# Patient Record
Sex: FEMALE | Race: WHITE
[De-identification: names, ages, dates, MRNs, and addresses within clinical notes are randomized per-mention and may not be internally consistent; named-entity substitution may affect disease eponyms.]

---

## 2017-01-09 LAB
ALBUMIN SERPL-MCNC: 3.6 G/DL (ref 3.2–4.5)
ALT SERPL-CCNC: 21 U/L (ref 0–55)
ANION GAP SERPL CALC-SCNC: 9 MMOL/L (ref 5–14)
AST SERPL-CCNC: 18 U/L (ref 5–34)
BASOPHILS # BLD AUTO: 0 10^3/UL (ref 0–0.1)
BASOPHILS NFR BLD AUTO: 0 % (ref 0–10)
BILIRUB SERPL-MCNC: 0.5 MG/DL (ref 0.1–1)
BUN SERPL-MCNC: 22 MG/DL (ref 7–18)
BUN/CREAT SERPL: 22
CALCIUM SERPL-MCNC: 8.6 MG/DL (ref 8.5–10.1)
CHLORIDE SERPL-SCNC: 104 MMOL/L (ref 98–107)
CO2 SERPL-SCNC: 22 MMOL/L (ref 21–32)
CREAT SERPL-MCNC: 1.01 MG/DL (ref 0.6–1.3)
EOSINOPHIL # BLD AUTO: 0.2 10^3/UL (ref 0–0.3)
EOSINOPHIL NFR BLD AUTO: 2 % (ref 0–10)
ERYTHROCYTE [DISTWIDTH] IN BLOOD BY AUTOMATED COUNT: 14.6 % (ref 10–14.5)
GFR SERPLBLD BASED ON 1.73 SQ M-ARVRAT: 54 ML/MIN
GLUCOSE SERPL-MCNC: 259 MG/DL (ref 70–105)
LDH SERPL L TO P-CCNC: 319 U/L (ref 125–220)
LYMPHOCYTES # BLD AUTO: 1.5 X 10^3 (ref 1–4)
LYMPHOCYTES NFR BLD AUTO: 23 % (ref 12–44)
MCH RBC QN AUTO: 27 PG (ref 25–34)
MCHC RBC AUTO-ENTMCNC: 30 G/DL (ref 32–36)
MCV RBC AUTO: 88 FL (ref 80–99)
MONOCYTES # BLD AUTO: 0.5 X 10^3 (ref 0–1)
MONOCYTES NFR BLD AUTO: 8 % (ref 0–12)
NEUTROPHILS # BLD AUTO: 4.3 X 10^3 (ref 1.8–7.8)
NEUTROPHILS NFR BLD AUTO: 66 % (ref 42–75)
PLATELET # BLD: 192 10^3/UL (ref 130–400)
PMV BLD AUTO: 10.4 FL (ref 7.4–10.4)
POTASSIUM SERPL-SCNC: 4.9 MMOL/L (ref 3.6–5)
PROT SERPL-MCNC: 6.3 G/DL (ref 6.4–8.2)
RBC # BLD AUTO: 3.55 10^6/UL (ref 4.35–5.85)
RETICS/RBC NFR: 2.41 % (ref 0.5–2.4)
SODIUM SERPL-SCNC: 135 MMOL/L (ref 135–145)
WBC # BLD AUTO: 6.5 10^3/UL (ref 4.3–11)

## 2017-01-09 NOTE — XMS REPORT
Continuity of Care Document

 Created on: 2016



MADISON SCHUMACHER

External Reference #: [id number]

: 1945

Sex: Female



Demographics







 Address  [address] [place]  [address]

 

 Home Phone  [phone]

 

 Preferred Language  English

 

 Marital Status  Unknown

 

 Synagogue Affiliation  Unknown

 

 Race  Unknown

 

 Ethnic Group  Unknown





Author







 Author  [facility]

 

 Organization  [facility]

 

 Address  Unknown

 

 Phone  Unavailable







Support







 Name  Relationship  Address  Phone

 

 KOKO DOTSON DO  Caregiver  [address]

[place]  [address] [phone]

 

 FOX NAVARRO ARNP  Caregiver  [address] [facility] [place]  [address] [phone]

 

 EMILIO SCHUMACHER  Next Of Kin  [address] [place]  [address] [phone]







Care Team Providers







 Care Team Member Name  Role  Phone

 

 KOKO DOTSON DO  PCP  [phone]







Insurance Providers







 Payer Name  Policy Number  Subscriber Name  Relationship

 

 [employer]  [id number]  Madison Schumacher  19 Self / Same As Patient

 

 [employer]  [id number]  Madison Schumacher Self / Same As Patient







Advance Directives







 Directive  Response  Recorded Date/Time

 

 Advance Directives  Yes  16 2:06pm

 

 Health Care Power of   Yes  16 2:06pm

 

 Organ Donor  Yes  16 2:06pm







Problems

Active Problems







 Medical Problem  Onset Date  Status

 

 CHF (congestive heart failure)  Unknown  Acute

 

 Cellulitis, abdominal wall  Unknown  Acute

 

 Chest pain  Unknown  Acute

 

 Fracture of foot bone, left, closed  Unknown  Acute

 

 Fracture of toe  Unknown  Acute

 

 History of thrombocytopenia  Unknown  Acute

 

 History of thrombocytopenia  Unknown  Acute

 

 Hypertensive urgency  Unknown  Acute

 

 Hypertensive urgency  Unknown  Acute

 

 Hypomagnesemia  Unknown  Acute

 

 Hypoxemia  Unknown  Acute

 

 Knee pain  Unknown  Acute

 

 Knee pain  Unknown  Acute

 

 Left low back pain  Unknown  Acute

 

 Sepsis  Unknown  Acute

 

 Sprain of knee  Unknown  Acute







Medications

Current Home Medications







 Medication  Dose  Units  Route  Directions  Days/Qty  Instructions  Start Date

 

 Glipizide (Glucotrol) 5 Mg  5  Mg  Oral  Daily 0630     TAKES 1 TAB IN THE AM 
AND 1 & 1/2 TABS IN THE EVENING  09

 

 Hydrochlorothiazide 12.5 Mg  12.5  Mg  Oral  Daily        09

 

 Glipizide (Glucotrol) 5 Mg  7.5  Mg  Oral  Daily 1700     TAKES 1 TAB IN THE 
AM AND 1 & 1/2 TABS IN THE EVENING  11

 

 Benazepril Hcl 40 Mg  40  Mg  Oral  Daily        06/10/13

 

 Albuterol Sulfate 1 Puff  2  Puff  Inhalation  Every 6 Hours as needed for 
Shortness Of Breath        14

 

 Aspirin 81 Mg  81  Mg  Oral  Daily        03/28/15

 

 Potassium Citrate 15 Meq  15  Meq  Oral  Twice A Day        08/30/15

 

 Omega-3 Fatty Acids/Fish Oil 1 Each  1,000  Mg  Oral  Daily        08/30/15

 

 Carvedilol 12.5 Mg  12.5  Mg  Oral  Twice A Day        08/31/15

 

 Ticagrelor 90 Mg  90  Mg  Oral  Twice A Day        08/31/15

 

 Albuterol Sulfate 2.5 Mg/3 Ml  2.5  Mg  Nebullizer  Three Times A Day as 
needed for Wheezing        08/31/15

 

 Naproxen Sodium 220 Mg  440  Mg  Oral  Twice A Day as needed for Pain     
TAKES 2 (220MG) TABLETS  08/31/15

 

 Atorvastatin Calcium 10 Mg  10  Mg  Oral  Daily        08/31/15

 

 Pantoprazole Sodium 40 Mg  40  Mg  Oral  Daily        08/31/15

 

 Fluticasone/Salmeterol 1 Each  1  Puff  Inhalation  Daily as needed for 
Allergies        08/31/15

 

 Insulin Lispro 100 Unit/1 Ml  6-8  Unit  Sub-Q  With Breakfast as needed for 
Blood Sugar Above 120        08/31/15

 

 Amoxicillin/Potassium Clav 1 Each  1  Each  Oral  Twice A Day  10     09/02/15

 

 Tramadol Hcl 50 Mg  50  Mg  Oral  Every 6 Hours as needed for Pain  20     





Past Home Medications







 Medication  Directions  Ordered  Status

 

 Ezetimibe 10 Mg Tablet,     09  Discontinued

 

 Carvedilol (Coreg) 6.25 Mg Tablet, 6.25 Mg Oral  Twice A Day  09  
Discontinued

 

 Amlodipine/Benazepril Hcl 1 Each Capsule, 1 Tab Oral  Daily  09  
Discontinued

 

 Metformin Hcl (Glucophage) 1,000 Mg Tablet, 1000 Mg Oral  Twice A Day    Discontinued

 

 Aspirin 81 Mg Chew,     09  Discontinued

 

 Cholecalciferol 1,000 Unit Tablet,     09  Discontinued

 

 Naproxen Sodium 220 Mg Tablet,     09  Discontinued

 

 Insulin Glargine 100 Unit/1 Ml Vial,   Subcutaneously  Bedtime for Sliding 
Scale  09  Discontinued

 

 Insulin Human Lispro 100 U/Ml Vial, 6-8 Units Subcutaneously  With Breakfast 
as needed for Bs Above 120  09  Discontinued

 

 Ezetimibe 10 Mg Tablet, 10 Mg Oral  Daily  09  Discontinued

 

 Glipizide (Glucotrol) 5 Mg Tablet,     09  Discontinued

 

 Salmeterol Xinafoate/Fluticasone 1 Diskus Inhp, 1 Puff Inhalation  Daily as 
needed for Allergies  12/10/10  Discontinued

 

 Albuterol 8.5 Gm Hfa.aer.ad, 8.5 Gm Inhalation  Every 4HRS as needed  12/10/10
  Discontinued

 

 Clindamycin Hcl 75 Mg Capsule, 2 Tab Oral  Daily  12/10/10  Discontinued

 

 Acetaminophen/Hydrocodone Bitart 1 Tab Tab, 1 - 2 Ea Oral  Q4hr Prn  12/14/10  
Discontinued

 

 Nitrofurantoin Macrocrystals 100 Mg Capsule, 1 Cap Oral  Twice A Day  13
  Discontinued

 

 Phenazopyridine Hcl 200 Mg Tablet, 1 Each Oral  Three Times A Day And Prn    Discontinued

 

 Hyoscyamine Sulfate 0.125 Mg Tab, 1 - 2 Each Oral  Q4hr Prn  13  
Discontinued

 

 Acetaminophen/Hydrocodone Bitart 1 Tab Tablet, 1 - 2 Tab Oral  Every 4HRS as 
needed  13  Discontinued

 

 Amlodipine Besylate (Norvasc 10 Mg) 10 Mg Tablet, 10 Mg Oral  Daily  06/10/13  
Discontinued

 

 Clindamycin Hcl 150 Mg Cap, 150 Mg Oral  Daily  06/10/13  Discontinued

 

 Nitrofurantoin Macrocrystals 100 Mg Capsule, 100 Mg Oral  Twice A Day  06/10/
13  Discontinued

 

 Aspirin 81 Mg Tabec, 81 Mg Oral  Daily  13  Discontinued

 

 Clopidogrel Bisulfate 75 Mg Tablet, 75 Mg Oral  Daily  13  Discontinued

 

 Atorvastatin Calcium 10 Mg Tablet, 10 Mg Oral  Daily  13  Discontinued

 

 Omega-3/Dha/Epa/Fish Oil 1 Each Capsule.dr, 3000 Mg Oral  Daily  13  
Discontinued

 

 Pantoprazole Sodium 40 Mg Tablet.dr, 40 Mg Oral  Daily  13  Discontinued

 

 Cyclobenzaprine Hcl (Flexeril) 10 Mg Tablet, 1 Each Oral  Three Times A Day 
And Prn  13  Discontinued

 

 Acetaminophen/Hydrocodone Bitart 1 Each Tablet, 1 Each Oral  Q4hr Prn    Discontinued

 

 Clindamycin Hcl 150 Mg Cap, 150 Mg Oral  Daily  14  Discontinued

 

 Naproxen Sodium 220 Mg Capsule, 220 Mg Oral  Twice A Day as needed for Pain    Discontinued

 

 Acetaminophen/Hydrocodone Bitart 1 Each Tablet, 1 Each Oral  Every 6 Hours as 
needed for Pain  14  Discontinued

 

 Omega-3/Dha/Epa/Fish Oil 1,200 Mg Capsule, 1500 Mg Oral  Twice A Day  10/08/14
  Discontinued

 

 Clindamycin Hcl 150 Mg Capsule, 150 Mg Oral  Bedtime  10/08/14  Discontinued

 

 [Poly-Iron] , 150 Mg Oral  Mon, Wed, Fri  10/08/14  Discontinued

 

 Potassium Citrate 5 Meq Tablet.sa, 15 Meq Oral  Twice A Day  10/08/14  
Discontinued

 

 Carvedilol (Coreg) 12.5 Mg Tablet, 12.5 Mg Oral  Twice A Day  10/08/14  
Discontinued

 

 Acetaminophen/Hydrocodone Bitart 1 Each Tablet, 0.5 Tab Oral  Q4 -6H as needed 
for Pain  10/08/14  Discontinued

 

 Insulin Glargine 100 Unit/1 Ml Vial, 50   Subcutaneously  Bedtime for Sliding 
Scale  10/10/14  Discontinued

 

 Prednisone 20 Mg Tab, 100 Mg Oral  Daily  10/10/14  Discontinued

 

 Omega-3S/Dha/Epa/Fish Oil/D3 1 Each Capsule, 1500 Mg Oral  Twice A Day  01/13/
15  Discontinued

 

 Insulin Glargine,Hum.rec.anlog 100 Unit/1 Ml Vial, 30-50 Unit Sub-Q  Bedtime  
01/13/15  Discontinued

 

 Amlodipine Besylate 10 Mg Tablet, 10 Mg Oral  Daily  01/13/15  Discontinued

 

 [Ticagrelor] 90 Mg Tablet, 90 Mg Oral  Twice A Day  01/14/15  Discontinued

 

 [Aspirin] 81 Mg Tabec, 81 Mg Oral  Daily  01/14/15  Discontinued

 

 Carvedilol 12.5 Mg Tablet, 12.5 Mg Oral  Twice A Day  01/14/15  Discontinued

 

 Hydrocodone Bit/Acetaminophen 1 Each Tablet, 0.5-1 Ea Oral  Every 6 Hours as 
needed for Pain  03/28/15  Discontinued

 

 Cefdinir (Omnicef) 300 Mg Capsule, 1 Each Oral  Twice A Day  03/28/15  
Discontinued

 

 Albuterol Sulfate 0.63 Mg/3 Ml Vial.neb, 0.83 Mg Inhalation  Three Times A Day 
as needed for Shortness Of Breath  08/30/15  Discontinued

 

 [Aleve] ,     08/30/15  Discontinued







Social History







 Social History Problem  Response  Recorded Date/Time

 

 Alcohol Use  Occasionally Uses  2016 2:06pm

 

 Recreational Drug Use  No  2016 2:06pm

 

 Recent Foreign Travel  No  10/08/2014 1:31pm

 

 Recent Infectious Disease Exposure  No  10/08/2014 1:31pm

 

 Hospitalization with Isolation  Denies  10/08/2014 1:31pm

 

 Sexually Transmitted Disease  No  2016 2:06pm

 

 HIV/AIDS  No  2016 2:06pm

 

 Do you dip or chew tobacco?  No  2016 2:06pm







Hospital Discharge Instructions

Current inpatient/outpatient. Discharge instructions are currently unavailable.



Plan of Care







 Prescriptions   







Functional Status

No functional status results.



Allergies, Adverse Reactions, Alerts







 Allergen  Type  Severity  Reaction  Status  Last Updated

 

 heparin ([id number])  Allergy  Unknown     Active  01/13/15

 

 TAPE  Allergy  Unknown     Active  14







Immunizations







 Name  Given  Type

 

 Date of Pneumonia Vaccine  10/08/13  Historical

 

 Date of Influenza Vaccine  14  Historical

 

 Hepatitis A  No  Historical

 

 Hepatitis B  No  Historical

 

 Tetanus Booster (TDap)  Unknown  Historical







Vital Signs

No known vital signs results.



Results

Pending Laboratory Results







 Test Name  Collection Date/Time

 

     

 

     

 

     

 

     

 

     

 

     

 

     

 

     

 

     

 

     

 

     

 

     

 

     

 

     

 

     

 

     

 

     

 

     

 

     

 

     

 

     

 

     

 

     

 

     

 

     

 

     

 

     

 

     

 

     

 

     

 

     

 

     

 

     

 

     

 

     

 

     

 

     

 

     

 

     

 

     

 

     







Procedures

No known history of procedures.



Encounters







 Encounter  Location  Arrival/Admit Date  Discharge/Depart Date  Attending 
Provider

 

 Registered Clinic  [facility]  16 9:53am     FOX NAVARRO

 

 Discharged Recurring  [facility]  16 9:51am   11:59pm  ROSALBA PHILLIPS

## 2017-01-19 ENCOUNTER — HOSPITAL ENCOUNTER (OUTPATIENT)
Dept: HOSPITAL 75 - ONC | Age: 72
LOS: 80 days | Discharge: HOME | End: 2017-04-09
Attending: INTERNAL MEDICINE
Payer: MEDICARE

## 2017-01-19 DIAGNOSIS — E11.9: ICD-10-CM

## 2017-01-19 DIAGNOSIS — M87.9: ICD-10-CM

## 2017-01-19 DIAGNOSIS — Z79.899: ICD-10-CM

## 2017-01-19 DIAGNOSIS — D69.3: Primary | ICD-10-CM

## 2017-01-19 DIAGNOSIS — D50.9: ICD-10-CM

## 2017-01-19 DIAGNOSIS — E66.01: ICD-10-CM

## 2017-01-19 DIAGNOSIS — I25.10: ICD-10-CM

## 2017-01-19 PROCEDURE — 80053 COMPREHEN METABOLIC PANEL: CPT

## 2017-01-19 PROCEDURE — 99213 OFFICE O/P EST LOW 20 MIN: CPT

## 2017-01-19 PROCEDURE — 85025 COMPLETE CBC W/AUTO DIFF WBC: CPT

## 2017-01-19 PROCEDURE — 82728 ASSAY OF FERRITIN: CPT

## 2017-01-19 PROCEDURE — 85045 AUTOMATED RETICULOCYTE COUNT: CPT

## 2017-01-19 PROCEDURE — 36415 COLL VENOUS BLD VENIPUNCTURE: CPT

## 2017-01-19 PROCEDURE — 96365 THER/PROPH/DIAG IV INF INIT: CPT

## 2017-01-19 PROCEDURE — 83615 LACTATE (LD) (LDH) ENZYME: CPT

## 2017-01-27 ENCOUNTER — HOSPITAL ENCOUNTER (EMERGENCY)
Dept: HOSPITAL 75 - ER | Age: 72
Discharge: HOME | End: 2017-01-27
Payer: MEDICARE

## 2017-01-27 VITALS — SYSTOLIC BLOOD PRESSURE: 172 MMHG | DIASTOLIC BLOOD PRESSURE: 70 MMHG

## 2017-01-27 VITALS — BODY MASS INDEX: 45.99 KG/M2 | WEIGHT: 293 LBS | HEIGHT: 67 IN

## 2017-01-27 DIAGNOSIS — Z79.4: ICD-10-CM

## 2017-01-27 DIAGNOSIS — H61.21: ICD-10-CM

## 2017-01-27 DIAGNOSIS — I10: ICD-10-CM

## 2017-01-27 DIAGNOSIS — Z79.899: ICD-10-CM

## 2017-01-27 DIAGNOSIS — Z79.82: ICD-10-CM

## 2017-01-27 DIAGNOSIS — E11.9: ICD-10-CM

## 2017-01-27 DIAGNOSIS — Z95.5: ICD-10-CM

## 2017-01-27 DIAGNOSIS — J06.9: Primary | ICD-10-CM

## 2017-01-27 LAB
BASOPHILS # BLD AUTO: 0 10^3/UL (ref 0–0.1)
BASOPHILS NFR BLD AUTO: 0 % (ref 0–10)
EOSINOPHIL # BLD AUTO: 0.1 10^3/UL (ref 0–0.3)
EOSINOPHIL NFR BLD AUTO: 2 % (ref 0–10)
ERYTHROCYTE [DISTWIDTH] IN BLOOD BY AUTOMATED COUNT: 18.6 % (ref 10–14.5)
LYMPHOCYTES # BLD AUTO: 1.1 X 10^3 (ref 1–4)
LYMPHOCYTES NFR BLD AUTO: 19 % (ref 12–44)
MCH RBC QN AUTO: 29 PG (ref 25–34)
MCHC RBC AUTO-ENTMCNC: 32 G/DL (ref 32–36)
MCV RBC AUTO: 91 FL (ref 80–99)
MONOCYTES # BLD AUTO: 0.4 X 10^3 (ref 0–1)
MONOCYTES NFR BLD AUTO: 8 % (ref 0–12)
NEUTROPHILS # BLD AUTO: 4.1 X 10^3 (ref 1.8–7.8)
NEUTROPHILS NFR BLD AUTO: 71 % (ref 42–75)
PLATELET # BLD: 154 10^3/UL (ref 130–400)
PMV BLD AUTO: 11 FL (ref 7.4–10.4)
RBC # BLD AUTO: 3.8 10^6/UL (ref 4.35–5.85)
WBC # BLD AUTO: 5.7 10^3/UL (ref 4.3–11)

## 2017-01-27 PROCEDURE — 36415 COLL VENOUS BLD VENIPUNCTURE: CPT

## 2017-01-27 PROCEDURE — 99285 EMERGENCY DEPT VISIT HI MDM: CPT

## 2017-01-27 PROCEDURE — 85025 COMPLETE CBC W/AUTO DIFF WBC: CPT

## 2017-01-27 PROCEDURE — 87804 INFLUENZA ASSAY W/OPTIC: CPT

## 2017-01-27 NOTE — ED COUGH/URI
General


Chief Complaint:  Cough/Cold/Flu Symptoms


Stated Complaint:  COUGH/COLD SYMPTOMS


Nursing Triage Note:  


PT STATES SHE HAS HAD A COUGH, CONGESTION AND SOA FOR 3-4 DAYS. STATES SHE 


THOUGHT SHE HAD A FEVER THIS AM.


Source:  patient


Exam Limitations:  no limitations





History of Present Illness


Time seen by provider:  17:09


Initial Comments


The patient is a 71-year-old white female known to me.  She reports that she 

has developed upper respiratory symptoms over the last 3-4 days.  She has a 

stuffiness and decreased hearing in her right ear.  It Feels like there is a 

lump in her throat and she has had heavy postnasal drip.  She has coughing 

spells that make her very breathless but otherwise does not feel short of 

breath.  She has been coughing up some thick phlegm which seems to help.


Timing/Duration:  week


Severity/Quality:  moderate, productive cough


Associated Symptoms:  dizziness, earache, nasal congestion, nasal drainage





Allergies and Home Medications


Allergies


Coded Allergies:  


     heparin (Unverified  Allergy, Unknown, 1/13/15)


Uncoded Allergies:  


     TAPE (Allergy, Unknown, 6/18/14)





Home Medications


Albuterol Sulfate 1 Puff Puff 2 PUFF IH Q6H PRN PRN SHORTNESS OF BREATH (

Reported) 


Albuterol Sulfate 2.5 Mg/3 Ml Vial.neb 2.5 MG NEB TID PRN PRN WHEEZING (Reported

) 


Amoxicillin/Potassium Clav 1 Each Tablet #10 1 EACH PO BID 


   Prescribed by: KOKO DOTSON on 9/2/15 1301


Aspirin 81 Mg Tablet.dr 81 MG PO DAILY (Reported) 


Atorvastatin Calcium 10 Mg Tablet 10 MG PO DAILY (Reported) 


Benazepril Hcl 40 Mg Tablet 40 MG PO DAILY (Reported) 


Carvedilol 12.5 Mg Tablet 12.5 MG PO BID (Reported) 


Fluticasone/Salmeterol 1 Each Blst.w.dev 1 PUFF INH DAILY PRN PRN ALLERGIES (

Reported) 


Glipizide 5 Mg Tablet 5 MG PO DAILY 0630 (Reported) 


   TAKES 1 TAB IN THE AM AND 1 & 1/2 TABS IN THE EVENING 


Glipizide 5 Mg Tablet 7.5 MG PO DAILY 1700 (Reported) 


   TAKES 1 TAB IN THE AM AND 1 & 1/2 TABS IN THE EVENING 


Hydrochlorothiazide 12.5 Mg Cap 12.5 MG PO DAILY (Reported) 


Insulin Lispro 100 Unit/1 Ml Vial 6-8 UNIT SQ WITH BREAKFAST PRN PRN BLOOD 

SUGAR ABOVE 120 (Reported) 


Naproxen Sodium 220 Mg Tablet 440 MG PO BID PRN PRN PAIN (Reported) 


   TAKES 2 (220MG) TABLETS 


Omega-3 Fatty Acids/Fish Oil 1 Each Capsule 1,000 MG PO DAILY (Reported) 


Pantoprazole Sodium 40 Mg Tablet.dr 40 MG PO DAILY (Reported) 


Potassium Citrate 15 Meq Tab 15 MEQ PO BID (Reported) 


Ticagrelor 90 Mg Tablet 90 MG PO BID (Reported) 


Tramadol HCl 50 Mg Tablet #20 50 MG PO Q6H PRN PRN PAIN 


   Prescribed by: ATUL MARES on 5/2/16 1518


Tramadol HCl 50 Mg Tablet #20 50 MG PO Q4H 


   Prescribed by: ABISAI DANIELLE on 9/28/16 1540





Constitutional:   see HPI


EENTM:   hearing loss nose congestion


Respiratory:   see HPI cough


Cardiovascular:   no symptoms reported


Gastrointestinal:   no symptoms reported


Genitourinary:   no symptoms reported


Musculoskeletal:   no symptoms reported


Skin:   no symptoms reported


Psychiatric/Neurological:   No Symptoms Reported


Hematologic/Lymphatic:   No Symptoms Reported


Immunological/Allergic:   no symptoms reported





Past Medical-Social-Family Hx


Patient Social History


Alcohol Use:  Denies Use


Recreational Drug Use:  No


Smoking Status:  Never a Smoker


Former Smoker/When Quit:  Oct 8, 1984


Recent Foreign Travel:  No


Contact w/Someone Who Travel:  No


Recent Infectious Disease Expo:  No


Recent Hopitalizations:  No


Physical Abuse Screen:  No


Sexual Abuse:  No





Immunizations Up To Date


Tetanus Booster (TDap):  Unknown


Date of Pneumonia Vaccine:  Oct 8, 2013


Date of Influenza Vaccine:  Oct 1, 2016





Seasonal Allergies


Seasonal Allergies:  Yes





Surgeries


HX Surgeries:  Yes (LITHOTRIPSY, STONE REMOVAL, J STENT; CARDIAC CATHS--STENTS 

X 2)


Surgeries:  Cardiac, Coronary Stent, Gallbladder, Renal





Respiratory


Hx Respiratory Disorders:  Yes


Respiratory Disorders:  Asthma





Cardiovascular


Hx Cardiac Disorders:  Yes (CORONARY STENTS X2)


Cardiac Disorders:  Coronary Artery Disease, High Cholesterol, Hypertension





Neurological


Hx Neurological Disorders:  No





Reproductive System


Hx Reproductive Disorders:  No


Sexually Transmitted Disease:  No


HIV/AIDS:  No


GYN History:  Menopausal





Genitourinary


Hx Genitourinary Disorders:  Yes (KIDNEY STENTS)


Genitourinary Disorders:  Kidney Stones





Gastrointestinal


Hx Gastrointestinal Disorders:  Yes


Gastrointestinal Disorders:  Gastroesophageal Reflux





Musculoskeletal


Hx Musculoskeletal Disorders:  Yes (CHRONIC OSTEOMYELYTIS LEFT SHIN WITH 

LYMPHEDEMA;  WHEELCHAIR BOUND )


Musculoskeletal Disorders:  Degenerate Disk Disease, Arthritis





Endocrine


Hx Endocrine Disorders:  Yes (DM TYPE II; MORBID OBESITY)


Endocrine Disorders:  Diabetes, Insulin dep





HEENT


HX ENT Disorders:  No





Cancer


Hx Cancer:  No





Psychosocial


Hx Psychiatric Problems:  No





Integumentary


HX Skin/Integumentary Disorder:  No


Skin/Integumentary Disorders:  Recent Skin Changes





Blood Transfusions


Hx Blood Disorders:  Yes (ITP)


Adverse Reaction to a Blood Tr:  No





Family Medical History


Significant Family History:  No Pertinent Family Hx


Family Medial History:  


Cardiovascular disease


  19 MOTHER


Diabetes mellitus


  19 MOTHER


Paternal family history of emphysema


  19 FATHER





Physical Exam


Vital Signs





 Vital Sign - Last 12Hours








 1/27/17 1/27/17





 15:53 15:56


 


Temp 97.8 


 


Pulse 88 


 


Resp 18 


 


B/P 173/75 


 


O2 Delivery  Room Air





Capillary Refill : Less Than 3 Seconds


General Appearance:   mild distress


Eyes:  Bilateral Eye Normal Inspection


HEENT:   other (cerumen impaction right ear obscuring the tympanic membrane)


Neck:   non-tender full range of motion supple


Respiratory:   chest non-tender lungs clear normal breath sounds no respiratory 

distress no accessory muscle use respiratory distress


Cardiovascular:   normal peripheral pulses regular rate, rhythm no edema no 

gallop no JVD no murmur


Gastrointestinal:   other (large pannus)


Extremities:   normal range of motion non-tender normal inspection no pedal 

edema no calf tenderness normal capillary refill


Neurologic/Psychiatric:   CNs II-XII nml as tested no motor/sensory deficits 

alert normal mood/affect oriented x 3


Skin:   normal color warm/dry





Progress/Results/Core Measures


Results/Orders


Lab Results





 Laboratory Tests








Test


  1/27/17


16:55 Range/Units


 


 


Basophils # (Auto)


  0.0 


  0.0-0.1


10^3/uL


 


Basophils (%) (Auto) 0  0-10  %


 


Eosinophils # (Auto)


  0.1 


  0.0-0.3


10^3/uL


 


Eosinophils (%) (Auto) 2  0-10  %


 


Hematocrit 35  35-52  %


 


Hemoglobin 11.0 L 11.5-16.0  G/DL


 


Lymphocytes # (Auto) 1.1  1.0-4.0  X 10^3


 


Lymphocytes (%) (Auto) 19  12-44  %


 


Mean Corpuscular Hemoglobin 29  25-34  PG


 


Mean Corpuscular Hemoglobin


Concent 32 


  32-36  G/DL


 


 


Mean Corpuscular Volume 91  80-99  FL


 


Mean Platelet Volume 11.0 H 7.4-10.4  FL


 


Monocytes # (Auto) 0.4  0.0-1.0  X 10^3


 


Monocytes (%) (Auto) 8  0-12  %


 


Neutrophils # (Auto) 4.1  1.8-7.8  X 10^3


 


Neutrophils (%) (Auto) 71  42-75  %


 


Platelet Count


  154 


  130-400


10^3/uL


 


Red Blood Count


  3.80 L


  4.35-5.85


10^6/uL


 


Red Cell Distribution Width 18.6 H 10.0-14.5  %


 


White Blood Count


  5.7 


  4.3-11.0


10^3/uL








Micro Results








 Microbiology


1/27/17 Influenza Types A,B Antigen (KIM) - Final, Complete


          








My Orders





 Orders-CHEPE GRAHAM MD


Cbc With Automated Diff (1/27/17 16:34)


Influenza A And B Antigens (1/27/17 16:34)





Vital Signs/I&O





 Vital Sign - Last 12Hours








 1/27/17 1/27/17





 15:53 15:56


 


Temp 97.8 


 


Pulse 88 


 


Resp 18 


 


B/P 173/75 


 


O2 Delivery  Room Air








Blood Pressure Mean:  107





Departure


Impression


Impression:  


 Primary Impression:  


 Upper respiratory infection


Disposition:  01 HOME, SELF-CARE


Condition:  Stable/Unchanged





Departure-Patient Inst.


Referrals:  


KOKO DOTSON DO (PCP/Family)


Primary Care Physician


Patient Instructions:  Cough, Adult (DC)





Add. Discharge Instructions:


All discharge instructions reviewed with patient and/or family. Voiced 

understanding.


If you develop a fever use Tylenol or ibuprofen for relief.


As Robitussin has worked in the past for you get some and use it


Plenty of liquids or useful as well as lozenges such as halls








CHEPE GRAHAM MD Jan 27, 2017 17:14

## 2017-01-27 NOTE — XMS REPORT
Continuity of Care Document

 Created on: 2016



MADISON SCHUMACHER

External Reference #: [id number]

: 1945

Sex: Female



Demographics







 Address  [address] [place]  [address]

 

 Home Phone  [phone]

 

 Preferred Language  English

 

 Marital Status  Unknown

 

 [place] Affiliation  Unknown

 

 Race  Unknown

 

 Ethnic Group  Unknown





Author







 Author  [facility]

 

 Organization  [facility]

 

 Address  Unknown

 

 Phone  Unavailable







Support







 Name  Relationship  Address  Phone

 

 KOKO DOTSON DO  Caregiver  [address]

[place]  [address] [phone]

 

 FOX NAVARRO ARNP  Caregiver  [address] [facility] [place]  [address] [phone]

 

 EMILIO SCHUMACHER  Next Of Kin  [address] [place]  [address] [phone]







Care Team Providers







 Care Team Member Name  Role  Phone

 

 KOKO DOTSON DO  PCP  [phone]







Insurance Providers







 Payer Name  Policy Number  Subscriber Name  Relationship

 

 [employer]  [id number]  Madison Schumacher  19 Self / Same As Patient

 

 [employer]  [id number]  Madison Schumacher Self / Same As Patient







Advance Directives







 Directive  Response  Recorded Date/Time

 

 Advance Directives  Yes  16 2:06pm

 

 Health Care Power of   Yes  16 2:06pm

 

 Organ Donor  Yes  16 2:06pm







Problems

Active Problems







 Medical Problem  Onset Date  Status

 

 CHF (congestive heart failure)  Unknown  Acute

 

 Cellulitis, abdominal wall  Unknown  Acute

 

 Chest pain  Unknown  Acute

 

 Fracture of foot bone, left, closed  Unknown  Acute

 

 Fracture of toe  Unknown  Acute

 

 History of thrombocytopenia  Unknown  Acute

 

 History of thrombocytopenia  Unknown  Acute

 

 Hypertensive urgency  Unknown  Acute

 

 Hypertensive urgency  Unknown  Acute

 

 Hypomagnesemia  Unknown  Acute

 

 Hypoxemia  Unknown  Acute

 

 Knee pain  Unknown  Acute

 

 Knee pain  Unknown  Acute

 

 Left low back pain  Unknown  Acute

 

 Sepsis  Unknown  Acute

 

 Sprain of knee  Unknown  Acute







Medications

Current Home Medications







 Medication  Dose  Units  Route  Directions  Days/Qty  Instructions  Start Date

 

 Glipizide (Glucotrol) 5 Mg  5  Mg  Oral  Daily 0630     TAKES 1 TAB IN THE AM 
AND 1 & 1/2 TABS IN THE EVENING  09

 

 Hydrochlorothiazide 12.5 Mg  12.5  Mg  Oral  Daily        09

 

 Glipizide (Glucotrol) 5 Mg  7.5  Mg  Oral  Daily 1700     TAKES 1 TAB IN THE 
AM AND 1 & 1/2 TABS IN THE EVENING  11

 

 Benazepril Hcl 40 Mg  40  Mg  Oral  Daily        06/10/13

 

 Albuterol Sulfate 1 Puff  2  Puff  Inhalation  Every 6 Hours as needed for 
Shortness Of Breath        14

 

 Aspirin 81 Mg  81  Mg  Oral  Daily        03/28/15

 

 Potassium Citrate 15 Meq  15  Meq  Oral  Twice A Day        08/30/15

 

 Omega-3 Fatty Acids/Fish Oil 1 Each  1,000  Mg  Oral  Daily        08/30/15

 

 Carvedilol 12.5 Mg  12.5  Mg  Oral  Twice A Day        08/31/15

 

 Ticagrelor 90 Mg  90  Mg  Oral  Twice A Day        08/31/15

 

 Albuterol Sulfate 2.5 Mg/3 Ml  2.5  Mg  Nebullizer  Three Times A Day as 
needed for Wheezing        08/31/15

 

 Naproxen Sodium 220 Mg  440  Mg  Oral  Twice A Day as needed for Pain     
TAKES 2 (220MG) TABLETS  08/31/15

 

 Atorvastatin Calcium 10 Mg  10  Mg  Oral  Daily        08/31/15

 

 Pantoprazole Sodium 40 Mg  40  Mg  Oral  Daily        08/31/15

 

 Fluticasone/Salmeterol 1 Each  1  Puff  Inhalation  Daily as needed for 
Allergies        08/31/15

 

 Insulin Lispro 100 Unit/1 Ml  6-8  Unit  Sub-Q  With Breakfast as needed for 
Blood Sugar Above 120        08/31/15

 

 Amoxicillin/Potassium Clav 1 Each  1  Each  Oral  Twice A Day  10     09/02/15

 

 Tramadol Hcl 50 Mg  50  Mg  Oral  Every 6 Hours as needed for Pain  20     





Past Home Medications







 Medication  Directions  Ordered  Status

 

 Ezetimibe 10 Mg Tablet,     09  Discontinued

 

 Carvedilol (Coreg) 6.25 Mg Tablet, 6.25 Mg Oral  Twice A Day  09  
Discontinued

 

 Amlodipine/Benazepril Hcl 1 Each Capsule, 1 Tab Oral  Daily  09  
Discontinued

 

 Metformin Hcl (Glucophage) 1,000 Mg Tablet, 1000 Mg Oral  Twice A Day    Discontinued

 

 Aspirin 81 Mg Chew,     09  Discontinued

 

 Cholecalciferol 1,000 Unit Tablet,     09  Discontinued

 

 Naproxen Sodium 220 Mg Tablet,     09  Discontinued

 

 Insulin Glargine 100 Unit/1 Ml Vial,   Subcutaneously  Bedtime for Sliding 
Scale  09  Discontinued

 

 Insulin Human Lispro 100 U/Ml Vial, 6-8 Units Subcutaneously  With Breakfast 
as needed for Bs Above 120  09  Discontinued

 

 Ezetimibe 10 Mg Tablet, 10 Mg Oral  Daily  09  Discontinued

 

 Glipizide (Glucotrol) 5 Mg Tablet,     09  Discontinued

 

 Salmeterol Xinafoate/Fluticasone 1 Diskus Inhp, 1 Puff Inhalation  Daily as 
needed for Allergies  12/10/10  Discontinued

 

 Albuterol 8.5 Gm Hfa.aer.ad, 8.5 Gm Inhalation  Every 4HRS as needed  12/10/10
  Discontinued

 

 Clindamycin Hcl 75 Mg Capsule, 2 Tab Oral  Daily  12/10/10  Discontinued

 

 Acetaminophen/Hydrocodone Bitart 1 Tab Tab, 1 - 2 Ea Oral  Q4hr Prn  12/14/10  
Discontinued

 

 Nitrofurantoin Macrocrystals 100 Mg Capsule, 1 Cap Oral  Twice A Day  13
  Discontinued

 

 Phenazopyridine Hcl 200 Mg Tablet, 1 Each Oral  Three Times A Day And Prn    Discontinued

 

 Hyoscyamine Sulfate 0.125 Mg Tab, 1 - 2 Each Oral  Q4hr Prn  13  
Discontinued

 

 Acetaminophen/Hydrocodone Bitart 1 Tab Tablet, 1 - 2 Tab Oral  Every 4HRS as 
needed  13  Discontinued

 

 Amlodipine Besylate (Norvasc 10 Mg) 10 Mg Tablet, 10 Mg Oral  Daily  06/10/13  
Discontinued

 

 Clindamycin Hcl 150 Mg Cap, 150 Mg Oral  Daily  06/10/13  Discontinued

 

 Nitrofurantoin Macrocrystals 100 Mg Capsule, 100 Mg Oral  Twice A Day  06/10/
13  Discontinued

 

 Aspirin 81 Mg Tabec, 81 Mg Oral  Daily  13  Discontinued

 

 Clopidogrel Bisulfate 75 Mg Tablet, 75 Mg Oral  Daily  13  Discontinued

 

 Atorvastatin Calcium 10 Mg Tablet, 10 Mg Oral  Daily  13  Discontinued

 

 Omega-3/Dha/Epa/Fish Oil 1 Each Capsule.dr, 3000 Mg Oral  Daily  13  
Discontinued

 

 Pantoprazole Sodium 40 Mg Tablet.dr, 40 Mg Oral  Daily  13  Discontinued

 

 Cyclobenzaprine Hcl (Flexeril) 10 Mg Tablet, 1 Each Oral  Three Times A Day 
And Prn  13  Discontinued

 

 Acetaminophen/Hydrocodone Bitart 1 Each Tablet, 1 Each Oral  Q4hr Prn    Discontinued

 

 Clindamycin Hcl 150 Mg Cap, 150 Mg Oral  Daily  14  Discontinued

 

 Naproxen Sodium 220 Mg Capsule, 220 Mg Oral  Twice A Day as needed for Pain    Discontinued

 

 Acetaminophen/Hydrocodone Bitart 1 Each Tablet, 1 Each Oral  Every 6 Hours as 
needed for Pain  14  Discontinued

 

 Omega-3/Dha/Epa/Fish Oil 1,200 Mg Capsule, 1500 Mg Oral  Twice A Day  10/08/14
  Discontinued

 

 Clindamycin Hcl 150 Mg Capsule, 150 Mg Oral  Bedtime  10/08/14  Discontinued

 

 [Poly-Iron] , 150 Mg Oral  Mon, Wed, Fri  10/08/14  Discontinued

 

 Potassium Citrate 5 Meq Tablet.sa, 15 Meq Oral  Twice A Day  10/08/14  
Discontinued

 

 Carvedilol (Coreg) 12.5 Mg Tablet, 12.5 Mg Oral  Twice A Day  10/08/14  
Discontinued

 

 Acetaminophen/Hydrocodone Bitart 1 Each Tablet, 0.5 Tab Oral  Q4 -6H as needed 
for Pain  10/08/14  Discontinued

 

 Insulin Glargine 100 Unit/1 Ml Vial, 50   Subcutaneously  Bedtime for Sliding 
Scale  10/10/14  Discontinued

 

 Prednisone 20 Mg Tab, 100 Mg Oral  Daily  10/10/14  Discontinued

 

 Omega-3S/Dha/Epa/Fish Oil/D3 1 Each Capsule, 1500 Mg Oral  Twice A Day  01/13/
15  Discontinued

 

 Insulin Glargine,Hum.rec.anlog 100 Unit/1 Ml Vial, 30-50 Unit Sub-Q  Bedtime  
01/13/15  Discontinued

 

 Amlodipine Besylate 10 Mg Tablet, 10 Mg Oral  Daily  01/13/15  Discontinued

 

 [Ticagrelor] 90 Mg Tablet, 90 Mg Oral  Twice A Day  01/14/15  Discontinued

 

 [Aspirin] 81 Mg Tabec, 81 Mg Oral  Daily  01/14/15  Discontinued

 

 Carvedilol 12.5 Mg Tablet, 12.5 Mg Oral  Twice A Day  01/14/15  Discontinued

 

 Hydrocodone Bit/Acetaminophen 1 Each Tablet, 0.5-1 Ea Oral  Every 6 Hours as 
needed for Pain  03/28/15  Discontinued

 

 Cefdinir (Omnicef) 300 Mg Capsule, 1 Each Oral  Twice A Day  03/28/15  
Discontinued

 

 Albuterol Sulfate 0.63 Mg/3 Ml Vial.neb, 0.83 Mg Inhalation  Three Times A Day 
as needed for Shortness Of Breath  08/30/15  Discontinued

 

 [Aleve] ,     08/30/15  Discontinued







Social History







 Social History Problem  Response  Recorded Date/Time

 

 Alcohol Use  Occasionally Uses  2016 2:06pm

 

 Recreational Drug Use  No  2016 2:06pm

 

 Recent Foreign Travel  No  10/08/2014 1:31pm

 

 Recent Infectious Disease Exposure  No  10/08/2014 1:31pm

 

 Hospitalization with Isolation  Denies  10/08/2014 1:31pm

 

 Sexually Transmitted Disease  No  2016 2:06pm

 

 HIV/AIDS  No  2016 2:06pm

 

 Do you dip or chew tobacco?  No  2016 2:06pm







Hospital Discharge Instructions

Current inpatient/outpatient. Discharge instructions are currently unavailable.



Plan of Care







 Prescriptions   







Functional Status

No functional status results.



Allergies, Adverse Reactions, Alerts







 Allergen  Type  Severity  Reaction  Status  Last Updated

 

 heparin ([id number])  Allergy  Unknown     Active  01/13/15

 

 TAPE  Allergy  Unknown     Active  14







Immunizations







 Name  Given  Type

 

 Date of Pneumonia Vaccine  10/08/13  Historical

 

 Date of Influenza Vaccine  14  Historical

 

 Hepatitis A  No  Historical

 

 Hepatitis B  No  Historical

 

 Tetanus Booster (TDap)  Unknown  Historical







Vital Signs

No known vital signs results.



Results

Pending Laboratory Results







 Test Name  Collection Date/Time

 

     

 

     

 

     

 

     

 

     

 

     

 

     

 

     

 

     

 

     

 

     

 

     

 

     

 

     

 

     

 

     

 

     

 

     

 

     

 

     

 

     

 

     

 

     

 

     

 

     

 

     

 

     

 

     

 

     

 

     

 

     

 

     

 

     

 

     

 

     

 

     

 

     

 

     

 

     

 

     

 

     







Procedures

No known history of procedures.



Encounters







 Encounter  Location  Arrival/Admit Date  Discharge/Depart Date  Attending 
Provider

 

 Registered Clinic  [facility]  16 9:53am     FOX NAVARRO

 

 Discharged Recurring  [facility]  16 9:51am   11:59pm  ROSALBA PHILLIPS

## 2017-01-31 ENCOUNTER — HOSPITAL ENCOUNTER (INPATIENT)
Dept: HOSPITAL 75 - ER | Age: 72
LOS: 2 days | Discharge: HOME | DRG: 194 | End: 2017-02-02
Attending: INTERNAL MEDICINE | Admitting: INTERNAL MEDICINE
Payer: MEDICARE

## 2017-01-31 VITALS — WEIGHT: 293 LBS | HEIGHT: 67 IN | BODY MASS INDEX: 45.99 KG/M2

## 2017-01-31 VITALS — SYSTOLIC BLOOD PRESSURE: 155 MMHG | DIASTOLIC BLOOD PRESSURE: 82 MMHG

## 2017-01-31 VITALS — SYSTOLIC BLOOD PRESSURE: 105 MMHG | DIASTOLIC BLOOD PRESSURE: 57 MMHG

## 2017-01-31 DIAGNOSIS — I25.10: ICD-10-CM

## 2017-01-31 DIAGNOSIS — M06.9: ICD-10-CM

## 2017-01-31 DIAGNOSIS — I10: ICD-10-CM

## 2017-01-31 DIAGNOSIS — G47.33: ICD-10-CM

## 2017-01-31 DIAGNOSIS — E66.01: ICD-10-CM

## 2017-01-31 DIAGNOSIS — K21.9: ICD-10-CM

## 2017-01-31 DIAGNOSIS — Z95.5: ICD-10-CM

## 2017-01-31 DIAGNOSIS — K75.81: ICD-10-CM

## 2017-01-31 DIAGNOSIS — E11.9: ICD-10-CM

## 2017-01-31 DIAGNOSIS — N20.0: ICD-10-CM

## 2017-01-31 DIAGNOSIS — M19.91: ICD-10-CM

## 2017-01-31 DIAGNOSIS — Z87.891: ICD-10-CM

## 2017-01-31 DIAGNOSIS — J45.909: ICD-10-CM

## 2017-01-31 DIAGNOSIS — J18.9: Primary | ICD-10-CM

## 2017-01-31 DIAGNOSIS — E78.00: ICD-10-CM

## 2017-01-31 DIAGNOSIS — Z79.4: ICD-10-CM

## 2017-01-31 LAB
ALBUMIN SERPL-MCNC: 3.5 G/DL (ref 3.2–4.5)
ALT SERPL-CCNC: 17 U/L (ref 0–55)
ANION GAP SERPL CALC-SCNC: 10 MMOL/L (ref 5–14)
ARTERIAL PATENCY WRIST A: (no result)
AST SERPL-CCNC: 19 U/L (ref 5–34)
BASE EXCESS STD BLDA CALC-SCNC: 0.4 MMOL/L (ref -2.5–2.5)
BASOPHILS # BLD AUTO: 0 10^3/UL (ref 0–0.1)
BASOPHILS NFR BLD AUTO: 0 % (ref 0–10)
BILIRUB SERPL-MCNC: 1.1 MG/DL (ref 0.1–1)
BILIRUB UR QL STRIP: NEGATIVE
BODY TEMPERATURE: 101
BUN SERPL-MCNC: 11 MG/DL (ref 7–18)
BUN/CREAT SERPL: 14
CALCIUM SERPL-MCNC: 8.3 MG/DL (ref 8.5–10.1)
CHLORIDE SERPL-SCNC: 100 MMOL/L (ref 98–107)
CO2 BLDA CALC-SCNC: 26.1 MMOL/L (ref 21–31)
CO2 SERPL-SCNC: 22 MMOL/L (ref 21–32)
CREAT SERPL-MCNC: 0.76 MG/DL (ref 0.6–1.3)
EOSINOPHIL # BLD AUTO: 0 10^3/UL (ref 0–0.3)
EOSINOPHIL NFR BLD AUTO: 0 % (ref 0–10)
ERYTHROCYTE [DISTWIDTH] IN BLOOD BY AUTOMATED COUNT: 18.4 % (ref 10–14.5)
GFR SERPLBLD BASED ON 1.73 SQ M-ARVRAT: > 60 ML/MIN
GLUCOSE SERPL-MCNC: 180 MG/DL (ref 70–105)
HCO3 BLDA-SCNC: 25 MMOL/L (ref 23–27)
INR PPP: 1.2 (ref 0.8–1.4)
KETONES UR QL STRIP: NEGATIVE
LEUKOCYTE ESTERASE UR QL STRIP: NEGATIVE
LYMPHOCYTES # BLD AUTO: 1.1 X 10^3 (ref 1–4)
LYMPHOCYTES NFR BLD AUTO: 10 % (ref 12–44)
MCH RBC QN AUTO: 29 PG (ref 25–34)
MCHC RBC AUTO-ENTMCNC: 32 G/DL (ref 32–36)
MCV RBC AUTO: 90 FL (ref 80–99)
MONOCYTES # BLD AUTO: 0.8 X 10^3 (ref 0–1)
MONOCYTES NFR BLD AUTO: 7 % (ref 0–12)
NEUTROPHILS # BLD AUTO: 8.8 X 10^3 (ref 1.8–7.8)
NEUTROPHILS NFR BLD AUTO: 82 % (ref 42–75)
NITRITE UR QL STRIP: NEGATIVE
PCO2 BLDA: 42 MMHG (ref 35–45)
PH BLDA: 7.4 [PH] (ref 7.37–7.43)
PH UR STRIP: 6 [PH] (ref 5–9)
PLATELET # BLD: 185 10^3/UL (ref 130–400)
PMV BLD AUTO: 11.3 FL (ref 7.4–10.4)
PO2 BLDA: 77 MMHG (ref 79–93)
POTASSIUM SERPL-SCNC: 4.8 MMOL/L (ref 3.6–5)
PROT SERPL-MCNC: 6.3 G/DL (ref 6.4–8.2)
PROT UR QL STRIP: (no result)
PROTHROMBIN TIME: 15 SEC (ref 12.2–14.7)
RBC # BLD AUTO: 3.87 10^6/UL (ref 4.35–5.85)
SAO2 % BLDA FROM PO2: 96 % (ref 94–100)
SODIUM SERPL-SCNC: 132 MMOL/L (ref 135–145)
SP GR UR STRIP: 1.01 (ref 1.02–1.02)
SQUAMOUS #/AREA URNS HPF: (no result) /HPF
UROBILINOGEN UR-MCNC: NORMAL MG/DL
WBC # BLD AUTO: 10.7 10^3/UL (ref 4.3–11)
WBC #/AREA URNS HPF: (no result) /HPF

## 2017-01-31 PROCEDURE — 83605 ASSAY OF LACTIC ACID: CPT

## 2017-01-31 PROCEDURE — 82962 GLUCOSE BLOOD TEST: CPT

## 2017-01-31 PROCEDURE — 85610 PROTHROMBIN TIME: CPT

## 2017-01-31 PROCEDURE — 87040 BLOOD CULTURE FOR BACTERIA: CPT

## 2017-01-31 PROCEDURE — 85025 COMPLETE CBC W/AUTO DIFF WBC: CPT

## 2017-01-31 PROCEDURE — 80053 COMPREHEN METABOLIC PANEL: CPT

## 2017-01-31 PROCEDURE — 81000 URINALYSIS NONAUTO W/SCOPE: CPT

## 2017-01-31 PROCEDURE — 70450 CT HEAD/BRAIN W/O DYE: CPT

## 2017-01-31 PROCEDURE — 96374 THER/PROPH/DIAG INJ IV PUSH: CPT

## 2017-01-31 PROCEDURE — 82805 BLOOD GASES W/O2 SATURATION: CPT

## 2017-01-31 PROCEDURE — 36415 COLL VENOUS BLD VENIPUNCTURE: CPT

## 2017-01-31 PROCEDURE — 71020: CPT

## 2017-01-31 PROCEDURE — 94760 N-INVAS EAR/PLS OXIMETRY 1: CPT

## 2017-01-31 PROCEDURE — 94640 AIRWAY INHALATION TREATMENT: CPT

## 2017-01-31 PROCEDURE — 71010: CPT

## 2017-01-31 RX ADMIN — ALBUTEROL SULFATE SCH MG: 2.5 SOLUTION RESPIRATORY (INHALATION) at 20:03

## 2017-01-31 RX ADMIN — SODIUM CHLORIDE SCH MLS/HR: 900 INJECTION, SOLUTION INTRAVENOUS at 18:38

## 2017-01-31 NOTE — ED COUGH/URI
General


Stated Complaint:  SOA


Source:  patient


Exam Limitations:  no limitations





History of Present Illness


Time seen by provider:  13:22


Initial Comments


brought to ER by her adult daughter with reports of fever up to 101, worsening 

shortness of breath with a cough productive of yellow sputum. She was seen here 

in the emergency room on 17 with a negative flu swab.  Daughter reports 

her to be very lethargic and incoherent today


Timing/Duration:  week, getting worse


Severity/Quality:  productive cough


Associated Symptoms:  cough, fever/chills, shortness of breath





Allergies and Home Medications


Allergies


Coded Allergies:  


     heparin (Unverified  Allergy, Unknown, 1/13/15)


Uncoded Allergies:  


     TAPE (Allergy, Unknown, 14)





Home Medications


Albuterol Sulfate 1 Puff Puff 2 PUFF IH Q6H PRN PRN SHORTNESS OF BREATH (

Reported) 


Albuterol Sulfate 2.5 Mg/3 Ml Vial.neb 2.5 MG NEB TID PRN PRN WHEEZING (Reported

) 


Aspirin 81 Mg Tablet.dr 81 MG PO DAILY (Reported) 


Atorvastatin Calcium 10 Mg Tablet 10 MG PO DAILY (Reported) 


Benazepril Hcl 40 Mg Tablet 40 MG PO DAILY (Reported) 


Carvedilol 12.5 Mg Tablet 12.5 MG PO BID (Reported) 


Fluticasone/Salmeterol 1 Each Blst.w.dev 1 PUFF INH DAILY PRN PRN ALLERGIES (

Reported) 


Glipizide 5 Mg Tablet 5 MG PO DAILY 0630 (Reported) 


   TAKES 1 TAB IN THE AM AND 1 & 1/2 TABS IN THE EVENING 


Glipizide 5 Mg Tablet 7.5 MG PO DAILY 1700 (Reported) 


   TAKES 1 TAB IN THE AM AND 1 & 1/2 TABS IN THE EVENING 


Hydrochlorothiazide 12.5 Mg Cap 12.5 MG PO DAILY (Reported) 


Insulin Lispro 100 Unit/1 Ml Vial 6-8 UNIT SQ WITH BREAKFAST PRN PRN BLOOD 

SUGAR ABOVE 120 (Reported) 


Naproxen Sodium 220 Mg Tablet 440 MG PO BID PRN PRN PAIN (Reported) 


   TAKES 2 (220MG) TABLETS 


Omega-3 Fatty Acids/Fish Oil 1 Each Capsule 1,000 MG PO DAILY (Reported) 


Pantoprazole Sodium 40 Mg Tablet.dr 40 MG PO DAILY (Reported) 


Potassium Citrate 15 Meq Tab 15 MEQ PO BID (Reported) 


Ticagrelor 90 Mg Tablet 90 MG PO BID (Reported) 


Tramadol HCl 50 Mg Tablet #20 50 MG PO Q6H PRN PRN PAIN 


   Prescribed by: ATUL MARES on 16 1518


Tramadol HCl 50 Mg Tablet #20 50 MG PO Q4H 


   Prescribed by: ABISAI DANIELLE on 16 1540





Constitutional:   see HPI chills fever


EENTM:   see HPI


Respiratory:   see HPI cough dyspnea on exertion


Cardiovascular:   no symptoms reported


Genitourinary:   no symptoms reported


Musculoskeletal:   no symptoms reported


Skin:   no symptoms reported


Psychiatric/Neurological:   No Symptoms Reported


Hematologic/Lymphatic:   No Symptoms Reported





Past Medical-Social-Family Hx


Patient Social History


Former Smoker/When Quit:  Oct 8, 1984


Recent Hopitalizations:  No





Immunizations Up To Date


Tetanus Booster (TDap):  Unknown


Date of Pneumonia Vaccine:  Oct 8, 2013


Date of Influenza Vaccine:  Oct 1, 2016





Seasonal Allergies


Seasonal Allergies:  Yes





Surgeries


HX Surgeries:  Yes (LITHOTRIPSY, STONE REMOVAL, J STENT; CARDIAC CATHS--STENTS 

X 2)


Surgeries:  Cardiac, Coronary Stent, Gallbladder, Renal





Respiratory


Hx Respiratory Disorders:  Yes


Respiratory Disorders:  Asthma





Cardiovascular


Hx Cardiac Disorders:  Yes (CORONARY STENTS X2)


Cardiac Disorders:  Coronary Artery Disease, High Cholesterol, Hypertension





Neurological


Hx Neurological Disorders:  No





Reproductive System


Hx Reproductive Disorders:  No


Sexually Transmitted Disease:  No


HIV/AIDS:  No


GYN History:  Menopausal





Genitourinary


Hx Genitourinary Disorders:  Yes (KIDNEY STENTS)


Genitourinary Disorders:  Kidney Stones





Gastrointestinal


Hx Gastrointestinal Disorders:  Yes


Gastrointestinal Disorders:  Gastroesophageal Reflux





Musculoskeletal


Hx Musculoskeletal Disorders:  Yes (CHRONIC OSTEOMYELYTIS LEFT SHIN WITH 

LYMPHEDEMA;  WHEELCHAIR BOUND )


Musculoskeletal Disorders:  Degenerate Disk Disease, Arthritis





Endocrine


Hx Endocrine Disorders:  Yes (DM TYPE II; MORBID OBESITY)


Endocrine Disorders:  Diabetes, Insulin dep





HEENT


HX ENT Disorders:  No





Cancer


Hx Cancer:  No





Psychosocial


Hx Psychiatric Problems:  No





Integumentary


HX Skin/Integumentary Disorder:  No


Skin/Integumentary Disorders:  Recent Skin Changes





Blood Transfusions


Hx Blood Disorders:  Yes (ITP)


Adverse Reaction to a Blood Tr:  No





Family Medical History


Significant Family History:  No Pertinent Family Hx


Family Medial History:  


Cardiovascular disease


  19 MOTHER


Diabetes mellitus


  19 MOTHER


Paternal family history of emphysema


  19 FATHER





Physical Exam


Vital Signs





 Vital Sign - Last 12Hours








 17





 13:10 13:35


 


Temp 101.1 


 


Pulse 97 


 


Resp 34 


 


B/P 202/91 


 


O2 Delivery Room Air 


 


O2 Flow Rate  2





Capillary Refill :


General Appearance:   WD/WN no apparent distress


Eyes:  Bilateral Eye EOMI, Bilateral Eye Normal Inspection, Bilateral Eye PERRL


HEENT:   PERRL/EOMI normal ENT inspection


Neck:   non-tender full range of motion


Respiratory:   no respiratory distress no accessory muscle use crackles (

bilateral bases)


Gastrointestinal:   normal bowel sounds non tender soft


Extremities:   normal range of motion non-tender normal inspection other (trace 

bilateral lower extremities pitting edema.  She does have compression stockings 

on)


Neurologic/Psychiatric:   alert normal mood/affect oriented x 3


Skin:   normal color warm/dry





Progress/Results/Core Measures


Results/Orders


Lab Results





 Laboratory Tests








Test


  17


13:35 17


13:48 17


14:19 17


15:05 Range/Units


 


 


Basophils # (Auto)


  0.0 


  


  


  


  0.0-0.1


10^3/uL


 


Basophils (%) (Auto) 0     0-10  %


 


Eosinophils # (Auto)


  0.0 


  


  


  


  0.0-0.3


10^3/uL


 


Eosinophils (%) (Auto) 0     0-10  %


 


Hematocrit 35     35-52  %


 


Hemoglobin 11.1 L    11.5-16.0  G/DL


 


Lymphocytes # (Auto) 1.1     1.0-4.0  X 10^3


 


Lymphocytes (%) (Auto) 10 L    12-44  %


 


Mean Corpuscular Hemoglobin 29     25-34  PG


 


Mean Corpuscular Hemoglobin


Concent 32 


  


  


  


  32-36  G/DL


 


 


Mean Corpuscular Volume 90     80-99  FL


 


Mean Platelet Volume 11.3 H    7.4-10.4  FL


 


Monocytes # (Auto) 0.8     0.0-1.0  X 10^3


 


Monocytes (%) (Auto) 7     0-12  %


 


Neutrophils # (Auto) 8.8 H    1.8-7.8  X 10^3


 


Neutrophils (%) (Auto) 82 H    42-75  %


 


Platelet Count


  185 


  


  


  


  130-400


10^3/uL


 


Red Blood Count


  3.87 L


  


  


  


  4.35-5.85


10^6/uL


 


Red Cell Distribution Width 18.4 H    10.0-14.5  %


 


White Blood Count


  10.7 


  


  


  


  4.3-11.0


10^3/uL


 


Glucometer  198 H     MG/DL


 


Alanine Aminotransferase


(ALT/SGPT) 


  


  17 


  


  0-55  U/L


 


 


Albumin   3.5   3.2-4.5  G/DL


 


Alkaline Phosphatase   138 H    U/L


 


Anion Gap   10   5-14  MMOL/L


 


Aspartate Amino Transf


(AST/SGOT) 


  


  19 


  


  5-34  U/L


 


 


BUN/Creatinine Ratio   14    


 


Blood Urea Nitrogen   11   7-18  MG/DL


 


Calcium Level   8.3 L  8.5-10.1  MG/DL


 


Carbon Dioxide Level   22   21-32  MMOL/L


 


Chloride Level   100     MMOL/L


 


Creatinine


  


  


  0.76 


  


  0.60-1.30


MG/DL


 


Estimat Glomerular Filtration


Rate 


  


  > 60 


  


   


 


 


Glucose Level   180 H    MG/DL


 


INR Comment   1.2   0.8-1.4  


 


Lactic Acid Level   1.8   0.5-2.0  MMOL/L


 


Potassium Level   4.8   3.6-5.0  MMOL/L


 


Prothrombin Time   15.0 H  12.2-14.7  SEC


 


Sodium Level   132 L  135-145  MMOL/L


 


Total Bilirubin   1.1 H  0.1-1.0  MG/DL


 


Total Protein   6.3 L  6.4-8.2  G/DL








My Orders





 Orders-SIXTO BULL APRN


Cbc With Automated Diff (17 13:20)


Comprehensive Metabolic Panel (17 13:20)


Protime With Inr (17 13:20)


Chest 1 View, Ap/Pa Only (17 13:20)


Blood Culture (17 13:20)


Lactic Acid Analyzer (17 13:20)


Acetaminophen  Tablet (Tylenol  Tablet) (17 13:30)


Albuterol/Ipra Inhalation Soln (Duoneb I (17 13:30)


Svn Sm Volume Nebulizer Rt-Rfs (17 13:27)


Albuterol/Ipra Inhalation Soln (Duoneb I (17 13:28)


Metoprolol Tartrate Injection (Lopressor (17 14:15)


Furosemide  Injection (Lasix  Injection) (17 14:15)


Ua Culture If Indicated (17 14:26)


Ibuprofen  Tablet (Motrin  Tablet) (17 15:15)


Arterial Blood Gas (17 15:10)


Ct Head Wo (17 15:15)





Medications Given in ED





 Current Medications








 Medications  Dose


 Ordered  Sig/Adelfo


 Route  Start Time


 Stop Time Status Last Admin


Dose Admin


 


 Acetaminophen  1,000 mg  ONCE  ONCE


 PO  17 13:30


 17 13:31 DC 17 13:57


1,000 MG


 


 Albuterol/


 Ipratropium  3 ml  STK-MED ONCE


 .ROUTE  17 13:28


 17 13:30 DC 17 13:34


3 ML


 


 Furosemide  40 mg  ONCE  ONCE


 IVP  17 14:15


 17 14:16 DC 17 14:16


40 MG


 


 Ibuprofen  600 mg  ONCE  ONCE


 PO  17 15:15


 17 15:16 DC 17 15:14


600 MG








Vital Signs/I&O





 Vital Sign - Last 12Hours








 17





 13:10 13:35


 


Temp 101.1 


 


Pulse 97 


 


Resp 34 


 


B/P 202/91 


 


O2 Delivery Room Air Nasal Cannula


 


O2 Flow Rate  2











Diagnostic Imaging





   Diagonstic Imaging:  Xray


   Plain Films/CT/US/NM/MRI:  chest


Comments


NAME:   DEV SCHUMACHER


MED REC#:   [id number]


ACCOUNT#:   [id number]


PT STATUS:   REG ER


:   1945


PHYSICIAN:   SIXTO BULL


ADMIT DATE:   17/ER


 ***Draft***


Date of Exam:17





CHEST 1 VIEW, AP/PA ONLY














INDICATION: Cough x3 weeks





EXAMINATION: Frontal chest obtained at 151 hours p.m. and


compared with 16.





There is cardiomegaly. There is central vascular congestion. The


study is technically limited but there is no definite infiltrate


or pneumothorax or pleural fluid.





IMPRESSION:


Technically limited study. Cardiomegaly with central vascula


congestion. No definite consolidation or pleural fluid.





Dictated on workstation # [id number]








Dict:   17 1359


Trans:   17 1405


[facility] [id number]





Interpreted by:     MARGOTH MG MD


Electronically signed by:





Departure


Communication


Time/Spoke to Admitting Phy:  15:31


Communication


Discussed with Dr. Caal.  We will admit the patient.


Progress Notes


1531-she is now sitting up in the chair talking and alert.  Family states she 

is much more alert than she was upon arrival though she is still somewhat 

altered.





Impression


Impression:  


 Primary Impression:  


 Febrile illness


 Additional Impressions:  


 Productive cough


 Influenza-like symptoms


Disposition:   ADMITTED AS INPATIENT


Condition:  Stable


Decision to Admit Reason:  Admit from ER (General)


Decision to Admit/Date:  2017


Time/Decision to Admit Time:  15:32





Departure-Patient Inst.


Referrals:  


KOKO DOTSON DO (PCP/Family)


Primary Care Physician








SIXTO BULL 2017 13:24

## 2017-01-31 NOTE — DIAGNOSTIC IMAGING REPORT
PROCEDURE: CT head without contrast.



TECHNIQUE: Multiple contiguous axial images were obtained

through the brain without the use of intravenous contrast.



INDICATION:  Confusion. Altered mental status.



FINDINGS: There is no intracranial hemorrhage, edema, or mass

effect. There are mild periventricular and deep white matter

hypodensities compatible with chronic microvascular ischemic

changes. This is commonly seen at the patient's age. There is no

hydrocephalus. No extra-axial fluid collection.



The calvarium, the paranasal sinuses, and the mastoid air cells

are clear.



IMPRESSION: No acute process.



Dictated by:



Dictated on workstation # [id number]

## 2017-01-31 NOTE — XMS REPORT
Continuity of Care Document

 Created on: 2016



MADISON SCHUMACHER

External Reference #: [id number]

: 1945

Sex: Female



Demographics







 Address  [address] [place]  [address]

 

 Home Phone  [phone]

 

 Preferred Language  English

 

 Marital Status  Unknown

 

 Pentecostalism Affiliation  Unknown

 

 Race  Unknown

 

 Ethnic Group  Unknown





Author







 Author  [facility]

 

 Organization  [facility]

 

 Address  Unknown

 

 Phone  Unavailable







Support







 Name  Relationship  Address  Phone

 

 KOKO DOTSON DO  Caregiver  [address]

[place]  [address] [phone]

 

 FOX NAVARRO ARNP  Caregiver  [address] [facility] [place]  [address] [phone]

 

 EMILIO SCHUMACHER  Next Of Kin  [address] [place]  [address] [phone]







Care Team Providers







 Care Team Member Name  Role  Phone

 

 KOKO DOTSON DO  PCP  [phone]







Insurance Providers







 Payer Name  Policy Number  Subscriber Name  Relationship

 

 [employer]  [id number]  Madison Schumacher  19 Self / Same As Patient

 

 [employer]  [id number]  Madison Schumacher Self / Same As Patient







Advance Directives







 Directive  Response  Recorded Date/Time

 

 Advance Directives  Yes  16 2:06pm

 

 Health Care Power of   Yes  16 2:06pm

 

 Organ Donor  Yes  16 2:06pm







Problems

Active Problems







 Medical Problem  Onset Date  Status

 

 CHF (congestive heart failure)  Unknown  Acute

 

 Cellulitis, abdominal wall  Unknown  Acute

 

 Chest pain  Unknown  Acute

 

 Fracture of foot bone, left, closed  Unknown  Acute

 

 Fracture of toe  Unknown  Acute

 

 History of thrombocytopenia  Unknown  Acute

 

 History of thrombocytopenia  Unknown  Acute

 

 Hypertensive urgency  Unknown  Acute

 

 Hypertensive urgency  Unknown  Acute

 

 Hypomagnesemia  Unknown  Acute

 

 Hypoxemia  Unknown  Acute

 

 Knee pain  Unknown  Acute

 

 Knee pain  Unknown  Acute

 

 Left low back pain  Unknown  Acute

 

 Sepsis  Unknown  Acute

 

 Sprain of knee  Unknown  Acute







Medications

Current Home Medications







 Medication  Dose  Units  Route  Directions  Days/Qty  Instructions  Start Date

 

 Glipizide (Glucotrol) 5 Mg  5  Mg  Oral  Daily 0630     TAKES 1 TAB IN THE AM 
AND 1 & 1/2 TABS IN THE EVENING  09

 

 Hydrochlorothiazide 12.5 Mg  12.5  Mg  Oral  Daily        09

 

 Glipizide (Glucotrol) 5 Mg  7.5  Mg  Oral  Daily 1700     TAKES 1 TAB IN THE 
AM AND 1 & 1/2 TABS IN THE EVENING  11

 

 Benazepril Hcl 40 Mg  40  Mg  Oral  Daily        06/10/13

 

 Albuterol Sulfate 1 Puff  2  Puff  Inhalation  Every 6 Hours as needed for 
Shortness Of Breath        14

 

 Aspirin 81 Mg  81  Mg  Oral  Daily        03/28/15

 

 Potassium Citrate 15 Meq  15  Meq  Oral  Twice A Day        08/30/15

 

 Omega-3 Fatty Acids/Fish Oil 1 Each  1,000  Mg  Oral  Daily        08/30/15

 

 Carvedilol 12.5 Mg  12.5  Mg  Oral  Twice A Day        08/31/15

 

 Ticagrelor 90 Mg  90  Mg  Oral  Twice A Day        08/31/15

 

 Albuterol Sulfate 2.5 Mg/3 Ml  2.5  Mg  Nebullizer  Three Times A Day as 
needed for Wheezing        08/31/15

 

 Naproxen Sodium 220 Mg  440  Mg  Oral  Twice A Day as needed for Pain     
TAKES 2 (220MG) TABLETS  08/31/15

 

 Atorvastatin Calcium 10 Mg  10  Mg  Oral  Daily        08/31/15

 

 Pantoprazole Sodium 40 Mg  40  Mg  Oral  Daily        08/31/15

 

 Fluticasone/Salmeterol 1 Each  1  Puff  Inhalation  Daily as needed for 
Allergies        08/31/15

 

 Insulin Lispro 100 Unit/1 Ml  6-8  Unit  Sub-Q  With Breakfast as needed for 
Blood Sugar Above 120        08/31/15

 

 Amoxicillin/Potassium Clav 1 Each  1  Each  Oral  Twice A Day  10     09/02/15

 

 Tramadol Hcl 50 Mg  50  Mg  Oral  Every 6 Hours as needed for Pain  20     





Past Home Medications







 Medication  Directions  Ordered  Status

 

 Ezetimibe 10 Mg Tablet,     09  Discontinued

 

 Carvedilol (Coreg) 6.25 Mg Tablet, 6.25 Mg Oral  Twice A Day  09  
Discontinued

 

 Amlodipine/Benazepril Hcl 1 Each Capsule, 1 Tab Oral  Daily  09  
Discontinued

 

 Metformin Hcl (Glucophage) 1,000 Mg Tablet, 1000 Mg Oral  Twice A Day    Discontinued

 

 Aspirin 81 Mg Chew,     09  Discontinued

 

 Cholecalciferol 1,000 Unit Tablet,     09  Discontinued

 

 Naproxen Sodium 220 Mg Tablet,     09  Discontinued

 

 Insulin Glargine 100 Unit/1 Ml Vial,   Subcutaneously  Bedtime for Sliding 
Scale  09  Discontinued

 

 Insulin Human Lispro 100 U/Ml Vial, 6-8 Units Subcutaneously  With Breakfast 
as needed for Bs Above 120  09  Discontinued

 

 Ezetimibe 10 Mg Tablet, 10 Mg Oral  Daily  09  Discontinued

 

 Glipizide (Glucotrol) 5 Mg Tablet,     09  Discontinued

 

 Salmeterol Xinafoate/Fluticasone 1 Diskus Inhp, 1 Puff Inhalation  Daily as 
needed for Allergies  12/10/10  Discontinued

 

 Albuterol 8.5 Gm Hfa.aer.ad, 8.5 Gm Inhalation  Every 4HRS as needed  12/10/10
  Discontinued

 

 Clindamycin Hcl 75 Mg Capsule, 2 Tab Oral  Daily  12/10/10  Discontinued

 

 Acetaminophen/Hydrocodone Bitart 1 Tab Tab, 1 - 2 Ea Oral  Q4hr Prn  12/14/10  
Discontinued

 

 Nitrofurantoin Macrocrystals 100 Mg Capsule, 1 Cap Oral  Twice A Day  13
  Discontinued

 

 Phenazopyridine Hcl 200 Mg Tablet, 1 Each Oral  Three Times A Day And Prn    Discontinued

 

 Hyoscyamine Sulfate 0.125 Mg Tab, 1 - 2 Each Oral  Q4hr Prn  13  
Discontinued

 

 Acetaminophen/Hydrocodone Bitart 1 Tab Tablet, 1 - 2 Tab Oral  Every 4HRS as 
needed  13  Discontinued

 

 Amlodipine Besylate (Norvasc 10 Mg) 10 Mg Tablet, 10 Mg Oral  Daily  06/10/13  
Discontinued

 

 Clindamycin Hcl 150 Mg Cap, 150 Mg Oral  Daily  06/10/13  Discontinued

 

 Nitrofurantoin Macrocrystals 100 Mg Capsule, 100 Mg Oral  Twice A Day  06/10/
13  Discontinued

 

 Aspirin 81 Mg Tabec, 81 Mg Oral  Daily  13  Discontinued

 

 Clopidogrel Bisulfate 75 Mg Tablet, 75 Mg Oral  Daily  13  Discontinued

 

 Atorvastatin Calcium 10 Mg Tablet, 10 Mg Oral  Daily  13  Discontinued

 

 Omega-3/Dha/Epa/Fish Oil 1 Each Capsule.dr, 3000 Mg Oral  Daily  13  
Discontinued

 

 Pantoprazole Sodium 40 Mg Tablet.dr, 40 Mg Oral  Daily  13  Discontinued

 

 Cyclobenzaprine Hcl (Flexeril) 10 Mg Tablet, 1 Each Oral  Three Times A Day 
And Prn  13  Discontinued

 

 Acetaminophen/Hydrocodone Bitart 1 Each Tablet, 1 Each Oral  Q4hr Prn    Discontinued

 

 Clindamycin Hcl 150 Mg Cap, 150 Mg Oral  Daily  14  Discontinued

 

 Naproxen Sodium 220 Mg Capsule, 220 Mg Oral  Twice A Day as needed for Pain    Discontinued

 

 Acetaminophen/Hydrocodone Bitart 1 Each Tablet, 1 Each Oral  Every 6 Hours as 
needed for Pain  14  Discontinued

 

 Omega-3/Dha/Epa/Fish Oil 1,200 Mg Capsule, 1500 Mg Oral  Twice A Day  10/08/14
  Discontinued

 

 Clindamycin Hcl 150 Mg Capsule, 150 Mg Oral  Bedtime  10/08/14  Discontinued

 

 [Poly-Iron] , 150 Mg Oral  Mon, Wed, Fri  10/08/14  Discontinued

 

 Potassium Citrate 5 Meq Tablet.sa, 15 Meq Oral  Twice A Day  10/08/14  
Discontinued

 

 Carvedilol (Coreg) 12.5 Mg Tablet, 12.5 Mg Oral  Twice A Day  10/08/14  
Discontinued

 

 Acetaminophen/Hydrocodone Bitart 1 Each Tablet, 0.5 Tab Oral  Q4 -6H as needed 
for Pain  10/08/14  Discontinued

 

 Insulin Glargine 100 Unit/1 Ml Vial, 50   Subcutaneously  Bedtime for Sliding 
Scale  10/10/14  Discontinued

 

 Prednisone 20 Mg Tab, 100 Mg Oral  Daily  10/10/14  Discontinued

 

 Omega-3S/Dha/Epa/Fish Oil/D3 1 Each Capsule, 1500 Mg Oral  Twice A Day  01/13/
15  Discontinued

 

 Insulin Glargine,Hum.rec.anlog 100 Unit/1 Ml Vial, 30-50 Unit Sub-Q  Bedtime  
01/13/15  Discontinued

 

 Amlodipine Besylate 10 Mg Tablet, 10 Mg Oral  Daily  01/13/15  Discontinued

 

 [Ticagrelor] 90 Mg Tablet, 90 Mg Oral  Twice A Day  01/14/15  Discontinued

 

 [Aspirin] 81 Mg Tabec, 81 Mg Oral  Daily  01/14/15  Discontinued

 

 Carvedilol 12.5 Mg Tablet, 12.5 Mg Oral  Twice A Day  01/14/15  Discontinued

 

 Hydrocodone Bit/Acetaminophen 1 Each Tablet, 0.5-1 Ea Oral  Every 6 Hours as 
needed for Pain  03/28/15  Discontinued

 

 Cefdinir (Omnicef) 300 Mg Capsule, 1 Each Oral  Twice A Day  03/28/15  
Discontinued

 

 Albuterol Sulfate 0.63 Mg/3 Ml Vial.neb, 0.83 Mg Inhalation  Three Times A Day 
as needed for Shortness Of Breath  08/30/15  Discontinued

 

 [Aleve] ,     08/30/15  Discontinued







Social History







 Social History Problem  Response  Recorded Date/Time

 

 Alcohol Use  Occasionally Uses  2016 2:06pm

 

 Recreational Drug Use  No  2016 2:06pm

 

 Recent Foreign Travel  No  10/08/2014 1:31pm

 

 Recent Infectious Disease Exposure  No  10/08/2014 1:31pm

 

 Hospitalization with Isolation  Denies  10/08/2014 1:31pm

 

 Sexually Transmitted Disease  No  2016 2:06pm

 

 HIV/AIDS  No  2016 2:06pm

 

 Do you dip or chew tobacco?  No  2016 2:06pm







Hospital Discharge Instructions

Current inpatient/outpatient. Discharge instructions are currently unavailable.



Plan of Care







 Prescriptions   







Functional Status

No functional status results.



Allergies, Adverse Reactions, Alerts







 Allergen  Type  Severity  Reaction  Status  Last Updated

 

 heparin ([id number])  Allergy  Unknown     Active  01/13/15

 

 TAPE  Allergy  Unknown     Active  14







Immunizations







 Name  Given  Type

 

 Date of Pneumonia Vaccine  10/08/13  Historical

 

 Date of Influenza Vaccine  14  Historical

 

 Hepatitis A  No  Historical

 

 Hepatitis B  No  Historical

 

 Tetanus Booster (TDap)  Unknown  Historical







Vital Signs

No known vital signs results.



Results

Pending Laboratory Results







 Test Name  Collection Date/Time

 

     

 

     

 

     

 

     

 

     

 

     

 

     

 

     

 

     

 

     

 

     

 

     

 

     

 

     

 

     

 

     

 

     

 

     

 

     

 

     

 

     

 

     

 

     

 

     

 

     

 

     

 

     

 

     

 

     

 

     

 

     

 

     

 

     

 

     

 

     

 

     

 

     

 

     

 

     

 

     

 

     







Procedures

No known history of procedures.



Encounters







 Encounter  Location  Arrival/Admit Date  Discharge/Depart Date  Attending 
Provider

 

 Registered Clinic  [facility]  16 9:53am     FOX NAVARRO

 

 Discharged Recurring  [facility]  16 9:51am   11:59pm  ROSALBA PHILLIPS

## 2017-01-31 NOTE — DIAGNOSTIC IMAGING REPORT
INDICATION: Cough x3 weeks



EXAMINATION: Frontal chest obtained at 151 hours p.m. and

compared with 9/28/16.



There is cardiomegaly. There is central vascular congestion. The

study is technically limited but there is no definite infiltrate

or pneumothorax or pleural fluid.



IMPRESSION:

Technically limited study. Cardiomegaly with central vascula

congestion. No definite consolidation or pleural fluid.



Dictated by:



Dictated on workstation # [id number]

## 2017-02-01 VITALS — DIASTOLIC BLOOD PRESSURE: 62 MMHG | SYSTOLIC BLOOD PRESSURE: 134 MMHG

## 2017-02-01 VITALS — SYSTOLIC BLOOD PRESSURE: 115 MMHG | DIASTOLIC BLOOD PRESSURE: 59 MMHG

## 2017-02-01 VITALS — DIASTOLIC BLOOD PRESSURE: 60 MMHG | SYSTOLIC BLOOD PRESSURE: 131 MMHG

## 2017-02-01 VITALS — DIASTOLIC BLOOD PRESSURE: 58 MMHG | SYSTOLIC BLOOD PRESSURE: 117 MMHG

## 2017-02-01 VITALS — DIASTOLIC BLOOD PRESSURE: 90 MMHG | SYSTOLIC BLOOD PRESSURE: 137 MMHG

## 2017-02-01 VITALS — SYSTOLIC BLOOD PRESSURE: 105 MMHG | DIASTOLIC BLOOD PRESSURE: 51 MMHG

## 2017-02-01 LAB
ALBUMIN SERPL-MCNC: 3.4 G/DL (ref 3.2–4.5)
ALT SERPL-CCNC: 15 U/L (ref 0–55)
ANION GAP SERPL CALC-SCNC: 8 MMOL/L (ref 5–14)
AST SERPL-CCNC: 14 U/L (ref 5–34)
BASOPHILS # BLD AUTO: 0 10^3/UL (ref 0–0.1)
BASOPHILS NFR BLD AUTO: 0 % (ref 0–10)
BILIRUB SERPL-MCNC: 1.1 MG/DL (ref 0.1–1)
BUN SERPL-MCNC: 17 MG/DL (ref 7–18)
BUN/CREAT SERPL: 15
CALCIUM SERPL-MCNC: 8.3 MG/DL (ref 8.5–10.1)
CHLORIDE SERPL-SCNC: 99 MMOL/L (ref 98–107)
CO2 SERPL-SCNC: 25 MMOL/L (ref 21–32)
CREAT SERPL-MCNC: 1.11 MG/DL (ref 0.6–1.3)
EOSINOPHIL # BLD AUTO: 0 10^3/UL (ref 0–0.3)
EOSINOPHIL NFR BLD AUTO: 0 % (ref 0–10)
ERYTHROCYTE [DISTWIDTH] IN BLOOD BY AUTOMATED COUNT: 18.7 % (ref 10–14.5)
GFR SERPLBLD BASED ON 1.73 SQ M-ARVRAT: 49 ML/MIN
GLUCOSE SERPL-MCNC: 154 MG/DL (ref 70–105)
LYMPHOCYTES # BLD AUTO: 1.3 X 10^3 (ref 1–4)
LYMPHOCYTES NFR BLD AUTO: 12 % (ref 12–44)
MCH RBC QN AUTO: 29 PG (ref 25–34)
MCHC RBC AUTO-ENTMCNC: 32 G/DL (ref 32–36)
MCV RBC AUTO: 91 FL (ref 80–99)
MONOCYTES # BLD AUTO: 1 X 10^3 (ref 0–1)
MONOCYTES NFR BLD AUTO: 9 % (ref 0–12)
NEUTROPHILS # BLD AUTO: 8.8 X 10^3 (ref 1.8–7.8)
NEUTROPHILS NFR BLD AUTO: 79 % (ref 42–75)
PLATELET # BLD: 179 10^3/UL (ref 130–400)
PMV BLD AUTO: 10.9 FL (ref 7.4–10.4)
POTASSIUM SERPL-SCNC: 4.7 MMOL/L (ref 3.6–5)
PROT SERPL-MCNC: 6.3 G/DL (ref 6.4–8.2)
RBC # BLD AUTO: 3.71 10^6/UL (ref 4.35–5.85)
SODIUM SERPL-SCNC: 132 MMOL/L (ref 135–145)
WBC # BLD AUTO: 11.2 10^3/UL (ref 4.3–11)

## 2017-02-01 RX ADMIN — SODIUM CHLORIDE SCH MLS/HR: 900 INJECTION, SOLUTION INTRAVENOUS at 19:59

## 2017-02-01 RX ADMIN — GUAIFENESIN AND CODEINE PHOSPHATE PRN ML: 100; 10 SOLUTION ORAL at 01:03

## 2017-02-01 RX ADMIN — INSULIN ASPART SCH UNIT: 100 INJECTION, SOLUTION INTRAVENOUS; SUBCUTANEOUS at 16:45

## 2017-02-01 RX ADMIN — CARVEDILOL SCH MG: 12.5 TABLET, FILM COATED ORAL at 20:08

## 2017-02-01 RX ADMIN — SODIUM CHLORIDE SCH MLS/HR: 900 INJECTION, SOLUTION INTRAVENOUS at 13:23

## 2017-02-01 RX ADMIN — ALBUTEROL SULFATE SCH MG: 2.5 SOLUTION RESPIRATORY (INHALATION) at 06:26

## 2017-02-01 RX ADMIN — FLUTICASONE PROPIONATE AND SALMETEROL XINAFOATE SCH PUFF: 45; 21 AEROSOL, METERED RESPIRATORY (INHALATION) at 18:39

## 2017-02-01 RX ADMIN — SODIUM CHLORIDE SCH MLS/HR: 900 INJECTION, SOLUTION INTRAVENOUS at 05:13

## 2017-02-01 RX ADMIN — GUAIFENESIN AND CODEINE PHOSPHATE PRN ML: 100; 10 SOLUTION ORAL at 05:57

## 2017-02-01 RX ADMIN — METFORMIN HYDROCHLORIDE SCH MG: 500 TABLET, FILM COATED ORAL at 17:00

## 2017-02-01 RX ADMIN — OMEGA-3 FATTY ACIDS CAP 1000 MG SCH MG: 1000 CAP at 17:05

## 2017-02-01 RX ADMIN — INSULIN ASPART SCH UNIT: 100 INJECTION, SOLUTION INTRAVENOUS; SUBCUTANEOUS at 21:15

## 2017-02-01 RX ADMIN — ALBUTEROL SULFATE SCH MG: 2.5 SOLUTION RESPIRATORY (INHALATION) at 18:39

## 2017-02-01 NOTE — HISTORY & PHYSICAL-HOSPITALIST
HPI


History of Present Illness:


HPI/Chief Complaint


CC: Fever with confusion





HPI: This is a 71yoWF pt of mine with morbid obesity, NATAN, DM insulin dependent

, HTN, and Merritt that presented to ER one day after ER evaluation did a 

worsening fever with confusion. WBC was normal, and kidney function was stable 

with normal lactic acid at 1.8 but due to confusion and comorbidities pt was 

placed empirically on Rocephin and pt is much improved.





Patient Interview:


Pt states that she is still coughing regularly and producing mucus.


Physical exam was stable.


Pt states that she is able to eat and drink without complications.


Pt states that she had cold symptoms for two weeks before pt began to 

experience confusion.





Scribed by Slim Kirk under the direct supervision of Dr. Dotson.


Source:  patient


Date Seen


17


Attending Physician


Ranjeet Caal MD


PCP


Kavitha Dotson DO


Referring Physician





Date of Admission


2017 at 16:11





Home Medications & Allergies


Home Medications


Reviewed patient Home Medication Reconciliation Form





Allergies


Coded Allergies:  


     heparin (Verified  Allergy, Unknown, 17)


Uncoded Allergies:  


     TAPE (Allergy, Mild, RASH, 17)





Past Medical-Social-Family Hx


Patient Social History


Marrital Status:  


Employed/Student:  retired (teacher and hairdresser)


Alcohol Use:  Occasionally Uses


Recreational Drug Use:  No


Smoking Status:  Former Smoker


Former smoker/When Quit:  Oct 8, 1984


Type Used:  Cigarettes


Physical Abuse Screen:  No


Sexual Abuse:  No


Recent Foreign Travel:  No


Contact w/other who traveled:  No


Recent Hopitalizations:  No


Recent Infectious Disease Expo:  No





Immunizations Up To Date


Tetanus Booster (TDap):  Unknown


Date of Pneumonia Vaccine:  Oct 1, 2016


Date of Influenza Vaccine:  Oct 1, 2016





Seasonal Allergies


Seasonal Allergies:  Yes





Surgeries


HX Surgeries:  Yes (LITHOTRIPSY, STONE REMOVAL, J STENT; CARDIAC CATHS--STENTS 

X 2)


Surgeries:  Cardiac, Coronary Stent, Gallbladder, Renal





Respiratory


Hx Respiratory Disorders:  Yes


Respiratory Disorders:  Asthma





Cardiovascular


Hx Cardiovascular Disorders:  Yes (CORONARY STENTS X2)


Cardiac Disorders:  Coronary Artery Disease, High Cholesterol, Hypertension





Neurological


Hx Neurological Disorders:  No





Reproductive System


Pregnant:  No


Hx Reproductive Disorders:  No


Sexually Transmitted Disease:  No


HIV/AIDS:  No


Female Reproductive Disorders:  Denies





Genitourinary


Hx Genitourinary Disorders:  Yes (KIDNEY STENTS)


Genitourinary Disorders:  Bladder Infection, Kidney Stones





Gastrointestinal


Hx Gastrointestinal Disorders:  Yes


Gastrointestinal Disorders:  Gastroesophageal Reflux





Musculoskeletal


Hx Musculoskeletal Disorders:  Yes (CHRONIC OSTEOMYELYTIS LEFT SHIN WITH 

LYMPHEDEMA;  WHEELCHAIR BOUND )


Musculoskeletal Disorders:  Rheumatoid Arthritis, Foot Drop, Fractures





Endocrine


Hx Endocrine Disorders:  Yes (DM TYPE II; MORBID OBESITY)


Endocrine Disorders:  Diabetes, Insulin dep





HEENT


HX ENT Disorders:  No





Cancer


Hx Cancer:  No





Psychosocial


Hx Psychiatric Problems:  No





Integumentary


HX Skin/Integumentary Disorder:  No


Skin/Integumentary Disorders:  Recent Skin Changes





Blood Transfusions


Hx Blood Disorders:  Yes (ITP)


Adverse Reaction to a Blood Tr:  No





Family Medical History


Significant Family History:  No Pertinent Family Hx


Family Hx:  


Cardiovascular disease


  19 MOTHER


Diabetes mellitus


  19 MOTHER


Paternal family history of emphysema


  19 FATHER





Review of Systems


Constitutional:   see HPI chills dizziness fever malaise weakness


EENTM:   no symptoms reported


Respiratory:   cough short of breath


Cardiovascular:   no symptoms reported


Gastrointestinal:   no symptoms reported


Genitourinary:   decreased output


Musculoskeletal:   back pain


Skin:   no symptoms reported


Psychiatric/Neurological:   Depressed


All Other Systems Reviewed


Negative Unless Noted:  Yes





Physical Exam


Physical Exam


Vital Signs





 Vital Sign - Last 12Hours








 17





 13:10 13:35 16:29


 


Temp 101.1  


 


Pulse 97  


 


Resp 34  


 


B/P 202/91  


 


Pulse Ox   97


 


O2 Delivery Room Air  


 


O2 Flow Rate  2 





Capillary Refill : Less Than 3 Seconds


General Appearance:   No Apparent Distress WD/WN Chronically ill Obese Other (

acutely ill)


Eyes:  Bilateral Eye Normal Inspection, Bilateral Eye PERRL


HEENT:   PERRL/EOMI Normal ENT Inspection Pharynx Normal


Neck:   Full Range of Motion Normal Inspection Non Tender Supple Carotid Bruit


Respiratory:   Chest Non Tender No Accessory Muscle Use No Respiratory Distress 

Crackles Decreased Breath Sounds Wheezing


Cardiovascular:   Regular Rate, Rhythm No Edema No Gallop No JVD No Murmur 

Normal Peripheral Pulses


Gastrointestinal:   Normal Bowel Sounds No Organomegaly No Pulsatile Mass Non 

Tender Soft


Back:   Normal Inspection No CVA Tenderness No Vertebral Tenderness


Extremity:   Normal Capillary Refill Normal Inspection Normal Range of Motion 

Non Tender No Calf Tenderness No Pedal Edema


Neurologic/Psychiatric:   Alert Oriented x3 No Motor/Sensory Deficits Normal 

Mood/Affect


Skin:   Normal Color Warm/Dry


Lymphatic:   No Adenopathy





Results


Results/Procedures


Lab


Laboratory Tests


17 13:35








17 14:19








17 09:04














Assessment/Plan


Admission Diagnosis


Assessment:


Acute febrile illness with altered mental status w/LLL infiltrate on repeat CXR 

today c/w lung exam after fluids when initial CXR was normal so will add Zmax 

to Rocephin


DM


HTN


MERRITT


Asthma


CAD previous stent placement


Renal lithiasis





Assessment and Plan


Plan:


Re-check CXR and it was reviewed


Reconciled home meds


PT


Monitor sugar





Clinical Quality Measures


DVT/VTE Risk/Contraindication:


Risk Factor Score Per Nursin


RFS Level Per Nursing on Admit:  4+=Very High








KAVITHA DOTSON DO 2017 10:45

## 2017-02-02 VITALS — SYSTOLIC BLOOD PRESSURE: 119 MMHG | DIASTOLIC BLOOD PRESSURE: 59 MMHG

## 2017-02-02 VITALS — SYSTOLIC BLOOD PRESSURE: 133 MMHG | DIASTOLIC BLOOD PRESSURE: 63 MMHG

## 2017-02-02 VITALS — DIASTOLIC BLOOD PRESSURE: 69 MMHG | SYSTOLIC BLOOD PRESSURE: 144 MMHG

## 2017-02-02 RX ADMIN — SODIUM CHLORIDE SCH MLS/HR: 900 INJECTION, SOLUTION INTRAVENOUS at 09:03

## 2017-02-02 RX ADMIN — OMEGA-3 FATTY ACIDS CAP 1000 MG SCH MG: 1000 CAP at 07:09

## 2017-02-02 RX ADMIN — METFORMIN HYDROCHLORIDE SCH MG: 500 TABLET, FILM COATED ORAL at 07:08

## 2017-02-02 RX ADMIN — FLUTICASONE PROPIONATE AND SALMETEROL XINAFOATE SCH PUFF: 45; 21 AEROSOL, METERED RESPIRATORY (INHALATION) at 07:15

## 2017-02-02 RX ADMIN — CARVEDILOL SCH MG: 12.5 TABLET, FILM COATED ORAL at 09:02

## 2017-02-02 RX ADMIN — INSULIN ASPART SCH UNIT: 100 INJECTION, SOLUTION INTRAVENOUS; SUBCUTANEOUS at 11:00

## 2017-02-02 RX ADMIN — ALBUTEROL SULFATE SCH MG: 2.5 SOLUTION RESPIRATORY (INHALATION) at 07:10

## 2017-02-02 RX ADMIN — INSULIN ASPART SCH UNIT: 100 INJECTION, SOLUTION INTRAVENOUS; SUBCUTANEOUS at 06:00

## 2017-02-02 NOTE — DISCHARGE SUMMARY-HOSPITALIST
Diagnosis/Chief Complaint


Date of Admission


2017 at 16:11


Date of Discharge





Admission Diagnosis


Assessment:


Acute febrile illness with altered mental status w/LLL infiltrate on repeat CXR 

today c/w lung exam after fluids when initial CXR was normal so will add Zmax 

to Rocephin


DM


HTN


MERRITT


Asthma


CAD previous stent placement


Renal lithiasis





Discharge Diagnosis


Assessment:


Acute febrile illness with altered mental status w/LLL infiltrate on repeat CXR 

today c/w lung exam after fluids when initial CXR was normal so will add Zmax 

to Rocephin


DM


HTN


MERRITT


Asthma


CAD previous stent placement


Renal lithiasis








Plan:


Re-check CXR and it was reviewed


Reconciled home meds


PT


Monitor sugar








Reason Hospital Visit/Course


CC: Fever with confusion





HPI: This is a 71yoWF pt of mine with morbid obesity, NATAN, DM insulin dependent

, HTN, and Merritt that presented to ER one day after ER evaluation did a 

worsening fever with confusion. WBC was normal, and kidney function was stable 

with normal lactic acid at 1.8 but due to confusion and comorbidities pt was 

placed empirically on Rocephin and pt is much improved.





Patient Interview:


Pt states that she is still coughing regularly and producing mucus.


Physical exam was stable.


Pt states that she is able to eat and drink without complications.


Pt states that she had cold symptoms for two weeks before pt began to 

experience confusion.





Scribed by Slim Kirk under the direct supervision of Dr. Dotson.








Notes from 2017:





Chart Review:


No fever


Vitals stable


All Cx NGTD


CXR yesterday confirmed LLL pneumonia


Pt responding to Rocephin and Zmax





Patient Interview:


Dr. Dotson informs pt that CXR confirm pneumonia, but that treatment is working 

well.


Pt states that she feels ready to go home.


Pt denies using O2 at home.


Pt denies using CPAP at home.


Pt states that her coughing is reduced, but requests cough medicine.


Physical exam was stable.


Pt states that she uses [facility].


Pt will have assistance from family after DC.


Pt states that she feels that she is breathing sufficiently.





No fever, pleasant, oriented 3, chronically ill


Regular rate and rhythm, clear to all sedation bilaterally but distant breath 

sounds in the bases


No edema





Plan:


Home O2 eval


Lasix 20 mg IV x1


Codeine


DC IVF


Follow-up appointment with Dr. Dotson next week





Scribed by Slim Kirk under the direct supervision of Dr. Dotson.





Discharge Summary


Discharge Physical Examination


Allergies:  


Coded Allergies:  


     heparin (Verified  Allergy, Unknown, 17)


Uncoded Allergies:  


     TAPE (Allergy, Mild, RASH, 17)


Vitals & I&Os





Vital Signs








  Date Time  Temp Pulse Resp B/P Pulse Ox O2 Delivery O2 Flow Rate FiO2


 


17 09:54     96 Nasal Cannula 1.00 


 


17 08:00 96.0 93 20 144/69    











Hospital Course


Labs (last 24 hrs)


 Laboratory Tests


17 16:22: Glucometer 155H


17 20:39: Glucometer 166H


17 05:59: Glucometer 76


17 11:08: Glucometer 136H





 Microbiology


17 Blood Culture - Preliminary, Resulted


          No growth





Pending Labs


Laboratory Tests


17 05:59: Glucometer 76


17 11:08: Glucometer 136








Discharge


Home Medications:





 Active Scripts


 Active


Cefdinir 300 Mg Capsule 300 Mg PO BID


 Reported


Glipizide 5 Mg Tablet 5 Mg PO DAILY@0630


Metformin HCl 1,000 Mg Tablet 1,000 Mg PO BID


Brilinta (Ticagrelor) 60 Mg Tablet 60 Mg PO BID


Citalopram HBr (Citalopram Hydrobromide) 20 Mg Tablet 20 Mg PO DAILY


Proventil Hfa (Albuterol Sulfate) 6.7 Gm Hfa.aer.ad 2 Puff IH Q4H PRN


Omega 3 Fish Oil Softgel (Omega-3 Fatty Acids/Fish Oil) 1 Each Capsule.dr 1 Cap 

PO BID


Humalog (Insulin Lispro) 100 Unit/1 Ml Vial 6-8 Unit SQ WITH BREAKFAST PRN


Advair 100-50 Diskus (Fluticasone/Salmeterol) 1 Each Blst.w.dev 1 Puff INH 

DAILY PRN


     LAST FILLED 16 #1 INHALER


Pantoprazole Sodium 40 Mg Tablet.dr 40 Mg PO DAILY


Atorvastatin Calcium 10 Mg Tablet 10 Mg PO DAILY


Aleve (Naproxen Sodium) 220 Mg Tablet 440 Mg PO BID PRN


     TAKES 2 (220MG) TABLETS


Albuterol Sulfate 2.5 Mg/3 Ml Vial.neb 2.5 Mg NEB TID PRN


Carvedilol 12.5 Mg Tablet 12.5 Mg PO BID


Potassium Citrate ER (Potassium Citrate) 15 Meq Tab 15 Meq PO BID


Aspir 81 (Aspirin) 81 Mg Tablet.dr 81 Mg PO HS


Glipizide 5 Mg Tablet 7.5 Mg PO DAILY 1700


     TAKES 1 TAB IN THE AM AND 1 & 1/2 TABS IN THE EVENING


Hctz (Hydrochlorothiazide) 12.5 Mg Cap 12.5 Mg PO DAILY





Instructions to patient/family


Please see electonic discharge instructions given to patient.





Clinical Quality Measures


DVT/VTE Risk/Contraindication:


Risk Factor Score Per Nursin


RFS Level Per Nursing on Admit:  4+=Very High








KOKO DOTSON DO 2017 10:46

## 2017-02-02 NOTE — DISCHARGE INSTRUCTIONS
Discharge Instructions


Discharge Medications


New, Converted or Re-Newed RX:  Transmitted to Pharmacy


New Medications:  


Cefdinir (Cefdinir) 300 Mg Capsule


300 MG PO BID #10 CAP


 


Continued Medications:  


Albuterol Sulfate (Albuterol Sulfate) 2.5 Mg/3 Ml Vial.neb


2.5 MG NEB TID PRN WHEEZING


Albuterol Sulfate (Proventil Hfa) 6.7 Gm Hfa.aer.ad


2 PUFF IH Q4H PRN SHORTNESS OF BREATH


Aspirin (Aspir 81) 81 Mg Tablet.dr


81 MG PO HS TAB


Atorvastatin Calcium (Atorvastatin Calcium) 10 Mg Tablet


10 MG PO DAILY


Carvedilol (Carvedilol) 12.5 Mg Tablet


12.5 MG PO BID


Citalopram Hydrobromide (Citalopram HBr) 20 Mg Tablet


20 MG PO DAILY


Fluticasone/Salmeterol (Advair 100-50 Diskus) 1 Each Blst.w.dev


1 PUFF INH DAILY LAST FILLED 05/04/16 #1 INHALER PRN ALLERGIES


Glipizide (Glipizide) 5 Mg Tablet


7.5 MG PO DAILY 1700 TAKES 1 TAB IN THE AM AND 1 & 1/2 TABS IN THE EVENING


Glipizide (Glipizide) 5 Mg Tablet


5 MG PO DAILY@0630


Hydrochlorothiazide (Hctz) 12.5 Mg Cap


12.5 MG PO DAILY


Insulin Lispro (Humalog) 100 Unit/1 Ml Vial


6-8 UNIT SQ WITH BREAKFAST PRN BLOOD SUGAR ABOVE 120


Metformin HCl (Metformin HCl) 1,000 Mg Tablet


1000 MG PO BID


Naproxen Sodium (Aleve) 220 Mg Tablet


440 MG PO BID TAKES 2 (220MG) TABLETS PRN PAIN


Omega-3 Fatty Acids/Fish Oil (Omega 3 Fish Oil Softgel) 1 Each Capsule.dr


1 CAP PO BID CAP


Pantoprazole Sodium (Pantoprazole Sodium) 40 Mg Tablet.


40 MG PO DAILY


Potassium Citrate (Potassium Citrate ER) 15 Meq Tab


15 MEQ PO BID TAB


Ticagrelor (Brilinta) 60 Mg Tablet


60 MG PO BID








Patient Instructions


Goal/Follow Up Appt:  


Dr [facility] Thursday, 2/9/17 at 1:00pm


Patient Instructions:  


Complete antibiotic


Maintain asthma medicine at home





Activity & Diet


Discharge Diet:  ADA Diet, Cardiac Diet


Activity as Tolerated:  Yes








KOKO GODOY DO Feb 2, 2017 11:00

## 2017-04-17 LAB
ALBUMIN SERPL-MCNC: 3.5 G/DL (ref 3.2–4.5)
ALT SERPL-CCNC: 19 U/L (ref 0–55)
ANION GAP SERPL CALC-SCNC: 6 MMOL/L (ref 5–14)
AST SERPL-CCNC: 21 U/L (ref 5–34)
BASOPHILS # BLD AUTO: 0 10^3/UL (ref 0–0.1)
BASOPHILS NFR BLD AUTO: 0 % (ref 0–10)
BILIRUB SERPL-MCNC: 0.5 MG/DL (ref 0.1–1)
BUN SERPL-MCNC: 19 MG/DL (ref 7–18)
BUN/CREAT SERPL: 22
CALCIUM SERPL-MCNC: 8.6 MG/DL (ref 8.5–10.1)
CHLORIDE SERPL-SCNC: 105 MMOL/L (ref 98–107)
CO2 SERPL-SCNC: 26 MMOL/L (ref 21–32)
CREAT SERPL-MCNC: 0.86 MG/DL (ref 0.6–1.3)
EOSINOPHIL # BLD AUTO: 0.2 10^3/UL (ref 0–0.3)
EOSINOPHIL NFR BLD AUTO: 4 % (ref 0–10)
ERYTHROCYTE [DISTWIDTH] IN BLOOD BY AUTOMATED COUNT: 15 % (ref 10–14.5)
GFR SERPLBLD BASED ON 1.73 SQ M-ARVRAT: > 60 ML/MIN
GLUCOSE SERPL-MCNC: 278 MG/DL (ref 70–105)
LDH SERPL L TO P-CCNC: 242 U/L (ref 125–220)
LYMPHOCYTES # BLD AUTO: 1.3 X 10^3 (ref 1–4)
LYMPHOCYTES NFR BLD AUTO: 24 % (ref 12–44)
MCH RBC QN AUTO: 29 PG (ref 25–34)
MCHC RBC AUTO-ENTMCNC: 31 G/DL (ref 32–36)
MCV RBC AUTO: 92 FL (ref 80–99)
MONOCYTES # BLD AUTO: 0.5 X 10^3 (ref 0–1)
MONOCYTES NFR BLD AUTO: 8 % (ref 0–12)
NEUTROPHILS # BLD AUTO: 3.5 X 10^3 (ref 1.8–7.8)
NEUTROPHILS NFR BLD AUTO: 64 % (ref 42–75)
PLATELET # BLD: 206 10^3/UL (ref 130–400)
PMV BLD AUTO: 10.1 FL (ref 7.4–10.4)
POTASSIUM SERPL-SCNC: 4.6 MMOL/L (ref 3.6–5)
PROT SERPL-MCNC: 6.3 G/DL (ref 6.4–8.2)
RBC # BLD AUTO: 3.34 10^6/UL (ref 4.35–5.85)
RETICS/RBC NFR: 2.52 % (ref 0.5–2.4)
SODIUM SERPL-SCNC: 137 MMOL/L (ref 135–145)
WBC # BLD AUTO: 5.6 10^3/UL (ref 4.3–11)

## 2017-04-28 ENCOUNTER — HOSPITAL ENCOUNTER (OUTPATIENT)
Dept: HOSPITAL 75 - ONC | Age: 72
LOS: 79 days | Discharge: HOME | End: 2017-07-16
Attending: INTERNAL MEDICINE
Payer: MEDICARE

## 2017-04-28 DIAGNOSIS — M87.9: ICD-10-CM

## 2017-04-28 DIAGNOSIS — Z79.899: ICD-10-CM

## 2017-04-28 DIAGNOSIS — D69.3: Primary | ICD-10-CM

## 2017-04-28 DIAGNOSIS — E66.01: ICD-10-CM

## 2017-04-28 DIAGNOSIS — I25.10: ICD-10-CM

## 2017-04-28 DIAGNOSIS — D50.9: ICD-10-CM

## 2017-04-28 DIAGNOSIS — E11.9: ICD-10-CM

## 2017-04-28 PROCEDURE — 80053 COMPREHEN METABOLIC PANEL: CPT

## 2017-04-28 PROCEDURE — 83615 LACTATE (LD) (LDH) ENZYME: CPT

## 2017-04-28 PROCEDURE — 85025 COMPLETE CBC W/AUTO DIFF WBC: CPT

## 2017-04-28 PROCEDURE — 36415 COLL VENOUS BLD VENIPUNCTURE: CPT

## 2017-04-28 PROCEDURE — 85045 AUTOMATED RETICULOCYTE COUNT: CPT

## 2017-04-28 PROCEDURE — 99213 OFFICE O/P EST LOW 20 MIN: CPT

## 2017-04-28 PROCEDURE — 96365 THER/PROPH/DIAG IV INF INIT: CPT

## 2017-04-28 PROCEDURE — 82728 ASSAY OF FERRITIN: CPT

## 2017-07-31 LAB
ALBUMIN SERPL-MCNC: 3.5 GM/DL (ref 3.2–4.5)
ALT SERPL-CCNC: 27 U/L (ref 0–55)
ANION GAP SERPL CALC-SCNC: 11 MMOL/L (ref 5–14)
AST SERPL-CCNC: 23 U/L (ref 5–34)
BASOPHILS # BLD AUTO: 0 10^3/UL (ref 0–0.1)
BASOPHILS NFR BLD AUTO: 0 % (ref 0–10)
BILIRUB SERPL-MCNC: 0.7 MG/DL (ref 0.1–1)
BUN SERPL-MCNC: 18 MG/DL (ref 7–18)
BUN/CREAT SERPL: 22
CALCIUM SERPL-MCNC: 9 MG/DL (ref 8.5–10.1)
CHLORIDE SERPL-SCNC: 103 MMOL/L (ref 98–107)
CO2 SERPL-SCNC: 21 MMOL/L (ref 21–32)
CREAT SERPL-MCNC: 0.83 MG/DL (ref 0.6–1.3)
EOSINOPHIL # BLD AUTO: 0.2 10^3/UL (ref 0–0.3)
EOSINOPHIL NFR BLD AUTO: 3 % (ref 0–10)
ERYTHROCYTE [DISTWIDTH] IN BLOOD BY AUTOMATED COUNT: 14.4 % (ref 10–14.5)
GFR SERPLBLD BASED ON 1.73 SQ M-ARVRAT: > 60 ML/MIN
GLUCOSE SERPL-MCNC: 252 MG/DL (ref 70–105)
LDH SERPL L TO P-CCNC: 208 U/L (ref 125–220)
LYMPHOCYTES # BLD AUTO: 1.2 X 10^3 (ref 1–4)
LYMPHOCYTES NFR BLD AUTO: 20 % (ref 12–44)
MCH RBC QN AUTO: 29 PG (ref 25–34)
MCHC RBC AUTO-ENTMCNC: 32 G/DL (ref 32–36)
MCV RBC AUTO: 92 FL (ref 80–99)
MONOCYTES # BLD AUTO: 0.4 X 10^3 (ref 0–1)
MONOCYTES NFR BLD AUTO: 6 % (ref 0–12)
NEUTROPHILS # BLD AUTO: 4.3 X 10^3 (ref 1.8–7.8)
NEUTROPHILS NFR BLD AUTO: 71 % (ref 42–75)
PLATELET # BLD: 195 10^3/UL (ref 130–400)
PMV BLD AUTO: 10.4 FL (ref 7.4–10.4)
POTASSIUM SERPL-SCNC: 4.8 MMOL/L (ref 3.6–5)
PROT SERPL-MCNC: 6.3 GM/DL (ref 6.4–8.2)
RBC # BLD AUTO: 3.57 10^6/UL (ref 4.35–5.85)
RETICS/RBC NFR: 2.76 % (ref 0.5–2.4)
SODIUM SERPL-SCNC: 135 MMOL/L (ref 135–145)
WBC # BLD AUTO: 6.1 10^3/UL (ref 4.3–11)

## 2017-08-14 ENCOUNTER — HOSPITAL ENCOUNTER (OUTPATIENT)
Dept: HOSPITAL 75 - ONC | Age: 72
LOS: 47 days | Discharge: HOME | End: 2017-09-30
Attending: INTERNAL MEDICINE
Payer: MEDICARE

## 2017-08-14 DIAGNOSIS — Z79.899: ICD-10-CM

## 2017-08-14 DIAGNOSIS — D69.3: Primary | ICD-10-CM

## 2017-08-14 DIAGNOSIS — E66.01: ICD-10-CM

## 2017-08-14 DIAGNOSIS — E11.9: ICD-10-CM

## 2017-08-14 DIAGNOSIS — I25.10: ICD-10-CM

## 2017-08-14 DIAGNOSIS — M87.9: ICD-10-CM

## 2017-08-14 DIAGNOSIS — D50.9: ICD-10-CM

## 2017-08-14 PROCEDURE — 83615 LACTATE (LD) (LDH) ENZYME: CPT

## 2017-08-14 PROCEDURE — 80053 COMPREHEN METABOLIC PANEL: CPT

## 2017-08-14 PROCEDURE — 36415 COLL VENOUS BLD VENIPUNCTURE: CPT

## 2017-08-14 PROCEDURE — 99213 OFFICE O/P EST LOW 20 MIN: CPT

## 2017-08-14 PROCEDURE — 85025 COMPLETE CBC W/AUTO DIFF WBC: CPT

## 2017-08-14 PROCEDURE — 85045 AUTOMATED RETICULOCYTE COUNT: CPT

## 2017-08-14 PROCEDURE — 96365 THER/PROPH/DIAG IV INF INIT: CPT

## 2017-08-14 PROCEDURE — 82728 ASSAY OF FERRITIN: CPT

## 2017-10-23 ENCOUNTER — HOSPITAL ENCOUNTER (OUTPATIENT)
Dept: HOSPITAL 75 - ONC | Age: 72
LOS: 87 days | Discharge: HOME | End: 2018-01-18
Attending: INTERNAL MEDICINE
Payer: MEDICARE

## 2017-10-23 DIAGNOSIS — D69.3: Primary | ICD-10-CM

## 2017-10-23 DIAGNOSIS — M87.9: ICD-10-CM

## 2017-10-23 DIAGNOSIS — E66.01: ICD-10-CM

## 2017-10-23 DIAGNOSIS — E11.9: ICD-10-CM

## 2017-10-23 DIAGNOSIS — I25.10: ICD-10-CM

## 2017-10-23 DIAGNOSIS — Z79.899: ICD-10-CM

## 2017-10-23 DIAGNOSIS — D50.9: ICD-10-CM

## 2017-10-23 LAB
ALBUMIN SERPL-MCNC: 3.4 GM/DL (ref 3.2–4.5)
ALP SERPL-CCNC: 168 U/L (ref 40–136)
ALT SERPL-CCNC: 28 U/L (ref 0–55)
BASOPHILS # BLD AUTO: 0 10^3/UL (ref 0–0.1)
BASOPHILS NFR BLD AUTO: 0 % (ref 0–10)
BILIRUB SERPL-MCNC: 0.4 MG/DL (ref 0.1–1)
BUN/CREAT SERPL: 20
CALCIUM SERPL-MCNC: 8.3 MG/DL (ref 8.5–10.1)
CHLORIDE SERPL-SCNC: 106 MMOL/L (ref 98–107)
CO2 SERPL-SCNC: 23 MMOL/L (ref 21–32)
CREAT SERPL-MCNC: 0.9 MG/DL (ref 0.6–1.3)
EOSINOPHIL # BLD AUTO: 0.1 10^3/UL (ref 0–0.3)
EOSINOPHIL NFR BLD AUTO: 2 % (ref 0–10)
ERYTHROCYTE [DISTWIDTH] IN BLOOD BY AUTOMATED COUNT: 14.4 % (ref 10–14.5)
GFR SERPLBLD BASED ON 1.73 SQ M-ARVRAT: > 60 ML/MIN
GLUCOSE SERPL-MCNC: 224 MG/DL (ref 70–105)
HCT VFR BLD CALC: 33 % (ref 35–52)
HGB BLD-MCNC: 10.2 G/DL (ref 11.5–16)
LYMPHOCYTES # BLD AUTO: 1.5 X 10^3 (ref 1–4)
LYMPHOCYTES NFR BLD AUTO: 24 % (ref 12–44)
MANUAL DIFFERENTIAL PERFORMED BLD QL: NO
MCH RBC QN AUTO: 29 PG (ref 25–34)
MCHC RBC AUTO-ENTMCNC: 31 G/DL (ref 32–36)
MCV RBC AUTO: 94 FL (ref 80–99)
MONOCYTES # BLD AUTO: 0.4 X 10^3 (ref 0–1)
MONOCYTES NFR BLD AUTO: 7 % (ref 0–12)
NEUTROPHILS # BLD AUTO: 4 X 10^3 (ref 1.8–7.8)
NEUTROPHILS NFR BLD AUTO: 66 % (ref 42–75)
PLATELET # BLD: 158 10^3/UL (ref 130–400)
PMV BLD AUTO: 11 FL (ref 7.4–10.4)
POTASSIUM SERPL-SCNC: 5 MMOL/L (ref 3.6–5)
PROT SERPL-MCNC: 6.3 GM/DL (ref 6.4–8.2)
RBC # BLD AUTO: 3.47 10^6/UL (ref 4.35–5.85)
SODIUM SERPL-SCNC: 137 MMOL/L (ref 135–145)
WBC # BLD AUTO: 6.1 10^3/UL (ref 4.3–11)

## 2017-10-23 PROCEDURE — 99213 OFFICE O/P EST LOW 20 MIN: CPT

## 2017-10-23 PROCEDURE — 82728 ASSAY OF FERRITIN: CPT

## 2017-10-23 PROCEDURE — 80053 COMPREHEN METABOLIC PANEL: CPT

## 2017-10-23 PROCEDURE — 85025 COMPLETE CBC W/AUTO DIFF WBC: CPT

## 2017-10-23 PROCEDURE — 36415 COLL VENOUS BLD VENIPUNCTURE: CPT

## 2018-02-12 ENCOUNTER — HOSPITAL ENCOUNTER (OUTPATIENT)
Dept: HOSPITAL 75 - RAD | Age: 73
End: 2018-02-12
Attending: NURSE PRACTITIONER
Payer: MEDICARE

## 2018-02-12 DIAGNOSIS — J20.9: ICD-10-CM

## 2018-02-12 DIAGNOSIS — I51.7: Primary | ICD-10-CM

## 2018-02-12 PROCEDURE — 71046 X-RAY EXAM CHEST 2 VIEWS: CPT

## 2018-02-12 NOTE — DIAGNOSTIC IMAGING REPORT
CLINICAL INDICATION: Patient has been sick with cough and

congestion since Danielle. Checking for pneumonia.



EXAM: Chest x-ray PA and lateral views.



COMPARISON: Chest x-ray dated 2/1/2017.



FINDINGS:



There is interval improved aeration of the left lung base. There

is suspected mild bibasilar atelectasis. Otherwise, lungs are

clear. There is cardiomegaly and mild pulmonary vascular

congestion again seen. There are degenerative spurs involving the

thoracic spine.



IMPRESSION:

1: There is no interval lung infiltrate seen. There is improved

aeration of the left lung base with resolution of the previously

seen infiltrate.



2: Stable cardiomegaly and mild pulmonary vascular congestion

which may be seen with congestive heart failure.



Dictated by: 



  Dictated on workstation # [id number]

## 2018-02-14 LAB
ALBUMIN SERPL-MCNC: 3.3 GM/DL (ref 3.2–4.5)
ALP SERPL-CCNC: 124 U/L (ref 40–136)
ALT SERPL-CCNC: 20 U/L (ref 0–55)
BASOPHILS # BLD AUTO: 0 10^3/UL (ref 0–0.1)
BASOPHILS NFR BLD AUTO: 0 % (ref 0–10)
BILIRUB SERPL-MCNC: 0.6 MG/DL (ref 0.1–1)
BUN/CREAT SERPL: 33
CALCIUM SERPL-MCNC: 8.7 MG/DL (ref 8.5–10.1)
CHLORIDE SERPL-SCNC: 106 MMOL/L (ref 98–107)
CO2 SERPL-SCNC: 21 MMOL/L (ref 21–32)
CREAT SERPL-MCNC: 1.33 MG/DL (ref 0.6–1.3)
EOSINOPHIL # BLD AUTO: 0.2 10^3/UL (ref 0–0.3)
EOSINOPHIL NFR BLD AUTO: 2 % (ref 0–10)
ERYTHROCYTE [DISTWIDTH] IN BLOOD BY AUTOMATED COUNT: 15.9 % (ref 10–14.5)
GFR SERPLBLD BASED ON 1.73 SQ M-ARVRAT: 39 ML/MIN
GLUCOSE SERPL-MCNC: 144 MG/DL (ref 70–105)
HCT VFR BLD CALC: 22 % (ref 35–52)
HGB BLD-MCNC: 6.2 G/DL (ref 11.5–16)
LYMPHOCYTES # BLD AUTO: 0.8 X 10^3 (ref 1–4)
LYMPHOCYTES NFR BLD AUTO: 11 % (ref 12–44)
MANUAL DIFFERENTIAL PERFORMED BLD QL: NO
MCH RBC QN AUTO: 24 PG (ref 25–34)
MCHC RBC AUTO-ENTMCNC: 28 G/DL (ref 32–36)
MCV RBC AUTO: 86 FL (ref 80–99)
MONOCYTES # BLD AUTO: 0.5 X 10^3 (ref 0–1)
MONOCYTES NFR BLD AUTO: 7 % (ref 0–12)
NEUTROPHILS # BLD AUTO: 5.7 X 10^3 (ref 1.8–7.8)
NEUTROPHILS NFR BLD AUTO: 79 % (ref 42–75)
PLATELET # BLD: 210 10^3/UL (ref 130–400)
PMV BLD AUTO: 11.3 FL (ref 7.4–10.4)
POTASSIUM SERPL-SCNC: 5.6 MMOL/L (ref 3.6–5)
PROT SERPL-MCNC: 6.3 GM/DL (ref 6.4–8.2)
RBC # BLD AUTO: 2.55 10^6/UL (ref 4.35–5.85)
SODIUM SERPL-SCNC: 137 MMOL/L (ref 135–145)
WBC # BLD AUTO: 7.2 10^3/UL (ref 4.3–11)

## 2018-02-17 ENCOUNTER — HOSPITAL ENCOUNTER (INPATIENT)
Dept: HOSPITAL 75 - ER | Age: 73
LOS: 3 days | Discharge: NURSING, REHAB OR LONG-TERM CARE | DRG: 812 | End: 2018-02-20
Attending: FAMILY MEDICINE | Admitting: FAMILY MEDICINE
Payer: MEDICARE

## 2018-02-17 VITALS — SYSTOLIC BLOOD PRESSURE: 147 MMHG | DIASTOLIC BLOOD PRESSURE: 74 MMHG

## 2018-02-17 VITALS — WEIGHT: 249.8 LBS | HEIGHT: 67 IN | BODY MASS INDEX: 39.21 KG/M2

## 2018-02-17 DIAGNOSIS — J45.30: ICD-10-CM

## 2018-02-17 DIAGNOSIS — I11.9: ICD-10-CM

## 2018-02-17 DIAGNOSIS — Z99.3: ICD-10-CM

## 2018-02-17 DIAGNOSIS — L03.311: ICD-10-CM

## 2018-02-17 DIAGNOSIS — K21.9: ICD-10-CM

## 2018-02-17 DIAGNOSIS — Z95.5: ICD-10-CM

## 2018-02-17 DIAGNOSIS — L29.8: ICD-10-CM

## 2018-02-17 DIAGNOSIS — E87.5: ICD-10-CM

## 2018-02-17 DIAGNOSIS — Z87.891: ICD-10-CM

## 2018-02-17 DIAGNOSIS — E11.9: ICD-10-CM

## 2018-02-17 DIAGNOSIS — M06.9: ICD-10-CM

## 2018-02-17 DIAGNOSIS — B37.2: ICD-10-CM

## 2018-02-17 DIAGNOSIS — Z91.14: ICD-10-CM

## 2018-02-17 DIAGNOSIS — Z79.4: ICD-10-CM

## 2018-02-17 DIAGNOSIS — I25.10: ICD-10-CM

## 2018-02-17 DIAGNOSIS — E78.00: ICD-10-CM

## 2018-02-17 DIAGNOSIS — I50.9: ICD-10-CM

## 2018-02-17 DIAGNOSIS — L27.0: ICD-10-CM

## 2018-02-17 DIAGNOSIS — E66.01: ICD-10-CM

## 2018-02-17 DIAGNOSIS — D50.9: Primary | ICD-10-CM

## 2018-02-17 LAB
ALBUMIN SERPL-MCNC: 3.1 GM/DL (ref 3.2–4.5)
ALP SERPL-CCNC: 140 U/L (ref 40–136)
ALT SERPL-CCNC: 17 U/L (ref 0–55)
APTT BLD: 32 SEC (ref 24–35)
BASOPHILS # BLD AUTO: 0 10^3/UL (ref 0–0.1)
BASOPHILS NFR BLD AUTO: 0 % (ref 0–10)
BILIRUB SERPL-MCNC: 0.5 MG/DL (ref 0.1–1)
BUN/CREAT SERPL: 31
CALCIUM SERPL-MCNC: 8.6 MG/DL (ref 8.5–10.1)
CHLORIDE SERPL-SCNC: 109 MMOL/L (ref 98–107)
CO2 SERPL-SCNC: 20 MMOL/L (ref 21–32)
CREAT SERPL-MCNC: 1.18 MG/DL (ref 0.6–1.3)
EOSINOPHIL # BLD AUTO: 0.2 10^3/UL (ref 0–0.3)
EOSINOPHIL NFR BLD AUTO: 3 % (ref 0–10)
ERYTHROCYTE [DISTWIDTH] IN BLOOD BY AUTOMATED COUNT: 16.5 % (ref 10–14.5)
GFR SERPLBLD BASED ON 1.73 SQ M-ARVRAT: 45 ML/MIN
GLUCOSE SERPL-MCNC: 63 MG/DL (ref 70–105)
HCT VFR BLD CALC: 24 % (ref 35–52)
HGB BLD-MCNC: 6.8 G/DL (ref 11.5–16)
INR PPP: 1.2 (ref 0.8–1.4)
LYMPHOCYTES # BLD AUTO: 1.2 X 10^3 (ref 1–4)
LYMPHOCYTES NFR BLD AUTO: 16 % (ref 12–44)
MAGNESIUM SERPL-MCNC: 1.9 MG/DL (ref 1.8–2.4)
MANUAL DIFFERENTIAL PERFORMED BLD QL: NO
MCH RBC QN AUTO: 26 PG (ref 25–34)
MCHC RBC AUTO-ENTMCNC: 29 G/DL (ref 32–36)
MCV RBC AUTO: 89 FL (ref 80–99)
MONOCYTES # BLD AUTO: 0.6 X 10^3 (ref 0–1)
MONOCYTES NFR BLD AUTO: 8 % (ref 0–12)
NEUTROPHILS # BLD AUTO: 5.4 X 10^3 (ref 1.8–7.8)
NEUTROPHILS NFR BLD AUTO: 72 % (ref 42–75)
PLATELET # BLD: 203 10^3/UL (ref 130–400)
PMV BLD AUTO: 11.1 FL (ref 7.4–10.4)
POTASSIUM SERPL-SCNC: 5.4 MMOL/L (ref 3.6–5)
PROT SERPL-MCNC: 5.8 GM/DL (ref 6.4–8.2)
PROTHROMBIN TIME: 15.4 SEC (ref 12.2–14.7)
RBC # BLD AUTO: 2.66 10^6/UL (ref 4.35–5.85)
SODIUM SERPL-SCNC: 140 MMOL/L (ref 135–145)
WBC # BLD AUTO: 7.5 10^3/UL (ref 4.3–11)

## 2018-02-17 PROCEDURE — 85027 COMPLETE CBC AUTOMATED: CPT

## 2018-02-17 PROCEDURE — 83735 ASSAY OF MAGNESIUM: CPT

## 2018-02-17 PROCEDURE — 87205 SMEAR GRAM STAIN: CPT

## 2018-02-17 PROCEDURE — 82962 GLUCOSE BLOOD TEST: CPT

## 2018-02-17 PROCEDURE — 86901 BLOOD TYPING SEROLOGIC RH(D): CPT

## 2018-02-17 PROCEDURE — 86850 RBC ANTIBODY SCREEN: CPT

## 2018-02-17 PROCEDURE — 87186 SC STD MICRODIL/AGAR DIL: CPT

## 2018-02-17 PROCEDURE — 83605 ASSAY OF LACTIC ACID: CPT

## 2018-02-17 PROCEDURE — 85025 COMPLETE CBC W/AUTO DIFF WBC: CPT

## 2018-02-17 PROCEDURE — 85007 BL SMEAR W/DIFF WBC COUNT: CPT

## 2018-02-17 PROCEDURE — 81000 URINALYSIS NONAUTO W/SCOPE: CPT

## 2018-02-17 PROCEDURE — 87075 CULTR BACTERIA EXCEPT BLOOD: CPT

## 2018-02-17 PROCEDURE — 87088 URINE BACTERIA CULTURE: CPT

## 2018-02-17 PROCEDURE — 83036 HEMOGLOBIN GLYCOSYLATED A1C: CPT

## 2018-02-17 PROCEDURE — 71045 X-RAY EXAM CHEST 1 VIEW: CPT

## 2018-02-17 PROCEDURE — 36415 COLL VENOUS BLD VENIPUNCTURE: CPT

## 2018-02-17 PROCEDURE — 80053 COMPREHEN METABOLIC PANEL: CPT

## 2018-02-17 PROCEDURE — 80048 BASIC METABOLIC PNL TOTAL CA: CPT

## 2018-02-17 PROCEDURE — 87040 BLOOD CULTURE FOR BACTERIA: CPT

## 2018-02-17 PROCEDURE — 87070 CULTURE OTHR SPECIMN AEROBIC: CPT

## 2018-02-17 PROCEDURE — 87077 CULTURE AEROBIC IDENTIFY: CPT

## 2018-02-17 PROCEDURE — 96361 HYDRATE IV INFUSION ADD-ON: CPT

## 2018-02-17 PROCEDURE — 83540 ASSAY OF IRON: CPT

## 2018-02-17 PROCEDURE — 96374 THER/PROPH/DIAG INJ IV PUSH: CPT

## 2018-02-17 PROCEDURE — 86900 BLOOD TYPING SEROLOGIC ABO: CPT

## 2018-02-17 PROCEDURE — 86920 COMPATIBILITY TEST SPIN: CPT

## 2018-02-17 PROCEDURE — 82728 ASSAY OF FERRITIN: CPT

## 2018-02-17 PROCEDURE — 85730 THROMBOPLASTIN TIME PARTIAL: CPT

## 2018-02-17 PROCEDURE — 85610 PROTHROMBIN TIME: CPT

## 2018-02-17 PROCEDURE — 96375 TX/PRO/DX INJ NEW DRUG ADDON: CPT

## 2018-02-17 NOTE — ED GENERAL
General


Chief Complaint:  General Problems/Pain


Stated Complaint:  ANEMIA, CELLULITIS PANNUS, CANDIDAL INFECTION


Nursing Triage Note:  


PT TO ED 5 W/ C/O "DRAINING" YEAST INFECTION TO RT HIP.  PT'S SON ALSO REPORTS 


INCREASED CONFUSION, RECENT BLOOD TRANSFUSION R/T ANEMA


Nursing Sepsis Screen:  No Definite Risk





History of Present Illness


Date Seen by Provider:  Feb 17, 2018


Time Seen by Provider:  21:30


Initial Comments


72-year-old  female presents for weakness, drainage from pannus, and 

chronic anemia. Patient reports on 2/14/18, she was transfused with 1 unit of 

PRBCs at [facility]. She has chronic anemia, and receives iron 

infusions. She also has been having erythema and drainage from her pannus, on 

the right side. She has been started on antifungal creams and she took a round 

of azithromycin which she finished 2 days ago. She denies a history of MRSA.


Timing/Duration:  5-6 Days


Associated Systoms:  No Chest Pain, No Cough, No Diaphoresis, No Fever/Chills, 

No Loss of Appetite, No Malaise, No Nausea/Vomiting, No Rash, Shortness of Air, 

Weakness





Allergies and Home Medications


Allergies


Coded Allergies:  


     heparin (Verified  Allergy, Unknown, 1/31/17)


Uncoded Allergies:  


     TAPE (Allergy, Mild, RASH, 1/31/17)





Home Medications


Albuterol Sulfate 2.5 Mg/3 Ml Vial.neb, 2.5 MG NEB TID PRN for WHEEZING, (

Reported)


Albuterol Sulfate 6.7 Gm Hfa.aer.ad, 2 PUFF IH Q4H PRN for SHORTNESS OF BREATH, 

(Reported)


Aspirin 81 Mg Tablet.dr, 81 MG PO HS, (Reported)


Atorvastatin Calcium 10 Mg Tablet, 10 MG PO DAILY, (Reported)


Carvedilol 12.5 Mg Tablet, 12.5 MG PO BID, (Reported)


Cefdinir 300 Mg Capsule, 300 MG PO BID, #10


   Prescribed by: KOKO DOTSON on 2/2/17 1059


Citalopram Hydrobromide 20 Mg Tablet, 20 MG PO DAILY, (Reported)


Fluticasone/Salmeterol 1 Each Blst.w.dev, 1 PUFF INH DAILY PRN for ALLERGIES, (

Reported)


   LAST FILLED 05/04/16 #1 INHALER 


Glipizide 5 Mg Tablet, 7.5 MG PO DAILY 1700, (Reported)


   TAKES 1 TAB IN THE AM AND 1 & 1/2 TABS IN THE EVENING 


Glipizide 5 Mg Tablet, 5 MG PO DAILY@0630, (Reported)


Hydrochlorothiazide 12.5 Mg Cap, 12.5 MG PO DAILY, (Reported)


Insulin Lispro 100 Unit/1 Ml Vial, 6-8 UNIT SQ WITH BREAKFAST PRN for BLOOD 

SUGAR ABOVE 120, (Reported)


Metformin HCl 1,000 Mg Tablet, 1,000 MG PO BID, (Reported)


Naproxen Sodium 220 Mg Tablet, 440 MG PO BID PRN for PAIN, (Reported)


   TAKES 2 (220MG) TABLETS 


Omega-3 Fatty Acids/Fish Oil 1 Each Capsule.dr, 1 CAP PO BID, (Reported)


Pantoprazole Sodium 40 Mg Tablet.dr, 40 MG PO DAILY, (Reported)


Potassium Citrate 15 Meq Tab, 15 MEQ PO BID, (Reported)


Ticagrelor 60 Mg Tablet, 60 MG PO BID, (Reported)





Constitutional:  see HPI, weakness, other (pallor)


Skin:  see HPI, other (fungal infection to pannus)


Hematologic/Lymphatic:  See HPI, Anemia


All Other Systems Reviewed


Negative Unless Noted:  Yes





Past Medical-Social-Family Hx


Patient Social History


Alcohol Use:  Denies Use


Number of Drinks Today:  AA


Alcohol Beverage of Choice:  Beer


Recreational Drug Use:  No


Smoking Status:  Former Smoker


Type Used:  Cigarettes


Former Smoker, Quit:  Jan 31, 1977


Recent Foreign Travel:  No


Contact w/Someone Who Travel:  No


Recent Infectious Disease Expo:  No


Recent Hopitalizations:  No


Physical Abuse:  No


Sexual Abuse:  No


Mistreated:  No


Fear:  No





Immunizations Up To Date


Tetanus Booster (TDap):  Unknown


PED Vaccines UTD:  Yes


Date of Pneumonia Vaccine:  Oct 1, 2016


Date of Influenza Vaccine:  Oct 1, 2016





Seasonal Allergies


Seasonal Allergies:  Yes





Surgeries


History of Surgeries:  Yes


Surgeries:  Cardiac, Coronary Stent, Gallbladder, Renal





Respiratory


History of Respiratory Disorde:  Yes


Respiratory Disorders:  Asthma


Currently Using CPAP:  No


Currently Using BIPAP:  No





Cardiovascular


History of Cardiac Disorders:  Yes


Cardiac Disorders:  Coronary Artery Disease, High Cholesterol, Hypertension





Neurological


History of Neurological Disord:  No





Reproductive System


Hx Reproductive Disorders:  No


Sexually Transmitted Disease:  No


HIV/AIDS:  No


Female Reproductive Disorders:  Denies


GYN History:  Menopausal





Genitourinary


History of Genitourinary Disor:  No


Genitourinary Disorders:  Bladder Infection, Kidney Stones





Gastrointestinal


History of Gastrointestinal Di:  No


Gastrointestinal Disorders:  Gastroesophageal Reflux





Musculoskeletal


Musculoskeletal Disorders:  Rheumatoid Arthritis, Foot Drop, Fractures





Endocrine


History of Endocrine Disorders:  Yes (DM TYPE II; MORBID OBESITY)


Endocrine Disorders:  Diabetes, Insulin dep





HEENT


History of HEENT Disorders:  No





Cancer


History of Cancer:  No





Psychosocial


History of Psychiatric Problem:  Yes


Suicide Risk Score:  0





Integumentary


History of Skin or Integumenta:  No


Skin/Integumentary Disorders:  Recent Skin Changes





Blood Transfusions


History of Blood Disorders:  Yes (anemia, platelets low, transfusion every 2-3 

months)


Adverse Reaction to a Blood Tr:  No





Reviewed Nursing Assessment


Reviewed/Agree w Nursing PMH:  Yes





Family Medical History


Significant Family History:  No Pertinent Family Hx


Family Medial History:  


Cardiovascular disease


  19 MOTHER


Diabetes mellitus


  19 MOTHER


Paternal family history of emphysema


  19 FATHER





Physical Exam


Vital Signs





Vital Signs - First Documented








 2/17/18





 21:20


 


Temp 97.5


 


Pulse 79


 


Resp 20


 


B/P (MAP) 115/45 (68)


 


Pulse Ox 96


 


O2 Delivery Room Air





Capillary Refill : Less Than 3 Seconds


General Appearance:  WD/WN, Mild Distress


Eyes:  Bilateral Eye Normal Inspection, Bilateral Eye PERRL, Bilateral Eye EOMI


HEENT:  PERRL/EOMI, TMs Normal, Normal ENT Inspection, Pharynx Normal


Neck:  Full Range of Motion, Normal Inspection, Non Tender, Supple


Respiratory:  Chest Non Tender, Lungs Clear, Normal Breath Sounds


Cardiovascular:  Regular Rate, Rhythm, No Murmur


Gastrointestinal:  Normal Bowel Sounds, Non Tender, Soft, Distended


Extremity:  Normal Capillary Refill, Pedal Edema


Neurologic/Psychiatric:  Alert, Oriented x3, No Motor/Sensory Deficits, Normal 

Mood/Affect


Skin:  Warm/Dry, Pallor, Other (patient has morbid obesity with large abdominal 

apron. With the assistance of one RN, the patient was able to stand, dressings 

were removed from the pannus, there was a strong foul odor and clear drainage 

noted. Culture was obtained. There was marked erythema, no abscesses or 

purulent drainage noted.)





Focused Exam


Evaluation


Lactate Level


Laboratory Tests


2/17/18 21:44: Lactic Acid Level 4.48*H





Lactic Acid Level





Laboratory Tests








Test


  2/17/18


21:44


 


Lactic Acid Level


  4.48 MMOL/L


(0.50-2.00)  *H











Progress/Results/Core Measures


Suspected Sepsis


Recent Fever Within 48 Hours:  No


Infection Criteria Present:  Suspected New Infection


New/Unexplained  Altered Menta:  No


Sepsis Screen:  No Definite Risk


Within 3hrs of presentation:  Admin fluids, Admin ABX, Blood cultures prior to 

ABX's, Lactate level


Sepsis Diagnosis:  


SIRS


Temperature:97.5 


Pulse: 79 


Respiratory Rate: 20


 


Laboratory Tests


2/17/18 21:44: White Blood Count 7.5


Blood Pressure 115 /45 


Mean: 68


 


Laboratory Tests


2/17/18 21:44: Lactic Acid Level 4.48*H


Laboratory Tests


2/17/18 21:44: 


Creatinine 1.18, INR Comment 1.2, Platelet Count 203, Total Bilirubin 0.5








Results/Orders


Lab Results





Laboratory Tests








Test


  2/17/18


21:44 Range/Units


 


 


White Blood Count


  7.5 


  4.3-11.0


10^3/uL


 


Red Blood Count


  2.66 L


  4.35-5.85


10^6/uL


 


Hemoglobin 6.8 *L 11.5-16.0  G/DL


 


Hematocrit 24 L 35-52  %


 


Mean Corpuscular Volume 89  80-99  FL


 


Mean Corpuscular Hemoglobin 26  25-34  PG


 


Mean Corpuscular Hemoglobin


Concent 29 L


  32-36  G/DL


 


 


Red Cell Distribution Width 16.5 H 10.0-14.5  %


 


Platelet Count


  203 


  130-400


10^3/uL


 


Mean Platelet Volume 11.1 H 7.4-10.4  FL


 


Neutrophils (%) (Auto) 72  42-75  %


 


Lymphocytes (%) (Auto) 16  12-44  %


 


Monocytes (%) (Auto) 8  0-12  %


 


Eosinophils (%) (Auto) 3  0-10  %


 


Basophils (%) (Auto) 0  0-10  %


 


Neutrophils # (Auto) 5.4  1.8-7.8  X 10^3


 


Lymphocytes # (Auto) 1.2  1.0-4.0  X 10^3


 


Monocytes # (Auto) 0.6  0.0-1.0  X 10^3


 


Eosinophils # (Auto)


  0.2 


  0.0-0.3


10^3/uL


 


Basophils # (Auto)


  0.0 


  0.0-0.1


10^3/uL


 


Prothrombin Time 15.4 H 12.2-14.7  SEC


 


INR Comment 1.2  0.8-1.4  


 


Activated Partial


Thromboplast Time 32 


  24-35  SEC


 


 


Sodium Level 140  135-145  MMOL/L


 


Potassium Level 5.4 H 3.6-5.0  MMOL/L


 


Chloride Level 109 H   MMOL/L


 


Carbon Dioxide Level 20 L 21-32  MMOL/L


 


Anion Gap 11  5-14  MMOL/L


 


Blood Urea Nitrogen 36 H 7-18  MG/DL


 


Creatinine


  1.18 


  0.60-1.30


MG/DL


 


Estimat Glomerular Filtration


Rate 45 


   


 


 


BUN/Creatinine Ratio 31   


 


Glucose Level 63 L   MG/DL


 


Lactic Acid Level


  4.48 *H


  0.50-2.00


MMOL/L


 


Calcium Level 8.6  8.5-10.1  MG/DL


 


Magnesium Level 1.9  1.8-2.4  MG/DL


 


Total Bilirubin 0.5  0.1-1.0  MG/DL


 


Aspartate Amino Transf


(AST/SGOT) 28 


  5-34  U/L


 


 


Alanine Aminotransferase


(ALT/SGPT) 17 


  0-55  U/L


 


 


Alkaline Phosphatase 140 H   U/L


 


Total Protein 5.8 L 6.4-8.2  GM/DL


 


Albumin 3.1 L 3.2-4.5  GM/DL








My Orders





Orders - COLIN ZACARIAS


Cbc With Automated Diff (2/17/18 21:33)


Comprehensive Metabolic Panel (2/17/18 21:33)


Lactic Acid Analyzer (2/17/18 21:33)


Blood Culture (2/17/18 21:33)


Sputum Culture (2/17/18 21:33)


Ua Culture If Indicated (2/17/18 21:33)


Protime With Inr (2/17/18 21:33)


Partial Thromboplastin Time (2/17/18 21:33)


Chest 1 View, Ap/Pa Only (2/17/18 21:33)


Saline Lock/Iv-Start (2/17/18 21:33)


Type And Screen (2/17/18 21:33)


Magnesium (2/17/18 21:33)


Saline Lock/Iv-Start (2/17/18 22:11)


Ns Iv 1000 Ml (Sodium Chloride 0.9%) (2/17/18 22:11)


Iron Tibc %Sat & Ferritin (2/17/18 22:43)


Vancomycin Injection (Vancomycin Injecti (2/17/18 23:00)


Wound Culture (2/17/18 23:13)


Anaerobic Culture (2/17/18 23:13)


Medications Given in ED





Current Medications








 Medications  Dose


 Ordered  Sig/Adelfo


 Route  Start Time


 Stop Time Status Last Admin


Dose Admin


 


 Sodium Chloride  1,000 ml @ 


 0 mls/hr  Q0M ONCE


 IV  2/17/18 22:11


 2/17/18 22:12 DC 2/17/18 22:32


1,000 MLS/HR








Vital Signs/I&O





Vital Sign - Last 12Hours








 2/17/18





 21:20


 


Temp 97.5


 


Pulse 79


 


Resp 20


 


B/P (MAP) 115/45 (68)


 


Pulse Ox 96


 


O2 Delivery Room Air





Capillary Refill : Less Than 3 Seconds








Blood Pressure Mean:  68








Progress Note :  


   Time:  21:30


Progress Note


Initial evaluation completed, labs recommended. Treatment pending lab results.


2200 RBC 2.6, hemoglobin 6.8, hematocrit 24, lactic acid 4.48. Normal saline 1 

L IV over one hour.


2230 discussed patient with Dr. Edgar, agreed to inpatient admission with 

Diflucan 150 mg once daily and vancomycin per pharmacy. We'll start with 

vancomycin 1 g IV in the ED. 


2245 discussed patient by phone with Dr. Pablo, recommended to transfuse 2 

units PRBCs overnight. Evaluate with labs in the morning. He cautioned against 

over hydration, no IV fluids to be given while receiving blood transfusion.


2300 admission discussed with the patient and her son, they agreed with this 

treatment plan QUESTIONS answered.





Departure


Impression


Impression:  


 Primary Impression:  


 Chronic anemia


 Additional Impressions:  


 Candidal skin infection


 Cellulitis of abdominal wall


Disposition:  09 ADMITTED AS INPATIENT


Condition:  Stable





Admissions


Decision to Admit Reason:  Admit from ER (General)


Decision to Admit/Date:  Feb 17, 2018


Time/Decision to Admit Time:  22:00





Departure-Patient Inst.


Referrals:  


INOCENTE GRANADOS DO (PCP)


Primary Care Physician








LEANDER FREIRE (Family)


Primary Care Physician











COLIN ZACARIAS Feb 17, 2018 23:21

## 2018-02-18 VITALS — SYSTOLIC BLOOD PRESSURE: 145 MMHG | DIASTOLIC BLOOD PRESSURE: 62 MMHG

## 2018-02-18 VITALS — SYSTOLIC BLOOD PRESSURE: 153 MMHG | DIASTOLIC BLOOD PRESSURE: 70 MMHG

## 2018-02-18 VITALS — DIASTOLIC BLOOD PRESSURE: 60 MMHG | SYSTOLIC BLOOD PRESSURE: 135 MMHG

## 2018-02-18 VITALS — DIASTOLIC BLOOD PRESSURE: 72 MMHG | SYSTOLIC BLOOD PRESSURE: 174 MMHG

## 2018-02-18 VITALS — DIASTOLIC BLOOD PRESSURE: 74 MMHG | SYSTOLIC BLOOD PRESSURE: 168 MMHG

## 2018-02-18 VITALS — SYSTOLIC BLOOD PRESSURE: 132 MMHG | DIASTOLIC BLOOD PRESSURE: 91 MMHG

## 2018-02-18 VITALS — DIASTOLIC BLOOD PRESSURE: 64 MMHG | SYSTOLIC BLOOD PRESSURE: 148 MMHG

## 2018-02-18 VITALS — DIASTOLIC BLOOD PRESSURE: 68 MMHG | SYSTOLIC BLOOD PRESSURE: 149 MMHG

## 2018-02-18 VITALS — SYSTOLIC BLOOD PRESSURE: 140 MMHG | DIASTOLIC BLOOD PRESSURE: 72 MMHG

## 2018-02-18 VITALS — SYSTOLIC BLOOD PRESSURE: 150 MMHG | DIASTOLIC BLOOD PRESSURE: 34 MMHG

## 2018-02-18 LAB
ALBUMIN SERPL-MCNC: 3.2 GM/DL (ref 3.2–4.5)
ALP SERPL-CCNC: 139 U/L (ref 40–136)
ALT SERPL-CCNC: 21 U/L (ref 0–55)
ANISOCYTOSIS BLD QL SMEAR: SLIGHT
APTT PPP: YELLOW S
BACTERIA #/AREA URNS HPF: (no result) /HPF
BASO STIPL BLD QL SMEAR: SLIGHT
BASOPHILS # BLD AUTO: 0 10^3/UL (ref 0–0.1)
BASOPHILS NFR BLD AUTO: 0 % (ref 0–10)
BASOPHILS NFR BLD MANUAL: 0 %
BILIRUB SERPL-MCNC: 1 MG/DL (ref 0.1–1)
BILIRUB UR QL STRIP: NEGATIVE
BUN/CREAT SERPL: 31
BUN/CREAT SERPL: 31
CALCIUM SERPL-MCNC: 8.3 MG/DL (ref 8.5–10.1)
CALCIUM SERPL-MCNC: 8.4 MG/DL (ref 8.5–10.1)
CHLORIDE SERPL-SCNC: 106 MMOL/L (ref 98–107)
CHLORIDE SERPL-SCNC: 106 MMOL/L (ref 98–107)
CO2 SERPL-SCNC: 18 MMOL/L (ref 21–32)
CO2 SERPL-SCNC: 19 MMOL/L (ref 21–32)
CREAT SERPL-MCNC: 1.16 MG/DL (ref 0.6–1.3)
CREAT SERPL-MCNC: 1.23 MG/DL (ref 0.6–1.3)
EOSINOPHIL # BLD AUTO: 0 10^3/UL (ref 0–0.3)
EOSINOPHIL NFR BLD AUTO: 0 % (ref 0–10)
EOSINOPHIL NFR BLD MANUAL: 0 %
ERYTHROCYTE [DISTWIDTH] IN BLOOD BY AUTOMATED COUNT: 16.5 % (ref 10–14.5)
FIBRINOGEN PPP-MCNC: (no result) MG/DL
GFR SERPLBLD BASED ON 1.73 SQ M-ARVRAT: 43 ML/MIN
GFR SERPLBLD BASED ON 1.73 SQ M-ARVRAT: 46 ML/MIN
GLUCOSE SERPL-MCNC: 222 MG/DL (ref 70–105)
GLUCOSE SERPL-MCNC: 285 MG/DL (ref 70–105)
GLUCOSE UR STRIP-MCNC: NEGATIVE MG/DL
HCT VFR BLD CALC: 28 % (ref 35–52)
HGB BLD-MCNC: 8.3 G/DL (ref 11.5–16)
HYALINE CASTS #/AREA URNS LPF: (no result) /LPF
KETONES UR QL STRIP: NEGATIVE
LEUKOCYTE ESTERASE UR QL STRIP: (no result)
LYMPHOCYTES # BLD AUTO: 0.3 X 10^3 (ref 1–4)
LYMPHOCYTES NFR BLD AUTO: 4 % (ref 12–44)
MANUAL DIFFERENTIAL PERFORMED BLD QL: YES
MCH RBC QN AUTO: 27 PG (ref 25–34)
MCHC RBC AUTO-ENTMCNC: 30 G/DL (ref 32–36)
MCV RBC AUTO: 89 FL (ref 80–99)
MONOCYTES # BLD AUTO: 0.2 X 10^3 (ref 0–1)
MONOCYTES NFR BLD AUTO: 3 % (ref 0–12)
MONOCYTES NFR BLD: 3 %
NEUTROPHILS # BLD AUTO: 6.1 X 10^3 (ref 1.8–7.8)
NEUTROPHILS NFR BLD AUTO: 93 % (ref 42–75)
NEUTS BAND NFR BLD MANUAL: 89 %
NEUTS BAND NFR BLD: 3 %
NITRITE UR QL STRIP: NEGATIVE
OVALOCYTES BLD QL SMEAR: SLIGHT
PH UR STRIP: 5 [PH] (ref 5–9)
PLATELET # BLD: 193 10^3/UL (ref 130–400)
PMV BLD AUTO: 11.2 FL (ref 7.4–10.4)
POLYCHROMASIA BLD QL SMEAR: SLIGHT
POTASSIUM SERPL-SCNC: 5.3 MMOL/L (ref 3.6–5)
POTASSIUM SERPL-SCNC: 5.9 MMOL/L (ref 3.6–5)
PROT SERPL-MCNC: 5.9 GM/DL (ref 6.4–8.2)
PROT UR QL STRIP: (no result)
RBC # BLD AUTO: 3.11 10^6/UL (ref 4.35–5.85)
RBC #/AREA URNS HPF: (no result) /HPF
SODIUM SERPL-SCNC: 135 MMOL/L (ref 135–145)
SODIUM SERPL-SCNC: 136 MMOL/L (ref 135–145)
SP GR UR STRIP: 1.02 (ref 1.02–1.02)
SQUAMOUS #/AREA URNS HPF: (no result) /HPF
UROBILINOGEN UR-MCNC: NORMAL MG/DL
VARIANT LYMPHS NFR BLD MANUAL: 5 %
WBC # BLD AUTO: 6.5 10^3/UL (ref 4.3–11)
WBC #/AREA URNS HPF: (no result) /HPF

## 2018-02-18 RX ADMIN — CLINDAMYCIN IN 5 PERCENT DEXTROSE SCH MLS/HR: 12 INJECTION, SOLUTION INTRAVENOUS at 17:52

## 2018-02-18 RX ADMIN — Medication SCH ML: at 05:02

## 2018-02-18 RX ADMIN — Medication SCH ML: at 20:36

## 2018-02-18 RX ADMIN — CLINDAMYCIN IN 5 PERCENT DEXTROSE SCH MLS/HR: 12 INJECTION, SOLUTION INTRAVENOUS at 05:02

## 2018-02-18 RX ADMIN — CLINDAMYCIN IN 5 PERCENT DEXTROSE SCH MLS/HR: 12 INJECTION, SOLUTION INTRAVENOUS at 23:18

## 2018-02-18 RX ADMIN — FLUCONAZOLE SCH MG: 100 TABLET ORAL at 09:15

## 2018-02-18 RX ADMIN — PANTOPRAZOLE SODIUM SCH MG: 40 TABLET, DELAYED RELEASE ORAL at 20:36

## 2018-02-18 RX ADMIN — LISINOPRIL SCH MG: 20 TABLET ORAL at 20:35

## 2018-02-18 RX ADMIN — CLINDAMYCIN IN 5 PERCENT DEXTROSE SCH MLS/HR: 12 INJECTION, SOLUTION INTRAVENOUS at 11:24

## 2018-02-18 RX ADMIN — Medication SCH ML: at 14:55

## 2018-02-18 RX ADMIN — SODIUM CHLORIDE SCH MLS/HR: 900 INJECTION, SOLUTION INTRAVENOUS at 14:55

## 2018-02-18 RX ADMIN — ASPIRIN SCH MG: 81 TABLET ORAL at 20:36

## 2018-02-18 RX ADMIN — SODIUM CHLORIDE SCH MLS/HR: 900 INJECTION, SOLUTION INTRAVENOUS at 00:18

## 2018-02-18 RX ADMIN — NYSTATIN SCH GM: 100000 CREAM TOPICAL at 20:37

## 2018-02-18 RX ADMIN — INSULIN DETEMIR SCH UNIT: 100 INJECTION, SOLUTION SUBCUTANEOUS at 20:36

## 2018-02-18 RX ADMIN — CLINDAMYCIN IN 5 PERCENT DEXTROSE SCH MLS/HR: 12 INJECTION, SOLUTION INTRAVENOUS at 00:40

## 2018-02-18 RX ADMIN — SODIUM CHLORIDE SCH MLS/HR: 900 INJECTION, SOLUTION INTRAVENOUS at 06:57

## 2018-02-18 RX ADMIN — CARVEDILOL SCH MG: 12.5 TABLET, FILM COATED ORAL at 20:36

## 2018-02-18 RX ADMIN — ATORVASTATIN CALCIUM SCH MG: 10 TABLET, FILM COATED ORAL at 20:36

## 2018-02-18 NOTE — DIAGNOSTIC IMAGING REPORT
INDICATION: Cough and congestion



COMPARISON: 02/12/2018



TECHNIQUE: Single frontal radiograph of the chest dated

02/17/2018



FINDINGS: The cardiac silhouette is enlarged, though stable. Mild

central pulmonary vascular congestion. Mild prominence of the

pulmonary interstitium is again identified. No additional focal

pulmonary opacity. Slight blunting of the left costophrenic

angle. No significant right pleural effusion. No pneumothorax. No

acute osseous abnormality.



IMPRESSION: Cardiomegaly with mild pulmonary vascular congestion.



Prominence of the pulmonary interstitium, likely related to mild

interstitial edema with trace left pleural effusion.



Dictated by: 



  Dictated on workstation # [id number]
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
## 2018-02-18 NOTE — HISTORY & PHYSICIAL ([facility])
ABIODUN WOODALL MED STUDENT 18 1315:


HPI


History of Present Illness:


Patient is a 73 yo F w/ PMH of iron deficiency anemia, CAD s/p stenting x2, HTN

, asthma and DMII who presented to the ED w/ c/o fatigue and was found to be 

anemic w/ Hbg of 6.8; cellulitis was also noted being treated w/ topical 

nystatin. Patient states fatigue started Saturday, noticed she was getting more 

tired throughout the day, currently patient is wheelchair bound living alone 

and was unable to perform transfers by herself 2/2 leg weakness, she has been 

seeing "stars" in her vision during these dizzy spells which are mostly 

positional. Patient has an extensive hx of anemia since childhood and has been 

on PO Fe for years however it's been a few years since her last infusion of 

iron. Patient is a type II diabetic, she takes her glucose level in the morning 

only, takes metformin 1000 mg bid and inconsistently takes insulin 0-30 units/

day.  





Patient was seen on Monday in clinic for rash developing in her pannus region 

and difficulty breathing and was started on azithromycin and given nystatin 

topical cream.


Source:  patient


Exam Limitations:  no limitations


Date seen by provider:  2018


Time Seen by Provider:  14:20


Attending Physician


Bryce Mireles MD


PCP


Rachel Xiong NP


Consult





Date of Admission


2018 at 22:40





Home Medications


Home Medications


Reviewed patient Home Medication Reconciliation Form





Allergies


Coded Allergies:  


     vancomycin (Verified  Allergy, Intermediate, RASH, 18)


     heparin (Verified  Allergy, Unknown, 17)


Uncoded Allergies:  


     TAPE (Allergy, Mild, RASH, 17)





PMH-Social-Family Hx


Patient Social History


Marrital Status:  


Number of Children:  4


Number of living children:  4


Living Status:  Alone


Alcohol Use:  Denies Use


Recreational Drug Use:  No


Smoking Status:  Former Smoker


Former smoker/When Quit:  Oct 8, 1984


Type Used:  Cigarettes


Recent Foreign Travel:  No


Contact w/other who traveled:  No


Recent Hopitalizations:  No


Recent Infectious Disease Expo:  No


Physical Abuse Screen:  No


Sexual Abuse:  No





Immunizations Up To Date


Tetanus Booster (TDap):  Unknown


Date of Pneumonia Vaccine:  Oct 1, 2016


Date of Influenza Vaccine:  Oct 1, 2017





Family Medical History


Significant Family History:  No Pertinent Family Hx


Family History:  


Cardiovascular disease


  19 MOTHER


Diabetes mellitus


  19 MOTHER


Paternal family history of emphysema


  19 FATHER





Review of Systems ([facility])


Constitutional:  dizziness, weakness


EENTM:  blurred vision, No epistaxis, No nose congestion


Respiratory:  No cough, dyspnea on exertion, No hemoptysis, orthopnea, short of 

breath


Cardiovascular:  No chest pain


Gastrointestinal:  No abdominal pain, No constipation, No diarrhea, No 

hematemesis, No melena, No nausea, No vomiting


Genitourinary:  No discharge, No dysuria


Musculoskeletal:  no symptoms reported


Skin:  rash


Psychiatric/Neurological:  Denies Headache, Denies Numbness, Denies Paresthesia

, Denies Tingling





Reviewed Test Results


Reviewed Test Results


Lab





Laboratory Tests








Test


  18


21:44 18


23:36 18


03:13 18


06:09 Range/Units


 


 


White Blood Count


  7.5 


  


  


  


  4.3-11.0


10^3/uL


 


Red Blood Count


  2.66 L


  


  


  


  4.35-5.85


10^6/uL


 


Hemoglobin 6.8 *L    11.5-16.0  G/DL


 


Hematocrit 24 L    35-52  %


 


Mean Corpuscular Volume 89     80-99  FL


 


Mean Corpuscular Hemoglobin 26     25-34  PG


 


Mean Corpuscular Hemoglobin


Concent 29 L


  


  


  


  32-36  G/DL


 


 


Red Cell Distribution Width 16.5 H    10.0-14.5  %


 


Platelet Count


  203 


  


  


  


  130-400


10^3/uL


 


Mean Platelet Volume 11.1 H    7.4-10.4  FL


 


Neutrophils (%) (Auto) 72     42-75  %


 


Lymphocytes (%) (Auto) 16     12-44  %


 


Monocytes (%) (Auto) 8     0-12  %


 


Eosinophils (%) (Auto) 3     0-10  %


 


Basophils (%) (Auto) 0     0-10  %


 


Neutrophils # (Auto) 5.4     1.8-7.8  X 10^3


 


Lymphocytes # (Auto) 1.2     1.0-4.0  X 10^3


 


Monocytes # (Auto) 0.6     0.0-1.0  X 10^3


 


Eosinophils # (Auto)


  0.2 


  


  


  


  0.0-0.3


10^3/uL


 


Basophils # (Auto)


  0.0 


  


  


  


  0.0-0.1


10^3/uL


 


Prothrombin Time 15.4 H    12.2-14.7  SEC


 


INR Comment 1.2     0.8-1.4  


 


Activated Partial


Thromboplast Time 32 


  


  


  


  24-35  SEC


 


 


Sodium Level 140     135-145  MMOL/L


 


Potassium Level 5.4 H    3.6-5.0  MMOL/L


 


Chloride Level 109 H      MMOL/L


 


Carbon Dioxide Level 20 L    21-32  MMOL/L


 


Anion Gap 11     5-14  MMOL/L


 


Blood Urea Nitrogen 36 H    7-18  MG/DL


 


Creatinine


  1.18 


  


  


  


  0.60-1.30


MG/DL


 


Estimat Glomerular Filtration


Rate 45 


  


  


  


   


 


 


BUN/Creatinine Ratio 31      


 


Glucose Level 63 L      MG/DL


 


Lactic Acid Level


  4.48 *H


  3.66 *H


  


  


  0.50-2.00


MMOL/L


 


Calcium Level 8.6     8.5-10.1  MG/DL


 


Magnesium Level 1.9     1.8-2.4  MG/DL


 


Total Bilirubin 0.5     0.1-1.0  MG/DL


 


Aspartate Amino Transf


(AST/SGOT) 28 


  


  


  


  5-34  U/L


 


 


Alanine Aminotransferase


(ALT/SGPT) 17 


  


  


  


  0-55  U/L


 


 


Alkaline Phosphatase 140 H      U/L


 


Total Protein 5.8 L    6.4-8.2  GM/DL


 


Albumin 3.1 L    3.2-4.5  GM/DL


 


Urine Color   YELLOW    


 


Urine Clarity


  


  


  SLIGHTLY


CLOUDY 


   


 


 


Urine pH   5   5-9  


 


Urine Specific Gravity   1.020   1.016-1.022  


 


Urine Protein   2+ H  NEGATIVE  


 


Urine Glucose (UA)   NEGATIVE   NEGATIVE  


 


Urine Ketones   NEGATIVE   NEGATIVE  


 


Urine Nitrite   NEGATIVE   NEGATIVE  


 


Urine Bilirubin   NEGATIVE   NEGATIVE  


 


Urine Urobilinogen   NORMAL   NORMAL  MG/DL


 


Urine Leukocyte Esterase   3+ H  NEGATIVE  


 


Urine RBC (Auto)   4+ H  NEGATIVE  


 


Urine RBC    H   /HPF


 


Urine WBC   5-10 H   /HPF


 


Urine Squamous Epithelial


Cells 


  


  5-10 


  


   /HPF


 


 


Urine Crystals   NONE    /LPF


 


Urine Bacteria   FEW H   /HPF


 


Urine Casts   PRESENT    /LPF


 


Urine Hyaline Casts   2-5 H   /LPF


 


Urine Mucus   NEGATIVE    /LPF


 


Urine Culture Indicated   YES    


 


Glucometer    158 H   MG/DL


 


Test


  18


10:34 18


12:45 18


14:53 


  Range/Units


 


 


White Blood Count


  6.5 


  


  


  


  4.3-11.0


10^3/uL


 


Red Blood Count


  3.11 L


  


  


  


  4.35-5.85


10^6/uL


 


Hemoglobin 8.3 #L    11.5-16.0  G/DL


 


Hematocrit 28 L    35-52  %


 


Mean Corpuscular Volume 89     80-99  FL


 


Mean Corpuscular Hemoglobin 27     25-34  PG


 


Mean Corpuscular Hemoglobin


Concent 30 L


  


  


  


  32-36  G/DL


 


 


Red Cell Distribution Width 16.5 H    10.0-14.5  %


 


Platelet Count


  193 


  


  


  


  130-400


10^3/uL


 


Mean Platelet Volume 11.2 H    7.4-10.4  FL


 


Neutrophils (%) (Auto) 93 H    42-75  %


 


Lymphocytes (%) (Auto) 4 L    12-44  %


 


Monocytes (%) (Auto) 3     0-12  %


 


Eosinophils (%) (Auto) 0     0-10  %


 


Basophils (%) (Auto) 0     0-10  %


 


Neutrophils # (Auto) 6.1     1.8-7.8  X 10^3


 


Lymphocytes # (Auto) 0.3 L    1.0-4.0  X 10^3


 


Monocytes # (Auto) 0.2     0.0-1.0  X 10^3


 


Eosinophils # (Auto)


  0.0 


  


  


  


  0.0-0.3


10^3/uL


 


Basophils # (Auto)


  0.0 


  


  


  


  0.0-0.1


10^3/uL


 


Neutrophils % (Manual) 89      %


 


Lymphocytes % (Manual) 5      %


 


Monocytes % (Manual) 3      %


 


Eosinophils % (Manual) 0      %


 


Basophils % (Manual) 0      %


 


Band Neutrophils 3      %


 


Polychromasia SLIGHT      


 


Basophilic Stippling SLIGHT      


 


Anisocytosis SLIGHT      


 


Elliptocytes SLIGHT      


 


Sodium Level 135     135-145  MMOL/L


 


Potassium Level 5.9 H    3.6-5.0  MMOL/L


 


Chloride Level 106       MMOL/L


 


Carbon Dioxide Level 18 L    21-32  MMOL/L


 


Anion Gap 11     5-14  MMOL/L


 


Blood Urea Nitrogen 38 H    7-18  MG/DL


 


Creatinine


  1.23 


  


  


  


  0.60-1.30


MG/DL


 


Estimat Glomerular Filtration


Rate 43 


  


  


  


   


 


 


BUN/Creatinine Ratio 31      


 


Glucose Level 285 H      MG/DL


 


Lactic Acid Level


  3.14 *H


  3.96 *H


  


  


  0.50-2.00


MMOL/L


 


Calcium Level 8.3 L    8.5-10.1  MG/DL


 


Total Bilirubin 1.0     0.1-1.0  MG/DL


 


Aspartate Amino Transf


(AST/SGOT) 32 


  


  


  


  5-34  U/L


 


 


Alanine Aminotransferase


(ALT/SGPT) 21 


  


  


  


  0-55  U/L


 


 


Alkaline Phosphatase 139 H      U/L


 


Total Protein 5.9 L    6.4-8.2  GM/DL


 


Albumin 3.2     3.2-4.5  GM/DL








Radiology


CXR  @ 2133


IMPRESSION: Cardiomegaly with mild pulmonary vascular congestion.


Prominence of the pulmonary interstitium, likely related to mild


interstitial edema with trace left pleural effusion.





Physical Exam-([facility])


Physical Exam


Vital Signs





 VS - Last 72 Hours, by Label








 18





 21:20 23:20 23:55 00:00


 


Temp 97.5  96.9 


 


Pulse 79 76 67 


 


Resp  18 


 


B/P (MAP) 115/45 (68) 143/63 147/74 (98) 


 


Pulse Ox 96 97 94 96


 


O2 Delivery Room Air Room Air Room Air Room Air


 


    





 18





 01:48 02:06 04:21 04:21


 


Temp 97.1 98.0 97.0 97.0


 


Pulse 77 78 72 72


 


Resp 


 


B/P (MAP) 149/68 153/70 168/74 (105) 168/74


 


Pulse Ox 96 96 91 91


 


O2 Delivery Room Air Room Air Room Air Room Air


 


    





 18





 05:46 06:05 08:00 08:49


 


Temp 98.9 98.7 97.6 96.7


 


Pulse 72 76 77 86


 


Resp  20


 


B/P (MAP) 148/64 145/62 132/91 (105) 174/72


 


Pulse Ox 92 92 94 93


 


O2 Delivery Room Air Room Air Room Air Room Air





Capillary Refill : Less Than 3 Seconds


General Appearance:  WD/WN, no apparent distress


HEENT:  PERRL/EOMI, normal ENT inspection, No scleral icterus (R), No scleral 

icterus (L)


Neck:  non-tender, full range of motion, supple, No carotid bruit


Respiratory:  chest non-tender, lungs clear, normal breath sounds, no 

respiratory distress, no accessory muscle use


Cardiovascular:  normal peripheral pulses, regular rate, rhythm, systolic murmur


Peripheral Pulses:  2+ Dorsalis Pedis (R), 2+ Left Dors-Pedis (L)


Gastrointestinal:  normal bowel sounds, non tender, soft, No guarding, No 

rebound


Extremities:  pedal edema


Neurologic/Psychiatric:  CNs II-XII nml as tested, alert, normal mood/affect, 

oriented x 3


Skin:  rash





Clinical Quality Measures


DVT/VTE Risk/Contraindication:


Risk Factor Score Per Nursin


RFS Level Per Nursing on Admit:  4+=Very High





Copy


[facility]   [facility]





Assessment/Plan


Assessment/Plan


Admission Dx


Anemia & cellulitis


Plan


Anemia, Hgb 6.8->8.3


Chronic anemia since age 7


PO Fe and infusions for years, last infusion a few years ago


Dizzy, seeing stars, difficulty performing transfers at home


Hx of falls


Plan:


- Fe infusion


- Monitor Hgb


- FOBT





Cellulitis of panus


- Nystatin cream


- Fluconazole 150 mg qd


- Clindamycin





Hyperkalemia, K 5.4->5.9


- Lasix 40 mg IV x1


- Kayexalate


- Continue to monitor





Lactic Acidosis, L acid 4.48->3.66->3.14->3.96


- D/c home metformin


- IVF





DMII


Patient on PO meds at home


Inconsistently taking insulin


Plan:


- Insulin sliding scale


- Hgb A1c








BRYCE MIRELES MD 18 2007:


Home Medications


Allergies


Coded Allergies:  


     vancomycin (Verified  Allergy, Intermediate, RASH, 18)


     heparin (Verified  Allergy, Unknown, 17)


Uncoded Allergies:  


     TAPE (Allergy, Mild, RASH, 17)





PMH-Social-Family Hx


Patient Social History


Living Status:  Family lives near and helps patient





Past Medical History





Normocytic Anemia with Fe Def: follows with Dr Osman HERNANDEZDM


CAD s/p stenting


Asthma


Morbid Obesity





Family Medical History


Family History:  


Cardiovascular disease


  19 MOTHER


Diabetes mellitus


  19 MOTHER


Paternal family history of emphysema


  19 FATHER





Physical Exam-([facility])


Physical Exam


Vital Signs





 VS - Last 72 Hours, by Label








 18





 21:20 23:20 23:55 00:00


 


Temp 97.5  96.9 


 


Pulse 79 76 67 


 


Resp 20 20 18 


 


B/P (MAP) 115/45 (68) 143/63 147/74 (98) 


 


Pulse Ox 96 97 94 96


 


O2 Delivery Room Air Room Air Room Air Room Air


 


    





 18





 01:48 02:06 04:21 04:21


 


Temp 97.1 98.0 97.0 97.0


 


Pulse 77 78 72 72


 


Resp 20 18 18 18


 


B/P (MAP) 149/68 153/70 168/74 (105) 168/74


 


Pulse Ox 96 96 91 91


 


O2 Delivery Room Air Room Air Room Air Room Air


 


    





 18





 05:46 06:05 08:00 08:49


 


Temp 98.9 98.7 97.6 96.7


 


Pulse 72 76 77 86


 


Resp 18 18 20 20


 


B/P (MAP) 148/64 145/62 132/91 (105) 174/72


 


Pulse Ox 92 92 94 93


 


O2 Delivery Room Air Room Air Room Air Room Air


 


    





 18 





 12:00 16:35 19:52 


 


Temp 98.5 97.8 98.0 


 


Pulse 84 85 79 


 


Resp 18 18 18 


 


B/P (MAP) 150/34 (72) 140/72 (94) 135/60 (85) 


 


Pulse Ox 92 91 96 


 


O2 Delivery Room Air Room Air Room Air 








General Appearance:  WD/WN, no apparent distress, other (Morbid Obesity)


HEENT:  PERRL/EOMI


Neck:  non-tender, full range of motion, supple


Respiratory:  chest non-tender, lungs clear, normal breath sounds, no 

respiratory distress, no accessory muscle use


Cardiovascular:  normal peripheral pulses, regular rate, rhythm, systolic 

murmur (3/6)


Gastrointestinal:  normal bowel sounds, non tender, soft


Extremities:  pedal edema (3+ bilaterally)


Neurologic/Psychiatric:  CNs II-XII nml as tested, no motor/sensory deficits, 

alert, normal mood/affect, oriented x 3


Skin:  rash (Pannus Cellulits with non purulent drainage, + edma in pannus)


Lymphatic:  no adenopathy





Copy


Copies To 1:   Rachel VELASQUEZ





Assessment/Plan


Assessment/Plan





(1) Hyperkalemia


Status:  Acute


Assessment & Plan:  - 1 dose of Kayexalate given, repeat bmp in AM


- No ECG changes





(2) Lactic acidosis


Status:  Acute


Assessment & Plan:  - Does no look to be do to infection as it is not improving 

with hydration


- Recommend holding Metformin 


- Continue to monitor





(3) Insulin dependent diabetes mellitus with complications


Status:  Chronic


Assessment & Plan:  - Non Compliant with insulin, states that she takes it 

sometimes and takes long acting on slide scale


- Start Levemir 10 qhs


- A1c pending


- Would benefit from DM education





(4) CAD (coronary artery disease), native coronary artery


Status:  Chronic


Assessment & Plan:  - Continue home meds


Qualifiers:  


   Qualified Codes:  I25.10 - Atherosclerotic heart disease of native coronary 

artery without angina pectoris


(5) Mild persistent asthma


Status:  Chronic


Assessment & Plan:  - Treatments available PRN


Qualifiers:  


   Qualified Codes:  J45.30 - Mild persistent asthma, uncomplicated


(6) Morbid obesity


Status:  Chronic


(7) Cellulitis, abdominal wall


Status:  Acute


Assessment & Plan:  - Continue antibiotics


- Patient did not tolerate Vancomycin due to itching and infusion site redness, 

placed on her allergy list





(8) CHF (congestive heart failure)


Status:  Chronic


Assessment & Plan:  - Last Echo in system from  shows normal EF 60%


- Patient to put on compression stockings today


Qualifiers:  


   Qualified Codes:  I50.9 - Heart failure, unspecified


(9) Candidal skin infection


Status:  Acute


Assessment & Plan:  - Continue topical and systemic antifungals





(10) DVT prophylaxis


Status:  Acute


Assessment & Plan:  Lovenox 40 Daily














ABIODUN WOODALL STUDENT 2018 13:15


BRYCE MIRELES MD 2018 20:07

## 2018-02-19 VITALS — SYSTOLIC BLOOD PRESSURE: 141 MMHG | DIASTOLIC BLOOD PRESSURE: 63 MMHG

## 2018-02-19 VITALS — DIASTOLIC BLOOD PRESSURE: 58 MMHG | SYSTOLIC BLOOD PRESSURE: 126 MMHG

## 2018-02-19 VITALS — SYSTOLIC BLOOD PRESSURE: 148 MMHG | DIASTOLIC BLOOD PRESSURE: 67 MMHG

## 2018-02-19 VITALS — DIASTOLIC BLOOD PRESSURE: 60 MMHG | SYSTOLIC BLOOD PRESSURE: 133 MMHG

## 2018-02-19 VITALS — SYSTOLIC BLOOD PRESSURE: 139 MMHG | DIASTOLIC BLOOD PRESSURE: 69 MMHG

## 2018-02-19 VITALS — SYSTOLIC BLOOD PRESSURE: 137 MMHG | DIASTOLIC BLOOD PRESSURE: 60 MMHG

## 2018-02-19 LAB
ALBUMIN SERPL-MCNC: 3 GM/DL (ref 3.2–4.5)
ALP SERPL-CCNC: 124 U/L (ref 40–136)
ALT SERPL-CCNC: 26 U/L (ref 0–55)
BASOPHILS # BLD AUTO: 0 10^3/UL (ref 0–0.1)
BASOPHILS NFR BLD AUTO: 0 % (ref 0–10)
BILIRUB SERPL-MCNC: 0.6 MG/DL (ref 0.1–1)
BUN/CREAT SERPL: 34
CALCIUM SERPL-MCNC: 8.2 MG/DL (ref 8.5–10.1)
CHLORIDE SERPL-SCNC: 110 MMOL/L (ref 98–107)
CO2 SERPL-SCNC: 22 MMOL/L (ref 21–32)
CREAT SERPL-MCNC: 1.18 MG/DL (ref 0.6–1.3)
EOSINOPHIL # BLD AUTO: 0.1 10^3/UL (ref 0–0.3)
EOSINOPHIL NFR BLD AUTO: 3 % (ref 0–10)
ERYTHROCYTE [DISTWIDTH] IN BLOOD BY AUTOMATED COUNT: 17.9 % (ref 10–14.5)
GFR SERPLBLD BASED ON 1.73 SQ M-ARVRAT: 45 ML/MIN
GLUCOSE SERPL-MCNC: 109 MG/DL (ref 70–105)
HCT VFR BLD CALC: 26 % (ref 35–52)
HGB BLD-MCNC: 7.8 G/DL (ref 11.5–16)
LYMPHOCYTES # BLD AUTO: 0.7 X 10^3 (ref 1–4)
LYMPHOCYTES NFR BLD AUTO: 17 % (ref 12–44)
MANUAL DIFFERENTIAL PERFORMED BLD QL: NO
MCH RBC QN AUTO: 27 PG (ref 25–34)
MCHC RBC AUTO-ENTMCNC: 30 G/DL (ref 32–36)
MCV RBC AUTO: 90 FL (ref 80–99)
MONOCYTES # BLD AUTO: 0.5 X 10^3 (ref 0–1)
MONOCYTES NFR BLD AUTO: 12 % (ref 0–12)
NEUTROPHILS # BLD AUTO: 2.9 X 10^3 (ref 1.8–7.8)
NEUTROPHILS NFR BLD AUTO: 68 % (ref 42–75)
PLATELET # BLD: 174 10^3/UL (ref 130–400)
PMV BLD AUTO: 10.9 FL (ref 7.4–10.4)
POTASSIUM SERPL-SCNC: 5.1 MMOL/L (ref 3.6–5)
PROT SERPL-MCNC: 5.5 GM/DL (ref 6.4–8.2)
RBC # BLD AUTO: 2.91 10^6/UL (ref 4.35–5.85)
SODIUM SERPL-SCNC: 139 MMOL/L (ref 135–145)
WBC # BLD AUTO: 4.2 10^3/UL (ref 4.3–11)

## 2018-02-19 RX ADMIN — CLINDAMYCIN IN 5 PERCENT DEXTROSE SCH MLS/HR: 12 INJECTION, SOLUTION INTRAVENOUS at 23:58

## 2018-02-19 RX ADMIN — Medication SCH ML: at 21:52

## 2018-02-19 RX ADMIN — INSULIN DETEMIR SCH UNIT: 100 INJECTION, SOLUTION SUBCUTANEOUS at 21:48

## 2018-02-19 RX ADMIN — ASPIRIN SCH MG: 81 TABLET ORAL at 21:48

## 2018-02-19 RX ADMIN — CLINDAMYCIN IN 5 PERCENT DEXTROSE SCH MLS/HR: 12 INJECTION, SOLUTION INTRAVENOUS at 12:08

## 2018-02-19 RX ADMIN — CARVEDILOL SCH MG: 12.5 TABLET, FILM COATED ORAL at 21:48

## 2018-02-19 RX ADMIN — NYSTATIN SCH GM: 100000 CREAM TOPICAL at 09:28

## 2018-02-19 RX ADMIN — CEFAZOLIN SODIUM SCH MLS/HR: 2 SOLUTION INTRAVENOUS at 21:47

## 2018-02-19 RX ADMIN — CARVEDILOL SCH MG: 12.5 TABLET, FILM COATED ORAL at 09:27

## 2018-02-19 RX ADMIN — FLUCONAZOLE SCH MG: 100 TABLET ORAL at 09:27

## 2018-02-19 RX ADMIN — CLINDAMYCIN IN 5 PERCENT DEXTROSE SCH MLS/HR: 12 INJECTION, SOLUTION INTRAVENOUS at 05:22

## 2018-02-19 RX ADMIN — Medication SCH ML: at 12:09

## 2018-02-19 RX ADMIN — NYSTATIN SCH GM: 100000 CREAM TOPICAL at 12:09

## 2018-02-19 RX ADMIN — NYSTATIN SCH GM: 100000 CREAM TOPICAL at 21:52

## 2018-02-19 RX ADMIN — CLINDAMYCIN IN 5 PERCENT DEXTROSE SCH MLS/HR: 12 INJECTION, SOLUTION INTRAVENOUS at 17:25

## 2018-02-19 RX ADMIN — PANTOPRAZOLE SODIUM SCH MG: 40 TABLET, DELAYED RELEASE ORAL at 21:48

## 2018-02-19 RX ADMIN — Medication SCH ML: at 05:22

## 2018-02-19 RX ADMIN — LISINOPRIL SCH MG: 20 TABLET ORAL at 21:51

## 2018-02-19 RX ADMIN — ATORVASTATIN CALCIUM SCH MG: 10 TABLET, FILM COATED ORAL at 21:48

## 2018-02-19 NOTE — PROGRESS NOTE (SOAP)
Subjective


Subjective/Events-last exam


Today patient reports that she is feeling much better than when she was admitted

, because she was able to rest.  Patient reports that abruptly about 2 weeks 

ago she suddenly became unable to get herself into her bed, because she was 

unable to raise her leg high enough to get into the bed.  Patient reports she 

will have someone that helps her into bed at night, but she will wake up in the 

middle of the night to go to the bathroom, and then has been unable to get back 

into bed and has had to stay upright in her wheelchair.  She reports this 

happened many nights in a row, and she thinks that is what contributed to her 

pannus infection.  The patient reports that she lives at home alone and that 

makes it difficult for her; she is also concerned because she cannot see to 

clean her wound due to its location.  She would be interested in a swing bed if 

she qualifies or home health.


Review of Systems


Date Seen by Provider:  2018


Time Seen by Provider:  12:22


General:  No Chills, No Night Sweats, No Fatigue, No Malaise


HEENT:  No Head Aches, No Eye Pain, No Ear Pain, No Dysphasia


Pulmonary:  No Dyspnea, No Cough


Cardiovascular:  No: Chest Pain, Palpitations


Gastrointestinal:  No: Nausea, Vomiting


Genitourinary:  No Dysuria, No Frequency


Musculoskeletal:  No: neck pain, back pain


Neurological:  Weakness, No: Numbness, Change in speech, Confusion, Seizures





Objective


Exam


Last Set of Vital Signs





Vital Signs








  Date Time  Temp Pulse Resp B/P (MAP) Pulse Ox O2 Delivery O2 Flow Rate FiO2


 


18 04:23 97.9 75 19 133/60 (84) 89 Room Air  





Capillary Refill : Less Than 3 Seconds


I&O











Intake and Output 


 


 18





 00:00


 


Intake Total 3830 ml


 


Output Total 300 ml


 


Balance 3530 ml


 


 


 


Intake Oral 1530 ml


 


IV Total 1750 ml


 


Other 550 ml


 


Output Urine Total 300 ml


 


# Voids 4


 


# Bowel Movements 1








General:  Alert, Oriented X3, Cooperative, No Acute Distress


HEENT:  EOMI, Mucous Memb Moist/Pink


Neck:  Supple, No Thyromegaly


Lungs:  Clear to Auscultation, Normal Air Movement


Heart:  Regular Rate, Normal S1, Normal S2


Abdomen:  Normal Bowel Sounds, Soft, No Tenderness


Extremities:  No Clubbing, No Cyanosis


Skin:  Other (cellulitis of pannus with ulceration, healing appropriately)


Neuro:  Normal Speech, Normal Tone, Sensation Intact


Psych/Mental Status:  Mental Status NL, Mood NL





Results/Procedures


Lab


Laboratory Tests


18 12:45: Lactic Acid Level 3.96*H


18 14:53: 


Lactic Acid Level 3.14*H, Sodium Level 136, Potassium Level 5.3H, Chloride 

Level 106, Carbon Dioxide Level 19L, Anion Gap 11, Blood Urea Nitrogen 36H, 

Creatinine 1.16, Estimat Glomerular Filtration Rate 46, BUN/Creatinine Ratio 31

, Glucose Level 222H, Calcium Level 8.4L


18 17:07: Lactic Acid Level 2.43*H


18 20:17: Glucometer 199H


18 05:33: 


White Blood Count 4.2L, Red Blood Count 2.91L, Hemoglobin 7.8L, Hematocrit 26L, 

Mean Corpuscular Volume 90, Mean Corpuscular Hemoglobin 27, Mean Corpuscular 

Hemoglobin Concent 30L, Red Cell Distribution Width 17.9H, Platelet Count 174, 

Mean Platelet Volume 10.9H, Neutrophils (%) (Auto) 68, Lymphocytes (%) (Auto) 17

, Monocytes (%) (Auto) 12, Eosinophils (%) (Auto) 3, Basophils (%) (Auto) 0, 

Neutrophils # (Auto) 2.9, Lymphocytes # (Auto) 0.7L, Monocytes # (Auto) 0.5, 

Eosinophils # (Auto) 0.1, Basophils # (Auto) 0.0, Sodium Level 139, Potassium 

Level 5.1H, Chloride Level 110H, Carbon Dioxide Level 22, Anion Gap 7, Blood 

Urea Nitrogen 40H, Creatinine 1.18, Estimat Glomerular Filtration Rate 45, BUN/

Creatinine Ratio 34, Glucose Level 109H, Lactic Acid Level 1.04, Calcium Level 

8.2L, Total Bilirubin 0.6, Aspartate Amino Transf (AST/SGOT) 37H, Alanine 

Aminotransferase (ALT/SGPT) 26, Alkaline Phosphatase 124, Total Protein 5.5L, 

Albumin 3.0L





Microbiology


18 Blood Culture - Preliminary, Resulted


          No growth


18 Urine Culture - Preliminary, Resulted


          


18 Gram Stain - Final, Resulted


          


18 Wound Culture - Preliminary, Resulted


          Gram Negative Gutierrez


          Corynebacterium species


Radiology


CXR  @ 9276


IMPRESSION: Cardiomegaly with mild pulmonary vascular congestion.


Prominence of the pulmonary interstitium, likely related to mild


interstitial edema with trace left pleural effusion.





Assessment/Plan


Assessment/Plan


Admission Dx


Cellulitis


Anemia


Plan








(1) Hyperkalemia


Status:  Acute


Assessment & Plan:  - 1 dose of Kayexalate given, repeat bmp in AM


- No ECG changes





-down to 5.1 this AM


-will recheck in AM and if not decreased will repeat Kayexalate





(2) Lactic acidosis


Status:  Acute


Assessment & Plan:  - Does no look to be do to infection as it is not improving 

with hydration


- Recommend holding Metformin 


- Continue to monitor





(3) Insulin dependent diabetes mellitus with complications


Status:  Chronic


Assessment & Plan:  - Non Compliant with insulin, states that she takes it 

sometimes and takes long acting on slide scale


- Start Levemir 10 qhs


- A1c pending


- Would benefit from DM education





-HgbA1C pending


-continue Levemir 10 units at HS


-sliding scale with meals


-will plan to have patient follow up with diabetic educator shortly after 

discharge





(4) CAD (coronary artery disease), native coronary artery


Status:  Chronic


Assessment & Plan:  - Continue home meds


Qualifiers:  


   Qualified Codes:  I25.10 - Atherosclerotic heart disease of native coronary 

artery without angina pectoris


(5) Mild persistent asthma


Status:  Chronic


Assessment & Plan:  - Treatments available PRN


Qualifiers:  


   Qualified Codes:  J45.30 - Mild persistent asthma, uncomplicated


(6) Morbid obesity


Status:  Chronic


(7) Cellulitis, abdominal wall


Status:  Acute


Assessment & Plan:  - Continue antibiotics


- Patient did not tolerate Vancomycin due to itching and infusion site redness, 

placed on her allergy list





-anaerobic cultures pending


-corynebacterium species with sensitivities pending


-proteus mirabilis that is pan sensitive; will start on cefazolin Q8H 


-continue clindamycin


-will narrow coverage once final sensitivities are available





(8) CHF (congestive heart failure)


Status:  Chronic


Assessment & Plan:  - Last Echo in system from  shows normal EF 60%


- Patient to put on compression stockings today


Qualifiers:  


   Qualified Codes:  I50.9 - Heart failure, unspecified


(9) Candidal skin infection


Status:  Acute


Assessment & Plan:  - Continue topical and systemic antifungals





(10) DVT prophylaxis


Status:  Acute


Assessment & Plan:  Lovenox 40 Daily





(11) Anemia


Status:  Chronic


Assessment & Plan:  


-B12, Folate, Iron Studies pending


-s/p transfusion 1 unit PRBC for symptomatic anemia


-Hgb stable


-Hematology consulted 








Clinical Quality Measures


DVT/VTE Risk/Contraindication:


Risk Factor Score Per Nursin


RFS Level Per Nursing on Admit:  4+=Very High











MURPHY LONG DO 2018 11:45

## 2018-02-19 NOTE — PHYSICAL THERAPY EVALUATION
PT Evaluation-General


Medical Diagnosis


Admission Date


2018 at 22:40


Medical Diagnosis:  anemia/cellulitis pannus


Onset Date:  2018





Therapy Diagnosis


Therapy Diagnosis:  debility





Height/Weight


Height (Feet):  5


Height (Inches):  7.00


Weight (Pounds):  249


Weight (Ounces):  12.8





Precautions


Precautions/Isolations:  Fall Prevention, Standard Precautions, Pressure Ulcer





Weight Bear Status


Right Lower Extremity:  Right


Weight Bearing/Tolerated


Left Lower Extremity:  Left


Weight Bearing/Tolerated





Referral


Physician:  Herve


Reason for Referral:  Evaluation/Treatment





Medical History


Pertinent Medical History:  Arthritis, CAD, DM, GERD, HTN, Neuropathy, OA


Additional Medical History


drop foot/morbid obesity


Current History


ED with drainage of pannus and increase confusion


Reviewed History:  Yes





Social History


Home:  Apartment


Current Living Status:  Alone


Entry Into Home:  Level Entry





Prior/Core FIM


Prior Level of Function


              Functional [place] Measure


0=Not Assessed/NA   4=Minimal Assistance


1=Total Assistance   5=Supervision or Setup


2=Maximal Assistance   6=Modified [place]


3=Moderate Assistance   7=Complete [place]


Bed Mobility:  6


Transfers (B,C,W/C) (FIM):  6


Wheelchair Mobility:  6


nonambulatory PLOF





PT Evaluation-Current


Subjective


Patient agrees to PT.  Patient reports she does not ambulate due to her 

avascular necrosis right hip.  Patient reports that Dr. Koenig told her not 

to move her leg, even exercise. PT reassured patient that exercise is fine and 

will not compromise hip.





Pain





   Numeric Pain Scale:  0-No Pain


   Location:  No Pain Reported





Objective


Patient Orientation:  Normal For Age


Problem Solving:  Fair





ROM/Strength


ROM Lower Extremities


bilateral LE limited due to obesity (pannus limited bilateral LE ROM)


Strength Lower Extremities


bilateral Le 4-/5 grossly with knee flexion/extension





Integumentary/Posture


Integumentary


refer to nursing notes


Bowel Incontinence:  No


Posture


trunk flexed posture in stand with FWW





Neuromuscular


(Tone, Coordination, Reflexes)


diminished coordination due to inactivity PLOF





Sensory


Vision:  Functional


Hearing:  Functional


Sensation Right Lower Extremit:  Intact


Sensation Left Lower Extremity:  Intact





Transfers


              Functional [place] Measure


0=Not Assessed/NA   4=Minimal Assistance


1=Total Assistance   5=Supervision or Setup


2=Maximal Assistance   6=Modified [place]


3=Moderate Assistance   7=Complete [place]


Transfers (B, C, W/C) (FIM):  5


Scootin


Rollin


Supine to/from Sit:  5


Sit to/from Stand:  5


bed t/f WC(FIM only if WC use):  5


SBA with all bed mobility with use of assistive device to assist with right LE 

and SBA with transfers.





Gait


Mode of Locomotion:  Wheelchair


Anticipated Mode of Locomotion:  Wheelchair





Wheelchair Training


Wheelchair (FIM):  1


Wheelchair Distance (FIM):  1=up to 49 ft


Wheelchair Level of Assist:  6





Balance


Sitting Static:  Fair


Sitting Dynamic:  Fair


Standing Static:  Fair


 Standing Dynamic:  Fair





Assessment/Needs


72 y.o. female, will be seen short term to address bed mobility and transfers 

to ensure safe return to home.  Patient appears to self limit.  Nursing 

informed of PT findings.


Rehab Potential:  Fair


Post Rehab Potential-Barriers:  compliance





PT Long Term Goals


Long Term Goals


PT Long Term Goals Time Frame:  2018


Transfers (B,C,W/C) (FIM):  6





PT Plan


Problem List


Problem List:  Activity Tolerance, Functional Strength, Safety, Balance, 

Transfer, Bed Mobility





Treatment/Plan


Treatment Plan:  Continue Plan of Care


Treatment Plan:  Bed Mobility, Education, Functional Activity Daniel, Functional 

Strength, Safety, Therapeutic Exercise, Transfers


Treatment Duration:  2018


Frequency:  5 times per week


Estimated Hrs Per Day:  .25 hour per day


Patient and/or Family Agrees t:  Yes





Time/GCodes


Time In:  815


Time Out:  843


Total Billed Treatment Time:  28


Total Billed Treatment


1 visit


[facility]


FA 13 min











EMILIO JUNIOR PT 2018 09:22

## 2018-02-20 ENCOUNTER — HOSPITAL ENCOUNTER (INPATIENT)
Dept: HOSPITAL 75 - 4TH | Age: 73
LOS: 3 days | Discharge: NURSING, REHAB OR LONG-TERM CARE | DRG: 603 | End: 2018-02-23
Attending: FAMILY MEDICINE | Admitting: FAMILY MEDICINE
Payer: MEDICARE

## 2018-02-20 VITALS — DIASTOLIC BLOOD PRESSURE: 84 MMHG | SYSTOLIC BLOOD PRESSURE: 127 MMHG

## 2018-02-20 VITALS — HEIGHT: 67 IN | WEIGHT: 249.8 LBS | BODY MASS INDEX: 39.21 KG/M2

## 2018-02-20 VITALS — DIASTOLIC BLOOD PRESSURE: 78 MMHG | SYSTOLIC BLOOD PRESSURE: 160 MMHG

## 2018-02-20 VITALS — DIASTOLIC BLOOD PRESSURE: 66 MMHG | SYSTOLIC BLOOD PRESSURE: 155 MMHG

## 2018-02-20 DIAGNOSIS — R53.1: ICD-10-CM

## 2018-02-20 DIAGNOSIS — B37.2: ICD-10-CM

## 2018-02-20 DIAGNOSIS — I50.9: ICD-10-CM

## 2018-02-20 DIAGNOSIS — D50.9: ICD-10-CM

## 2018-02-20 DIAGNOSIS — Z91.14: ICD-10-CM

## 2018-02-20 DIAGNOSIS — Z79.4: ICD-10-CM

## 2018-02-20 DIAGNOSIS — K21.9: ICD-10-CM

## 2018-02-20 DIAGNOSIS — Z95.5: ICD-10-CM

## 2018-02-20 DIAGNOSIS — I11.9: ICD-10-CM

## 2018-02-20 DIAGNOSIS — J45.30: ICD-10-CM

## 2018-02-20 DIAGNOSIS — Z87.891: ICD-10-CM

## 2018-02-20 DIAGNOSIS — E66.01: ICD-10-CM

## 2018-02-20 DIAGNOSIS — L03.311: Primary | ICD-10-CM

## 2018-02-20 DIAGNOSIS — E78.00: ICD-10-CM

## 2018-02-20 DIAGNOSIS — E11.9: ICD-10-CM

## 2018-02-20 DIAGNOSIS — Z99.3: ICD-10-CM

## 2018-02-20 DIAGNOSIS — I25.10: ICD-10-CM

## 2018-02-20 DIAGNOSIS — M06.9: ICD-10-CM

## 2018-02-20 LAB
BASOPHILS # BLD AUTO: 0 10^3/UL (ref 0–0.1)
BASOPHILS NFR BLD AUTO: 1 % (ref 0–10)
BUN/CREAT SERPL: 31
CALCIUM SERPL-MCNC: 8.1 MG/DL (ref 8.5–10.1)
CHLORIDE SERPL-SCNC: 110 MMOL/L (ref 98–107)
CO2 SERPL-SCNC: 22 MMOL/L (ref 21–32)
CREAT SERPL-MCNC: 1.25 MG/DL (ref 0.6–1.3)
EOSINOPHIL # BLD AUTO: 0.2 10^3/UL (ref 0–0.3)
EOSINOPHIL NFR BLD AUTO: 5 % (ref 0–10)
ERYTHROCYTE [DISTWIDTH] IN BLOOD BY AUTOMATED COUNT: 19 % (ref 10–14.5)
GFR SERPLBLD BASED ON 1.73 SQ M-ARVRAT: 42 ML/MIN
GLUCOSE SERPL-MCNC: 144 MG/DL (ref 70–105)
HCT VFR BLD CALC: 26 % (ref 35–52)
HGB BLD-MCNC: 7.8 G/DL (ref 11.5–16)
LYMPHOCYTES # BLD AUTO: 0.9 X 10^3 (ref 1–4)
LYMPHOCYTES NFR BLD AUTO: 24 % (ref 12–44)
MANUAL DIFFERENTIAL PERFORMED BLD QL: NO
MCH RBC QN AUTO: 27 PG (ref 25–34)
MCHC RBC AUTO-ENTMCNC: 30 G/DL (ref 32–36)
MCV RBC AUTO: 91 FL (ref 80–99)
MONOCYTES # BLD AUTO: 0.5 X 10^3 (ref 0–1)
MONOCYTES NFR BLD AUTO: 13 % (ref 0–12)
NEUTROPHILS # BLD AUTO: 2.2 X 10^3 (ref 1.8–7.8)
NEUTROPHILS NFR BLD AUTO: 57 % (ref 42–75)
PLATELET # BLD: 173 10^3/UL (ref 130–400)
PMV BLD AUTO: 11.2 FL (ref 7.4–10.4)
POTASSIUM SERPL-SCNC: 5.1 MMOL/L (ref 3.6–5)
RBC # BLD AUTO: 2.88 10^6/UL (ref 4.35–5.85)
SODIUM SERPL-SCNC: 139 MMOL/L (ref 135–145)
WBC # BLD AUTO: 3.8 10^3/UL (ref 4.3–11)

## 2018-02-20 PROCEDURE — 82962 GLUCOSE BLOOD TEST: CPT

## 2018-02-20 PROCEDURE — 80048 BASIC METABOLIC PNL TOTAL CA: CPT

## 2018-02-20 PROCEDURE — 36415 COLL VENOUS BLD VENIPUNCTURE: CPT

## 2018-02-20 RX ADMIN — Medication SCH ML: at 05:01

## 2018-02-20 RX ADMIN — CEFAZOLIN SODIUM SCH MLS/HR: 2 SOLUTION INTRAVENOUS at 20:44

## 2018-02-20 RX ADMIN — CARVEDILOL SCH MG: 12.5 TABLET, FILM COATED ORAL at 08:32

## 2018-02-20 RX ADMIN — NYSTATIN SCH GM: 100000 CREAM TOPICAL at 20:43

## 2018-02-20 RX ADMIN — SODIUM POLYSTYRENE SULFONATE SCH GM: 15 SUSPENSION ORAL; RECTAL at 12:36

## 2018-02-20 RX ADMIN — SODIUM POLYSTYRENE SULFONATE SCH GM: 15 SUSPENSION ORAL; RECTAL at 09:41

## 2018-02-20 RX ADMIN — MICONAZOLE NITRATE SCH GM: 20 POWDER TOPICAL at 20:45

## 2018-02-20 RX ADMIN — CARVEDILOL SCH MG: 12.5 TABLET, FILM COATED ORAL at 20:44

## 2018-02-20 RX ADMIN — NYSTATIN SCH GM: 100000 CREAM TOPICAL at 08:32

## 2018-02-20 RX ADMIN — CLINDAMYCIN IN 5 PERCENT DEXTROSE SCH MLS/HR: 12 INJECTION, SOLUTION INTRAVENOUS at 05:01

## 2018-02-20 RX ADMIN — CLINDAMYCIN IN 5 PERCENT DEXTROSE SCH MLS/HR: 12 INJECTION, SOLUTION INTRAVENOUS at 11:19

## 2018-02-20 RX ADMIN — PANTOPRAZOLE SODIUM SCH MG: 40 TABLET, DELAYED RELEASE ORAL at 20:44

## 2018-02-20 RX ADMIN — CLINDAMYCIN IN 5 PERCENT DEXTROSE SCH MLS/HR: 12 INJECTION, SOLUTION INTRAVENOUS at 18:08

## 2018-02-20 RX ADMIN — NYSTATIN SCH GM: 100000 CREAM TOPICAL at 12:57

## 2018-02-20 RX ADMIN — CEFAZOLIN SODIUM SCH MLS/HR: 2 SOLUTION INTRAVENOUS at 12:36

## 2018-02-20 RX ADMIN — LISINOPRIL SCH MG: 20 TABLET ORAL at 20:44

## 2018-02-20 RX ADMIN — ATORVASTATIN CALCIUM SCH MG: 10 TABLET, FILM COATED ORAL at 20:44

## 2018-02-20 RX ADMIN — FLUCONAZOLE SCH MG: 100 TABLET ORAL at 08:32

## 2018-02-20 RX ADMIN — ASPIRIN SCH MG: 81 TABLET ORAL at 20:44

## 2018-02-20 RX ADMIN — INSULIN DETEMIR SCH UNIT: 100 INJECTION, SOLUTION SUBCUTANEOUS at 20:45

## 2018-02-20 RX ADMIN — CEFAZOLIN SODIUM SCH MLS/HR: 2 SOLUTION INTRAVENOUS at 05:00

## 2018-02-20 NOTE — PHYSICAL THERAPY EVALUATION
PT Evaluation-General


Medical Diagnosis


Admission Date


2018 at 14:07


Medical Diagnosis:  Cellulitis, anemia, debility


Onset Date:  2018





Therapy Diagnosis


Therapy Diagnosis:  Impaired functional mobility and acivity tolerance





Height/Weight


Height (Feet):  5


Height (Inches):  7.00


Weight (Pounds):  249


Weight (Ounces):  12.8





Precautions


Precautions/Isolations:  Fall Prevention, Standard Precautions





Weight Bear Status


Right Lower Extremity:  Right


Weight Bearing/Tolerated


Left Lower Extremity:  Left


Weight Bearing/Tolerated





Referral


Physician:  Vee Francis DO


Reason for Referral:  Evaluation/Treatment





Medical History


Pertinent Medical History:  Arthritis, CAD, DM, GERD, HTN, Neuropathy, OA


Additional Medical History


CHF, asthma


Current History


Pt arrived with c/o rash under pannus. Pt found to have cellulitis, anemia, and 

debility.


Reviewed History:  Yes





Social History


Home:  Single Level


Current Living Status:  Alone


Entry Into Home:  Ramp


PT Steps Into Home:  0


PT Steps Inside Home:  0


Pt ambulates in home while cruising from anything she can grab to remain 

standing.





Prior/Core FIM


Prior Level of Function


              Functional [place] Measure


0=Not Assessed/NA   4=Minimal Assistance


1=Total Assistance   5=Supervision or Setup


2=Maximal Assistance   6=Modified [place]


3=Moderate Assistance   7=Complete [place]


Bed Mobility:  6


Transfers (B,C,W/C) (FIM):  6


Gait:  6


Locomotion:  6


Pt utilized WCH and walker to get around the home as well as grabbing furniture 

to keep balance during walking.





PT Evaluation-Current


Subjective


Pt is sitting in WCH pre eval and reports pain in R hip due to reporting 

avascular necrosis. Pt agrees to PT.





Pain





   Comment:  No pain rating given





Pt/Family Goals


[place] at home





Objective


Patient Orientation:  Normal For Age





ROM/Strength


ROM Lower Extremities


limitations in hip ROM due to pannus


Strenght Lower Extremities


Grossly 4/5 nicolette





Neuromuscular


(Tone, Coordination, Reflexes)


NT





Sensory


Vision:  Wears Glasses


Hearing:  Functional


Sensation Right Lower Extremit:  Impaired (Impaired at L4 and S1)


Sensation Left Lower Extremity:  Impaired (Impaired at S1)





Transfers


              Functional [place] Measure


0=Not Assessed/NA   4=Minimal Assistance


1=Total Assistance   5=Supervision or Setup


2=Maximal Assistance   6=Modified [place]


3=Moderate Assistance   7=Complete [place]


Transfers (B, C, W/C) (FIM):  4


Sit to/from Stand:  4


bed t/f WC(FIM only if WC use):  4


Sit to Lying (QC):  88


Lying to Sitting/Side of Bed(Q:  88


Sit to Stand (QC):  4


Chair/Bed-to-Chair Xfer(QC):  4


Bed mobility not performed due to pt refusing to lay in bed.





Gait


Does the Patient Walk?:  No and Walking Goal IS indicated


Mode of Locomotion:  Wheelchair


Anticipated Mode of Locomotion:  Wheelchair





Wheelchair Training


Does the Pt Use a Wheelchair?:  Yes


Wheelchair (FIM):  1


Distance:  15 feet x2


Wheelchair Level of Assist:  6


Wheel 50 ft with 2 turns (QC):  88


Wheel 150 ft (QC):  88


Type of Wheelchair:  Manual


Pt remained in room throughout eval.





Balance


Sitting Static:  Fair


Sitting Dynamic:  Fair


Standing Static:  Fair


 Standing Dynamic:  Fair





Treatment


Ankle pumps: 20 x2 BLE





Assessment/Needs


Pt demonstrates BLE strength at 4/5 grossly, but refuses to perform bed 

mobility.  Pt is able to perform WCH to sitting edge of bed transfer with CGA 

and use of walker. Pt is able to utilize WCH to get around the room and 

continues to hold onto bed rails to maintain sitting balance in the bed.


Rehab Potential:  Fair


Post Rehab Potential-Barriers:  Compliance, obesity





PT Long Term Goals


Long Term Goals


PT Long Term Goals Time Frame:  2018


Transfers (B,C,W/C) (FIM):  5


Sit to Lying (QC):  4


Lying-Sitting on Side/Bed(QC):  4


Sit to Stand (QC):  4


Rollin


Chair/Bed-to-Chair Xfer(QC):  4


Gait (FIM):  1


Distance:  20'


Gait Level of Assist:  5


Gait Assistive Device:  FWW


Wheelchair (FIM):  2


Distance:  50'


Wheelchair Level of Assist:  6


Wheel 50 feet with 2 turns (QC:  6





PT Plan


Problem List


Problem List:  Activity Tolerance, Functional Strength, Safety, Balance, Gait, 

Transfer, Bed Mobility, ROM





Treatment/Plan


Treatment Plan:  Continue Plan of Care


Treatment Plan:  Bed Mobility, Education, Functional Activity Daniel, Functional 

Strength, Gait, Safety, Therapeutic Exercise, Transfers


Treatment Duration:  2018


Frequency:  6 times per week


Estimated Hrs Per Day:  .25 hour per day (15-30 min)


Patient and/or Family Agrees t:  Yes





Safety Risks/Education


Patient Education:  Transfer Techniques, Correct Positioning, W/C Management, 

Safety Issues


Teaching Recipient:  Patient


Teaching Methods:  Demonstration, Discussion


Response to Teaching:  Reinforcement Needed





Discharge Recommendations


Plan


Pt will perform bed mobility and transfer training, gait and balance training, 

strength and activity tolerance training, and education in order to promote 

independence at home.


Therapy D/C Recommendations:  Home w/ Family Support


Equpiment Recommendations-D/C:  Front Wheeled Walker





Time/GCodes


Time In:  1500


Time Out:  1530


Total Billed Treatment Time:  30


Total Billed Treatment


1 visit


20 min EVM


10 min CHRISTIAN AGUIRRE PT 2018 15:50

## 2018-02-20 NOTE — WOUND CARE ASSESSMENT
Wound Care Assessment


Date Seen by Provider:  Feb 20, 2018


Time Seen by Provider:  18:15


Chief Complaint


Rash bilateral groins.


HPI


72 year old female with bilateral intertrigo related to severe obesity and 

immobility.


Exam





Vital Signs








  Date Time  Temp Pulse Resp B/P (MAP) Pulse Ox O2 Delivery O2 Flow Rate FiO2


 


2/20/18 21:00      Room Air  


 


2/20/18 17:33 97.4 74 17 160/78 (105) 90   





Capillary Refill :





Results


Laboratory Tests


2/20/18 20:33: Glucometer 192H











BRADLY MERAZ MD Feb 20, 2018 23:33

## 2018-02-20 NOTE — PHYSICAL THERAPY DAILY NOTE
PT Daily Note-Current


Subjective


Patient agrees to PT.  Patient requests commode use for BM.





Pain





   Numeric Pain Scale:  0-No Pain


   Location:  No Pain Reported





Mental Status


Patient Orientation:  Normal For Age





Transfers


              Functional [place] Measure


0=Not Assessed/NA   4=Minimal Assistance


1=Total Assistance   5=Supervision or Setup


2=Maximal Assistance   6=Modified [place]


3=Moderate Assistance   7=Complete IndependenceIRFPAI Quality Coding Scale











6 Independent with activity with or without an assistive device


 


5  Patient requires set up or clean up by helper.  Patient completes activity  

by  themselves


 


4 Supervision or touching assist (CGA). [place] provide cues , steadying assist


 


3 The helper provides less than half the effort to complete the activity


 


2 The helper provides more than half the effort to complete the activity


 


1 Dependent.  The helper does all the effort to complete an activity 


 


7 Patient refused to complete or attempt activity


 


9 The patient did not perform the activity before the current illness or injury


 


88 Not attempted due to Medical conditions or safety concerns








Transfers (B, C, W/C) (FIM):  5


Scootin


Sit to/from Stand:  5


Bed to/from Chair:  5


Patient performs sit to stand transfers and SPT with FWW with slow, methodical 

movement.  Patient refuses to cleanse self after BM stating she is able to do 

it at home but can't do it here.  When PT asked why patient reports her house 

is set up differently and PT just doesn't understand.  Patient also reports she 

hasn't been able to get into her bed at home for several weeks.  When attempted 

in hospital, patient states its the bed and not her.  PT will discuss with RN/

or supervisor on a different bed, however, it is very obvious that the limiting 

factor is her pannus and the pressure/weight it places on her legs resulting in 

inability to perform bed mobility.





Weight Bearing


Right Lower Extremity:  Right


Weight Bearing/Tolerated


Left Lower Extremity:  Left


Weight Bearing/Tolerated





Exercises


Seated Therapy Exercises:  Ankle pumps, Long arc quads


Seated Reps:  25





Assessment


Patient is unable to perform bed mobility due to limitation with ROM bilateral 

hips due to weight of pannus.  This PT discussed with RN on retrieving a lower 

bed to see is patient is able to perform this task.  Patient, when asked to 

perform toileting, bed mobility, etc, states she is able to perform these tasks 

at home just not here.





PT Long Term Goals


Long Term Goals


PT Long Term Goals Time Frame:  2018


Transfers (B,C,W/C) (FIM):  6





PT Plan


Treatment/Plan


Treatment Plan:  Continue Plan of Care


Treatment Plan:  Bed Mobility, Education, Functional Activity Daniel, Functional 

Strength, Safety, Therapeutic Exercise, Transfers


Treatment Duration:  2018


Frequency:  5 times per week


Estimated Hrs Per Day:  .25 hour per day


Patient and/or Family Agrees t:  Yes





Time/GCodes


Time In:  847


Time Out:  915


Total Billed Treatment Time:  28


Total Billed Treatment


1 visit


FA x 2 28 min











EMILIO JUNIOR PT 2018 09:28

## 2018-02-20 NOTE — DISCHARGE INSTRUCTIONS
Discharge [facility]


Discharge Medications


New, Converted or Re-Newed RX:  Other (patient to swing bed)


Continued Medications:  


Albuterol Sulfate (Proventil Hfa) 6.7 Gm Hfa.aer.ad


2 PUFF IH Q4H PRN for SHORTNESS OF BREATH





Aspirin (Aspirin EC) 81 Mg Tablet.dr


81 MG PO HS, TAB





Atorvastatin Calcium (Atorvastatin Calcium) 10 Mg Tablet


10 MG PO HS





Benazepril HCl (Benazepril HCl) 40 Mg Tab


40 MG PO HS





Carvedilol (Carvedilol) 12.5 Mg Tablet


12.5 MG PO BID





Citalopram Hydrobromide (Citalopram HBr) 20 Mg Tablet


20 MG PO HS





Fluticasone/Salmeterol (Advair 100-50 Diskus) 1 Each Blst.w.dev


1-2 PUFF IH DAILY PRN for SHORTNESS OF BREATH, EA





Glipizide (Glipizide) 5 Mg Tablet


5 MG PO BID





Hydrochlorothiazide (Hydrochlorothiazide) 12.5 Mg Capsule


12.5 MG PO BID





Insulin Detemir (Levemir Flextouch) 100 Unit/1 Ml Insuln.pen


100 UNIT SQ, EA


USES A SLIDING SCALE RELATED TO GLUCOSE LEVEL


Insulin Lispro (Humalog Kwikpen) 100 Unit/1 Ml Insuln.pen


6-8 UNIT SQ PRN, EA


takes 6-8 units for glucose greater than 130


Lactobacillus Combination No.4 (Probiotic) 1 Each Capsule


1 CAP PO Q48H





Metformin HCl (Metformin HCl) 1,000 Mg Tablet


1000 MG PO BID


WITH MEALS


Mupirocin (Mupirocin) 22 Gm Oint...g.


TP TID PRN for RASH





Naproxen Sodium (Aleve) 220 Mg Tablet


440 MG PO BID PRN for PAIN


TAKES 2 (220MG) TABLETS


Nystatin (Nystatin) 15 Gm Cream..g.


TP TID PRN for RASH, TUBE





Pantoprazole Sodium (Pantoprazole Sodium) 40 Mg Tablet.dr


40 MG PO HS





Potassium Citrate (Urocit-K) 15 Meq Tablet.er


15 MEQ PO BID





Ticagrelor (Brilinta) 60 Mg Tablet


60 MG PO BID











Patient Instructions


Patient Instructions


Continue with therapies and IV antibiotics in swing bed





Activity & Diet


Discharge Diet:  ADA Diet


Activity as Tolerated:  Yes





Copy


Copies To 1:   [facility]/MURPHY MEADOWS DO Feb 20, 2018 13:54

## 2018-02-21 VITALS — DIASTOLIC BLOOD PRESSURE: 66 MMHG | SYSTOLIC BLOOD PRESSURE: 140 MMHG

## 2018-02-21 VITALS — DIASTOLIC BLOOD PRESSURE: 79 MMHG | SYSTOLIC BLOOD PRESSURE: 154 MMHG

## 2018-02-21 VITALS — SYSTOLIC BLOOD PRESSURE: 164 MMHG | DIASTOLIC BLOOD PRESSURE: 68 MMHG

## 2018-02-21 LAB
BUN/CREAT SERPL: 30
CALCIUM SERPL-MCNC: 8.1 MG/DL (ref 8.5–10.1)
CHLORIDE SERPL-SCNC: 110 MMOL/L (ref 98–107)
CO2 SERPL-SCNC: 21 MMOL/L (ref 21–32)
CREAT SERPL-MCNC: 0.89 MG/DL (ref 0.6–1.3)
GFR SERPLBLD BASED ON 1.73 SQ M-ARVRAT: > 60 ML/MIN
GLUCOSE SERPL-MCNC: 164 MG/DL (ref 70–105)
POTASSIUM SERPL-SCNC: 4.9 MMOL/L (ref 3.6–5)
SODIUM SERPL-SCNC: 139 MMOL/L (ref 135–145)

## 2018-02-21 RX ADMIN — CLINDAMYCIN IN 5 PERCENT DEXTROSE SCH MLS/HR: 12 INJECTION, SOLUTION INTRAVENOUS at 04:57

## 2018-02-21 RX ADMIN — CLINDAMYCIN IN 5 PERCENT DEXTROSE SCH MLS/HR: 12 INJECTION, SOLUTION INTRAVENOUS at 00:02

## 2018-02-21 RX ADMIN — ASPIRIN SCH MG: 81 TABLET ORAL at 21:06

## 2018-02-21 RX ADMIN — CEFAZOLIN SODIUM SCH MLS/HR: 2 SOLUTION INTRAVENOUS at 21:05

## 2018-02-21 RX ADMIN — LISINOPRIL SCH MG: 20 TABLET ORAL at 21:07

## 2018-02-21 RX ADMIN — CEFAZOLIN SODIUM SCH MLS/HR: 2 SOLUTION INTRAVENOUS at 04:57

## 2018-02-21 RX ADMIN — CEFAZOLIN SODIUM SCH MLS/HR: 2 SOLUTION INTRAVENOUS at 12:00

## 2018-02-21 RX ADMIN — CLINDAMYCIN IN 5 PERCENT DEXTROSE SCH MLS/HR: 12 INJECTION, SOLUTION INTRAVENOUS at 13:14

## 2018-02-21 RX ADMIN — ATORVASTATIN CALCIUM SCH MG: 10 TABLET, FILM COATED ORAL at 21:06

## 2018-02-21 RX ADMIN — MICONAZOLE NITRATE SCH APPLIC: 20 POWDER TOPICAL at 09:14

## 2018-02-21 RX ADMIN — Medication PRN ML: at 12:01

## 2018-02-21 RX ADMIN — MICONAZOLE NITRATE SCH APPLIC: 20 POWDER TOPICAL at 13:14

## 2018-02-21 RX ADMIN — NYSTATIN SCH GM: 100000 CREAM TOPICAL at 09:15

## 2018-02-21 RX ADMIN — PANTOPRAZOLE SODIUM SCH MG: 40 TABLET, DELAYED RELEASE ORAL at 21:06

## 2018-02-21 RX ADMIN — FLUCONAZOLE SCH MG: 100 TABLET ORAL at 09:14

## 2018-02-21 RX ADMIN — CLINDAMYCIN IN 5 PERCENT DEXTROSE SCH MLS/HR: 12 INJECTION, SOLUTION INTRAVENOUS at 23:50

## 2018-02-21 RX ADMIN — NYSTATIN SCH GM: 100000 CREAM TOPICAL at 13:00

## 2018-02-21 RX ADMIN — Medication PRN ML: at 13:14

## 2018-02-21 RX ADMIN — CARVEDILOL SCH MG: 12.5 TABLET, FILM COATED ORAL at 21:06

## 2018-02-21 RX ADMIN — INSULIN DETEMIR SCH UNIT: 100 INJECTION, SOLUTION SUBCUTANEOUS at 21:06

## 2018-02-21 RX ADMIN — NYSTATIN SCH GM: 100000 CREAM TOPICAL at 22:38

## 2018-02-21 RX ADMIN — CLINDAMYCIN IN 5 PERCENT DEXTROSE SCH MLS/HR: 12 INJECTION, SOLUTION INTRAVENOUS at 18:42

## 2018-02-21 RX ADMIN — MICONAZOLE NITRATE SCH APPLIC: 20 POWDER TOPICAL at 22:38

## 2018-02-21 RX ADMIN — Medication PRN ML: at 18:42

## 2018-02-21 RX ADMIN — CARVEDILOL SCH MG: 12.5 TABLET, FILM COATED ORAL at 09:14

## 2018-02-21 NOTE — OCCUPATIONAL THERAPY EVAL
OT Evaluation-General/PLF


Medical Diagnosis


Admission Date


Feb 20, 2018 at 14:07


Medical Diagnosis:  Cellulitis, anemia, debility


Onset Date:  Feb 17, 2018





Therapy Diagnosis


Therapy Diagnosis:  decr self care, decr funct mobility, decr activity jenna





Height/Weight


Height (Feet):  5


Height (Inches):  7.00


Weight (Pounds):  249


Weight (Ounces):  12.8





Precautions


Precautions/Isolations:  Fall Prevention, Standard Precautions


Safety Interventions:  None





Referral


Physician:  Vee Francis DO


Referral Reason:  Evaluation/Treatment





Medical History


Pertinent Medical History:  Arthritis, CAD, DM, GERD, Heart Failure, HTN, 

Neuropathy, OA, Rheumatoid Arthritis


Additional Medical History


Coronary stent, asthma. Bladder infection, kidney stones. Foot drop. Morbid 

obesity. pt reported avascular necrosis R hip


Current History


Admitted with weakness and drainage from pannus. Has cellulitis abdominal wall, 

candida skin infection, chronic anemia


Reviewed History:  Yes





Social History


Home:  Single Level


Current Living Status:  Alone


Entry Into Home:  Ramp


 Steps Into Home:  0


 Steps Inside Home:  0





ADL-Prior Level of Function


ADL PLOF Comments


Pt reported that she has been able to manage most of her basic ADLs at home but 

she does have trouble getting her pants on over her feet. She has someone come 

put her support socks on twice a week and just leaves them on otherwise. She 

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 She has 4 children that help but are 

not able to live with her to help full time. She is concerned about her ability 

to manage at home and said that her children want her to move to an assisted 

living facility


DME/Equipment:  Bath Bench, Grab Bars, Shower Hose Extender





OT Current Status


Subjective


Pt seen in room, up in w/c, agreeable to OT. pain not rated or described





Appearance


Alert, cooperative





Current


Glasses/Contacts:  Yes


Upper Extremity ROM


Grossly WFL bilat


Upper Extremity Strength


grossly 4+/5 bilat but with some weakness L shoulder when extending it.





ADL-Treatment


              Functional [place] Measure


0=Not Assessed/NA   4=Minimal Assistance


1=Total Assistance   5=Supervision or Setup


2=Maximal Assistance   6=Modified [place]


3=Moderate Assistance   7=Complete IndependenceIRFPAI Quality Coding Scale











6 Independent with activity with or without an assistive device


 


5  Patient requires set up or clean up by helper.  Patient completes activity  

by  themselves


 


4 Supervision or touching assist (CGA). [place] provide cues , steadying assist


 


3 The helper provides less than half the effort to complete the activity


 


2 The helper provides more than half the effort to complete the activity


 


1 Dependent.  The helper does all the effort to complete an activity 


 


7 Patient refused to complete or attempt activity


 


9 The patient did not perform the activity before the current illness or injury


 


88 Not attempted due to Medical conditions or safety concerns








Eating (FIM):  6 (Pt reported that she is able to open packages, fed herself)


Eating (QC):  6


Grooming (FIM):  6 (Pt reported that she is able to propel her w/c to bathroom 

to sink and brush teeth, comb hair, etc, with no help)


Oral Hygiene (QC):  6


Toileting (FIM):  1 (PT had just toileted patient and reported that she needed 

help to get pants down, pants up, and wipe. CGA when standing, tall toilet, 

grab bar)


Toileting Hygiene (QC):  1


Toilet/Commode Transfer (FIM):  4 (Per PT, pt needed CGA for sit to stand (usug 

FWW), then CGA to turn to sit on toilet, using grab bar and tall toilet)





Education


OT Patient Education:  Purpose of tx/functional activities, Safety issues, 

Transfer techniques, Use of adapted equipment


Teaching Recipient:  Patient


Teaching Methods:  Discussion


Response to Teaching:  Verbalize Understanding





OT Long Term Goals


Long Term Goals


Time Frame:  Feb 28, 2018


Lower Body Dressing(FIM):  5


Toileting(FIM):  5


Toileting Hygiene (QC):  5


Toilet/Commode Transfer(FIM):  5


Toilet/Commode Transfer (QC):  5


Additional Goals:  2-Verbalize Understanding, 3-ImproveStrength/Daniel


1=Demonstrate adherence to instructed precautions during ADL tasks.


2=Patient will verbalize/demonstrate understanding of assistive devices/

modifications for ADL.


3=Patient will improve strength/tolerance for activity to enable patient to 

perform ADL's.





OT Education/Plan


Problem List/Assessment


Assessment:  Decreased Activ Tolerance, Dependent Transfers, Impaired Self-Care 

Skills


Pt would benefit from skilled OT to increase her independence in basic self 

care to allow her to safely return to her home and to decrease caregiver burden





Discharge Recommendations


Plan/Recommendations:  Continue POC





Treatment Plan/Plan of Care


Treatment,Training & Education:  Yes


Patient would benefit from OT for education, treatment and training to promote 

independence in ADL's, mobility, safety and/or upper extremity function for ADL'

s.


Plan of Care:  ADL Retraining, Functional Mobility


Treatment Duration:  Feb 28, 2018


Frequency:  5 times per week


Estimated Hrs Per Day:  .5 hour per day


Agreement:  Yes


Rehab Potential:  Fair





Time/GCodes


Start Time:  15:30


Stop Time:  16:00


Total Time Billed (hr/min):  30


Billed Treatment Time


visit, 30 minutes evaluation low intensity











CRICKET POLLACK OT Feb 21, 2018 16:15

## 2018-02-21 NOTE — PHYSICAL THERAPY DAILY NOTE
PT Daily Note-Current


Subjective


Pt sitting in [facility] visiting upon arrival.  Pt agrees to PT but reports it might 

be limited due to Amnia causing dizziness and lightheadedness.





Mental Status


Patient Orientation:  Person, Place, Situation


Attachments:  IV





Transfers


              Functional [place] Measure


0=Not Assessed/NA   4=Minimal Assistance


1=Total Assistance   5=Supervision or Setup


2=Maximal Assistance   6=Modified [place]


3=Moderate Assistance   7=Complete IndependenceIRFPAI Quality Coding Scale











6 Independent with activity with or without an assistive device


 


5  Patient requires set up or clean up by helper.  Patient completes activity  

by  themselves


 


4 Supervision or touching assist (CGA). [place] provide cues , steadying assist


 


3 The helper provides less than half the effort to complete the activity


 


2 The helper provides more than half the effort to complete the activity


 


1 Dependent.  The helper does all the effort to complete an activity 


 


7 Patient refused to complete or attempt activity


 


9 The patient did not perform the activity before the current illness or injury


 


88 Not attempted due to Medical conditions or safety concerns








Sit to/from Stand:  4


Sit to Stand (QC):  4





Weight Bearing


Right Lower Extremity:  Right


Weight Bearing/Tolerated


Left Lower Extremity:  Left


Weight Bearing/Tolerated





Wheelchair Training


Does the Pt Use a Wheelchair?:  Yes


Wheelchair Distance:  1=up to 49 ft


Distance:  25'


Wheelchair Level of Assist:  5


Type of Wheelchair:  Manual





Treatments


Pt reports to PTA that she doesn't feel as though she can participate in 

Therapy due to lightheadedness and dizziness.  Pt decides she needs to use BSC 

but cannot SPT to it from [facility].  Pt propels self to toilet and stands using FWW 

for support at [facility].  Pt shuffles feet then sits.  PTA assists with donning & 

doffing pants as well as pericare.  Pt returns to [facility] to propel self out of 

restroom.  Nurse & Aide arrive at end of tx.





Assessment


Current Status:  Fair Progress


Pt is limited with mobility and transfers as well as toileting.





PT Long Term Goals


Long Term Goals


PT Long Term Goals Time Frame:  2018


Transfers (B,C,W/C) (FIM):  5


Sit to Lying (QC):  4


Lying-Sitting on Side/Bed(QC):  4


Sit to Stand (QC):  4


Rollin


Chair/Bed-to-Chair Xfer(QC):  4


Gait (FIM):  1


Distance:  20'


Gait Level of Assist:  5


Gait Assistive Device:  FWW


Wheelchair (FIM):  2


Distance:  50'


Wheelchair Level of Assist:  6


Wheel 50 feet with 2 turns (QC:  6





PT Plan


Problem List


Problem List:  Activity Tolerance, Functional Strength, Safety, Balance, Gait, 

Transfer, Bed Mobility





Treatment/Plan


Treatment Plan:  Continue Plan of Care


Treatment Plan:  Bed Mobility, Education, Functional Activity Daniel, Functional 

Strength, Gait, Safety, Therapeutic Exercise, Transfers


Treatment Duration:  2018


Frequency:  6 times per week


Estimated Hrs Per Day:  .25 hour per day (15-30 min)


Patient and/or Family Agrees t:  Yes





Safety Risks/Education


Patient Education:  Gait Training, Transfer Techniques, Correct Positioning, 

Safety Issues


Teaching Recipient:  Patient


Teaching Methods:  Discussion


Response to Teaching:  Verbalize Understanding





Time/GCodes


Time In:  1447


Time Out:  1512


Total Billed Treatment Time:  25


Total Billed Treatment


1, FA x2 (25m)











STEPHANY PAREDES PTA 2018 15:25

## 2018-02-22 VITALS — DIASTOLIC BLOOD PRESSURE: 65 MMHG | SYSTOLIC BLOOD PRESSURE: 149 MMHG

## 2018-02-22 VITALS — DIASTOLIC BLOOD PRESSURE: 71 MMHG | SYSTOLIC BLOOD PRESSURE: 155 MMHG

## 2018-02-22 RX ADMIN — PANTOPRAZOLE SODIUM SCH MG: 40 TABLET, DELAYED RELEASE ORAL at 22:16

## 2018-02-22 RX ADMIN — MICONAZOLE NITRATE SCH APPLIC: 20 POWDER TOPICAL at 13:10

## 2018-02-22 RX ADMIN — CEFAZOLIN SODIUM SCH MLS/HR: 2 SOLUTION INTRAVENOUS at 04:33

## 2018-02-22 RX ADMIN — CLINDAMYCIN IN 5 PERCENT DEXTROSE SCH MLS/HR: 12 INJECTION, SOLUTION INTRAVENOUS at 12:23

## 2018-02-22 RX ADMIN — ATORVASTATIN CALCIUM SCH MG: 10 TABLET, FILM COATED ORAL at 22:16

## 2018-02-22 RX ADMIN — NYSTATIN SCH GM: 100000 CREAM TOPICAL at 12:43

## 2018-02-22 RX ADMIN — ASPIRIN SCH MG: 81 TABLET ORAL at 22:16

## 2018-02-22 RX ADMIN — MICONAZOLE NITRATE SCH APPLIC: 20 POWDER TOPICAL at 22:17

## 2018-02-22 RX ADMIN — CLINDAMYCIN IN 5 PERCENT DEXTROSE SCH MLS/HR: 12 INJECTION, SOLUTION INTRAVENOUS at 17:59

## 2018-02-22 RX ADMIN — CLINDAMYCIN IN 5 PERCENT DEXTROSE SCH MLS/HR: 12 INJECTION, SOLUTION INTRAVENOUS at 05:12

## 2018-02-22 RX ADMIN — NYSTATIN SCH GM: 100000 CREAM TOPICAL at 08:53

## 2018-02-22 RX ADMIN — CEFAZOLIN SODIUM SCH MLS/HR: 2 SOLUTION INTRAVENOUS at 20:38

## 2018-02-22 RX ADMIN — LISINOPRIL SCH MG: 20 TABLET ORAL at 22:16

## 2018-02-22 RX ADMIN — FLUCONAZOLE SCH MG: 100 TABLET ORAL at 08:53

## 2018-02-22 RX ADMIN — INSULIN DETEMIR SCH UNIT: 100 INJECTION, SOLUTION SUBCUTANEOUS at 22:18

## 2018-02-22 RX ADMIN — CEFAZOLIN SODIUM SCH MLS/HR: 2 SOLUTION INTRAVENOUS at 13:10

## 2018-02-22 RX ADMIN — CARVEDILOL SCH MG: 12.5 TABLET, FILM COATED ORAL at 22:16

## 2018-02-22 RX ADMIN — NYSTATIN SCH GM: 100000 CREAM TOPICAL at 22:17

## 2018-02-22 RX ADMIN — CARVEDILOL SCH MG: 12.5 TABLET, FILM COATED ORAL at 08:53

## 2018-02-22 RX ADMIN — MICONAZOLE NITRATE SCH APPLIC: 20 POWDER TOPICAL at 08:53

## 2018-02-22 NOTE — PHYSICAL THERAPY DAILY NOTE
PT Daily Note-Current


Subjective


Patient agrees to exercises, however, declined attempt to get in to bed.





Pain





   Numeric Pain Scale:  5-Moderate Pain


   Location:  Right


   Location Body Site:  Hip


   Pain Description:  Ache





Mental Status


Patient Orientation:  Normal For Age





Transfers


              Functional [place] Measure


0=Not Assessed/NA   4=Minimal Assistance


1=Total Assistance   5=Supervision or Setup


2=Maximal Assistance   6=Modified [place]


3=Moderate Assistance   7=Complete IndependenceIRFPAI Quality Coding Scale











6 Independent with activity with or without an assistive device


 


5  Patient requires set up or clean up by helper.  Patient completes activity  

by  themselves


 


4 Supervision or touching assist (CGA). [place] provide cues , steadying assist


 


3 The helper provides less than half the effort to complete the activity


 


2 The helper provides more than half the effort to complete the activity


 


1 Dependent.  The helper does all the effort to complete an activity 


 


7 Patient refused to complete or attempt activity


 


9 The patient did not perform the activity before the current illness or injury


 


88 Not attempted due to Medical conditions or safety concerns








Transfers (B, C, W/C) (FIM):  5


Scootin


Sit to/from Stand:  5


Sit to Stand (QC):  5


Chair/Bed-to-Chair Xfer(QC):  5


Bed to/from Chair:  5


she did sit EOB, however, declined to attempt bed mobility exercises.





Weight Bearing


Right Lower Extremity:  Right


Weight Bearing/Tolerated


Left Lower Extremity:  Left


Weight Bearing/Tolerated





Exercises


Seated Therapy Exercises:  Ankle pumps, Long arc quads


Seated Reps:  20 (3 sets)





Assessment


Patient tolerated treatment.  PT encouraged patient to perform bed mobility, 

however, patient does not feel comfortable with the bed in the room.





PT Short Term Goals


Short Term Goals


Wheelchair Distance:  25'





PT Long Term Goals


Long Term Goals


PT Long Term Goals Time Frame:  2018


Transfers (B,C,W/C) (FIM):  5


Sit to Lying (QC):  4


Lying-Sitting on Side/Bed(QC):  4


Sit to Stand (QC):  4


Rollin


Chair/Bed-to-Chair Xfer(QC):  4


Gait (FIM):  1


Distance:  20'


Gait Level of Assist:  5


Gait Assistive Device:  FWW


Wheelchair (FIM):  2


Distance:  50'


Wheelchair Level of Assist:  6


Wheel 50 feet with 2 turns (QC:  6





PT Plan


Treatment/Plan


Treatment Plan:  Continue Plan of Care


Treatment Plan:  Bed Mobility, Education, Functional Activity Daniel, Functional 

Strength, Gait, Safety, Therapeutic Exercise, Transfers


Treatment Duration:  2018


Frequency:  6 times per week


Estimated Hrs Per Day:  .25 hour per day (15-30 min)


Patient and/or Family Agrees t:  Yes





Time/GCodes


Time In:  930


Time Out:  945


Total Billed Treatment Time:  15


Total Billed Treatment


1 visit


EX 15 min











EMILIO JUNIOR PT 2018 10:17

## 2018-02-22 NOTE — DISCHARGE SUMMARY
Diagnosis/Chief Complaint


Date of Admission


2018 at 14:07


Date of Discharge


18


Admission Diagnosis


Admission Diagnosis


Cellulitis


Diabetes Mellitus, Type 2 - Insulin Dependent


Obesity, BMI 39


Weakness


Anemia


Hyperkalemia





Discharge Diagnosis


Cellulitis


-pt's pannus cellulitis improved in appearance


-wound care per Dr. Caraballo's recommendations


-will plan to DC abx on discharge





Type II DM


-pt previously was using levemir only occasionally


-elevated lactic acid, suspected due to metformin -- metformin discontinued


-Levemir and sliding scale insulin


-diabetic diet


-patient will need diabetic education on discharge and emphasis on importance 

of insulin use





Weakness


-patient reports that she is not nearly strong enough to go home and would like 

to be discharged to a nursing home


-social work consulted, spoke with patient


-patient accepted at [facility]


-will discharge tomorrow to [facility] to continue PT and strength 

building





Anemia


-pt sees hematology


-s/p transfusion 2 units PRBC as inpatient for symptomatic anemia


-pt with long history of anemia of unknown exact etiology


-pt to continue to follow with hematology after discharge





Hyperkalemia


-pt had elevated potassium as inpatient when discharged to swing


-recheck in the AM showed resolution


-RESOLVED





Chief Complaint/HPI


Chief Complaint/HPI


72 year old obese, diabetic female admitted to a swing bed for IV antibiotics - 

patient with pannus cellulitis that cultured out multiple organisms, including 

corynebacterium, for which she is on Ancef.  Patient was initially treated for 

Vancomycin, but had a reaction, and then clindamycin, which does not treat 

corynebacterium.  Once the culture results were available, the patient was put 

on Ancef.  With wound care and antibiotics, the patient's pannus cellulitis 

continues to improve.  The patient was on metformin at home prior to admission 

to inpatient, but was found to have significant lactic acidosis which was 

attributed to metformin and it was discontinued.  The patient is on levemir and 

was placed on sliding scale insulin, which she will need to continue after 

discharge.  The patient has overall weakness.  Patient's cellulitis was caused 

by the fact that 2 weeks ago she became too weak to get herself into bed, and 

she lives alone.  The patient would have assistance to get into bed initially, 

but when she would get up in the middle of the night to use the bathroom, she 

could not get herself back into bed and would just sit up in her wheelchair.  

This led to increased friction and pressure on her pannus, and coupled with her 

diabetes and noncompliant insulin use she developed an infection.  She is 

unable to appropriately care for this area herself, as well as is still to weak 

to get into bed herself.  The patient also has a long history of anemia, and on 

admission to the hospital had symptomatic anemia requiring a blood transfusion.

  She follows with hematology, who saw her as an inpatient, and she will 

continue to see them after discharge.





Discharge Summary-Simple/Stand


Discharge Physical Examination


Allergies:  


Coded Allergies:  


     vancomycin (Verified  Allergy, Intermediate, RASH, 18)


     heparin (Verified  Allergy, Unknown, 17)


Uncoded Allergies:  


     TAPE (Allergy, Mild, RASH, 17)


Vitals & I&Os





Vital Sign - Last 12Hours








  Date Time  Temp Pulse Resp B/P (MAP) Pulse Ox O2 Delivery O2 Flow Rate FiO2


 


18 19:18      Room Air  


 


18 17:16 97.9 76 18 149/65 (93) 92   














Intake and Output 


 


 18





 00:00


 


Intake Total 1350 ml


 


Output Total 1100 ml


 


Balance 250 ml








General Appearance:  Alert, Oriented X3, Cooperative, No Acute Distress


HEENT:  Atraumatic, EOMI, Mucous Memb Moist/[place]


Respiratory:  Clear to Auscultation, Normal Air Movement


Cardiovascular:  Regular Rate, Normal S1, Normal S2, Other (II/VI systolic 

murmur)


Abdominal:  Normal Bowel Sounds, Soft, No Tenderness


Extremities:  No Clubbing, No Cyanosis


Skin:  Other (resolving pannus cellulitis)


Neuro:  Normal Speech, Normal Tone, Sensation Intact, Cranial Nerves 3-12 NL


Psych/Mental Status:  Mental Status NL, Mood NL





Hospital Course


See final discharge diagnosis.





Discussion & Recommendations


The patient reports that she is much too weak to go home even with home health, 

and she would like to go to a nursing home.  She would like to go and get 

stronger before attempting to go home.  She was seen by social work, and has 

been accepted at [facility] tomorrow.





Discharge


Condition at discharge


Stable


Instructions to patient/family


Please see electronic discharge instructions given to patient.


Discharge Medications


Reviewed and agree with Discharge Medication list on patient's Discharge 

Instruction sheet





Clinical Quality Measures


DVT/VTE Risk/Contraindication:


Risk Factor Score Per Nursin





Copy


Copies To 1:   [facility]/MURPHY MEADOWS DO 2018 21:12

## 2018-02-22 NOTE — DISCHARGE INSTRUCTIONS
Discharge [facility]


Discharge Medications


New, Converted or Re-Newed RX:  Other (patient discharged to nursing home)


New Medications:  


Insulin Determir (Levemir) 1,000 Units/10 Ml Soln


10 UNIT SQ HS for 30 Days, #5 EA





 


Changed Medications:  


Insulin Lispro (Humalog Kwikpen) 100 Unit/1 Ml Insuln.pen


6 UNIT SQ AC for 30 Days, #5 EA (Changed from: 6-8 UNIT; PRN; takes 6-8 units 

for glucose greater than 130)


for glucose greater than 130 with meals


 


Continued Medications:  


Albuterol Sulfate (Proventil Hfa) 6.7 Gm Hfa.aer.ad


2 PUFF IH Q4H PRN for SHORTNESS OF BREATH





Aspirin (Aspirin EC) 81 Mg Tablet.dr


81 MG PO HS, TAB





Atorvastatin Calcium (Atorvastatin Calcium) 10 Mg Tablet


10 MG PO HS





Benazepril HCl (Benazepril HCl) 40 Mg Tab


40 MG PO HS





Carvedilol (Carvedilol) 12.5 Mg Tablet


12.5 MG PO BID





Citalopram Hydrobromide (Citalopram HBr) 20 Mg Tablet


20 MG PO HS





Fluticasone/Salmeterol (Advair 100-50 Diskus) 1 Each Blst.w.dev


1-2 PUFF IH DAILY PRN for SHORTNESS OF BREATH, EA





Glipizide (Glipizide) 5 Mg Tablet


5 MG PO BID





Hydrochlorothiazide (Hydrochlorothiazide) 12.5 Mg Capsule


12.5 MG PO BID





Lactobacillus Combination No.4 (Probiotic) 1 Each Capsule


1 CAP PO Q48H





Mupirocin (Mupirocin) 22 Gm Oint...g.


TP TID PRN for RASH





Naproxen Sodium (Aleve) 220 Mg Tablet


440 MG PO BID PRN for PAIN


TAKES 2 (220MG) TABLETS


Nystatin (Nystatin) 15 Gm Cream..g.


TP TID PRN for RASH, TUBE





Pantoprazole Sodium (Pantoprazole Sodium) 40 Mg Tablet.dr


40 MG PO HS





Ticagrelor (Brilinta) 60 Mg Tablet


60 MG PO BID





 


Discontinued Medications:  


Insulin Detemir (Levemir Flextouch) 100 Unit/1 Ml Insuln.pen


100 UNIT SQ, EA


USES A SLIDING SCALE RELATED TO GLUCOSE LEVEL


Metformin HCl (Metformin HCl) 1,000 Mg Tablet


1000 MG PO BID


WITH MEALS


Potassium Citrate (Urocit-K) 15 Meq Tablet.er


15 MEQ PO BID











Patient Instructions


Patient Instructions


Continue therapy and medications as directed


Goal/Follow Up Appt:  


Patient will be seen at [facility]


Return to [facility] Hospital For:  


chest pain or pressure, shortness of breath, fever over 101 unrelieved by


tylenol, nausea or vomiting that makes you unable to keep down ice chips


or lasts for more than 24 hours, if directed by the on call provider, or


any other emergent complaints or concerns





Activity & Diet


Discharge Diet:  ADA Diet, Cardiac Diet





Copy


Copies To 1:   [facility]/MURPHY MEADOWS DO Feb 22, 2018 22:09

## 2018-02-22 NOTE — OCCUPATIONAL THER DAILY NOTE
OT Current Status-Daily Note


Subjective


Pt seen in room, up in w/c, agreeable to OT. No pain mentioned





Appearance


Alert, cooperative





Mental Status/Objective


              Functional [place] Measure


0=Not Assessed/NA   4=Minimal Assistance


1=Total Assistance   5=Supervision or Setup


2=Maximal Assistance   6=Modified [place]


3=Moderate Assistance   7=Complete [place]





ADL-Treatment


OT demonstration of use of dressing stick and reacher for putting pants on and 

taking them off, taking shoes off. Pt practiced many of the movements needed to 

use the devices to assist with lower body dressing. Pt also provided with 

information for purchase in town and also online. She is very interested in 

using the items and believes that they will work for her. Discussed that pt 

will receive continued OT at [facility] and that she will be able to practice using 

these items there. Pt left up in wheelchair, all needs met.


              Functional [place] Measure


0=Not Assessed/NA   4=Minimal Assistance


1=Total Assistance   5=Supervision or Setup


2=Maximal Assistance   6=Modified [place]


3=Moderate Assistance   7=Complete IndependenceIRFPAI Quality Coding Scale











6 Independent with activity with or without an assistive device


 


5  Patient requires set up or clean up by helper.  Patient completes activity  

by  themselves


 


4 Supervision or touching assist (CGA). [place] provide cues , steadying assist


 


3 The helper provides less than half the effort to complete the activity


 


2 The helper provides more than half the effort to complete the activity


 


1 Dependent.  The helper does all the effort to complete an activity 


 


7 Patient refused to complete or attempt activity


 


9 The patient did not perform the activity before the current illness or injury


 


88 Not attempted due to Medical conditions or safety concerns











Education


OT Patient Education:  Modified ADL techniques, Purpose of tx/functional 

activities, Use of adapted equipment


Teaching Recipient:  Patient


Teaching Methods:  Demonstration


Response to Teaching:  Return Demonstration





OT Short Term Goals


Short Term Goals


1=Demonstrate adherence to instructed precautions during ADL tasks.


2=Patient will verbalize/demonstrate understanding of assistive devices/

modifications for ADL.


3=Patient will improve strength/tolerance for activity to enable patient to 

perform ADL's.





OT Long Term Goals


Long Term Goals


Time Frame:  Feb 28, 2018


Lower Body Dressing(FIM):  5


Toileting(FIM):  5


Toileting Hygiene (QC):  5


Toilet/Commode Transfer(FIM):  5


Toilet/Commode Transfer (QC):  5


Additional Goals:  2-Verbalize Understanding, 3-ImproveStrength/Daniel


1=Demonstrate adherence to instructed precautions during ADL tasks.


2=Patient will verbalize/demonstrate understanding of assistive devices/

modifications for ADL.


3=Patient will improve strength/tolerance for activity to enable patient to 

perform ADL's.





OT Education/Plan


Problem List/Assessment


Pt would benefit from skilled OT to increase her independence in basic self 

care to allow her to safely return to her home and to decrease caregiver burden





Discharge Recommendations


Plan/Recommendations:  Continue POC





Treatment Plan/Plan of Care


Patient would benefit from OT for education, treatment and training to promote 

independence in ADL's, mobility, safety and/or upper extremity function for ADL'

s.


Plan of Care:  ADL Retraining, Functional Mobility


Treatment Duration:  Feb 28, 2018


Frequency:  5 times per week


Estimated Hrs Per Day:  .5 hour per day


Agreement:  Yes


Rehab Potential:  Fair





Time/GCodes


Start Time:  14:50


Stop Time:  15:05


Total Time Billed (hr/min):  15


Billed Treatment Time


visit, 15 minutes ADL











CRICKET POLLACK OT Feb 22, 2018 15:37

## 2018-02-23 VITALS — SYSTOLIC BLOOD PRESSURE: 144 MMHG | DIASTOLIC BLOOD PRESSURE: 65 MMHG

## 2018-02-23 RX ADMIN — CARVEDILOL SCH MG: 12.5 TABLET, FILM COATED ORAL at 09:12

## 2018-02-23 RX ADMIN — NYSTATIN SCH GM: 100000 CREAM TOPICAL at 09:13

## 2018-02-23 RX ADMIN — FLUCONAZOLE SCH MG: 100 TABLET ORAL at 09:12

## 2018-02-23 RX ADMIN — CLINDAMYCIN IN 5 PERCENT DEXTROSE SCH MLS/HR: 12 INJECTION, SOLUTION INTRAVENOUS at 01:20

## 2018-02-23 RX ADMIN — CLINDAMYCIN IN 5 PERCENT DEXTROSE SCH MLS/HR: 12 INJECTION, SOLUTION INTRAVENOUS at 12:22

## 2018-02-23 RX ADMIN — CLINDAMYCIN IN 5 PERCENT DEXTROSE SCH MLS/HR: 12 INJECTION, SOLUTION INTRAVENOUS at 07:19

## 2018-02-23 RX ADMIN — MICONAZOLE NITRATE SCH APPLIC: 20 POWDER TOPICAL at 12:49

## 2018-02-23 RX ADMIN — CEFAZOLIN SODIUM SCH MLS/HR: 2 SOLUTION INTRAVENOUS at 12:49

## 2018-02-23 RX ADMIN — MICONAZOLE NITRATE SCH APPLIC: 20 POWDER TOPICAL at 09:13

## 2018-02-23 RX ADMIN — NYSTATIN SCH GM: 100000 CREAM TOPICAL at 12:49

## 2018-02-23 RX ADMIN — CEFAZOLIN SODIUM SCH MLS/HR: 2 SOLUTION INTRAVENOUS at 05:07

## 2018-02-23 NOTE — THERAPY TEAM DISCHARGE SUMMARY
Therapy Discharge Summary


Discharge Recommendations


Date of Discharge





Therapy D/C Recommendations:  Home w/ Family Support





Occupational Therapy


Pt seen for skilled OT to increase her independence in basic self care. On 

admission she was modified independent/independent with eating and grooming at w

/c level, was dependant for toileting (clothing management and hygiene) and CGA 

for toilet transfers. She was shown dressing stick and reacher to help her put 

pants on over feet but did not practice with them. She is discharged today for 

continued therapy at skilled level. No goals were met due to short length of 

treatment but progress was made toward lower body dressing goal. DC OT


Decreased Activ Tolerance, Dependent Transfers, Impaired Self-Care Skills





PT Long Term Goals


Long Term Goals


PT Long Term Goals Time Frame:  2018


Transfers (B,C,W/C) (FIM):  5


Sit to Lying (QC):  4


Lying-Sitting on Side/Bed(QC):  4


Sit to Stand (QC):  4 (met 18)


Rollin


Chair/Bed-to-Chair Xfer(QC):  4 (met 18)


Gait (FIM):  1


Distance:  20'


Gait Level of Assist:  5


Gait Assistive Device:  FWW


Wheelchair (FIM):  2


Distance:  50'


Wheelchair Level of Assist:  6


Wheel 50 feet with 2 turns (QC:  6





OT Long Term Goals


Long Term Goals


Time Frame:  2018


Lower Body Dressing(FIM):  5


Toileting(FIM):  5


Toileting Hygiene (QC):  5


Toilet/Commode Transfer(FIM):  5


Toilet/Commode Transfer (QC):  5


Additional Goals:  2-Verbalize Understanding, 3-ImproveStrength/Daniel


1=Demonstrate adherence to instructed precautions during ADL tasks.


2=Patient will verbalize/demonstrate understanding of assistive devices/

modifications for ADL.


3=Patient will improve strength/tolerance for activity to enable patient to 

perform ADL's.











CRICKET POLLACK OT 2018 08:28

## 2018-02-23 NOTE — DISCHARGE INST-SKILLED NURSING
Discharge Inst-Skilled NF


Patient Instructions


Patient Problems:  


Pannus Cellulitis - resolving


Chronic Anemia


Type II Diabetes Mellitus, Insulin Dependent


HTN


Morbid Obesity


Generalized Debility and Weakness


Heart Murmur


CAD


Chronic Pain GERD


Goal:  


strength building and restoration of function sufficient to live


independently and alone





Consult/Follow Up/Orders


Follow up appt.:  


Patient will be seen by LEIGHTON COLMENARES at [facility]





Patient to keep appt at [facility] with Hematology as


previously scheduled; please contact [facility] to confirm exact date


and time


Skilled NF Admit to:  [facility]


Certifications SNF


I certify that SNF services are required to be given on an inpatient basis 

because of the above named patient's need for skilled nursing care on a 

continuing basis for the conditions(s) for which he/she was receiving inpatient 

hospital services prior to his/her transfer to the SNF.


Skilled Nursing Facility Order:  Nursing Services, Occupational Ther-Evaluate & 

Treat, Physical Therapy-Evaluate & Treat, Wound Care-Eval/Treat


Discharge Diet:  ADA Diet, Cardiac Diet


Daily Activity as Tolerated:  Yes





New & Resume Previous Orders


New & Resume Previous Orders


Routine Vital Signs


Accuchecks AC and HS, call for glucose over 400


Medications as ordered


Diet as above


Code Status: FULL





Discharge Medications


New Medications:  


Insulin Determir (Levemir) 1,000 Units/10 Ml Soln


10 UNIT SQ HS for 30 Days, #5 EA





 


Changed Medications:  


Insulin Lispro (Humalog Kwikpen) 100 Unit/1 Ml Insuln.pen


6 UNIT SQ AC for 30 Days, #5 EA (Changed from: 6-8 UNIT; PRN; takes 6-8 units 

for glucose greater than 130)


for glucose greater than 130 with meals


 


Continued Medications:  


Albuterol Sulfate (Proventil Hfa) 6.7 Gm Hfa.aer.ad


2 PUFF IH Q4H PRN for SHORTNESS OF BREATH





Aspirin (Aspirin EC) 81 Mg Tablet.dr


81 MG PO HS, TAB





Atorvastatin Calcium (Atorvastatin Calcium) 10 Mg Tablet


10 MG PO HS





Benazepril HCl (Benazepril HCl) 40 Mg Tab


40 MG PO HS





Carvedilol (Carvedilol) 12.5 Mg Tablet


12.5 MG PO BID





Citalopram Hydrobromide (Citalopram HBr) 20 Mg Tablet


20 MG PO HS





Fluticasone/Salmeterol (Advair 100-50 Diskus) 1 Each Blst.w.dev


1-2 PUFF IH DAILY PRN for SHORTNESS OF BREATH, EA





Glipizide (Glipizide) 5 Mg Tablet


5 MG PO BID





Hydrochlorothiazide (Hydrochlorothiazide) 12.5 Mg Capsule


12.5 MG PO BID





Lactobacillus Combination No.4 (Probiotic) 1 Each Capsule


1 CAP PO Q48H





Mupirocin (Mupirocin) 22 Gm Oint...g.


TP TID PRN for RASH





Naproxen Sodium (Aleve) 220 Mg Tablet


440 MG PO BID PRN for PAIN


TAKES 2 (220MG) TABLETS


Nystatin (Nystatin) 15 Gm Cream..g.


TP TID PRN for RASH, TUBE





Pantoprazole Sodium (Pantoprazole Sodium) 40 Mg Tablet.dr


40 MG PO HS





Ticagrelor (Brilinta) 60 Mg Tablet


60 MG PO BID





 


Discontinued Medications:  


Insulin Detemir (Levemir Flextouch) 100 Unit/1 Ml Insuln.pen


100 UNIT SQ, EA


USES A SLIDING SCALE RELATED TO GLUCOSE LEVEL


Metformin HCl (Metformin HCl) 1,000 Mg Tablet


1000 MG PO BID


WITH MEALS


Potassium Citrate (Urocit-K) 15 Meq Tablet.er


15 MEQ PO BID








Vee Long 


Feb 23, 2018 


09:04











VEE LONG DO Feb 23, 2018 09:18

## 2018-02-23 NOTE — THERAPY TEAM DISCHARGE SUMMARY
Therapy Discharge Summary


Discharge Recommendations


Date of Discharge





Therapy D/C Recommendations:  Home w/ Family Support





Physical Therapy


Patient is nonambulatory x 2 yrs, however, is able to perform sit to stand 

transfers and SPT w/c to bed SBA.  Patient continues to have difficulty with 

performing sit to supine mobility due to large pannus restricting hip ROM.  

Patient has avascular necrosis of the right hip per patient report and states 

that Dr. Koenig told her not to move it.  Patient performed exercises of AP 

and LAQ but unable to demonstrate hip flexion exercises due to above statement.

  PT to dismiss to NH on this date.





Occupational Therapy


Decreased Activ Tolerance, Dependent Transfers, Impaired Self-Care Skills





PT Long Term Goals


Long Term Goals


PT Long Term Goals Time Frame:  2018


Transfers (B,C,W/C) (FIM):  5


Sit to Lying (QC):  4


Lying-Sitting on Side/Bed(QC):  4


Sit to Stand (QC):  4 (met 18)


Rollin


Chair/Bed-to-Chair Xfer(QC):  4 (met 18)


Gait (FIM):  1


Distance:  20'


Gait Level of Assist:  5


Gait Assistive Device:  FWW


Wheelchair (FIM):  2


Distance:  50'


Wheelchair Level of Assist:  6


Wheel 50 feet with 2 turns (QC:  6





OT Long Term Goals


Long Term Goals


Time Frame:  2018


Lower Body Dressing(FIM):  5


Toileting(FIM):  5


Toileting Hygiene (QC):  5


Toilet/Commode Transfer(FIM):  5


Toilet/Commode Transfer (QC):  5


Additional Goals:  2-Verbalize Understanding, 3-ImproveStrength/Daniel


1=Demonstrate adherence to instructed precautions during ADL tasks.


2=Patient will verbalize/demonstrate understanding of assistive devices/

modifications for ADL.


3=Patient will improve strength/tolerance for activity to enable patient to 

perform ADL's.











EMILIO JUNIOR PT 2018 08:13

## 2018-02-28 ENCOUNTER — HOSPITAL ENCOUNTER (OUTPATIENT)
Dept: HOSPITAL 75 - ONC | Age: 73
LOS: 76 days | Discharge: HOME | End: 2018-05-15
Attending: INTERNAL MEDICINE
Payer: MEDICARE

## 2018-02-28 DIAGNOSIS — D50.9: ICD-10-CM

## 2018-02-28 DIAGNOSIS — E11.9: ICD-10-CM

## 2018-02-28 DIAGNOSIS — D69.3: Primary | ICD-10-CM

## 2018-02-28 DIAGNOSIS — Z79.899: ICD-10-CM

## 2018-02-28 DIAGNOSIS — I25.10: ICD-10-CM

## 2018-02-28 DIAGNOSIS — E66.01: ICD-10-CM

## 2018-02-28 DIAGNOSIS — M87.9: ICD-10-CM

## 2018-02-28 LAB
ALBUMIN SERPL-MCNC: 3 GM/DL (ref 3.2–4.5)
ALP SERPL-CCNC: 230 U/L (ref 40–136)
ALT SERPL-CCNC: 27 U/L (ref 0–55)
BASOPHILS # BLD AUTO: 0 10^3/UL (ref 0–0.1)
BASOPHILS NFR BLD AUTO: 0 % (ref 0–10)
BILIRUB SERPL-MCNC: 0.8 MG/DL (ref 0.1–1)
BUN/CREAT SERPL: 17
CALCIUM SERPL-MCNC: 8.1 MG/DL (ref 8.5–10.1)
CHLORIDE SERPL-SCNC: 104 MMOL/L (ref 98–107)
CO2 SERPL-SCNC: 28 MMOL/L (ref 21–32)
CREAT SERPL-MCNC: 0.83 MG/DL (ref 0.6–1.3)
EOSINOPHIL # BLD AUTO: 0.3 10^3/UL (ref 0–0.3)
EOSINOPHIL NFR BLD AUTO: 6 % (ref 0–10)
ERYTHROCYTE [DISTWIDTH] IN BLOOD BY AUTOMATED COUNT: 21.7 % (ref 10–14.5)
GFR SERPLBLD BASED ON 1.73 SQ M-ARVRAT: > 60 ML/MIN
GLUCOSE SERPL-MCNC: 190 MG/DL (ref 70–105)
HCT VFR BLD CALC: 32 % (ref 35–52)
HGB BLD-MCNC: 9.5 G/DL (ref 11.5–16)
LYMPHOCYTES # BLD AUTO: 1 X 10^3 (ref 1–4)
LYMPHOCYTES NFR BLD AUTO: 21 % (ref 12–44)
MANUAL DIFFERENTIAL PERFORMED BLD QL: NO
MCH RBC QN AUTO: 28 PG (ref 25–34)
MCHC RBC AUTO-ENTMCNC: 30 G/DL (ref 32–36)
MCV RBC AUTO: 93 FL (ref 80–99)
MONOCYTES # BLD AUTO: 0.4 X 10^3 (ref 0–1)
MONOCYTES NFR BLD AUTO: 8 % (ref 0–12)
NEUTROPHILS # BLD AUTO: 3.1 X 10^3 (ref 1.8–7.8)
NEUTROPHILS NFR BLD AUTO: 65 % (ref 42–75)
PLATELET # BLD: 210 10^3/UL (ref 130–400)
PMV BLD AUTO: 11.3 FL (ref 7.4–10.4)
POTASSIUM SERPL-SCNC: 4.9 MMOL/L (ref 3.6–5)
PROT SERPL-MCNC: 5.8 GM/DL (ref 6.4–8.2)
RBC # BLD AUTO: 3.46 10^6/UL (ref 4.35–5.85)
SODIUM SERPL-SCNC: 138 MMOL/L (ref 135–145)
WBC # BLD AUTO: 4.8 10^3/UL (ref 4.3–11)

## 2018-02-28 PROCEDURE — 84155 ASSAY OF PROTEIN SERUM: CPT

## 2018-02-28 PROCEDURE — 86920 COMPATIBILITY TEST SPIN: CPT

## 2018-02-28 PROCEDURE — 85025 COMPLETE CBC W/AUTO DIFF WBC: CPT

## 2018-02-28 PROCEDURE — 36430 TRANSFUSION BLD/BLD COMPNT: CPT

## 2018-02-28 PROCEDURE — 86900 BLOOD TYPING SEROLOGIC ABO: CPT

## 2018-02-28 PROCEDURE — 36415 COLL VENOUS BLD VENIPUNCTURE: CPT

## 2018-02-28 PROCEDURE — 80053 COMPREHEN METABOLIC PANEL: CPT

## 2018-02-28 PROCEDURE — 86850 RBC ANTIBODY SCREEN: CPT

## 2018-02-28 PROCEDURE — 83883 ASSAY NEPHELOMETRY NOT SPEC: CPT

## 2018-02-28 PROCEDURE — 96365 THER/PROPH/DIAG IV INF INIT: CPT

## 2018-02-28 PROCEDURE — 82728 ASSAY OF FERRITIN: CPT

## 2018-02-28 PROCEDURE — 84165 PROTEIN E-PHORESIS SERUM: CPT

## 2018-02-28 PROCEDURE — 86901 BLOOD TYPING SEROLOGIC RH(D): CPT

## 2018-02-28 PROCEDURE — 83615 LACTATE (LD) (LDH) ENZYME: CPT

## 2018-07-11 ENCOUNTER — HOSPITAL ENCOUNTER (EMERGENCY)
Dept: HOSPITAL 75 - ER | Age: 73
LOS: 1 days | Discharge: HOME | End: 2018-07-12
Payer: MEDICARE

## 2018-07-11 VITALS — WEIGHT: 293 LBS | BODY MASS INDEX: 45.99 KG/M2 | HEIGHT: 67 IN

## 2018-07-11 DIAGNOSIS — K21.9: ICD-10-CM

## 2018-07-11 DIAGNOSIS — I25.10: ICD-10-CM

## 2018-07-11 DIAGNOSIS — F32.9: ICD-10-CM

## 2018-07-11 DIAGNOSIS — E78.00: ICD-10-CM

## 2018-07-11 DIAGNOSIS — F41.9: ICD-10-CM

## 2018-07-11 DIAGNOSIS — Z88.8: ICD-10-CM

## 2018-07-11 DIAGNOSIS — E66.01: ICD-10-CM

## 2018-07-11 DIAGNOSIS — I10: ICD-10-CM

## 2018-07-11 DIAGNOSIS — Z88.1: ICD-10-CM

## 2018-07-11 DIAGNOSIS — Z87.442: ICD-10-CM

## 2018-07-11 DIAGNOSIS — Z90.49: ICD-10-CM

## 2018-07-11 DIAGNOSIS — J45.909: ICD-10-CM

## 2018-07-11 DIAGNOSIS — M25.551: Primary | ICD-10-CM

## 2018-07-11 DIAGNOSIS — Z79.82: ICD-10-CM

## 2018-07-11 DIAGNOSIS — R53.1: ICD-10-CM

## 2018-07-11 DIAGNOSIS — Z95.5: ICD-10-CM

## 2018-07-11 DIAGNOSIS — Z87.891: ICD-10-CM

## 2018-07-11 DIAGNOSIS — Z79.4: ICD-10-CM

## 2018-07-11 DIAGNOSIS — E11.9: ICD-10-CM

## 2018-07-11 DIAGNOSIS — M06.9: ICD-10-CM

## 2018-07-11 DIAGNOSIS — Z86.2: ICD-10-CM

## 2018-07-11 LAB
BASOPHILS # BLD AUTO: 0 10^3/UL (ref 0–0.1)
BASOPHILS NFR BLD AUTO: 0 % (ref 0–10)
BUN/CREAT SERPL: 27
CALCIUM SERPL-MCNC: 8.8 MG/DL (ref 8.5–10.1)
CHLORIDE SERPL-SCNC: 104 MMOL/L (ref 98–107)
CO2 SERPL-SCNC: 21 MMOL/L (ref 21–32)
CREAT SERPL-MCNC: 1.07 MG/DL (ref 0.6–1.3)
EOSINOPHIL # BLD AUTO: 0.2 10^3/UL (ref 0–0.3)
EOSINOPHIL NFR BLD AUTO: 3 % (ref 0–10)
ERYTHROCYTE [DISTWIDTH] IN BLOOD BY AUTOMATED COUNT: 13.8 % (ref 10–14.5)
GFR SERPLBLD BASED ON 1.73 SQ M-ARVRAT: 51 ML/MIN
GLUCOSE SERPL-MCNC: 172 MG/DL (ref 70–105)
HCT VFR BLD CALC: 34 % (ref 35–52)
HGB BLD-MCNC: 11 G/DL (ref 11.5–16)
LYMPHOCYTES # BLD AUTO: 1.5 X 10^3 (ref 1–4)
LYMPHOCYTES NFR BLD AUTO: 22 % (ref 12–44)
MANUAL DIFFERENTIAL PERFORMED BLD QL: NO
MCH RBC QN AUTO: 30 PG (ref 25–34)
MCHC RBC AUTO-ENTMCNC: 32 G/DL (ref 32–36)
MCV RBC AUTO: 92 FL (ref 80–99)
MONOCYTES # BLD AUTO: 0.5 X 10^3 (ref 0–1)
MONOCYTES NFR BLD AUTO: 7 % (ref 0–12)
NEUTROPHILS # BLD AUTO: 4.6 X 10^3 (ref 1.8–7.8)
NEUTROPHILS NFR BLD AUTO: 68 % (ref 42–75)
PLATELET # BLD: 180 10^3/UL (ref 130–400)
PMV BLD AUTO: 11 FL (ref 7.4–10.4)
POTASSIUM SERPL-SCNC: 4.9 MMOL/L (ref 3.6–5)
RBC # BLD AUTO: 3.72 10^6/UL (ref 4.35–5.85)
SODIUM SERPL-SCNC: 136 MMOL/L (ref 135–145)
WBC # BLD AUTO: 6.8 10^3/UL (ref 4.3–11)

## 2018-07-11 PROCEDURE — 85025 COMPLETE CBC W/AUTO DIFF WBC: CPT

## 2018-07-11 PROCEDURE — 80048 BASIC METABOLIC PNL TOTAL CA: CPT

## 2018-07-11 PROCEDURE — 96374 THER/PROPH/DIAG INJ IV PUSH: CPT

## 2018-07-11 PROCEDURE — 36415 COLL VENOUS BLD VENIPUNCTURE: CPT

## 2018-07-11 NOTE — ED HIP PAIN/INJURY
General


Chief Complaint:  Hip/Pelvic Problems


Stated Complaint:  HIP PAIN


Nursing Triage Note:  


RIGHT HIP PAIN, NO INJURY


Source:  patient, old records


Exam Limitations:  no limitations





History of Present Illness


Date Seen by Provider:  Jul 11, 2018


Time Seen by Provider:  21:23


Initial Comments


This 72-year-old woman presents to the emergency room with complaints of 

exacerbation of chronic right hip pain.  She reports history of avascular 

necrosis which typically is not very painful for her.  She is normally able to 

control her pain with Aleve taken in the mornings.  However, the pain she is 

experiencing today is not relieved with Aleve.  In the past couple of days she 

has had extreme difficulty in transferring and bearing weight due to pain.  The 

pain starts in the right SI joint area and radiates down into the thigh.  She 

reports pain is better with sitting and worse with lying flat or bearing 

weight.  She also describes some generalized weakness especially in both legs.  

She has felt like this in the past when having anemia or trouble 

thrombocytopenia for which she has required transfusions in the past.  She 

would like her cell counts checked today.  Patient reports increased stress 

level lately with the deaths of multiple people who are close to her.  Patient 

uses a wheelchair most of the time but has historically been able to transfer 

independently and bear weight.  Patient has had episodes of sciatica in the 

past and states the distribution of today's pain seems similar.  Patient 

reports she has not been considered a surgical candidate for hip replacement in 

the past due to her generalized health and comorbidities.  She states she is 

not a good surgical candidate.





Allergies and Home Medications


Allergies


Coded Allergies:  


     vancomycin (Verified  Allergy, Intermediate, RASH, 2/18/18)


     heparin (Verified  Allergy, Unknown, 1/31/17)


Uncoded Allergies:  


     TAPE (Allergy, Mild, RASH, 1/31/17)





Home Medications


Albuterol Sulfate 6.7 Gm Hfa.aer.ad, 2 PUFF IH Q4H PRN for SHORTNESS OF BREATH, 

(Reported)


Aspirin 81 Mg Tablet.dr, 81 MG PO HS, (Reported)


Atorvastatin Calcium 10 Mg Tablet, 10 MG PO HS, (Reported)


Benazepril HCl 40 Mg Tab, 40 MG PO HS, (Reported)


Carvedilol 12.5 Mg Tablet, 12.5 MG PO BID, (Reported)


Citalopram Hydrobromide 20 Mg Tablet, 20 MG PO HS, (Reported)


Fluticasone/Salmeterol 1 Each Blst.w.dev, 1-2 PUFF IH DAILY PRN for SHORTNESS 

OF BREATH, (Reported)


Glipizide 5 Mg Tablet, 5 MG PO BID, (Reported)


Hydrochlorothiazide 12.5 Mg Capsule, 12.5 MG PO BID, (Reported)


Insulin Determir 1,000 Units/10 Ml Soln, 10 UNIT SQ HS


   Prescribed by: MURPHY LONG on 2/22/18 2148


Insulin Lispro 100 Unit/1 Ml Insuln.pen, 6 UNIT SQ AC


   for glucose greater than 130 with meals 


   Prescribed by: MURPHY LONG on 2/22/18 2148


Lactobacillus Combination No.4 1 Each Capsule, 1 CAP PO Q48H, (Reported)


Mupirocin 22 Gm Oint...g., TP TID PRN for RASH, (Reported)


Naproxen Sodium 220 Mg Tablet, 440 MG PO BID PRN for PAIN, (Reported)


   TAKES 2 (220MG) TABLETS 


Nystatin 15 Gm Cream..g., TP TID PRN for RASH, (Reported)


Pantoprazole Sodium 40 Mg Tablet.dr, 40 MG PO HS, (Reported)





Patient Home Medication List


Home Medication List Reviewed:  Yes





Constitutional:  see HPI


EENTM:  no symptoms reported


Respiratory:  no symptoms reported


Cardiovascular:  no symptoms reported


Gastrointestinal:  no symptoms reported


Genitourinary:  no symptoms reported


Pregnant:  No


Musculoskeletal:  see HPI


Skin:  no symptoms reported


Psychiatric/Neurological:  No Symptoms Reported





Past Medical-Social-Family Hx


Past Med/Social Hx:  Reviewed and Corrections made


Patient Social History


Alcohol Use:  Rarely Uses


Number of Drinks Today:  AA


Alcohol Beverage of Choice:  Beer


Recreational Drug Use:  No


Smoking Status:  Former Smoker


Type Used:  Cigarettes


Former Smoker, Quit:  Jan 31, 1977


2nd Hand Smoke Exposure:  No


Recent Foreign Travel:  No


Contact w/Someone Who Travel:  No


Recent Infectious Disease Expo:  No


Recent Hopitalizations:  No





Immunizations Up To Date


Tetanus Booster (TDap):  Unknown


PED Vaccines UTD:  Yes


Date of Pneumonia Vaccine:  Oct 1, 2016


Date of Influenza Vaccine:  Oct 1, 2017





Seasonal Allergies


Seasonal Allergies:  Yes





Past Medical History


Surgeries:  Yes


Cardiac, Coronary Stent, Gallbladder, Renal


Respiratory:  Yes


Asthma


Currently Using CPAP:  No


Currently Using BIPAP:  No


Cardiac:  Yes (stent)


Coronary Artery Disease, High Cholesterol, Hypertension


Neurological:  No


Pregnant:  No


Reproductive Disorders:  No


Female Reproductive Disorders:  Denies


GYN History:  Menopausal


Sexually Transmitted Disease:  No


HIV/AIDS:  No


Genitourinary:  Yes


Bladder Infection, Kidney Stones


Gastrointestinal:  No


Gastroesophageal Reflux


Musculoskeletal:  Yes (avascular necrosis of the right hip)


Rheumatoid Arthritis, Foot Drop, Fractures


Endocrine:  Yes (DM TYPE II; MORBID OBESITY)


Diabetes, Insulin dep


HEENT:  No


Cancer:  No


Psychosocial:  Yes


Anxiety, Depression


Integumentary:  No


Recent Skin Changes


Blood Disorders:  Yes (anemia, platelets low, transfusion every 2-3 months)


Adverse Reaction/Blood Tranf:  No





Family Medical History


Reviewed and Corrections made





Cardiovascular disease


  19 MOTHER


Diabetes mellitus


  19 MOTHER


Paternal family history of emphysema


  19 FATHER


Heart Disease, COPD, Diabetes





Physical Exam


Vital Signs


Capillary Refill : Less Than 3 Seconds


Height, Weight, BMI


Height: 5'7.00"


Weight: 249lbs.12.8oz.113.757344pb; 39.1 BMI


Method:Stated


General Appearance:  No Apparent Distress, WD/WN


HEENT:  Normal ENT Inspection


Cardiovascular:  Regular Rate, Rhythm, No Murmur


Respiratory:  Lungs Clear, Normal Breath Sounds, No Accessory Muscle Use, No 

Respiratory Distress


Extremity:  Other (There is pain with rotation of the right hip and palpation 

of the lateral hip)


Neurologic/Psychiatric:  Alert, Oriented x3, No Motor/Sensory Deficits, Normal 

Mood/Affect, CNs II-XII Norm as Tested


Skin:  Normal Color, Warm/Dry





Progress/Results/Core Measures


Results/Orders


Lab Results





Laboratory Tests








Test


 7/11/18


21:39 Range/Units


 


 


White Blood Count


 6.8 


 4.3-11.0


10^3/uL


 


Red Blood Count


 3.72 L


 4.35-5.85


10^6/uL


 


Hemoglobin 11.0 L 11.5-16.0  G/DL


 


Hematocrit 34 L 35-52  %


 


Mean Corpuscular Volume 92  80-99  FL


 


Mean Corpuscular Hemoglobin 30  25-34  PG


 


Mean Corpuscular Hemoglobin


Concent 32 


 32-36  G/DL





 


Red Cell Distribution Width 13.8  10.0-14.5  %


 


Platelet Count


 180 


 130-400


10^3/uL


 


Mean Platelet Volume 11.0 H 7.4-10.4  FL


 


Neutrophils (%) (Auto) 68  42-75  %


 


Lymphocytes (%) (Auto) 22  12-44  %


 


Monocytes (%) (Auto) 7  0-12  %


 


Eosinophils (%) (Auto) 3  0-10  %


 


Basophils (%) (Auto) 0  0-10  %


 


Neutrophils # (Auto) 4.6  1.8-7.8  X 10^3


 


Lymphocytes # (Auto) 1.5  1.0-4.0  X 10^3


 


Monocytes # (Auto) 0.5  0.0-1.0  X 10^3


 


Eosinophils # (Auto)


 0.2 


 0.0-0.3


10^3/uL


 


Basophils # (Auto)


 0.0 


 0.0-0.1


10^3/uL


 


Sodium Level 136  135-145  MMOL/L


 


Potassium Level 4.9  3.6-5.0  MMOL/L


 


Chloride Level 104    MMOL/L


 


Carbon Dioxide Level 21  21-32  MMOL/L


 


Anion Gap 11  5-14  MMOL/L


 


Blood Urea Nitrogen 29 H 7-18  MG/DL


 


Creatinine


 1.07 


 0.60-1.30


MG/DL


 


Estimat Glomerular Filtration


Rate 51 


  





 


BUN/Creatinine Ratio 27   


 


Glucose Level 172 H   MG/DL


 


Calcium Level 8.8  8.5-10.1  MG/DL








My Orders





Orders - LACY MELGOZA MD


Pelvis With Right Hip 2-3views (7/11/18 21:38)


Basic Metabolic Panel (7/11/18 21:38)


Cbc With Automated Diff (7/11/18 21:38)


Saline Lock/Iv-Start (7/11/18 21:38)


Ketorolac Injection (Toradol Injection) (7/11/18 22:30)


Acetaminophen  Tablet (Tylenol  Tablet) (7/11/18 23:45)


Iv Push Med Admin Ed (7/11/18 )





Medications Given in ED





Vital Signs/I&O











Blood Pressure Mean:  108











Progress


Progress Note #1:  


   Time:  22:22


Progress Note


Labs have been reviewed.  Patient has unremarkable cell counts.  X-rays of the 

pelvis and right hip have been reviewed by me.  Unfortunately patient's bone 

density and a body habitus negatively impacting the quality of the study.  

Patient also had a significant amount of pain while transferring to and from 

the x-ray table.  Patient would like some pain management.  She wants to avoid 

narcotics if possible as she is already unsteady on her feet and with 

transfers.  Toradol will be given at this time to help control her pain.  X-

rays are being sent to [facility] for review.  There is concern from this provider 

about the integrity of the femoral neck.


Progress Note #2:  


Progress Note


Tylenol and Toradol were given for management of pain.  Patient still had 

significant pain.  An attempt was made to perform a CT scan of the pelvis.  

Once patient saw the CT table and assessed today, she declined the CT.  She 

wants to return home and try again at a later date if symptoms persist.





Diagnostic Imaging





   Diagonstic Imaging:  Xray


   Plain Films/CT/US/NM/MRI:  pelvis, hip


Comments


X-ray of the pelvis and hip was viewed by me.  [facility] report reviewed.  No 

acute fractures noted.  There is significant degenerative and posttraumatic 

change.  Follow-up with CT of the pelvis was recommended if clinical concern 

persists.





Departure


Impression





 Primary Impression:  


 Right hip pain


 Additional Impressions:  


 Generalized weakness


 History of anemia


Disposition:  01 HOME, SELF-CARE


Condition:  Improved





Departure-Patient Inst.


Decision time for Depature:  00:26


Referrals:  


[facility] (PCP)


Primary Care Physician








LEANDER FREIRE (Family)


Primary Care Physician


Patient Instructions:  Hip Pain





Add. Discharge Instructions:  


You may continue taking Aleve (naproxen) up to 500 mg twice a day.  Add Tylenol 

(acetaminophen) up to 1000 mg every 6 hours as needed for pain not controlled 

by Aleve.


Follow-up with your primary care provider as soon as possible.  Return to care 

if you have worsening symptoms.











All discharge instructions reviewed with patient and/or family. Voiced 

understanding.





Copy


Copies To 1:   CARIDAD PIKE MD, JOSHUA T MD Jul 11, 2018 22:27

## 2018-07-12 VITALS — SYSTOLIC BLOOD PRESSURE: 158 MMHG | DIASTOLIC BLOOD PRESSURE: 76 MMHG

## 2018-07-12 NOTE — DIAGNOSTIC IMAGING REPORT
Pelvis and right hip at 1020h.



INDICATION:



 4 views were obtained. This exam is less than optimal due to

patient's body habitus.



The previous CT abdomen/pelvis exam of 03/18/2016 noted a right

femoral head fracture with subchondral collapse and a background

of pre-existing avascular necrosis. In the interval since the

previous exam, the degenerative and post traumatic changes

involving the right femoral head have progressed somewhat. There

is no fracture or acute bony abnormality appreciated. The left

hip joint and sacroiliac joints show only moderate degenerative

disease. There is moderately severe degenerative disc and bony

disease in the visualized lower lumbar spine. The soft tissues

are unremarkable.



IMPRESSION:

1.  There are posttraumatic and degenerative changes involving

the right femoral head but there is no acute abnormality evident

on this suboptimal exam..

2. If clinical concern regarding an underlying bony abnormality

persists, then CT would be recommended for further evaluation.



Dictated by: 



  Dictated on workstation # [id number]

## 2018-08-23 ENCOUNTER — HOSPITAL ENCOUNTER (OUTPATIENT)
Dept: HOSPITAL 75 - ONC | Age: 73
LOS: 8 days | Discharge: HOME | End: 2018-08-31
Attending: INTERNAL MEDICINE
Payer: MEDICARE

## 2018-08-23 DIAGNOSIS — I25.10: ICD-10-CM

## 2018-08-23 DIAGNOSIS — Z79.899: ICD-10-CM

## 2018-08-23 DIAGNOSIS — D69.3: Primary | ICD-10-CM

## 2018-08-23 DIAGNOSIS — E66.01: ICD-10-CM

## 2018-08-23 DIAGNOSIS — D50.9: ICD-10-CM

## 2018-08-23 DIAGNOSIS — E11.9: ICD-10-CM

## 2018-08-23 DIAGNOSIS — M87.9: ICD-10-CM

## 2018-08-23 PROCEDURE — 96365 THER/PROPH/DIAG IV INF INIT: CPT

## 2018-09-08 ENCOUNTER — HOSPITAL ENCOUNTER (EMERGENCY)
Dept: HOSPITAL 75 - ER | Age: 73
Discharge: HOME | End: 2018-09-08
Payer: MEDICARE

## 2018-09-08 VITALS — DIASTOLIC BLOOD PRESSURE: 92 MMHG | SYSTOLIC BLOOD PRESSURE: 197 MMHG

## 2018-09-08 VITALS — WEIGHT: 293 LBS | HEIGHT: 67 IN | BODY MASS INDEX: 45.99 KG/M2

## 2018-09-08 DIAGNOSIS — Z79.82: ICD-10-CM

## 2018-09-08 DIAGNOSIS — E66.01: ICD-10-CM

## 2018-09-08 DIAGNOSIS — N39.0: Primary | ICD-10-CM

## 2018-09-08 DIAGNOSIS — Z88.8: ICD-10-CM

## 2018-09-08 DIAGNOSIS — E11.9: ICD-10-CM

## 2018-09-08 DIAGNOSIS — J45.909: ICD-10-CM

## 2018-09-08 DIAGNOSIS — I25.10: ICD-10-CM

## 2018-09-08 DIAGNOSIS — Z95.5: ICD-10-CM

## 2018-09-08 DIAGNOSIS — F41.9: ICD-10-CM

## 2018-09-08 DIAGNOSIS — K21.9: ICD-10-CM

## 2018-09-08 DIAGNOSIS — F32.9: ICD-10-CM

## 2018-09-08 DIAGNOSIS — Z88.0: ICD-10-CM

## 2018-09-08 DIAGNOSIS — Z82.49: ICD-10-CM

## 2018-09-08 DIAGNOSIS — Z87.891: ICD-10-CM

## 2018-09-08 DIAGNOSIS — R06.02: ICD-10-CM

## 2018-09-08 DIAGNOSIS — Z79.51: ICD-10-CM

## 2018-09-08 DIAGNOSIS — M06.9: ICD-10-CM

## 2018-09-08 DIAGNOSIS — E78.00: ICD-10-CM

## 2018-09-08 DIAGNOSIS — Z79.4: ICD-10-CM

## 2018-09-08 DIAGNOSIS — Z87.442: ICD-10-CM

## 2018-09-08 DIAGNOSIS — I10: ICD-10-CM

## 2018-09-08 DIAGNOSIS — D64.9: ICD-10-CM

## 2018-09-08 DIAGNOSIS — Z91.048: ICD-10-CM

## 2018-09-08 LAB
ALBUMIN SERPL-MCNC: 3.7 GM/DL (ref 3.2–4.5)
ALP SERPL-CCNC: 197 U/L (ref 40–136)
ALT SERPL-CCNC: 25 U/L (ref 0–55)
APTT PPP: YELLOW S
BACTERIA #/AREA URNS HPF: (no result) /HPF
BASOPHILS # BLD AUTO: 0 10^3/UL (ref 0–0.1)
BASOPHILS NFR BLD AUTO: 0 % (ref 0–10)
BILIRUB SERPL-MCNC: 1.1 MG/DL (ref 0.1–1)
BILIRUB UR QL STRIP: NEGATIVE
BUN/CREAT SERPL: 23
CALCIUM SERPL-MCNC: 8.9 MG/DL (ref 8.5–10.1)
CHLORIDE SERPL-SCNC: 106 MMOL/L (ref 98–107)
CO2 SERPL-SCNC: 23 MMOL/L (ref 21–32)
CREAT SERPL-MCNC: 0.79 MG/DL (ref 0.6–1.3)
EOSINOPHIL # BLD AUTO: 0.3 10^3/UL (ref 0–0.3)
EOSINOPHIL NFR BLD AUTO: 4 % (ref 0–10)
ERYTHROCYTE [DISTWIDTH] IN BLOOD BY AUTOMATED COUNT: 16.9 % (ref 10–14.5)
FIBRINOGEN PPP-MCNC: (no result) MG/DL
GFR SERPLBLD BASED ON 1.73 SQ M-ARVRAT: > 60 ML/MIN
GLUCOSE SERPL-MCNC: 115 MG/DL (ref 70–105)
GLUCOSE UR STRIP-MCNC: NEGATIVE MG/DL
HCT VFR BLD CALC: 38 % (ref 35–52)
HGB BLD-MCNC: 12 G/DL (ref 11.5–16)
KETONES UR QL STRIP: NEGATIVE
LEUKOCYTE ESTERASE UR QL STRIP: (no result)
LYMPHOCYTES # BLD AUTO: 1.2 X 10^3 (ref 1–4)
LYMPHOCYTES NFR BLD AUTO: 17 % (ref 12–44)
MAGNESIUM SERPL-MCNC: 2 MG/DL (ref 1.8–2.4)
MANUAL DIFFERENTIAL PERFORMED BLD QL: NO
MCH RBC QN AUTO: 31 PG (ref 25–34)
MCHC RBC AUTO-ENTMCNC: 32 G/DL (ref 32–36)
MCV RBC AUTO: 96 FL (ref 80–99)
MONOCYTES # BLD AUTO: 0.5 X 10^3 (ref 0–1)
MONOCYTES NFR BLD AUTO: 7 % (ref 0–12)
NEUTROPHILS # BLD AUTO: 5.3 X 10^3 (ref 1.8–7.8)
NEUTROPHILS NFR BLD AUTO: 72 % (ref 42–75)
NITRITE UR QL STRIP: NEGATIVE
PH UR STRIP: 5 [PH] (ref 5–9)
PLATELET # BLD: 192 10^3/UL (ref 130–400)
PMV BLD AUTO: 11.4 FL (ref 7.4–10.4)
POTASSIUM SERPL-SCNC: 4.4 MMOL/L (ref 3.6–5)
PROT SERPL-MCNC: 6.9 GM/DL (ref 6.4–8.2)
PROT UR QL STRIP: (no result)
RBC # BLD AUTO: 3.93 10^6/UL (ref 4.35–5.85)
RBC #/AREA URNS HPF: (no result) /HPF
SODIUM SERPL-SCNC: 138 MMOL/L (ref 135–145)
SP GR UR STRIP: 1.02 (ref 1.02–1.02)
SQUAMOUS #/AREA URNS HPF: (no result) /HPF
UROBILINOGEN UR-MCNC: NORMAL MG/DL
WBC # BLD AUTO: 7.4 10^3/UL (ref 4.3–11)
WBC #/AREA URNS HPF: (no result) /HPF

## 2018-09-08 PROCEDURE — 87077 CULTURE AEROBIC IDENTIFY: CPT

## 2018-09-08 PROCEDURE — 87088 URINE BACTERIA CULTURE: CPT

## 2018-09-08 PROCEDURE — 85025 COMPLETE CBC W/AUTO DIFF WBC: CPT

## 2018-09-08 PROCEDURE — 71045 X-RAY EXAM CHEST 1 VIEW: CPT

## 2018-09-08 PROCEDURE — 86141 C-REACTIVE PROTEIN HS: CPT

## 2018-09-08 PROCEDURE — 80053 COMPREHEN METABOLIC PANEL: CPT

## 2018-09-08 PROCEDURE — 83735 ASSAY OF MAGNESIUM: CPT

## 2018-09-08 PROCEDURE — 36415 COLL VENOUS BLD VENIPUNCTURE: CPT

## 2018-09-08 PROCEDURE — 87186 SC STD MICRODIL/AGAR DIL: CPT

## 2018-09-08 PROCEDURE — 82962 GLUCOSE BLOOD TEST: CPT

## 2018-09-08 PROCEDURE — 83880 ASSAY OF NATRIURETIC PEPTIDE: CPT

## 2018-09-08 PROCEDURE — 81000 URINALYSIS NONAUTO W/SCOPE: CPT

## 2018-09-08 NOTE — XMS REPORT
[facility]

 Created on: 2018



Madison Young

External Reference #: [id number]

: 1945

Sex: Female



Demographics







 Address  [address] [place]  [address]

 

 Preferred Language  Unknown

 

 Marital Status  Unknown

 

 Taoism Affiliation  Unknown

 

 Race  Unknown

 

 Ethnic Group  Unknown





Author







 Author  FREIRELEANDER HILLIARD

 

 Organization  [facility]

 

 Address  [address] [place]  [address]



 

 Phone  [phone]







Care Team Providers







 Care Team Member Name  Role  Phone

 

 FREIRELEANDER HILLIARD  Unavailable  [phone]







PROBLEMS







 Type  Condition  ICD9-CM Code  KIZ02-ZE Code  Onset Dates  Condition Status  
SNOMED Code

 

 Problem  Anxiety disorder, unspecified     F41.9     Active  688107811

 

 Problem  Body mass index (BMI) of 45.0-49.9 in adult     Z68.42     Active  
471029130

 

 Problem  Morbid (severe) obesity due to excess calories     E66.01     Active  
009659068

 

 Problem  Iron deficiency anemia     D50.9     Active  26419938

 

 Problem  Non-adherence to medical treatment     Z91.19     Active  674234386

 

 Problem  Long-term insulin use     Z79.4     Active  495175046

 

 Problem  Recurrent major depressive disorder, in partial remission     F33.41 
    Active  07642910

 

 Problem  Skin ulcer, limited to breakdown of skin     L98.491     Active  
88427407

 

 Problem  Other iron deficiency anemia     D50.8     Active  36878933

 

 Problem  Avascular necrosis of bone of right hip     M87.051     Active  
877376147

 

 Problem  Coronary artery disease     I25.10     Active  02254220

 

 Problem  Chronic kidney disease (CKD) stage G3a/A3, moderately decreased 
glomerular filtration rate (GFR) between 45-59 mL/min/1.73 square meter and 
albuminuria creatinine ratio greater than 300 mg/g     N18.3     Active  
077683040

 

 Problem  Microalbuminuric diabetic nephropathy     E11.21     Active  895152504

 

 Problem  Immune thrombocytopenic purpura     D69.3     Active  647501032

 

 Problem  Hyperlipidemia, unspecified hyperlipidemia     E78.5     Active  
28315648

 

 Problem  GERD (gastroesophageal reflux disease)     K21.9     Active  136824283

 

 Problem  Asthma     J45.909     Active  291247621

 

 Problem  Type 2 diabetes mellitus with diabetic polyneuropathy     E11.42     
Active  47107425

 

 Problem  Hypertension     I10     Active  53545189







ALLERGIES

No Information



ENCOUNTERS







 Encounter  Location  Date  Diagnosis

 

 [facility]  [id number] [address]
[phone]  29 Aug, 2018   

 

 [place] [id number] [address]     

 

 [place] [address] [place] [address]-
[phone]    Type 2 diabetes mellitus with diabetic polyneuropathy 
E11.42 ; Hypertension I10 ; Hyperlipidemia, unspecified hyperlipidemia E78.5 ; 
Body mass index (BMI) of 45.0-49.9 in adult Z68.42 ; Long-term insulin use 
Z79.4 ; Non-adherence to medical treatment Z91.19 ; Coronary artery disease 
I25.10 ; GERD (gastroesophageal reflux disease) K21.9 ; Asthma J45.909 and 
Recurrent major depressive disorder, in partial remission F33.41

 

 [place] [address] [place] [id number] [place] [phone]
[phone]  22 May, 2018  Recurrent major depressive disorder, in partial remission 
F33.41 and Hypertension I10

 

 [place] [address] [place] [id number] [place] [address]-
4163  21 May, 2018  Immune thrombocytopenic purpura D69.3 and Iron deficiency 
anemia D50.9

 

 [place] [id number] [place] [phone]-
[phone]  21 May, 2018  Type 2 diabetes mellitus with diabetic polyneuropathy 
E11.42 ; Long-term insulin use Z79.4 ; Chronic kidney disease (CKD) stage G3a/A3
, moderately decreased glomerular filtration rate (GFR) between 45-59 mL/min/
1.73 square meter and albuminuria creatinine ratio greater than 300 mg/g N18.3 
; Hypertension I10 ; Hyperlipidemia, unspecified hyperlipidemia E78.5 ; 
Coronary artery disease I25.10 ; GERD (gastroesophageal reflux disease) K21.9 ; 
Immune thrombocytopenic purpura D69.3 ; Asthma J45.909 ; Morbid (severe) 
obesity due to excess calories E66.01 and Recurrent major depressive disorder, 
in partial remission F33.41

 

 [address] [place] [address]-
[phone]  11 May, 2018  Chronic kidney disease (CKD) stage G3a/A3, moderately 
decreased glomerular filtration rate (GFR) between 45-59 mL/min/1.73 square 
meter and albuminuria creatinine ratio greater than 300 mg/g N18.3

 

 [place] [address] [place] [phone]-
[phone]  02 May, 2018  Chronic kidney disease (CKD) stage G3a/A3, moderately 
decreased glomerular filtration rate (GFR) between 45-59 mL/min/1.73 square 
meter and albuminuria creatinine ratio greater than 300 mg/g N18.3

 

 [place] [address] [place] [id number] [place] [phone]-
[phone]     

 

 [place] [address] [phone]-
[phone]  28 Mar, 2018   

 

 [place] [address] [place] [id number] [place] [phone]-
[phone]  26 Mar, 2018  Immune thrombocytopenic purpura D69.3 ; Type 2 diabetes 
mellitus with diabetic polyneuropathy E11.42 ; Avascular necrosis of bone of 
right hip M87.051 ; Long-term insulin use Z79.4 ; GERD (gastroesophageal reflux 
disease) K21.9 ; Hyperlipidemia, unspecified hyperlipidemia E78.5 ; 
Hypertension I10 ; Recurrent major depressive disorder, in partial remission 
F33.41 ; Microalbuminuric diabetic nephropathy E11.21 ; Body mass index (BMI) 
of 45.0-49.9 in adult Z68.42 ; BMI 45.0-49.9, adult Z68.42 and Chronic kidney 
disease (CKD) stage G3a/A3, moderately decreased glomerular filtration rate (GFR
) between 45-59 mL/min/1.73 square meter and albuminuria creatinine ratio 
greater than 300 mg/g N18.3

 

 [place] [address] [place] [id number] [place] [address]-
0208  23 Mar, 2018   

 

 [place] [address] [place] [id number] [place] [address]265-
5096  23 Mar, 2018   

 

 [place] [address] [place] [id number] [place] [phone]-
[phone]  19 Mar, 2018   

 

 [place] [address] [place] [address]-
3486  14 Mar, 2018   

 

 [place] [address] [place] [address]-
2235  13 Mar, 2018  Type 2 diabetes mellitus with diabetic polyneuropathy 
E11.42 ; Asthma J45.909 and Hypertension I10

 

 [facility]  [address] [place] 
[id number]  13 Mar, 2018  Skin ulcer, limited to breakdown of skin L98.491 ; 
Immune thrombocytopenic purpura D69.3 ; Avascular necrosis of bone of right hip 
M87.051 and Type 2 diabetes mellitus with diabetic polyneuropathy E11.42

 

 [place] [address] [place] [id number] [place] [address]-
6050  06 Mar, 2018  Asthma J45.909 and Type 2 diabetes mellitus with diabetic 
polyneuropathy E11.42

 

 [place] [address] [place] [id number] [place] [address]295-
7861  01 Mar, 2018   

 

 [facility]  [address] [place] 
[id number]    Other iron deficiency anemia D50.8 ; Weakness R53.1 ; 
Type 2 diabetes mellitus with diabetic polyneuropathy E11.42 ; Cellulitis of 
trunk, unspecified site of trunk L03.319 ; Coronary artery disease I25.10 ; 
Avascular necrosis of bone of right hip M87.051 and Morbid (severe) obesity due 
to excess calories E66.01

 

 [facility]  [id number] [place] [address] 
[phone]     

 

 [facility]  [address] [place] [address]-
3710     

 

 [facility]  [id number] [place] [phone]
[phone]  15 Feb, 2018  Yeast infection of the skin B37.2

 

 [place] [address] [place] [id number] [place] [address] 96009-
0890  15 Feb, 2018   

 

 [place] [address] [place] [id number] [place] [address] 49692-
9318  15 2018   

 

 [facility]  [address] [place] [id number] [place] [address]-
9768     

 

 [facility] [place] [facility]  [address] [place] [id number] [place] [phone]
[phone]     

 

 [facility]  [address] [address]-
2241    Type 2 diabetes mellitus with diabetic polyneuropathy 
E11.42 ; Avascular necrosis of bone of right hip M87.051 ; Hyperlipidemia, 
unspecified hyperlipidemia E78.5 ; Hypertension I10 ; Anxiety disorder, 
unspecified F41.9 ; Immune thrombocytopenic purpura D69.3 ; Coronary artery 
disease I25.10 ; Orthopnea R06.01 ; Acute bronchitis, unspecified organism 
J20.9 ; Wound of left lower extremity, initial encounter S81.802A ; Body aches 
R52 and Debilitated R53.81

 

 [place] [address] [place] [id number] [place] [address] 61419-
1770     

 

 [place] [address] [place] [id number] [place] [address] 88032-
0394    Type 2 diabetes mellitus with diabetic polyneuropathy 
E11.42 ; Encounter for immunization Z23 ; Hyperlipidemia, unspecified 
hyperlipidemia E78.5 ; Hypertension I10 ; Anxiety disorder, unspecified F41.9 ; 
Asthma J45.909 ; Body mass index (BMI) of 45.0-49.9 in adult Z68.42 and Morbid (
severe) obesity due to excess calories E66.01

 

 [facility]  [address] [place] [id number] [place] [address]38-
6272    Type 2 diabetes mellitus with diabetic polyneuropathy 
E11.42 ; Hyperlipidemia, unspecified hyperlipidemia E78.5 ; Hypertension I10 
and GERD (gastroesophageal reflux disease) K21.9

 

 [place] [address] [place] [id number] [place] [address] [phone]-
[phone]  22 Mar, 2017  Type 2 diabetes mellitus with diabetic polyneuropathy E11.42

 

 [place] [address] [place] [id number] [place] [address] 05060-
0531  28 2017  Type 2 diabetes mellitus with diabetic polyneuropathy 
E11.42 ; Coronary artery disease I25.10 ; History of MI (myocardial infarction) 
I25.2 ; Immune thrombocytopenic purpura D69.3 ; GERD (gastroesophageal reflux 
disease) K21.9 ; Hyperlipidemia, unspecified hyperlipidemia E78.5 ; 
Hypertension I10 ; History of kidney stones Z87.442 and Anxiety disorder, 
unspecified F41.9

 

 [place] [address] [place] [id number] [place] [address]23-
8515  14 2017   

 

 [place] [address] [address]-
4912  29 Sep, 2016  Coronary artery disease I25.10 ; History of MI (myocardial 
infarction) I25.2 ; History of kidney stones Z87.442 ; Type 2 diabetes mellitus 
with diabetic polyneuropathy E11.42 ; Avascular necrosis of bone of right hip 
M87.051 ; Hyperlipidemia, unspecified hyperlipidemia E78.5 ; Hypertension I10 ; 
Asthma J45.909 and Encounter for immunization Z23

 

 [place] [address] [place] [id number] [place] [address] 83493-
[phone]  20 Sep, 2016   

 

 [place] [address] [address]-
2479    Coronary artery disease I25.10 ; Type 2 diabetes mellitus 
with diabetic polyneuropathy E11.42 ; History of MI (myocardial infarction) 
I25.2 ; Immune thrombocytopenic purpura D69.3 ; Hyperlipidemia, unspecified 
hyperlipidemia E78.5 and Hypertension I10

 

 [place] [address] [place] [id number] [place] [address] 76941-
[phone]    Coronary artery disease I25.10 ; Lymphedema I89.0 ; History 
of MI (myocardial infarction) I25.2 ; History of kidney stones Z87.442 ; Immune 
thrombocytopenic purpura D69.3 ; Type 2 diabetes mellitus with diabetic 
polyneuropathy E11.42 ; Avascular necrosis of bone of right hip M87.051 ; GERD (
gastroesophageal reflux disease) K21.9 ; Hyperlipidemia, unspecified 
hyperlipidemia E78.5 ; Hypertension I10 and Asthma J45.909

 

 [place] [address] [place] [address] [phone]-
[phone]     

 

 [facility]  [address] [place] [id number] [place] [address] 26974-
[phone]     

 

 [place] [address] [place] [id number] [place] [address] [phone]-
[phone]  02 Dec, 2015   

 

 [place] [address] [address]-
5362  02 Dec, 2015  Hyperlipidemia, unspecified hyperlipidemia E78.5

 

 [place] [address] [place] [id number] [place] [address] [phone]-
[phone]     

 

 [place] [address] [place] [id number] [place] [address] 46185-
[phone]     

 

 [place] [address] [place] [id number] [place] [address] [phone]-
[phone]    Allergic rhinitis J30.9

 

 [place] [address] [place] [id number] [place] [address] 21590-
9972    Type 2 diabetes mellitus with diabetic polyneuropathy 
E11.42 ; Routine adult health maintenance Z00.00 ; Coronary artery disease 
I25.10 ; History of MI (myocardial infarction) I25.2 ; History of kidney stones 
Z87.442 ; Immune thrombocytopenic purpura D69.3 ; Avascular necrosis of bone of 
right hip M87.051 and GERD (gastroesophageal reflux disease) K21.9







IMMUNIZATIONS

No Known Immunizations



SOCIAL HISTORY

Never Assessed



REASON FOR VISIT

Triage



PLAN OF CARE





VITAL SIGNS





MEDICATIONS

Unknown Medications



RESULTS

No Results



PROCEDURES

No Known procedures



INSTRUCTIONS





MEDICATIONS ADMINISTERED

No Known Medications



MEDICAL (GENERAL) HISTORY







 Type  Description  Date

 

 Medical History  Type II Diabetes   

 

 Medical History  CAD   

 

 Medical History  Acute MI x1   

 

 Medical History  Heart Cath x3   

 

 Medical History  Kidney Stones   

 

 Medical History  Lymphedema   

 

 Medical History  Immune thrombocytopenic purpura   

 

 Surgical History  Heart Stents x2   

 

 Surgical History  Cholecystectomy   

 

 Surgical History     

 

 Surgical History  Monitor Teeth   

 

 Hospitalization History  [facility]  

 

 Hospitalization History  Sepsis/Toxic Shock  

 

 Hospitalization History  [facility]-flu[facility]pneumonia  2017

 

 Hospitalization History  [facility]- Anemia, cellulitis pannus, yeast 
infection. Discharged 2018

## 2018-09-08 NOTE — XMS REPORT
[facility]

 Created on: 2018



Madison Young

External Reference #: [id number]

: 1945

Sex: Female



Demographics







 Address  [address] [place]  [address]

 

 Preferred Language  Unknown

 

 Marital Status  Unknown

 

 Synagogue Affiliation  Unknown

 

 Race  Unknown

 

 Ethnic Group  Unknown





Author







 Author  TANI  LEANDER

 

 Organization  [facility]

 

 Address  [address] [place]  [address]



 

 Phone  [phone]







Care Team Providers







 Care Team Member Name  Role  Phone

 

 TANI  LEANDER  Unavailable  [phone]







PROBLEMS







 Type  Condition  ICD9-CM Code  HIF94-EY Code  Onset Dates  Condition Status  
SNOMED Code

 

 Problem  Long-term insulin use     Z79.4     Active  270440989

 

 Problem  Skin ulcer, limited to breakdown of skin     L98.491     Active  
09735961

 

 Problem  Other iron deficiency anemia     D50.8     Active  38110450

 

 Problem  Peripheral edema     R60.9     Active  649600642

 

 Problem  Coronary artery disease     I25.10     Active  33424326

 

 Problem  Diabetes mellitus due to underlying condition with foot ulcer     
E08.621     Active  884810438

 

 Problem  Type 2 diabetes mellitus with diabetic polyneuropathy     E11.42     
Active  62053489

 

 Problem  Microalbuminuric diabetic nephropathy     E11.21     Active  062083525

 

 Problem  Iron deficiency anemia     D50.9     Active  19583199

 

 Problem  Non-adherence to medical treatment     Z91.19     Active  669611334

 

 Problem  Non-pressure chronic ulcer of other part of left foot limited to 
breakdown of skin     L97.521     Active  445243014

 

 Problem  Acute idiopathic gout involving toe of left foot     M10.072     
Active  38684031

 

 Problem  Immune thrombocytopenic purpura     D69.3     Active  690075357

 

 Problem  Hyperlipidemia, unspecified hyperlipidemia     E78.5     Active  
46984325

 

 Problem  GERD (gastroesophageal reflux disease)     K21.9     Active  446555110

 

 Problem  Avascular necrosis of bone of right hip     M87.051     Active  
445207364

 

 Problem  Anxiety disorder, unspecified     F41.9     Active  737930610

 

 Problem  Morbid (severe) obesity due to excess calories     E66.01     Active  
672963756

 

 Problem  Asthma     J45.909     Active  876840996

 

 Problem  Body mass index (BMI) of 45.0-49.9 in adult     Z68.42     Active  
646093001

 

 Problem  Chronic kidney disease (CKD) stage G3a/A3, moderately decreased 
glomerular filtration rate (GFR) between 45-59 mL/min/1.73 square meter and 
albuminuria creatinine ratio greater than 300 mg/g     N18.3     Active  
479435739

 

 Problem  Hypertension     I10     Active  99358082

 

 Problem  Recurrent major depressive disorder, in partial remission     F33.41 
    Active  52454134







ALLERGIES

No Information



ENCOUNTERS







 Encounter  Location  Date  Diagnosis

 

 [facility]  [id number] [place] [phone]-
[phone]  06 Sep, 2018   

 

 [facility]  [address] [phone]-
[phone]  29 Aug, 2018   

 

 [facility]  [address] [phone]-
[phone]  13 Aug, 2018  Hypertension I10 and Peripheral edema R60.9

 

 [facility]  [id number] [place] [phone]-
[phone]  09 Aug, 2018   

 

 [facility]  [address] [phone]-
[phone]  09 Aug, 2018  Hypertension I10 and Peripheral edema R60.9

 

 [facility]  [id number] [place] [address] [phone]-
[phone]  06 Aug, 2018  Hypertension I10 and Peripheral edema R60.9

 

 [facility]  [id number] [place] [address] 30803-
[phone]  02 Aug, 2018   

 

 [facility]  [id number] [place] [address] 45367-
2410  02 Aug, 2018  Hypertension I10 and Peripheral edema R60.9

 

 [facility]  [id number] [place] [address] 13258-
[phone]  01 Aug, 2018   

 

 [facility]  [id number] [place] [address] 21969-
[phone]     

 

 [facility]  [id number] [place] [address] 54669-
[phone]     

 

 [facility]  [id number] [place] [address] 30243-
4397     

 

 [facility]  [address] [place] [address]-
0726    Diabetes mellitus due to underlying condition with foot 
ulcer E08.621 ; Non-pressure chronic ulcer of other part of left foot limited 
to breakdown of skin L97.521 and BMI 45.0-49.9, adult Z68.42

 

 [id number] [place] [address] 76212-
[phone]    Acute idiopathic gout involving toe of left foot M10.072

 

 [id number] [place] [address] 21936-
[phone]     

 

 [address] [address]-
2810     

 

 [id number] [place] [address] 41320-
2360     

 

 [id number] [place] [address] 28854-
5374    Type 2 diabetes mellitus with diabetic polyneuropathy 
E11.42 ; Hypertension I10 ; Hyperlipidemia, unspecified hyperlipidemia E78.5 ; 
Body mass index (BMI) of 45.0-49.9 in adult Z68.42 ; Long-term insulin use 
Z79.4 ; Non-adherence to medical treatment Z91.19 ; Coronary artery disease 
I25.10 ; GERD (gastroesophageal reflux disease) K21.9 ; Asthma J45.909 and 
Recurrent major depressive disorder, in partial remission F33.41

 

 [id number] [place] [address] 69061-
[phone]  22 May, 2018  Recurrent major depressive disorder, in partial remission 
F33.41 and Hypertension I10

 

 [id number] [place] [address] 29170-
0572  21 May, 2018  Immune thrombocytopenic purpura D69.3 and Iron deficiency 
anemia D50.9

 

 [id number] [place] [address] 82830-
8999  21 May, 2018  Type 2 diabetes mellitus with diabetic polyneuropathy 
E11.42 ; Long-term insulin use Z79.4 ; Chronic kidney disease (CKD) stage G3a/A3
, moderately decreased glomerular filtration rate (GFR) between 45-59 mL/min/
1.73 square meter and albuminuria creatinine ratio greater than 300 mg/g N18.3 
; Hypertension I10 ; Hyperlipidemia, unspecified hyperlipidemia E78.5 ; 
Coronary artery disease I25.10 ; GERD (gastroesophageal reflux disease) K21.9 ; 
Immune thrombocytopenic purpura D69.3 ; Asthma J45.909 ; Morbid (severe) 
obesity due to excess calories E66.01 and Recurrent major depressive disorder, 
in partial remission F33.41

 

 [id number] [place] [address] 77605-
4472  11 May, 2018  Chronic kidney disease (CKD) stage G3a/A3, moderately 
decreased glomerular filtration rate (GFR) between 45-59 mL/min/1.73 square 
meter and albuminuria creatinine ratio greater than 300 mg/g N18.3

 

 [id number] [place] [address] 63324-
5442  02 May, 2018  Chronic kidney disease (CKD) stage G3a/A3, moderately 
decreased glomerular filtration rate (GFR) between 45-59 mL/min/1.73 square 
meter and albuminuria creatinine ratio greater than 300 mg/g N18.3

 

 [id number] [place] [address] 03534-
5519     

 

 [id number] [place] [address] 07638-
3528  28 Mar, 2018   

 

 [id number] [place] [address] 31193-
6772  26 Mar, 2018  Immune thrombocytopenic purpura D69.3 ; Type 2 diabetes 
mellitus with diabetic polyneuropathy E11.42 ; Avascular necrosis of bone of 
right hip M87.051 ; Long-term insulin use Z79.4 ; GERD (gastroesophageal reflux 
disease) K21.9 ; Hyperlipidemia, unspecified hyperlipidemia E78.5 ; 
Hypertension I10 ; Recurrent major depressive disorder, in partial remission 
F33.41 ; Microalbuminuric diabetic nephropathy E11.21 ; Body mass index (BMI) 
of 45.0-49.9 in adult Z68.42 ; BMI 45.0-49.9, adult Z68.42 and Chronic kidney 
disease (CKD) stage G3a/A3, moderately decreased glomerular filtration rate (GFR
) between 45-59 mL/min/1.73 square meter and albuminuria creatinine ratio 
greater than 300 mg/g N18.3

 

 [address] [place] [address] 25764-
2286  23 Mar, 2018   

 

 [address] [address]-
1343  23 Mar, 2018   

 

 [id number] [place] [address] 96202-
5074  19 Mar, 2018   

 

 [address] 9[address]87-
9816  14 Mar, 2018   

 

 [id number] [place] [address] 39536-
6387  13 Mar, 2018  Type 2 diabetes mellitus with diabetic polyneuropathy 
E11.42 ; Asthma J45.909 and Hypertension I10

 

 [facility]  [address] 
[id number]  13 Mar, 2018  Skin ulcer, limited to breakdown of skin L98.491 ; 
Immune thrombocytopenic purpura D69.3 ; Avascular necrosis of bone of right hip 
M87.051 and Type 2 diabetes mellitus with diabetic polyneuropathy E11.42

 

 [id number] [place] [address] 93731-
5760  06 Mar, 2018  Asthma J45.909 and Type 2 diabetes mellitus with diabetic 
polyneuropathy E11.42

 

 [address] [place] [address] 17346-
0910  01 Mar, 2018   

 

 [facility]  [address] 
[id number]    Other iron deficiency anemia D50.8 ; Weakness R53.1 ; 
Type 2 diabetes mellitus with diabetic polyneuropathy E11.42 ; Cellulitis of 
trunk, unspecified site of trunk L03.319 ; Coronary artery disease I25.10 ; 
Avascular necrosis of bone of right hip M87.051 and Morbid (severe) obesity due 
to excess calories E66.01

 

 [facility] [place]  [id number] [place] 
[id number]     

 

 [facility]  [id number] [place] [address] 56706-
3975     

 

 [facility]  [facility] [id number] [place] [address] [phone]
[phone]  15 Feb, 2018  Yeast infection of the skin B37.2

 

 [id number] [place] [address] 03597-
0245  15 Feb, 2018   

 

 [id number] [place] [address] 93178-
9364  15 Feb, 2018   

 

 [id number] [place] [address] 60887-
[phone]     

 

 [facility] WALK IN [facility]  [id number] [place] [address] [phone]
[phone]     

 

 [address] [place] [address] 22726-
3317    Type 2 diabetes mellitus with diabetic polyneuropathy 
E11.42 ; Avascular necrosis of bone of right hip M87.051 ; Hyperlipidemia, 
unspecified hyperlipidemia E78.5 ; Hypertension I10 ; Anxiety disorder, 
unspecified F41.9 ; Immune thrombocytopenic purpura D69.3 ; Coronary artery 
disease I25.10 ; Orthopnea R06.01 ; Acute bronchitis, unspecified organism 
J20.9 ; Wound of left lower extremity, initial encounter S81.802A ; Body aches 
R52 and Debilitated R53.81

 

 [address] [place] [address] 67550-
7923     

 

 [address] [place] [address] 45120-
5450    Type 2 diabetes mellitus with diabetic polyneuropathy 
E11.42 ; Encounter for immunization Z23 ; Hyperlipidemia, unspecified 
hyperlipidemia E78.5 ; Hypertension I10 ; Anxiety disorder, unspecified F41.9 ; 
Asthma J45.909 ; Body mass index (BMI) of 45.0-49.9 in adult Z68.42 and Morbid (
severe) obesity due to excess calories E66.01

 

 [id number] [place] [address] 55002-
8269    Type 2 diabetes mellitus with diabetic polyneuropathy 
E11.42 ; Hyperlipidemia, unspecified hyperlipidemia E78.5 ; Hypertension I10 
and GERD (gastroesophageal reflux disease) K21.9

 

 [address] [address]-
4831  22 Mar, 2017  Type 2 diabetes mellitus with diabetic polyneuropathy E11.42

 

 [address] [address]-
9497    Type 2 diabetes mellitus with diabetic polyneuropathy 
E11.42 ; Coronary artery disease I25.10 ; History of MI (myocardial infarction) 
I25.2 ; Immune thrombocytopenic purpura D69.3 ; GERD (gastroesophageal reflux 
disease) K21.9 ; Hyperlipidemia, unspecified hyperlipidemia E78.5 ; 
Hypertension I10 ; History of kidney stones Z87.442 and Anxiety disorder, 
unspecified F41.9

 

 [address] [address]-
1411     

 

 [address] [address]-
8345  29 Sep, 2016  Coronary artery disease I25.10 ; History of MI (myocardial 
infarction) I25.2 ; History of kidney stones Z87.442 ; Type 2 diabetes mellitus 
with diabetic polyneuropathy E11.42 ; Avascular necrosis of bone of right hip 
M87.051 ; Hyperlipidemia, unspecified hyperlipidemia E78.5 ; Hypertension I10 ; 
Asthma J45.909 and Encounter for immunization Z23

 

 [address] [address]70-
6165  20 Sep, 2016   

 

 [address] [address]-
9999    Coronary artery disease I25.10 ; Type 2 diabetes mellitus 
with diabetic polyneuropathy E11.42 ; History of MI (myocardial infarction) 
I25.2 ; Immune thrombocytopenic purpura D69.3 ; Hyperlipidemia, unspecified 
hyperlipidemia E78.5 and Hypertension I10

 

 [id number] [place] [address] 10908-
6731    Coronary artery disease I25.10 ; Lymphedema I89.0 ; History 
of MI (myocardial infarction) I25.2 ; History of kidney stones Z87.442 ; Immune 
thrombocytopenic purpura D69.3 ; Type 2 diabetes mellitus with diabetic 
polyneuropathy E11.42 ; Avascular necrosis of bone of right hip M87.051 ; GERD (
gastroesophageal reflux disease) K21.9 ; Hyperlipidemia, unspecified 
hyperlipidemia E78.5 ; Hypertension I10 and Asthma J45.909

 

 [address] 8[address]14-
9837     

 

 [address] [address]65-
1018     

 

 [address] [address]82-
3559  02 Dec, 2015   

 

 [address] 86825-
0732  02 Dec, 2015  Hyperlipidemia, unspecified hyperlipidemia E78.5

 

 [address] [address]854-
[phone]     

 

 [address] 9[address]07-
1126     

 

 [address] [address]91-
0323    Allergic rhinitis J30.9

 

 [address] [address]807-
9403    Type 2 diabetes mellitus with diabetic polyneuropathy 
E11.42 ; Routine adult health maintenance Z00.00 ; Coronary artery disease 
I25.10 ; History of MI (myocardial infarction) I25.2 ; History of kidney stones 
Z87.442 ; Immune thrombocytopenic purpura D69.3 ; Avascular necrosis of bone of 
right hip M87.051 and GERD (gastroesophageal reflux disease) K21.9







IMMUNIZATIONS

No Known Immunizations



SOCIAL HISTORY

Never Assessed



REASON FOR VISIT

lab orders



PLAN OF CARE





VITAL SIGNS





MEDICATIONS

Unknown Medications



RESULTS

No Results



PROCEDURES

No Known procedures



INSTRUCTIONS





MEDICATIONS ADMINISTERED

No Known Medications



MEDICAL (GENERAL) HISTORY







 Type  Description  Date

 

 Medical History  Type II Diabetes   

 

 Medical History  CAD   

 

 Medical History  Acute MI x1   

 

 Medical History  Heart Cath x3   

 

 Medical History  Kidney Stones   

 

 Medical History  Lymphedema   

 

 Medical History  Immune thrombocytopenic purpura   

 

 Surgical History  Heart Stents x2   

 

 Surgical History  Cholecystectomy   

 

 Surgical History     

 

 Surgical History  [place] Teeth   

 

 Hospitalization History  [facility]  

 

 Hospitalization History  Sepsis/Toxic Shock  

 

 Hospitalization History  [facility]-flu[facility]pneumonia  2017

 

 Hospitalization History  [facility]- Anemia, cellulitis pannus, yeast 
infection. Discharged 2018

## 2018-09-08 NOTE — XMS REPORT
[facility]

 Created on: 2018



Madison Young

External Reference #: [id number]

: 1945

Sex: Female



Demographics







 Address  [address] [place]  [address]

 

 Preferred Language  Unknown

 

 Marital Status  Unknown

 

 [employer] Affiliation  Unknown

 

 Race  Unknown

 

 Ethnic Group  Unknown





Author







 Author  FREIRELEANDER HILLIARD

 

 Organization  [facility]

 

 Address  [address] [place]  [address]



 

 Phone  [phone]







Care Team Providers







 Care Team Member Name  Role  Phone

 

 FREIRELEANDER HILLIARD  Unavailable  [phone]







PROBLEMS







 Type  Condition  ICD9-CM Code  [id number] Code  Onset Dates  Condition Status  
SNOMED Code

 

 Problem  Anxiety disorder, unspecified     F41.9     Active  015590200

 

 Problem  Body mass index (BMI) of 45.0-49.9 in adult     Z68.42     Active  
103811047

 

 Problem  Morbid (severe) obesity due to excess calories     E66.01     Active  
617782380

 

 Problem  Iron deficiency anemia     D50.9     Active  76701395

 

 Problem  Non-adherence to medical treatment     Z91.19     Active  788000317

 

 Problem  Long-term insulin use     Z79.4     Active  689197378

 

 Problem  Recurrent major depressive disorder, in partial remission     F33.41 
    Active  50987908

 

 Problem  Skin ulcer, limited to breakdown of skin     L98.491     Active  
89091365

 

 Problem  Other iron deficiency anemia     D50.8     Active  38164670

 

 Problem  Avascular necrosis of bone of right hip     M87.051     Active  
424849625

 

 Problem  Coronary artery disease     I25.10     Active  77613635

 

 Problem  Chronic kidney disease (CKD) stage G3a/A3, moderately decreased 
glomerular filtration rate (GFR) between 45-59 mL/min/1.73 square meter and 
albuminuria creatinine ratio greater than 300 mg/g     N18.3     Active  
345432606

 

 Problem  Microalbuminuric diabetic nephropathy     E11.21     Active  812202889

 

 Problem  Immune thrombocytopenic purpura     D69.3     Active  930963820

 

 Problem  Hyperlipidemia, unspecified hyperlipidemia     E78.5     Active  
96599120

 

 Problem  GERD (gastroesophageal reflux disease)     K21.9     Active  533110984

 

 Problem  Asthma     J45.909     Active  348900762

 

 Problem  Type 2 diabetes mellitus with diabetic polyneuropathy     E11.42     
Active  33243940

 

 Problem  Hypertension     I10     Active  57320811







ALLERGIES

No Information



ENCOUNTERS







 Encounter  Location  Date  Diagnosis

 

 [facility]  [address], [place] [phone]
[phone]  29 Aug, 2018   

 

 [place] [address], [place] [address]     

 

 [place] [address] [place] [address]-
9300    Type 2 diabetes mellitus with diabetic polyneuropathy 
E11.42 ; Hypertension I10 ; Hyperlipidemia, unspecified hyperlipidemia E78.5 ; 
Body mass index (BMI) of 45.0-49.9 in adult Z68.42 ; Long-term insulin use 
Z79.4 ; Non-adherence to medical treatment Z91.19 ; Coronary artery disease 
I25.10 ; GERD (gastroesophageal reflux disease) K21.9 ; Asthma J45.909 and 
Recurrent major depressive disorder, in partial remission F33.41

 

 [place] [address] [place] [id number] [place] [phone]-
[phone]  22 May, 2018  Recurrent major depressive disorder, in partial remission 
F33.41 and Hypertension I10

 

 [place] [address] [place] [id number] [place] [phone]-
[phone]  21 May, 2018  Immune thrombocytopenic purpura D69.3 and Iron deficiency 
anemia D50.9

 

 [place] [id number] [place] [address]-
4320  21 May, 2018  Type 2 diabetes mellitus with diabetic polyneuropathy 
E11.42 ; Long-term insulin use Z79.4 ; Chronic kidney disease (CKD) stage G3a/A3
, moderately decreased glomerular filtration rate (GFR) between 45-59 mL/min/
1.73 square meter and albuminuria creatinine ratio greater than 300 mg/g N18.3 
; Hypertension I10 ; Hyperlipidemia, unspecified hyperlipidemia E78.5 ; 
Coronary artery disease I25.10 ; GERD (gastroesophageal reflux disease) K21.9 ; 
Immune thrombocytopenic purpura D69.3 ; Asthma J45.909 ; Morbid (severe) 
obesity due to excess calories E66.01 and Recurrent major depressive disorder, 
in partial remission F33.41

 

 [address] [place] [phone]-
[phone]  11 May, 2018  Chronic kidney disease (CKD) stage G3a/A3, moderately 
decreased glomerular filtration rate (GFR) between 45-59 mL/min/1.73 square 
meter and albuminuria creatinine ratio greater than 300 mg/g N18.3

 

 [place] [address] [place] [address]-
[phone]  02 May, 2018  Chronic kidney disease (CKD) stage G3a/A3, moderately 
decreased glomerular filtration rate (GFR) between 45-59 mL/min/1.73 square 
meter and albuminuria creatinine ratio greater than 300 mg/g N18.3

 

 [place] [address] [place] [id number] [place] [address]-
[phone]     

 

 [place] [address] [phone]-
[phone]  28 Mar, 2018   

 

 [place] [address] [place] [id number] [place] [address]-
[phone]  26 Mar, 2018  Immune thrombocytopenic purpura D69.3 ; Type 2 diabetes 
mellitus with diabetic polyneuropathy E11.42 ; Avascular necrosis of bone of 
right hip M87.051 ; Long-term insulin use Z79.4 ; GERD (gastroesophageal reflux 
disease) K21.9 ; Hyperlipidemia, unspecified hyperlipidemia E78.5 ; 
Hypertension I10 ; Recurrent major depressive disorder, in partial remission 
F33.41 ; Microalbuminuric diabetic nephropathy E11.21 ; Body mass index (BMI) 
of 45.0-49.9 in adult Z68.42 ; BMI 45.0-49.9, adult Z68.42 and Chronic kidney 
disease (CKD) stage G3a/A3, moderately decreased glomerular filtration rate (GFR
) between 45-59 mL/min/1.73 square meter and albuminuria creatinine ratio 
greater than 300 mg/g N18.3

 

 [place] [address] [place] [id number] [place] [phone]-
[phone]  23 Mar, 2018   

 

 [place] [address] [place] [id number] [place] [phone]-
[phone]  23 Mar, 2018   

 

 [place] [address] [place] [id number] [place] [address]-
[phone]  19 Mar, 2018   

 

 [place] [address] [place] [address]-
5509  14 Mar, 2018   

 

 [place] [address] [place] [address]-
5259  13 Mar, 2018  Type 2 diabetes mellitus with diabetic polyneuropathy 
E11.42 ; Asthma J45.909 and Hypertension I10

 

 [address] [facility]  [address] [place] 
[id number]  13 Mar, 2018  Skin ulcer, limited to breakdown of skin L98.491 ; 
Immune thrombocytopenic purpura D69.3 ; Avascular necrosis of bone of right hip 
M87.051 and Type 2 diabetes mellitus with diabetic polyneuropathy E11.42

 

 [place] [address] [place] [id number] [place] [address] 6[address]34-
5271  06 Mar, 2018  Asthma J45.909 and Type 2 diabetes mellitus with diabetic 
polyneuropathy E11.42

 

 [place] [address] [place] [id number] [place] [address] 65184-
3410  01 Mar, 2018   

 

 [address] [facility]  [address] [place] 
[id number]    Other iron deficiency anemia D50.8 ; Weakness R53.1 ; 
Type 2 diabetes mellitus with diabetic polyneuropathy E11.42 ; Cellulitis of 
trunk, unspecified site of trunk L03.319 ; Coronary artery disease I25.10 ; 
Avascular necrosis of bone of right hip M87.051 and Morbid (severe) obesity due 
to excess calories E66.01

 

 [facility]  [address] [id number] [place] [address] 
[id number]     

 

 [facility]  [address] [place] [phone]-
[phone]     

 

 [facility]  [id number] [place] [phone]
[phone]  15 Feb, 2018  Yeast infection of the skin B37.2

 

 [place] [address] [place] [id number] [place] [address] [phone]-
[phone]  15 Feb, 2018   

 

 [place] [address] [place] [id number] [place] [address] 01848-
2403  15 2018   

 

 [facility]  [address] [place] [id number] [place] [address] [phone]-
[phone]     

 

 [facility] [place] [facility]  [address] [place] [id number] [place] [phone]
[phone]     

 

 [facility]  [address] [phone]-
[phone]    Type 2 diabetes mellitus with diabetic polyneuropathy 
E11.42 ; Avascular necrosis of bone of right hip M87.051 ; Hyperlipidemia, 
unspecified hyperlipidemia E78.5 ; Hypertension I10 ; Anxiety disorder, 
unspecified F41.9 ; Immune thrombocytopenic purpura D69.3 ; Coronary artery 
disease I25.10 ; Orthopnea R06.01 ; Acute bronchitis, unspecified organism 
J20.9 ; Wound of left lower extremity, initial encounter S81.802A ; Body aches 
R52 and Debilitated R53.81

 

 [place] [address] [place] [id number] [place] [address]-
2304     

 

 [place] [address] [place] [id number] [place] [address] 08846-
4465    Type 2 diabetes mellitus with diabetic polyneuropathy 
E11.42 ; Encounter for immunization Z23 ; Hyperlipidemia, unspecified 
hyperlipidemia E78.5 ; Hypertension I10 ; Anxiety disorder, unspecified F41.9 ; 
Asthma J45.909 ; Body mass index (BMI) of 45.0-49.9 in adult Z68.42 and Morbid (
severe) obesity due to excess calories E66.01

 

 [facility]  [address] [place] [id number] [place] [address] 44767-
2258    Type 2 diabetes mellitus with diabetic polyneuropathy 
E11.42 ; Hyperlipidemia, unspecified hyperlipidemia E78.5 ; Hypertension I10 
and GERD (gastroesophageal reflux disease) K21.9

 

 [place] [address] [place] [id number] [place] [address] 38118-
[phone]  22 Mar, 2017  Type 2 diabetes mellitus with diabetic polyneuropathy E11.42

 

 [place] [address] [place] [id number] [place] [address] 17191-
4432  28 2017  Type 2 diabetes mellitus with diabetic polyneuropathy 
E11.42 ; Coronary artery disease I25.10 ; History of MI (myocardial infarction) 
I25.2 ; Immune thrombocytopenic purpura D69.3 ; GERD (gastroesophageal reflux 
disease) K21.9 ; Hyperlipidemia, unspecified hyperlipidemia E78.5 ; 
Hypertension I10 ; History of kidney stones Z87.442 and Anxiety disorder, 
unspecified F41.9

 

 [place] [address] [place] [id number] [place] [address]-
8626  14 2017   

 

 [place] [address] [address]-
7153  29 Sep, 2016  Coronary artery disease I25.10 ; History of MI (myocardial 
infarction) I25.2 ; History of kidney stones Z87.442 ; Type 2 diabetes mellitus 
with diabetic polyneuropathy E11.42 ; Avascular necrosis of bone of right hip 
M87.051 ; Hyperlipidemia, unspecified hyperlipidemia E78.5 ; Hypertension I10 ; 
Asthma J45.909 and Encounter for immunization Z23

 

 [place] [address] [place] [id number] [place] [address] 80781-
1554  20 Sep, 2016   

 

 [place] [address] [address]-
0823    Coronary artery disease I25.10 ; Type 2 diabetes mellitus 
with diabetic polyneuropathy E11.42 ; History of MI (myocardial infarction) 
I25.2 ; Immune thrombocytopenic purpura D69.3 ; Hyperlipidemia, unspecified 
hyperlipidemia E78.5 and Hypertension I10

 

 [place] [address] [place] [id number] [place] [address] 47967-
8127    Coronary artery disease I25.10 ; Lymphedema I89.0 ; History 
of MI (myocardial infarction) I25.2 ; History of kidney stones Z87.442 ; Immune 
thrombocytopenic purpura D69.3 ; Type 2 diabetes mellitus with diabetic 
polyneuropathy E11.42 ; Avascular necrosis of bone of right hip M87.051 ; GERD (
gastroesophageal reflux disease) K21.9 ; Hyperlipidemia, unspecified 
hyperlipidemia E78.5 ; Hypertension I10 and Asthma J45.909

 

 [place] [address] [place] [address] 23178-
[phone]     

 

 [facility]  [address] [place] [id number] [place] [address] [phone]-
[phone]     

 

 [place] [address] [place] [id number] [place] [address] [phone]-
[phone]  02 Dec, 2015   

 

 [facility]  [address] [phone]
[phone]  02 Dec, 2015  Hyperlipidemia, unspecified hyperlipidemia E78.5

 

 [place] [address] [place] [id number] [place] [address] [phone]-
[phone]     

 

 [place] [address] [place] [id number] [place] [address] [phone]-
[phone]     

 

 [place] [address] [place] [id number] [place] [address] 35158-
5937    Allergic rhinitis J30.9

 

 [place] [address] [place] [id number] [place] [address] 11266-
[phone]    Type 2 diabetes mellitus with diabetic polyneuropathy 
E11.42 ; Routine adult health maintenance Z00.00 ; Coronary artery disease 
I25.10 ; History of MI (myocardial infarction) I25.2 ; History of kidney stones 
Z87.442 ; Immune thrombocytopenic purpura D69.3 ; Avascular necrosis of bone of 
right hip M87.051 and GERD (gastroesophageal reflux disease) K21.9







IMMUNIZATIONS

No Known Immunizations



SOCIAL HISTORY

Never Assessed



REASON FOR VISIT

Critical Lab Results



PLAN OF CARE





VITAL SIGNS





MEDICATIONS

Unknown Medications



RESULTS

No Results



PROCEDURES

No Known procedures



INSTRUCTIONS





MEDICATIONS ADMINISTERED

No Known Medications



MEDICAL (GENERAL) HISTORY







 Type  Description  Date

 

 Medical History  Type II Diabetes   

 

 Medical History  CAD   

 

 Medical History  Acute MI x1   

 

 Medical History  Heart Cath x3   

 

 Medical History  Kidney Stones   

 

 Medical History  Lymphedema   

 

 Medical History  Immune thrombocytopenic purpura   

 

 Surgical History  Heart Stents x2   

 

 Surgical History  Cholecystectomy   

 

 Surgical History     

 

 Surgical History  Midway Teeth   

 

 Hospitalization History  [facility]  

 

 Hospitalization History  Sepsis/Toxic Shock  

 

 Hospitalization History  [facility]-flu/pneumonia  2017

 

 Hospitalization History  [facility]- Anemia, cellulitis pannus, yeast 
infection. Discharged 2018

## 2018-09-08 NOTE — XMS REPORT
[facility]

 Created on: 2018



Madison Young

External Reference #: [id number]

: 1945

Sex: Female



Demographics







 Address  [address] [place]  [address]

 

 Preferred Language  Unknown

 

 Marital Status  Unknown

 

 Voodoo Affiliation  Unknown

 

 Race  Unknown

 

 Ethnic Group  Unknown





Author







 Author  FREIRELEANDER HILLIARD

 

 Organization  [facility]

 

 Address  [address] [place]  [address]



 

 Phone  [phone]







Care Team Providers







 Care Team Member Name  Role  Phone

 

 FREIRELEANDER HILLIARD  Unavailable  [phone]







PROBLEMS







 Type  Condition  ICD9-CM Code  [id number] Code  Onset Dates  Condition Status  
SNOMED Code

 

 Problem  Anxiety disorder, unspecified     F41.9     Active  393155449

 

 Problem  Body mass index (BMI) of 45.0-49.9 in adult     Z68.42     Active  
455936701

 

 Problem  Morbid (severe) obesity due to excess calories     E66.01     Active  
911644711

 

 Problem  Iron deficiency anemia     D50.9     Active  88558419

 

 Problem  Non-adherence to medical treatment     Z91.19     Active  306122470

 

 Problem  Long-term insulin use     Z79.4     Active  979050978

 

 Problem  Recurrent major depressive disorder, in partial remission     F33.41 
    Active  81035642

 

 Problem  Skin ulcer, limited to breakdown of skin     L98.491     Active  
01694123

 

 Problem  Other iron deficiency anemia     D50.8     Active  33569669

 

 Problem  Avascular necrosis of bone of right hip     M87.051     Active  
147692230

 

 Problem  Coronary artery disease     I25.10     Active  86620376

 

 Problem  Chronic kidney disease (CKD) stage G3a/A3, moderately decreased 
glomerular filtration rate (GFR) between 45-59 mL/min/1.73 square meter and 
albuminuria creatinine ratio greater than 300 mg/g     N18.3     Active  
560545629

 

 Problem  Microalbuminuric diabetic nephropathy     E11.21     Active  951859064

 

 Problem  Immune thrombocytopenic purpura     D69.3     Active  243102242

 

 Problem  Hyperlipidemia, unspecified hyperlipidemia     E78.5     Active  
13307521

 

 Problem  GERD (gastroesophageal reflux disease)     K21.9     Active  301844621

 

 Problem  Asthma     J45.909     Active  258946763

 

 Problem  Type 2 diabetes mellitus with diabetic polyneuropathy     E11.42     
Active  55985782

 

 Problem  Hypertension     I10     Active  18697499







ALLERGIES

No Information



ENCOUNTERS







 Encounter  Location  Date  Diagnosis

 

 [facility]  [address], [place] [address]
[phone]  29 Aug, 2018   

 

 [place] [address], [place] [address]
[phone]     

 

 [place] [address] [place] [phone]-
[phone]    Type 2 diabetes mellitus with diabetic polyneuropathy 
E11.42 ; Hypertension I10 ; Hyperlipidemia, unspecified hyperlipidemia E78.5 ; 
Body mass index (BMI) of 45.0-49.9 in adult Z68.42 ; Long-term insulin use 
Z79.4 ; Non-adherence to medical treatment Z91.19 ; Coronary artery disease 
I25.10 ; GERD (gastroesophageal reflux disease) K21.9 ; Asthma J45.909 and 
Recurrent major depressive disorder, in partial remission F33.41

 

 [place] [address] [place] [id number] [place] [address]-
[phone]  22 May, 2018  Recurrent major depressive disorder, in partial remission 
F33.41 and Hypertension I10

 

 [place] [address] [place] [id number] [place] [address]-
5615  21 May, 2018  Immune thrombocytopenic purpura D69.3 and Iron deficiency 
anemia D50.9

 

 [place] [id number] [place] [address]-
5831  21 May, 2018  Type 2 diabetes mellitus with diabetic polyneuropathy 
E11.42 ; Long-term insulin use Z79.4 ; Chronic kidney disease (CKD) stage G3a/A3
, moderately decreased glomerular filtration rate (GFR) between 45-59 mL/min/
1.73 square meter and albuminuria creatinine ratio greater than 300 mg/g N18.3 
; Hypertension I10 ; Hyperlipidemia, unspecified hyperlipidemia E78.5 ; 
Coronary artery disease I25.10 ; GERD (gastroesophageal reflux disease) K21.9 ; 
Immune thrombocytopenic purpura D69.3 ; Asthma J45.909 ; Morbid (severe) 
obesity due to excess calories E66.01 and Recurrent major depressive disorder, 
in partial remission F33.41

 

 [address] [place] [address]-
[phone]  11 May, 2018  Chronic kidney disease (CKD) stage G3a/A3, moderately 
decreased glomerular filtration rate (GFR) between 45-59 mL/min/1.73 square 
meter and albuminuria creatinine ratio greater than 300 mg/g N18.3

 

 [place] [address] [place] [address]211-
4162  02 May, 2018  Chronic kidney disease (CKD) stage G3a/A3, moderately 
decreased glomerular filtration rate (GFR) between 45-59 mL/min/1.73 square 
meter and albuminuria creatinine ratio greater than 300 mg/g N18.3

 

 [place] [address] [place] [id number] [place] [address] 66145-
[phone]     

 

 [place] [address] [phone]-
[phone]  28 Mar, 2018   

 

 [place] [address] [place] [id number] [place] [phone]-
[phone]  26 Mar, 2018  Immune thrombocytopenic purpura D69.3 ; Type 2 diabetes 
mellitus with diabetic polyneuropathy E11.42 ; Avascular necrosis of bone of 
right hip M87.051 ; Long-term insulin use Z79.4 ; GERD (gastroesophageal reflux 
disease) K21.9 ; Hyperlipidemia, unspecified hyperlipidemia E78.5 ; 
Hypertension I10 ; Recurrent major depressive disorder, in partial remission 
F33.41 ; Microalbuminuric diabetic nephropathy E11.21 ; Body mass index (BMI) 
of 45.0-49.9 in adult Z68.42 ; BMI 45.0-49.9, adult Z68.42 and Chronic kidney 
disease (CKD) stage G3a/A3, moderately decreased glomerular filtration rate (GFR
) between 45-59 mL/min/1.73 square meter and albuminuria creatinine ratio 
greater than 300 mg/g N18.3

 

 [place] [address] [place] [id number] [place] [address]09-
[phone]  23 Mar, 2018   

 

 [place] [address] [place] [id number] [place] [address] 48959-
[phone]  23 Mar, 2018   

 

 [place] [address] [place] [id number] [place] [address] [phone]-
[phone]  19 Mar, 2018   

 

 [place] [address] [place] [address]97-
7804  14 Mar, 2018   

 

 [place] [address] [place] [address] 96676-
0263  13 Mar, 2018  Type 2 diabetes mellitus with diabetic polyneuropathy 
E11.42 ; Asthma J45.909 and Hypertension I10

 

 [facility]  [address] [place] 
[phone]  13 Mar, 2018  Skin ulcer, limited to breakdown of skin L98.491 ; 
Immune thrombocytopenic purpura D69.3 ; Avascular necrosis of bone of right hip 
M87.051 and Type 2 diabetes mellitus with diabetic polyneuropathy E11.42

 

 [place] [address] [place] [id number] [place] [address] 08478-
1735  06 Mar, 2018  Asthma J45.909 and Type 2 diabetes mellitus with diabetic 
polyneuropathy E11.42

 

 [place] [address] [place] [id number] [place] [address] 68373-
7327  01 Mar, 2018   

 

 [facility]  [address] [place] 
[id number]    Other iron deficiency anemia D50.8 ; Weakness R53.1 ; 
Type 2 diabetes mellitus with diabetic polyneuropathy E11.42 ; Cellulitis of 
trunk, unspecified site of trunk L03.319 ; Coronary artery disease I25.10 ; 
Avascular necrosis of bone of right hip M87.051 and Morbid (severe) obesity due 
to excess calories E66.01

 

 [facility]  [id number] [place] [address] 
[id number]     

 

 [facility]  [address] [place] [address]24-
[phone]     

 

 [facility] WALK IN [facility]  [id number] [place] [phone]
[phone]  15 Feb, 2018  Yeast infection of the skin B37.2

 

 [place] [address] [place] [id number] [place] [address] 25892-
6286  15 Feb, 2018   

 

 [place] [address] [place] [id number] [place] [address] 36753-
[phone]  15 2018   

 

 [facility]  [address] [id number] [place] [address] 25583-
[phone]     

 

 [facility] [place] [facility]  [id number] [place] [address] [phone]
[phone]     

 

 [facility]  [address] [phone]-
[phone]    Type 2 diabetes mellitus with diabetic polyneuropathy 
E11.42 ; Avascular necrosis of bone of right hip M87.051 ; Hyperlipidemia, 
unspecified hyperlipidemia E78.5 ; Hypertension I10 ; Anxiety disorder, 
unspecified F41.9 ; Immune thrombocytopenic purpura D69.3 ; Coronary artery 
disease I25.10 ; Orthopnea R06.01 ; Acute bronchitis, unspecified organism 
J20.9 ; Wound of left lower extremity, initial encounter S81.802A ; Body aches 
R52 and Debilitated R53.81

 

 [place] [address] [place] [id number] [place] [address] 63771-
[phone]     

 

 [place] [address] [place] [id number] [place] [address] 56236-
1484    Type 2 diabetes mellitus with diabetic polyneuropathy 
E11.42 ; Encounter for immunization Z23 ; Hyperlipidemia, unspecified 
hyperlipidemia E78.5 ; Hypertension I10 ; Anxiety disorder, unspecified F41.9 ; 
Asthma J45.909 ; Body mass index (BMI) of 45.0-49.9 in adult Z68.42 and Morbid (
severe) obesity due to excess calories E66.01

 

 [facility]  [id number] [place] [address] 01690-
5876    Type 2 diabetes mellitus with diabetic polyneuropathy 
E11.42 ; Hyperlipidemia, unspecified hyperlipidemia E78.5 ; Hypertension I10 
and GERD (gastroesophageal reflux disease) K21.9

 

 [place] [address] [place] [id number] [place] [address]99-
2704  22 Mar, 2017  Type 2 diabetes mellitus with diabetic polyneuropathy E11.42

 

 [place] [address] [place] [id number] [place] [address] 26818-
4219  28 2017  Type 2 diabetes mellitus with diabetic polyneuropathy 
E11.42 ; Coronary artery disease I25.10 ; History of MI (myocardial infarction) 
I25.2 ; Immune thrombocytopenic purpura D69.3 ; GERD (gastroesophageal reflux 
disease) K21.9 ; Hyperlipidemia, unspecified hyperlipidemia E78.5 ; 
Hypertension I10 ; History of kidney stones Z87.442 and Anxiety disorder, 
unspecified F41.9

 

 [place] [address] [place] [id number] [place] [address] 82006-
0786  14 2017   

 

 [place] [address] [phone]-
[phone]  29 Sep, 2016  Coronary artery disease I25.10 ; History of MI (myocardial 
infarction) I25.2 ; History of kidney stones Z87.442 ; Type 2 diabetes mellitus 
with diabetic polyneuropathy E11.42 ; Avascular necrosis of bone of right hip 
M87.051 ; Hyperlipidemia, unspecified hyperlipidemia E78.5 ; Hypertension I10 ; 
Asthma J45.909 and Encounter for immunization Z23

 

 [place] [address] [place] [id number] [place] [address] 58477-
[phone]  20 Sep, 2016   

 

 [place] [address] [address]-
2171    Coronary artery disease I25.10 ; Type 2 diabetes mellitus 
with diabetic polyneuropathy E11.42 ; History of MI (myocardial infarction) 
I25.2 ; Immune thrombocytopenic purpura D69.3 ; Hyperlipidemia, unspecified 
hyperlipidemia E78.5 and Hypertension I10

 

 [place] [address] [place] [id number] [place] [address] 97723-
[phone]    Coronary artery disease I25.10 ; Lymphedema I89.0 ; History 
of MI (myocardial infarction) I25.2 ; History of kidney stones Z87.442 ; Immune 
thrombocytopenic purpura D69.3 ; Type 2 diabetes mellitus with diabetic 
polyneuropathy E11.42 ; Avascular necrosis of bone of right hip M87.051 ; GERD (
gastroesophageal reflux disease) K21.9 ; Hyperlipidemia, unspecified 
hyperlipidemia E78.5 ; Hypertension I10 and Asthma J45.909

 

 [place] [address] [place] [address] [phone]-
[phone]     

 

 [facility]  [address] [id number] [place] [address] 36646-
[phone]     

 

 [place] [address] [place] [id number] [place] [address] [phone]-
[phone]  02 Dec, 2015   

 

 [place] [address] [phone]-
[phone]  02 Dec, 2015  Hyperlipidemia, unspecified hyperlipidemia E78.5

 

 [place] [address] [place] [id number] [place] [address] 75963-
[phone]     

 

 [place] [address] [place] [id number] [place] [address] 39444-
[phone]     

 

 [place] [address] [place] [id number] [place] [address] 04583-
[phone]    Allergic rhinitis J30.9

 

 [place] [address] [place] [id number] [place] [address] [phone]-
6877    Type 2 diabetes mellitus with diabetic polyneuropathy 
E11.42 ; Routine adult health maintenance Z00.00 ; Coronary artery disease 
I25.10 ; History of MI (myocardial infarction) I25.2 ; History of kidney stones 
Z87.442 ; Immune thrombocytopenic purpura D69.3 ; Avascular necrosis of bone of 
right hip M87.051 and GERD (gastroesophageal reflux disease) K21.9







IMMUNIZATIONS

No Known Immunizations



SOCIAL HISTORY

Never Assessed



REASON FOR VISIT

Requests return call



PLAN OF CARE





VITAL SIGNS





MEDICATIONS

Unknown Medications



RESULTS

No Results



PROCEDURES

No Known procedures



INSTRUCTIONS





MEDICATIONS ADMINISTERED

No Known Medications



MEDICAL (GENERAL) HISTORY







 Type  Description  Date

 

 Medical History  Type II Diabetes   

 

 Medical History  CAD   

 

 Medical History  Acute MI x1   

 

 Medical History  Heart Cath x3   

 

 Medical History  Kidney Stones   

 

 Medical History  Lymphedema   

 

 Medical History  Immune thrombocytopenic purpura   

 

 Surgical History  Heart Stents x2   

 

 Surgical History  Cholecystectomy   

 

 Surgical History     

 

 Surgical History  Wana Teeth   

 

 Hospitalization History  [facility]  

 

 Hospitalization History  Sepsis/Toxic Shock  

 

 Hospitalization History  [facility]-flu/pneumonia  2017

 

 Hospitalization History  [facility]- Anemia, cellulitis pannus, yeast 
infection. Discharged 2018

## 2018-09-08 NOTE — XMS REPORT
[facility]

 Created on: 2018



Madison Young

External Reference #: [id number]

: 1945

Sex: Female



Demographics







 Address  [address] [place]  [address]

 

 Preferred Language  Unknown

 

 Marital Status  Unknown

 

 Yazidism Affiliation  Unknown

 

 Race  Unknown

 

 Ethnic Group  Unknown





Author







 Author  TANI  LEANDER

 

 Organization  [facility]

 

 Address  [address] [place]  [address]



 

 Phone  [phone]







Care Team Providers







 Care Team Member Name  Role  Phone

 

 TANI  LEANDER  Unavailable  [phone]







PROBLEMS







 Type  Condition  ICD9-CM Code  LCT15-UC Code  Onset Dates  Condition Status  
SNOMED Code

 

 Problem  Long-term insulin use     Z79.4     Active  575188919

 

 Problem  Skin ulcer, limited to breakdown of skin     L98.491     Active  
32606972

 

 Problem  Other iron deficiency anemia     D50.8     Active  46195710

 

 Problem  Peripheral edema     R60.9     Active  178480708

 

 Problem  Coronary artery disease     I25.10     Active  62443557

 

 Problem  Diabetes mellitus due to underlying condition with foot ulcer     
E08.621     Active  114626974

 

 Problem  Type 2 diabetes mellitus with diabetic polyneuropathy     E11.42     
Active  97696956

 

 Problem  Microalbuminuric diabetic nephropathy     E11.21     Active  765115241

 

 Problem  Iron deficiency anemia     D50.9     Active  60231039

 

 Problem  Non-adherence to medical treatment     Z91.19     Active  113820491

 

 Problem  Non-pressure chronic ulcer of other part of left foot limited to 
breakdown of skin     L97.521     Active  717676431

 

 Problem  Acute idiopathic gout involving toe of left foot     M10.072     
Active  35843063

 

 Problem  Immune thrombocytopenic purpura     D69.3     Active  780657382

 

 Problem  Hyperlipidemia, unspecified hyperlipidemia     E78.5     Active  
40763898

 

 Problem  GERD (gastroesophageal reflux disease)     K21.9     Active  126632332

 

 Problem  Avascular necrosis of bone of right hip     M87.051     Active  
547102208

 

 Problem  Anxiety disorder, unspecified     F41.9     Active  106416634

 

 Problem  Morbid (severe) obesity due to excess calories     E66.01     Active  
751455213

 

 Problem  Asthma     J45.909     Active  871564794

 

 Problem  Body mass index (BMI) of 45.0-49.9 in adult     Z68.42     Active  
079044896

 

 Problem  Chronic kidney disease (CKD) stage G3a/A3, moderately decreased 
glomerular filtration rate (GFR) between 45-59 mL/min/1.73 square meter and 
albuminuria creatinine ratio greater than 300 mg/g     N18.3     Active  
831892116

 

 Problem  Hypertension     I10     Active  61628297

 

 Problem  Recurrent major depressive disorder, in partial remission     F33.41 
    Active  11584651







ALLERGIES

No Information



ENCOUNTERS







 Encounter  Location  Date  Diagnosis

 

 [facility]  [id number] [place] [address] 14046-
[phone]  06 Sep, 2018   

 

 [facility]  [address] [phone]-
[phone]  29 Aug, 2018   

 

 [facility]  [id number] [place] [address] 85729-
[phone]  13 Aug, 2018   

 

 [facility]  [address] [phone]-
[phone]  09 Aug, 2018   

 

 [facility]  [address] 15958-
[phone]  09 Aug, 2018  Hypertension I10 and Peripheral edema R60.9

 

 [facility]  [id number] [place] [address] 16959-
1668  06 Aug, 2018  Hypertension I10 and Peripheral edema R60.9

 

 [facility]  [id number] [place] [address] 00084-
1516  02 Aug, 2018   

 

 [facility]  [id number] [place] [address] 38798-
9924  02 Aug, 2018  Hypertension I10 and Peripheral edema R60.9

 

 [facility]  [id number] [place] [address] 88294-
1039  01 Aug, 2018   

 

 [facility]  [id number] [place] [address] 61891-
5295     

 

 [facility]  [address] [address]33-
7144     

 

 [facility]  [id number] [place] [address] 52634-
6117     

 

 [facility]  [address] [address]990-
1475    Diabetes mellitus due to underlying condition with foot 
ulcer E08.621 ; Non-pressure chronic ulcer of other part of left foot limited 
to breakdown of skin L97.521 and BMI 45.0-49.9, adult Z68.42

 

 [place] [address] [place] [address] 06644-
5954    Acute idiopathic gout involving toe of left foot M10.072

 

 [place] [address] [id number] [place] [address] 54059-
0202     

 

 [id number] [place] [address] 59647-
5551     

 

 [address] [id number] [place] [address] 19038-
4180     

 

 [id number] [place] [address] 93131-
7670    Type 2 diabetes mellitus with diabetic polyneuropathy 
E11.42 ; Hypertension I10 ; Hyperlipidemia, unspecified hyperlipidemia E78.5 ; 
Body mass index (BMI) of 45.0-49.9 in adult Z68.42 ; Long-term insulin use 
Z79.4 ; Non-adherence to medical treatment Z91.19 ; Coronary artery disease 
I25.10 ; GERD (gastroesophageal reflux disease) K21.9 ; Asthma J45.909 and 
Recurrent major depressive disorder, in partial remission F33.41

 

 [id number] [place] [address] 66269-
1315  22 May, 2018  Recurrent major depressive disorder, in partial remission 
F33.41 and Hypertension I10

 

 [id number] [place] [address] 88296-
2974  21 May, 2018  Immune thrombocytopenic purpura D69.3 and Iron deficiency 
anemia D50.9

 

 [id number] [place] [address] 20206-
8930  21 May, 2018  Type 2 diabetes mellitus with diabetic polyneuropathy 
E11.42 ; Long-term insulin use Z79.4 ; Chronic kidney disease (CKD) stage G3a/A3
, moderately decreased glomerular filtration rate (GFR) between 45-59 mL/min/
1.73 square meter and albuminuria creatinine ratio greater than 300 mg/g N18.3 
; Hypertension I10 ; Hyperlipidemia, unspecified hyperlipidemia E78.5 ; 
Coronary artery disease I25.10 ; GERD (gastroesophageal reflux disease) K21.9 ; 
Immune thrombocytopenic purpura D69.3 ; Asthma J45.909 ; Morbid (severe) 
obesity due to excess calories E66.01 and Recurrent major depressive disorder, 
in partial remission F33.41

 

 [id number] [place] [address] 40651-
9144  11 May, 2018  Chronic kidney disease (CKD) stage G3a/A3, moderately 
decreased glomerular filtration rate (GFR) between 45-59 mL/min/1.73 square 
meter and albuminuria creatinine ratio greater than 300 mg/g N18.3

 

 [id number] [place] [address] 38615-
8629  02 May, 2018  Chronic kidney disease (CKD) stage G3a/A3, moderately 
decreased glomerular filtration rate (GFR) between 45-59 mL/min/1.73 square 
meter and albuminuria creatinine ratio greater than 300 mg/g N18.3

 

 [id number] [place] [address] 99900-
1818     

 

 [id number] [place] [address] 43198-
3130  28 Mar, 2018   

 

 [id number] [place] [address] 30399-
6571  26 Mar, 2018  Immune thrombocytopenic purpura D69.3 ; Type 2 diabetes 
mellitus with diabetic polyneuropathy E11.42 ; Avascular necrosis of bone of 
right hip M87.051 ; Long-term insulin use Z79.4 ; GERD (gastroesophageal reflux 
disease) K21.9 ; Hyperlipidemia, unspecified hyperlipidemia E78.5 ; 
Hypertension I10 ; Recurrent major depressive disorder, in partial remission 
F33.41 ; Microalbuminuric diabetic nephropathy E11.21 ; Body mass index (BMI) 
of 45.0-49.9 in adult Z68.42 ; BMI 45.0-49.9, adult Z68.42 and Chronic kidney 
disease (CKD) stage G3a/A3, moderately decreased glomerular filtration rate (GFR
) between 45-59 mL/min/1.73 square meter and albuminuria creatinine ratio 
greater than 300 mg/g N18.3

 

 [id number] [place] [address] 49377-
3312  23 Mar, 2018   

 

 [address] [address] 19672-
7097  23 Mar, 2018   

 

 [id number] [place] [address] 25496-
4746  19 Mar, 2018   

 

 [address] 74673-
5411  14 Mar, 2018   

 

 [id number] [place] [address] 45498-
1457  13 Mar, 2018  Type 2 diabetes mellitus with diabetic polyneuropathy 
E11.42 ; Asthma J45.909 and Hypertension I10

 

 [facility]  [address] [place] 
[id number]  13 Mar, 2018  Skin ulcer, limited to breakdown of skin L98.491 ; 
Immune thrombocytopenic purpura D69.3 ; Avascular necrosis of bone of right hip 
M87.051 and Type 2 diabetes mellitus with diabetic polyneuropathy E11.42

 

 [address] [place] [address] 45008-
8596  06 Mar, 2018  Asthma J45.909 and Type 2 diabetes mellitus with diabetic 
polyneuropathy E11.42

 

 [id number] [place] [address] 46544-
4684  01 Mar, 2018   

 

 [facility]  [address] 
[id number]    Other iron deficiency anemia D50.8 ; Weakness R53.1 ; 
Type 2 diabetes mellitus with diabetic polyneuropathy E11.42 ; Cellulitis of 
trunk, unspecified site of trunk L03.319 ; Coronary artery disease I25.10 ; 
Avascular necrosis of bone of right hip M87.051 and Morbid (severe) obesity due 
to excess calories E66.01

 

 [id number] 
[id number]     

 

 [facility]  [id number] [place] [address] 06018-
1279     

 

 [facility]  [id number] [place] [address] [phone]
[phone]  15 Feb, 2018  Yeast infection of the skin B37.2

 

 [id number] [place] [address] 65321-
0740  15 Feb, 2018   

 

 [facility]  [address] [place] [address] 69982-
8882  15 Feb, 2018   

 

 [id number] [place] [address] 72349-
4076     

 

 [facility] [place] [id number] [place] [address] 24549
-[phone]     

 

 [facility]  [address] [place] [address] 75160-
8932    Type 2 diabetes mellitus with diabetic polyneuropathy 
E11.42 ; Avascular necrosis of bone of right hip M87.051 ; Hyperlipidemia, 
unspecified hyperlipidemia E78.5 ; Hypertension I10 ; Anxiety disorder, 
unspecified F41.9 ; Immune thrombocytopenic purpura D69.3 ; Coronary artery 
disease I25.10 ; Orthopnea R06.01 ; Acute bronchitis, unspecified organism 
J20.9 ; Wound of left lower extremity, initial encounter S81.802A ; Body aches 
R52 and Debilitated R53.81

 

 [address] [place] [address] 72220-
1965     

 

 [address] [id number] [place] [address] 34536-
0587    Type 2 diabetes mellitus with diabetic polyneuropathy 
E11.42 ; Encounter for immunization Z23 ; Hyperlipidemia, unspecified 
hyperlipidemia E78.5 ; Hypertension I10 ; Anxiety disorder, unspecified F41.9 ; 
Asthma J45.909 ; Body mass index (BMI) of 45.0-49.9 in adult Z68.42 and Morbid (
severe) obesity due to excess calories E66.01

 

 [address] [address]47-
2138    Type 2 diabetes mellitus with diabetic polyneuropathy 
E11.42 ; Hyperlipidemia, unspecified hyperlipidemia E78.5 ; Hypertension I10 
and GERD (gastroesophageal reflux disease) K21.9

 

 [address] [address]16-
3973  22 Mar, 2017  Type 2 diabetes mellitus with diabetic polyneuropathy E11.42

 

 [address] [address]58-
4642    Type 2 diabetes mellitus with diabetic polyneuropathy 
E11.42 ; Coronary artery disease I25.10 ; History of MI (myocardial infarction) 
I25.2 ; Immune thrombocytopenic purpura D69.3 ; GERD (gastroesophageal reflux 
disease) K21.9 ; Hyperlipidemia, unspecified hyperlipidemia E78.5 ; 
Hypertension I10 ; History of kidney stones Z87.442 and Anxiety disorder, 
unspecified F41.9

 

 [address] 25003-
4833     

 

 [address] 7[address]68-
2862  29 Sep, 2016  Coronary artery disease I25.10 ; History of MI (myocardial 
infarction) I25.2 ; History of kidney stones Z87.442 ; Type 2 diabetes mellitus 
with diabetic polyneuropathy E11.42 ; Avascular necrosis of bone of right hip 
M87.051 ; Hyperlipidemia, unspecified hyperlipidemia E78.5 ; Hypertension I10 ; 
Asthma J45.909 and Encounter for immunization Z23

 

 [address] 9[address]82-
6729  20 Sep, 2016   

 

 [address] [address]661-
4374    Coronary artery disease I25.10 ; Type 2 diabetes mellitus 
with diabetic polyneuropathy E11.42 ; History of MI (myocardial infarction) 
I25.2 ; Immune thrombocytopenic purpura D69.3 ; Hyperlipidemia, unspecified 
hyperlipidemia E78.5 and Hypertension I10

 

 [address] [address] [address]65-
9106    Coronary artery disease I25.10 ; Lymphedema I89.0 ; History 
of MI (myocardial infarction) I25.2 ; History of kidney stones Z87.442 ; Immune 
thrombocytopenic purpura D69.3 ; Type 2 diabetes mellitus with diabetic 
polyneuropathy E11.42 ; Avascular necrosis of bone of right hip M87.051 ; GERD (
gastroesophageal reflux disease) K21.9 ; Hyperlipidemia, unspecified 
hyperlipidemia E78.5 ; Hypertension I10 and Asthma J45.909

 

 [address] 68643-
5476     

 

 [address] 06594-
9454     

 

 [address] 88261-
9061  02 Dec, 2015   

 

 [address] 02458-
6930  02 Dec, 2015  Hyperlipidemia, unspecified hyperlipidemia E78.5

 

 [address] [address]60-
2992     

 

 [address] 82282-
8903     

 

 [address] 24321-
9468    Allergic rhinitis J30.9

 

 [address] 40610-
1742    Type 2 diabetes mellitus with diabetic polyneuropathy 
E11.42 ; Routine adult health maintenance Z00.00 ; Coronary artery disease 
I25.10 ; History of MI (myocardial infarction) I25.2 ; History of kidney stones 
Z87.442 ; Immune thrombocytopenic purpura D69.3 ; Avascular necrosis of bone of 
right hip M87.051 and GERD (gastroesophageal reflux disease) K21.9







IMMUNIZATIONS

No Known Immunizations



SOCIAL HISTORY

Never Assessed



REASON FOR VISIT

Lab (walk-in)



PLAN OF CARE





VITAL SIGNS





MEDICATIONS

Unknown Medications



RESULTS

No Results



PROCEDURES







 Procedure  Date Ordered  Result  Body Site

 

 [facility]  May 11, 2018      

 

 VENCLAUDIA, ROUTINE*  May 11, 2018      







INSTRUCTIONS





MEDICATIONS ADMINISTERED

No Known Medications



MEDICAL (GENERAL) HISTORY







 Type  Description  Date

 

 Medical History  Type II Diabetes   

 

 Medical History  CAD   

 

 Medical History  Acute MI x1   

 

 Medical History  Heart Cath x3   

 

 Medical History  Kidney Stones   

 

 Medical History  Lymphedema   

 

 Medical History  Immune thrombocytopenic purpura   

 

 Surgical History  Heart Stents x2   

 

 Surgical History  Cholecystectomy   

 

 Surgical History     

 

 Surgical History  Newcomb Teeth   

 

 Hospitalization History  [facility]  

 

 Hospitalization History  Sepsis/Toxic Shock  

 

 [facility]

 

 [facility], [facility], yeast 
infection. Discharged 2018

## 2018-09-08 NOTE — XMS REPORT
[facility]

 Created on: 2018



Madison Young

External Reference #: [id number]

: 1945

Sex: Female



Demographics







 Address  [address] [place]  [address]

 

 Preferred Language  Unknown

 

 Marital Status  Unknown

 

 Mandaeism Affiliation  Unknown

 

 Race  Unknown

 

 Ethnic Group  Unknown





Author







 Author  SHAZIA MANUEL

 

 [facility]

 

 Address  [address] [place]  [address]



 

 Phone  [phone]







Care Team Providers







 Care Team Member Name  Role  Phone

 

 SHAZIA MANUEL  Unavailable  [phone]







PROBLEMS







 Type  Condition  ICD9-CM Code  WUC49-YG Code  Onset Dates  Condition Status  
SNOMED Code

 

 Problem  Anxiety disorder, unspecified     F41.9     Active  864751798

 

 Problem  Body mass index (BMI) of 45.0-49.9 in adult     Z68.42     Active  
542465391

 

 Problem  Morbid (severe) obesity due to excess calories     E66.01     Active  
343439721

 

 Problem  Iron deficiency anemia     D50.9     Active  76284231

 

 Problem  Non-adherence to medical treatment     Z91.19     Active  986651311

 

 Problem  Long-term insulin use     Z79.4     Active  011327013

 

 Problem  Recurrent major depressive disorder, in partial remission     F33.41 
    Active  77528450

 

 Problem  Skin ulcer, limited to breakdown of skin     L98.491     Active  
22109706

 

 Problem  Other iron deficiency anemia     D50.8     Active  53024056

 

 Problem  Avascular necrosis of bone of right hip     M87.051     Active  
595579985

 

 Problem  Coronary artery disease     I25.10     Active  00157075

 

 Problem  Chronic kidney disease (CKD) stage G3a/A3, moderately decreased 
glomerular filtration rate (GFR) between 45-59 mL/min/1.73 square meter and 
albuminuria creatinine ratio greater than 300 mg/g     N18.3     Active  
052918696

 

 Problem  Microalbuminuric diabetic nephropathy     E11.21     Active  239686128

 

 Problem  Immune thrombocytopenic purpura     D69.3     Active  558955643

 

 Problem  Hyperlipidemia, unspecified hyperlipidemia     E78.5     Active  
79868261

 

 Problem  GERD (gastroesophageal reflux disease)     K21.9     Active  888695759

 

 Problem  Asthma     J45.909     Active  948342123

 

 Problem  Type 2 diabetes mellitus with diabetic polyneuropathy     E11.42     
Active  47902941

 

 Problem  Hypertension     I10     Active  30709537







ALLERGIES

No Information



ENCOUNTERS







 Encounter  Location  Date  Diagnosis

 

 [facility]  [address], [place] [address]
[phone]  29 Aug, 2018   

 

 [place] [id number] [place] [phone]
[phone]     

 

 [place] [id number] [place] [phone]
[phone]     

 

 [place] [id number] [place] [phone]-
[phone]    Type 2 diabetes mellitus with diabetic polyneuropathy 
E11.42 ; Hypertension I10 ; Hyperlipidemia, unspecified hyperlipidemia E78.5 ; 
Body mass index (BMI) of 45.0-49.9 in adult Z68.42 ; Long-term insulin use 
Z79.4 ; Non-adherence to medical treatment Z91.19 ; Coronary artery disease 
I25.10 ; GERD (gastroesophageal reflux disease) K21.9 ; Asthma J45.909 and 
Recurrent major depressive disorder, in partial remission F33.41

 

 [place] [id number] [place] [phone]
[phone]  22 May, 2018  Recurrent major depressive disorder, in partial remission 
F33.41 and Hypertension I10

 

 [place] [id number] [place] [phone]
[phone]  21 May, 2018  Immune thrombocytopenic purpura D69.3 and Iron deficiency 
anemia D50.9

 

 [place] [id number] [place] [phone]-
8661  21 May, 2018  Type 2 diabetes mellitus with diabetic polyneuropathy 
E11.42 ; Long-term insulin use Z79.4 ; Chronic kidney disease (CKD) stage G3a/A3
, moderately decreased glomerular filtration rate (GFR) between 45-59 mL/min/
1.73 square meter and albuminuria creatinine ratio greater than 300 mg/g N18.3 
; Hypertension I10 ; Hyperlipidemia, unspecified hyperlipidemia E78.5 ; 
Coronary artery disease I25.10 ; GERD (gastroesophageal reflux disease) K21.9 ; 
Immune thrombocytopenic purpura D69.3 ; Asthma J45.909 ; Morbid (severe) 
obesity due to excess calories E66.01 and Recurrent major depressive disorder, 
in partial remission F33.41

 

 [place] [address] [place] [phone]
[phone]  11 May, 2018  Chronic kidney disease (CKD) stage G3a/A3, moderately 
decreased glomerular filtration rate (GFR) between 45-59 mL/min/1.73 square 
meter and albuminuria creatinine ratio greater than 300 mg/g N18.3

 

 [place] [address] [place] [phone]-
[phone]  02 May, 2018  Chronic kidney disease (CKD) stage G3a/A3, moderately 
decreased glomerular filtration rate (GFR) between 45-59 mL/min/1.73 square 
meter and albuminuria creatinine ratio greater than 300 mg/g N18.3

 

 [place] [id number] [place] [phone]
[phone]     

 

 [place] [id number] [place] [address] [phone]-
[phone]  28 Mar, 2018   

 

 [place] [id number] [place] [address] [phone]-
[phone]  26 Mar, 2018  Immune thrombocytopenic purpura D69.3 ; Type 2 diabetes 
mellitus with diabetic polyneuropathy E11.42 ; Avascular necrosis of bone of 
right hip M87.051 ; Long-term insulin use Z79.4 ; GERD (gastroesophageal reflux 
disease) K21.9 ; Hyperlipidemia, unspecified hyperlipidemia E78.5 ; 
Hypertension I10 ; Recurrent major depressive disorder, in partial remission 
F33.41 ; Microalbuminuric diabetic nephropathy E11.21 ; Body mass index (BMI) 
of 45.0-49.9 in adult Z68.42 ; BMI 45.0-49.9, adult Z68.42 and Chronic kidney 
disease (CKD) stage G3a/A3, moderately decreased glomerular filtration rate (GFR
) between 45-59 mL/min/1.73 square meter and albuminuria creatinine ratio 
greater than 300 mg/g N18.3

 

 [place] [address] [place] [address] [phone]-
[phone]  23 Mar, 2018   

 

 [place] [id number] [place] [address] [phone]-
[phone]  23 Mar, 2018   

 

 [facility]  [id number] [address]-
[phone]  19 Mar, 2018   

 

 [facility]  [address] [place] [phone]
[phone]  14 Mar, 2018   

 

 [place] [address] [place] [phone]-
[phone]  13 Mar, 2018  Type 2 diabetes mellitus with diabetic polyneuropathy 
E11.42 ; Asthma J45.909 and Hypertension I10

 

 [email or web address]  [address] [place] 
[id number]  13 Mar, 2018  Skin ulcer, limited to breakdown of skin L98.491 ; 
Immune thrombocytopenic purpura D69.3 ; Avascular necrosis of bone of right hip 
M87.051 and Type 2 diabetes mellitus with diabetic polyneuropathy E11.42

 

 [place] [address] [place] [address] 10834-
1229  06 Mar, 2018  Asthma J45.909 and Type 2 diabetes mellitus with diabetic 
polyneuropathy E11.42

 

 [place] [address] [address]-
2717  01 Mar, 2018   

 

 [email or web address]  [address] [place] 
[id number]    Other iron deficiency anemia D50.8 ; Weakness R53.1 ; 
Type 2 diabetes mellitus with diabetic polyneuropathy E11.42 ; Cellulitis of 
trunk, unspecified site of trunk L03.319 ; Coronary artery disease I25.10 ; 
Avascular necrosis of bone of right hip M87.051 and Morbid (severe) obesity due 
to excess calories E66.01

 

 [facility]  [id number] 
[id number]     

 

 [facility]  [address] [place] [phone]
[phone]     

 

 [facility] [place] [facility]  [address] [phone]  15 Feb, 2018  Yeast infection of the skin B37.2

 

 [place] [address] [address]-
7184  15 Feb, 2018   

 

 [facility]  [address] [place] [address]-
[phone]  15 Feb, 2018   

 

 [facility]  [id number] [place] [phone]-
[phone]  13 2018   

 

 [facility] [place] [facility]  [id number] [place] [phone]
[phone]     

 

 [facility]  [id number] [place] [address]10-
9021    Type 2 diabetes mellitus with diabetic polyneuropathy 
E11.42 ; Avascular necrosis of bone of right hip M87.051 ; Hyperlipidemia, 
unspecified hyperlipidemia E78.5 ; Hypertension I10 ; Anxiety disorder, 
unspecified F41.9 ; Immune thrombocytopenic purpura D69.3 ; Coronary artery 
disease I25.10 ; Orthopnea R06.01 ; Acute bronchitis, unspecified organism 
J20.9 ; Wound of left lower extremity, initial encounter S81.802A ; Body aches 
R52 and Debilitated R53.81

 

 [place] [id number] [place] [address] [phone]-
[phone]     

 

 [place] [id number] [place] [address] [phone]-
9595  02 2017  Type 2 diabetes mellitus with diabetic polyneuropathy 
E11.42 ; Encounter for immunization Z23 ; Hyperlipidemia, unspecified 
hyperlipidemia E78.5 ; Hypertension I10 ; Anxiety disorder, unspecified F41.9 ; 
Asthma J45.909 ; Body mass index (BMI) of 45.0-49.9 in adult Z68.42 and Morbid (
severe) obesity due to excess calories E66.01

 

 [place] [address] [place] [address] [phone]-
9996    Type 2 diabetes mellitus with diabetic polyneuropathy 
E11.42 ; Hyperlipidemia, unspecified hyperlipidemia E78.5 ; Hypertension I10 
and GERD (gastroesophageal reflux disease) K21.9

 

 [place] [address] [phone]-
[phone]  22 Mar, 2017  Type 2 diabetes mellitus with diabetic polyneuropathy E11.42

 

 [place] [address] [phone]-
[phone]    Type 2 diabetes mellitus with diabetic polyneuropathy 
E11.42 ; Coronary artery disease I25.10 ; History of MI (myocardial infarction) 
I25.2 ; Immune thrombocytopenic purpura D69.3 ; GERD (gastroesophageal reflux 
disease) K21.9 ; Hyperlipidemia, unspecified hyperlipidemia E78.5 ; 
Hypertension I10 ; History of kidney stones Z87.442 and Anxiety disorder, 
unspecified F41.9

 

 [place] [address] [phone]-
[phone]     

 

 [address] [phone]-
[phone]  29 Sep, 2016  Coronary artery disease I25.10 ; History of MI (myocardial 
infarction) I25.2 ; History of kidney stones Z87.442 ; Type 2 diabetes mellitus 
with diabetic polyneuropathy E11.42 ; Avascular necrosis of bone of right hip 
M87.051 ; Hyperlipidemia, unspecified hyperlipidemia E78.5 ; Hypertension I10 ; 
Asthma J45.909 and Encounter for immunization Z23

 

 [place] [address] [place] [address] [phone]-
4084  20 Sep, 2016   

 

 [address] [phone]-
6326    Coronary artery disease I25.10 ; Type 2 diabetes mellitus 
with diabetic polyneuropathy E11.42 ; History of MI (myocardial infarction) 
I25.2 ; Immune thrombocytopenic purpura D69.3 ; Hyperlipidemia, unspecified 
hyperlipidemia E78.5 and Hypertension I10

 

 [place] [address] [phone]-
[phone]    Coronary artery disease I25.10 ; Lymphedema I89.0 ; History 
of MI (myocardial infarction) I25.2 ; History of kidney stones Z87.442 ; Immune 
thrombocytopenic purpura D69.3 ; Type 2 diabetes mellitus with diabetic 
polyneuropathy E11.42 ; Avascular necrosis of bone of right hip M87.051 ; GERD (
gastroesophageal reflux disease) K21.9 ; Hyperlipidemia, unspecified 
hyperlipidemia E78.5 ; Hypertension I10 and Asthma J45.909

 

 [place] [address], [place] [address]-
2137     

 

 [place] [id number] [place] [address] [phone]-
[phone]     

 

 [place] [address] [place] [id number] [place] [address] [phone]-
[phone]  02 Dec, 2015   

 

 [place] [address] [place] [address] [id number] [place] [address] [phone]-
[phone]  02 Dec, 2015  Hyperlipidemia, unspecified hyperlipidemia E78.5

 

 [place] [address] [place] [address] [address] [place] [address] [phone]-
[phone]     

 

 [place] [id number] [place] [address] [phone]-
5172     

 

 [place] [address] [place] [address] [address] [place] [address] [phone]-
9728    Allergic rhinitis J30.9

 

 [place] [id number] [place] [address] 65703-
9667    Type 2 diabetes mellitus with diabetic polyneuropathy 
E11.42 ; Routine adult health maintenance Z00.00 ; Coronary artery disease 
I25.10 ; History of MI (myocardial infarction) I25.2 ; History of kidney stones 
Z87.442 ; Immune thrombocytopenic purpura D69.3 ; Avascular necrosis of bone of 
right hip M87.051 and GERD (gastroesophageal reflux disease) K21.9







IMMUNIZATIONS

No Known Immunizations



SOCIAL HISTORY

Never Assessed



REASON FOR VISIT

DC Advair and Increase Humalog



PLAN OF CARE





VITAL SIGNS





MEDICATIONS







 Medication  Instructions  Dosage  Frequency  Start Date  End Date  Duration  
Status

 

 Humalog KwikPen 100 UNIT/ML  Subcutaneous three times a day before meals  10 
units              Active







RESULTS

No Results



PROCEDURES

No Known procedures



INSTRUCTIONS





MEDICATIONS ADMINISTERED

No Known Medications



MEDICAL (GENERAL) HISTORY







 Type  Description  Date

 

 Medical History  Type II Diabetes   

 

 Medical History  CAD   

 

 Medical History  Acute MI x1   

 

 Medical History  Heart Cath x3   

 

 Medical History  Kidney Stones   

 

 Medical History  Lymphedema   

 

 Medical History  Immune thrombocytopenic purpura   

 

 Surgical History  Heart Stents x2   

 

 Surgical History  Cholecystectomy   

 

 Surgical History     

 

 Surgical History  [place]   

 

 Hospitalization History  IPT  

 

 Hospitalization History  Sepsis/Toxic Shock  

 

 Hospitalization History  [facility]-flu/pneumonia  2017

 

 Hospitalization History  [facility], [place], operated by [facility]- Anemia, cellulitis pannus, yeast 
infection. Discharged 2018

## 2018-09-08 NOTE — XMS REPORT
[facility]

 Created on: 2018



Madison Young

External Reference #: [id number]

: 1945

Sex: Female



Demographics







 Address  [address] [place]  [address]

 

 Preferred Language  Unknown

 

 Marital Status  Unknown

 

 [employer] Affiliation  Unknown

 

 Race  Unknown

 

 Ethnic Group  Unknown





Author







 Author  TANI  LEANDER

 

 Organization  [facility]

 

 Address  [address] [place]  [address]



 

 Phone  [phone]







Care Team Providers







 Care Team Member Name  Role  Phone

 

 TANI  LEANDER  Unavailable  [phone]







PROBLEMS







 Type  Condition  ICD9-CM Code  JSZ25-XO Code  Onset Dates  Condition Status  
SNOMED Code

 

 Problem  Long-term insulin use     Z79.4     Active  309159951

 

 Problem  Skin ulcer, limited to breakdown of skin     L98.491     Active  
49582785

 

 Problem  Other iron deficiency anemia     D50.8     Active  54535844

 

 Problem  Peripheral edema     R60.9     Active  257497986

 

 Problem  Coronary artery disease     I25.10     Active  48915656

 

 Problem  Diabetes mellitus due to underlying condition with foot ulcer     
E08.621     Active  988656248

 

 Problem  Type 2 diabetes mellitus with diabetic polyneuropathy     E11.42     
Active  12773106

 

 Problem  Microalbuminuric diabetic nephropathy     E11.21     Active  068187364

 

 Problem  Iron deficiency anemia     D50.9     Active  38405662

 

 Problem  Non-adherence to medical treatment     Z91.19     Active  024962021

 

 Problem  Non-pressure chronic ulcer of other part of left foot limited to 
breakdown of skin     L97.521     Active  373295130

 

 Problem  Acute idiopathic gout involving toe of left foot     M10.072     
Active  56557369

 

 Problem  Immune thrombocytopenic purpura     D69.3     Active  894491209

 

 Problem  Hyperlipidemia, unspecified hyperlipidemia     E78.5     Active  
29220560

 

 Problem  GERD (gastroesophageal reflux disease)     K21.9     Active  600948446

 

 Problem  Avascular necrosis of bone of right hip     M87.051     Active  
020285070

 

 Problem  Anxiety disorder, unspecified     F41.9     Active  323169703

 

 Problem  Morbid (severe) obesity due to excess calories     E66.01     Active  
487486589

 

 Problem  Asthma     J45.909     Active  795809535

 

 Problem  Body mass index (BMI) of 45.0-49.9 in adult     Z68.42     Active  
985866160

 

 Problem  Chronic kidney disease (CKD) stage G3a/A3, moderately decreased 
glomerular filtration rate (GFR) between 45-59 mL/min/1.73 square meter and 
albuminuria creatinine ratio greater than 300 mg/g     N18.3     Active  
400509802

 

 Problem  Hypertension     I10     Active  46333544

 

 Problem  Recurrent major depressive disorder, in partial remission     F33.41 
    Active  32436791







ALLERGIES

No Information



ENCOUNTERS







 Encounter  Location  Date  Diagnosis

 

 [place] [id number] [place] [address]992-
[phone]  06 Sep, 2018   

 

 [facility]  [address] [phone]-
[phone]  29 Aug, 2018   

 

 [facility]  [address] [address]-
8613  09 Aug, 2018   

 

 [place] [address] [phone]-
[phone]  06 Aug, 2018  Hypertension I10 and Peripheral edema R60.9

 

 [place] [address] [address]-
8971  02 Aug, 2018   

 

 [facility]  [id number] [place] [address] 88348-
9247  02 Aug, 2018  Hypertension I10 and Peripheral edema R60.9

 

 [place] [id number] [place] [address] 19267-
3745  01 Aug, 2018   

 

 [place] [id number] [place] [address] 53927-
1345     

 

 [place] [id number] [place] [address] 09272-
3579     

 

 [place] [id number] [place] [address] 40064-
9412     

 

 [place] [id number] [place] [address] 83229-
4440    Diabetes mellitus due to underlying condition with foot 
ulcer E08.621 ; Non-pressure chronic ulcer of other part of left foot limited 
to breakdown of skin L97.521 and BMI 45.0-49.9, adult Z68.42

 

 [place] [address] [place] [address]-
1599    Acute idiopathic gout involving toe of left foot M10.072

 

 [place] [id number] [place] [address] 07585-
4814     

 

 [place] [id number] [place] [address] 94590-
8580     

 

 [place] [id number] [place] [address] 38055-
1142     

 

 [place] [id number] [place] [address] 28476-
9044    Type 2 diabetes mellitus with diabetic polyneuropathy 
E11.42 ; Hypertension I10 ; Hyperlipidemia, unspecified hyperlipidemia E78.5 ; 
Body mass index (BMI) of 45.0-49.9 in adult Z68.42 ; Long-term insulin use 
Z79.4 ; Non-adherence to medical treatment Z91.19 ; Coronary artery disease 
I25.10 ; GERD (gastroesophageal reflux disease) K21.9 ; Asthma J45.909 and 
Recurrent major depressive disorder, in partial remission F33.41

 

 [place] [id number] [place] [address]133-
1208  22 May, 2018  Recurrent major depressive disorder, in partial remission 
F33.41 and Hypertension I10

 

 [place] [id number] [place] [address] 04173-
4621  21 May, 2018  Immune thrombocytopenic purpura D69.3 and Iron deficiency 
anemia D50.9

 

 [place] [address] [place] [address] 45473-
0003  21 May, 2018  Type 2 diabetes mellitus with diabetic polyneuropathy 
E11.42 ; Long-term insulin use Z79.4 ; Chronic kidney disease (CKD) stage G3a/A3
, moderately decreased glomerular filtration rate (GFR) between 45-59 mL/min/
1.73 square meter and albuminuria creatinine ratio greater than 300 mg/g N18.3 
; Hypertension I10 ; Hyperlipidemia, unspecified hyperlipidemia E78.5 ; 
Coronary artery disease I25.10 ; GERD (gastroesophageal reflux disease) K21.9 ; 
Immune thrombocytopenic purpura D69.3 ; Asthma J45.909 ; Morbid (severe) 
obesity due to excess calories E66.01 and Recurrent major depressive disorder, 
in partial remission F33.41

 

 [place] [id number] [place] [address] 91748-
1846  11 May, 2018  Chronic kidney disease (CKD) stage G3a/A3, moderately 
decreased glomerular filtration rate (GFR) between 45-59 mL/min/1.73 square 
meter and albuminuria creatinine ratio greater than 300 mg/g N18.3

 

 [place] [id number] [place] [address] 26867-
6475  02 May, 2018  Chronic kidney disease (CKD) stage G3a/A3, moderately 
decreased glomerular filtration rate (GFR) between 45-59 mL/min/1.73 square 
meter and albuminuria creatinine ratio greater than 300 mg/g N18.3

 

 [place] [address] [address]-
1517     

 

 [place] [address] [address]-
8565  28 Mar, 2018   

 

 [place] [address] [address]-
2013  26 Mar, 2018  Immune thrombocytopenic purpura D69.3 ; Type 2 diabetes 
mellitus with diabetic polyneuropathy E11.42 ; Avascular necrosis of bone of 
right hip M87.051 ; Long-term insulin use Z79.4 ; GERD (gastroesophageal reflux 
disease) K21.9 ; Hyperlipidemia, unspecified hyperlipidemia E78.5 ; 
Hypertension I10 ; Recurrent major depressive disorder, in partial remission 
F33.41 ; Microalbuminuric diabetic nephropathy E11.21 ; Body mass index (BMI) 
of 45.0-49.9 in adult Z68.42 ; BMI 45.0-49.9, adult Z68.42 and Chronic kidney 
disease (CKD) stage G3a/A3, moderately decreased glomerular filtration rate (GFR
) between 45-59 mL/min/1.73 square meter and albuminuria creatinine ratio 
greater than 300 mg/g N18.3

 

 [place] [address] [address]-
5339  23 Mar, 2018   

 

 [facility]  [address] [address]-
2654  23 Mar, 2018   

 

 [facility]  [address] [place] [address] 18687-
[phone]  19 Mar, 2018   

 

 [facility]  [address] [place] [address] 84068-
3015  14 Mar, 2018   

 

 [place] [id number] [place] [address] 48906-
9680  13 Mar, 2018  Type 2 diabetes mellitus with diabetic polyneuropathy 
E11.42 ; Asthma J45.909 and Hypertension I10

 

 [facility] [employer] [facility] [employer]  [address] [place] 
[id number]  13 Mar, 2018  Skin ulcer, limited to breakdown of skin L98.491 ; 
Immune thrombocytopenic purpura D69.3 ; Avascular necrosis of bone of right hip 
M87.051 and Type 2 diabetes mellitus with diabetic polyneuropathy E11.42

 

 [place] [address] [place] [address] 18435-
7817  06 Mar, 2018  Asthma J45.909 and Type 2 diabetes mellitus with diabetic 
polyneuropathy E11.42

 

 [place] [address] [place] [address] 85986-
9120  01 Mar, 2018   

 

 [facility]  [address] 
[id number]    Other iron deficiency anemia D50.8 ; Weakness R53.1 ; 
Type 2 diabetes mellitus with diabetic polyneuropathy E11.42 ; Cellulitis of 
trunk, unspecified site of trunk L03.319 ; Coronary artery disease I25.10 ; 
Avascular necrosis of bone of right hip M87.051 and Morbid (severe) obesity due 
to excess calories E66.01

 

 [facility]  [id number] 
[id number]     

 

 [facility]  [address] [address]-
1660     

 

 [facility] [place] [facility]  [id number] [phone]  15 Feb, 2018  Yeast infection of the skin B37.2

 

 [facility]  [id number] [place] [address] 17718-
2826  15 Feb, 2018   

 

 [facility]  [id number] [place] [address] 14602-
2549  15 Feb, 2018   

 

 [facility]  [address] [address]-
9352     

 

 [facility]  [id number] [place] [phone]
[phone]     

 

 [facility]  [id number] [place] [address] 47752-
1774    Type 2 diabetes mellitus with diabetic polyneuropathy 
E11.42 ; Avascular necrosis of bone of right hip M87.051 ; Hyperlipidemia, 
unspecified hyperlipidemia E78.5 ; Hypertension I10 ; Anxiety disorder, 
unspecified F41.9 ; Immune thrombocytopenic purpura D69.3 ; Coronary artery 
disease I25.10 ; Orthopnea R06.01 ; Acute bronchitis, unspecified organism 
J20.9 ; Wound of left lower extremity, initial encounter S81.802A ; Body aches 
R52 and Debilitated R53.81

 

 [place] [id number] [place] [address] 62119-
7423     

 

 [place] [id number] [place] [address] 42923-
4162  02 2017  Type 2 diabetes mellitus with diabetic polyneuropathy 
E11.42 ; Encounter for immunization Z23 ; Hyperlipidemia, unspecified 
hyperlipidemia E78.5 ; Hypertension I10 ; Anxiety disorder, unspecified F41.9 ; 
Asthma J45.909 ; Body mass index (BMI) of 45.0-49.9 in adult Z68.42 and Morbid (
severe) obesity due to excess calories E66.01

 

 [place] [address] [place] [address] 41870-
8145    Type 2 diabetes mellitus with diabetic polyneuropathy 
E11.42 ; Hyperlipidemia, unspecified hyperlipidemia E78.5 ; Hypertension I10 
and GERD (gastroesophageal reflux disease) K21.9

 

 [place] [address] [address]-
4263  22 Mar, 2017  Type 2 diabetes mellitus with diabetic polyneuropathy E11.42

 

 [place] [address] [address]-
5170    Type 2 diabetes mellitus with diabetic polyneuropathy 
E11.42 ; Coronary artery disease I25.10 ; History of MI (myocardial infarction) 
I25.2 ; Immune thrombocytopenic purpura D69.3 ; GERD (gastroesophageal reflux 
disease) K21.9 ; Hyperlipidemia, unspecified hyperlipidemia E78.5 ; 
Hypertension I10 ; History of kidney stones Z87.442 and Anxiety disorder, 
unspecified F41.9

 

 [place] [address] [address]-
0590     

 

 [address] [address]-
[address]  29 Sep, 2016  Coronary artery disease I25.10 ; History of MI (myocardial 
infarction) I25.2 ; History of kidney stones Z87.442 ; Type 2 diabetes mellitus 
with diabetic polyneuropathy E11.42 ; Avascular necrosis of bone of right hip 
M87.051 ; Hyperlipidemia, unspecified hyperlipidemia E78.5 ; Hypertension I10 ; 
Asthma J45.909 and Encounter for immunization Z23

 

 [place] [id number] [place] [address] 98021-
2041  20 Sep, 2016   

 

 [address] [address]-
2814    Coronary artery disease I25.10 ; Type 2 diabetes mellitus 
with diabetic polyneuropathy E11.42 ; History of MI (myocardial infarction) 
I25.2 ; Immune thrombocytopenic purpura D69.3 ; Hyperlipidemia, unspecified 
hyperlipidemia E78.5 and Hypertension I10

 

 [place] [id number] [place] [address] 00864-
0146    Coronary artery disease I25.10 ; Lymphedema I89.0 ; History 
of MI (myocardial infarction) I25.2 ; History of kidney stones Z87.442 ; Immune 
thrombocytopenic purpura D69.3 ; Type 2 diabetes mellitus with diabetic 
polyneuropathy E11.42 ; Avascular necrosis of bone of right hip M87.051 ; GERD (
gastroesophageal reflux disease) K21.9 ; Hyperlipidemia, unspecified 
hyperlipidemia E78.5 ; Hypertension I10 and Asthma J45.909

 

 [place] [id number] [place] [address] 69341-
8996     

 

 [place] [id number] [place] [address] 54959-
4328     

 

 [place] [address] [address]-
3909  02 Dec, 2015   

 

 [place] [address] [address]-
1918  02 Dec, 2015  Hyperlipidemia, unspecified hyperlipidemia E78.5

 

 [place] [id number] [place] [address] 93074-
[phone]     

 

 [place] [address] [address]-
8870     

 

 [place] [id number] [place] [address] 64884-
4064    Allergic rhinitis J30.9

 

 [place] [id number] [place] [address] 24826-
1219    Type 2 diabetes mellitus with diabetic polyneuropathy 
E11.42 ; Routine adult health maintenance Z00.00 ; Coronary artery disease 
I25.10 ; History of MI (myocardial infarction) I25.2 ; History of kidney stones 
Z87.442 ; Immune thrombocytopenic purpura D69.3 ; Avascular necrosis of bone of 
right hip M87.051 and GERD (gastroesophageal reflux disease) K21.9







IMMUNIZATIONS

No Known Immunizations



SOCIAL HISTORY

Never Assessed



REASON FOR VISIT

Requests return call



PLAN OF CARE





VITAL SIGNS





MEDICATIONS

Unknown Medications



RESULTS

No Results



PROCEDURES

No Known procedures



INSTRUCTIONS





MEDICATIONS ADMINISTERED

No Known Medications



MEDICAL (GENERAL) HISTORY







 Type  Description  Date

 

 Medical History  Type II Diabetes   

 

 Medical History  CAD   

 

 Medical History  Acute MI x1   

 

 Medical History  Heart Cath x3   

 

 Medical History  Kidney Stones   

 

 Medical History  Lymphedema   

 

 Medical History  Immune thrombocytopenic purpura   

 

 Surgical History  Heart Stents x2   

 

 Surgical History  Cholecystectomy   

 

 Surgical History     

 

 Surgical History  [place]   

 

 Hospitalization History  [facility]  

 

 Hospitalization History  Sepsis/Toxic Shock  

 

 Hospitalization History  [facility]-flu/pneumonia  2017

 

 Hospitalization History  [facility]- Anemia, cellulitis pannus, yeast 
infection. Discharged 2018

## 2018-09-08 NOTE — XMS REPORT
[facility]

 Created on: 2018



Madison Young

External Reference #: [id number]

: 1945

Sex: Female



Demographics







 Address  [address] [place]  [address]

 

 Preferred Language  Unknown

 

 Marital Status  Unknown

 

 Faith Affiliation  Unknown

 

 Race  Unknown

 

 Ethnic Group  Unknown





Author







 Author  TANI  LEANDER

 

 Organization  [facility]

 

 Address  [address] [place]  [address]



 

 Phone  [phone]







Care Team Providers







 Care Team Member Name  Role  Phone

 

 TANI  LEANDER  Unavailable  [phone]







PROBLEMS







 Type  Condition  ICD9-CM Code  SID73-EU Code  Onset Dates  Condition Status  
SNOMED Code

 

 Problem  Long-term insulin use     Z79.4     Active  141530460

 

 Problem  Skin ulcer, limited to breakdown of skin     L98.491     Active  
40011063

 

 Problem  Other iron deficiency anemia     D50.8     Active  51033119

 

 Problem  Peripheral edema     R60.9     Active  794423731

 

 Problem  Coronary artery disease     I25.10     Active  78702171

 

 Problem  Diabetes mellitus due to underlying condition with foot ulcer     
E08.621     Active  276419606

 

 Problem  Type 2 diabetes mellitus with diabetic polyneuropathy     E11.42     
Active  60720382

 

 Problem  Microalbuminuric diabetic nephropathy     E11.21     Active  158732522

 

 Problem  Iron deficiency anemia     D50.9     Active  72713058

 

 Problem  Non-adherence to medical treatment     Z91.19     Active  075157614

 

 Problem  Non-pressure chronic ulcer of other part of left foot limited to 
breakdown of skin     L97.521     Active  372994052

 

 Problem  Acute idiopathic gout involving toe of left foot     M10.072     
Active  25698298

 

 Problem  Immune thrombocytopenic purpura     D69.3     Active  670446232

 

 Problem  Hyperlipidemia, unspecified hyperlipidemia     E78.5     Active  
56576246

 

 Problem  GERD (gastroesophageal reflux disease)     K21.9     Active  527477188

 

 Problem  Avascular necrosis of bone of right hip     M87.051     Active  
782596309

 

 Problem  Anxiety disorder, unspecified     F41.9     Active  022965694

 

 Problem  Morbid (severe) obesity due to excess calories     E66.01     Active  
893170710

 

 Problem  Asthma     J45.909     Active  229004890

 

 Problem  Body mass index (BMI) of 45.0-49.9 in adult     Z68.42     Active  
361743308

 

 Problem  Chronic kidney disease (CKD) stage G3a/A3, moderately decreased 
glomerular filtration rate (GFR) between 45-59 mL/min/1.73 square meter and 
albuminuria creatinine ratio greater than 300 mg/g     N18.3     Active  
248003487

 

 Problem  Hypertension     I10     Active  31193558

 

 Problem  Recurrent major depressive disorder, in partial remission     F33.41 
    Active  28114508







ALLERGIES

No Information



ENCOUNTERS







 Encounter  Location  Date  Diagnosis

 

 [facility]  [id number] [place] [address] 24347-
1033  06 Sep, 2018   

 

 [facility]  [address] [address]-
0243  17 Aug, 2018  Peripheral edema R60.9

 

 [facility]  [id number] [place] [address] 08394-
5210  13 Aug, 2018  Hypertension I10 and Peripheral edema R60.9

 

 [facility]  [id number] [place] [address] 35817-
5699  09 Aug, 2018   

 

 [facility]  [id number] [place] [address] 38946-
6678  09 Aug, 2018  Hypertension I10 and Peripheral edema R60.9

 

 [facility]  [id number] [place] [address] 34227-
1674  06 Aug, 2018  Hypertension I10 and Peripheral edema R60.9

 

 [facility]  [id number] [place] [address] 42551-
8933  02 Aug, 2018   

 

 [facility]  [id number] [place] [address] 92346-
8101  02 Aug, 2018  Hypertension I10 and Peripheral edema R60.9

 

 [facility]  [id number] [place] [address] 17441-
7454  01 Aug, 2018   

 

 [facility]  [id number] [place] [address] 04014-
4747     

 

 [facility]  [id number] [place] [address] 94357-
5348     

 

 [facility]  [id number] [place] [address] 81399-
9060     

 

 [facility] [id number] [place] [address] 37161-
3540    Diabetes mellitus due to underlying condition with foot 
ulcer E08.621 ; Non-pressure chronic ulcer of other part of left foot limited 
to breakdown of skin L97.521 and BMI 45.0-49.9, adult Z68.42

 

 [id number] [place] [address] 02302-
4323    Acute idiopathic gout involving toe of left foot M10.072

 

 [place] [id number] [place] [address] 91587-
0632     

 

 [address] [address]-
8208     

 

 [place] [id number] [place] [address] 89057-
5979     

 

 [address] [address]-
7882    Type 2 diabetes mellitus with diabetic polyneuropathy 
E11.42 ; Hypertension I10 ; Hyperlipidemia, unspecified hyperlipidemia E78.5 ; 
Body mass index (BMI) of 45.0-49.9 in adult Z68.42 ; Long-term insulin use 
Z79.4 ; Non-adherence to medical treatment Z91.19 ; Coronary artery disease 
I25.10 ; GERD (gastroesophageal reflux disease) K21.9 ; Asthma J45.909 and 
Recurrent major depressive disorder, in partial remission F33.41

 

 [place] [id number] [place] [address] 30121-
8686  22 May, 2018  Recurrent major depressive disorder, in partial remission 
F33.41 and Hypertension I10

 

 [address] [id number] [place] [address] 57229-
9308  21 May, 2018  Immune thrombocytopenic purpura D69.3 and Iron deficiency 
anemia D50.9

 

 [address] [place] [id number] [place] [address] 84702-
9589  21 May, 2018  Type 2 diabetes mellitus with diabetic polyneuropathy 
E11.42 ; Long-term insulin use Z79.4 ; Chronic kidney disease (CKD) stage G3a/A3
, moderately decreased glomerular filtration rate (GFR) between 45-59 mL/min/
1.73 square meter and albuminuria creatinine ratio greater than 300 mg/g N18.3 
; Hypertension I10 ; Hyperlipidemia, unspecified hyperlipidemia E78.5 ; 
Coronary artery disease I25.10 ; GERD (gastroesophageal reflux disease) K21.9 ; 
Immune thrombocytopenic purpura D69.3 ; Asthma J45.909 ; Morbid (severe) 
obesity due to excess calories E66.01 and Recurrent major depressive disorder, 
in partial remission F33.41

 

 [place] [address] [address]-
3427  11 May, 2018  Chronic kidney disease (CKD) stage G3a/A3, moderately 
decreased glomerular filtration rate (GFR) between 45-59 mL/min/1.73 square 
meter and albuminuria creatinine ratio greater than 300 mg/g N18.3

 

 [place] [id number] [place] [address] 97273-
0834  02 May, 2018  Chronic kidney disease (CKD) stage G3a/A3, moderately 
decreased glomerular filtration rate (GFR) between 45-59 mL/min/1.73 square 
meter and albuminuria creatinine ratio greater than 300 mg/g N18.3

 

 [place] [id number] [place] [address] 73595-
4672     

 

 [place] [id number] [place] [address] 60637-
6216  28 Mar, 2018   

 

 [place] [id number] [place] [address] 69862-
3949  26 Mar, 2018  Immune thrombocytopenic purpura D69.3 ; Type 2 diabetes 
mellitus with diabetic polyneuropathy E11.42 ; Avascular necrosis of bone of 
right hip M87.051 ; Long-term insulin use Z79.4 ; GERD (gastroesophageal reflux 
disease) K21.9 ; Hyperlipidemia, unspecified hyperlipidemia E78.5 ; 
Hypertension I10 ; Recurrent major depressive disorder, in partial remission 
F33.41 ; Microalbuminuric diabetic nephropathy E11.21 ; Body mass index (BMI) 
of 45.0-49.9 in adult Z68.42 ; BMI 45.0-49.9, adult Z68.42 and Chronic kidney 
disease (CKD) stage G3a/A3, moderately decreased glomerular filtration rate (GFR
) between 45-59 mL/min/1.73 square meter and albuminuria creatinine ratio 
greater than 300 mg/g N18.3

 

 [place] [address] [place] [address] 99674-
6133  23 Mar, 2018   

 

 [place] [id number] [place] [address] 29541-
4223  23 Mar, 2018   

 

 [place] [id number] [place] [address] 39322-
5946  19 Mar, 2018   

 

 [place] [id number] [place] [address] 23211-
6263  14 Mar, 2018   

 

 [place] [id number] [place] [address] 39259-
9354  13 Mar, 2018  Type 2 diabetes mellitus with diabetic polyneuropathy 
E11.42 ; Asthma J45.909 and Hypertension I10

 

 [email or web address]  [address] 
[id number]  13 Mar, 2018  Skin ulcer, limited to breakdown of skin L98.491 ; 
Immune thrombocytopenic purpura D69.3 ; Avascular necrosis of bone of right hip 
M87.051 and Type 2 diabetes mellitus with diabetic polyneuropathy E11.42

 

 [place] [address] [address] [place] [address] 47854-
8482  06 Mar, 2018  Asthma J45.909 and Type 2 diabetes mellitus with diabetic 
polyneuropathy E11.42

 

 [place] [id number] [place] [address] 22347-
4736  01 Mar, 2018   

 

 [email or web address]  [address] 
[id number]    Other iron deficiency anemia D50.8 ; Weakness R53.1 ; 
Type 2 diabetes mellitus with diabetic polyneuropathy E11.42 ; Cellulitis of 
trunk, unspecified site of trunk L03.319 ; Coronary artery disease I25.10 ; 
Avascular necrosis of bone of right hip M87.051 and Morbid (severe) obesity due 
to excess calories E66.01

 

 [facility]  [id number] 
[id number]     

 

 [facility]  [address] [place] [address] 46719-
5819     

 

 [facility] [place] [id number] [place] [address] 79344
[phone]  15 Feb, 2018  Yeast infection of the skin B37.2

 

 [place] [id number] [place] [address] 12384-
9381  15 Feb, 2018   

 

 [place] [id number] [place] [address] 62323-
4576  15 Feb, 2018   

 

 [place] [id number] [place] [address] 73485-
4094     

 

 [facility]  [id number] [place] [address] 18630
[phone]     

 

 [place] [address] [place] [address] 01390-
0880    Type 2 diabetes mellitus with diabetic polyneuropathy 
E11.42 ; Avascular necrosis of bone of right hip M87.051 ; Hyperlipidemia, 
unspecified hyperlipidemia E78.5 ; Hypertension I10 ; Anxiety disorder, 
unspecified F41.9 ; Immune thrombocytopenic purpura D69.3 ; Coronary artery 
disease I25.10 ; Orthopnea R06.01 ; Acute bronchitis, unspecified organism 
J20.9 ; Wound of left lower extremity, initial encounter S81.802A ; Body aches 
R52 and Debilitated R53.81

 

 [place] [address] [place] [address] 20861-
1177     

 

 [facility]  [address] [place] [address] 81887-
4940    Type 2 diabetes mellitus with diabetic polyneuropathy 
E11.42 ; Encounter for immunization Z23 ; Hyperlipidemia, unspecified 
hyperlipidemia E78.5 ; Hypertension I10 ; Anxiety disorder, unspecified F41.9 ; 
Asthma J45.909 ; Body mass index (BMI) of 45.0-49.9 in adult Z68.42 and Morbid (
severe) obesity due to excess calories E66.01

 

 [address] [address]-
4192    Type 2 diabetes mellitus with diabetic polyneuropathy 
E11.42 ; Hyperlipidemia, unspecified hyperlipidemia E78.5 ; Hypertension I10 
and GERD (gastroesophageal reflux disease) K21.9

 

 [address] [address]-
2527  22 Mar, 2017  Type 2 diabetes mellitus with diabetic polyneuropathy E11.42

 

 [address] [address]14-
2969    Type 2 diabetes mellitus with diabetic polyneuropathy 
E11.42 ; Coronary artery disease I25.10 ; History of MI (myocardial infarction) 
I25.2 ; Immune thrombocytopenic purpura D69.3 ; GERD (gastroesophageal reflux 
disease) K21.9 ; Hyperlipidemia, unspecified hyperlipidemia E78.5 ; 
Hypertension I10 ; History of kidney stones Z87.442 and Anxiety disorder, 
unspecified F41.9

 

 [address] [address]-
7925     

 

 [address] [address]34-
3196  29 Sep, 2016  Coronary artery disease I25.10 ; History of MI (myocardial 
infarction) I25.2 ; History of kidney stones Z87.442 ; Type 2 diabetes mellitus 
with diabetic polyneuropathy E11.42 ; Avascular necrosis of bone of right hip 
M87.051 ; Hyperlipidemia, unspecified hyperlipidemia E78.5 ; Hypertension I10 ; 
Asthma J45.909 and Encounter for immunization Z23

 

 [address] [address]343-
9778  20 Sep, 2016   

 

 [address] [address]16-
1404    Coronary artery disease I25.10 ; Type 2 diabetes mellitus 
with diabetic polyneuropathy E11.42 ; History of MI (myocardial infarction) 
I25.2 ; Immune thrombocytopenic purpura D69.3 ; Hyperlipidemia, unspecified 
hyperlipidemia E78.5 and Hypertension I10

 

 [place] [id number] [place] [address] 64563-
9852    Coronary artery disease I25.10 ; Lymphedema I89.0 ; History 
of MI (myocardial infarction) I25.2 ; History of kidney stones Z87.442 ; Immune 
thrombocytopenic purpura D69.3 ; Type 2 diabetes mellitus with diabetic 
polyneuropathy E11.42 ; Avascular necrosis of bone of right hip M87.051 ; GERD (
gastroesophageal reflux disease) K21.9 ; Hyperlipidemia, unspecified 
hyperlipidemia E78.5 ; Hypertension I10 and Asthma J45.909

 

 [place] [address] [address]-
0615     

 

 [place] [address] [address]-
8960     

 

 [place] [address] 6[address]27-
5910  02 Dec, 2015   

 

 [place] [address] [address]18-
4419  02 Dec, 2015  Hyperlipidemia, unspecified hyperlipidemia E78.5

 

 [place] [address] 60608-
7368     

 

 [place] [address] 05396-
4053     

 

 [place] [address] 27162-
8364    Allergic rhinitis J30.9

 

 [place] [address] [address]36-
1467    Type 2 diabetes mellitus with diabetic polyneuropathy 
E11.42 ; Routine adult health maintenance Z00.00 ; Coronary artery disease 
I25.10 ; History of MI (myocardial infarction) I25.2 ; History of kidney stones 
Z87.442 ; Immune thrombocytopenic purpura D69.3 ; Avascular necrosis of bone of 
right hip M87.051 and GERD (gastroesophageal reflux disease) K21.9







IMMUNIZATIONS

No Known Immunizations



SOCIAL HISTORY

Never Assessed



REASON FOR VISIT





PLAN OF CARE





VITAL SIGNS





MEDICATIONS

Unknown Medications



RESULTS

No Results



PROCEDURES

No Known procedures



INSTRUCTIONS





MEDICATIONS ADMINISTERED

No Known Medications



MEDICAL (GENERAL) HISTORY







 Type  Description  Date

 

 Medical History  Type II Diabetes   

 

 Medical History  CAD   

 

 Medical History  Acute MI x1   

 

 Medical History  Heart Cath x3   

 

 Medical History  Kidney Stones   

 

 Medical History  Lymphedema   

 

 Medical History  Immune thrombocytopenic purpura   

 

 Surgical History  Heart Stents x2   

 

 Surgical History  Cholecystectomy   

 

 Surgical History     

 

 Surgical History  Rockfall Teeth   

 

 Hospitalization History  [facility]  

 

 Hospitalization History  Sepsis/Toxic Shock  

 

 Hospitalization History  [facility]-flu[facility]neumonia  2017

 

 Hospitalization History  [facility]- Anemia, cellulitis pannus, yeast 
infection. Discharged 2018

## 2018-09-08 NOTE — XMS REPORT
[facility]

 Created on: 2018



Madison Young

External Reference #: [id number]

: 1945

Sex: Female



Demographics







 Address  [address] [place]  [address]

 

 Preferred Language  Unknown

 

 Marital Status  Unknown

 

 Adventism Affiliation  Unknown

 

 Race  Unknown

 

 Ethnic Group  Unknown





Author







 Author  TANI  LEANDER

 

 Organization  [facility]

 

 Address  [address] [place]  [address]



 

 Phone  [phone]







Care Team Providers







 Care Team Member Name  Role  Phone

 

 TANI  LEANDER  Unavailable  [phone]







PROBLEMS







 Type  Condition  ICD9-CM Code  ICP05-HC Code  Onset Dates  Condition Status  
SNOMED Code

 

 Problem  Long-term insulin use     Z79.4     Active  228481963

 

 Problem  Skin ulcer, limited to breakdown of skin     L98.491     Active  
36379201

 

 Problem  Other iron deficiency anemia     D50.8     Active  28126220

 

 Problem  Peripheral edema     R60.9     Active  270076590

 

 Problem  Coronary artery disease     I25.10     Active  87463044

 

 Problem  Diabetes mellitus due to underlying condition with foot ulcer     
E08.621     Active  659932381

 

 Problem  Type 2 diabetes mellitus with diabetic polyneuropathy     E11.42     
Active  14797439

 

 Problem  Microalbuminuric diabetic nephropathy     E11.21     Active  669975398

 

 Problem  Iron deficiency anemia     D50.9     Active  76562109

 

 Problem  Non-adherence to medical treatment     Z91.19     Active  803828123

 

 Problem  Non-pressure chronic ulcer of other part of left foot limited to 
breakdown of skin     L97.521     Active  105832470

 

 Problem  Acute idiopathic gout involving toe of left foot     M10.072     
Active  76151500

 

 Problem  Immune thrombocytopenic purpura     D69.3     Active  875619666

 

 Problem  Hyperlipidemia, unspecified hyperlipidemia     E78.5     Active  
73043010

 

 Problem  GERD (gastroesophageal reflux disease)     K21.9     Active  493765601

 

 Problem  Avascular necrosis of bone of right hip     M87.051     Active  
107146560

 

 Problem  Anxiety disorder, unspecified     F41.9     Active  780863339

 

 Problem  Morbid (severe) obesity due to excess calories     E66.01     Active  
043335343

 

 Problem  Asthma     J45.909     Active  352568281

 

 Problem  Body mass index (BMI) of 45.0-49.9 in adult     Z68.42     Active  
488642996

 

 Problem  Chronic kidney disease (CKD) stage G3a/A3, moderately decreased 
glomerular filtration rate (GFR) between 45-59 mL/min/1.73 square meter and 
albuminuria creatinine ratio greater than 300 mg/g     N18.3     Active  
160042239

 

 Problem  Hypertension     I10     Active  88058472

 

 Problem  Recurrent major depressive disorder, in partial remission     F33.41 
    Active  55087945







ALLERGIES

No Information



ENCOUNTERS







 Encounter  Location  Date  Diagnosis

 

 [facility]  [id number] [place] [address]-
4621  06 Sep, 2018   

 

 [facility]  [address] [address]-
1743  29 Aug, 2018   

 

 [facility]  [address] [address]-
0738  13 Aug, 2018  Hypertension I10 and Peripheral edema R60.9

 

 [facility]  [id number] [place] [address] 6[address]79-
[phone]  09 Aug, 2018   

 

 [facility]  [address] [address]-
8221  09 Aug, 2018  Hypertension I10 and Peripheral edema R60.9

 

 [facility]  [id number] [place] [address] 31647-
0743  06 Aug, 2018  Hypertension I10 and Peripheral edema R60.9

 

 [facility]  [id number] [place] [address]-
0254  02 Aug, 2018   

 

 [facility]  [id number] [place] [address] 86820-
5657  02 Aug, 2018  Hypertension I10 and Peripheral edema R60.9

 

 [facility]  [id number] [place] [address] 66912-
0476  01 Aug, 2018   

 

 [facility]  [id number] [place] [address] 63469-
6336     

 

 [facility]  [id number] [place] [address] 78414-
0682     

 

 [facility]  [id number] [place] [address] 93693-
1770     

 

 [facility]  [address] [place] [address]-
0256    Diabetes mellitus due to underlying condition with foot 
ulcer E08.621 ; Non-pressure chronic ulcer of other part of left foot limited 
to breakdown of skin L97.521 and BMI 45.0-49.9, adult Z68.42

 

 [address] [id number] [place] [address]-
3605    Acute idiopathic gout involving toe of left foot M10.072

 

 [place] [id number] [place] [address] 31017-
8307     

 

 [address] [address]-
5708     

 

 [place] [id number] [place] [address] 68696-
7764     

 

 [place] [id number] [place] [address]-
2955    Type 2 diabetes mellitus with diabetic polyneuropathy 
E11.42 ; Hypertension I10 ; Hyperlipidemia, unspecified hyperlipidemia E78.5 ; 
Body mass index (BMI) of 45.0-49.9 in adult Z68.42 ; Long-term insulin use 
Z79.4 ; Non-adherence to medical treatment Z91.19 ; Coronary artery disease 
I25.10 ; GERD (gastroesophageal reflux disease) K21.9 ; Asthma J45.909 and 
Recurrent major depressive disorder, in partial remission F33.41

 

 [place] [id number] [place] [address] 8[address]64-
3656  22 May, 2018  Recurrent major depressive disorder, in partial remission 
F33.41 and Hypertension I10

 

 [place] [id number] [place] [address] 62032-
7362  21 May, 2018  Immune thrombocytopenic purpura D69.3 and Iron deficiency 
anemia D50.9

 

 [place] [id number] [place] [address] 08116-
7827  21 May, 2018  Type 2 diabetes mellitus with diabetic polyneuropathy 
E11.42 ; Long-term insulin use Z79.4 ; Chronic kidney disease (CKD) stage G3a/A3
, moderately decreased glomerular filtration rate (GFR) between 45-59 mL/min/
1.73 square meter and albuminuria creatinine ratio greater than 300 mg/g N18.3 
; Hypertension I10 ; Hyperlipidemia, unspecified hyperlipidemia E78.5 ; 
Coronary artery disease I25.10 ; GERD (gastroesophageal reflux disease) K21.9 ; 
Immune thrombocytopenic purpura D69.3 ; Asthma J45.909 ; Morbid (severe) 
obesity due to excess calories E66.01 and Recurrent major depressive disorder, 
in partial remission F33.41

 

 [place] [id number] [place] [address] 58171-
1778  11 May, 2018  Chronic kidney disease (CKD) stage G3a/A3, moderately 
decreased glomerular filtration rate (GFR) between 45-59 mL/min/1.73 square 
meter and albuminuria creatinine ratio greater than 300 mg/g N18.3

 

 [place] [id number] [place] [address] 85656-
5383  02 May, 2018  Chronic kidney disease (CKD) stage G3a/A3, moderately 
decreased glomerular filtration rate (GFR) between 45-59 mL/min/1.73 square 
meter and albuminuria creatinine ratio greater than 300 mg/g N18.3

 

 [place] [id number] [place] [address] 43142-
9460     

 

 [place] [id number] [place] [address] 39692-
3052  28 Mar, 2018   

 

 [place] [id number] [place] [address]80-
5938  26 Mar, 2018  Immune thrombocytopenic purpura D69.3 ; Type 2 diabetes 
mellitus with diabetic polyneuropathy E11.42 ; Avascular necrosis of bone of 
right hip M87.051 ; Long-term insulin use Z79.4 ; GERD (gastroesophageal reflux 
disease) K21.9 ; Hyperlipidemia, unspecified hyperlipidemia E78.5 ; 
Hypertension I10 ; Recurrent major depressive disorder, in partial remission 
F33.41 ; Microalbuminuric diabetic nephropathy E11.21 ; Body mass index (BMI) 
of 45.0-49.9 in adult Z68.42 ; BMI 45.0-49.9, adult Z68.42 and Chronic kidney 
disease (CKD) stage G3a/A3, moderately decreased glomerular filtration rate (GFR
) between 45-59 mL/min/1.73 square meter and albuminuria creatinine ratio 
greater than 300 mg/g N18.3

 

 [address] [place] [address] 18981-
5850  23 Mar, 2018   

 

 [address] [place] [address] [address]-
4343  23 Mar, 2018   

 

 [place] [id number] [place] [address] 24934-
9684  19 Mar, 2018   

 

 [address] [place] [address] [address]-
3148  14 Mar, 2018   

 

 [place] [id number] [place] [address] 41864-
3196  13 Mar, 2018  Type 2 diabetes mellitus with diabetic polyneuropathy 
E11.42 ; Asthma J45.909 and Hypertension I10

 

 [facility]  [address] 
[id number]  13 Mar, 2018  Skin ulcer, limited to breakdown of skin L98.491 ; 
Immune thrombocytopenic purpura D69.3 ; Avascular necrosis of bone of right hip 
M87.051 and Type 2 diabetes mellitus with diabetic polyneuropathy E11.42

 

 [id number] [place] [address] 11896-
0868  06 Mar, 2018  Asthma J45.909 and Type 2 diabetes mellitus with diabetic 
polyneuropathy E11.42

 

 [address] [place] [address] 81847-
3602  01 Mar, 2018   

 

 [facility]  [address] 
[id number]    Other iron deficiency anemia D50.8 ; Weakness R53.1 ; 
Type 2 diabetes mellitus with diabetic polyneuropathy E11.42 ; Cellulitis of 
trunk, unspecified site of trunk L03.319 ; Coronary artery disease I25.10 ; 
Avascular necrosis of bone of right hip M87.051 and Morbid (severe) obesity due 
to excess calories E66.01

 

 [facility]  [id number] 
[id number]     

 

 [facility]  [id number] [place] [address] 9[address]63-
0999     

 

 [facility]  [facility] [id number] [place] [phone]
[phone]  15 Feb, 2018  Yeast infection of the skin B37.2

 

 [place] [id number] [place] [address] 97432-
4305  15 Feb, 2018   

 

 [place] [id number] [place] [address] 99967-
6413  15 Feb, 2018   

 

 [place] [id number] [place] [address] 52599-
0473     

 

 [facility]ALK IN [facility]  [id number] [place] [address] 21978
[phone]     

 

 [address] [place] [address] 13375-
6506    Type 2 diabetes mellitus with diabetic polyneuropathy 
E11.42 ; Avascular necrosis of bone of right hip M87.051 ; Hyperlipidemia, 
unspecified hyperlipidemia E78.5 ; Hypertension I10 ; Anxiety disorder, 
unspecified F41.9 ; Immune thrombocytopenic purpura D69.3 ; Coronary artery 
disease I25.10 ; Orthopnea R06.01 ; Acute bronchitis, unspecified organism 
J20.9 ; Wound of left lower extremity, initial encounter S81.802A ; Body aches 
R52 and Debilitated R53.81

 

 [address] [place] [address] 90095-
6502     

 

 [address] [place] [address] 81012-
7866    Type 2 diabetes mellitus with diabetic polyneuropathy 
E11.42 ; Encounter for immunization Z23 ; Hyperlipidemia, unspecified 
hyperlipidemia E78.5 ; Hypertension I10 ; Anxiety disorder, unspecified F41.9 ; 
Asthma J45.909 ; Body mass index (BMI) of 45.0-49.9 in adult Z68.42 and Morbid (
severe) obesity due to excess calories E66.01

 

 [id number] [place] [address] 66159-
9733    Type 2 diabetes mellitus with diabetic polyneuropathy 
E11.42 ; Hyperlipidemia, unspecified hyperlipidemia E78.5 ; Hypertension I10 
and GERD (gastroesophageal reflux disease) K21.9

 

 [address] [address]-
0819  22 Mar, 2017  Type 2 diabetes mellitus with diabetic polyneuropathy E11.42

 

 [address] [address]-
4074    Type 2 diabetes mellitus with diabetic polyneuropathy 
E11.42 ; Coronary artery disease I25.10 ; History of MI (myocardial infarction) 
I25.2 ; Immune thrombocytopenic purpura D69.3 ; GERD (gastroesophageal reflux 
disease) K21.9 ; Hyperlipidemia, unspecified hyperlipidemia E78.5 ; 
Hypertension I10 ; History of kidney stones Z87.442 and Anxiety disorder, 
unspecified F41.9

 

 [address] [address]-
4550     

 

 [address] [address]-
1848  29 Sep, 2016  Coronary artery disease I25.10 ; History of MI (myocardial 
infarction) I25.2 ; History of kidney stones Z87.442 ; Type 2 diabetes mellitus 
with diabetic polyneuropathy E11.42 ; Avascular necrosis of bone of right hip 
M87.051 ; Hyperlipidemia, unspecified hyperlipidemia E78.5 ; Hypertension I10 ; 
Asthma J45.909 and Encounter for immunization Z23

 

 [address] [address]-
5042  20 Sep, 2016   

 

 [address] [address]-
8142    Coronary artery disease I25.10 ; Type 2 diabetes mellitus 
with diabetic polyneuropathy E11.42 ; History of MI (myocardial infarction) 
I25.2 ; Immune thrombocytopenic purpura D69.3 ; Hyperlipidemia, unspecified 
hyperlipidemia E78.5 and Hypertension I10

 

 [place] [id number] [place] [address] 71512-
5726    Coronary artery disease I25.10 ; Lymphedema I89.0 ; History 
of MI (myocardial infarction) I25.2 ; History of kidney stones Z87.442 ; Immune 
thrombocytopenic purpura D69.3 ; Type 2 diabetes mellitus with diabetic 
polyneuropathy E11.42 ; Avascular necrosis of bone of right hip M87.051 ; GERD (
gastroesophageal reflux disease) K21.9 ; Hyperlipidemia, unspecified 
hyperlipidemia E78.5 ; Hypertension I10 and Asthma J45.909

 

 [address] [address]-
1615     

 

 [address] [address] [address]-
0068     

 

 [address] [address]-
6348  02 Dec, 2015   

 

 [address] [address]-
6037  02 Dec, 2015  Hyperlipidemia, unspecified hyperlipidemia E78.5

 

 [address] [address]-
1244     

 

 [address] [address] [address]-
2709     

 

 [address] [address]-
4180    Allergic rhinitis J30.9

 

 [address] [address]-
8987    Type 2 diabetes mellitus with diabetic polyneuropathy 
E11.42 ; Routine adult health maintenance Z00.00 ; Coronary artery disease 
I25.10 ; History of MI (myocardial infarction) I25.2 ; History of kidney stones 
Z87.442 ; Immune thrombocytopenic purpura D69.3 ; Avascular necrosis of bone of 
right hip M87.051 and GERD (gastroesophageal reflux disease) K21.9







IMMUNIZATIONS

No Known Immunizations



SOCIAL HISTORY

Never Assessed



REASON FOR VISIT

Refill request



PLAN OF CARE





VITAL SIGNS





MEDICATIONS







 Medication  Instructions  Dosage  Frequency  Start Date  End Date  Duration  
Status

 

 Benazepril HCl 40 mg  Orally Once a day  1 tablet  24h        90 days  Active

 

 Citalopram Hydrobromide 20 mg  Orally Once a day  1 tablet  24h           
Active







RESULTS

No Results



PROCEDURES

No Known procedures



INSTRUCTIONS





MEDICATIONS ADMINISTERED

No Known Medications



MEDICAL (GENERAL) HISTORY







 Type  Description  Date

 

 Medical History  Type II Diabetes   

 

 Medical History  CAD   

 

 Medical History  Acute MI x1   

 

 Medical History  Heart Cath x3   

 

 Medical History  Kidney Stones   

 

 Medical History  Lymphedema   

 

 Medical History  Immune thrombocytopenic purpura   

 

 Surgical History  Heart Stents x2   

 

 Surgical History  Cholecystectomy   

 

 Surgical History     

 

 Surgical History  [facility]   

 

 Hospitalization History  [facility]  

 

 Hospitalization History  Sepsis/Toxic Shock  

 

 Hospitalization History  [facility]-flu[facility]pneumonia  2017

 

 Hospitalization History  [facility]- Anemia, cellulitis pannus, yeast 
infection. Discharged 2018

## 2018-09-08 NOTE — XMS REPORT
[facility]

 Created on: 2018



Madison Young

External Reference #: [id number]

: 1945

Sex: Female



Demographics







 Address  [address] [place]  [address]

 

 Preferred Language  Unknown

 

 Marital Status  Unknown

 

 [employer] Affiliation  Unknown

 

 Race  Unknown

 

 Ethnic Group  Unknown





Author







 Author  TANI  LEANDER

 

 Organization  [facility]

 

 Address  [address] [place]  [address]



 

 Phone  [phone]







Care Team Providers







 Care Team Member Name  Role  Phone

 

 TANI  LEANDER  Unavailable  [phone]







PROBLEMS







 Type  Condition  ICD9-CM Code  SNM10-IX Code  Onset Dates  Condition Status  
SNOMED Code

 

 Problem  Long-term insulin use     Z79.4     Active  343130480

 

 Problem  Skin ulcer, limited to breakdown of skin     L98.491     Active  
72685623

 

 Problem  Other iron deficiency anemia     D50.8     Active  79500246

 

 Problem  Peripheral edema     R60.9     Active  067894620

 

 Problem  Coronary artery disease     I25.10     Active  08781357

 

 Problem  Diabetes mellitus due to underlying condition with foot ulcer     
E08.621     Active  191751101

 

 Problem  Type 2 diabetes mellitus with diabetic polyneuropathy     E11.42     
Active  31540113

 

 Problem  Microalbuminuric diabetic nephropathy     E11.21     Active  002477694

 

 Problem  Iron deficiency anemia     D50.9     Active  72684370

 

 Problem  Non-adherence to medical treatment     Z91.19     Active  177864946

 

 Problem  Non-pressure chronic ulcer of other part of left foot limited to 
breakdown of skin     L97.521     Active  074459997

 

 Problem  Acute idiopathic gout involving toe of left foot     M10.072     
Active  42133427

 

 Problem  Immune thrombocytopenic purpura     D69.3     Active  546220042

 

 Problem  Hyperlipidemia, unspecified hyperlipidemia     E78.5     Active  
74784369

 

 Problem  GERD (gastroesophageal reflux disease)     K21.9     Active  518041903

 

 Problem  Avascular necrosis of bone of right hip     M87.051     Active  
330205538

 

 Problem  Anxiety disorder, unspecified     F41.9     Active  431430819

 

 Problem  Morbid (severe) obesity due to excess calories     E66.01     Active  
777167211

 

 Problem  Asthma     J45.909     Active  268891011

 

 Problem  Body mass index (BMI) of 45.0-49.9 in adult     Z68.42     Active  
808856748

 

 Problem  Chronic kidney disease (CKD) stage G3a/A3, moderately decreased 
glomerular filtration rate (GFR) between 45-59 mL/min/1.73 square meter and 
albuminuria creatinine ratio greater than 300 mg/g     N18.3     Active  
967117788

 

 Problem  Hypertension     I10     Active  72168418

 

 Problem  Recurrent major depressive disorder, in partial remission     F33.41 
    Active  43772649







ALLERGIES







 Substance  Reaction  Event Type  Date  Status

 

 Heparin Sodium (Porcine) PF  Unknown  Drug Allergy    Active

 

 Adhesive  Unknown  Non Drug Allergy    Active







ENCOUNTERS







 Encounter  Location  Date  Diagnosis

 

 [place] [address] [phone]-
[phone]  06 Sep, 2018   

 

 [facility]  [address] [phone]
[phone]  17 Aug, 2018  Peripheral edema R60.9

 

 [place] [address] [phone]-
7226  13 Aug, 2018  Hypertension I10 and Peripheral edema R60.9

 

 [place] [address] [place] [id number] [place] [address]-
[phone]  09 Aug, 2018   

 

 [facility]  [address] [phone]
[phone]  09 Aug, 2018  Hypertension I10 and Peripheral edema R60.9

 

 [place] [address] [place] [id number] [place] [phone]-
[phone]  06 Aug, 2018  Hypertension I10 and Peripheral edema R60.9

 

 [place] [address] [place] [id number] [place] [phone]-
[phone]  02 Aug, 2018   

 

 [facility]  [address] [phone]-
[phone]  02 Aug, 2018  Hypertension I10 and Peripheral edema R60.9

 

 [place] [address] [place] [id number] [place] [address]18-
[phone]  01 Aug, 2018   

 

 [place] [address] [place] [id number] [place] [address]-
[phone]     

 

 [facility]  [address] [place] [id number] [place] [phone]-
[phone]     

 

 [facility] [address] [place] [phone]-
[phone]     

 

 [place] [id number] [place] [address]-
8145    Diabetes mellitus due to underlying condition with foot 
ulcer E08.621 ; Non-pressure chronic ulcer of other part of left foot limited 
to breakdown of skin L97.521 and BMI 45.0-49.9, adult Z68.42

 

 [place] [id number] [place] [address]-
5566    Acute idiopathic gout involving toe of left foot M10.072

 

 [address] [phone]-
[phone]     

 

 [place] [id number] [place] [address]-
[phone]     

 

 [place] [id number] [place] [address]-
2535     

 

 [place] [id number] [place] [address]120-
6614    Type 2 diabetes mellitus with diabetic polyneuropathy 
E11.42 ; Hypertension I10 ; Hyperlipidemia, unspecified hyperlipidemia E78.5 ; 
Body mass index (BMI) of 45.0-49.9 in adult Z68.42 ; Long-term insulin use 
Z79.4 ; Non-adherence to medical treatment Z91.19 ; Coronary artery disease 
I25.10 ; GERD (gastroesophageal reflux disease) K21.9 ; Asthma J45.909 and 
Recurrent major depressive disorder, in partial remission F33.41

 

 [address] [place] [address]53-
7249  22 May, 2018  Recurrent major depressive disorder, in partial remission 
F33.41 and Hypertension I10

 

 [place] [id number] [place] [address]-
[phone]  21 May, 2018  Immune thrombocytopenic purpura D69.3 and Iron deficiency 
anemia D50.9

 

 [place] [id number] [place] [address]-
5333  21 May, 2018  Type 2 diabetes mellitus with diabetic polyneuropathy 
E11.42 ; Long-term insulin use Z79.4 ; Chronic kidney disease (CKD) stage G3a/A3
, moderately decreased glomerular filtration rate (GFR) between 45-59 mL/min/
1.73 square meter and albuminuria creatinine ratio greater than 300 mg/g N18.3 
; Hypertension I10 ; Hyperlipidemia, unspecified hyperlipidemia E78.5 ; 
Coronary artery disease I25.10 ; GERD (gastroesophageal reflux disease) K21.9 ; 
Immune thrombocytopenic purpura D69.3 ; Asthma J45.909 ; Morbid (severe) 
obesity due to excess calories E66.01 and Recurrent major depressive disorder, 
in partial remission F33.41

 

 [place] [address] [place] [address] 7[address]68-
7458  11 May, 2018  Chronic kidney disease (CKD) stage G3a/A3, moderately 
decreased glomerular filtration rate (GFR) between 45-59 mL/min/1.73 square 
meter and albuminuria creatinine ratio greater than 300 mg/g N18.3

 

 [place] [address] [place] [address]-
4974  02 May, 2018  Chronic kidney disease (CKD) stage G3a/A3, moderately 
decreased glomerular filtration rate (GFR) between 45-59 mL/min/1.73 square 
meter and albuminuria creatinine ratio greater than 300 mg/g N18.3

 

 [place] [address] [address]-
9090     

 

 [place] [address] [place] [id number] [place] [address] 25795-
3512  28 Mar, 2018   

 

 [place] [address] [place] [address]162-
7817  26 Mar, 2018  Immune thrombocytopenic purpura D69.3 ; Type 2 diabetes 
mellitus with diabetic polyneuropathy E11.42 ; Avascular necrosis of bone of 
right hip M87.051 ; Long-term insulin use Z79.4 ; GERD (gastroesophageal reflux 
disease) K21.9 ; Hyperlipidemia, unspecified hyperlipidemia E78.5 ; 
Hypertension I10 ; Recurrent major depressive disorder, in partial remission 
F33.41 ; Microalbuminuric diabetic nephropathy E11.21 ; Body mass index (BMI) 
of 45.0-49.9 in adult Z68.42 ; BMI 45.0-49.9, adult Z68.42 and Chronic kidney 
disease (CKD) stage G3a/A3, moderately decreased glomerular filtration rate (GFR
) between 45-59 mL/min/1.73 square meter and albuminuria creatinine ratio 
greater than 300 mg/g N18.3

 

 [place] [address] [address]-
2753  23 Mar, 2018   

 

 [place] [address] [address]-
2790  23 Mar, 2018   

 

 [place] [address] [address]-
8149  19 Mar, 2018   

 

 [place] [address] [address]-
7715  14 Mar, 2018   

 

 [place] [address] [place] [id number] [place] [address]368-
0523  13 Mar, 2018  Type 2 diabetes mellitus with diabetic polyneuropathy 
E11.42 ; Asthma J45.909 and Hypertension I10

 

 [facility]  [address] [place] 
[id number]  13 Mar, 2018  Skin ulcer, limited to breakdown of skin L98.491 ; 
Immune thrombocytopenic purpura D69.3 ; Avascular necrosis of bone of right hip 
M87.051 and Type 2 diabetes mellitus with diabetic polyneuropathy E11.42

 

 [place] [address] [place] [id number] [place] [address] 14022-
1116  06 Mar, 2018  Asthma J45.909 and Type 2 diabetes mellitus with diabetic 
polyneuropathy E11.42

 

 [place] [address] [address]-
9009  01 Mar, 2018   

 

 [facility]  [address] [place] 
[id number]    Other iron deficiency anemia D50.8 ; Weakness R53.1 ; 
Type 2 diabetes mellitus with diabetic polyneuropathy E11.42 ; Cellulitis of 
trunk, unspecified site of trunk L03.319 ; Coronary artery disease I25.10 ; 
Avascular necrosis of bone of right hip M87.051 and Morbid (severe) obesity due 
to excess calories E66.01

 

 [facility]  [address] [place] [id number] [place] [address] 
[id number]  23 2018   

 

 [facility]  [address] [place] [address] 78482-
6559     

 

 [facility]  [address] [place] [id number] [place] [address] [phone]
[phone]  15 Feb, 2018  Yeast infection of the skin B37.2

 

 [place] [address] [place] [id number] [place] [address] 7[address]95-
[phone]  15 Feb, 2018   

 

 [facility]  [address] [place] [id number] [place] [address] 12758-
5025  15 Feb, 2018   

 

 [place] [address] [place] [id number] [place] [address]-
6794     

 

 [facility] [place] [facility]  [address] [place] [phone]
[phone]     

 

 [place] [address] [place] [id number] [place] [address] 23331-
2270    Type 2 diabetes mellitus with diabetic polyneuropathy 
E11.42 ; Avascular necrosis of bone of right hip M87.051 ; Hyperlipidemia, 
unspecified hyperlipidemia E78.5 ; Hypertension I10 ; Anxiety disorder, 
unspecified F41.9 ; Immune thrombocytopenic purpura D69.3 ; Coronary artery 
disease I25.10 ; Orthopnea R06.01 ; Acute bronchitis, unspecified organism 
J20.9 ; Wound of left lower extremity, initial encounter S81.802A ; Body aches 
R52 and Debilitated R53.81

 

 [place] [address] [place] [address] 98958-
1887     

 

 [facility]  [address] [place] [id number] [place] [address] 29727-
1544    Type 2 diabetes mellitus with diabetic polyneuropathy 
E11.42 ; Encounter for immunization Z23 ; Hyperlipidemia, unspecified 
hyperlipidemia E78.5 ; Hypertension I10 ; Anxiety disorder, unspecified F41.9 ; 
Asthma J45.909 ; Body mass index (BMI) of 45.0-49.9 in adult Z68.42 and Morbid (
severe) obesity due to excess calories E66.01

 

 [address] [address]-
1658    Type 2 diabetes mellitus with diabetic polyneuropathy 
E11.42 ; Hyperlipidemia, unspecified hyperlipidemia E78.5 ; Hypertension I10 
and GERD (gastroesophageal reflux disease) K21.9

 

 [address] [address]-
8301  22 Mar, 2017  Type 2 diabetes mellitus with diabetic polyneuropathy E11.42

 

 [address] [address]-
0155    Type 2 diabetes mellitus with diabetic polyneuropathy 
E11.42 ; Coronary artery disease I25.10 ; History of MI (myocardial infarction) 
I25.2 ; Immune thrombocytopenic purpura D69.3 ; GERD (gastroesophageal reflux 
disease) K21.9 ; Hyperlipidemia, unspecified hyperlipidemia E78.5 ; 
Hypertension I10 ; History of kidney stones Z87.442 and Anxiety disorder, 
unspecified F41.9

 

 [place] [id number] [place] [address] 39[address]-
9452     

 

 [address] [address]-
7952  29 Sep, 2016  Coronary artery disease I25.10 ; History of MI (myocardial 
infarction) I25.2 ; History of kidney stones Z87.442 ; Type 2 diabetes mellitus 
with diabetic polyneuropathy E11.42 ; Avascular necrosis of bone of right hip 
M87.051 ; Hyperlipidemia, unspecified hyperlipidemia E78.5 ; Hypertension I10 ; 
Asthma J45.909 and Encounter for immunization Z23

 

 [place] [id number] [place] [address]303-
6071  20 Sep, 2016   

 

 [address] [place] [address] 99590-
5954    Coronary artery disease I25.10 ; Type 2 diabetes mellitus 
with diabetic polyneuropathy E11.42 ; History of MI (myocardial infarction) 
I25.2 ; Immune thrombocytopenic purpura D69.3 ; Hyperlipidemia, unspecified 
hyperlipidemia E78.5 and Hypertension I10

 

 [place] [address] [address]-
3960    Coronary artery disease I25.10 ; Lymphedema I89.0 ; History 
of MI (myocardial infarction) I25.2 ; History of kidney stones Z87.442 ; Immune 
thrombocytopenic purpura D69.3 ; Type 2 diabetes mellitus with diabetic 
polyneuropathy E11.42 ; Avascular necrosis of bone of right hip M87.051 ; GERD (
gastroesophageal reflux disease) K21.9 ; Hyperlipidemia, unspecified 
hyperlipidemia E78.5 ; Hypertension I10 and Asthma J45.909

 

 [place] [address] [address]-
[phone]     

 

 [place] [address] [address]-
2139     

 

 [place] [address] [address]-
4450  02 Dec, 2015   

 

 [place] [address] [place] [id number] [place] [address] [phone]-
1648  02 Dec, 2015  Hyperlipidemia, unspecified hyperlipidemia E78.5

 

 [place] [address] [address]-
2351     

 

 [place] [address] [address]-
4032     

 

 [place] [address] [address]-
1572    Allergic rhinitis J30.9

 

 [place] [address] [address]-
9535    Type 2 diabetes mellitus with diabetic polyneuropathy 
E11.42 ; Routine adult health maintenance Z00.00 ; Coronary artery disease 
I25.10 ; History of MI (myocardial infarction) I25.2 ; History of kidney stones 
Z87.442 ; Immune thrombocytopenic purpura D69.3 ; Avascular necrosis of bone of 
right hip M87.051 and GERD (gastroesophageal reflux disease) K21.9







IMMUNIZATIONS

No Known Immunizations



SOCIAL HISTORY

Never Assessed



REASON FOR VISIT

Diabetes-awoods



PLAN OF CARE







 Activity  Details









  









 Follow Up  3 Months, prn Reason:CHM/DM







VITAL SIGNS







 Height  67 in  2018

 

 Weight  293 lbs  2018

 

 Temperature  98.3 degrees Fahrenheit  2018

 

 Heart Rate  81 bpm  2018

 

 Respiratory Rate  22   2018

 

 BMI  45.89 kg/m2  2018

 

 Blood pressure systolic  130 mmHg  2018

 

 Blood pressure diastolic  64 mmHg  2018







MEDICATIONS







 Medication  Instructions  Dosage  Frequency  Start Date  End Date  Duration  
Status

 

 Carvedilol 12.5 MG  Orally 2 times a day  1 tablet  12h           Active

 

 Humalog KwikPen 100 UNIT/ML  Subcutaneous three times a day before meals  12 
units              Active

 

 Pantoprazole Sodium 40 mg  Orally Once a day  1 tablet  24h           Active

 

 Aspir-81 81 MG  Orally Once a day  1 tablet  24h     16 May, 2019     Active

 

 Liraglutide 18 MG/3ML  Subcutaneous Once a day  0.6mg daily for one week then 
increase to 1.2 mg daily  24h  22 Carlos Enrique, 2018  20 Sep, 2018  30 days  Active

 

 Citalopram Hydrobromide 20 mg  Orally Once a day  1 tablet  24h           
Active

 

 Benazepril HCl 40 mg  Orally Once a day  1 tablet  24h           Active

 

 Aleve 220 MG  Orally every 12 hrs  2 tablets with food or milk as needed  12h 
          Not-Taking

 

 GlipiZIDE 5 mg  Orally 2 times a day  1 tablet  12h           Active

 

 Levemir FlexTouch 100 UNIT/ML  Subcutaneous 2 times a day  20  units twice 
daily  12h          Active

 

 Nystatin           26 Mar, 2018        Not-Taking

 

 Probiotic -                    Active

 

 Hydrochlorothiazide 25 MG  Orally Once a day  1 tablet  24h           Active

 

 ProAir  (90 Base) MCG/ACT  Inhalation every 4 hrs  2 puffs as needed  
4h          Active

 

 Atorvastatin Calcium 10 mg  Orally Once a day  1 tablet  24h      
    Active

 

 Advair Diskus           26 Mar, 2018        Active







RESULTS

No Results



PROCEDURES







 Procedure  Date Ordered  Result  Body Site

 

 GLYCATED HEMOGLOBIN TEST  2018      

 

 [facility]      

 

 [facility] VISIT ESTABLISHED PATIENT  2018      

 

 VENCLAUDIA, ROUTINE*  2018      







INSTRUCTIONS





MEDICATIONS ADMINISTERED

No Known Medications



MEDICAL (GENERAL) HISTORY







 Type  Description  Date

 

 Medical History  Type II Diabetes   

 

 Medical History  CAD   

 

 Medical History  Acute MI x1   

 

 Medical History  Heart Cath x3   

 

 Medical History  Kidney Stones   

 

 Medical History  Lymphedema   

 

 Medical History  Immune thrombocytopenic purpura   

 

 Surgical History  Heart Stents x2   

 

 Surgical History  Cholecystectomy   

 

 Surgical History     

 

 Surgical History  [place]   

 

 Hospitalization History  [facility]  

 

 Hospitalization History  Sepsis/Toxic Shock  

 

 Hospitalization History  [facility]-flu[facility]neumonia  2017

 

 Hospitalization History  [facility]- Anemia, cellulitis pannus, yeast 
infection. Discharged 2018

## 2018-09-08 NOTE — XMS REPORT
[facility]

 Created on: 2018



Madison Young

External Reference #: [id number]

: 1945

Sex: Female



Demographics







 Address  [address] [place]  [address]

 

 Preferred Language  Unknown

 

 Marital Status  Unknown

 

 Mu-ism Affiliation  Unknown

 

 Race  Unknown

 

 Ethnic Group  Unknown





Author







 Author  FREIRELEANDER HILLIARD

 

 Organization  [facility]

 

 Address  [address] [place]  [address]



 

 Phone  [phone]







Care Team Providers







 Care Team Member Name  Role  Phone

 

 FREIREISMAEL HILLIARDELE  Unavailable  [phone]







PROBLEMS







 Type  Condition  ICD9-CM Code  GOV19-UB Code  Onset Dates  Condition Status  
SNOMED Code

 

 Problem  Recurrent major depressive disorder, in partial remission     F33.41 
    Active  70481781

 

 Problem  Other iron deficiency anemia     D50.8     Active  62788013

 

 Problem  Long-term insulin use     Z79.4     Active  993469966

 

 Problem  Diabetes mellitus due to underlying condition with foot ulcer     
E08.621     Active  127821576

 

 Problem  Immune thrombocytopenic purpura     D69.3     Active  265246113

 

 Problem  Non-pressure chronic ulcer of other part of left foot limited to 
breakdown of skin     L97.521     Active  925057656

 

 Problem  Chronic kidney disease (CKD) stage G3a/A3, moderately decreased 
glomerular filtration rate (GFR) between 45-59 mL/min/1.73 square meter and 
albuminuria creatinine ratio greater than 300 mg/g     N18.3     Active  
641680650

 

 Problem  Microalbuminuric diabetic nephropathy     E11.21     Active  319251581

 

 Problem  Non-adherence to medical treatment     Z91.19     Active  581052816

 

 Problem  Skin ulcer, limited to breakdown of skin     L98.491     Active  
38357445

 

 Problem  Acute idiopathic gout involving toe of left foot     M10.072     
Active  13867899

 

 Problem  Iron deficiency anemia     D50.9     Active  74384943

 

 Problem  GERD (gastroesophageal reflux disease)     K21.9     Active  421734911

 

 Problem  Avascular necrosis of bone of right hip     M87.051     Active  
553983098

 

 Problem  Type 2 diabetes mellitus with diabetic polyneuropathy     E11.42     
Active  26279620

 

 Problem  Coronary artery disease     I25.10     Active  81352984

 

 Problem  Hypertension     I10     Active  83478175

 

 Problem  Anxiety disorder, unspecified     F41.9     Active  784300000

 

 Problem  Hyperlipidemia, unspecified hyperlipidemia     E78.5     Active  
69083074

 

 Problem  Morbid (severe) obesity due to excess calories     E66.01     Active  
457031163

 

 Problem  Asthma     J45.909     Active  438942642

 

 Problem  Body mass index (BMI) of 45.0-49.9 in adult     Z68.42     Active  
344229438







ALLERGIES

No Information



ENCOUNTERS







 Encounter  Location  Date  Diagnosis

 

 [place] [address] [place] [id number] [place] [phone]-
[phone]  29 Aug, 2018   

 

 [place] [address] [phone]    Diabetes mellitus due to underlying condition with foot 
ulcer E08.621 and Non-pressure chronic ulcer of other part of left foot limited 
to breakdown of skin L97.521

 

 [place] [address] [phone]-
[phone]    Acute idiopathic gout involving toe of left foot M10.072

 

 [place] [address] [address]-
[phone]     

 

 [place] [address] [address]-
[phone]     

 

 [place] [address] [place] [id number] [place] [phone]-
[phone]     

 

 [place] [address] [address]-
7842    Type 2 diabetes mellitus with diabetic polyneuropathy 
E11.42 ; Hypertension I10 ; Hyperlipidemia, unspecified hyperlipidemia E78.5 ; 
Body mass index (BMI) of 45.0-49.9 in adult Z68.42 ; Long-term insulin use 
Z79.4 ; Non-adherence to medical treatment Z91.19 ; Coronary artery disease 
I25.10 ; GERD (gastroesophageal reflux disease) K21.9 ; Asthma J45.909 and 
Recurrent major depressive disorder, in partial remission F33.41

 

 [place] [address] [place] [address] [id number] [place] [phone]-
[phone]  22 May, 2018  Recurrent major depressive disorder, in partial remission 
F33.41 and Hypertension I10

 

 [place] [address] [phone]-
[phone]  21 May, 2018  Immune thrombocytopenic purpura D69.3 and Iron deficiency 
anemia D50.9

 

 [place] [address] [place] [address]-
6290  21 May, 2018  Type 2 diabetes mellitus with diabetic polyneuropathy 
E11.42 ; Long-term insulin use Z79.4 ; Chronic kidney disease (CKD) stage G3a/A3
, moderately decreased glomerular filtration rate (GFR) between 45-59 mL/min/
1.73 square meter and albuminuria creatinine ratio greater than 300 mg/g N18.3 
; Hypertension I10 ; Hyperlipidemia, unspecified hyperlipidemia E78.5 ; 
Coronary artery disease I25.10 ; GERD (gastroesophageal reflux disease) K21.9 ; 
Immune thrombocytopenic purpura D69.3 ; Asthma J45.909 ; Morbid (severe) 
obesity due to excess calories E66.01 and Recurrent major depressive disorder, 
in partial remission F33.41

 

 [place] [address] [id number] [place] [address]-
[phone]  11 May, 2018  Chronic kidney disease (CKD) stage G3a/A3, moderately 
decreased glomerular filtration rate (GFR) between 45-59 mL/min/1.73 square 
meter and albuminuria creatinine ratio greater than 300 mg/g N18.3

 

 [place] [address] [place] [phone]-
[phone]  02 May, 2018  Chronic kidney disease (CKD) stage G3a/A3, moderately 
decreased glomerular filtration rate (GFR) between 45-59 mL/min/1.73 square 
meter and albuminuria creatinine ratio greater than 300 mg/g N18.3

 

 [place] [address] [place] [address]148-
[phone]     

 

 [place] [address] [place] [id number] [place] [address] 89008-
[phone]  28 Mar, 2018   

 

 [place] [address] [place] [id number] [place] [phone]-
[phone]  26 Mar, 2018  Immune thrombocytopenic purpura D69.3 ; Type 2 diabetes 
mellitus with diabetic polyneuropathy E11.42 ; Avascular necrosis of bone of 
right hip M87.051 ; Long-term insulin use Z79.4 ; GERD (gastroesophageal reflux 
disease) K21.9 ; Hyperlipidemia, unspecified hyperlipidemia E78.5 ; 
Hypertension I10 ; Recurrent major depressive disorder, in partial remission 
F33.41 ; Microalbuminuric diabetic nephropathy E11.21 ; Body mass index (BMI) 
of 45.0-49.9 in adult Z68.42 ; BMI 45.0-49.9, adult Z68.42 and Chronic kidney 
disease (CKD) stage G3a/A3, moderately decreased glomerular filtration rate (GFR
) between 45-59 mL/min/1.73 square meter and albuminuria creatinine ratio 
greater than 300 mg/g N18.3

 

 [place] [address] [phone]-
[phone]  23 Mar, 2018   

 

 [place] [address] [phone]-
[phone]  23 Mar, 2018   

 

 [place] [address] [address]-
2644  19 Mar, 2018   

 

 [place] [address] [address]-
3326  14 Mar, 2018   

 

 [place] [address] [address]-
6130  13 Mar, 2018  Type 2 diabetes mellitus with diabetic polyneuropathy 
E11.42 ; Asthma J45.909 and Hypertension I10

 

 [facility]  [address] 
[id number]  13 Mar, 2018  Skin ulcer, limited to breakdown of skin L98.491 ; 
Immune thrombocytopenic purpura D69.3 ; Avascular necrosis of bone of right hip 
M87.051 and Type 2 diabetes mellitus with diabetic polyneuropathy E11.42

 

 [place] [address] [place] [id number] [place] [address] 8[address]-
4797  06 Mar, 2018  Asthma J45.909 and Type 2 diabetes mellitus with diabetic 
polyneuropathy E11.42

 

 [place] [address] [place] [id number] [place] [address]-
1616  01 Mar, 2018   

 

 [facility]  [address] 
[id number]    Other iron deficiency anemia D50.8 ; Weakness R53.1 ; 
Type 2 diabetes mellitus with diabetic polyneuropathy E11.42 ; Cellulitis of 
trunk, unspecified site of trunk L03.319 ; Coronary artery disease I25.10 ; 
Avascular necrosis of bone of right hip M87.051 and Morbid (severe) obesity due 
to excess calories E66.01

 

 [facility]  [address] 
[id number]     

 

 [place] [address] [phone]-
[phone]     

 

 [facility] [place] [address] [phone]
[phone]  15 Feb, 2018  Yeast infection of the skin B37.2

 

 [address] [phone]-
[phone]  15 Feb, 2018   

 

 [place] [address] [address]-
2572  15 Feb, 2018   

 

 [place] [address] [place] [id number] [place] [phone]-
[phone]     

 

 [facility] [place] [id number] [place] [phone]     

 

 [place] [address] [place] [id number] [place] [address]67-
3954    Type 2 diabetes mellitus with diabetic polyneuropathy 
E11.42 ; Avascular necrosis of bone of right hip M87.051 ; Hyperlipidemia, 
unspecified hyperlipidemia E78.5 ; Hypertension I10 ; Anxiety disorder, 
unspecified F41.9 ; Immune thrombocytopenic purpura D69.3 ; Coronary artery 
disease I25.10 ; Orthopnea R06.01 ; Acute bronchitis, unspecified organism 
J20.9 ; Wound of left lower extremity, initial encounter S81.802A ; Body aches 
R52 and Debilitated R53.81

 

 [address] [phone]-
[phone]     

 

 [place] [address], [place] [address]-
6225    Type 2 diabetes mellitus with diabetic polyneuropathy 
E11.42 ; Encounter for immunization Z23 ; Hyperlipidemia, unspecified 
hyperlipidemia E78.5 ; Hypertension I10 ; Anxiety disorder, unspecified F41.9 ; 
Asthma J45.909 ; Body mass index (BMI) of 45.0-49.9 in adult Z68.42 and Morbid (
severe) obesity due to excess calories E66.01

 

 [place] [address] [place] [address]-
1415    Type 2 diabetes mellitus with diabetic polyneuropathy 
E11.42 ; Hyperlipidemia, unspecified hyperlipidemia E78.5 ; Hypertension I10 
and GERD (gastroesophageal reflux disease) K21.9

 

 [place] [id number] [place] [address]-
3302  22 Mar, 2017  Type 2 diabetes mellitus with diabetic polyneuropathy E11.42

 

 [place] [id number] [address]-
[address]    Type 2 diabetes mellitus with diabetic polyneuropathy 
E11.42 ; Coronary artery disease I25.10 ; History of MI (myocardial infarction) 
I25.2 ; Immune thrombocytopenic purpura D69.3 ; GERD (gastroesophageal reflux 
disease) K21.9 ; Hyperlipidemia, unspecified hyperlipidemia E78.5 ; 
Hypertension I10 ; History of kidney stones Z87.442 and Anxiety disorder, 
unspecified F41.9

 

 [place] [address] [place] [address]-
1020     

 

 [address] [place] [address] 7[address]-
1004  29 Sep, 2016  Coronary artery disease I25.10 ; History of MI (myocardial 
infarction) I25.2 ; History of kidney stones Z87.442 ; Type 2 diabetes mellitus 
with diabetic polyneuropathy E11.42 ; Avascular necrosis of bone of right hip 
M87.051 ; Hyperlipidemia, unspecified hyperlipidemia E78.5 ; Hypertension I10 ; 
Asthma J45.909 and Encounter for immunization Z23

 

 [place] [address] [id number] [place] [address]-
[phone]  20 Sep, 2016   

 

 [place] [address] [address]-
2335    Coronary artery disease I25.10 ; Type 2 diabetes mellitus 
with diabetic polyneuropathy E11.42 ; History of MI (myocardial infarction) 
I25.2 ; Immune thrombocytopenic purpura D69.3 ; Hyperlipidemia, unspecified 
hyperlipidemia E78.5 and Hypertension I10

 

 [place] [address] [id number] [place] [address] 20978-
5737    Coronary artery disease I25.10 ; Lymphedema I89.0 ; History 
of MI (myocardial infarction) I25.2 ; History of kidney stones Z87.442 ; Immune 
thrombocytopenic purpura D69.3 ; Type 2 diabetes mellitus with diabetic 
polyneuropathy E11.42 ; Avascular necrosis of bone of right hip M87.051 ; GERD (
gastroesophageal reflux disease) K21.9 ; Hyperlipidemia, unspecified 
hyperlipidemia E78.5 ; Hypertension I10 and Asthma J45.909

 

 [place] [address] [place] [address] [id number] [place] [address]-
9880     

 

 [place] [address] [place] [id number] [place] [address]-
[phone]     

 

 [place] [address] [place] [id number] [place] [address] 68751-
7108  02 Dec, 2015   

 

 [place] [address] [place] [id number] [place] [address] 7[address]85-
[phone]  02 Dec, 2015  Hyperlipidemia, unspecified hyperlipidemia E78.5

 

 [place] [address] [place] [id number] [place] [address]34-
[phone]     

 

 [place] [address] [place] [id number] [place] [address] 65385-
[phone]     

 

 [place] [address] [place] [id number] [place] [address] 6[address]-
[phone]    Allergic rhinitis J30.9

 

 [facility]  [address] [facility] [id number] [place] [address]-
3098  19 2015  Type 2 diabetes mellitus with diabetic polyneuropathy 
E11.42 ; Routine adult health maintenance Z00.00 ; Coronary artery disease 
I25.10 ; History of MI (myocardial infarction) I25.2 ; History of kidney stones 
Z87.442 ; Immune thrombocytopenic purpura D69.3 ; Avascular necrosis of bone of 
right hip M87.051 and GERD (gastroesophageal reflux disease) K21.9







IMMUNIZATIONS

No Known Immunizations



SOCIAL HISTORY

Never Assessed



REASON FOR VISIT

dx codes



PLAN OF CARE





VITAL SIGNS





MEDICATIONS

Unknown Medications



RESULTS

No Results



PROCEDURES

No Known procedures



INSTRUCTIONS





MEDICATIONS ADMINISTERED

No Known Medications



MEDICAL (GENERAL) HISTORY







 Type  Description  Date

 

 Medical History  Type II Diabetes   

 

 Medical History  CAD   

 

 Medical History  Acute MI x1   

 

 Medical History  Heart Cath x3   

 

 Medical History  Kidney Stones   

 

 Medical History  Lymphedema   

 

 Medical History  Immune thrombocytopenic purpura   

 

 Surgical History  Heart Stents x2   

 

 Surgical History  Cholecystectomy   

 

 Surgical History     

 

 Surgical History  [place]   

 

 Hospitalization History  [facility]  

 

 Hospitalization History  Sepsis/Toxic Shock  

 

 Hospitalization History  [facility]-flu/pneumonia  2017

 

 Hospitalization History  [facility], [place], operated by [facility]- Anemia, cellulitis pannus, yeast 
infection. Discharged 2018

## 2018-09-08 NOTE — ED GENERAL
General


Chief Complaint:  Respiratory Problems


Stated Complaint:  SOB


Nursing Triage Note:  


ARRIVED VIA WC.  COMPLAINS OF INCREASED SOA AND FLUCTUATION OF WEIGHT.  HAS 

BEEN 


TO THE DR.


Nursing Sepsis Screen:  No Definite Risk


Source of Information:  Patient


Exam Limitations:  No Limitations





History of Present Illness


Date Seen by Provider:  Sep 8, 2018


Time Seen by Provider:  18:20


Initial Comments


Here with report of increasing shortness of air and weight gain.  Has been on 

diuretics and that has helped but seems to have been getting worse recently.  

Does have history of anemia.  Reports that shortness of breath has worsened and 

is worse with activity.  Also reports that she has foul-smelling urine and 

seems to be urinating more.


Timing/Duration:  3-4 Days


Severity:  Moderate


Associated Systoms:  No Chest Pain, No Cough, No Nausea/Vomiting; Shortness of 

Air, Weakness





Allergies and Home Medications


Allergies


Coded Allergies:  


     vancomycin (Verified  Allergy, Intermediate, RASH, 18)


     heparin (Verified  Allergy, Unknown, 17)


Uncoded Allergies:  


     TAPE (Allergy, Mild, RASH, 17)





Home Medications


Albuterol Sulfate 6.7 Gm Hfa.aer.ad, 2 PUFF IH Q4H PRN for SHORTNESS OF BREATH, 

(Reported)


Aspirin 81 Mg Tablet.dr, 81 MG PO HS, (Reported)


Atorvastatin Calcium 10 Mg Tablet, 10 MG PO HS, (Reported)


Benazepril HCl 40 Mg Tab, 40 MG PO HS, (Reported)


Carvedilol 12.5 Mg Tablet, 12.5 MG PO BID, (Reported)


Citalopram Hydrobromide 20 Mg Tablet, 20 MG PO HS, (Reported)


Fluticasone/Salmeterol 1 Each Blst.w.dev, 1-2 PUFF IH DAILY PRN for SHORTNESS 

OF BREATH, (Reported)


Glipizide 5 Mg Tablet, 5 MG PO BID, (Reported)


Hydrochlorothiazide 12.5 Mg Capsule, 12.5 MG PO BID, (Reported)


Insulin Determir 1,000 Units/10 Ml Soln, 10 UNIT SQ HS


   Prescribed by: MURPHY LONG on 18


Insulin Lispro 100 Unit/1 Ml Insuln.pen, 6 UNIT SQ AC


   for glucose greater than 130 with meals 


   Prescribed by: MURPHY LONG on 18


Lactobacillus Combination No.4 1 Each Capsule, 1 CAP PO Q48H, (Reported)


Mupirocin 22 Gm Oint...g., TP TID PRN for RASH, (Reported)


Naproxen Sodium 220 Mg Tablet, 440 MG PO BID PRN for PAIN, (Reported)


   TAKES 2 (220MG) TABLETS 


Nystatin 15 Gm Cream..g., TP TID PRN for RASH, (Reported)


Pantoprazole Sodium 40 Mg Tablet.dr, 40 MG PO HS, (Reported)





Patient Home Medication List


Home Medication List Reviewed:  Yes





Review of Systems


Review of Systems


Constitutional:  see HPI, chills; No fever


EENTM:  no symptoms reported


Respiratory:  dyspnea on exertion, short of breath; No wheezing


Cardiovascular:  No chest pain; edema


Gastrointestinal:  No abdominal pain, No nausea, No vomiting


Genitourinary:  see HPI, frequency


Musculoskeletal:  joint pain; No muscle pain


Skin:  no symptoms reported





All Other Systems Reviewed


Negative Unless Noted:  Yes





Past Medical-Social-Family Hx


Past Med/Social Hx:  Reviewed Nursing Past Med/Soc Hx


Patient Social History


Alcohol Use:  Denies Use


Alcohol Beverage of Choice:  Beer


Recreational Drug Use:  No


Smoking Status:  Former Smoker


Type Used:  Cigarettes


Former Smoker, Quit:  1977


2nd Hand Smoke Exposure:  No


Recent Foreign Travel:  No


Contact w/Someone Who Travel:  No


Recent Infectious Disease Expo:  No


Recent Hopitalizations:  No





Immunizations Up To Date


Tetanus Booster (TDap):  Unknown


PED Vaccines UTD:  Yes


Date of Pneumonia Vaccine:  Oct 1, 2016


Date of Influenza Vaccine:  Oct 1, 2017





Seasonal Allergies


Seasonal Allergies:  Yes





Past Medical History


Surgeries:  Yes


Cardiac, Coronary Stent, Gallbladder, Renal


Respiratory:  Yes


Asthma


Currently Using CPAP:  No


Currently Using BIPAP:  No


Cardiac:  Yes (stent)


Coronary Artery Disease, High Cholesterol, Hypertension


Neurological:  No


Reproductive Disorders:  No


Female Reproductive Disorders:  Denies


GYN History:  Menopausal


Sexually Transmitted Disease:  No


HIV/AIDS:  No


Genitourinary:  Yes


Bladder Infection, Kidney Stones


Gastrointestinal:  No


Gastroesophageal Reflux


Musculoskeletal:  Yes (avascular necrosis of the right hip)


Rheumatoid Arthritis, Foot Drop, Fractures


Endocrine:  Yes (DM TYPE II; MORBID OBESITY)


Diabetes, Insulin dep


HEENT:  No


Cancer:  No


Psychosocial:  Yes


Anxiety, Depression


Integumentary:  No


Recent Skin Changes


Blood Disorders:  Yes (anemia, platelets low, transfusion every 2-3 months)


Adverse Reaction/Blood Tranf:  No





Family Medical History


Reviewed Nursing Family Hx





Cardiovascular disease


  19 MOTHER


Diabetes mellitus


  19 MOTHER


Paternal family history of emphysema


  19 FATHER


Heart Disease, COPD, Diabetes





Physical Exam


Vital Signs





Vital Signs - First Documented








 18





 17:26 18:42


 


Temp 98.0 


 


Pulse 75 


 


Resp 16 


 


B/P (MAP) 197/92 (127) 


 


Pulse Ox 94 


 


O2 Delivery Room Air 


 


O2 Flow Rate  2.00





Capillary Refill : Less Than 3 Seconds


Height, Weight, BMI


Height: 5'7.00"


Weight: 320lbs. 12.8oz. [id number]; 39.1 BMI


Method:Stated


General Appearance:  No Apparent Distress, Obese


HEENT:  PERRL/EOMI, Pharynx Normal


Neck:  Non Tender, Supple


Respiratory:  Lungs Clear, Normal Breath Sounds


Cardiovascular:  Regular Rate, Rhythm, No Murmur


Gastrointestinal:  Non Tender, Soft


Extremity:  Pedal Edema (has compression stockings.  1+ edema at the top of the 

stockings), Pelvis Stable, Other (tenderness to the right hip that is chronic)


Neurologic/Psychiatric:  Alert, Oriented x3


Skin:  Normal Color, Warm/Dry





Progress/Results/Core Measures


Suspected Sepsis


Recent Fever Within 48 Hours:  No


Infection Criteria Present:  None


New/Unexplained  Altered Menta:  No


Sepsis Screen:  No Definite Risk


SIRS


Temperature:98.0 


Pulse: 75 


Respiratory Rate: 16


 


Laboratory Tests


18 18:38: White Blood Count 7.4


Blood Pressure 197 /92 


Mean: 127


 


Laboratory Tests


18 18:38: 


Creatinine 0.79, Platelet Count 192, Total Bilirubin 1.1H








Results/Orders


Lab Results





Laboratory Tests








Test


 18


18:30 18


18:38 18


18:40 Range/Units


 


 


Urine Color YELLOW     


 


Urine Clarity


 SLIGHTLY


CLOUDY 


 


  





 


Urine pH 5    5-9  


 


Urine Specific Gravity 1.020    1.016-1.022  


 


Urine Protein 3+ H   NEGATIVE  


 


Urine Glucose (UA) NEGATIVE    NEGATIVE  


 


Urine Ketones NEGATIVE    NEGATIVE  


 


Urine Nitrite NEGATIVE    NEGATIVE  


 


Urine Bilirubin NEGATIVE    NEGATIVE  


 


Urine Urobilinogen NORMAL    NORMAL  MG/DL


 


Urine Leukocyte Esterase 2+ H   NEGATIVE  


 


Urine RBC (Auto) 1+ H   NEGATIVE  


 


Urine RBC RARE     /HPF


 


Urine WBC 5-10 H    /HPF


 


Urine Squamous Epithelial


Cells 2-5 


 


 


  /HPF





 


Urine Crystals NONE     /LPF


 


Urine Bacteria FEW H    /HPF


 


Urine Casts NONE     /LPF


 


Urine Mucus NEGATIVE     /LPF


 


Urine Culture Indicated YES     


 


White Blood Count


 


 7.4 


 


 4.3-11.0


10^3/uL


 


Red Blood Count


 


 3.93 L


 


 4.35-5.85


10^6/uL


 


Hemoglobin  12.0   11.5-16.0  G/DL


 


Hematocrit  38   35-52  %


 


Mean Corpuscular Volume  96   80-99  FL


 


Mean Corpuscular Hemoglobin  31   25-34  PG


 


Mean Corpuscular Hemoglobin


Concent 


 32 


 


 32-36  G/DL





 


Red Cell Distribution Width  16.9 H  10.0-14.5  %


 


Platelet Count


 


 192 


 


 130-400


10^3/uL


 


Mean Platelet Volume  11.4 H  7.4-10.4  FL


 


Neutrophils (%) (Auto)  72   42-75  %


 


Lymphocytes (%) (Auto)  17   12-44  %


 


Monocytes (%) (Auto)  7   0-12  %


 


Eosinophils (%) (Auto)  4   0-10  %


 


Basophils (%) (Auto)  0   0-10  %


 


Neutrophils # (Auto)  5.3   1.8-7.8  X 10^3


 


Lymphocytes # (Auto)  1.2   1.0-4.0  X 10^3


 


Monocytes # (Auto)  0.5   0.0-1.0  X 10^3


 


Eosinophils # (Auto)


 


 0.3 


 


 0.0-0.3


10^3/uL


 


Basophils # (Auto)


 


 0.0 


 


 0.0-0.1


10^3/uL


 


Sodium Level  138   135-145  MMOL/L


 


Potassium Level  4.4   3.6-5.0  MMOL/L


 


Chloride Level  106     MMOL/L


 


Carbon Dioxide Level  23   21-32  MMOL/L


 


Anion Gap  9   5-14  MMOL/L


 


Blood Urea Nitrogen  18   7-18  MG/DL


 


Creatinine


 


 0.79 


 


 0.60-1.30


MG/DL


 


Estimat Glomerular Filtration


Rate 


 > 60 


 


  





 


BUN/Creatinine Ratio  23    


 


Glucose Level  115 H    MG/DL


 


Calcium Level  8.9   8.5-10.1  MG/DL


 


Corrected Calcium  9.1   8.5-10.1  MG/DL


 


Magnesium Level  2.0   1.8-2.4  MG/DL


 


Total Bilirubin  1.1 H  0.1-1.0  MG/DL


 


Aspartate Amino Transf


(AST/SGOT) 


 31 


 


 5-34  U/L





 


Alanine Aminotransferase


(ALT/SGPT) 


 25 


 


 0-55  U/L





 


Alkaline Phosphatase  197 H    U/L


 


C-Reactive Protein High


Sensitivity 


 0.73 H


 


 0.00-0.50


MG/DL


 


B-Type Natriuretic Peptide  206.5 H  <100.0  PG/ML


 


Total Protein  6.9   6.4-8.2  GM/DL


 


Albumin  3.7   3.2-4.5  GM/DL


 


Glucometer   117 H   MG/DL








My Orders





Orders - ATUL MARES MD


Cbc With Automated Diff (18 18:31)


Comprehensive Metabolic Panel (18 18:31)


Hs C Reactive Protein (18 18:31)


Ua Culture If Indicated (18 18:31)


Saline Lock/Iv-Start (18 18:31)


Chest 1 View, Ap/Pa Only (18 18:31)


BNP (18 18:37)


Magnesium (18 18:37)


Urine Culture (18 18:30)


Cefdinir Capsule (Omnicef Capsule) (18 20:15)





Vital Signs/I&O











 18





 17:26 18:42


 


Temp 98.0 


 


Pulse 75 


 


Resp 16 


 


B/P (MAP) 197/92 (127) 


 


Pulse Ox 94 88


 


O2 Delivery Room Air Nasal Cannula


 


O2 Flow Rate  2.00





Capillary Refill : Less Than 3 Seconds








Blood Pressure Mean:  127








Progress Note :  


Progress Note


Seen and evaluated.  IV, labs and UA ordered.  Chest x-ray ordered.  Monitor 

patient.  2015: Overall no significant findings other than urinary tract 

infection.  BNP is slightly elevated but within her normal range.  We will 

initiate Omnicef and continue the outpatient for urinary tract infection given 

her history of sepsis and septic shock.  This would also cover lungs if there 

is underlying nondetectable pneumonia currently.  All this was discussed with 

patient and family who agree.  Discharged home with return precautions.  

Patient and family verbalize understanding instructions and agreement with plan.





Diagnostic Imaging





   Diagonstic Imaging:  Xray


   Plain Films/CT/US/NM/MRI:  chest


Comments


NAME:   DEV SCHUMACHER


MED REC#:   [id number]


ACCOUNT#:   [id number]


PT STATUS:   REG ER


:   1945


PHYSICIAN:   ATUL MARES MD


ADMIT DATE:   18/ER


 ***Draft***


Date of Exam:18





CHEST 1 VIEW, AP/PA ONLY








EXAMINATION: Chest radiograph, portable AP view.





DATE: 2018 at 1846 hours.





INDICATION: 72-year-old female, shortness of breath.





COMPARISON:  2018.





FINDINGS: There are technical limitations of the exam relating to


patient body habitus and difficulties with exposure. There is


unchanged cardiomegaly. There is no identified pneumothorax.


There is no definite large pleural effusion. There are bilateral


interstitial opacities which appear fairly similar to the


comparison exam. There is somewhat of a bibasilar predominance.





IMPRESSION: 


1. Unchanged cardiomegaly with bilateral interstitial opacities


similar in appearance to comparison exam. Differential diagnostic


considerations would include pulmonary interstitial edema,


atypical infection, and chronic lung changes.








  Dictated on workstation # [id number]








Dict:   18


Trans:   18


[facility] [id number]





Interpreted by:     DON HOWELL MD


Electronically signed by:


   Reviewed:  Reviewed by Me





Departure


Impression





 Primary Impression:  


 Urinary tract infection


 Qualified Codes:  N30.00 - Acute cystitis without hematuria


 Additional Impression:  


 Dyspnea


 Qualified Codes:  R06.09 - Other forms of dyspnea


Disposition:  01 HOME, SELF-CARE


Condition:  Improved





Departure-Patient Inst.


Decision time for Depature:  20:17


Referrals:  


[facility]/ (PCP)


Primary Care Physician








LEANDER FREIRE (Family)


Primary Care Physician


Patient Instructions:  Shortness of Breath (Dyspnea) (DC), Urinary Tract 

Infection, Adult (DC)





Add. Discharge Instructions:  


All discharge instructions reviewed with patient and/or family. Voiced 

understanding.





Take medications as directed.  You should use your albuterol treatments at home 

to help with the shortness of breath.  Continue other home medications as 

previously prescribed.  Follow-up with your doctor this week for recheck and 

further evaluation.  Return for worse pain, fever, vomiting, weakness, 

breathing problems or other concerns as needed.


Scripts


Cefdinir (Cefdinir) 300 Mg Capsule


300 MG PO BID, #14 CAP 0 Refills


   Prov: ATUL MARES MD         18











ATUL MARES MD Sep 8, 2018 19:11

## 2018-09-08 NOTE — XMS REPORT
[facility]

 Created on: 2018



Madison Young

External Reference #: [id number]

: 1945

Sex: Female



Demographics







 Address  [address] [place]  [address]

 

 Preferred Language  Unknown

 

 Marital Status  Unknown

 

 Anabaptist Affiliation  Unknown

 

 Race  Unknown

 

 Ethnic Group  Unknown





Author







 Author  FREIRELEANDER HILLIARD

 

 Organization  [facility]

 

 Address  [address] [place]  [address]



 

 Phone  [phone]







Care Team Providers







 Care Team Member Name  Role  Phone

 

 FREIREISMAEL HILLIARDELE  Unavailable  [phone]







PROBLEMS







 Type  Condition  ICD9-CM Code  [id number] Code  Onset Dates  Condition Status  
SNOMED Code

 

 Problem  Recurrent major depressive disorder, in partial remission     F33.41 
    Active  94536063

 

 Problem  Other iron deficiency anemia     D50.8     Active  64964464

 

 Problem  Long-term insulin use     Z79.4     Active  239680669

 

 Problem  Diabetes mellitus due to underlying condition with foot ulcer     
E08.621     Active  384591652

 

 Problem  Immune thrombocytopenic purpura     D69.3     Active  507414697

 

 Problem  Non-pressure chronic ulcer of other part of left foot limited to 
breakdown of skin     L97.521     Active  413291807

 

 Problem  Chronic kidney disease (CKD) stage G3a/A3, moderately decreased 
glomerular filtration rate (GFR) between 45-59 mL/min/1.73 square meter and 
albuminuria creatinine ratio greater than 300 mg/g     N18.3     Active  
532553070

 

 Problem  Microalbuminuric diabetic nephropathy     E11.21     Active  032807905

 

 Problem  Non-adherence to medical treatment     Z91.19     Active  527753553

 

 Problem  Skin ulcer, limited to breakdown of skin     L98.491     Active  
41596163

 

 Problem  Acute idiopathic gout involving toe of left foot     M10.072     
Active  90476408

 

 Problem  Iron deficiency anemia     D50.9     Active  55229116

 

 Problem  GERD (gastroesophageal reflux disease)     K21.9     Active  901417211

 

 Problem  Avascular necrosis of bone of right hip     M87.051     Active  
116521718

 

 Problem  Type 2 diabetes mellitus with diabetic polyneuropathy     E11.42     
Active  32263743

 

 Problem  Coronary artery disease     I25.10     Active  98060355

 

 Problem  Hypertension     I10     Active  32563441

 

 Problem  Anxiety disorder, unspecified     F41.9     Active  115482277

 

 Problem  Hyperlipidemia, unspecified hyperlipidemia     E78.5     Active  
07884181

 

 Problem  Morbid (severe) obesity due to excess calories     E66.01     Active  
632751172

 

 Problem  Asthma     J45.909     Active  690082027

 

 Problem  Body mass index (BMI) of 45.0-49.9 in adult     Z68.42     Active  
577986259







ALLERGIES

No Information



ENCOUNTERS







 Encounter  Location  Date  Diagnosis

 

 [place] [address] [place] [id number] [place] [phone]
[phone]  29 Aug, 2018   

 

 [place] [address] [address]
5[address]    Diabetes mellitus due to underlying condition with foot 
ulcer E08.621 and Non-pressure chronic ulcer of other part of left foot limited 
to breakdown of skin L97.521

 

 [place] [address] [address]-
2801    Acute idiopathic gout involving toe of left foot M10.072

 

 [place] [address] [phone]-
[phone]     

 

 [place] [address] [address]-
8955     

 

 [place] [address] [place] [id number] [place] [address]-
[phone]     

 

 [place] [address] [address]-
[address]0    Type 2 diabetes mellitus with diabetic polyneuropathy 
E11.42 ; Hypertension I10 ; Hyperlipidemia, unspecified hyperlipidemia E78.5 ; 
Body mass index (BMI) of 45.0-49.9 in adult Z68.42 ; Long-term insulin use 
Z79.4 ; Non-adherence to medical treatment Z91.19 ; Coronary artery disease 
I25.10 ; GERD (gastroesophageal reflux disease) K21.9 ; Asthma J45.909 and 
Recurrent major depressive disorder, in partial remission F33.41

 

 [place] [address] [place] [id number] [place] [phone]-
[phone]  22 May, 2018  Recurrent major depressive disorder, in partial remission 
F33.41 and Hypertension I10

 

 [place] [address] [phone]-
[phone]  21 May, 2018  Immune thrombocytopenic purpura D69.3 and Iron deficiency 
anemia D50.9

 

 [place] [address] [place] [phone]-
[phone]  21 May, 2018  Type 2 diabetes mellitus with diabetic polyneuropathy 
E11.42 ; Long-term insulin use Z79.4 ; Chronic kidney disease (CKD) stage G3a/A3
, moderately decreased glomerular filtration rate (GFR) between 45-59 mL/min/
1.73 square meter and albuminuria creatinine ratio greater than 300 mg/g N18.3 
; Hypertension I10 ; Hyperlipidemia, unspecified hyperlipidemia E78.5 ; 
Coronary artery disease I25.10 ; GERD (gastroesophageal reflux disease) K21.9 ; 
Immune thrombocytopenic purpura D69.3 ; Asthma J45.909 ; Morbid (severe) 
obesity due to excess calories E66.01 and Recurrent major depressive disorder, 
in partial remission F33.41

 

 [place] [address] [place] [id number] [place] [address]-
4697  11 May, 2018  Chronic kidney disease (CKD) stage G3a/A3, moderately 
decreased glomerular filtration rate (GFR) between 45-59 mL/min/1.73 square 
meter and albuminuria creatinine ratio greater than 300 mg/g N18.3

 

 [place] [address] [place] [phone]-
5612  02 May, 2018  Chronic kidney disease (CKD) stage G3a/A3, moderately 
decreased glomerular filtration rate (GFR) between 45-59 mL/min/1.73 square 
meter and albuminuria creatinine ratio greater than 300 mg/g N18.3

 

 [place] [address] [place] [phone]-
5813     

 

 [place] [address] [place] [id number] [place] [phone]-
[phone]  28 Mar, 2018   

 

 [place] [address] [place] [id number] [place] [address]-
8085  26 Mar, 2018  Immune thrombocytopenic purpura D69.3 ; Type 2 diabetes 
mellitus with diabetic polyneuropathy E11.42 ; Avascular necrosis of bone of 
right hip M87.051 ; Long-term insulin use Z79.4 ; GERD (gastroesophageal reflux 
disease) K21.9 ; Hyperlipidemia, unspecified hyperlipidemia E78.5 ; 
Hypertension I10 ; Recurrent major depressive disorder, in partial remission 
F33.41 ; Microalbuminuric diabetic nephropathy E11.21 ; Body mass index (BMI) 
of 45.0-49.9 in adult Z68.42 ; BMI 45.0-49.9, adult Z68.42 and Chronic kidney 
disease (CKD) stage G3a/A3, moderately decreased glomerular filtration rate (GFR
) between 45-59 mL/min/1.73 square meter and albuminuria creatinine ratio 
greater than 300 mg/g N18.3

 

 [place] [address] [phone]-
2965  23 Mar, 2018   

 

 [place] [address] [address]-
8141  23 Mar, 2018   

 

 [place] [address] [address]-
0207  19 Mar, 2018   

 

 [place] [address] [address]-
0009  14 Mar, 2018   

 

 [place] [address] [address]-
8846  13 Mar, 2018  Type 2 diabetes mellitus with diabetic polyneuropathy 
E11.42 ; Asthma J45.909 and Hypertension I10

 

 [facility]  [address] 
[id number]  13 Mar, 2018  Skin ulcer, limited to breakdown of skin L98.491 ; 
Immune thrombocytopenic purpura D69.3 ; Avascular necrosis of bone of right hip 
M87.051 and Type 2 diabetes mellitus with diabetic polyneuropathy E11.42

 

 [place] [address] [place] [id number] [place] [address]-
5804  06 Mar, 2018  Asthma J45.909 and Type 2 diabetes mellitus with diabetic 
polyneuropathy E11.42

 

 [place] [address] [place] [id number] [place] [address]-
0457  01 Mar, 2018   

 

 [facility]  [address] 
[id number]    Other iron deficiency anemia D50.8 ; Weakness R53.1 ; 
Type 2 diabetes mellitus with diabetic polyneuropathy E11.42 ; Cellulitis of 
trunk, unspecified site of trunk L03.319 ; Coronary artery disease I25.10 ; 
Avascular necrosis of bone of right hip M87.051 and Morbid (severe) obesity due 
to excess calories E66.01

 

 [facility]  [address] 
[id number]     

 

 [place] [address] [phone]-
[phone]     

 

 [facility] WALK IN [place] [address] [address]
[phone]  15 Feb, 2018  Yeast infection of the skin B37.2

 

 [address] [address]-
1274  15 Feb, 2018   

 

 [place] [address] [address]-
4557  15 Feb, 2018   

 

 [place] [address] [place] [id number] [place] [phone]-
[phone]     

 

 [facility] [place] [address] [place] [id number] [place] [phone]     

 

 [place] [address] [place] [id number] [place] [address]-
3927    Type 2 diabetes mellitus with diabetic polyneuropathy 
E11.42 ; Avascular necrosis of bone of right hip M87.051 ; Hyperlipidemia, 
unspecified hyperlipidemia E78.5 ; Hypertension I10 ; Anxiety disorder, 
unspecified F41.9 ; Immune thrombocytopenic purpura D69.3 ; Coronary artery 
disease I25.10 ; Orthopnea R06.01 ; Acute bronchitis, unspecified organism 
J20.9 ; Wound of left lower extremity, initial encounter S81.802A ; Body aches 
R52 and Debilitated R53.81

 

 [address] [address]-
[phone]     

 

 [place] [address], [place] [address]    Type 2 diabetes mellitus with diabetic polyneuropathy 
E11.42 ; Encounter for immunization Z23 ; Hyperlipidemia, unspecified 
hyperlipidemia E78.5 ; Hypertension I10 ; Anxiety disorder, unspecified F41.9 ; 
Asthma J45.909 ; Body mass index (BMI) of 45.0-49.9 in adult Z68.42 and Morbid (
severe) obesity due to excess calories E66.01

 

 [place] [address] [place] [address]-
2808    Type 2 diabetes mellitus with diabetic polyneuropathy 
E11.42 ; Hyperlipidemia, unspecified hyperlipidemia E78.5 ; Hypertension I10 
and GERD (gastroesophageal reflux disease) K21.9

 

 [place] [id number] [place] [address]-
8491  22 Mar, 2017  Type 2 diabetes mellitus with diabetic polyneuropathy E11.42

 

 [place] [address] [place] [id number] [address]-
[address]    Type 2 diabetes mellitus with diabetic polyneuropathy 
E11.42 ; Coronary artery disease I25.10 ; History of MI (myocardial infarction) 
I25.2 ; Immune thrombocytopenic purpura D69.3 ; GERD (gastroesophageal reflux 
disease) K21.9 ; Hyperlipidemia, unspecified hyperlipidemia E78.5 ; 
Hypertension I10 ; History of kidney stones Z87.442 and Anxiety disorder, 
unspecified F41.9

 

 [place] [address] [place] [address]-
7320     

 

 [address] [place] [address]-
2631  29 Sep, 2016  Coronary artery disease I25.10 ; History of MI (myocardial 
infarction) I25.2 ; History of kidney stones Z87.442 ; Type 2 diabetes mellitus 
with diabetic polyneuropathy E11.42 ; Avascular necrosis of bone of right hip 
M87.051 ; Hyperlipidemia, unspecified hyperlipidemia E78.5 ; Hypertension I10 ; 
Asthma J45.909 and Encounter for immunization Z23

 

 [place] [address] [place] [id number] [place] [address]68-
4560  20 Sep, 2016   

 

 [place] [address] [address]-
4245    Coronary artery disease I25.10 ; Type 2 diabetes mellitus 
with diabetic polyneuropathy E11.42 ; History of MI (myocardial infarction) 
I25.2 ; Immune thrombocytopenic purpura D69.3 ; Hyperlipidemia, unspecified 
hyperlipidemia E78.5 and Hypertension I10

 

 [place] [address] [place] [id number] [place] [address]-
2130    Coronary artery disease I25.10 ; Lymphedema I89.0 ; History 
of MI (myocardial infarction) I25.2 ; History of kidney stones Z87.442 ; Immune 
thrombocytopenic purpura D69.3 ; Type 2 diabetes mellitus with diabetic 
polyneuropathy E11.42 ; Avascular necrosis of bone of right hip M87.051 ; GERD (
gastroesophageal reflux disease) K21.9 ; Hyperlipidemia, unspecified 
hyperlipidemia E78.5 ; Hypertension I10 and Asthma J45.909

 

 [place] [address] [place] [id number] [place] [phone]-
2021     

 

 [place] [address] [place] [id number] [place] [address]-
0902     

 

 [place] [address] [place] [id number] [place] [phone]-
9305  02 Dec, 2015   

 

 [place] [address] [place] [id number] [place] [address] [phone]-
[phone]  02 Dec, 2015  Hyperlipidemia, unspecified hyperlipidemia E78.5

 

 [place] [address] [place] [id number] [place] [address] 9[address]89-
[phone]     

 

 [place] [address] [place] [id number] [place] [phone]-
[phone]     

 

 [place] [address] [place] [id number] [place] [address]-
4655    Allergic rhinitis J30.9

 

 [facility] [place]  [address] [facility] [id number] [place] [address]-
[address]  19 2015  Type 2 diabetes mellitus with diabetic polyneuropathy 
E11.42 ; Routine adult health maintenance Z00.00 ; Coronary artery disease 
I25.10 ; History of MI (myocardial infarction) I25.2 ; History of kidney stones 
Z87.442 ; Immune thrombocytopenic purpura D69.3 ; Avascular necrosis of bone of 
right hip M87.051 and GERD (gastroesophageal reflux disease) K21.9







IMMUNIZATIONS

No Known Immunizations



SOCIAL HISTORY

Never Assessed



REASON FOR VISIT

PA



PLAN OF CARE





VITAL SIGNS





MEDICATIONS

Unknown Medications



RESULTS

No Results



PROCEDURES

No Known procedures



INSTRUCTIONS





MEDICATIONS ADMINISTERED

No Known Medications



MEDICAL (GENERAL) HISTORY







 Type  Description  Date

 

 Medical History  Type II Diabetes   

 

 Medical History  CAD   

 

 Medical History  Acute MI x1   

 

 Medical History  Heart Cath x3   

 

 Medical History  Kidney Stones   

 

 Medical History  Lymphedema   

 

 Medical History  Immune thrombocytopenic purpura   

 

 Surgical History  Heart Stents x2   

 

 Surgical History  Cholecystectomy   

 

 Surgical History     

 

 Surgical History  [place]   

 

 Hospitalization History  [facility]  

 

 Hospitalization History  Sepsis/Toxic Shock  

 

 Hospitalization History  [facility]-flu/pneumonia  2017

 

 Hospitalization History  [facility]- Anemia, cellulitis pannus, yeast 
infection. Discharged 2018

## 2018-09-08 NOTE — XMS REPORT
[facility]

 Created on: 07/15/2018



Madison Young

External Reference #: [id number]

: 1945

Sex: Female



Demographics







 Address  [address] [place]  [address]

 

 Preferred Language  Unknown

 

 Marital Status  Unknown

 

 [employer] Affiliation  Unknown

 

 Race  Unknown

 

 Ethnic Group  Unknown





Author







 Author  SHAZIA MANUEL

 

 [facility]

 

 Address  [address] [place]  [address]



 

 Phone  [phone]







Care Team Providers







 Care Team Member Name  Role  Phone

 

 SHAZIA MANUEL  Unavailable  [phone]







PROBLEMS







 Type  Condition  ICD9-CM Code  AKJ88-XM Code  Onset Dates  Condition Status  
SNOMED Code

 

 Problem  Anxiety disorder, unspecified     F41.9     Active  898981583

 

 Problem  Body mass index (BMI) of 45.0-49.9 in adult     Z68.42     Active  
738327550

 

 Problem  Morbid (severe) obesity due to excess calories     E66.01     Active  
135161288

 

 Problem  Iron deficiency anemia     D50.9     Active  21118801

 

 Problem  Non-adherence to medical treatment     Z91.19     Active  705299799

 

 Problem  Long-term insulin use     Z79.4     Active  762010840

 

 Problem  Recurrent major depressive disorder, in partial remission     F33.41 
    Active  24879123

 

 Problem  Skin ulcer, limited to breakdown of skin     L98.491     Active  
78460128

 

 Problem  Other iron deficiency anemia     D50.8     Active  45670406

 

 Problem  Avascular necrosis of bone of right hip     M87.051     Active  
049951787

 

 Problem  Coronary artery disease     I25.10     Active  11952002

 

 Problem  Chronic kidney disease (CKD) stage G3a/A3, moderately decreased 
glomerular filtration rate (GFR) between 45-59 mL/min/1.73 square meter and 
albuminuria creatinine ratio greater than 300 mg/g     N18.3     Active  
638234159

 

 Problem  Microalbuminuric diabetic nephropathy     E11.21     Active  997433589

 

 Problem  Immune thrombocytopenic purpura     D69.3     Active  075175068

 

 Problem  Hyperlipidemia, unspecified hyperlipidemia     E78.5     Active  
25714492

 

 Problem  GERD (gastroesophageal reflux disease)     K21.9     Active  028441059

 

 Problem  Asthma     J45.909     Active  927038755

 

 Problem  Type 2 diabetes mellitus with diabetic polyneuropathy     E11.42     
Active  28536081

 

 Problem  Hypertension     I10     Active  27126401







ALLERGIES

No Information



ENCOUNTERS







 Encounter  Location  Date  Diagnosis

 

 [facility]  [address], [place] [address]
5[address]  29 Aug, 2018   

 

 [place] [address], [place] [address]     

 

 [place] [address], [place] [address]
2797     

 

 [place] [id number] [address]
8229     

 

 [place] [address] [place] [address]-
[phone]    Type 2 diabetes mellitus with diabetic polyneuropathy 
E11.42 ; Hypertension I10 ; Hyperlipidemia, unspecified hyperlipidemia E78.5 ; 
Body mass index (BMI) of 45.0-49.9 in adult Z68.42 ; Long-term insulin use 
Z79.4 ; Non-adherence to medical treatment Z91.19 ; Coronary artery disease 
I25.10 ; GERD (gastroesophageal reflux disease) K21.9 ; Asthma J45.909 and 
Recurrent major depressive disorder, in partial remission F33.41

 

 [address] [place] [address]-
5485  22 May, 2018  Recurrent major depressive disorder, in partial remission 
F33.41 and Hypertension I10

 

 [address], [place] [address]
8126  21 May, 2018  Immune thrombocytopenic purpura D69.3 and Iron deficiency 
anemia D50.9

 

 [address], [place] [address]  21 May, 2018  Type 2 diabetes mellitus with diabetic polyneuropathy 
E11.42 ; Long-term insulin use Z79.4 ; Chronic kidney disease (CKD) stage G3a/A3
, moderately decreased glomerular filtration rate (GFR) between 45-59 mL/min/
1.73 square meter and albuminuria creatinine ratio greater than 300 mg/g N18.3 
; Hypertension I10 ; Hyperlipidemia, unspecified hyperlipidemia E78.5 ; 
Coronary artery disease I25.10 ; GERD (gastroesophageal reflux disease) K21.9 ; 
Immune thrombocytopenic purpura D69.3 ; Asthma J45.909 ; Morbid (severe) 
obesity due to excess calories E66.01 and Recurrent major depressive disorder, 
in partial remission F33.41

 

 [place] [address] [place] [id number] [place] [address]-
3706  11 May, 2018  Chronic kidney disease (CKD) stage G3a/A3, moderately 
decreased glomerular filtration rate (GFR) between 45-59 mL/min/1.73 square 
meter and albuminuria creatinine ratio greater than 300 mg/g N18.3

 

 [place] [id number] [place] [address]-
5201  02 May, 2018  Chronic kidney disease (CKD) stage G3a/A3, moderately 
decreased glomerular filtration rate (GFR) between 45-59 mL/min/1.73 square 
meter and albuminuria creatinine ratio greater than 300 mg/g N18.3

 

 [place] [address] [address]-
[address]     

 

 [place] [address] [address]-
[phone]  28 Mar, 2018   

 

 [place] [id number] [place] [address]-
8241  26 Mar, 2018  Immune thrombocytopenic purpura D69.3 ; Type 2 diabetes 
mellitus with diabetic polyneuropathy E11.42 ; Avascular necrosis of bone of 
right hip M87.051 ; Long-term insulin use Z79.4 ; GERD (gastroesophageal reflux 
disease) K21.9 ; Hyperlipidemia, unspecified hyperlipidemia E78.5 ; 
Hypertension I10 ; Recurrent major depressive disorder, in partial remission 
F33.41 ; Microalbuminuric diabetic nephropathy E11.21 ; Body mass index (BMI) 
of 45.0-49.9 in adult Z68.42 ; BMI 45.0-49.9, adult Z68.42 and Chronic kidney 
disease (CKD) stage G3a/A3, moderately decreased glomerular filtration rate (GFR
) between 45-59 mL/min/1.73 square meter and albuminuria creatinine ratio 
greater than 300 mg/g N18.3

 

 [place] [id number] [place] [address]-
[phone]  23 Mar, 2018   

 

 [place] [address] [place] [address]-
[phone]  23 Mar, 2018   

 

 [place] [address] [place] [address]-
8580  19 Mar, 2018   

 

 [facility]  [id number] [place] [address]-
1177  14 Mar, 2018   

 

 [place] [id number] [place] [address]-
1015  13 Mar, 2018  Type 2 diabetes mellitus with diabetic polyneuropathy 
E11.42 ; Asthma J45.909 and Hypertension I10

 

 [facility]  [address] [place] 
[id number]  13 Mar, 2018  Skin ulcer, limited to breakdown of skin L98.491 ; 
Immune thrombocytopenic purpura D69.3 ; Avascular necrosis of bone of right hip 
M87.051 and Type 2 diabetes mellitus with diabetic polyneuropathy E11.42

 

 [place] [address] [place] [address]-
3841  06 Mar, 2018  Asthma J45.909 and Type 2 diabetes mellitus with diabetic 
polyneuropathy E11.42

 

 [place] [address] [place] [address]-
6947  01 Mar, 2018   

 

 [facility]  [address] [place] 
[id number]    Other iron deficiency anemia D50.8 ; Weakness R53.1 ; 
Type 2 diabetes mellitus with diabetic polyneuropathy E11.42 ; Cellulitis of 
trunk, unspecified site of trunk L03.319 ; Coronary artery disease I25.10 ; 
Avascular necrosis of bone of right hip M87.051 and Morbid (severe) obesity due 
to excess calories E66.01

 

 [facility]  [id number] [place] [address] 
[phone]     

 

 [place] [address] [place] [address]-
6912     

 

 [facility] [place] [facility]  [id number] [place] [phone]
[phone]  15 Feb, 2018  Yeast infection of the skin B37.2

 

 [place] [address] [place] [id number] [place] [address]-
6129  15 Feb, 2018   

 

 [facility]  [id number] [place] [address]-
9892  15 Feb, 2018   

 

 [facility]  [id number] [place] [address]-
0665     

 

 [facility]  [id number] [place] [phone]
[phone]     

 

 [place] [address] [place] [address] [id number] [place] [address]-
2312    Type 2 diabetes mellitus with diabetic polyneuropathy 
E11.42 ; Avascular necrosis of bone of right hip M87.051 ; Hyperlipidemia, 
unspecified hyperlipidemia E78.5 ; Hypertension I10 ; Anxiety disorder, 
unspecified F41.9 ; Immune thrombocytopenic purpura D69.3 ; Coronary artery 
disease I25.10 ; Orthopnea R06.01 ; Acute bronchitis, unspecified organism 
J20.9 ; Wound of left lower extremity, initial encounter S81.802A ; Body aches 
R52 and Debilitated R53.81

 

 [place] [id number] [place] [address]-
2481     

 

 [place] [address] [place] [address] [id number] [place] [address]-
5566  02 2017  Type 2 diabetes mellitus with diabetic polyneuropathy 
E11.42 ; Encounter for immunization Z23 ; Hyperlipidemia, unspecified 
hyperlipidemia E78.5 ; Hypertension I10 ; Anxiety disorder, unspecified F41.9 ; 
Asthma J45.909 ; Body mass index (BMI) of 45.0-49.9 in adult Z68.42 and Morbid (
severe) obesity due to excess calories E66.01

 

 [place] [address] [place] [address]-
1454    Type 2 diabetes mellitus with diabetic polyneuropathy 
E11.42 ; Hyperlipidemia, unspecified hyperlipidemia E78.5 ; Hypertension I10 
and GERD (gastroesophageal reflux disease) K21.9

 

 [place] [id number] [place] [address]-
0354  22 Mar, 2017  Type 2 diabetes mellitus with diabetic polyneuropathy E11.42

 

 [place] [id number] [place] [address]-
0241    Type 2 diabetes mellitus with diabetic polyneuropathy 
E11.42 ; Coronary artery disease I25.10 ; History of MI (myocardial infarction) 
I25.2 ; Immune thrombocytopenic purpura D69.3 ; GERD (gastroesophageal reflux 
disease) K21.9 ; Hyperlipidemia, unspecified hyperlipidemia E78.5 ; 
Hypertension I10 ; History of kidney stones Z87.442 and Anxiety disorder, 
unspecified F41.9

 

 [place] [id number] [place] [address]-
9846     

 

 [address] [address]-
2633  29 Sep, 2016  Coronary artery disease I25.10 ; History of MI (myocardial 
infarction) I25.2 ; History of kidney stones Z87.442 ; Type 2 diabetes mellitus 
with diabetic polyneuropathy E11.42 ; Avascular necrosis of bone of right hip 
M87.051 ; Hyperlipidemia, unspecified hyperlipidemia E78.5 ; Hypertension I10 ; 
Asthma J45.909 and Encounter for immunization Z23

 

 [place] [id number] [place] [address] 365[address]-
1627  20 Sep, 2016   

 

 [address] [address]-
2681    Coronary artery disease I25.10 ; Type 2 diabetes mellitus 
with diabetic polyneuropathy E11.42 ; History of MI (myocardial infarction) 
I25.2 ; Immune thrombocytopenic purpura D69.3 ; Hyperlipidemia, unspecified 
hyperlipidemia E78.5 and Hypertension I10

 

 [place] [address] [place] [id number] [place] [address] 69593-
0637    Coronary artery disease I25.10 ; Lymphedema I89.0 ; History 
of MI (myocardial infarction) I25.2 ; History of kidney stones Z87.442 ; Immune 
thrombocytopenic purpura D69.3 ; Type 2 diabetes mellitus with diabetic 
polyneuropathy E11.42 ; Avascular necrosis of bone of right hip M87.051 ; GERD (
gastroesophageal reflux disease) K21.9 ; Hyperlipidemia, unspecified 
hyperlipidemia E78.5 ; Hypertension I10 and Asthma J45.909

 

 [place] [address] [id number] [place] [address]-
4717     

 

 [place] [id number] [place] [address] 629[address]-
4542     

 

 [place] [address] [phone]-
9514  02 Dec, 2015   

 

 [place] [address] [phone]-
[phone]  02 Dec, 2015  Hyperlipidemia, unspecified hyperlipidemia E78.5

 

 [place] [address] [place] [id number] [place] [address] 75240-
[phone]     

 

 [place] [id number] [place] [address]29-
0816     

 

 [place] [address] [place] [address] [place] [address] 83757-
3849    Allergic rhinitis J30.9

 

 [place] [address] [place] [id number] [place] [address] 39[address]-
[phone]    Type 2 diabetes mellitus with diabetic polyneuropathy 
E11.42 ; Routine adult health maintenance Z00.00 ; Coronary artery disease 
I25.10 ; History of MI (myocardial infarction) I25.2 ; History of kidney stones 
Z87.442 ; Immune thrombocytopenic purpura D69.3 ; Avascular necrosis of bone of 
right hip M87.051 and GERD (gastroesophageal reflux disease) K21.9







IMMUNIZATIONS

No Known Immunizations



SOCIAL HISTORY

Never Assessed



REASON FOR VISIT

[facility] dismissal/Med refills



PLAN OF CARE





VITAL SIGNS





MEDICATIONS







 Medication  Instructions  Dosage  Frequency  Start Date  End Date  Duration  
Status

 

 Humalog KwikPen 100 UNIT/ML  Subcutaneous three times a day before meals  10 
units              Active

 

 Benazepril HCl 40 mg  Orally Once a day  1 tablet  24h        30 days  Active

 

 Atorvastatin Calcium 10 mg  Orally Once a day  1 tablet  24h       
30 days  Active

 

 Pantoprazole Sodium 40 mg  Orally Once a day  1 tablet  24h        30 days  
Active

 

 Levemir FlexTouch 100 UNIT/ML  Subcutaneous at bedtime  10 units             Active

 

 GlipiZIDE 5 mg  Orally 2 times a day  1 tablet  12h        30 days  Active

 

 Carvedilol 12.5 MG  Orally 2 times a day  1 tablet  12h        30 days  Active

 

 ProAir  (90 Base) MCG/ACT  Inhalation every 4 hrs  2 puffs as needed  
4h          Active

 

 Hydrochlorothiazide 12.5 MG  Orally twice a day  1 tablet  12h        30 days  
Active

 

 Citalopram Hydrobromide 20 mg  Orally Once a day  1 tablet  24h        30 days
  Active







RESULTS

No Results



PROCEDURES

No Known procedures



INSTRUCTIONS





MEDICATIONS ADMINISTERED

No Known Medications



MEDICAL (GENERAL) HISTORY







 Type  Description  Date

 

 Medical History  Type II Diabetes   

 

 Medical History  CAD   

 

 Medical History  Acute MI x1   

 

 Medical History  Heart Cath x3   

 

 Medical History  Kidney Stones   

 

 Medical History  Lymphedema   

 

 Medical History  Immune thrombocytopenic purpura   

 

 Surgical History  Heart Stents x2   

 

 Surgical History  Cholecystectomy   

 

 Surgical History     

 

 Surgical History  Squire Teeth   

 

 Hospitalization History  [facility]  

 

 Hospitalization History  Sepsis/Toxic Shock  

 

 Hospitalization History  [facility]-flu[facility]pneumonia  2017

 

 Hospitalization History  [facility], [place], operated by [facility]- Anemia, cellulitis pannus, yeast 
infection. Discharged 2018

## 2018-09-08 NOTE — XMS REPORT
[facility]

 Created on: 2018



Madison Young

External Reference #: [id number]

: 1945

Sex: Female



Demographics







 Address  [address] [place]  [address]

 

 Preferred Language  Unknown

 

 Marital Status  Unknown

 

 [employer] Affiliation  Unknown

 

 Race  Unknown

 

 Ethnic Group  Unknown





Author







 Author  TANI  LEANDER

 

 Organization  [facility]

 

 Address  [address] [place]  [address]



 

 Phone  [phone]







Care Team Providers







 Care Team Member Name  Role  Phone

 

 TANI  LEANDER  Unavailable  [phone]







PROBLEMS







 Type  Condition  ICD9-CM Code  NGF25-SP Code  Onset Dates  Condition Status  
SNOMED Code

 

 Problem  Long-term insulin use     Z79.4     Active  088460991

 

 Problem  Skin ulcer, limited to breakdown of skin     L98.491     Active  
37181480

 

 Problem  Other iron deficiency anemia     D50.8     Active  15977605

 

 Problem  Peripheral edema     R60.9     Active  892696488

 

 Problem  Coronary artery disease     I25.10     Active  48082998

 

 Problem  Diabetes mellitus due to underlying condition with foot ulcer     
E08.621     Active  844130516

 

 Problem  Type 2 diabetes mellitus with diabetic polyneuropathy     E11.42     
Active  54889379

 

 Problem  Microalbuminuric diabetic nephropathy     E11.21     Active  478750947

 

 Problem  Iron deficiency anemia     D50.9     Active  36382219

 

 Problem  Non-adherence to medical treatment     Z91.19     Active  434528392

 

 Problem  Non-pressure chronic ulcer of other part of left foot limited to 
breakdown of skin     L97.521     Active  661493572

 

 Problem  Acute idiopathic gout involving toe of left foot     M10.072     
Active  56626449

 

 Problem  Immune thrombocytopenic purpura     D69.3     Active  200398439

 

 Problem  Hyperlipidemia, unspecified hyperlipidemia     E78.5     Active  
85223123

 

 Problem  GERD (gastroesophageal reflux disease)     K21.9     Active  997661940

 

 Problem  Avascular necrosis of bone of right hip     M87.051     Active  
676995496

 

 Problem  Anxiety disorder, unspecified     F41.9     Active  040478650

 

 Problem  Morbid (severe) obesity due to excess calories     E66.01     Active  
711511476

 

 Problem  Asthma     J45.909     Active  606435106

 

 Problem  Body mass index (BMI) of 45.0-49.9 in adult     Z68.42     Active  
520960449

 

 Problem  Chronic kidney disease (CKD) stage G3a/A3, moderately decreased 
glomerular filtration rate (GFR) between 45-59 mL/min/1.73 square meter and 
albuminuria creatinine ratio greater than 300 mg/g     N18.3     Active  
024271122

 

 Problem  Hypertension     I10     Active  49517154

 

 Problem  Recurrent major depressive disorder, in partial remission     F33.41 
    Active  43274371







ALLERGIES

No Information



ENCOUNTERS







 Encounter  Location  Date  Diagnosis

 

 [facility]  [id number] [place] [phone]-
[phone]  06 Sep, 2018   

 

 [facility]  [address] [address]-
7177  29 Aug, 2018   

 

 [facility]  [id number] [place] [address] [phone]
[phone]  13 Aug, 2018   

 

 [facility]  [address] [phone]-
[phone]  09 Aug, 2018   

 

 [facility]  [address] [phone]-
[phone]  09 Aug, 2018  Hypertension I10 and Peripheral edema R60.9

 

 [facility]  [id number] [place] [address] 24269-
[phone]  06 Aug, 2018  Hypertension I10 and Peripheral edema R60.9

 

 [facility]  [id number] [place] [address] 55537-
6046  02 Aug, 2018   

 

 [facility]  [id number] [place] [address] 29958-
[phone]  02 Aug, 2018  Hypertension I10 and Peripheral edema R60.9

 

 [facility]  [id number] [place] [address] 50581-
1560  01 Aug, 2018   

 

 [facility]  [id number] [place] [address] [phone]-
[phone]     

 

 [facility]  [address] [address]-
[phone]     

 

 [facility]  [id number] [place] [address] 94337-
3031     

 

 [facility]  [address] [address]-
3885    Diabetes mellitus due to underlying condition with foot 
ulcer E08.621 ; Non-pressure chronic ulcer of other part of left foot limited 
to breakdown of skin L97.521 and BMI 45.0-49.9, adult Z68.42

 

 [place] [address] [place] [address] 24059-
9518    Acute idiopathic gout involving toe of left foot M10.072

 

 [id number] [place] [address] 69418-
[phone]     

 

 [place] [address] [id number] [place] [address] 34100-
8441     

 

 [place] [id number] [place] [address] 34645-
[phone]     

 

 [id number] [place] [address] 33262-
4692    Type 2 diabetes mellitus with diabetic polyneuropathy 
E11.42 ; Hypertension I10 ; Hyperlipidemia, unspecified hyperlipidemia E78.5 ; 
Body mass index (BMI) of 45.0-49.9 in adult Z68.42 ; Long-term insulin use 
Z79.4 ; Non-adherence to medical treatment Z91.19 ; Coronary artery disease 
I25.10 ; GERD (gastroesophageal reflux disease) K21.9 ; Asthma J45.909 and 
Recurrent major depressive disorder, in partial remission F33.41

 

 [id number] [place] [address] 95632-
[phone]  22 May, 2018  Recurrent major depressive disorder, in partial remission 
F33.41 and Hypertension I10

 

 [id number] [place] [address] 48323-
4249  21 May, 2018  Immune thrombocytopenic purpura D69.3 and Iron deficiency 
anemia D50.9

 

 [id number] [place] [address] 40121-
8922  21 May, 2018  Type 2 diabetes mellitus with diabetic polyneuropathy 
E11.42 ; Long-term insulin use Z79.4 ; Chronic kidney disease (CKD) stage G3a/A3
, moderately decreased glomerular filtration rate (GFR) between 45-59 mL/min/
1.73 square meter and albuminuria creatinine ratio greater than 300 mg/g N18.3 
; Hypertension I10 ; Hyperlipidemia, unspecified hyperlipidemia E78.5 ; 
Coronary artery disease I25.10 ; GERD (gastroesophageal reflux disease) K21.9 ; 
Immune thrombocytopenic purpura D69.3 ; Asthma J45.909 ; Morbid (severe) 
obesity due to excess calories E66.01 and Recurrent major depressive disorder, 
in partial remission F33.41

 

 [id number] [place] [address] 46358-
2061  11 May, 2018  Chronic kidney disease (CKD) stage G3a/A3, moderately 
decreased glomerular filtration rate (GFR) between 45-59 mL/min/1.73 square 
meter and albuminuria creatinine ratio greater than 300 mg/g N18.3

 

 [id number] [place] [address] 84365-
5502  02 May, 2018  Chronic kidney disease (CKD) stage G3a/A3, moderately 
decreased glomerular filtration rate (GFR) between 45-59 mL/min/1.73 square 
meter and albuminuria creatinine ratio greater than 300 mg/g N18.3

 

 [id number] [place] [address] 76832-
0141     

 

 [id number] [place] [address] 12392-
2937  28 Mar, 2018   

 

 [id number] [place] [address] 59863-
9018  26 Mar, 2018  Immune thrombocytopenic purpura D69.3 ; Type 2 diabetes 
mellitus with diabetic polyneuropathy E11.42 ; Avascular necrosis of bone of 
right hip M87.051 ; Long-term insulin use Z79.4 ; GERD (gastroesophageal reflux 
disease) K21.9 ; Hyperlipidemia, unspecified hyperlipidemia E78.5 ; 
Hypertension I10 ; Recurrent major depressive disorder, in partial remission 
F33.41 ; Microalbuminuric diabetic nephropathy E11.21 ; Body mass index (BMI) 
of 45.0-49.9 in adult Z68.42 ; BMI 45.0-49.9, adult Z68.42 and Chronic kidney 
disease (CKD) stage G3a/A3, moderately decreased glomerular filtration rate (GFR
) between 45-59 mL/min/1.73 square meter and albuminuria creatinine ratio 
greater than 300 mg/g N18.3

 

 [id number] [place] [address] 69709-
0229  23 Mar, 2018   

 

 [address] [address]-
6188  23 Mar, 2018   

 

 [id number] [place] [address] 05269-
0682  19 Mar, 2018   

 

 [address] [address]20-
9533  14 Mar, 2018   

 

 [id number] [place] [address] 38238-
9725  13 Mar, 2018  Type 2 diabetes mellitus with diabetic polyneuropathy 
E11.42 ; Asthma J45.909 and Hypertension I10

 

 [facility]  [address] [place] 
[id number]  13 Mar, 2018  Skin ulcer, limited to breakdown of skin L98.491 ; 
Immune thrombocytopenic purpura D69.3 ; Avascular necrosis of bone of right hip 
M87.051 and Type 2 diabetes mellitus with diabetic polyneuropathy E11.42

 

 [address] [place] [address] 14556-
3744  06 Mar, 2018  Asthma J45.909 and Type 2 diabetes mellitus with diabetic 
polyneuropathy E11.42

 

 [id number] [place] [address] 97018-
3150  01 Mar, 2018   

 

 [facility]  [address] 
[id number]    Other iron deficiency anemia D50.8 ; Weakness R53.1 ; 
Type 2 diabetes mellitus with diabetic polyneuropathy E11.42 ; Cellulitis of 
trunk, unspecified site of trunk L03.319 ; Coronary artery disease I25.10 ; 
Avascular necrosis of bone of right hip M87.051 and Morbid (severe) obesity due 
to excess calories E66.01

 

 [address] [id number] [place] 
[id number]     

 

 [facility]  [id number] [place] [address] 42109-
2920     

 

 [facility]  [id number] [place] [address] [phone]
[phone]  15 Feb, 2018  Yeast infection of the skin B37.2

 

 [id number] [place] [address] 81675-
3655  15 Feb, 2018   

 

 [facility]  [address] [place] [address] 36761-
6794  15 Feb, 2018   

 

 [id number] [place] [address] 40394-
[phone]     

 

 [facility] [place] [id number] [place] [address] [phone]
[phone]     

 

 [facility]  [address] [place] [address] 40017-
3954    Type 2 diabetes mellitus with diabetic polyneuropathy 
E11.42 ; Avascular necrosis of bone of right hip M87.051 ; Hyperlipidemia, 
unspecified hyperlipidemia E78.5 ; Hypertension I10 ; Anxiety disorder, 
unspecified F41.9 ; Immune thrombocytopenic purpura D69.3 ; Coronary artery 
disease I25.10 ; Orthopnea R06.01 ; Acute bronchitis, unspecified organism 
J20.9 ; Wound of left lower extremity, initial encounter S81.802A ; Body aches 
R52 and Debilitated R53.81

 

 [address] [place] [address] 13930-
4931     

 

 [id number] [place] [address] 79802-
9411    Type 2 diabetes mellitus with diabetic polyneuropathy 
E11.42 ; Encounter for immunization Z23 ; Hyperlipidemia, unspecified 
hyperlipidemia E78.5 ; Hypertension I10 ; Anxiety disorder, unspecified F41.9 ; 
Asthma J45.909 ; Body mass index (BMI) of 45.0-49.9 in adult Z68.42 and Morbid (
severe) obesity due to excess calories E66.01

 

 [address] [address]-
3950    Type 2 diabetes mellitus with diabetic polyneuropathy 
E11.42 ; Hyperlipidemia, unspecified hyperlipidemia E78.5 ; Hypertension I10 
and GERD (gastroesophageal reflux disease) K21.9

 

 [address] [address]24-
9889  22 Mar, 2017  Type 2 diabetes mellitus with diabetic polyneuropathy E11.42

 

 [address] [address]19-
0046    Type 2 diabetes mellitus with diabetic polyneuropathy 
E11.42 ; Coronary artery disease I25.10 ; History of MI (myocardial infarction) 
I25.2 ; Immune thrombocytopenic purpura D69.3 ; GERD (gastroesophageal reflux 
disease) K21.9 ; Hyperlipidemia, unspecified hyperlipidemia E78.5 ; 
Hypertension I10 ; History of kidney stones Z87.442 and Anxiety disorder, 
unspecified F41.9

 

 [address] [address]-
5799     

 

 [address] [address]-
5107  29 Sep, 2016  Coronary artery disease I25.10 ; History of MI (myocardial 
infarction) I25.2 ; History of kidney stones Z87.442 ; Type 2 diabetes mellitus 
with diabetic polyneuropathy E11.42 ; Avascular necrosis of bone of right hip 
M87.051 ; Hyperlipidemia, unspecified hyperlipidemia E78.5 ; Hypertension I10 ; 
Asthma J45.909 and Encounter for immunization Z23

 

 [address] [address]67-
5577  20 Sep, 2016   

 

 [address] [address]348-
9219    Coronary artery disease I25.10 ; Type 2 diabetes mellitus 
with diabetic polyneuropathy E11.42 ; History of MI (myocardial infarction) 
I25.2 ; Immune thrombocytopenic purpura D69.3 ; Hyperlipidemia, unspecified 
hyperlipidemia E78.5 and Hypertension I10

 

 [address] [address]-
7864    Coronary artery disease I25.10 ; Lymphedema I89.0 ; History 
of MI (myocardial infarction) I25.2 ; History of kidney stones Z87.442 ; Immune 
thrombocytopenic purpura D69.3 ; Type 2 diabetes mellitus with diabetic 
polyneuropathy E11.42 ; Avascular necrosis of bone of right hip M87.051 ; GERD (
gastroesophageal reflux disease) K21.9 ; Hyperlipidemia, unspecified 
hyperlipidemia E78.5 ; Hypertension I10 and Asthma J45.909

 

 [address] [address]361-
9862     

 

 [address] 20099-
8866     

 

 [address] [address]89-
6493  02 Dec, 2015   

 

 [address] [address]603-
7791  02 Dec, 2015  Hyperlipidemia, unspecified hyperlipidemia E78.5

 

 [address] [phone]-
[phone]     

 

 [address] 24044-
2453     

 

 [address] [address]50-
1767    Allergic rhinitis J30.9

 

 [address] [address]15-
5505    Type 2 diabetes mellitus with diabetic polyneuropathy 
E11.42 ; Routine adult health maintenance Z00.00 ; Coronary artery disease 
I25.10 ; History of MI (myocardial infarction) I25.2 ; History of kidney stones 
Z87.442 ; Immune thrombocytopenic purpura D69.3 ; Avascular necrosis of bone of 
right hip M87.051 and GERD (gastroesophageal reflux disease) K21.9







IMMUNIZATIONS

No Known Immunizations



SOCIAL HISTORY

Never Assessed



REASON FOR VISIT

labs



PLAN OF CARE





VITAL SIGNS





MEDICATIONS

Unknown Medications



RESULTS

No Results



PROCEDURES

No Known procedures



INSTRUCTIONS





MEDICATIONS ADMINISTERED

No Known Medications



MEDICAL (GENERAL) HISTORY







 Type  Description  Date

 

 Medical History  Type II Diabetes   

 

 Medical History  CAD   

 

 Medical History  Acute MI x1   

 

 Medical History  Heart Cath x3   

 

 Medical History  Kidney Stones   

 

 Medical History  Lymphedema   

 

 Medical History  Immune thrombocytopenic purpura   

 

 Surgical History  Heart Stents x2   

 

 Surgical History  Cholecystectomy   

 

 Surgical History     

 

 Surgical History  Juana Diaz Teeth   

 

 Hospitalization History  [facility]  

 

 Hospitalization History  Sepsis/Toxic Shock  

 

 Hospitalization History  [facility]neumonia  2017

 

 Hospitalization History  [facility]- Anemia, cellulitis pannus, yeast 
infection. Discharged 2018

## 2018-09-08 NOTE — XMS REPORT
[facility]

 Created on: 2018



Madison Young

External Reference #: [id number]

: 1945

Sex: Female



Demographics







 Address  [address] [place]  [address]

 

 Preferred Language  Unknown

 

 Marital Status  Unknown

 

 [employer] Affiliation  Unknown

 

 Race  Unknown

 

 Ethnic Group  Unknown





Author







 Author  FREIRELEANDER HILLIARD

 

 Organization  [facility]

 

 Address  [address] [place]  [address]



 

 Phone  [phone]







Care Team Providers







 Care Team Member Name  Role  Phone

 

 FREIRELEANDER HILLIARD  Unavailable  [phone]







PROBLEMS







 Type  Condition  ICD9-CM Code  AQK60-DN Code  Onset Dates  Condition Status  
SNOMED Code

 

 Problem  Anxiety disorder, unspecified     F41.9     Active  405622492

 

 Problem  Body mass index (BMI) of 45.0-49.9 in adult     Z68.42     Active  
877042929

 

 Problem  Morbid (severe) obesity due to excess calories     E66.01     Active  
578110117

 

 Problem  Iron deficiency anemia     D50.9     Active  12116545

 

 Problem  Non-adherence to medical treatment     Z91.19     Active  785190963

 

 Problem  Long-term insulin use     Z79.4     Active  646614571

 

 Problem  Recurrent major depressive disorder, in partial remission     F33.41 
    Active  49597346

 

 Problem  Skin ulcer, limited to breakdown of skin     L98.491     Active  
39910139

 

 Problem  Other iron deficiency anemia     D50.8     Active  15245047

 

 Problem  Avascular necrosis of bone of right hip     M87.051     Active  
867059590

 

 Problem  Coronary artery disease     I25.10     Active  28789742

 

 Problem  Chronic kidney disease (CKD) stage G3a/A3, moderately decreased 
glomerular filtration rate (GFR) between 45-59 mL/min/1.73 square meter and 
albuminuria creatinine ratio greater than 300 mg/g     N18.3     Active  
922963776

 

 Problem  Microalbuminuric diabetic nephropathy     E11.21     Active  428403495

 

 Problem  Immune thrombocytopenic purpura     D69.3     Active  675270123

 

 Problem  Hyperlipidemia, unspecified hyperlipidemia     E78.5     Active  
47219317

 

 Problem  GERD (gastroesophageal reflux disease)     K21.9     Active  949876782

 

 Problem  Asthma     J45.909     Active  876656764

 

 Problem  Type 2 diabetes mellitus with diabetic polyneuropathy     E11.42     
Active  18458025

 

 Problem  Hypertension     I10     Active  79384695







ALLERGIES

No Information



ENCOUNTERS







 Encounter  Location  Date  Diagnosis

 

 [facility]  [id number] [address]  29 Aug, 2018   

 

 [place] [id number] [address]     

 

 [place] [address] [place] [address]-
4032    Type 2 diabetes mellitus with diabetic polyneuropathy 
E11.42 ; Hypertension I10 ; Hyperlipidemia, unspecified hyperlipidemia E78.5 ; 
Body mass index (BMI) of 45.0-49.9 in adult Z68.42 ; Long-term insulin use 
Z79.4 ; Non-adherence to medical treatment Z91.19 ; Coronary artery disease 
I25.10 ; GERD (gastroesophageal reflux disease) K21.9 ; Asthma J45.909 and 
Recurrent major depressive disorder, in partial remission F33.41

 

 [place] [address] [place] [id number] [place] [address]-
[phone]  22 May, 2018  Recurrent major depressive disorder, in partial remission 
F33.41 and Hypertension I10

 

 [place] [address] [place] [id number] [place] [address]-
6349  21 May, 2018  Immune thrombocytopenic purpura D69.3 and Iron deficiency 
anemia D50.9

 

 [place] [id number] [place] [address]-
8691  21 May, 2018  Type 2 diabetes mellitus with diabetic polyneuropathy 
E11.42 ; Long-term insulin use Z79.4 ; Chronic kidney disease (CKD) stage G3a/A3
, moderately decreased glomerular filtration rate (GFR) between 45-59 mL/min/
1.73 square meter and albuminuria creatinine ratio greater than 300 mg/g N18.3 
; Hypertension I10 ; Hyperlipidemia, unspecified hyperlipidemia E78.5 ; 
Coronary artery disease I25.10 ; GERD (gastroesophageal reflux disease) K21.9 ; 
Immune thrombocytopenic purpura D69.3 ; Asthma J45.909 ; Morbid (severe) 
obesity due to excess calories E66.01 and Recurrent major depressive disorder, 
in partial remission F33.41

 

 [address] [place] [address]-
8187  11 May, 2018  Chronic kidney disease (CKD) stage G3a/A3, moderately 
decreased glomerular filtration rate (GFR) between 45-59 mL/min/1.73 square 
meter and albuminuria creatinine ratio greater than 300 mg/g N18.3

 

 [place] [address] [place] [address]-
6705  02 May, 2018  Chronic kidney disease (CKD) stage G3a/A3, moderately 
decreased glomerular filtration rate (GFR) between 45-59 mL/min/1.73 square 
meter and albuminuria creatinine ratio greater than 300 mg/g N18.3

 

 [place] [address] [place] [id number] [place] [address]-
[phone]     

 

 [place] [address] [phone]-
[phone]  28 Mar, 2018   

 

 [place] [address] [place] [id number] [place] [address]-
5570  26 Mar, 2018  Immune thrombocytopenic purpura D69.3 ; Type 2 diabetes 
mellitus with diabetic polyneuropathy E11.42 ; Avascular necrosis of bone of 
right hip M87.051 ; Long-term insulin use Z79.4 ; GERD (gastroesophageal reflux 
disease) K21.9 ; Hyperlipidemia, unspecified hyperlipidemia E78.5 ; 
Hypertension I10 ; Recurrent major depressive disorder, in partial remission 
F33.41 ; Microalbuminuric diabetic nephropathy E11.21 ; Body mass index (BMI) 
of 45.0-49.9 in adult Z68.42 ; BMI 45.0-49.9, adult Z68.42 and Chronic kidney 
disease (CKD) stage G3a/A3, moderately decreased glomerular filtration rate (GFR
) between 45-59 mL/min/1.73 square meter and albuminuria creatinine ratio 
greater than 300 mg/g N18.3

 

 [place] [address] [place] [id number] [place] [address]-
3226  23 Mar, 2018   

 

 [place] [address] [place] [id number] [place] [address]-
2614  23 Mar, 2018   

 

 [place] [address] [place] [id number] [place] [address]-
3279  19 Mar, 2018   

 

 [place] [address] [place] [address]-
0113  14 Mar, 2018   

 

 [place] [address] [place] [address]-
0146  13 Mar, 2018  Type 2 diabetes mellitus with diabetic polyneuropathy 
E11.42 ; Asthma J45.909 and Hypertension I10

 

 [facility] [email or web address]  [address] [place] 
[id number]  13 Mar, 2018  Skin ulcer, limited to breakdown of skin L98.491 ; 
Immune thrombocytopenic purpura D69.3 ; Avascular necrosis of bone of right hip 
M87.051 and Type 2 diabetes mellitus with diabetic polyneuropathy E11.42

 

 [place] [address] [place] [id number] [place] [address]-
4037  06 Mar, 2018  Asthma J45.909 and Type 2 diabetes mellitus with diabetic 
polyneuropathy E11.42

 

 [place] [address] [place] [id number] [place] [address]-
9235  01 Mar, 2018   

 

 [facility] [employer]  [address] [place] 
[id number]    Other iron deficiency anemia D50.8 ; Weakness R53.1 ; 
Type 2 diabetes mellitus with diabetic polyneuropathy E11.42 ; Cellulitis of 
trunk, unspecified site of trunk L03.319 ; Coronary artery disease I25.10 ; 
Avascular necrosis of bone of right hip M87.051 and Morbid (severe) obesity due 
to excess calories E66.01

 

 [facility]  [address] [place] [id number] [place] [address] 
[phone]     

 

 [facility]  [address] [place] [address]-
[phone]     

 

 [facility]  [id number] [place] [phone]
[phone]  15 Feb, 2018  Yeast infection of the skin B37.2

 

 [place] [address] [place] [id number] [place] [address] 8[address]-
0839  15 Feb, 2018   

 

 [place] [address] [place] [id number] [place] [address]68-
7602  15 2018   

 

 [facility]  [address] [id number] [place] [address]-
[phone]     

 

 [facility] [place] [facility]  [address] [id number] [place] [address]
[phone]     

 

 [facility]  [address] [address]-
9687    Type 2 diabetes mellitus with diabetic polyneuropathy 
E11.42 ; Avascular necrosis of bone of right hip M87.051 ; Hyperlipidemia, 
unspecified hyperlipidemia E78.5 ; Hypertension I10 ; Anxiety disorder, 
unspecified F41.9 ; Immune thrombocytopenic purpura D69.3 ; Coronary artery 
disease I25.10 ; Orthopnea R06.01 ; Acute bronchitis, unspecified organism 
J20.9 ; Wound of left lower extremity, initial encounter S81.802A ; Body aches 
R52 and Debilitated R53.81

 

 [place] [address] [place] [id number] [place] [address]-
7154     

 

 [place] [address] [place] [id number] [place] [address]90-
2790    Type 2 diabetes mellitus with diabetic polyneuropathy 
E11.42 ; Encounter for immunization Z23 ; Hyperlipidemia, unspecified 
hyperlipidemia E78.5 ; Hypertension I10 ; Anxiety disorder, unspecified F41.9 ; 
Asthma J45.909 ; Body mass index (BMI) of 45.0-49.9 in adult Z68.42 and Morbid (
severe) obesity due to excess calories E66.01

 

 [facility]  [address] [id number] [place] [address]-
3856    Type 2 diabetes mellitus with diabetic polyneuropathy 
E11.42 ; Hyperlipidemia, unspecified hyperlipidemia E78.5 ; Hypertension I10 
and GERD (gastroesophageal reflux disease) K21.9

 

 [place] [address] [place] [id number] [place] [address]49-
3666  22 Mar, 2017  Type 2 diabetes mellitus with diabetic polyneuropathy E11.42

 

 [place] [address] [place] [id number] [place] [address] 8[address]57-
9095  28 2017  Type 2 diabetes mellitus with diabetic polyneuropathy 
E11.42 ; Coronary artery disease I25.10 ; History of MI (myocardial infarction) 
I25.2 ; Immune thrombocytopenic purpura D69.3 ; GERD (gastroesophageal reflux 
disease) K21.9 ; Hyperlipidemia, unspecified hyperlipidemia E78.5 ; 
Hypertension I10 ; History of kidney stones Z87.442 and Anxiety disorder, 
unspecified F41.9

 

 [place] [address] [place] [id number] [place] [address]12-
7169  14 2017   

 

 [place] [address] [address]-
9890  29 Sep, 2016  Coronary artery disease I25.10 ; History of MI (myocardial 
infarction) I25.2 ; History of kidney stones Z87.442 ; Type 2 diabetes mellitus 
with diabetic polyneuropathy E11.42 ; Avascular necrosis of bone of right hip 
M87.051 ; Hyperlipidemia, unspecified hyperlipidemia E78.5 ; Hypertension I10 ; 
Asthma J45.909 and Encounter for immunization Z23

 

 [place] [address] [place] [id number] [place] [address] 91457-
2433  20 Sep, 2016   

 

 [place] [address] [address]-
2376    Coronary artery disease I25.10 ; Type 2 diabetes mellitus 
with diabetic polyneuropathy E11.42 ; History of MI (myocardial infarction) 
I25.2 ; Immune thrombocytopenic purpura D69.3 ; Hyperlipidemia, unspecified 
hyperlipidemia E78.5 and Hypertension I10

 

 [place] [address] [place] [id number] [place] [address] 34689-
3031    Coronary artery disease I25.10 ; Lymphedema I89.0 ; History 
of MI (myocardial infarction) I25.2 ; History of kidney stones Z87.442 ; Immune 
thrombocytopenic purpura D69.3 ; Type 2 diabetes mellitus with diabetic 
polyneuropathy E11.42 ; Avascular necrosis of bone of right hip M87.051 ; GERD (
gastroesophageal reflux disease) K21.9 ; Hyperlipidemia, unspecified 
hyperlipidemia E78.5 ; Hypertension I10 and Asthma J45.909

 

 [place] [address] [place] [address]40-
3885     

 

 [facility]  [address] [id number] [place] [address] 21930-
9522     

 

 [place] [address] [place] [id number] [place] [address] 16887-
0510  02 Dec, 2015   

 

 [place] [address] [phone]-
2186  02 Dec, 2015  Hyperlipidemia, unspecified hyperlipidemia E78.5

 

 [place] [address] [place] [id number] [place] [address] 63328-
5057     

 

 [place] [address] [place] [id number] [place] [address] 15909-
8570     

 

 [place] [address] [place] [id number] [place] [address] 96077-
4085    Allergic rhinitis J30.9

 

 [place] [address] [place] [id number] [place] [address] 31018-
1040    Type 2 diabetes mellitus with diabetic polyneuropathy 
E11.42 ; Routine adult health maintenance Z00.00 ; Coronary artery disease 
I25.10 ; History of MI (myocardial infarction) I25.2 ; History of kidney stones 
Z87.442 ; Immune thrombocytopenic purpura D69.3 ; Avascular necrosis of bone of 
right hip M87.051 and GERD (gastroesophageal reflux disease) K21.9







IMMUNIZATIONS

No Known Immunizations



SOCIAL HISTORY

Never Assessed



REASON FOR VISIT

Requests return call



PLAN OF CARE





VITAL SIGNS





MEDICATIONS

Unknown Medications



RESULTS

No Results



PROCEDURES

No Known procedures



INSTRUCTIONS





MEDICATIONS ADMINISTERED

No Known Medications



MEDICAL (GENERAL) HISTORY







 Type  Description  Date

 

 Medical History  Type II Diabetes   

 

 Medical History  CAD   

 

 Medical History  Acute MI x1   

 

 Medical History  Heart Cath x3   

 

 Medical History  Kidney Stones   

 

 Medical History  Lymphedema   

 

 Medical History  Immune thrombocytopenic purpura   

 

 Surgical History  Heart Stents x2   

 

 Surgical History  Cholecystectomy   

 

 Surgical History     

 

 Surgical History  Totz Teeth   

 

 Hospitalization History  [facility]  

 

 Hospitalization History  Sepsis/Toxic Shock  

 

 Hospitalization History  [facility]-flu/pneumonia  2017

 

 Hospitalization History  [facility]- Anemia, cellulitis pannus, yeast 
infection. Discharged 2018

## 2018-09-08 NOTE — XMS REPORT
[facility]

 Created on: 2018



Madison Young

External Reference #: [id number]

: 1945

Sex: Female



Demographics







 Address  [address] [place]  [address]

 

 Preferred Language  Unknown

 

 Marital Status  Unknown

 

 [employer] Affiliation  Unknown

 

 Race  Unknown

 

 Ethnic Group  Unknown





Author







 Author  TANI  LEANDER

 

 Organization  [facility]

 

 Address  [address] [place]  [address]



 

 Phone  [phone]







Care Team Providers







 Care Team Member Name  Role  Phone

 

 TANI  LEANDER  Unavailable  [phone]







PROBLEMS







 Type  Condition  ICD9-CM Code  FAM30-AG Code  Onset Dates  Condition Status  
SNOMED Code

 

 Problem  Long-term insulin use     Z79.4     Active  197318671

 

 Problem  Skin ulcer, limited to breakdown of skin     L98.491     Active  
36850184

 

 Problem  Other iron deficiency anemia     D50.8     Active  90390288

 

 Problem  Peripheral edema     R60.9     Active  174843854

 

 Problem  Coronary artery disease     I25.10     Active  67648145

 

 Problem  Diabetes mellitus due to underlying condition with foot ulcer     
E08.621     Active  942989038

 

 Problem  Type 2 diabetes mellitus with diabetic polyneuropathy     E11.42     
Active  73375909

 

 Problem  Microalbuminuric diabetic nephropathy     E11.21     Active  481102359

 

 Problem  Iron deficiency anemia     D50.9     Active  57334770

 

 Problem  Non-adherence to medical treatment     Z91.19     Active  068824079

 

 Problem  Non-pressure chronic ulcer of other part of left foot limited to 
breakdown of skin     L97.521     Active  492152379

 

 Problem  Acute idiopathic gout involving toe of left foot     M10.072     
Active  69189636

 

 Problem  Immune thrombocytopenic purpura     D69.3     Active  686762176

 

 Problem  Hyperlipidemia, unspecified hyperlipidemia     E78.5     Active  
44396392

 

 Problem  GERD (gastroesophageal reflux disease)     K21.9     Active  091336876

 

 Problem  Avascular necrosis of bone of right hip     M87.051     Active  
074522397

 

 Problem  Anxiety disorder, unspecified     F41.9     Active  241839128

 

 Problem  Morbid (severe) obesity due to excess calories     E66.01     Active  
271155035

 

 Problem  Asthma     J45.909     Active  317715638

 

 Problem  Body mass index (BMI) of 45.0-49.9 in adult     Z68.42     Active  
015541108

 

 Problem  Chronic kidney disease (CKD) stage G3a/A3, moderately decreased 
glomerular filtration rate (GFR) between 45-59 mL/min/1.73 square meter and 
albuminuria creatinine ratio greater than 300 mg/g     N18.3     Active  
884097579

 

 Problem  Hypertension     I10     Active  01798373

 

 Problem  Recurrent major depressive disorder, in partial remission     F33.41 
    Active  33466715







ALLERGIES

No Information



ENCOUNTERS







 Encounter  Location  Date  Diagnosis

 

 [facility]  [id number] [place] [address] [phone]-
[phone]  06 Sep, 2018   

 

 [facility]  [address] [phone]-
1824  17 Aug, 2018  Peripheral edema R60.9

 

 [facility]  [id number] [place] [address] 51392-
[phone]  13 Aug, 2018  Hypertension I10 and Peripheral edema R60.9

 

 [facility]  [id number] [place] [address] 67453-
3602  09 Aug, 2018   

 

 [facility]  [id number] [place] [address] 32759-
2652  09 Aug, 2018  Hypertension I10 and Peripheral edema R60.9

 

 [facility]  [id number] [place] [address] 26071-
4423  06 Aug, 2018  Hypertension I10 and Peripheral edema R60.9

 

 [facility]  [id number] [place] [address] 36795-
3162  02 Aug, 2018   

 

 [facility]  [id number] [place] [address] [phone]-
7749  02 Aug, 2018  Hypertension I10 and Peripheral edema R60.9

 

 [facility]  [id number] [place] [address] 42688-
3015  01 Aug, 2018   

 

 [facility]  [id number] [place] [address] 40544-
2125     

 

 [facility]  [id number] [place] [address] 20496-
3064     

 

 [facility]  [id number] [place] [address] 72772-
9510     

 

 [place] [id number] [place] [address] 29561-
4442    Diabetes mellitus due to underlying condition with foot 
ulcer E08.621 ; Non-pressure chronic ulcer of other part of left foot limited 
to breakdown of skin L97.521 and BMI 45.0-49.9, adult Z68.42

 

 [id number] [place] [address] 88612-
3818    Acute idiopathic gout involving toe of left foot M10.072

 

 [place] [id number] [place] [address] 93473-
0920     

 

 [address] [address]-
[phone]     

 

 [place] [id number] [place] [address] 03210-
6263     

 

 [address] [address]855-
7107    Type 2 diabetes mellitus with diabetic polyneuropathy 
E11.42 ; Hypertension I10 ; Hyperlipidemia, unspecified hyperlipidemia E78.5 ; 
Body mass index (BMI) of 45.0-49.9 in adult Z68.42 ; Long-term insulin use 
Z79.4 ; Non-adherence to medical treatment Z91.19 ; Coronary artery disease 
I25.10 ; GERD (gastroesophageal reflux disease) K21.9 ; Asthma J45.909 and 
Recurrent major depressive disorder, in partial remission F33.41

 

 [place] [id number] [place] [address] 82957-
3795  22 May, 2018  Recurrent major depressive disorder, in partial remission 
F33.41 and Hypertension I10

 

 [id number] [place] [address] 38602-
7719  21 May, 2018  Immune thrombocytopenic purpura D69.3 and Iron deficiency 
anemia D50.9

 

 [id number] [place] [address] 11338-
9553  21 May, 2018  Type 2 diabetes mellitus with diabetic polyneuropathy 
E11.42 ; Long-term insulin use Z79.4 ; Chronic kidney disease (CKD) stage G3a/A3
, moderately decreased glomerular filtration rate (GFR) between 45-59 mL/min/
1.73 square meter and albuminuria creatinine ratio greater than 300 mg/g N18.3 
; Hypertension I10 ; Hyperlipidemia, unspecified hyperlipidemia E78.5 ; 
Coronary artery disease I25.10 ; GERD (gastroesophageal reflux disease) K21.9 ; 
Immune thrombocytopenic purpura D69.3 ; Asthma J45.909 ; Morbid (severe) 
obesity due to excess calories E66.01 and Recurrent major depressive disorder, 
in partial remission F33.41

 

 [place] [address] [address]-
7234  11 May, 2018  Chronic kidney disease (CKD) stage G3a/A3, moderately 
decreased glomerular filtration rate (GFR) between 45-59 mL/min/1.73 square 
meter and albuminuria creatinine ratio greater than 300 mg/g N18.3

 

 [place] [id number] [place] [address] 47776-
1931  02 May, 2018  Chronic kidney disease (CKD) stage G3a/A3, moderately 
decreased glomerular filtration rate (GFR) between 45-59 mL/min/1.73 square 
meter and albuminuria creatinine ratio greater than 300 mg/g N18.3

 

 [place] [id number] [place] [address] 19160-
4255     

 

 [place] [id number] [place] [address] 89258-
4322  28 Mar, 2018   

 

 [place] [id number] [place] [address] 25055-
0954  26 Mar, 2018  Immune thrombocytopenic purpura D69.3 ; Type 2 diabetes 
mellitus with diabetic polyneuropathy E11.42 ; Avascular necrosis of bone of 
right hip M87.051 ; Long-term insulin use Z79.4 ; GERD (gastroesophageal reflux 
disease) K21.9 ; Hyperlipidemia, unspecified hyperlipidemia E78.5 ; 
Hypertension I10 ; Recurrent major depressive disorder, in partial remission 
F33.41 ; Microalbuminuric diabetic nephropathy E11.21 ; Body mass index (BMI) 
of 45.0-49.9 in adult Z68.42 ; BMI 45.0-49.9, adult Z68.42 and Chronic kidney 
disease (CKD) stage G3a/A3, moderately decreased glomerular filtration rate (GFR
) between 45-59 mL/min/1.73 square meter and albuminuria creatinine ratio 
greater than 300 mg/g N18.3

 

 [place] [address] [address] [place] [address] 12852-
2632  23 Mar, 2018   

 

 [place] [id number] [place] [address] 10209-
8282  23 Mar, 2018   

 

 [place] [id number] [place] [address] 47032-
1588  19 Mar, 2018   

 

 [place] [id number] [place] [address] 46192-
1851  14 Mar, 2018   

 

 [place] [id number] [place] [address] 23778-
3202  13 Mar, 2018  Type 2 diabetes mellitus with diabetic polyneuropathy 
E11.42 ; Asthma J45.909 and Hypertension I10

 

 [facility]  [address] 
[id number]  13 Mar, 2018  Skin ulcer, limited to breakdown of skin L98.491 ; 
Immune thrombocytopenic purpura D69.3 ; Avascular necrosis of bone of right hip 
M87.051 and Type 2 diabetes mellitus with diabetic polyneuropathy E11.42

 

 [address] [place] [address] 13967-
1176  06 Mar, 2018  Asthma J45.909 and Type 2 diabetes mellitus with diabetic 
polyneuropathy E11.42

 

 [place] [id number] [place] [address] 19897-
6990  01 Mar, 2018   

 

 [facility]  [address] 
[id number]    Other iron deficiency anemia D50.8 ; Weakness R53.1 ; 
Type 2 diabetes mellitus with diabetic polyneuropathy E11.42 ; Cellulitis of 
trunk, unspecified site of trunk L03.319 ; Coronary artery disease I25.10 ; 
Avascular necrosis of bone of right hip M87.051 and Morbid (severe) obesity due 
to excess calories E66.01

 

 [facility]  [id number] 
[id number]     

 

 [facility]  [address] [place] [address] 06586-
2237     

 

 [facility] [place] [id number] [place] [address] [phone]
[phone]  15 Feb, 2018  Yeast infection of the skin B37.2

 

 [place] [id number] [place] [address] 77632-
1786  15 Feb, 2018   

 

 [place] [id number] [place] [address] 54957-
4427  15 Feb, 2018   

 

 [place] [id number] [place] [address] 49932-
4585     

 

 [facility]  [id number] [place] [address] [phone]
[phone]     

 

 [place] [address] [address] [place] [address] 97412-
3717    Type 2 diabetes mellitus with diabetic polyneuropathy 
E11.42 ; Avascular necrosis of bone of right hip M87.051 ; Hyperlipidemia, 
unspecified hyperlipidemia E78.5 ; Hypertension I10 ; Anxiety disorder, 
unspecified F41.9 ; Immune thrombocytopenic purpura D69.3 ; Coronary artery 
disease I25.10 ; Orthopnea R06.01 ; Acute bronchitis, unspecified organism 
J20.9 ; Wound of left lower extremity, initial encounter S81.802A ; Body aches 
R52 and Debilitated R53.81

 

 [address] [address] [place] [address] 41008-
7975     

 

 [facility]  [address] [place] [address] 90159-
6639    Type 2 diabetes mellitus with diabetic polyneuropathy 
E11.42 ; Encounter for immunization Z23 ; Hyperlipidemia, unspecified 
hyperlipidemia E78.5 ; Hypertension I10 ; Anxiety disorder, unspecified F41.9 ; 
Asthma J45.909 ; Body mass index (BMI) of 45.0-49.9 in adult Z68.42 and Morbid (
severe) obesity due to excess calories E66.01

 

 [address] [address]195-
2319    Type 2 diabetes mellitus with diabetic polyneuropathy 
E11.42 ; Hyperlipidemia, unspecified hyperlipidemia E78.5 ; Hypertension I10 
and GERD (gastroesophageal reflux disease) K21.9

 

 [address] 95629-
6998  22 Mar, 2017  Type 2 diabetes mellitus with diabetic polyneuropathy E11.42

 

 [address] 41133-
1500    Type 2 diabetes mellitus with diabetic polyneuropathy 
E11.42 ; Coronary artery disease I25.10 ; History of MI (myocardial infarction) 
I25.2 ; Immune thrombocytopenic purpura D69.3 ; GERD (gastroesophageal reflux 
disease) K21.9 ; Hyperlipidemia, unspecified hyperlipidemia E78.5 ; 
Hypertension I10 ; History of kidney stones Z87.442 and Anxiety disorder, 
unspecified F41.9

 

 [address] 67177-
9069     

 

 [address] 85746-
7841  29 Sep, 2016  Coronary artery disease I25.10 ; History of MI (myocardial 
infarction) I25.2 ; History of kidney stones Z87.442 ; Type 2 diabetes mellitus 
with diabetic polyneuropathy E11.42 ; Avascular necrosis of bone of right hip 
M87.051 ; Hyperlipidemia, unspecified hyperlipidemia E78.5 ; Hypertension I10 ; 
Asthma J45.909 and Encounter for immunization Z23

 

 [address] 06847-
8790  20 Sep, 2016   

 

 [address] 10457-
8294    Coronary artery disease I25.10 ; Type 2 diabetes mellitus 
with diabetic polyneuropathy E11.42 ; History of MI (myocardial infarction) 
I25.2 ; Immune thrombocytopenic purpura D69.3 ; Hyperlipidemia, unspecified 
hyperlipidemia E78.5 and Hypertension I10

 

 [place] [id number] [place] [address] 73567-
7995    Coronary artery disease I25.10 ; Lymphedema I89.0 ; History 
of MI (myocardial infarction) I25.2 ; History of kidney stones Z87.442 ; Immune 
thrombocytopenic purpura D69.3 ; Type 2 diabetes mellitus with diabetic 
polyneuropathy E11.42 ; Avascular necrosis of bone of right hip M87.051 ; GERD (
gastroesophageal reflux disease) K21.9 ; Hyperlipidemia, unspecified 
hyperlipidemia E78.5 ; Hypertension I10 and Asthma J45.909

 

 [place] [address] 15772-
6005     

 

 [place] [address] [address]139-
8966     

 

 [address] [address] 28696-
1463  02 Dec, 2015   

 

 [place] [address] 58241-
4586  02 Dec, 2015  Hyperlipidemia, unspecified hyperlipidemia E78.5

 

 [address] [address] 77774-
5747     

 

 [place] [address] 14825-
3326     

 

 [place] [address] [address] 34947-
7614    Allergic rhinitis J30.9

 

 [place] [address] [address] 77051-
7315    Type 2 diabetes mellitus with diabetic polyneuropathy 
E11.42 ; Routine adult health maintenance Z00.00 ; Coronary artery disease 
I25.10 ; History of MI (myocardial infarction) I25.2 ; History of kidney stones 
Z87.442 ; Immune thrombocytopenic purpura D69.3 ; Avascular necrosis of bone of 
right hip M87.051 and GERD (gastroesophageal reflux disease) K21.9







IMMUNIZATIONS

No Known Immunizations



SOCIAL HISTORY

Never Assessed



REASON FOR VISIT

hip pain



PLAN OF CARE





VITAL SIGNS





MEDICATIONS

Unknown Medications



RESULTS

No Results



PROCEDURES

No Known procedures



INSTRUCTIONS





MEDICATIONS ADMINISTERED

No Known Medications



MEDICAL (GENERAL) HISTORY







 Type  Description  Date

 

 Medical History  Type II Diabetes   

 

 Medical History  CAD   

 

 Medical History  Acute MI x1   

 

 Medical History  Heart Cath x3   

 

 Medical History  Kidney Stones   

 

 Medical History  Lymphedema   

 

 Medical History  Immune thrombocytopenic purpura   

 

 Surgical History  Heart Stents x2   

 

 Surgical History  Cholecystectomy   

 

 Surgical History     

 

 Surgical History  Sells Teeth   

 

 Hospitalization History  [facility]  

 

 Hospitalization History  Sepsis/Toxic Shock  

 

 Hospitalization History  [facility]-flu/pneumonia  2017

 

 Hospitalization History  [facility]- Anemia, cellulitis pannus, yeast 
infection. Discharged 2018

## 2018-09-08 NOTE — XMS REPORT
[facility]

 Created on: 2018



Madison Young

External Reference #: [id number]

: 1945

Sex: Female



Demographics







 Address  [address] [place]  [address]

 

 Preferred Language  Unknown

 

 Marital Status  Unknown

 

 Mormonism Affiliation  Unknown

 

 Race  Unknown

 

 Ethnic Group  Unknown





Author







 Author  FREIRELEANDER HILLIARD

 

 Organization  [facility]

 

 Address  [address] [place]  [address]



 

 Phone  [phone]







Care Team Providers







 Care Team Member Name  Role  Phone

 

 FREIREISMAEL HILLIARDELE  Unavailable  [phone]







PROBLEMS







 Type  Condition  ICD9-CM Code  PYW68-TA Code  Onset Dates  Condition Status  
SNOMED Code

 

 Problem  Recurrent major depressive disorder, in partial remission     F33.41 
    Active  80111604

 

 Problem  Other iron deficiency anemia     D50.8     Active  06829247

 

 Problem  Long-term insulin use     Z79.4     Active  308289957

 

 Problem  Diabetes mellitus due to underlying condition with foot ulcer     
E08.621     Active  292776226

 

 Problem  Immune thrombocytopenic purpura     D69.3     Active  218227773

 

 Problem  Non-pressure chronic ulcer of other part of left foot limited to 
breakdown of skin     L97.521     Active  313995697

 

 Problem  Chronic kidney disease (CKD) stage G3a/A3, moderately decreased 
glomerular filtration rate (GFR) between 45-59 mL/min/1.73 square meter and 
albuminuria creatinine ratio greater than 300 mg/g     N18.3     Active  
[id number]

 

 Problem  Microalbuminuric diabetic nephropathy     E11.21     Active  406217106

 

 Problem  Non-adherence to medical treatment     Z91.19     Active  905792490

 

 Problem  Skin ulcer, limited to breakdown of skin     L98.491     Active  
80106320

 

 Problem  Acute idiopathic gout involving toe of left foot     M10.072     
Active  97067662

 

 Problem  Iron deficiency anemia     D50.9     Active  46441179

 

 Problem  GERD (gastroesophageal reflux disease)     K21.9     Active  801093704

 

 Problem  Avascular necrosis of bone of right hip     M87.051     Active  
501349583

 

 Problem  Type 2 diabetes mellitus with diabetic polyneuropathy     E11.42     
Active  07009549

 

 Problem  Coronary artery disease     I25.10     Active  26362325

 

 Problem  Hypertension     I10     Active  13448859

 

 Problem  Anxiety disorder, unspecified     F41.9     Active  375465051

 

 Problem  Hyperlipidemia, unspecified hyperlipidemia     E78.5     Active  
17679127

 

 Problem  Morbid (severe) obesity due to excess calories     E66.01     Active  
378499500

 

 Problem  Asthma     J45.909     Active  215428563

 

 Problem  Body mass index (BMI) of 45.0-49.9 in adult     Z68.42     Active  
594050525







ALLERGIES







 Substance  Reaction  Event Type  Date  Status

 

 Heparin Sodium (Porcine) PF  Unknown  Drug Allergy  26 Mar, 2018  Active

 

 Adhesive  Unknown  Non Drug Allergy  26 Mar, 2018  Active







ENCOUNTERS







 Encounter  Location  Date  Diagnosis

 

 [place] [address] [phone]  29 Aug, 2018   

 

 [place] [address] [address]    Diabetes mellitus due to underlying condition with foot 
ulcer E08.621 and Non-pressure chronic ulcer of other part of left foot limited 
to breakdown of skin L97.521

 

 [address] [phone]    Acute idiopathic gout involving toe of left foot M10.072

 

 [place] [address] [address]-
4[address]     

 

 [place] [address] [address]
2095     

 

 [place] [address] [place] [id number] [place] [address]-
[phone]     

 

 [place] [address] [place] [id number] [place] [address]-
2994    Type 2 diabetes mellitus with diabetic polyneuropathy 
E11.42 ; Hypertension I10 ; Hyperlipidemia, unspecified hyperlipidemia E78.5 ; 
Body mass index (BMI) of 45.0-49.9 in adult Z68.42 ; Long-term insulin use 
Z79.4 ; Non-adherence to medical treatment Z91.19 ; Coronary artery disease 
I25.10 ; GERD (gastroesophageal reflux disease) K21.9 ; Asthma J45.909 and 
Recurrent major depressive disorder, in partial remission F33.41

 

 [place] [id number] [place] [address]-
[phone]  22 May, 2018  Recurrent major depressive disorder, in partial remission 
F33.41 and Hypertension I10

 

 [place] [id number], [place] [address]  21 May, 2018  Immune thrombocytopenic purpura D69.3 and Iron deficiency 
anemia D50.9

 

 [place] [address] [place] [id number] [place] [address]-
6944  21 May, 2018  Type 2 diabetes mellitus with diabetic polyneuropathy 
E11.42 ; Long-term insulin use Z79.4 ; Chronic kidney disease (CKD) stage G3a/A3
, moderately decreased glomerular filtration rate (GFR) between 45-59 mL/min/
1.73 square meter and albuminuria creatinine ratio greater than 300 mg/g N18.3 
; Hypertension I10 ; Hyperlipidemia, unspecified hyperlipidemia E78.5 ; 
Coronary artery disease I25.10 ; GERD (gastroesophageal reflux disease) K21.9 ; 
Immune thrombocytopenic purpura D69.3 ; Asthma J45.909 ; Morbid (severe) 
obesity due to excess calories E66.01 and Recurrent major depressive disorder, 
in partial remission F33.41

 

 [place] [address] [place] [id number] [place] [address]-
1710  11 May, 2018  Chronic kidney disease (CKD) stage G3a/A3, moderately 
decreased glomerular filtration rate (GFR) between 45-59 mL/min/1.73 square 
meter and albuminuria creatinine ratio greater than 300 mg/g N18.3

 

 [place] [address], [place] [address]-
9[address]  02 May, 2018  Chronic kidney disease (CKD) stage G3a/A3, moderately 
decreased glomerular filtration rate (GFR) between 45-59 mL/min/1.73 square 
meter and albuminuria creatinine ratio greater than 300 mg/g N18.3

 

 [place] [address] [place] [address]-
4796     

 

 [place] [address] [place] [id number] [place] [address]-
1833  28 Mar, 2018   

 

 [place] [address] [place] [id number] [address]  26 Mar, 2018  Immune thrombocytopenic purpura D69.3 ; Type 2 diabetes 
mellitus with diabetic polyneuropathy E11.42 ; Avascular necrosis of bone of 
right hip M87.051 ; Long-term insulin use Z79.4 ; GERD (gastroesophageal reflux 
disease) K21.9 ; Hyperlipidemia, unspecified hyperlipidemia E78.5 ; 
Hypertension I10 ; Recurrent major depressive disorder, in partial remission 
F33.41 ; Microalbuminuric diabetic nephropathy E11.21 ; Body mass index (BMI) 
of 45.0-49.9 in adult Z68.42 ; BMI 45.0-49.9, adult Z68.42 and Chronic kidney 
disease (CKD) stage G3a/A3, moderately decreased glomerular filtration rate (GFR
) between 45-59 mL/min/1.73 square meter and albuminuria creatinine ratio 
greater than 300 mg/g N18.3

 

 [place] [address] [address]-
[phone]  23 Mar, 2018   

 

 [place] [address] [phone]-
0023  23 Mar, 2018   

 

 [place] [address] [address]-
5228  19 Mar, 2018   

 

 [place] [address] [address]-
5211  14 Mar, 2018   

 

 [place] [address] [address]-
8359  13 Mar, 2018  Type 2 diabetes mellitus with diabetic polyneuropathy 
E11.42 ; Asthma J45.909 and Hypertension I10

 

 [facility]  [address] [place], [place] 
[id number]  13 Mar, 2018  Skin ulcer, limited to breakdown of skin L98.491 ; 
Immune thrombocytopenic purpura D69.3 ; Avascular necrosis of bone of right hip 
M87.051 and Type 2 diabetes mellitus with diabetic polyneuropathy E11.42

 

 [place] [address] [address]-
7378  06 Mar, 2018  Asthma J45.909 and Type 2 diabetes mellitus with diabetic 
polyneuropathy E11.42

 

 [place] [address] [phone]
[phone]  01 Mar, 2018   

 

 [facility]  [address] [place] 
[id number]    Other iron deficiency anemia D50.8 ; Weakness R53.1 ; 
Type 2 diabetes mellitus with diabetic polyneuropathy E11.42 ; Cellulitis of 
trunk, unspecified site of trunk L03.319 ; Coronary artery disease I25.10 ; 
Avascular necrosis of bone of right hip M87.051 and Morbid (severe) obesity due 
to excess calories E66.01

 

 [facility]  [address] [place] [id number] [place] [address] 
[id number]  23 2018   

 

 [place] [address] [place] [id number] [place] [address]-
37[address]     

 

 [facility] [place] [address] [place] [id number] [place] [phone]
[phone]  15 Feb, 2018  Yeast infection of the skin B37.2

 

 [place] [address] [place] [id number] [place] [address]-
5864  15 Feb, 2018   

 

 [facility]  [address] [place] [id number] [place] [address]-
3214  15 Feb, 2018   

 

 [facility]  [address] [place] [id number] [place] [address]-
[phone]     

 

 [facility]  [address] [place] [address]
[phone]     

 

 [facility]  [address] [place] [id number] [place] [address]-
[phone]    Type 2 diabetes mellitus with diabetic polyneuropathy 
E11.42 ; Avascular necrosis of bone of right hip M87.051 ; Hyperlipidemia, 
unspecified hyperlipidemia E78.5 ; Hypertension I10 ; Anxiety disorder, 
unspecified F41.9 ; Immune thrombocytopenic purpura D69.3 ; Coronary artery 
disease I25.10 ; Orthopnea R06.01 ; Acute bronchitis, unspecified organism 
J20.9 ; Wound of left lower extremity, initial encounter S81.802A ; Body aches 
R52 and Debilitated R53.81

 

 [address] [address]-
[address]     

 

 [place] [id number] [place] [address]-
7591    Type 2 diabetes mellitus with diabetic polyneuropathy 
E11.42 ; Encounter for immunization Z23 ; Hyperlipidemia, unspecified 
hyperlipidemia E78.5 ; Hypertension I10 ; Anxiety disorder, unspecified F41.9 ; 
Asthma J45.909 ; Body mass index (BMI) of 45.0-49.9 in adult Z68.42 and Morbid (
severe) obesity due to excess calories E66.01

 

 [place] [id number] [place] [address]-
3860    Type 2 diabetes mellitus with diabetic polyneuropathy 
E11.42 ; Hyperlipidemia, unspecified hyperlipidemia E78.5 ; Hypertension I10 
and GERD (gastroesophageal reflux disease) K21.9

 

 [place] [id number] [place] [address]-
9853  22 Mar, 2017  Type 2 diabetes mellitus with diabetic polyneuropathy E11.42

 

 [place] [address] [place] [address]-
2433    Type 2 diabetes mellitus with diabetic polyneuropathy 
E11.42 ; Coronary artery disease I25.10 ; History of MI (myocardial infarction) 
I25.2 ; Immune thrombocytopenic purpura D69.3 ; GERD (gastroesophageal reflux 
disease) K21.9 ; Hyperlipidemia, unspecified hyperlipidemia E78.5 ; 
Hypertension I10 ; History of kidney stones Z87.442 and Anxiety disorder, 
unspecified F41.9

 

 [place] [address] [place] [address]-
5[address]     

 

 [place] [id number] [place] [address]-
5444  29 Sep, 2016  Coronary artery disease I25.10 ; History of MI (myocardial 
infarction) I25.2 ; History of kidney stones Z87.442 ; Type 2 diabetes mellitus 
with diabetic polyneuropathy E11.42 ; Avascular necrosis of bone of right hip 
M87.051 ; Hyperlipidemia, unspecified hyperlipidemia E78.5 ; Hypertension I10 ; 
Asthma J45.909 and Encounter for immunization Z23

 

 [place] [address] [address]-
2791  20 Sep, 2016   

 

 [place] [address] [address]-
7816    Coronary artery disease I25.10 ; Type 2 diabetes mellitus 
with diabetic polyneuropathy E11.42 ; History of MI (myocardial infarction) 
I25.2 ; Immune thrombocytopenic purpura D69.3 ; Hyperlipidemia, unspecified 
hyperlipidemia E78.5 and Hypertension I10

 

 [place] [address] [address]-
6033    Coronary artery disease I25.10 ; Lymphedema I89.0 ; History 
of MI (myocardial infarction) I25.2 ; History of kidney stones Z87.442 ; Immune 
thrombocytopenic purpura D69.3 ; Type 2 diabetes mellitus with diabetic 
polyneuropathy E11.42 ; Avascular necrosis of bone of right hip M87.051 ; GERD (
gastroesophageal reflux disease) K21.9 ; Hyperlipidemia, unspecified 
hyperlipidemia E78.5 ; Hypertension I10 and Asthma J45.909

 

 [place] [address] [address]-
[address]     

 

 [place] [address] [address]-
6[address]     

 

 [place] [address] [address]-
[phone]  02 Dec, 2015   

 

 [place] [address] [address]-
3859  02 Dec, 2015  Hyperlipidemia, unspecified hyperlipidemia E78.5

 

 [place] [address] [address]-
[address]     

 

 [place] [address] [address]-
8447     

 

 [place] [address] [facility] [id number] [place] [address]52    Allergic rhinitis J30.9

 

 [facility]  [address] [facility] [id number] [place] [address]
0616    Type 2 diabetes mellitus with diabetic polyneuropathy 
E11.42 ; Routine adult health maintenance Z00.00 ; Coronary artery disease 
I25.10 ; History of MI (myocardial infarction) I25.2 ; History of kidney stones 
Z87.442 ; Immune thrombocytopenic purpura D69.3 ; Avascular necrosis of bone of 
right hip M87.051 and GERD (gastroesophageal reflux disease) K21.9







IMMUNIZATIONS

No Known Immunizations



SOCIAL HISTORY

Never Assessed



REASON FOR VISIT

DM II,  Needs scheduled for Medicare GURPREET Reyes RN, Medication consult



PLAN OF CARE







 Activity  Details









  









 Follow Up  2 Months Reason:CHM/DM







VITAL SIGNS







 Height  67 in  2018

 

 Weight  295 lbs  2018

 

 Temperature  98.3 degrees Fahrenheit  2018

 

 Heart Rate  78 bpm  2018

 

 Respiratory Rate  20   2018

 

 BMI  46.20 kg/m2  2018

 

 Blood pressure systolic  136 mmHg  2018

 

 Blood pressure diastolic  86 mmHg  2018







MEDICATIONS







 Medication  Instructions  Dosage  Frequency  Start Date  End Date  Duration  
Status

 

 Mupirocin 2 %  Externally Three times a day as needed  1 application to 
affected area              Active

 

 Nystatin           26 Mar, 2018        Active

 

 Aspir-81 81 MG  Orally Once a day  1 tablet  24h           Active

 

 Probiotic -                    Active

 

 Citalopram Hydrobromide 20 mg  Orally Once a day  1 tablet  24h           
Active

 

 Hydrochlorothiazide 12.5 MG  Orally twice a day  1 tablet  12h           Active

 

 Hydrochlorothiazide 12.5 MG  Orally Once a day  1 capsule in the morning  24h  
     90  Not-Taking

 

 Atorvastatin Calcium 10 mg  Orally Once a day  1 tablet  24h      
    Active

 

 Benazepril HCl 40 mg  Orally Once a day  1 tablet  24h           Active

 

 Advair Diskus           26 Mar, 2018        Active

 

 Levemir FlexTouch 100 UNIT/ML  Subcutaneous 2 times a day  10 units twice 
daily  12h       12 months  Active

 

 Pantoprazole Sodium 40 mg  Orally Once a day  1 tablet  24h           Active

 

 ProAir  (90 Base) MCG/ACT  Inhalation every 4 hrs  2 puffs as needed  
4h          Not-Taking

 

 Aleve 220 MG  Orally every 12 hrs  2 tablets with food or milk as needed  12h 
          Active

 

 Humalog KwikPen 100 UNIT/ML  Subcutaneous three times a day before meals  10 
units              Active

 

 GlipiZIDE 5 mg  Orally 2 times a day  1 tablet  12h           Active

 

 Carvedilol 12.5 MG  Orally 2 times a day  1 tablet  12h           Active







RESULTS

No Results



PROCEDURES







 Procedure  Date Ordered  Result  Body Site

 

 [facility] VISIT ESTABLISHED PATIENT  2018      







INSTRUCTIONS





MEDICATIONS ADMINISTERED

No Known Medications



MEDICAL (GENERAL) HISTORY







 Type  Description  Date

 

 Medical History  Type II Diabetes   

 

 Medical History  CAD   

 

 Medical History  Acute MI x1   

 

 Medical History  Heart Cath x3   

 

 Medical History  Kidney Stones   

 

 Medical History  Lymphedema   

 

 Medical History  Immune thrombocytopenic purpura   

 

 Surgical History  Heart Stents x2   

 

 Surgical History  Cholecystectomy   

 

 Surgical History     

 

 Surgical History  [place] Teeth   

 

 Hospitalization History  [facility]  

 

 Hospitalization History  Sepsis/Toxic Shock  

 

 Hospitalization History  [facility]-flu/pneumonia  2017

 

 Hospitalization History  [facility]- Anemia, cellulitis pannus, yeast 
infection. Discharged 2018

## 2018-09-08 NOTE — XMS REPORT
[facility]

 Created on: 2018



Madison Young

External Reference #: [id number]

: 1945

Sex: Female



Demographics







 Address  [address] [place]  [address]

 

 Preferred Language  Unknown

 

 Marital Status  Unknown

 

 Protestant Affiliation  Unknown

 

 Race  Unknown

 

 Ethnic Group  Unknown





Author







 Author  SHAZIA MANUEL

 

 [facility]

 

 Address  [address] [place]  [address]



 

 Phone  [phone]







Care Team Providers







 Care Team Member Name  Role  Phone

 

 SHAZIA MANUEL  Unavailable  [phone]







PROBLEMS







 Type  Condition  ICD9-CM Code  RFD57-HU Code  Onset Dates  Condition Status  
SNOMED Code

 

 Problem  Recurrent major depressive disorder, in partial remission     F33.41 
    Active  50253278

 

 Problem  Other iron deficiency anemia     D50.8     Active  26092148

 

 Problem  Long-term insulin use     Z79.4     Active  224381928

 

 Problem  Diabetes mellitus due to underlying condition with foot ulcer     
E08.621     Active  225837760

 

 Problem  Immune thrombocytopenic purpura     D69.3     Active  237980312

 

 Problem  Non-pressure chronic ulcer of other part of left foot limited to 
breakdown of skin     L97.521     Active  711592357

 

 Problem  Chronic kidney disease (CKD) stage G3a/A3, moderately decreased 
glomerular filtration rate (GFR) between 45-59 mL/min/1.73 square meter and 
albuminuria creatinine ratio greater than 300 mg/g     N18.3     Active  
318603218

 

 Problem  Microalbuminuric diabetic nephropathy     E11.21     Active  924836062

 

 Problem  Non-adherence to medical treatment     Z91.19     Active  875507187

 

 Problem  Skin ulcer, limited to breakdown of skin     L98.491     Active  
78933906

 

 Problem  Acute idiopathic gout involving toe of left foot     M10.072     
Active  37231352

 

 Problem  Iron deficiency anemia     D50.9     Active  42714192

 

 Problem  GERD (gastroesophageal reflux disease)     K21.9     Active  450473278

 

 Problem  Avascular necrosis of bone of right hip     M87.051     Active  
726268052

 

 Problem  Type 2 diabetes mellitus with diabetic polyneuropathy     E11.42     
Active  87950199

 

 Problem  Coronary artery disease     I25.10     Active  15119292

 

 Problem  Hypertension     I10     Active  78873235

 

 Problem  Anxiety disorder, unspecified     F41.9     Active  226901108

 

 Problem  Hyperlipidemia, unspecified hyperlipidemia     E78.5     Active  
28707745

 

 Problem  Morbid (severe) obesity due to excess calories     E66.01     Active  
419078553

 

 Problem  Asthma     J45.909     Active  580634106

 

 Problem  Body mass index (BMI) of 45.0-49.9 in adult     Z68.42     Active  
190666328







ALLERGIES

No Information



ENCOUNTERS







 Encounter  Location  Date  Diagnosis

 

 [place] [id number] [place] [phone]-
[phone]  29 Aug, 2018   

 

 [place] [address] [phone]-
[phone]    Diabetes mellitus due to underlying condition with foot 
ulcer E08.621 and Non-pressure chronic ulcer of other part of left foot limited 
to breakdown of skin L97.521

 

 [place] [address] [phone]-
[phone]    Acute idiopathic gout involving toe of left foot M10.072

 

 [place] [id number] [place] [phone]-
[phone]     

 

 [place] [id number] [place] [address]25-
[phone]     

 

 [place] [id number] [place] [address]286-
[phone]     

 

 [place] [id number] [place] [address]86-
6909    Type 2 diabetes mellitus with diabetic polyneuropathy 
E11.42 ; Hypertension I10 ; Hyperlipidemia, unspecified hyperlipidemia E78.5 ; 
Body mass index (BMI) of 45.0-49.9 in adult Z68.42 ; Long-term insulin use 
Z79.4 ; Non-adherence to medical treatment Z91.19 ; Coronary artery disease 
I25.10 ; GERD (gastroesophageal reflux disease) K21.9 ; Asthma J45.909 and 
Recurrent major depressive disorder, in partial remission F33.41

 

 [place] [id number] [place] [phone]-
[phone]  22 May, 2018  Recurrent major depressive disorder, in partial remission 
F33.41 and Hypertension I10

 

 [place] [id number] [place] [phone]-
[phone]  21 May, 2018  Immune thrombocytopenic purpura D69.3 and Iron deficiency 
anemia D50.9

 

 [place] [id number] [place] [address]672-
3104  21 May, 2018  Type 2 diabetes mellitus with diabetic polyneuropathy 
E11.42 ; Long-term insulin use Z79.4 ; Chronic kidney disease (CKD) stage G3a/A3
, moderately decreased glomerular filtration rate (GFR) between 45-59 mL/min/
1.73 square meter and albuminuria creatinine ratio greater than 300 mg/g N18.3 
; Hypertension I10 ; Hyperlipidemia, unspecified hyperlipidemia E78.5 ; 
Coronary artery disease I25.10 ; GERD (gastroesophageal reflux disease) K21.9 ; 
Immune thrombocytopenic purpura D69.3 ; Asthma J45.909 ; Morbid (severe) 
obesity due to excess calories E66.01 and Recurrent major depressive disorder, 
in partial remission F33.41

 

 [place] [id number] [place] [address]759-
[phone]  11 May, 2018  Chronic kidney disease (CKD) stage G3a/A3, moderately 
decreased glomerular filtration rate (GFR) between 45-59 mL/min/1.73 square 
meter and albuminuria creatinine ratio greater than 300 mg/g N18.3

 

 [place] [id number] [place] [address]882-
[phone]  02 May, 2018  Chronic kidney disease (CKD) stage G3a/A3, moderately 
decreased glomerular filtration rate (GFR) between 45-59 mL/min/1.73 square 
meter and albuminuria creatinine ratio greater than 300 mg/g N18.3

 

 [place] [id number] [place] [address] 78021-
3054     

 

 [place] [id number] [place] [address] 46158-
[phone]  28 Mar, 2018   

 

 [place] [id number] [place] [address] [phone]-
[phone]  26 Mar, 2018  Immune thrombocytopenic purpura D69.3 ; Type 2 diabetes 
mellitus with diabetic polyneuropathy E11.42 ; Avascular necrosis of bone of 
right hip M87.051 ; Long-term insulin use Z79.4 ; GERD (gastroesophageal reflux 
disease) K21.9 ; Hyperlipidemia, unspecified hyperlipidemia E78.5 ; 
Hypertension I10 ; Recurrent major depressive disorder, in partial remission 
F33.41 ; Microalbuminuric diabetic nephropathy E11.21 ; Body mass index (BMI) 
of 45.0-49.9 in adult Z68.42 ; BMI 45.0-49.9, adult Z68.42 and Chronic kidney 
disease (CKD) stage G3a/A3, moderately decreased glomerular filtration rate (GFR
) between 45-59 mL/min/1.73 square meter and albuminuria creatinine ratio 
greater than 300 mg/g N18.3

 

 [place] [address] [phone]-
[phone]  23 Mar, 2018   

 

 [place] [address] [phone]-
[phone]  23 Mar, 2018   

 

 [place] [address] [address]-
5862  19 Mar, 2018   

 

 [place] [id number] [place] [address]120-
2387  14 Mar, 2018   

 

 [place] [address] [address]-
2647  13 Mar, 2018  Type 2 diabetes mellitus with diabetic polyneuropathy 
E11.42 ; Asthma J45.909 and Hypertension I10

 

 [facility]  [address] [place], [place] 
[phone]  13 Mar, 2018  Skin ulcer, limited to breakdown of skin L98.491 ; 
Immune thrombocytopenic purpura D69.3 ; Avascular necrosis of bone of right hip 
M87.051 and Type 2 diabetes mellitus with diabetic polyneuropathy E11.42

 

 [place] [address] [address]-
2953  06 Mar, 2018  Asthma J45.909 and Type 2 diabetes mellitus with diabetic 
polyneuropathy E11.42

 

 [place] [address] [address]-
2476  01 Mar, 2018   

 

 [facility]  [address] [place] 
[id number]    Other iron deficiency anemia D50.8 ; Weakness R53.1 ; 
Type 2 diabetes mellitus with diabetic polyneuropathy E11.42 ; Cellulitis of 
trunk, unspecified site of trunk L03.319 ; Coronary artery disease I25.10 ; 
Avascular necrosis of bone of right hip M87.051 and Morbid (severe) obesity due 
to excess calories E66.01

 

 [facility]  [id number] [place] [address] 
[phone]     

 

 [place] [id number] [place] [address]-
[address]1     

 

 [facility] [place] [address] [phone]  15 Feb, 2018  Yeast infection of the skin B37.2

 

 [place] [id number] [place] [address] 47582-
7130  15 Feb, 2018   

 

 [place] [id number] [place] [address]-
7831  15 Feb, 2018   

 

 [place] [id number] [place] [address] 78696-
[phone]     

 

 [facility] [place] [id number] [place] [phone]     

 

 [place] [address] [place] [address]255-
5099    Type 2 diabetes mellitus with diabetic polyneuropathy 
E11.42 ; Avascular necrosis of bone of right hip M87.051 ; Hyperlipidemia, 
unspecified hyperlipidemia E78.5 ; Hypertension I10 ; Anxiety disorder, 
unspecified F41.9 ; Immune thrombocytopenic purpura D69.3 ; Coronary artery 
disease I25.10 ; Orthopnea R06.01 ; Acute bronchitis, unspecified organism 
J20.9 ; Wound of left lower extremity, initial encounter S81.802A ; Body aches 
R52 and Debilitated R53.81

 

 [place] [id number] [place] [address] 21443-
0063     

 

 [place] [address] [place] [address]-
6536    Type 2 diabetes mellitus with diabetic polyneuropathy 
E11.42 ; Encounter for immunization Z23 ; Hyperlipidemia, unspecified 
hyperlipidemia E78.5 ; Hypertension I10 ; Anxiety disorder, unspecified F41.9 ; 
Asthma J45.909 ; Body mass index (BMI) of 45.0-49.9 in adult Z68.42 and Morbid (
severe) obesity due to excess calories E66.01

 

 [place] [id number] [place] [address] 90030-
9004    Type 2 diabetes mellitus with diabetic polyneuropathy 
E11.42 ; Hyperlipidemia, unspecified hyperlipidemia E78.5 ; Hypertension I10 
and GERD (gastroesophageal reflux disease) K21.9

 

 [place] [id number] [place] [address]20-
9522  22 Mar, 2017  Type 2 diabetes mellitus with diabetic polyneuropathy E11.42

 

 [place] [address] [place] [address] 45385-
5423    Type 2 diabetes mellitus with diabetic polyneuropathy 
E11.42 ; Coronary artery disease I25.10 ; History of MI (myocardial infarction) 
I25.2 ; Immune thrombocytopenic purpura D69.3 ; GERD (gastroesophageal reflux 
disease) K21.9 ; Hyperlipidemia, unspecified hyperlipidemia E78.5 ; 
Hypertension I10 ; History of kidney stones Z87.442 and Anxiety disorder, 
unspecified F41.9

 

 [place] [address] [place] [address]404-
1298     

 

 [address] [place] [address] 67290-
3296  29 Sep, 2016  Coronary artery disease I25.10 ; History of MI (myocardial 
infarction) I25.2 ; History of kidney stones Z87.442 ; Type 2 diabetes mellitus 
with diabetic polyneuropathy E11.42 ; Avascular necrosis of bone of right hip 
M87.051 ; Hyperlipidemia, unspecified hyperlipidemia E78.5 ; Hypertension I10 ; 
Asthma J45.909 and Encounter for immunization Z23

 

 [place] [id number] [place] [address] 27494-
8524  20 Sep, 2016   

 

 [place] [address] [address]-
5871    Coronary artery disease I25.10 ; Type 2 diabetes mellitus 
with diabetic polyneuropathy E11.42 ; History of MI (myocardial infarction) 
I25.2 ; Immune thrombocytopenic purpura D69.3 ; Hyperlipidemia, unspecified 
hyperlipidemia E78.5 and Hypertension I10

 

 [place] [address] [place] [address] 8[address]63-
8408    Coronary artery disease I25.10 ; Lymphedema I89.0 ; History 
of MI (myocardial infarction) I25.2 ; History of kidney stones Z87.442 ; Immune 
thrombocytopenic purpura D69.3 ; Type 2 diabetes mellitus with diabetic 
polyneuropathy E11.42 ; Avascular necrosis of bone of right hip M87.051 ; GERD (
gastroesophageal reflux disease) K21.9 ; Hyperlipidemia, unspecified 
hyperlipidemia E78.5 ; Hypertension I10 and Asthma J45.909

 

 [place] [address] [address]-
9657     

 

 [place] [address] [address]-
[phone]     

 

 [place] [address] [place] [address] [phone]-
[phone]  02 Dec, 2015   

 

 [place] [address] [place] [address] 83180-
7056  02 Dec, 2015  Hyperlipidemia, unspecified hyperlipidemia E78.5

 

 [place] [address] [address]-
0219     

 

 [place] [address] [address]-
[phone]     

 

 [place] [address] [address]-
9111    Allergic rhinitis J30.9

 

 [facility]  [address] [facility] [id number] [place] [address]-
8689    Type 2 diabetes mellitus with diabetic polyneuropathy 
E11.42 ; Routine adult health maintenance Z00.00 ; Coronary artery disease 
I25.10 ; History of MI (myocardial infarction) I25.2 ; History of kidney stones 
Z87.442 ; Immune thrombocytopenic purpura D69.3 ; Avascular necrosis of bone of 
right hip M87.051 and GERD (gastroesophageal reflux disease) K21.9







IMMUNIZATIONS

No Known Immunizations



SOCIAL HISTORY

Never Assessed



REASON FOR VISIT

Discuss Dismissal



PLAN OF CARE







 Activity  Details









  









 Follow Up  prn Reason:







VITAL SIGNS





MEDICATIONS

Unknown Medications



RESULTS

No Results



PROCEDURES







 Procedure  Date Ordered  Result  Body Site

 

 [facility] VISIT ESTABLISHED PATIENT  2018      







INSTRUCTIONS





MEDICATIONS ADMINISTERED

No Known Medications



MEDICAL (GENERAL) HISTORY







 Type  Description  Date

 

 Medical History  Type II Diabetes   

 

 Medical History  CAD   

 

 Medical History  Acute MI x1   

 

 Medical History  Heart Cath x3   

 

 Medical History  Kidney Stones   

 

 Medical History  Lymphedema   

 

 Medical History  Immune thrombocytopenic purpura   

 

 Surgical History  Heart Stents x2   

 

 Surgical History  Cholecystectomy   

 

 Surgical History     

 

 Surgical History  [place] Teeth   

 

 Hospitalization History  [facility]  

 

 Hospitalization History  Sepsis/Toxic Shock  2015

 

 Hospitalization History  [facility]-flu/pneumonia  2017

 

 Hospitalization History  [facility]- Anemia, cellulitis pannus, yeast 
infection. Discharged 2018

## 2018-09-08 NOTE — XMS REPORT
[facility]

 Created on: 2018



Madison Young

External Reference #: [id number]

: 1945

Sex: Female



Demographics







 Address  [address] [place]  [address]

 

 Preferred Language  Unknown

 

 Marital Status  Unknown

 

 Rastafari Affiliation  Unknown

 

 Race  Unknown

 

 Ethnic Group  Unknown





Author







 Author  FREIRELEANDER HILLIARD

 

 Organization  [facility]

 

 Address  [address] [place]  [address]



 

 Phone  [phone]







Care Team Providers







 Care Team Member Name  Role  Phone

 

 FREIRELEANDER HILLIARD  Unavailable  [phone]







PROBLEMS







 Type  Condition  ICD9-CM Code  VJE99-BR Code  Onset Dates  Condition Status  
SNOMED Code

 

 Problem  Anxiety disorder, unspecified     F41.9     Active  317630385

 

 Problem  Body mass index (BMI) of 45.0-49.9 in adult     Z68.42     Active  
948024534

 

 Problem  Morbid (severe) obesity due to excess calories     E66.01     Active  
506744692

 

 Problem  Iron deficiency anemia     D50.9     Active  52444268

 

 Problem  Non-adherence to medical treatment     Z91.19     Active  985469065

 

 Problem  Long-term insulin use     Z79.4     Active  619262162

 

 Problem  Recurrent major depressive disorder, in partial remission     F33.41 
    Active  60963034

 

 Problem  Skin ulcer, limited to breakdown of skin     L98.491     Active  
24533855

 

 Problem  Other iron deficiency anemia     D50.8     Active  56625513

 

 Problem  Avascular necrosis of bone of right hip     M87.051     Active  
886338590

 

 Problem  Coronary artery disease     I25.10     Active  17932025

 

 Problem  Chronic kidney disease (CKD) stage G3a/A3, moderately decreased 
glomerular filtration rate (GFR) between 45-59 mL/min/1.73 square meter and 
albuminuria creatinine ratio greater than 300 mg/g     N18.3     Active  
691669455

 

 Problem  Microalbuminuric diabetic nephropathy     E11.21     Active  764849613

 

 Problem  Immune thrombocytopenic purpura     D69.3     Active  441931391

 

 Problem  Hyperlipidemia, unspecified hyperlipidemia     E78.5     Active  
76399762

 

 Problem  GERD (gastroesophageal reflux disease)     K21.9     Active  958588285

 

 Problem  Asthma     J45.909     Active  322132191

 

 Problem  Type 2 diabetes mellitus with diabetic polyneuropathy     E11.42     
Active  31893755

 

 Problem  Hypertension     I10     Active  94444885







ALLERGIES

No Information



ENCOUNTERS







 Encounter  Location  Date  Diagnosis

 

 [facility]  [address], [place] [address]  29 Aug, 2018   

 

 [place] [address] [address]
2673     

 

 [place] [address] [address]     

 

 [place] [address] [place] [address]-
[phone]     

 

 [place] [address] [place] [address]-
8794    Type 2 diabetes mellitus with diabetic polyneuropathy 
E11.42 ; Hypertension I10 ; Hyperlipidemia, unspecified hyperlipidemia E78.5 ; 
Body mass index (BMI) of 45.0-49.9 in adult Z68.42 ; Long-term insulin use 
Z79.4 ; Non-adherence to medical treatment Z91.19 ; Coronary artery disease 
I25.10 ; GERD (gastroesophageal reflux disease) K21.9 ; Asthma J45.909 and 
Recurrent major depressive disorder, in partial remission F33.41

 

 [place] [id number] [place] [phone]-
7742  22 May, 2018  Recurrent major depressive disorder, in partial remission 
F33.41 and Hypertension I10

 

 [address] [place] [address]-
8482  21 May, 2018  Immune thrombocytopenic purpura D69.3 and Iron deficiency 
anemia D50.9

 

 [address] [place] [address]-
[address]  21 May, 2018  Type 2 diabetes mellitus with diabetic polyneuropathy 
E11.42 ; Long-term insulin use Z79.4 ; Chronic kidney disease (CKD) stage G3a/A3
, moderately decreased glomerular filtration rate (GFR) between 45-59 mL/min/
1.73 square meter and albuminuria creatinine ratio greater than 300 mg/g N18.3 
; Hypertension I10 ; Hyperlipidemia, unspecified hyperlipidemia E78.5 ; 
Coronary artery disease I25.10 ; GERD (gastroesophageal reflux disease) K21.9 ; 
Immune thrombocytopenic purpura D69.3 ; Asthma J45.909 ; Morbid (severe) 
obesity due to excess calories E66.01 and Recurrent major depressive disorder, 
in partial remission F33.41

 

 [place] [address] [address]  11 May, 2018  Chronic kidney disease (CKD) stage G3a/A3, moderately 
decreased glomerular filtration rate (GFR) between 45-59 mL/min/1.73 square 
meter and albuminuria creatinine ratio greater than 300 mg/g N18.3

 

 [place] [address] [address]-
5517  02 May, 2018  Chronic kidney disease (CKD) stage G3a/A3, moderately 
decreased glomerular filtration rate (GFR) between 45-59 mL/min/1.73 square 
meter and albuminuria creatinine ratio greater than 300 mg/g N18.3

 

 [place] [address] [address]-
27[address]     

 

 [place] [address] [address]-
8146  28 Mar, 2018   

 

 [place] [address] [address]-
[address]  26 Mar, 2018  Immune thrombocytopenic purpura D69.3 ; Type 2 diabetes 
mellitus with diabetic polyneuropathy E11.42 ; Avascular necrosis of bone of 
right hip M87.051 ; Long-term insulin use Z79.4 ; GERD (gastroesophageal reflux 
disease) K21.9 ; Hyperlipidemia, unspecified hyperlipidemia E78.5 ; 
Hypertension I10 ; Recurrent major depressive disorder, in partial remission 
F33.41 ; Microalbuminuric diabetic nephropathy E11.21 ; Body mass index (BMI) 
of 45.0-49.9 in adult Z68.42 ; BMI 45.0-49.9, adult Z68.42 and Chronic kidney 
disease (CKD) stage G3a/A3, moderately decreased glomerular filtration rate (GFR
) between 45-59 mL/min/1.73 square meter and albuminuria creatinine ratio 
greater than 300 mg/g N18.3

 

 [place] [address] [place] [id number] [place] [address]-
7501  23 Mar, 2018   

 

 [place] [address] [address]
1023  23 Mar, 2018   

 

 [place] [address] [place] [address]-
0206  19 Mar, 2018   

 

 [facility]  [address] [place] [address]-
9288  14 Mar, 2018   

 

 [place] [address] [place] [address]-
4114  13 Mar, 2018  Type 2 diabetes mellitus with diabetic polyneuropathy 
E11.42 ; Asthma J45.909 and Hypertension I10

 

 [facility]  [address] [place], [place] 
[id number]  13 Mar, 2018  Skin ulcer, limited to breakdown of skin L98.491 ; 
Immune thrombocytopenic purpura D69.3 ; Avascular necrosis of bone of right hip 
M87.051 and Type 2 diabetes mellitus with diabetic polyneuropathy E11.42

 

 [place] [address] [place] [address]-
4242  06 Mar, 2018  Asthma J45.909 and Type 2 diabetes mellitus with diabetic 
polyneuropathy E11.42

 

 [place] [address] [place] [address]-
6144  01 Mar, 2018   

 

 [facility]  [address] [place] 
[id number]    Other iron deficiency anemia D50.8 ; Weakness R53.1 ; 
Type 2 diabetes mellitus with diabetic polyneuropathy E11.42 ; Cellulitis of 
trunk, unspecified site of trunk L03.319 ; Coronary artery disease I25.10 ; 
Avascular necrosis of bone of right hip M87.051 and Morbid (severe) obesity due 
to excess calories E66.01

 

 [facility]  [address] [place] [id number] [place] [address] 
[phone]     

 

 [facility]  [address] [place] [address]-
9985     

 

 [facility] [place] [facility]  [address] [place] [phone]  15 Feb, 2018  Yeast infection of the skin B37.2

 

 [place] [address] [place] [address]-
5695  15 Feb, 2018   

 

 [facility]  [address] [place] [id number] [place] [address]-
0990  15 Feb, 2018   

 

 [facility]  [address] [place] [id number] [place] [address]-
4436     

 

 [facility]  [address] [id number] [place] [phone]     

 

 [place] [address] [address]    Type 2 diabetes mellitus with diabetic polyneuropathy 
E11.42 ; Avascular necrosis of bone of right hip M87.051 ; Hyperlipidemia, 
unspecified hyperlipidemia E78.5 ; Hypertension I10 ; Anxiety disorder, 
unspecified F41.9 ; Immune thrombocytopenic purpura D69.3 ; Coronary artery 
disease I25.10 ; Orthopnea R06.01 ; Acute bronchitis, unspecified organism 
J20.9 ; Wound of left lower extremity, initial encounter S81.802A ; Body aches 
R52 and Debilitated R53.81

 

 [place] [address] [place] [id number] [place] [address]-
0234     

 

 [place] [address] [place] [id number] [place] [address]-
2393  02 2017  Type 2 diabetes mellitus with diabetic polyneuropathy 
E11.42 ; Encounter for immunization Z23 ; Hyperlipidemia, unspecified 
hyperlipidemia E78.5 ; Hypertension I10 ; Anxiety disorder, unspecified F41.9 ; 
Asthma J45.909 ; Body mass index (BMI) of 45.0-49.9 in adult Z68.42 and Morbid (
severe) obesity due to excess calories E66.01

 

 [place] [address] [place] [address]-
76[address]    Type 2 diabetes mellitus with diabetic polyneuropathy 
E11.42 ; Hyperlipidemia, unspecified hyperlipidemia E78.5 ; Hypertension I10 
and GERD (gastroesophageal reflux disease) K21.9

 

 [place] [address] [place] [id number] [place] [address]-
7001  22 Mar, 2017  Type 2 diabetes mellitus with diabetic polyneuropathy E11.42

 

 [place] [address] [address]-
[address]    Type 2 diabetes mellitus with diabetic polyneuropathy 
E11.42 ; Coronary artery disease I25.10 ; History of MI (myocardial infarction) 
I25.2 ; Immune thrombocytopenic purpura D69.3 ; GERD (gastroesophageal reflux 
disease) K21.9 ; Hyperlipidemia, unspecified hyperlipidemia E78.5 ; 
Hypertension I10 ; History of kidney stones Z87.442 and Anxiety disorder, 
unspecified F41.9

 

 [place] [address] [address]-
1057     

 

 [address] [address]-
[address]  29 Sep, 2016  Coronary artery disease I25.10 ; History of MI (myocardial 
infarction) I25.2 ; History of kidney stones Z87.442 ; Type 2 diabetes mellitus 
with diabetic polyneuropathy E11.42 ; Avascular necrosis of bone of right hip 
M87.051 ; Hyperlipidemia, unspecified hyperlipidemia E78.5 ; Hypertension I10 ; 
Asthma J45.909 and Encounter for immunization Z23

 

 [place] [id number] [place] [address]-
8154  20 Sep, 2016   

 

 [address] [address]-
0807    Coronary artery disease I25.10 ; Type 2 diabetes mellitus 
with diabetic polyneuropathy E11.42 ; History of MI (myocardial infarction) 
I25.2 ; Immune thrombocytopenic purpura D69.3 ; Hyperlipidemia, unspecified 
hyperlipidemia E78.5 and Hypertension I10

 

 [place] [address] [place] [id number] [place] [address]-
1598    Coronary artery disease I25.10 ; Lymphedema I89.0 ; History 
of MI (myocardial infarction) I25.2 ; History of kidney stones Z87.442 ; Immune 
thrombocytopenic purpura D69.3 ; Type 2 diabetes mellitus with diabetic 
polyneuropathy E11.42 ; Avascular necrosis of bone of right hip M87.051 ; GERD (
gastroesophageal reflux disease) K21.9 ; Hyperlipidemia, unspecified 
hyperlipidemia E78.5 ; Hypertension I10 and Asthma J45.909

 

 [place] [address] [place] [id number] [place] [address]-
8383     

 

 [place] [address] [address]-
3538     

 

 [place] [address] [address]-
1319  02 Dec, 2015   

 

 [place] [address] [address]-
3461  02 Dec, 2015  Hyperlipidemia, unspecified hyperlipidemia E78.5

 

 [place] [address] [place] [id number] [place] [address]-
3453     

 

 [place] [address] [place] [id number] [place] [address]-
3018     

 

 [place] [address] [place] [id number] [place] [address]-
3105    Allergic rhinitis J30.9

 

 [place] [id number] [place] [address]-
3375    Type 2 diabetes mellitus with diabetic polyneuropathy 
E11.42 ; Routine adult health maintenance Z00.00 ; Coronary artery disease 
I25.10 ; History of MI (myocardial infarction) I25.2 ; History of kidney stones 
Z87.442 ; Immune thrombocytopenic purpura D69.3 ; Avascular necrosis of bone of 
right hip M87.051 and GERD (gastroesophageal reflux disease) K21.9







IMMUNIZATIONS

No Known Immunizations



SOCIAL HISTORY

Never Assessed



REASON FOR VISIT

Requests return call



PLAN OF CARE





VITAL SIGNS





MEDICATIONS

Unknown Medications



RESULTS

No Results



PROCEDURES

No Known procedures



INSTRUCTIONS





MEDICATIONS ADMINISTERED

No Known Medications



MEDICAL (GENERAL) HISTORY







 Type  Description  Date

 

 Medical History  Type II Diabetes   

 

 Medical History  CAD   

 

 Medical History  Acute MI x1   

 

 Medical History  Heart Cath x3   

 

 Medical History  Kidney Stones   

 

 Medical History  Lymphedema   

 

 Medical History  Immune thrombocytopenic purpura   

 

 Surgical History  Heart Stents x2   

 

 Surgical History  Cholecystectomy   

 

 Surgical History     

 

 Surgical History  [facility]   

 

 Hospitalization History  [facility]  

 

 Hospitalization History  Sepsis/Toxic Shock  

 

 Hospitalization History  [facility]-flu/pneumonia  2017

 

 Hospitalization History  [facility]- Anemia, cellulitis pannus, yeast 
infection. Discharged 2018

## 2018-09-08 NOTE — XMS REPORT
[facility]

 Created on: 2018



Madison Young

External Reference #: [id number]

: 1945

Sex: Female



Demographics







 Address  [address] [place]  [address]

 

 Preferred Language  Unknown

 

 Marital Status  Unknown

 

 Taoist Affiliation  Unknown

 

 Race  Unknown

 

 Ethnic Group  Unknown





Author







 Author  TANI  LEANDER

 

 Organization  [facility]

 

 Address  [address] [place]  [address]



 

 Phone  [phone]







Care Team Providers







 Care Team Member Name  Role  Phone

 

 TANI  LEANDER  Unavailable  [phone]







PROBLEMS







 Type  Condition  ICD9-CM Code  CFZ85-FE Code  Onset Dates  Condition Status  
SNOMED Code

 

 Problem  Long-term insulin use     Z79.4     Active  091463823

 

 Problem  Skin ulcer, limited to breakdown of skin     L98.491     Active  
60843131

 

 Problem  Other iron deficiency anemia     D50.8     Active  75730986

 

 Problem  Peripheral edema     R60.9     Active  765252992

 

 Problem  Coronary artery disease     I25.10     Active  98001855

 

 Problem  Diabetes mellitus due to underlying condition with foot ulcer     
E08.621     Active  783487526

 

 Problem  Type 2 diabetes mellitus with diabetic polyneuropathy     E11.42     
Active  15228729

 

 Problem  Microalbuminuric diabetic nephropathy     E11.21     Active  579950841

 

 Problem  Iron deficiency anemia     D50.9     Active  90403481

 

 Problem  Non-adherence to medical treatment     Z91.19     Active  346207871

 

 Problem  Non-pressure chronic ulcer of other part of left foot limited to 
breakdown of skin     L97.521     Active  859334750

 

 Problem  Acute idiopathic gout involving toe of left foot     M10.072     
Active  76156763

 

 Problem  Immune thrombocytopenic purpura     D69.3     Active  641186435

 

 Problem  Hyperlipidemia, unspecified hyperlipidemia     E78.5     Active  
05560815

 

 Problem  GERD (gastroesophageal reflux disease)     K21.9     Active  320090028

 

 Problem  Avascular necrosis of bone of right hip     M87.051     Active  
868702609

 

 Problem  Anxiety disorder, unspecified     F41.9     Active  453540422

 

 Problem  Morbid (severe) obesity due to excess calories     E66.01     Active  
205906695

 

 Problem  Asthma     J45.909     Active  506062786

 

 Problem  Body mass index (BMI) of 45.0-49.9 in adult     Z68.42     Active  
340939771

 

 Problem  Chronic kidney disease (CKD) stage G3a/A3, moderately decreased 
glomerular filtration rate (GFR) between 45-59 mL/min/1.73 square meter and 
albuminuria creatinine ratio greater than 300 mg/g     N18.3     Active  
592950316

 

 Problem  Hypertension     I10     Active  11778065

 

 Problem  Recurrent major depressive disorder, in partial remission     F33.41 
    Active  42423307







ALLERGIES

No Information



ENCOUNTERS







 Encounter  Location  Date  Diagnosis

 

 [facility]  [id number] [place] [address] [phone]-
[phone]  06 Sep, 2018   

 

 [facility]  [address] [phone]-
7845  17 Aug, 2018  Peripheral edema R60.9

 

 [facility]  [id number] [place] [address] [phone]
[phone]  13 Aug, 2018  Hypertension I10 and Peripheral edema R60.9

 

 [facility]  [id number] [place] [address] 48456-
[phone]  09 Aug, 2018   

 

 [facility]  [id number] [place] [address] [phone]-
[phone]  09 Aug, 2018  Hypertension I10 and Peripheral edema R60.9

 

 [facility]  [id number] [place] [address] [phone]-
[phone]  06 Aug, 2018  Hypertension I10 and Peripheral edema R60.9

 

 [facility]  [id number] [place] [address] [phone]-
[phone]  02 Aug, 2018   

 

 [facility]  [id number] [place] [address] 23634-
[phone]  02 Aug, 2018  Hypertension I10 and Peripheral edema R60.9

 

 [facility]  [id number] [place] [address] [phone]-
[phone]  01 Aug, 2018   

 

 [facility]  [id number] [place] [address] [phone]-
[phone]     

 

 [facility]  [id number] [place] [address] [phone]-
[phone]     

 

 [facility]  [id number] [place] [address] 18784-
[phone]     

 

 [facility] [id number] [place] [address]-
3240    Diabetes mellitus due to underlying condition with foot 
ulcer E08.621 ; Non-pressure chronic ulcer of other part of left foot limited 
to breakdown of skin L97.521 and BMI 45.0-49.9, adult Z68.42

 

 [id number] [place] [address] 40262-
9454    Acute idiopathic gout involving toe of left foot M10.072

 

 [id number] [place] [address] 09724-
[phone]     

 

 [address] [phone]-
[phone]     

 

 [place] [id number] [place] [address] 06708-
[phone]     

 

 [address] [address]-
4405    Type 2 diabetes mellitus with diabetic polyneuropathy 
E11.42 ; Hypertension I10 ; Hyperlipidemia, unspecified hyperlipidemia E78.5 ; 
Body mass index (BMI) of 45.0-49.9 in adult Z68.42 ; Long-term insulin use 
Z79.4 ; Non-adherence to medical treatment Z91.19 ; Coronary artery disease 
I25.10 ; GERD (gastroesophageal reflux disease) K21.9 ; Asthma J45.909 and 
Recurrent major depressive disorder, in partial remission F33.41

 

 [place] [address] [id number] [place] [address] [phone]-
[phone]  22 May, 2018  Recurrent major depressive disorder, in partial remission 
F33.41 and Hypertension I10

 

 [id number] [place] [address] 31438-
1536  21 May, 2018  Immune thrombocytopenic purpura D69.3 and Iron deficiency 
anemia D50.9

 

 [id number] [place] [address] 11270-
0661  21 May, 2018  Type 2 diabetes mellitus with diabetic polyneuropathy 
E11.42 ; Long-term insulin use Z79.4 ; Chronic kidney disease (CKD) stage G3a/A3
, moderately decreased glomerular filtration rate (GFR) between 45-59 mL/min/
1.73 square meter and albuminuria creatinine ratio greater than 300 mg/g N18.3 
; Hypertension I10 ; Hyperlipidemia, unspecified hyperlipidemia E78.5 ; 
Coronary artery disease I25.10 ; GERD (gastroesophageal reflux disease) K21.9 ; 
Immune thrombocytopenic purpura D69.3 ; Asthma J45.909 ; Morbid (severe) 
obesity due to excess calories E66.01 and Recurrent major depressive disorder, 
in partial remission F33.41

 

 [address] [address]-
1780  11 May, 2018  Chronic kidney disease (CKD) stage G3a/A3, moderately 
decreased glomerular filtration rate (GFR) between 45-59 mL/min/1.73 square 
meter and albuminuria creatinine ratio greater than 300 mg/g N18.3

 

 [id number] [place] [address] 28488-
6949  02 May, 2018  Chronic kidney disease (CKD) stage G3a/A3, moderately 
decreased glomerular filtration rate (GFR) between 45-59 mL/min/1.73 square 
meter and albuminuria creatinine ratio greater than 300 mg/g N18.3

 

 [id number] [place] [address] 74426-
1644     

 

 [id number] [place] [address] 64568-
[phone]  28 Mar, 2018   

 

 [address] [id number] [place] [address] 09888-
0711  26 Mar, 2018  Immune thrombocytopenic purpura D69.3 ; Type 2 diabetes 
mellitus with diabetic polyneuropathy E11.42 ; Avascular necrosis of bone of 
right hip M87.051 ; Long-term insulin use Z79.4 ; GERD (gastroesophageal reflux 
disease) K21.9 ; Hyperlipidemia, unspecified hyperlipidemia E78.5 ; 
Hypertension I10 ; Recurrent major depressive disorder, in partial remission 
F33.41 ; Microalbuminuric diabetic nephropathy E11.21 ; Body mass index (BMI) 
of 45.0-49.9 in adult Z68.42 ; BMI 45.0-49.9, adult Z68.42 and Chronic kidney 
disease (CKD) stage G3a/A3, moderately decreased glomerular filtration rate (GFR
) between 45-59 mL/min/1.73 square meter and albuminuria creatinine ratio 
greater than 300 mg/g N18.3

 

 [id number] [place] [address] 44450-
2571  23 Mar, 2018   

 

 [id number] [place] [address] 82545-
[phone]  23 Mar, 2018   

 

 [id number] [place] [address] 93103-
4156  19 Mar, 2018   

 

 [address] [id number] [place] [address] 83914-
2893  14 Mar, 2018   

 

 [id number] [place] [address] [phone]-
2974  13 Mar, 2018  Type 2 diabetes mellitus with diabetic polyneuropathy 
E11.42 ; Asthma J45.909 and Hypertension I10

 

 [facility]  [address] 
[id number]  13 Mar, 2018  Skin ulcer, limited to breakdown of skin L98.491 ; 
Immune thrombocytopenic purpura D69.3 ; Avascular necrosis of bone of right hip 
M87.051 and Type 2 diabetes mellitus with diabetic polyneuropathy E11.42

 

 [address] [place] [address] 72420-
8418  06 Mar, 2018  Asthma J45.909 and Type 2 diabetes mellitus with diabetic 
polyneuropathy E11.42

 

 [id number] [place] [address] 84886-
5092  01 Mar, 2018   

 

 [facility]  [address] 
[id number]    Other iron deficiency anemia D50.8 ; Weakness R53.1 ; 
Type 2 diabetes mellitus with diabetic polyneuropathy E11.42 ; Cellulitis of 
trunk, unspecified site of trunk L03.319 ; Coronary artery disease I25.10 ; 
Avascular necrosis of bone of right hip M87.051 and Morbid (severe) obesity due 
to excess calories E66.01

 

 [facility]  [id number] 
[id number]     

 

 [facility]  [address] [place] [address] 88713-
2163     

 

 [facility] WALK IN [place] [id number] [place] [address] [phone]
[phone]  15 Feb, 2018  Yeast infection of the skin B37.2

 

 [address] [address] [id number] [place] [address] 26003-
0312  15 Feb, 2018   

 

 [address] [id number] [place] [address] 93858-
6900  15 Feb, 2018   

 

 [place] [id number] [place] [address] 27738-
[phone]     

 

 [facility] WALK IN [facility]  [id number] [place] [address] [phone]
[phone]     

 

 [address] [place] [address] 89319-
9170    Type 2 diabetes mellitus with diabetic polyneuropathy 
E11.42 ; Avascular necrosis of bone of right hip M87.051 ; Hyperlipidemia, 
unspecified hyperlipidemia E78.5 ; Hypertension I10 ; Anxiety disorder, 
unspecified F41.9 ; Immune thrombocytopenic purpura D69.3 ; Coronary artery 
disease I25.10 ; Orthopnea R06.01 ; Acute bronchitis, unspecified organism 
J20.9 ; Wound of left lower extremity, initial encounter S81.802A ; Body aches 
R52 and Debilitated R53.81

 

 [address] [place] [address] 63435-
[phone]     

 

 [facility]  [address] [place] [address] 29682-
6291    Type 2 diabetes mellitus with diabetic polyneuropathy 
E11.42 ; Encounter for immunization Z23 ; Hyperlipidemia, unspecified 
hyperlipidemia E78.5 ; Hypertension I10 ; Anxiety disorder, unspecified F41.9 ; 
Asthma J45.909 ; Body mass index (BMI) of 45.0-49.9 in adult Z68.42 and Morbid (
severe) obesity due to excess calories E66.01

 

 [address] [address]-
3376    Type 2 diabetes mellitus with diabetic polyneuropathy 
E11.42 ; Hyperlipidemia, unspecified hyperlipidemia E78.5 ; Hypertension I10 
and GERD (gastroesophageal reflux disease) K21.9

 

 [address] [address]-
0687  22 Mar, 2017  Type 2 diabetes mellitus with diabetic polyneuropathy E11.42

 

 [address] [address]-
1970    Type 2 diabetes mellitus with diabetic polyneuropathy 
E11.42 ; Coronary artery disease I25.10 ; History of MI (myocardial infarction) 
I25.2 ; Immune thrombocytopenic purpura D69.3 ; GERD (gastroesophageal reflux 
disease) K21.9 ; Hyperlipidemia, unspecified hyperlipidemia E78.5 ; 
Hypertension I10 ; History of kidney stones Z87.442 and Anxiety disorder, 
unspecified F41.9

 

 [address] [address]458-
3874     

 

 [address] 8[address]22-
9580  29 Sep, 2016  Coronary artery disease I25.10 ; History of MI (myocardial 
infarction) I25.2 ; History of kidney stones Z87.442 ; Type 2 diabetes mellitus 
with diabetic polyneuropathy E11.42 ; Avascular necrosis of bone of right hip 
M87.051 ; Hyperlipidemia, unspecified hyperlipidemia E78.5 ; Hypertension I10 ; 
Asthma J45.909 and Encounter for immunization Z23

 

 [address] 83853-
6135  20 Sep, 2016   

 

 [address] 86440-
7210    Coronary artery disease I25.10 ; Type 2 diabetes mellitus 
with diabetic polyneuropathy E11.42 ; History of MI (myocardial infarction) 
I25.2 ; Immune thrombocytopenic purpura D69.3 ; Hyperlipidemia, unspecified 
hyperlipidemia E78.5 and Hypertension I10

 

 [id number] [place] [address] 43212-
5516    Coronary artery disease I25.10 ; Lymphedema I89.0 ; History 
of MI (myocardial infarction) I25.2 ; History of kidney stones Z87.442 ; Immune 
thrombocytopenic purpura D69.3 ; Type 2 diabetes mellitus with diabetic 
polyneuropathy E11.42 ; Avascular necrosis of bone of right hip M87.051 ; GERD (
gastroesophageal reflux disease) K21.9 ; Hyperlipidemia, unspecified 
hyperlipidemia E78.5 ; Hypertension I10 and Asthma J45.909

 

 [address] 9[address]94-
[phone]     

 

 [address] [address] 15690-
7298     

 

 [address] 85839-
3825  02 Dec, 2015   

 

 [address] 25759-
3705  02 Dec, 2015  Hyperlipidemia, unspecified hyperlipidemia E78.5

 

 [address] [address]656-
7801     

 

 [address] [address]504-
7827     

 

 [address] 03508-
[phone]    Allergic rhinitis J30.9

 

 [address] [address]51-
8212    Type 2 diabetes mellitus with diabetic polyneuropathy 
E11.42 ; Routine adult health maintenance Z00.00 ; Coronary artery disease 
I25.10 ; History of MI (myocardial infarction) I25.2 ; History of kidney stones 
Z87.442 ; Immune thrombocytopenic purpura D69.3 ; Avascular necrosis of bone of 
right hip M87.051 and GERD (gastroesophageal reflux disease) K21.9







IMMUNIZATIONS

No Known Immunizations



SOCIAL HISTORY

Never Assessed



REASON FOR VISIT

DM ED SCHEDULED



PLAN OF CARE





VITAL SIGNS





MEDICATIONS

Unknown Medications



RESULTS

No Results



PROCEDURES

No Known procedures



INSTRUCTIONS





MEDICATIONS ADMINISTERED

No Known Medications



MEDICAL (GENERAL) HISTORY







 Type  Description  Date

 

 Medical History  Type II Diabetes   

 

 Medical History  CAD   

 

 Medical History  Acute MI x1   

 

 Medical History  Heart Cath x3   

 

 Medical History  Kidney Stones   

 

 Medical History  Lymphedema   

 

 Medical History  Immune thrombocytopenic purpura   

 

 Surgical History  Heart Stents x2   

 

 Surgical History  Cholecystectomy   

 

 Surgical History     

 

 Surgical History  [place] Te[facility]   

 

 Hospitalization History  [facility]  

 

 Hospitalization History  Sepsis/Toxic Shock  

 

 Hospitalization History  [facility]-flu/pneumonia  2017

 

 Hospitalization History  [facility]- Anemia, cellulitis pannus, yeast 
infection. Discharged 2018

## 2018-09-08 NOTE — XMS REPORT
[facility]

 Created on: 2018



Madison Young

External Reference #: [id number]

: 1945

Sex: Female



Demographics







 Address  [address] [place]  [address]

 

 Preferred Language  Unknown

 

 Marital Status  Unknown

 

 Synagogue Affiliation  Unknown

 

 Race  Unknown

 

 Ethnic Group  Unknown





Author







 Author  FREIRELEANDER HILLIARD

 

 Organization  [facility]

 

 Address  [address] [place]  [address]



 

 Phone  [phone]







Care Team Providers







 Care Team Member Name  Role  Phone

 

 FREIREISMAEL HILLIARDELE  Unavailable  [phone]







PROBLEMS







 Type  Condition  ICD9-CM Code  MHE90-SW Code  Onset Dates  Condition Status  
SNOMED Code

 

 Problem  Recurrent major depressive disorder, in partial remission     F33.41 
    Active  45202739

 

 Problem  Other iron deficiency anemia     D50.8     Active  77048165

 

 Problem  Long-term insulin use     Z79.4     Active  926216133

 

 Problem  Diabetes mellitus due to underlying condition with foot ulcer     
E08.621     Active  373339498

 

 Problem  Immune thrombocytopenic purpura     D69.3     Active  308758000

 

 Problem  Non-pressure chronic ulcer of other part of left foot limited to 
breakdown of skin     L97.521     Active  167217750

 

 Problem  Chronic kidney disease (CKD) stage G3a/A3, moderately decreased 
glomerular filtration rate (GFR) between 45-59 mL/min/1.73 square meter and 
albuminuria creatinine ratio greater than 300 mg/g     N18.3     Active  
794302391

 

 Problem  Microalbuminuric diabetic nephropathy     E11.21     Active  133716115

 

 Problem  Non-adherence to medical treatment     Z91.19     Active  715856106

 

 Problem  Skin ulcer, limited to breakdown of skin     L98.491     Active  
48419342

 

 Problem  Acute idiopathic gout involving toe of left foot     M10.072     
Active  24494774

 

 Problem  Iron deficiency anemia     D50.9     Active  20199677

 

 Problem  GERD (gastroesophageal reflux disease)     K21.9     Active  975742694

 

 Problem  Avascular necrosis of bone of right hip     M87.051     Active  
020257851

 

 Problem  Type 2 diabetes mellitus with diabetic polyneuropathy     E11.42     
Active  32337908

 

 Problem  Coronary artery disease     I25.10     Active  26895517

 

 Problem  Hypertension     I10     Active  78675459

 

 Problem  Anxiety disorder, unspecified     F41.9     Active  237838343

 

 Problem  Hyperlipidemia, unspecified hyperlipidemia     E78.5     Active  
07281687

 

 Problem  Morbid (severe) obesity due to excess calories     E66.01     Active  
298081905

 

 Problem  Asthma     J45.909     Active  070427445

 

 Problem  Body mass index (BMI) of 45.0-49.9 in adult     Z68.42     Active  
186014583







ALLERGIES

No Information



ENCOUNTERS







 Encounter  Location  Date  Diagnosis

 

 [place] [address] [place] [id number] [place] [phone]-
[phone]  29 Aug, 2018   

 

 [place] [address] [address]    Diabetes mellitus due to underlying condition with foot 
ulcer E08.621 and Non-pressure chronic ulcer of other part of left foot limited 
to breakdown of skin L97.521

 

 [place] [address] [address]-
2815    Acute idiopathic gout involving toe of left foot M10.072

 

 [place] [address] [address]-
[phone]     

 

 [place] [address] [phone]-
[phone]     

 

 [place] [address] [id number] [place] [address]-
[phone]     

 

 [place] [address] [address]-
9521    Type 2 diabetes mellitus with diabetic polyneuropathy 
E11.42 ; Hypertension I10 ; Hyperlipidemia, unspecified hyperlipidemia E78.5 ; 
Body mass index (BMI) of 45.0-49.9 in adult Z68.42 ; Long-term insulin use 
Z79.4 ; Non-adherence to medical treatment Z91.19 ; Coronary artery disease 
I25.10 ; GERD (gastroesophageal reflux disease) K21.9 ; Asthma J45.909 and 
Recurrent major depressive disorder, in partial remission F33.41

 

 [place] [address] [id number] [place] [phone]-
[phone]  22 May, 2018  Recurrent major depressive disorder, in partial remission 
F33.41 and Hypertension I10

 

 [place] [address] [address]-
[phone]  21 May, 2018  Immune thrombocytopenic purpura D69.3 and Iron deficiency 
anemia D50.9

 

 [place] [address] [place] [address]-
2717  21 May, 2018  Type 2 diabetes mellitus with diabetic polyneuropathy 
E11.42 ; Long-term insulin use Z79.4 ; Chronic kidney disease (CKD) stage G3a/A3
, moderately decreased glomerular filtration rate (GFR) between 45-59 mL/min/
1.73 square meter and albuminuria creatinine ratio greater than 300 mg/g N18.3 
; Hypertension I10 ; Hyperlipidemia, unspecified hyperlipidemia E78.5 ; 
Coronary artery disease I25.10 ; GERD (gastroesophageal reflux disease) K21.9 ; 
Immune thrombocytopenic purpura D69.3 ; Asthma J45.909 ; Morbid (severe) 
obesity due to excess calories E66.01 and Recurrent major depressive disorder, 
in partial remission F33.41

 

 [place] [id number] [place] [address]-
[phone]  11 May, 2018  Chronic kidney disease (CKD) stage G3a/A3, moderately 
decreased glomerular filtration rate (GFR) between 45-59 mL/min/1.73 square 
meter and albuminuria creatinine ratio greater than 300 mg/g N18.3

 

 [place] [address] [place] [address]-
7809  02 May, 2018  Chronic kidney disease (CKD) stage G3a/A3, moderately 
decreased glomerular filtration rate (GFR) between 45-59 mL/min/1.73 square 
meter and albuminuria creatinine ratio greater than 300 mg/g N18.3

 

 [place] [address] [place] [address]10-
[phone]     

 

 [place] [address] [id number] [place] [address] 9[address]42-
[phone]  28 Mar, 2018   

 

 [place] [address] [id number] [place] [address]-
3426  26 Mar, 2018  Immune thrombocytopenic purpura D69.3 ; Type 2 diabetes 
mellitus with diabetic polyneuropathy E11.42 ; Avascular necrosis of bone of 
right hip M87.051 ; Long-term insulin use Z79.4 ; GERD (gastroesophageal reflux 
disease) K21.9 ; Hyperlipidemia, unspecified hyperlipidemia E78.5 ; 
Hypertension I10 ; Recurrent major depressive disorder, in partial remission 
F33.41 ; Microalbuminuric diabetic nephropathy E11.21 ; Body mass index (BMI) 
of 45.0-49.9 in adult Z68.42 ; BMI 45.0-49.9, adult Z68.42 and Chronic kidney 
disease (CKD) stage G3a/A3, moderately decreased glomerular filtration rate (GFR
) between 45-59 mL/min/1.73 square meter and albuminuria creatinine ratio 
greater than 300 mg/g N18.3

 

 [place] [address] [phone]-
[phone]  23 Mar, 2018   

 

 [place] [address] [phone]-
0603  23 Mar, 2018   

 

 [place] [address] [address]-
8008  19 Mar, 2018   

 

 [place] [address] [address]-
7244  14 Mar, 2018   

 

 [place] [address] [address]-
0304  13 Mar, 2018  Type 2 diabetes mellitus with diabetic polyneuropathy 
E11.42 ; Asthma J45.909 and Hypertension I10

 

 [facility]  [address] 
[id number]  13 Mar, 2018  Skin ulcer, limited to breakdown of skin L98.491 ; 
Immune thrombocytopenic purpura D69.3 ; Avascular necrosis of bone of right hip 
M87.051 and Type 2 diabetes mellitus with diabetic polyneuropathy E11.42

 

 [place] [address] [id number] [place] [address]-
2214  06 Mar, 2018  Asthma J45.909 and Type 2 diabetes mellitus with diabetic 
polyneuropathy E11.42

 

 [place] [address] [id number] [place] [address]47-
[phone]  01 Mar, 2018   

 

 [facility]  [address] 
[id number]    Other iron deficiency anemia D50.8 ; Weakness R53.1 ; 
Type 2 diabetes mellitus with diabetic polyneuropathy E11.42 ; Cellulitis of 
trunk, unspecified site of trunk L03.319 ; Coronary artery disease I25.10 ; 
Avascular necrosis of bone of right hip M87.051 and Morbid (severe) obesity due 
to excess calories E66.01

 

 [facility]  [address] 
[id number]     

 

 [place] [address] [address]-
[phone]     

 

 [facility] [place] [address] [phone]  15 Feb, 2018  Yeast infection of the skin B37.2

 

 [address] [phone]-
[phone]  15 Feb, 2018   

 

 [place] [address] [address]-
5250  15 Feb, 2018   

 

 [place] [address] [address] [id number] [place] [phone]-
[phone]     

 

 [facility] [place] [id number] [place] [phone]     

 

 [place] [id number] [place] [address]-
6354    Type 2 diabetes mellitus with diabetic polyneuropathy 
E11.42 ; Avascular necrosis of bone of right hip M87.051 ; Hyperlipidemia, 
unspecified hyperlipidemia E78.5 ; Hypertension I10 ; Anxiety disorder, 
unspecified F41.9 ; Immune thrombocytopenic purpura D69.3 ; Coronary artery 
disease I25.10 ; Orthopnea R06.01 ; Acute bronchitis, unspecified organism 
J20.9 ; Wound of left lower extremity, initial encounter S81.802A ; Body aches 
R52 and Debilitated R53.81

 

 [address] [phone]-
[phone]     

 

 [place] [address] [address]-
98[address]    Type 2 diabetes mellitus with diabetic polyneuropathy 
E11.42 ; Encounter for immunization Z23 ; Hyperlipidemia, unspecified 
hyperlipidemia E78.5 ; Hypertension I10 ; Anxiety disorder, unspecified F41.9 ; 
Asthma J45.909 ; Body mass index (BMI) of 45.0-49.9 in adult Z68.42 and Morbid (
severe) obesity due to excess calories E66.01

 

 [place] [address] [place] [address]67-
6775    Type 2 diabetes mellitus with diabetic polyneuropathy 
E11.42 ; Hyperlipidemia, unspecified hyperlipidemia E78.5 ; Hypertension I10 
and GERD (gastroesophageal reflux disease) K21.9

 

 [place] [id number] [place] [address]-
4678  22 Mar, 2017  Type 2 diabetes mellitus with diabetic polyneuropathy E11.42

 

 [place] [id number] [address]-
5345    Type 2 diabetes mellitus with diabetic polyneuropathy 
E11.42 ; Coronary artery disease I25.10 ; History of MI (myocardial infarction) 
I25.2 ; Immune thrombocytopenic purpura D69.3 ; GERD (gastroesophageal reflux 
disease) K21.9 ; Hyperlipidemia, unspecified hyperlipidemia E78.5 ; 
Hypertension I10 ; History of kidney stones Z87.442 and Anxiety disorder, 
unspecified F41.9

 

 [place] [address] [place] [address]-
3186     

 

 [address] [place] [address] 6[address]-
9457  29 Sep, 2016  Coronary artery disease I25.10 ; History of MI (myocardial 
infarction) I25.2 ; History of kidney stones Z87.442 ; Type 2 diabetes mellitus 
with diabetic polyneuropathy E11.42 ; Avascular necrosis of bone of right hip 
M87.051 ; Hyperlipidemia, unspecified hyperlipidemia E78.5 ; Hypertension I10 ; 
Asthma J45.909 and Encounter for immunization Z23

 

 [place] [id number] [place] [address]-
8512  20 Sep, 2016   

 

 [place] [address] [address]-
1977    Coronary artery disease I25.10 ; Type 2 diabetes mellitus 
with diabetic polyneuropathy E11.42 ; History of MI (myocardial infarction) 
I25.2 ; Immune thrombocytopenic purpura D69.3 ; Hyperlipidemia, unspecified 
hyperlipidemia E78.5 and Hypertension I10

 

 [place] [address] [id number] [place] [address]-
1882    Coronary artery disease I25.10 ; Lymphedema I89.0 ; History 
of MI (myocardial infarction) I25.2 ; History of kidney stones Z87.442 ; Immune 
thrombocytopenic purpura D69.3 ; Type 2 diabetes mellitus with diabetic 
polyneuropathy E11.42 ; Avascular necrosis of bone of right hip M87.051 ; GERD (
gastroesophageal reflux disease) K21.9 ; Hyperlipidemia, unspecified 
hyperlipidemia E78.5 ; Hypertension I10 and Asthma J45.909

 

 [place] [address] [id number] [place] [address]-
4667     

 

 [place] [address] [id number] [place] [address]-
3624     

 

 [place] [address] [id number] [place] [address] 90651-
4334  02 Dec, 2015   

 

 [place] [address] [id number] [place] [address] 64492-
[phone]  02 Dec, 2015  Hyperlipidemia, unspecified hyperlipidemia E78.5

 

 [place] [address] [id number] [place] [address] [phone]-
[phone]     

 

 [place] [address] [id number] [place] [address] 207[address]-
3768     

 

 [place] [address] [id number] [place] [address]-
2966    Allergic rhinitis J30.9

 

 [facility]  [address] [facility] [id number] [place] [address]-
3269  19 2015  Type 2 diabetes mellitus with diabetic polyneuropathy 
E11.42 ; Routine adult health maintenance Z00.00 ; Coronary artery disease 
I25.10 ; History of MI (myocardial infarction) I25.2 ; History of kidney stones 
Z87.442 ; Immune thrombocytopenic purpura D69.3 ; Avascular necrosis of bone of 
right hip M87.051 and GERD (gastroesophageal reflux disease) K21.9







IMMUNIZATIONS

No Known Immunizations



SOCIAL HISTORY

Never Assessed



REASON FOR VISIT

medication change



PLAN OF CARE





VITAL SIGNS





MEDICATIONS

Unknown Medications



RESULTS

No Results



PROCEDURES

No Known procedures



INSTRUCTIONS





MEDICATIONS ADMINISTERED

No Known Medications



MEDICAL (GENERAL) HISTORY







 Type  Description  Date

 

 Medical History  Type II Diabetes   

 

 Medical History  CAD   

 

 Medical History  Acute MI x1   

 

 Medical History  Heart Cath x3   

 

 Medical History  Kidney Stones   

 

 Medical History  Lymphedema   

 

 Medical History  Immune thrombocytopenic purpura   

 

 Surgical History  Heart Stents x2   

 

 Surgical History  Cholecystectomy   

 

 Surgical History     

 

 Surgical History  [place]   

 

 Hospitalization History  [facility]  

 

 Hospitalization History  Sepsis/Toxic Shock  

 

 Hospitalization History  [facility]-flu/pneumonia  2017

 

 Hospitalization History  [facility]- Anemia, cellulitis pannus, yeast 
infection. Discharged 2018

## 2018-09-08 NOTE — XMS REPORT
[facility]

 Created on: 2018



Madison Young

External Reference #: [id number]

: 1945

Sex: Female



Demographics







 Address  [address] [place]  [address]

 

 Preferred Language  Unknown

 

 Marital Status  Unknown

 

 Jew Affiliation  Unknown

 

 Race  Unknown

 

 Ethnic Group  Unknown





Author







 Author  TANI  LEANDER

 

 Organization  [facility]

 

 Address  [address] [place]  [address]



 

 Phone  [phone]







Care Team Providers







 Care Team Member Name  Role  Phone

 

 TANI  LEANDER  Unavailable  [phone]







PROBLEMS







 Type  Condition  ICD9-CM Code  XWT81-YD Code  Onset Dates  Condition Status  
SNOMED Code

 

 Problem  Long-term insulin use     Z79.4     Active  583529984

 

 Problem  Skin ulcer, limited to breakdown of skin     L98.491     Active  
99874951

 

 Problem  Other iron deficiency anemia     D50.8     Active  77216167

 

 Problem  Peripheral edema     R60.9     Active  178822954

 

 Problem  Coronary artery disease     I25.10     Active  75650142

 

 Problem  Diabetes mellitus due to underlying condition with foot ulcer     
E08.621     Active  242683096

 

 Problem  Type 2 diabetes mellitus with diabetic polyneuropathy     E11.42     
Active  49527053

 

 Problem  Microalbuminuric diabetic nephropathy     E11.21     Active  744968729

 

 Problem  Iron deficiency anemia     D50.9     Active  35205171

 

 Problem  Non-adherence to medical treatment     Z91.19     Active  370524414

 

 Problem  Non-pressure chronic ulcer of other part of left foot limited to 
breakdown of skin     L97.521     Active  808279166

 

 Problem  Acute idiopathic gout involving toe of left foot     M10.072     
Active  10033005

 

 Problem  Immune thrombocytopenic purpura     D69.3     Active  885548488

 

 Problem  Hyperlipidemia, unspecified hyperlipidemia     E78.5     Active  
37446742

 

 Problem  GERD (gastroesophageal reflux disease)     K21.9     Active  631943794

 

 Problem  Avascular necrosis of bone of right hip     M87.051     Active  
992290745

 

 Problem  Anxiety disorder, unspecified     F41.9     Active  734806423

 

 Problem  Morbid (severe) obesity due to excess calories     E66.01     Active  
320063470

 

 Problem  Asthma     J45.909     Active  905469966

 

 Problem  Body mass index (BMI) of 45.0-49.9 in adult     Z68.42     Active  
830553245

 

 Problem  Chronic kidney disease (CKD) stage G3a/A3, moderately decreased 
glomerular filtration rate (GFR) between 45-59 mL/min/1.73 square meter and 
albuminuria creatinine ratio greater than 300 mg/g     N18.3     Active  
336589622

 

 Problem  Hypertension     I10     Active  11876490

 

 Problem  Recurrent major depressive disorder, in partial remission     F33.41 
    Active  21254345







ALLERGIES







 Substance  Reaction  Event Type  Date  Status

 

 Heparin Sodium (Porcine) PF  Unknown  Drug Allergy  21 May, 2018  Active

 

 Adhesive  Unknown  Non Drug Allergy  21 May, 2018  Active







ENCOUNTERS







 Encounter  Location  Date  Diagnosis

 

 [facility]  [address] [phone]  06 Sep, 2018   

 

 [facility]  [address] [address]-
[phone]  29 Aug, 2018   

 

 [place] [address] [address]-
1733  13 Aug, 2018  Hypertension I10 and Peripheral edema R60.9

 

 [place] [address] [address]-
2503  09 Aug, 2018   

 

 [facility]  [address] [address]-
[address]  09 Aug, 2018  Hypertension I10 and Peripheral edema R60.9

 

 [place] [address] [address]-
4472  06 Aug, 2018  Hypertension I10 and Peripheral edema R60.9

 

 [place] [address] [address]-
[phone]  02 Aug, 2018   

 

 [facility]  [address] [address]-
1878  02 Aug, 2018  Hypertension I10 and Peripheral edema R60.9

 

 [facility]  [address] [address]-
2112  01 Aug, 2018   

 

 [facility]  [address] [address]-
[address]     

 

 [facility]  [address] [address]-
[address]     

 

 [facility]  [address] [place] [address]-
[address]     

 

 [place] [address] [place] [id number] [place] [address]-
[address]    Diabetes mellitus due to underlying condition with foot 
ulcer E08.621 ; Non-pressure chronic ulcer of other part of left foot limited 
to breakdown of skin L97.521 and BMI 45.0-49.9, adult Z68.42

 

 [address] [address]-
3527    Acute idiopathic gout involving toe of left foot M10.072

 

 [address] [address]-
8712     

 

 [place] [address] [address]-
1485     

 

 [address] [address]-
4130     

 

 [place] [address] [place] [id number] [place] [address]-
4498    Type 2 diabetes mellitus with diabetic polyneuropathy 
E11.42 ; Hypertension I10 ; Hyperlipidemia, unspecified hyperlipidemia E78.5 ; 
Body mass index (BMI) of 45.0-49.9 in adult Z68.42 ; Long-term insulin use 
Z79.4 ; Non-adherence to medical treatment Z91.19 ; Coronary artery disease 
I25.10 ; GERD (gastroesophageal reflux disease) K21.9 ; Asthma J45.909 and 
Recurrent major depressive disorder, in partial remission F33.41

 

 [place] [id number] [place] [address]-
8944  22 May, 2018  Recurrent major depressive disorder, in partial remission 
F33.41 and Hypertension I10

 

 [place] [id number] [place] [address]-
2122  21 May, 2018  Immune thrombocytopenic purpura D69.3 and Iron deficiency 
anemia D50.9

 

 [address] [address]-
9702  21 May, 2018  Type 2 diabetes mellitus with diabetic polyneuropathy 
E11.42 ; Long-term insulin use Z79.4 ; Chronic kidney disease (CKD) stage G3a/A3
, moderately decreased glomerular filtration rate (GFR) between 45-59 mL/min/
1.73 square meter and albuminuria creatinine ratio greater than 300 mg/g N18.3 
; Hypertension I10 ; Hyperlipidemia, unspecified hyperlipidemia E78.5 ; 
Coronary artery disease I25.10 ; GERD (gastroesophageal reflux disease) K21.9 ; 
Immune thrombocytopenic purpura D69.3 ; Asthma J45.909 ; Morbid (severe) 
obesity due to excess calories E66.01 and Recurrent major depressive disorder, 
in partial remission F33.41

 

 [place] [address] [place] [id number] [place] [address]-
3781  11 May, 2018  Chronic kidney disease (CKD) stage G3a/A3, moderately 
decreased glomerular filtration rate (GFR) between 45-59 mL/min/1.73 square 
meter and albuminuria creatinine ratio greater than 300 mg/g N18.3

 

 [place] [address] [place] [id number] [place] [address]-
9271  02 May, 2018  Chronic kidney disease (CKD) stage G3a/A3, moderately 
decreased glomerular filtration rate (GFR) between 45-59 mL/min/1.73 square 
meter and albuminuria creatinine ratio greater than 300 mg/g N18.3

 

 [place] [address] [place] [id number] [place] [address]-
4885     

 

 [place] [address] [place] [id number] [place] [address]-
1461  28 Mar, 2018   

 

 [place] [address] [place] [id number] [place] [address]-
0346  26 Mar, 2018  Immune thrombocytopenic purpura D69.3 ; Type 2 diabetes 
mellitus with diabetic polyneuropathy E11.42 ; Avascular necrosis of bone of 
right hip M87.051 ; Long-term insulin use Z79.4 ; GERD (gastroesophageal reflux 
disease) K21.9 ; Hyperlipidemia, unspecified hyperlipidemia E78.5 ; 
Hypertension I10 ; Recurrent major depressive disorder, in partial remission 
F33.41 ; Microalbuminuric diabetic nephropathy E11.21 ; Body mass index (BMI) 
of 45.0-49.9 in adult Z68.42 ; BMI 45.0-49.9, adult Z68.42 and Chronic kidney 
disease (CKD) stage G3a/A3, moderately decreased glomerular filtration rate (GFR
) between 45-59 mL/min/1.73 square meter and albuminuria creatinine ratio 
greater than 300 mg/g N18.3

 

 [place] [address] [place] [id number] [place] [address]-
8097  23 Mar, 2018   

 

 [place] [address] [place] [id number] [place] [address]-
8735  23 Mar, 2018   

 

 [place] [address] [place] [id number] [place] [address]-
3799  19 Mar, 2018   

 

 [place] [address] [place] [id number] [place] [address]-
8960  14 Mar, 2018   

 

 [place] [address] [place] [id number] [place] [address]-
5221  13 Mar, 2018  Type 2 diabetes mellitus with diabetic polyneuropathy 
E11.42 ; Asthma J45.909 and Hypertension I10

 

 [facility]  [address] [place] 
[id number]  13 Mar, 2018  Skin ulcer, limited to breakdown of skin L98.491 ; 
Immune thrombocytopenic purpura D69.3 ; Avascular necrosis of bone of right hip 
M87.051 and Type 2 diabetes mellitus with diabetic polyneuropathy E11.42

 

 [place] [address] [place] [address]-
1159  06 Mar, 2018  Asthma J45.909 and Type 2 diabetes mellitus with diabetic 
polyneuropathy E11.42

 

 [place] [address] [place] [address]-
2218  01 Mar, 2018   

 

 [facility]  [address] [place] 
[id number]    Other iron deficiency anemia D50.8 ; Weakness R53.1 ; 
Type 2 diabetes mellitus with diabetic polyneuropathy E11.42 ; Cellulitis of 
trunk, unspecified site of trunk L03.319 ; Coronary artery disease I25.10 ; 
Avascular necrosis of bone of right hip M87.051 and Morbid (severe) obesity due 
to excess calories E66.01

 

 [facility]  [address] [place] [id number] [place] [address] 
[id number]     

 

 [facility]  [address] [place] [address]-
8021     

 

 [facility]  [address] [place] [id number] [place] [address]
-[phone]  15 Feb, 2018  Yeast infection of the skin B37.2

 

 [place] [address] [place] [id number] [place] [address]-
9818  15 Feb, 2018   

 

 [place] [address] [place] [id number] [place] [address]-
3305  15 Feb, 2018   

 

 [place] [address] [place] [id number] [place] [address]-
1162     

 

 [facility] [place] [facility]  [address] [place] [id number] [place] [address]
-[phone]     

 

 [place] [address] [place] [id number] [place] [address]-
8923    Type 2 diabetes mellitus with diabetic polyneuropathy 
E11.42 ; Avascular necrosis of bone of right hip M87.051 ; Hyperlipidemia, 
unspecified hyperlipidemia E78.5 ; Hypertension I10 ; Anxiety disorder, 
unspecified F41.9 ; Immune thrombocytopenic purpura D69.3 ; Coronary artery 
disease I25.10 ; Orthopnea R06.01 ; Acute bronchitis, unspecified organism 
J20.9 ; Wound of left lower extremity, initial encounter S81.802A ; Body aches 
R52 and Debilitated R53.81

 

 [place] [address] [place] [id number] [place] [address]-
8523     

 

 [facility]  [address] [place] [id number] [place] [address]-
9577    Type 2 diabetes mellitus with diabetic polyneuropathy 
E11.42 ; Encounter for immunization Z23 ; Hyperlipidemia, unspecified 
hyperlipidemia E78.5 ; Hypertension I10 ; Anxiety disorder, unspecified F41.9 ; 
Asthma J45.909 ; Body mass index (BMI) of 45.0-49.9 in adult Z68.42 and Morbid (
severe) obesity due to excess calories E66.01

 

 [place] [address] [address]-
9[address]    Type 2 diabetes mellitus with diabetic polyneuropathy 
E11.42 ; Hyperlipidemia, unspecified hyperlipidemia E78.5 ; Hypertension I10 
and GERD (gastroesophageal reflux disease) K21.9

 

 [address] [address]-
7926  22 Mar, 2017  Type 2 diabetes mellitus with diabetic polyneuropathy E11.42

 

 [address] [address]-
2681    Type 2 diabetes mellitus with diabetic polyneuropathy 
E11.42 ; Coronary artery disease I25.10 ; History of MI (myocardial infarction) 
I25.2 ; Immune thrombocytopenic purpura D69.3 ; GERD (gastroesophageal reflux 
disease) K21.9 ; Hyperlipidemia, unspecified hyperlipidemia E78.5 ; 
Hypertension I10 ; History of kidney stones Z87.442 and Anxiety disorder, 
unspecified F41.9

 

 [place] [id number] [place] [address]-
2487     

 

 [address] [address]-
8929  29 Sep, 2016  Coronary artery disease I25.10 ; History of MI (myocardial 
infarction) I25.2 ; History of kidney stones Z87.442 ; Type 2 diabetes mellitus 
with diabetic polyneuropathy E11.42 ; Avascular necrosis of bone of right hip 
M87.051 ; Hyperlipidemia, unspecified hyperlipidemia E78.5 ; Hypertension I10 ; 
Asthma J45.909 and Encounter for immunization Z23

 

 [address] [address]-
7385  20 Sep, 2016   

 

 [address] [place] [address]-
2240    Coronary artery disease I25.10 ; Type 2 diabetes mellitus 
with diabetic polyneuropathy E11.42 ; History of MI (myocardial infarction) 
I25.2 ; Immune thrombocytopenic purpura D69.3 ; Hyperlipidemia, unspecified 
hyperlipidemia E78.5 and Hypertension I10

 

 [place] [address] [address]-
1601    Coronary artery disease I25.10 ; Lymphedema I89.0 ; History 
of MI (myocardial infarction) I25.2 ; History of kidney stones Z87.442 ; Immune 
thrombocytopenic purpura D69.3 ; Type 2 diabetes mellitus with diabetic 
polyneuropathy E11.42 ; Avascular necrosis of bone of right hip M87.051 ; GERD (
gastroesophageal reflux disease) K21.9 ; Hyperlipidemia, unspecified 
hyperlipidemia E78.5 ; Hypertension I10 and Asthma J45.909

 

 [place] [address] [address]-
3538     

 

 [place] [address] [address]-
4734     

 

 [place] [address] [address]-
0193  02 Dec, 2015   

 

 [place] [address] [address]-
4765  02 Dec, 2015  Hyperlipidemia, unspecified hyperlipidemia E78.5

 

 [place] [address] [address]-
8498     

 

 [place] [address] [address]-
1807     

 

 [place] [address] [address]-
6895    Allergic rhinitis J30.9

 

 [place] [address] [address]-
4921    Type 2 diabetes mellitus with diabetic polyneuropathy 
E11.42 ; Routine adult health maintenance Z00.00 ; Coronary artery disease 
I25.10 ; History of MI (myocardial infarction) I25.2 ; History of kidney stones 
Z87.442 ; Immune thrombocytopenic purpura D69.3 ; Avascular necrosis of bone of 
right hip M87.051 and GERD (gastroesophageal reflux disease) K21.9







IMMUNIZATIONS

No Known Immunizations



SOCIAL HISTORY

Never Assessed



REASON FOR VISIT

Kidney f/u -JESSICA Dobson



PLAN OF CARE







 Activity  Details









  









 Follow Up  3 Months, prn Reason:CHM/DM

 

 Pending Test  A1C (IN HOUSE) 



 



VITAL SIGNS







 Height  67 in  2018

 

 Weight  293 lbs  2018

 

 Temperature  98.6 degrees Fahrenheit  2018

 

 Heart Rate  86 bpm  2018

 

 Respiratory Rate  22   2018

 

 BMI  45.89 kg/m2  2018

 

 Blood pressure systolic  142 mmHg  2018

 

 Blood pressure diastolic  80 mmHg  2018







MEDICATIONS







 Medication  Instructions  Dosage  Frequency  Start Date  End Date  Duration  
Status

 

 Atorvastatin Calcium 10 mg  Orally Once a day  1 tablet  24h       
90 days  Active

 

 GlipiZIDE 5 mg  Orally 2 times a day  1 tablet  12h        90 days  Active

 

 Carvedilol 12.5 MG  Orally 2 times a day  1 tablet  12h        90 days  Active

 

 Humalog KwikPen 100 UNIT/ML  Subcutaneous three times a day before meals  12 
units           12 months  Active

 

 Pantoprazole Sodium 40 mg  Orally Once a day  1 tablet  24h        90 days  
Active

 

 Nystatin           26 Mar, 2018        Not-Taking

 

 Aleve 220 MG  Orally every 12 hrs  2 tablets with food or milk as needed  12h 
          Not-Taking

 

 Levemir FlexTouch 100 UNIT/ML  Subcutaneous 2 times a day  20  units twice 
daily  12h       12 months  Active

 

 Advair Diskus           26 Mar, 2018        Active

 

 Probiotic -                    Active

 

 ProAir  (90 Base) MCG/ACT  Inhalation every 4 hrs  2 puffs as needed  
4h          Active

 

 Aspir-81 81 MG  Orally Once a day  1 tablet  24h     16 May, 2019  90 days  
Active

 

 Hydrochlorothiazide 25 MG  Orally Once a day  1 tablet  24h        90 days  
Active

 

 Benazepril HCl 40 mg  Orally twice a day  1 tablet  12h        90 days  Active

 

 Citalopram Hydrobromide 20 mg  Orally Once a day  1 tablet  24h           
Active







RESULTS

No Results



PROCEDURES







 Procedure  Date Ordered  Result  Body Site

 

 GLYCATED HEMOGLOBIN TEST  May 21, 2018      

 

 [facility] VISIT ESTABLISHED PATIENT  May 21, 2018      







INSTRUCTIONS





MEDICATIONS ADMINISTERED

No Known Medications



MEDICAL (GENERAL) HISTORY







 Type  Description  Date

 

 Medical History  Type II Diabetes   

 

 Medical History  CAD   

 

 Medical History  Acute MI x1   

 

 Medical History  Heart Cath x3   

 

 Medical History  Kidney Stones   

 

 Medical History  Lymphedema   

 

 Medical History  Immune thrombocytopenic purpura   

 

 Surgical History  Heart Stents x2   

 

 Surgical History  Cholecystectomy   

 

 Surgical History     

 

 Surgical History  Embarrass Teeth   

 

 Hospitalization History  [facility]  

 

 Hospitalization History  Sepsis/Toxic Shock  

 

 Hospitalization History  [facility]-flu/pneumonia  2017

 

 Hospitalization History  [facility]- Anemia, cellulitis pannus, yeast 
infection. Discharged 2018

## 2018-09-08 NOTE — XMS REPORT
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         
                         

 

 2015            DOTSON DO, KOKO            Ot            287.31        
                          

 

 2015            DOTSON DO, KOKO            Ot            401.1         
                         

 

 2015            DOTSON DO, KOKO            Ot            414.01        
                          

 

 2015            DOTSON DO, KOKO            Ot            457.1         
                         

 

 2015            DOTSON DO, KOKO            Ot            592.0         
                         

 

 2015            DOTSON DO, KOKO            Ot            791.0         
                         

 

 2015            ROSALBA PHILLIPS            Ot            287.5          
                        

 

 2015            ROSALBA PHILLIPS N            Ot            287.5          
                        

 

 2015            ROSALBA PHILLIPS N            Ot            287.5          
                        

 

 2015            KEN BEDOLLA MD            Ot            V45.82      
      PERCUTANEOUS TRANSLUM CORON ANGIOPLASTY                      

 

 2015            KEN BEDOLLA MD            Ot            V57.89      
      REHABILITATION PROC NEC                     

 

 2015            FOX NAVARRO ARNP            Ot            287.5    
                              

 

 2015            FOX NAVARRO ARNP            Ot            V58.69   
                               

 

 2015            FOX NAVARRO ARNP            Ot            287.5    
                              

 

 2015            FOX NAVARRO ARNP            Ot            V58.69   
                               

 

 2015            ROSALBA PHILLIPS N            Ot            287.5          
                        

 

 2015            ROSALBA PHILLIPS N            Ot            287.5          
                        

 

 2015            ROSALBA PHILLIPS N            Ot            287.5          
  THROMBOCYTOPENIA NOS                     

 

 2015            ALEENA JOHNSON DO            Ot            038.3   
                               

 

 2015            ALEENA JOHNSON DO            Ot            250.00  
                                

 

 2015            ALEENA JOHNSON DO            Ot            272.4   
                               

 

 2015            JOHNSON DO, ALEENA MCDANIEL            Ot            276.1   
                               

 

 2015            JOHNSON DO, ALEENA MCDANIEL            Ot            276.2   
                               

 

 2015            JOHNSON DO, ALEENA MCDANIEL            Ot            276.52  
                                

 

 2015            JOHNSON DOALEENA            Ot            278.01  
                                

 

 2015            JOHNSON DOALEENA            Ot            348.31  
                                

 

 2015            JOHNSON DOALEENA            Ot            401.9   
                               

 

 2015            JOHNSON DOALEENA            Ot            410.71  
                                

 

 2015            JOHNSON DOALEENA            Ot            414.01  
                                

 

 2015            JOHNSON DOALEENA            Ot            414.8   
                               

 

 2015            JOHNSON DOALEENA            Ot            457.1   
                               

 

 2015            JOHNSON DOALEENA            Ot            459.81  
                                

 

 2015            JOHNSON DOALEENA            Ot            493.90  
                                

 

 2015            JOHNSON DOALEENA            Ot            530.81  
                                

 

 2015            JOHNSON DOALEENA            Ot            584.9   
                               

 

 2015            JOHNSON DOALEENA            Ot            599.0   
                               

 

 2015            JOHNSON DOALEENA            Ot            722.52  
                                

 

 2015            JOHNSON DOALEENA            Ot            730.16  
                                

 

 2015            JOHNSON DOALEENA            Ot            733.42  
                                

 

 2015            JOHNSON DOALEENA            Ot            785.52  
                                

 

 2015            JOHNSON DOALEENA            Ot            787.91  
                                

 

 2015            JOHNSON DOALEENA            Ot            799.02  
                                

 

 2015            JOHNSON DOALEENA            Ot            995.92  
                                

 

 2015            JOHNSON DOALEENA            Ot            V12.3   
                               

 

 2015            JOHNSON DOALEENA            Ot            V13.01  
                                

 

 2015            JOHNSON DOALEENA            Ot            V15.82  
                                

 

 2015            JOHNSON DOALEENA            Ot            V45.82  
                                

 

 2015            JOHNSON DOALEENA            Ot            V85.42  
                                

 

 2015            JOHNSON DOALEENA            Ot            038.3   
         ANAEROBIC SEPTICEMIA                     

 

 2015            ALEX DOALEENA            Ot            250.00  
          DIAB RORO WO COMPL, TYPE II OR UNSPEC TY                     

 

 2015            ALEENA JOHNSON DO, Ot            272.4   
         HYPERLIPIDEMIA NEC/NOS                     

 

 2015            ALEENA JOHNSON DO, Ot            276.1   
         HYPOSMOLALITY                     

 

 2015            ALEENA JOHNSON DO, Ot            276.2   
         ACIDOSIS                     

 

 2015            ALEENA JOHNSON DO, Ot            276.52  
          HYPOVOLEMIA                     

 

 2015            ALEENA JOHNSON DO, Ot            278.01  
          MORBID OBESITY                     

 

 2015            ALEENA JOHNSON DO, Ot            285.9   
         ANEMIA NOS                     

 

 2015            ALEENA JOHNSON DO, Ot            348.31  
          METABOLIC ENCEPHALOPATHY                     

 

 2015            ALEENA JOHNSON DO, Ot            401.9   
         HYPERTENSION NOS                     

 

 2015            ALEENA JOHNSON DO, Ot            410.71  
          AC MYOCARDIAL INFARCT,SUBENDO INFARCT,IN                     

 

 2015            ALEENA JOHNSON DO, Ot            414.01  
          CORONARY ATHEROSCLEROSIS OF NATIVE CORON                     

 

 2015            ALEENA JOHNSON DO, Ot            414.8   
         CHR ISCHEMIC HRT DIS NEC                     

 

 2015            ALEENA JOHNSON DO, Ot            457.1   
         OTHER LYMPHEDEMA                     

 

 2015            ALEENA JOHNSON DO, Ot            459.81  
          VENOUS INSUFFICIENCY NOS                     

 

 2015            ALEENA JOHNSON DO, Ot            493.90  
          ASTHMA, UNSPECIFIED                     

 

 2015            ALEENA JOHNSON DO, Ot            530.81  
          ESOPHAGEAL REFLUX                     

 

 2015            ALEENA JOHNSON DO, Ot            584.9   
         ACUTE RENAL FAILURE, UNSPECIFIED                     

 

 2015            ALEENA JOHNSON DO, Ot            599.0   
         URIN TRACT INFECTION NOS                     

 

 2015            ALEENA JOHNSON DO, Ot            722.52  
          LUMB/LUMBOSAC DISC DEGEN                     

 

 2015            ALEENA JOHNSON DO, Ot            730.16  
          CHR OSTEOMYELIT-L/LEG                     

 

 2015            ALEENA JOHNSON DO, Ot            733.42  
          ASEPTIC NECROSIS FEMUR                     

 

 2015            ALEENA JOHNSON DO, Ot            785.52  
          SEPTIC SHOCK                     

 

 2015            ALEENA JOHNSON DO, Ot            787.91  
          DIARRHEA                     

 

 2015            ALEENA JOHNSON DO, Ot            799.02  
          HYPOXEMIA                     

 

 2015            ALEENA JOHNSON DO, Ot            995.92  
          SEVERE SEPSIS                     

 

 2015            ALEENA JOHNSON DO, Ot            996.72  
          OTH COMPLICATIONS DUE TO OTH CARD DEVICE                     

 

 2015            ALEENA JOHNSON DO            Ot            V12.3   
                               

 

 2015            ALEENA JOHNSON DO            Ot            V13.01  
                                

 

 2015            ALEENA JOHNSON DO            Ot            V15.82  
          HISTORY OF TOBACCO USE                     

 

 2015            ALEENA JOHNSON DO            Ot            V45.82  
          PERCUTANEOUS TRANSLUM CORON ANGIOPLASTY                      

 

 2015            ALEENA JOHNSON DO            Ot            V85.42  
          BODY MASS INDEX 45.0-49.9, ADULT                     

 

 2015            ROSALBA PHILLIPS N            Ot            287.5          
                        

 

 2015            ROSALBA PHILLIPS N            Ot            D69.6          
                        

 

 2015            IVETT PHILLIPSLUCY N            Ot            287.5          
  THROMBOCYTOPENIA NOS                     

 

 10/14/2015            FOX NAVARRO ARNP            Ot            280.9    
                              

 

 10/14/2015            FOX NAVARRO S ARNP            Ot            287.31   
                               

 

 10/14/2015            FOX NAVARRO S ARNP            Ot            412      
                            

 

 10/14/2015            FOX NAVARRO S ARNP            Ot            414.00   
                               

 

 10/14/2015            FOX NAVARRO S ARNP            Ot            V45.82   
                               

 

 10/14/2015            NAVARROFOX S ARNP            Ot            V58.69   
                               

 

 10/30/2015            NAVARROFOX WELSH S ARNP            Ot            280.9    
                              

 

 10/30/2015            FOX NAVARRO S ARNP            Ot            287.31   
                               

 

 10/30/2015            FOX NAVARRO S ARNP            Ot            412      
                            

 

 10/30/2015            FOX NAVARRO S ARNP            Ot            414.00   
                               

 

 10/30/2015            FOX NAVARRO S ARNP            Ot            V45.82   
                               

 

 10/30/2015            FOX NAVARRO S ARNP            Ot            V58.69   
                               

 

 12/10/2015            ALAN ROSALBA N            Ot            287.5          
                        

 

 12/10/2015            ALANROSALBA N            Ot            D69.6          
                        

 

 2015            ALANROSALBA N            Ot            287.5          
                        

 

 2016            DOTSON DO, KOKO            Ot            D50.8         
                         

 

 2016            DOTSON DO, KOKO            Ot            E11.8         
                         

 

 2016            DOTSON DO, KOKO            Ot            E78.0         
                         

 

 2016            DOTSON DO, KOKO            Ot            E78.1         
                         

 

 2016            DOTSON DO, KOKO            Ot            Z00.00        
                          

 

 2016            ALAN IVETTLUCY N            Ot            D50.9          
                        

 

 2016            ALAN, IVETTLUCY N            Ot            D69.3          
                        

 

 2016            ALAN, ROSALBA N            Ot            E11.9          
                        

 

 2016            ALAN, ROSALBA N            Ot            E66.01         
                         

 

 2016            ALAN, ROSALBA N            Ot            I25.10         
                         

 

 2016            ALAN, ROSALBA MORRISON            Ot            M87.9          
                        

 

 2016            ALAN, ROSALBA N            Ot            Z68.42         
                         

 

 2016            ALAN, ROSALBA N            Ot            Z79.899        
                          

 

 2016            ALAN, ROSALBA N            Ot            D50.9          
                        

 

 2016            ALAN, ROSALBA N            Ot            D69.3          
                        

 

 2016            ALAN, ROSALBA N            Ot            E11.9          
                        

 

 2016            ALAN, ROSALBA N            Ot            E66.01         
                         

 

 2016            ALAN, ROSALBA MORRISON            Ot            I25.10         
                         

 

 2016            ALAN, ROSALBA MORRISON            Ot            M87.9          
                        

 

 2016            ALAN, ROSALBA MORRISON            Ot            Z68.42         
                         

 

 2016            ALAN, ROSALBA N            Ot            Z79.899        
                          

 

 2016            ALAN, ROSALBA N            Ot            D50.9          
                        

 

 2016            ALAN, ROSALBA N            Ot            D69.3          
                        

 

 2016            ALAN, ROSALBA N            Ot            E11.9          
                        

 

 2016            ALAN, ROSALBA N            Ot            E66.01         
                         

 

 2016            ALAN, ROSALBA N            Ot            I25.10         
                         

 

 2016            ALAN, ROSALBA N            Ot            M87.9          
                        

 

 2016            ALAN, ROSALBA N            Ot            Z68.42         
                         

 

 2016            ALAN, ROSALBA N            Ot            Z79.899        
                          

 

 2016            ALAN, ROSALBA N            Ot            D50.9          
  IRON DEFICIENCY ANEMIA, UNSPECIFIED                     

 

 2016            ALAN, ROSALBA N            Ot            D69.3          
  IMMUNE THROMBOCYTOPENIC PURPURA                     

 

 2016            ALAN, ROSALBA N            Ot            E11.9          
  TYPE 2 DIABETES MELLITUS WITHOUT COMPLIC                     

 

 2016            ALANROSALBA N            Ot            E66.01         
   MORBID (SEVERE) OBESITY DUE TO EXCESS CA                     

 

 2016            ROSALBA PHILLIPS N            Ot            I25.10         
   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                      

 

 2016            ALANROSALBA VILLALOBOS            Ot            M87.9          
  OSTEONECROSIS, UNSPECIFIED                     

 

 2016            ROSALBA PHILLIPS            Ot            Z68.42         
   BODY MASS INDEX (BMI) 45.0-49.9, ADULT                     

 

 2016            ROSALBA PHILLIPS            Ot            Z79.899        
    OTHER LONG TERM (CURRENT) DRUG THERAPY                     

 

 2016            ROSALBA PHILLIPS            Ot            D50.9          
                        

 

 2016            ROSALBA PHILLIPS            Ot            D69.3          
                        

 

 2016            ROSALBA PHILLIPS            Ot            E11.9          
                        

 

 2016            ROSALBA PHILLIPS            Ot            E66.01         
                         

 

 2016            ROSALBA PHILLIPS            Ot            I25.10         
                         

 

 2016            ROSALBA PHILLIPS            Ot            M87.9          
                        

 

 2016            ROSALBA PHILLIPS            Ot            Z68.42         
                         

 

 2016            ROSALBA PHILLIPS            Ot            Z79.899        
                          

 

 2016                         Ot            574.20                       
           

 

 2016                         Ot            V72.83                       
           

 

 2016                         Ot            V76.51                       
           

 

 2016                         Ot            443.9                        
          

 

 2016                         Ot            457.0                        
          

 

 2016                         Ot            682.6                        
          

 

 2016                         Ot            V58.69                       
           

 

 2016                         Ot            V58.89                       
           

 

 2016                         Ot            V76.12                       
           

 

 2016            TAWIL MD, SONU WEINBERG            Ot            592.1        
                          

 

 2016            TAWIL MD, SONU A            Ot            592.0        
                          

 

 2016            TAWIL MD, SONU A            Ot            V72.84       
                           

 

 2016            LORENA PA, ALAINA SANDOVAL            Ot            
272.4                                  

 

 2016            LORENA PA, ALAINA SANDOVAL            Ot            
397.0                                  

 

 2016            LORENA PA, ALAINA SANDOVAL            Ot            
401.9                                  

 

 2016            LORENA PA, ALAINA SANDOVAL            Ot            
412                                  

 

 2016            LORENA PA, ALAINA SANDOVAL            Ot            
414.00                                  

 

 2016            LORENA PA, ALAINA SANDOVAL            Ot            
416.8                                  

 

 2016            LORENA PA, ALAINA SANDOVAL            Ot            
424.0                                  

 

 2016            LORENA PA, ALAINA SANDOVAL            Ot            
278.01                                  

 

 2016            LORENA PA, ALAINA SANDOVAL            Ot            
401.9                                  

 

 2016            ALAINA OMALLEY            Ot            
412                                  

 

 2016            ALAINA OMALLEY            Ot            
414.00                                  

 

 2016            ALAINA OMALLEY            Ot            
V85.43                                  

 

 2016            DOTSON DO, KOKO            Ot            V76.12        
                          

 

 2016            VIOLETA ST MD            Ot            592.0        
                          

 

 2016                         Ot            280.9                        
          

 

 2016            FOX NAVARRO ARNP            Ot            287.5    
                              

 

 2016            DOTSON DO, KOKO            Ot            280.8         
                         

 

 2016            DOTSON DO, KOKO            Ot            287.31        
                          

 

 2016            DOTSON DO, KOKO            Ot            250.00        
                          

 

 2016            DOTSON DO, KOKO            Ot            272.1         
                         

 

 2016            DOTSON DO, KOKO            Ot            280.8         
                         

 

 2016            DOTSON DO, KOKO            Ot            287.31        
                          

 

 2016            DOTSON DO, KOKO            Ot            401.1         
                         

 

 2016            DOTSON DO, KOKO            Ot            414.01        
                          

 

 2016            DOTSON DO, KOKO            Ot            457.1         
                         

 

 2016            DOTSON DO, KOKO            Ot            592.0         
                         

 

 2016            DOTSON DO, KOKO            Ot            791.0         
                         

 

 2016            FOX NAVARRO ARNP            Ot            287.5    
                              

 

 2016            FOX NAVARRO ARNP            Ot            V58.69   
                               

 

 2016            FOX NAVARRO ARNP            Ot            280.9    
                              

 

 2016            FOX NAVARRO ARNP            Ot            287.31   
                               

 

 2016            FOX NAVARRO ARNP            Ot            412      
                            

 

 2016            FOX NAVARRO ARNP            Ot            414.00   
                               

 

 2016            FOX NAVARRO ARNP            Ot            V45.82   
                               

 

 2016            FOX NAVARRO ARNP            Ot            V58.69   
                               

 

 2016            DOTSON DO, KOKO            Ot            D50.8         
                         

 

 2016            DOTSON DO, KOKO            Ot            E11.8         
                         

 

 2016            DOTSON DO, KOKO            Ot            E78.0         
                         

 

 2016            DOTSON DO, KOKO            Ot            E78.1         
                         

 

 2016            DOTSON DO, KOKO            Ot            Z00.00        
                          

 

 2016            ALAN, BOBAN N            Ot            D50.9          
                        

 

 2016            ALAN, BOBAN N            Ot            D69.3          
                        

 

 2016            ALAN, BOBAN N            Ot            E11.9          
                        

 

 2016            ALAN, BOBAN N            Ot            E66.01         
                         

 

 2016            ALAN, BOBAN N            Ot            I25.10         
                         

 

 2016            ALAN, BOBAN N            Ot            M87.9          
                        

 

 2016            ALAN, BOBAN N            Ot            Z68.42         
                         

 

 2016            ALAN, BOBAN N            Ot            Z79.899        
                          

 

 2016            MACIEL HANSON, VIOLETA MARQUEZ            Ot            N20.2        
                          

 

 2016            JACKI NAVARROAH S ARNP            Ot            D50.9    
                              

 

 2016            JACKI NAVARROAH S ARNP            Ot            D69.3    
                              

 

 2016            JACKI NAVARROAH S ARNP            Ot            E11.9    
                              

 

 2016            JACKI NAVARROAH S ARNP            Ot            E66.01   
                               

 

 2016            NAVARRO HILAH S ARNP            Ot            I25.10   
                               

 

 2016            NAVARRO HILAH S ARNP            Ot            M87.9    
                              

 

 2016            NAVARRO HILAH S ARNP            Ot            Z68.42   
                               

 

 2016            NAVARRO HILAH S ARNP            Ot            Z79.899  
                                

 

 2016            ALAN, BOBAN N            Ot            D50.9          
                        

 

 2016            ALAN, BOBAN N            Ot            D69.3          
                        

 

 2016            ALAN, BOBAN N            Ot            E11.9          
                        

 

 2016            ALAN, BOBAN N            Ot            E66.01         
                         

 

 2016            ALAN, BOBAN N            Ot            I25.10         
                         

 

 2016            ALAN, BOBAN N            Ot            M87.9          
                        

 

 2016            ALAN, BOBAN N            Ot            Z68.42         
                         

 

 2016            ALAN, BOBAN N            Ot            Z79.899        
                          

 

 2016            MACIEL HANSON, VIOLETA MARQUEZ            Ot            N20.2        
                          

 

 2016            JACKI NAVARROAH S ARNP            Ot            D50.9    
                              

 

 2016            NAVARRO, HILAH S ARNP            Ot            D69.3    
                              

 

 2016            FOX NAVARRO S ARNP            Ot            E11.9    
                              

 

 2016            NAVARROJACKIAH S ARNP            Ot            E66.01   
                               

 

 2016            MELANIE FOX WELSH ARNP            Ot            I25.10   
                               

 

 2016            MELANIE FOX WELSH ARNP            Ot            M87.9    
                              

 

 2016            MELANIE FOX WELSH ARNP            Ot            Z68.42   
                               

 

 2016            NAVARRO FOX WELSH ARNP            Ot            Z79.899  
                                

 

 2016            MACIEL HANSON, J ALAM            Ot            N20.2        
    CALCULUS OF KIDNEY WITH CALCULUS OF URET                     

 

 2016            JACKI NAVARRONAHED S ARNP            Ot            D50.9    
        IRON DEFICIENCY ANEMIA, UNSPECIFIED                     

 

 2016            MELANIE FOX WELSH ARNP            Ot            D69.3    
        IMMUNE THROMBOCYTOPENIC PURPURA                     

 

 2016            MELANIE FOX WELSH ARNP            Ot            E11.9    
        TYPE 2 DIABETES MELLITUS WITHOUT COMPLIC                     

 

 2016            MELANIE FOX WELSH ARNP            Ot            E66.01   
         MORBID (SEVERE) OBESITY DUE TO EXCESS CA                     

 

 2016            JACKI NAVARRONAHED WELSH ARNP            Ot            I25.10   
         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                      

 

 2016            JACKI NAVARRONAHED WELSH ARNP            Ot            M87.9    
        OSTEONECROSIS, UNSPECIFIED                     

 

 2016            MELANIE FOX WELSH ARNP            Ot            Z68.42   
         BODY MASS INDEX (BMI) 45.0-49.9, ADULT                     

 

 2016            JACKI NAVARRONAHED WELSH ARNP            Ot            Z79.899  
          OTHER LONG TERM (CURRENT) DRUG THERAPY                     

 

 2016            ALANROSALBA N            Ot            D50.9          
  IRON DEFICIENCY ANEMIA, UNSPECIFIED                     

 

 2016            ROSALBA PHILLIPS N            Ot            D69.3          
  IMMUNE THROMBOCYTOPENIC PURPURA                     

 

 2016            ALANROSALBA N            Ot            E11.9          
  TYPE 2 DIABETES MELLITUS WITHOUT COMPLIC                     

 

 2016            ALANROSALBA VILLALOBOS N            Ot            E66.01         
   MORBID (SEVERE) OBESITY DUE TO EXCESS CA                     

 

 2016            ALANROSALBA VILLALOBOS N            Ot            I25.10         
   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                      

 

 2016            ROSALBA PHILLIPS N            Ot            M87.9          
  OSTEONECROSIS, UNSPECIFIED                     

 

 2016            ALANROSALBA VILLALOBOS N            Ot            Z68.42         
   BODY MASS INDEX (BMI) 45.0-49.9, ADULT                     

 

 2016            ROSALBA PHILLIPS N            Ot            Z79.899        
    OTHER LONG TERM (CURRENT) DRUG THERAPY                     

 

 2016            VISHNU HANSON, ATUL DAVIDSON            Ot            M25.561 
           PAIN IN RIGHT KNEE                     

 

 2016            VISHNU HANSON, ATUL DAVIDSON            Ot            M25.561 
           PAIN IN RIGHT KNEE                     

 

 2016            ALANROSALBA VILLALOBOS N            Ot            D50.9          
  IRON DEFICIENCY ANEMIA, UNSPECIFIED                     

 

 2016            ALANROSALBA N            Ot            D69.3          
  IMMUNE THROMBOCYTOPENIC PURPURA                     

 

 2016            ALAN ROSALBA N            Ot            E11.9          
  TYPE 2 DIABETES MELLITUS WITHOUT COMPLIC                     

 

 2016            ALANROSALBA N            Ot            E66.01         
   MORBID (SEVERE) OBESITY DUE TO EXCESS CA                     

 

 2016            ALAN IVETTAN N            Ot            I25.10         
   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                      

 

 2016            ALAN, ROSALBA N            Ot            M87.9          
  OSTEONECROSIS, UNSPECIFIED                     

 

 2016            ALANROSALBA N            Ot            Z68.42         
   BODY MASS INDEX (BMI) 45.0-49.9, ADULT                     

 

 2016            ALANROSALBA N            Ot            Z79.899        
    OTHER LONG TERM (CURRENT) DRUG THERAPY                     

 

 2016            ALANIVETTAN N            Ot            D50.9          
  IRON DEFICIENCY ANEMIA, UNSPECIFIED                     

 

 2016            ALAN BOBAN N            Ot            D69.3          
  IMMUNE THROMBOCYTOPENIC PURPURA                     

 

 2016            ALAN, BOBAN N            Ot            E11.9          
  TYPE 2 DIABETES MELLITUS WITHOUT COMPLIC                     

 

 2016            ALAN BOBAN N            Ot            E66.01         
   MORBID (SEVERE) OBESITY DUE TO EXCESS CA                     

 

 2016            ALANROSALBA N            Ot            I25.10         
   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                      

 

 2016            ALANROSALBA N            Ot            M87.9          
  OSTEONECROSIS, UNSPECIFIED                     

 

 2016            ALANIVETTAN N            Ot            Z68.42         
   BODY MASS INDEX (BMI) 45.0-49.9, ADULT                     

 

 2016            ALAN BOBAN N            Ot            Z79.899        
    OTHER LONG TERM (CURRENT) DRUG THERAPY                     

 

 2016            ALAN BOBAN N            Ot            D50.9          
  IRON DEFICIENCY ANEMIA, UNSPECIFIED                     

 

 2016            ALAN BOBAN N            Ot            D69.3          
  IMMUNE THROMBOCYTOPENIC PURPURA                     

 

 2016            ALAN BOBAN N            Ot            E11.9          
  TYPE 2 DIABETES MELLITUS WITHOUT COMPLIC                     

 

 2016            ROSALBA PHILLIPS            Ot            E66.01         
   MORBID (SEVERE) OBESITY DUE TO EXCESS CA                     

 

 2016            ROSALBA PHILLIPS            Ot            I25.10         
   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                      

 

 2016            ROSALBA PHILLIPS            Ot            M87.9          
  OSTEONECROSIS, UNSPECIFIED                     

 

 2016            ROSALBA PHILLIPS            Ot            Z68.42         
   BODY MASS INDEX (BMI) 45.0-49.9, ADULT                     

 

 2016            ROSALBA PHILLIPS            Ot            Z79.899        
    OTHER LONG TERM (CURRENT) DRUG THERAPY                     

 

 07/15/2016            FOX NAVARRO S ARNP            Ot            D50.9    
        IRON DEFICIENCY ANEMIA, UNSPECIFIED                     

 

 07/15/2016            FOX NAVARRO S ARNP            Ot            D69.3    
        IMMUNE THROMBOCYTOPENIC PURPURA                     

 

 07/15/2016            FOX NAVARRO S ARNP            Ot            E11.9    
        TYPE 2 DIABETES MELLITUS WITHOUT COMPLIC                     

 

 07/15/2016            FOX NAVARRO S ARNP            Ot            E66.01   
         MORBID (SEVERE) OBESITY DUE TO EXCESS CA                     

 

 07/15/2016            FOX NAVARRO S ARNP            Ot            I25.10   
         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                      

 

 07/15/2016            FOX NAVARRO S ARNP            Ot            M87.9    
        OSTEONECROSIS, UNSPECIFIED                     

 

 07/15/2016            FOX NAVARRO S ARNP            Ot            Z68.42   
         BODY MASS INDEX (BMI) 45.0-49.9, ADULT                     

 

 07/15/2016            FOX NAVARRO S ARNP            Ot            Z79.899  
          OTHER LONG TERM (CURRENT) DRUG THERAPY                     

 

 2016            FOX NAVARRO ARNP            Ot            D50.9    
        IRON DEFICIENCY ANEMIA, UNSPECIFIED                     

 

 2016            FOX NAVARRO S ARNP            Ot            D69.3    
        IMMUNE THROMBOCYTOPENIC PURPURA                     

 

 2016            FOX NAVARRO S ARNP            Ot            E11.9    
        TYPE 2 DIABETES MELLITUS WITHOUT COMPLIC                     

 

 2016            FOX NAVARRO S ARNP            Ot            E66.01   
         MORBID (SEVERE) OBESITY DUE TO EXCESS CA                     

 

 2016            FOX NAVARRO S ARNP            Ot            I25.10   
         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                      

 

 2016            FOX NAVARRO S ARNP            Ot            M87.9    
        OSTEONECROSIS, UNSPECIFIED                     

 

 2016            FOX NAVARRO S ARNP            Ot            Z68.42   
         BODY MASS INDEX (BMI) 45.0-49.9, ADULT                     

 

 2016            FOX NAVARRO DEDRA            Ot            Z79.899  
          OTHER LONG TERM (CURRENT) DRUG THERAPY                     

 

 2016            ALANIVETTLUCY N            Ot            D50.9          
  IRON DEFICIENCY ANEMIA, UNSPECIFIED                     

 

 2016            ALAN BOBAN N            Ot            D69.3          
  IMMUNE THROMBOCYTOPENIC PURPURA                     

 

 2016            ALANROSALBA N            Ot            E11.9          
  TYPE 2 DIABETES MELLITUS WITHOUT COMPLIC                     

 

 2016            ALAN BOBAN N            Ot            E66.01         
   MORBID (SEVERE) OBESITY DUE TO EXCESS CA                     

 

 2016            ALAN, BOBAN N            Ot            I25.10         
   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                      

 

 2016            ALANROSALBA N            Ot            M87.9          
  OSTEONECROSIS, UNSPECIFIED                     

 

 2016            ALAN BOBAN N            Ot            Z68.42         
   BODY MASS INDEX (BMI) 45.0-49.9, ADULT                     

 

 2016            ALAN BOBLUCY N            Ot            Z79.899        
    OTHER LONG TERM (CURRENT) DRUG THERAPY                     

 

 2016            ALAN BOBAN N            Ot            D50.9          
  IRON DEFICIENCY ANEMIA, UNSPECIFIED                     

 

 2016            ALAN, BOBAN N            Ot            D69.3          
  IMMUNE THROMBOCYTOPENIC PURPURA                     

 

 2016            ALAN, BOBAN N            Ot            E11.9          
  TYPE 2 DIABETES MELLITUS WITHOUT COMPLIC                     

 

 2016            ALAN, BOBAN N            Ot            E66.01         
   MORBID (SEVERE) OBESITY DUE TO EXCESS CA                     

 

 2016            ALANROSALBA N            Ot            I25.10         
   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                      

 

 2016            ALANROSALBA N            Ot            M87.9          
  OSTEONECROSIS, UNSPECIFIED                     

 

 2016            ALANROSALBA N            Ot            Z68.42         
   BODY MASS INDEX (BMI) 45.0-49.9, ADULT                     

 

 2016            ALAN BOBAN N            Ot            Z79.899        
    OTHER LONG TERM (CURRENT) DRUG THERAPY                     

 

 2016            SIXTO BULL            Ot            E11.9      
      TYPE 2 DIABETES MELLITUS WITHOUT COMPLIC                     

 

 2016            SIXTO BULL            Ot            I10        
    ESSENTIAL (PRIMARY) HYPERTENSION                     

 

 2016            SIXTO BULL            Ot            I25.10     
       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                      

 

 2016            SIXTO BULL            Ot            S93.402A   
         SPRAIN OF UNSPECIFIED LIGAMENT OF LEFT A                     

 

 2016            SIXTO BULL            Ot            S93.601A   
         UNSPECIFIED SPRAIN OF RIGHT FOOT, INITIA                     

 

 2016            SIXTO BULL            Ot            S99.921A   
         UNSPECIFIED INJURY OF RIGHT FOOT, INITIA                     

 

 2016            SIXTO BULL            Ot            W18.30XA   
         FALL ON SAME LEVEL, UNSPECIFIED, INITIAL                     

 

 2016            SIXTO BULL            Ot            Y92.481    
        PARKING LOT AS THE PLACE OF OCCURRENCE O                     

 

 2016            SIXTO BULL            Ot            Y93.89     
       ACTIVITY, OTHER SPECIFIED                     

 

 2016            SIXTO BULL            Ot            Y99.8      
      OTHER EXTERNAL CAUSE STATUS                     

 

 2016            SIXTO BULL            Ot            Z79.4      
      LONG TERM (CURRENT) USE OF INSULIN                     

 

 2016            SIXTO BULL            Ot            Z79.82     
       LONG TERM (CURRENT) USE OF ASPIRIN                     

 

 2016            SIXTO BULL            Ot            Z79.899    
        OTHER LONG TERM (CURRENT) DRUG THERAPY                     

 

 2016            SIXTO BULL            Ot            E11.9      
      TYPE 2 DIABETES MELLITUS WITHOUT COMPLIC                     

 

 2016            SIXTO BULL            Ot            I10        
    ESSENTIAL (PRIMARY) HYPERTENSION                     

 

 2016            SIXTO BULL            Ot            I25.10     
       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                      

 

 2016            SIXTO BULL            Ot            S93.402A   
         SPRAIN OF UNSPECIFIED LIGAMENT OF LEFT A                     

 

 2016            SIXTO BULL            Ot            S93.601A   
         UNSPECIFIED SPRAIN OF RIGHT FOOT, INITIA                     

 

 2016            SIXTO BULL            Ot            S99.921A   
         UNSPECIFIED INJURY OF RIGHT FOOT, INITIA                     

 

 2016            SIXTO BULL            Ot            W18.30XA   
         FALL ON SAME LEVEL, UNSPECIFIED, INITIAL                     

 

 2016            SIXTO BULL            Ot            Y92.481    
        PARKING LOT AS THE PLACE OF OCCURRENCE O                     

 

 2016            SIXTO BULL            Ot            Y93.89     
       ACTIVITY, OTHER SPECIFIED                     

 

 2016            SIXTO BULL            Ot            Y99.8      
      OTHER EXTERNAL CAUSE STATUS                     

 

 2016            SIXTO BULL            Ot            Z79.4      
      LONG TERM (CURRENT) USE OF INSULIN                     

 

 2016            SIXTO BULL            Ot            Z79.82     
       LONG TERM (CURRENT) USE OF ASPIRIN                     

 

 2016            SIXTO BULL            Ot            Z79.899    
        OTHER LONG TERM (CURRENT) DRUG THERAPY                     

 

 2016            SHASHI ABISAI SANTILLAN K            Ot            E11.9          
  TYPE 2 DIABETES MELLITUS WITHOUT COMPLIC                     

 

 2016            ABISAI DANIELLE DO K            Ot            E66.01         
   MORBID (SEVERE) OBESITY DUE TO EXCESS CA                     

 

 2016            SHASHI JENNIFER SANTILLANA K            Ot            I10            
ESSENTIAL (PRIMARY) HYPERTENSION                     

 

 2016            SHASHI DO ABISAI K            Ot            I51.7          
  CARDIOMEGALY                     

 

 2016            SHASHI JENNIFER SANTILLANA K            Ot            R07.89         
   OTHER CHEST PAIN                     

 

 2016            SHASHI DO ABISAI K            Ot            Z79.4          
  LONG TERM (CURRENT) USE OF INSULIN                     

 

 2016            SHASHI JENNIFER SANTILLANA K            Ot            Z79.82         
   LONG TERM (CURRENT) USE OF ASPIRIN                     

 

 2016            SHASHI JENNIFER SANTILLANA K            Ot            Z79.899        
    OTHER LONG TERM (CURRENT) DRUG THERAPY                     

 

 2016            SHASHI DO ABISAI K            Ot            Z95.5          
  PRESENCE OF CORONARY ANGIOPLASTY IMPLANT                     

 

 2016                         Ot            443.9            PERIPH 
VASCULAR DIS NOS                     

 

 2016                         Ot            457.0            POSTMASTECT 
LYMPHEDEMA                     

 

 2016                         Ot            682.6            CELLULITIS 
OF LEG                     

 

 2016                         Ot            V58.69            OTH MED,LT,
CURRENT USE                     

 

 2016                         Ot            V58.89            OTHER 
SPECIFIED AFTERCARE                     

 

 2016                         Ot            V76.12            OTH SCREEN 
MAMMO-MALIGN NEOPLASM OF LUCINA                     

 

 2016            TAWIL MD, ELIAS A            Ot            592.1        
    CALCULUS OF URETER                     

 

 2016            TAWIL MD, ELIAS A            Ot            592.0        
    CALCULUS OF KIDNEY                     

 

 2016            TAWIL MD, ELIAS A            Ot            V72.84       
     EXAM PRE-OPERATIVE NOS                     

 

 2016            ALAINA OMALLEY            Ot            
272.4            HYPERLIPIDEMIA NEC/NOS                     

 

 2016            ALAINA OMALLEY            Ot            
397.0            TRICUSPID VALVE DISEASE                     

 

 2016            SOSA-TASNEEM PA, ALAINA K            Ot            
401.9            HYPERTENSION NOS                     

 

 2016            ALAINA OMALLEY            Ot            
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                     

 

 2016            ALAINA OMALLEY            Ot            
414.00            CORON ATHEROSCLER NOS TYPE VESSEL, NATIV                     

 

 2016            ALAINA OMALLEY            Ot            
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                     

 

 2016            ALAINA OMALLEY            Ot            
424.0            MITRAL VALVE DISORDER                     

 

 2016            ALAINA OMALLEY            Ot            
278.01            MORBID OBESITY                     

 

 2016            ALAINA OMALLEY            Ot            
401.9            HYPERTENSION NOS                     

 

 2016            ALAINA OMALLEY            Ot            
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                     

 

 2016            ALAINA OMALLEY            Ot            
414.00            CORON ATHEROSCLER NOS TYPE VESSEL, NATIV                     

 

 2016            ALAINA OMALLEY            Ot            
V85.43            BODY MASS INDEX 50.0-59.9, ADULT                     

 

 2016            KOKO DOTSON DO            Ot            V76.12        
    OTH SCREEN MAMMO-MALIGN NEOPLASM OF LUCINA                     

 

 2016            MACIEL HANSON, VIOLETA MARQUEZ            Ot            592.0        
    CALCULUS OF KIDNEY                     

 

 2016                         Ot            280.9            IRON DEFIC 
ANEMIA NOS                     

 

 2016            FOX NAVARRO            Ot            287.5    
        THROMBOCYTOPENIA NOS                     

 

 2016            KOKO DOTSON DO            Ot            280.8         
   IRON DEFIC ANEMIA NEC                     

 

 2016            KOKO DTOSON DO            Ot            287.31        
    IMMUNE THROMBOCYTOPENIC PURPURA                     

 

 2016            KOKO DOTSON DO            Ot            250.00        
    DIAB RORO WO COMPL, TYPE II OR UNSPEC TY                     

 

 2016            KOKO DOTSON DO            Ot            272.1         
   PURE HYPERGLYCERIDEMIA                     

 

 2016            KOKO DOTSON DO            Ot            280.8         
   IRON DEFIC ANEMIA NEC                     

 

 2016            KOKO DOTSON DO            Ot            287.31        
    IMMUNE THROMBOCYTOPENIC PURPURA                     

 

 2016            KOKO DOTSON DO            Ot            401.1         
   BENIGN HYPERTENSION                     

 

 2016            KOKO DOTSON DO            Ot            414.01        
    CORONARY ATHEROSCLEROSIS OF NATIVE CORON                     

 

 2016            KOKO DOTSON DO            Ot            457.1         
   OTHER LYMPHEDEMA                     

 

 2016            KOKO DOTSON DO            Ot            592.0         
   CALCULUS OF KIDNEY                     

 

 2016            KOKO DOTSON DO            Ot            791.0         
   PROTEINURIA                     

 

 2016            FOX NAVARRO            Ot            287.5    
        THROMBOCYTOPENIA NOS                     

 

 2016            FOX NAVARRO            Ot            V58.69   
         OTH MED,LT,CURRENT USE                     

 

 2016            FOX NAVARRO            Ot            280.9    
        IRON DEFIC ANEMIA NOS                     

 

 2016            FOX NAVARRO            Ot            287.31   
         IMMUNE THROMBOCYTOPENIC PURPURA                     

 

 2016            FOX NAVARROP            Ot            412      
      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                     

 

 2016            FOX NAVARROP            Ot            414.00   
         CORON ATHEROSCLER NOS TYPE VESSEL, NATIV                     

 

 2016            FOX NAVARRO            Ot            V45.82   
         PERCUTANEOUS TRANSLUM CORON ANGIOPLASTY                      

 

 2016            FOX NAVARRO            Ot            V58.69   
         OTH MED,LT,CURRENT USE                     

 

 2016            KAREN DOTSON DOI            Ot            D50.8         
   OTHER IRON DEFICIENCY ANEMIAS                     

 

 2016            KOKO DOTSON DO            Ot            E11.8         
   TYPE 2 DIABETES MELLITUS WITH UNSPECIFIE                     

 

 2016            KOKO DOTSON DO            Ot            E78.0         
   PURE HYPERCHOLESTEROLEMIA                     

 

 2016            KOKO DOTSON DO            Ot            E78.1         
   PURE HYPERGLYCERIDEMIA                     

 

 2016            KOKO DOTSON DO            Ot            Z00.00        
    ENCNTR FOR GENERAL ADULT MEDICAL EXAM W/                     

 

 2016            MACIEL HANSON, J ALMA            Ot            N20.2        
    CALCULUS OF KIDNEY WITH CALCULUS OF URET                     

 

 2016            FOX NAVARRO            Ot            D50.9    
        IRON DEFICIENCY ANEMIA, UNSPECIFIED                     

 

 2016            FOX NAVARRO            Ot            D69.3    
        IMMUNE THROMBOCYTOPENIC PURPURA                     

 

 2016            FOX NAVARRO            Ot            E11.9    
        TYPE 2 DIABETES MELLITUS WITHOUT COMPLIC                     

 

 2016            FOX NAVARRO            Ot            E66.01   
         MORBID (SEVERE) OBESITY DUE TO EXCESS CA                     

 

 2016            FOX NAVARRO            Ot            I25.10   
         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                      

 

 2016            FOX NAVARRO            Ot            M87.9    
        OSTEONECROSIS, UNSPECIFIED                     

 

 2016            NAVARRO, HILAH S ARNP            Ot            Z68.42   
         BODY MASS INDEX (BMI) 45.0-49.9, ADULT                     

 

 2016            FOX NAVARRO ARNP            Ot            Z79.899  
          OTHER LONG TERM (CURRENT) DRUG THERAPY                     

 

 2016            FOX NAVARRO ARNP            Ot            D50.9    
        IRON DEFICIENCY ANEMIA, UNSPECIFIED                     

 

 2016            FOX NAVARRO ARNP            Ot            D69.3    
        IMMUNE THROMBOCYTOPENIC PURPURA                     

 

 2016            NAVARRO FOX WELSH ARNP            Ot            E11.9    
        TYPE 2 DIABETES MELLITUS WITHOUT COMPLIC                     

 

 2016            NAVARROFOX WELSH ARNP            Ot            E66.01   
         MORBID (SEVERE) OBESITY DUE TO EXCESS CA                     

 

 2016            NAVARROFOX WELSHP            Ot            I25.10   
         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                      

 

 2016            NAVARRO FOX WELSH ARNP            Ot            M87.9    
        OSTEONECROSIS, UNSPECIFIED                     

 

 2016            FOX NAVARRO ARNP            Ot            Z68.42   
         BODY MASS INDEX (BMI) 45.0-49.9, ADULT                     

 

 2016            NAVARROFOX WELSHP            Ot            Z79.899  
          OTHER LONG TERM (CURRENT) DRUG THERAPY                     

 

 2016            ROSALBA PHILLIPS N            Ot            D50.9          
  IRON DEFICIENCY ANEMIA, UNSPECIFIED                     

 

 2016            ALAN IVETTLUCY TAMIKO            Ot            D69.3          
  IMMUNE THROMBOCYTOPENIC PURPURA                     

 

 2016            ALANROSALBA N            Ot            E11.9          
  TYPE 2 DIABETES MELLITUS WITHOUT COMPLIC                     

 

 2016            ALANROSALBA N            Ot            E66.01         
   MORBID (SEVERE) OBESITY DUE TO EXCESS CA                     

 

 2016            ROSALBA PHILLIPS N            Ot            I25.10         
   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                      

 

 2016            ALANROSALBA N            Ot            M87.9          
  OSTEONECROSIS, UNSPECIFIED                     

 

 2016            ALAN, ROSALBA N            Ot            Z68.42         
   BODY MASS INDEX (BMI) 45.0-49.9, ADULT                     

 

 2016            ALAN, ROSALBA N            Ot            Z79.899        
    OTHER LONG TERM (CURRENT) DRUG THERAPY                     

 

 2016            SHASHI DO ABISAI K            Ot            E11.9          
  TYPE 2 DIABETES MELLITUS WITHOUT COMPLIC                     

 

 2016            SHASHI DO ABISAI K            Ot            E66.01         
   MORBID (SEVERE) OBESITY DUE TO EXCESS CA                     

 

 2016            SHASHI DO, ABISAI K            Ot            I10            
ESSENTIAL (PRIMARY) HYPERTENSION                     

 

 2016            SHASHI DO, ABISAI K            Ot            I51.7          
  CARDIOMEGALY                     

 

 2016            SHASHI DO, ABISAI K            Ot            R07.89         
   OTHER CHEST PAIN                     

 

 2016            SHASHI DO, ABISAI K            Ot            Z79.4          
  LONG TERM (CURRENT) USE OF INSULIN                     

 

 2016            SHASHI DO, ABISAI K            Ot            Z79.82         
   LONG TERM (CURRENT) USE OF ASPIRIN                     

 

 2016            SHASHI DO, ABISAI K            Ot            Z79.899        
    OTHER LONG TERM (CURRENT) DRUG THERAPY                     

 

 2016            SHASHI DO, ABISAI K            Ot            Z95.5          
  PRESENCE OF CORONARY ANGIOPLASTY IMPLANT                     

 

 2016            SHASHI DO, ABISAI K            Ot            E11.9          
  TYPE 2 DIABETES MELLITUS WITHOUT COMPLIC                     

 

 2016            SHASHI DO, ABISAI K            Ot            E66.01         
   MORBID (SEVERE) OBESITY DUE TO EXCESS CA                     

 

 2016            SHASHI DO, ABISAI K            Ot            I10            
ESSENTIAL (PRIMARY) HYPERTENSION                     

 

 2016            SHASHI DO, ABISAI K            Ot            I51.7          
  CARDIOMEGALY                     

 

 2016            SHASHI DO, ABISAI K            Ot            R07.89         
   OTHER CHEST PAIN                     

 

 2016            SHASHI DO, ABISAI K            Ot            Z79.4          
  LONG TERM (CURRENT) USE OF INSULIN                     

 

 2016            SHASHI DO, ABISAI K            Ot            Z79.82         
   LONG TERM (CURRENT) USE OF ASPIRIN                     

 

 2016            SHASHI DO, ABISAI K            Ot            Z79.899        
    OTHER LONG TERM (CURRENT) DRUG THERAPY                     

 

 2016            SHASHI DO, ABISAI K            Ot            Z95.5          
  PRESENCE OF CORONARY ANGIOPLASTY IMPLANT                     

 

 10/01/2016            SIXTO BULL            Ot            E11.9      
      TYPE 2 DIABETES MELLITUS WITHOUT COMPLIC                     

 

 10/01/2016            SIXTO BULL APRTAMIKO            Ot            I10        
    ESSENTIAL (PRIMARY) HYPERTENSION                     

 

 10/01/2016            SIXTO BULL            Ot            I25.10     
       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                      

 

 10/01/2016            SIXTO BULL            Ot            S93.402A   
         SPRAIN OF UNSPECIFIED LIGAMENT OF LEFT A                     

 

 10/01/2016            SIXTO BULL            Ot            S93.601A   
         UNSPECIFIED SPRAIN OF RIGHT FOOT, INITIA                     

 

 10/01/2016            SIXTO BULL            Ot            S99.921A   
         UNSPECIFIED INJURY OF RIGHT FOOT, INITIA                     

 

 10/01/2016            SIXTO BULL APRN            Ot            W18.30XA   
         FALL ON SAME LEVEL, UNSPECIFIED, INITIAL                     

 

 10/01/2016            SIXTO BULL            Ot            Y92.481    
        PARKING LOT AS THE PLACE OF OCCURRENCE O                     

 

 10/01/2016            SIXTO BULL APRN            Ot            Y93.89     
       ACTIVITY, OTHER SPECIFIED                     

 

 10/01/2016            SIXTO BULL            Ot            Y99.8      
      OTHER EXTERNAL CAUSE STATUS                     

 

 10/01/2016            SIXTO BULL APRTAMIKO            Ot            Z79.4      
      LONG TERM (CURRENT) USE OF INSULIN                     

 

 10/01/2016            SIXTO BULL APRN            Ot            Z79.82     
       LONG TERM (CURRENT) USE OF ASPIRIN                     

 

 10/01/2016            SIXTO BULL            Ot            Z79.899    
        OTHER LONG TERM (CURRENT) DRUG THERAPY                     

 

 10/01/2016            SHASHI DO, ABISAI K            Ot            E11.9          
  TYPE 2 DIABETES MELLITUS WITHOUT COMPLIC                     

 

 10/01/2016            SHASHI DO ABISAI K            Ot            E66.01         
   MORBID (SEVERE) OBESITY DUE TO EXCESS CA                     

 

 10/01/2016            SHASHI DO, ABISAI K            Ot            I10            
ESSENTIAL (PRIMARY) HYPERTENSION                     

 

 10/01/2016            SHASHI DO ABISAI K            Ot            I51.7          
  CARDIOMEGALY                     

 

 10/01/2016            SHASHI DO ABISAI K            Ot            R07.89         
   OTHER CHEST PAIN                     

 

 10/01/2016            SHASHI DO ABISAI K            Ot            Z79.4          
  LONG TERM (CURRENT) USE OF INSULIN                     

 

 10/01/2016            SHASHI DO ABISAI K            Ot            Z79.82         
   LONG TERM (CURRENT) USE OF ASPIRIN                     

 

 10/01/2016            SHASHI DO ABISAI K            Ot            Z79.899        
    OTHER LONG TERM (CURRENT) DRUG THERAPY                     

 

 10/01/2016            SHASHI DO ABISAI K            Ot            Z95.5          
  PRESENCE OF CORONARY ANGIOPLASTY IMPLANT                     

 

 10/24/2016            ROSALBA PHILLIPS            Ot            D50.9          
  IRON DEFICIENCY ANEMIA, UNSPECIFIED                     

 

 10/24/2016            ROSALBA PHILLIPS            Ot            D69.3          
  IMMUNE THROMBOCYTOPENIC PURPURA                     

 

 10/24/2016            ROSALBA PHILLIPS            Ot            E11.9          
  TYPE 2 DIABETES MELLITUS WITHOUT COMPLIC                     

 

 10/24/2016            ROSALBA PHILLIPS            Ot            E66.01         
   MORBID (SEVERE) OBESITY DUE TO EXCESS CA                     

 

 10/24/2016            ROSALBA PHILLIPS            Ot            I25.10         
   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                      

 

 10/24/2016            ALANROSALBA N            Ot            M87.9          
  OSTEONECROSIS, UNSPECIFIED                     

 

 10/24/2016            ALANROSALBA N            Ot            Z68.42         
   BODY MASS INDEX (BMI) 45.0-49.9, ADULT                     

 

 10/24/2016            ALANROSALBA N            Ot            Z79.899        
    OTHER LONG TERM (CURRENT) DRUG THERAPY                     

 

 10/27/2016            ALAN BOBAN N            Ot            D50.9          
  IRON DEFICIENCY ANEMIA, UNSPECIFIED                     

 

 10/27/2016            ALAN, BOBAN N            Ot            D69.3          
  IMMUNE THROMBOCYTOPENIC PURPURA                     

 

 10/27/2016            ALAN, BOBAN N            Ot            E11.9          
  TYPE 2 DIABETES MELLITUS WITHOUT COMPLIC                     

 

 10/27/2016            ALAN, BOBAN N            Ot            E66.01         
   MORBID (SEVERE) OBESITY DUE TO EXCESS CA                     

 

 10/27/2016            ALAN, BOBAN N            Ot            I25.10         
   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                      

 

 10/27/2016            ALANROSALBA N            Ot            M87.9          
  OSTEONECROSIS, UNSPECIFIED                     

 

 10/27/2016            ALANROSALBA N            Ot            Z68.42         
   BODY MASS INDEX (BMI) 45.0-49.9, ADULT                     

 

 10/27/2016            ALAN BOBAN N            Ot            Z79.899        
    OTHER LONG TERM (CURRENT) DRUG THERAPY                     

 

 10/30/2016            ALAN, BOBAN N            Ot            D50.9          
  IRON DEFICIENCY ANEMIA, UNSPECIFIED                     

 

 10/30/2016            ALAN, BOBAN N            Ot            D69.3          
  IMMUNE THROMBOCYTOPENIC PURPURA                     

 

 10/30/2016            ALAN, BOBAN N            Ot            E11.9          
  TYPE 2 DIABETES MELLITUS WITHOUT COMPLIC                     

 

 10/30/2016            ALAN BOBAN N            Ot            E66.01         
   MORBID (SEVERE) OBESITY DUE TO EXCESS CA                     

 

 10/30/2016            ALAN BOBAN N            Ot            I25.10         
   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                      

 

 10/30/2016            ALANIVETTAN N            Ot            M87.9          
  OSTEONECROSIS, UNSPECIFIED                     

 

 10/30/2016            ALAN BOBAN N            Ot            Z68.42         
   BODY MASS INDEX (BMI) 45.0-49.9, ADULT                     

 

 10/30/2016            ALAN BOBAN N            Ot            Z79.899        
    OTHER LONG TERM (CURRENT) DRUG THERAPY                     

 

 2016            ALAN, BOBAN N            Ot            D50.9          
  IRON DEFICIENCY ANEMIA, UNSPECIFIED                     

 

 2016            ALAN, BOBAN N            Ot            D69.3          
  IMMUNE THROMBOCYTOPENIC PURPURA                     

 

 2016            ROSALBA PHILLIPS N            Ot            E11.9          
  TYPE 2 DIABETES MELLITUS WITHOUT COMPLIC                     

 

 2016            ROSALBA PHILLIPS N            Ot            E66.01         
   MORBID (SEVERE) OBESITY DUE TO EXCESS CA                     

 

 2016            ROSALBA PHILLIPS N            Ot            I25.10         
   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                      

 

 2016            ROSALBA PHILLIPS N            Ot            M87.9          
  OSTEONECROSIS, UNSPECIFIED                     

 

 2016            ROSALBA PHILLIPS N            Ot            Z68.42         
   BODY MASS INDEX (BMI) 45.0-49.9, ADULT                     

 

 2016            ROSALBA PHILLIPS N            Ot            Z79.899        
    OTHER LONG TERM (CURRENT) DRUG THERAPY                     

 

 01/10/2017            ROSALBA PHILLIPS N            Ot            D50.9          
  IRON DEFICIENCY ANEMIA, UNSPECIFIED                     

 

 01/10/2017            ROSALBA PHILLIPS N            Ot            D69.3          
  IMMUNE THROMBOCYTOPENIC PURPURA                     

 

 01/10/2017            ROSALBA PHILLIPS N            Ot            E11.9          
  TYPE 2 DIABETES MELLITUS WITHOUT COMPLIC                     

 

 01/10/2017            ROSALBA PHILLIPS N            Ot            E66.01         
   MORBID (SEVERE) OBESITY DUE TO EXCESS CA                     

 

 01/10/2017            ROSALBA PHILLIPS N            Ot            I25.10         
   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                      

 

 01/10/2017            ROSALBA PHILLIPS N            Ot            M87.9          
  OSTEONECROSIS, UNSPECIFIED                     

 

 01/10/2017            ROSALBA PHILLIPS N            Ot            Z68.42         
   BODY MASS INDEX (BMI) 45.0-49.9, ADULT                     

 

 01/10/2017            ROSALBA PHILLIPS N            Ot            Z79.899        
    OTHER LONG TERM (CURRENT) DRUG THERAPY                     

 

 2017            CHEPE GRAHAM MD, Ot            E11.9      
      TYPE 2 DIABETES MELLITUS WITHOUT COMPLIC                     

 

 2017            CHEPE GRAHAM MD, Ot            H61.21     
       IMPACTED CERUMEN, RIGHT EAR                     

 

 2017            CHEPE GRAHAM MD, Ot            I10        
    ESSENTIAL (PRIMARY) HYPERTENSION                     

 

 2017            CHEPE GRAHAM MD, Ot            J06.9      
      ACUTE UPPER RESPIRATORY INFECTION, UNSPE                     

 

 2017            CHEPE GRAHAM MD, Ot            R05        
    COUGH                     

 

 2017            CHEPE GRAHAM MD, Ot            Z79.4      
      LONG TERM (CURRENT) USE OF INSULIN                     

 

 2017            CHEPE GRAHAM MD, Ot            Z79.82     
       LONG TERM (CURRENT) USE OF ASPIRIN                     

 

 2017            CHEPE GRAHAM MD            Ot            Z79.899    
        OTHER LONG TERM (CURRENT) DRUG THERAPY                     

 

 2017            CHEPE GRAHAM MD            Ot            Z95.5      
      PRESENCE OF CORONARY ANGIOPLASTY IMPLANT                     

 

 2017            CHEPE GRAHAM MD            Ot            E11.9      
      TYPE 2 DIABETES MELLITUS WITHOUT COMPLIC                     

 

 2017            CHEPE GRAHAM MD            Ot            H61.21     
       IMPACTED CERUMEN, RIGHT EAR                     

 

 2017            CHEPE GRAHAM MD            Ot            I10        
    ESSENTIAL (PRIMARY) HYPERTENSION                     

 

 2017            CHEPE GRAHAM MD            Ot            J06.9      
      ACUTE UPPER RESPIRATORY INFECTION, UNSPE                     

 

 2017            CHEPE GRAHAM MD            Ot            R05        
    COUGH                     

 

 2017            CHEPE GRAHAM MD            Ot            Z79.4      
      LONG TERM (CURRENT) USE OF INSULIN                     

 

 2017            CHEPE GRAHAM MD            Ot            Z79.82     
       LONG TERM (CURRENT) USE OF ASPIRIN                     

 

 2017            CHEPE GRAHAM MD            Ot            Z79.899    
        OTHER LONG TERM (CURRENT) DRUG THERAPY                     

 

 2017            CHEPE GRAHAM MD            Ot            Z95.5      
      PRESENCE OF CORONARY ANGIOPLASTY IMPLANT                     

 

 2017                         Ot            457.0            POSTMASTECT 
LYMPHEDEMA                     

 

 2017                         Ot            682.6            CELLULITIS 
OF LEG                     

 

 2017                         Ot            V58.69            OTH MED,LT,
CURRENT USE                     

 

 2017                         Ot            V58.89            OTHER 
SPECIFIED AFTERCARE                     

 

 2017                         Ot            280.9            IRON DEFIC 
ANEMIA NOS                     

 

 2017                         Ot            457.0            POSTMASTECT 
LYMPHEDEMA                     

 

 2017                         Ot            682.6            CELLULITIS 
OF LEG                     

 

 2017                         Ot            V58.69            OTH MED,LT,
CURRENT USE                     

 

 2017                         Ot            V58.89            OTHER 
SPECIFIED AFTERCARE                     

 

 2017                         Ot            280.9            IRON DEFIC 
ANEMIA NOS                     

 

 2017            CHEPE GRAHAM MD            Ot            E11.9      
      TYPE 2 DIABETES MELLITUS WITHOUT COMPLIC                     

 

 2017            CHEPE GRAHAM MD            Ot            H61.21     
       IMPACTED CERUMEN, RIGHT EAR                     

 

 2017            CHEPE GRAHAM MD            Ot            I10        
    ESSENTIAL (PRIMARY) HYPERTENSION                     

 

 2017            CHEPE GRAHAM MD            Ot            J06.9      
      ACUTE UPPER RESPIRATORY INFECTION, UNSPE                     

 

 2017            CHEPE GRAHAM MD            Ot            R05        
    COUGH                     

 

 2017            CHEPE GRAHAM MD, Ot            Z79.4      
      LONG TERM (CURRENT) USE OF INSULIN                     

 

 2017            CHEPE GRAHAM MD            Ot            Z79.82     
       LONG TERM (CURRENT) USE OF ASPIRIN                     

 

 2017            CHEPE GRAHAM MD, Ot            Z79.899    
        OTHER LONG TERM (CURRENT) DRUG THERAPY                     

 

 2017            CHEPE GRAHAM MD, Ot            Z95.5      
      PRESENCE OF CORONARY ANGIOPLASTY IMPLANT                     

 

 2017            CHEPE GRAHAM MD            Ot            E11.9      
      TYPE 2 DIABETES MELLITUS WITHOUT COMPLIC                     

 

 2017            CHEPE GRAHAM MD, Ot            E66.01     
       MORBID (SEVERE) OBESITY DUE TO EXCESS CA                     

 

 2017            CHEPE GRAHAM MD            Ot            E78.00     
       PURE HYPERCHOLESTEROLEMIA, UNSPECIFIED                     

 

 2017            CHEPE GRAHAM MD            Ot            G47.33     
       OBSTRUCTIVE SLEEP APNEA (ADULT) (PEDIATR                     

 

 2017            CHEPE GRAHAM MD            Ot            I10        
    ESSENTIAL (PRIMARY) HYPERTENSION                     

 

 2017            CHEPE GRAHAM MD, Ot            I25.10     
       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                      

 

 2017            CHEPE GRAHAM MD, Ot            J18.9      
      PNEUMONIA, UNSPECIFIED ORGANISM                     

 

 2017            CHEPE GRAHAM MD            Ot            J45.909    
        UNSPECIFIED ASTHMA, UNCOMPLICATED                     

 

 2017            CHEPE GRAHAM MD            Ot            K21.9      
      GASTRO-ESOPHAGEAL REFLUX DISEASE WITHOUT                     

 

 2017            CHEPE GRAHAM MD            Ot            K75.81     
       NONALCOHOLIC STEATOHEPATITIS (LEONE)                     

 

 2017            CHEPE GRAHAM MD            Ot            M06.9      
      RHEUMATOID ARTHRITIS, UNSPECIFIED                     

 

 2017            CHEPE GRAHAM MD            Ot            M19.91     
       PRIMARY OSTEOARTHRITIS, UNSPECIFIED SITE                     

 

 2017            CHEPE GRAHAM MD            Ot            N20.0      
      CALCULUS OF KIDNEY                     

 

 2017            CHEPE GRAHAM MD            Ot            R05        
    COUGH                     

 

 2017            CHEPE GRAHAM MD            Ot            R06.02     
       SHORTNESS OF BREATH                     

 

 2017            CHEPE GRAHAM MD            Ot            R50.9      
      FEVER, UNSPECIFIED                     

 

 2017            CHEPE GRAHAM MD            Ot            Z68.42     
       BODY MASS INDEX (BMI) 45.0-49.9, ADULT                     

 

 2017            CHEPE GRAHAM MD            Ot            Z79.4      
      LONG TERM (CURRENT) USE OF INSULIN                     

 

 2017            CHEPE GRAHAM MD            Ot            Z87.891    
        PERSONAL HISTORY OF NICOTINE DEPENDENCE                     

 

 2017            CHEPE GRAHAM MD            Ot            Z95.5      
      PRESENCE OF CORONARY ANGIOPLASTY IMPLANT                     

 

 2017            ROSALBA PHILLIPS            Ot            D50.9          
  IRON DEFICIENCY ANEMIA, UNSPECIFIED                     

 

 2017            ALANROSALBA            Ot            D69.3          
  IMMUNE THROMBOCYTOPENIC PURPURA                     

 

 2017            ALANROSALBA            Ot            E11.9          
  TYPE 2 DIABETES MELLITUS WITHOUT COMPLIC                     

 

 2017            ROSALBA PHILLIPS            Ot            E66.01         
   MORBID (SEVERE) OBESITY DUE TO EXCESS CA                     

 

 2017            ROSALBA PHILLIPS            Ot            I25.10         
   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                      

 

 2017            ALANROSALBA            Ot            M87.9          
  OSTEONECROSIS, UNSPECIFIED                     

 

 2017            ALANROSALBA            Ot            Z68.42         
   BODY MASS INDEX (BMI) 45.0-49.9, ADULT                     

 

 2017            ALANROSALBA N            Ot            Z79.899        
    OTHER LONG TERM (CURRENT) DRUG THERAPY                     

 

 2017            ROSALBA PHILLIPS            Ot            D50.9          
  IRON DEFICIENCY ANEMIA, UNSPECIFIED                     

 

 2017            ALANROSALBA N            Ot            D69.3          
  IMMUNE THROMBOCYTOPENIC PURPURA                     

 

 2017            ALANROSALBA N            Ot            E11.9          
  TYPE 2 DIABETES MELLITUS WITHOUT COMPLIC                     

 

 2017            ROSALBA PHILLIPS N            Ot            E66.01         
   MORBID (SEVERE) OBESITY DUE TO EXCESS CA                     

 

 2017            ROSALBA PHILLIPS N            Ot            I25.10         
   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                      

 

 2017            ALANROSALBA            Ot            M87.9          
  OSTEONECROSIS, UNSPECIFIED                     

 

 2017            ALANROSALBA            Ot            Z68.42         
   BODY MASS INDEX (BMI) 45.0-49.9, ADULT                     

 

 2017            ALANROSALBA N            Ot            Z79.899        
    OTHER LONG TERM (CURRENT) DRUG THERAPY                     

 

 2017            ROSALBA PHILLIPS            Ot            D50.9          
  IRON DEFICIENCY ANEMIA, UNSPECIFIED                     

 

 2017            ALANROSALBA N            Ot            D69.3          
  IMMUNE THROMBOCYTOPENIC PURPURA                     

 

 2017            ALANROSALBA N            Ot            E11.9          
  TYPE 2 DIABETES MELLITUS WITHOUT COMPLIC                     

 

 2017            ALAN ROSALBA N            Ot            E66.01         
   MORBID (SEVERE) OBESITY DUE TO EXCESS CA                     

 

 2017            ALAN IVETTAN N            Ot            I25.10         
   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                      

 

 2017            ALAN ROSALBA MORRISON            Ot            M87.9          
  OSTEONECROSIS, UNSPECIFIED                     

 

 2017            ALAN ROSALBA N            Ot            Z68.42         
   BODY MASS INDEX (BMI) 45.0-49.9, ADULT                     

 

 2017            ALAN, IVETTLUCY N            Ot            Z79.899        
    OTHER LONG TERM (CURRENT) DRUG THERAPY                     

 

 04/15/2017            ALANROSALBA N            Ot            D50.9          
  IRON DEFICIENCY ANEMIA, UNSPECIFIED                     

 

 04/15/2017            ALAN ROSALBA N            Ot            D69.3          
  IMMUNE THROMBOCYTOPENIC PURPURA                     

 

 04/15/2017            ALANROSALBA N            Ot            E11.9          
  TYPE 2 DIABETES MELLITUS WITHOUT COMPLIC                     

 

 04/15/2017            ALANROSALBA N            Ot            E66.01         
   MORBID (SEVERE) OBESITY DUE TO EXCESS CA                     

 

 04/15/2017            ALAN ROSALBA N            Ot            I25.10         
   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                      

 

 04/15/2017            ALANROSALBA N            Ot            M87.9          
  OSTEONECROSIS, UNSPECIFIED                     

 

 04/15/2017            ALAN IVETTLUCY N            Ot            Z68.42         
   BODY MASS INDEX (BMI) 45.0-49.9, ADULT                     

 

 04/15/2017            ALAN, IVETTLUCY N            Ot            Z79.899        
    OTHER LONG TERM (CURRENT) DRUG THERAPY                     

 

 2017            ALANROSALBA N            Ot            D50.9          
  IRON DEFICIENCY ANEMIA, UNSPECIFIED                     

 

 2017            ALAN ROSALBA N            Ot            D69.3          
  IMMUNE THROMBOCYTOPENIC PURPURA                     

 

 2017            ALAN ROSALBA N            Ot            E11.9          
  TYPE 2 DIABETES MELLITUS WITHOUT COMPLIC                     

 

 2017            ALANROSALBA N            Ot            E66.01         
   MORBID (SEVERE) OBESITY DUE TO EXCESS CA                     

 

 2017            ALAN IVETTLUCY N            Ot            I25.10         
   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                      

 

 2017            ALANROSALBA N            Ot            M87.9          
  OSTEONECROSIS, UNSPECIFIED                     

 

 2017            ALANROSALBA N            Ot            Z68.42         
   BODY MASS INDEX (BMI) 45.0-49.9, ADULT                     

 

 2017            ALANIVETTAN N            Ot            Z79.899        
    OTHER LONG TERM (CURRENT) DRUG THERAPY                     

 

 2017            ALAN IVETTAN N            Ot            D50.9          
  IRON DEFICIENCY ANEMIA, UNSPECIFIED                     

 

 2017            ALAN, IVETTAN N            Ot            D69.3          
  IMMUNE THROMBOCYTOPENIC PURPURA                     

 

 2017            ALAN, ROSALBA N            Ot            E11.9          
  TYPE 2 DIABETES MELLITUS WITHOUT COMPLIC                     

 

 2017            ALAN IVETTAN N            Ot            E66.01         
   MORBID (SEVERE) OBESITY DUE TO EXCESS CA                     

 

 2017            ALAN, BOBAN N            Ot            I25.10         
   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                      

 

 2017            ALANROSALBA N            Ot            M87.9          
  OSTEONECROSIS, UNSPECIFIED                     

 

 2017            ALAN IVETTLUCY N            Ot            Z68.42         
   BODY MASS INDEX (BMI) 45.0-49.9, ADULT                     

 

 2017            ALAN IVETTLUCY N            Ot            Z79.899        
    OTHER LONG TERM (CURRENT) DRUG THERAPY                     

 

 2017            ALAN, BOBAN N            Ot            D50.9          
  IRON DEFICIENCY ANEMIA, UNSPECIFIED                     

 

 2017            ALAN, BOBAN N            Ot            D69.3          
  IMMUNE THROMBOCYTOPENIC PURPURA                     

 

 2017            ALAN, BOBAN N            Ot            E11.9          
  TYPE 2 DIABETES MELLITUS WITHOUT COMPLIC                     

 

 2017            ALAN BOBAN N            Ot            E66.01         
   MORBID (SEVERE) OBESITY DUE TO EXCESS CA                     

 

 2017            ALANROSALBA N            Ot            I25.10         
   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                      

 

 2017            ALANROSALBA N            Ot            M87.9          
  OSTEONECROSIS, UNSPECIFIED                     

 

 2017            ALAN IVETTLUCY N            Ot            Z68.42         
   BODY MASS INDEX (BMI) 45.0-49.9, ADULT                     

 

 2017            ALAN, BOBAN N            Ot            Z79.899        
    OTHER LONG TERM (CURRENT) DRUG THERAPY                     

 

 2017            ALAN, BOBAN N            Ot            D50.9          
  IRON DEFICIENCY ANEMIA, UNSPECIFIED                     

 

 2017            ALAN, BOBAN N            Ot            D69.3          
  IMMUNE THROMBOCYTOPENIC PURPURA                     

 

 2017            ALAN, IVETTAN N            Ot            E11.9          
  TYPE 2 DIABETES MELLITUS WITHOUT COMPLIC                     

 

 2017            ALANROSALBA N            Ot            E66.01         
   MORBID (SEVERE) OBESITY DUE TO EXCESS CA                     

 

 2017            ALAN IVETTAN N            Ot            I25.10         
   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                      

 

 2017            ALANROSALBA N            Ot            M87.9          
  OSTEONECROSIS, UNSPECIFIED                     

 

 2017            ALANROSALBA N            Ot            Z68.42         
   BODY MASS INDEX (BMI) 45.0-49.9, ADULT                     

 

 2017            ALAN IVETTLUCY N            Ot            Z79.899        
    OTHER LONG TERM (CURRENT) DRUG THERAPY                     

 

 2017            ALAN, IVETTAN N            Ot            D50.9          
  IRON DEFICIENCY ANEMIA, UNSPECIFIED                     

 

 2017            ALAN, BOBAN N            Ot            D69.3          
  IMMUNE THROMBOCYTOPENIC PURPURA                     

 

 2017            ALAN, BOBAN N            Ot            E11.9          
  TYPE 2 DIABETES MELLITUS WITHOUT COMPLIC                     

 

 2017            ALAN, BOBAN N            Ot            E66.01         
   MORBID (SEVERE) OBESITY DUE TO EXCESS CA                     

 

 2017            ALAN BOBLUCY N            Ot            I25.10         
   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                      

 

 2017            ALANROSALBA N            Ot            M87.9          
  OSTEONECROSIS, UNSPECIFIED                     

 

 2017            ALAN BOBAN N            Ot            Z68.42         
   BODY MASS INDEX (BMI) 45.0-49.9, ADULT                     

 

 2017            ALAN IVETTLUCY N            Ot            Z79.899        
    OTHER LONG TERM (CURRENT) DRUG THERAPY                     

 

 2017            ALAN IVETTAN N            Ot            D50.9          
  IRON DEFICIENCY ANEMIA, UNSPECIFIED                     

 

 2017            ALAN, IVETTAN N            Ot            D69.3          
  IMMUNE THROMBOCYTOPENIC PURPURA                     

 

 2017            ALAN, BOBAN N            Ot            E11.9          
  TYPE 2 DIABETES MELLITUS WITHOUT COMPLIC                     

 

 2017            ALAN, IVETTAN N            Ot            E66.01         
   MORBID (SEVERE) OBESITY DUE TO EXCESS CA                     

 

 2017            ALAN BOBAN N            Ot            I25.10         
   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                      

 

 2017            ALAN BOBAN N            Ot            M87.9          
  OSTEONECROSIS, UNSPECIFIED                     

 

 2017            ALAN IVETTAN N            Ot            Z68.42         
   BODY MASS INDEX (BMI) 45.0-49.9, ADULT                     

 

 2017            ALAN IVETTAN N            Ot            Z79.899        
    OTHER LONG TERM (CURRENT) DRUG THERAPY                     

 

 2017            ALANIVETTAN N            Ot            D50.9          
  IRON DEFICIENCY ANEMIA, UNSPECIFIED                     

 

 2017            ALAN, IVETTAN N            Ot            D69.3          
  IMMUNE THROMBOCYTOPENIC PURPURA                     

 

 2017            ALAN, BOBAN N            Ot            E11.9          
  TYPE 2 DIABETES MELLITUS WITHOUT COMPLIC                     

 

 2017            ALANROSALBA N            Ot            E66.01         
   MORBID (SEVERE) OBESITY DUE TO EXCESS CA                     

 

 2017            ALAN, BOBAN N            Ot            I25.10         
   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                      

 

 2017            ALANROSALBA N            Ot            M87.9          
  OSTEONECROSIS, UNSPECIFIED                     

 

 2017            ALANROSALBA N            Ot            Z68.42         
   BODY MASS INDEX (BMI) 45.0-49.9, ADULT                     

 

 2017            ALANROSALBA N            Ot            Z79.899        
    OTHER LONG TERM (CURRENT) DRUG THERAPY                     

 

 2017            ALAN ROSALBA N            Ot            D50.9          
  IRON DEFICIENCY ANEMIA, UNSPECIFIED                     

 

 2017            ALANROSALBA N            Ot            D69.3          
  IMMUNE THROMBOCYTOPENIC PURPURA                     

 

 2017            ALAN, BOBAN N            Ot            E11.9          
  TYPE 2 DIABETES MELLITUS WITHOUT COMPLIC                     

 

 2017            ALAN, BOBAN N            Ot            E66.01         
   MORBID (SEVERE) OBESITY DUE TO EXCESS CA                     

 

 2017            ALAN, BOBAN N            Ot            I25.10         
   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                      

 

 2017            ALANROSALBA N            Ot            M87.9          
  OSTEONECROSIS, UNSPECIFIED                     

 

 2017            ALANROSALBA N            Ot            Z68.42         
   BODY MASS INDEX (BMI) 45.0-49.9, ADULT                     

 

 2017            ALANIVETTAN N            Ot            Z79.899        
    OTHER LONG TERM (CURRENT) DRUG THERAPY                     

 

 10/24/2017            ALAN IVETTAN N            Ot            D50.9          
  IRON DEFICIENCY ANEMIA, UNSPECIFIED                     

 

 10/24/2017            ALAN, BOBAN N            Ot            D69.3          
  IMMUNE THROMBOCYTOPENIC PURPURA                     

 

 10/24/2017            ALAN, BOBAN N            Ot            E11.9          
  TYPE 2 DIABETES MELLITUS WITHOUT COMPLIC                     

 

 10/24/2017            ALAN IVETTAN N            Ot            E66.01         
   MORBID (SEVERE) OBESITY DUE TO EXCESS CA                     

 

 10/24/2017            ALAN BOBAN N            Ot            I25.10         
   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                      

 

 10/24/2017            ALANROSALBA N            Ot            M87.9          
  OSTEONECROSIS, UNSPECIFIED                     

 

 10/24/2017            ALANROSALBA N            Ot            Z68.42         
   BODY MASS INDEX (BMI) 45.0-49.9, ADULT                     

 

 10/24/2017            ALANROSALBA VILLALOBOS N            Ot            Z79.899        
    OTHER LONG TERM (CURRENT) DRUG THERAPY                     

 

 2017            ALANROSALBA N            Ot            D50.9          
  IRON DEFICIENCY ANEMIA, UNSPECIFIED                     

 

 2017            ALAN, BOBAN N            Ot            D69.3          
  IMMUNE THROMBOCYTOPENIC PURPURA                     

 

 2017            ALAN, BOBAN N            Ot            E11.9          
  TYPE 2 DIABETES MELLITUS WITHOUT COMPLIC                     

 

 2017            ALAN, BOBAN N            Ot            E66.01         
   MORBID (SEVERE) OBESITY DUE TO EXCESS CA                     

 

 2017            ALAN BOBAN N            Ot            I25.10         
   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                      

 

 2017            ALANROSALBA VILLALOBOS N            Ot            M87.9          
  OSTEONECROSIS, UNSPECIFIED                     

 

 2017            ALANROSALBA N            Ot            Z68.42         
   BODY MASS INDEX (BMI) 45.0-49.9, ADULT                     

 

 2017            ALANROSALBA N            Ot            Z79.899        
    OTHER LONG TERM (CURRENT) DRUG THERAPY                     

 

 2017            ALANROSALBA N            Ot            D50.9          
  IRON DEFICIENCY ANEMIA, UNSPECIFIED                     

 

 2017            ALAN BOBLUCY N            Ot            D69.3          
  IMMUNE THROMBOCYTOPENIC PURPURA                     

 

 2017            ALAN, BOBLUCY N            Ot            E11.9          
  TYPE 2 DIABETES MELLITUS WITHOUT COMPLIC                     

 

 2017            ALAN IVETTLUCY N            Ot            E66.01         
   MORBID (SEVERE) OBESITY DUE TO EXCESS CA                     

 

 2017            ALANROSALBA N            Ot            I25.10         
   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                      

 

 2017            ALANROSALBA N            Ot            M87.9          
  OSTEONECROSIS, UNSPECIFIED                     

 

 2017            ALANROSALBA N            Ot            Z68.42         
   BODY MASS INDEX (BMI) 45.0-49.9, ADULT                     

 

 2017            ALANROSALBA N            Ot            Z79.899        
    OTHER LONG TERM (CURRENT) DRUG THERAPY                     

 

 2018            ALAN IVETTAN N            Ot            D50.9          
  IRON DEFICIENCY ANEMIA, UNSPECIFIED                     

 

 2018            ROSALBA PHILLIPS            Ot            D69.3          
  IMMUNE THROMBOCYTOPENIC PURPURA                     

 

 2018            ROSALBA PHILLIPS            Ot            E11.9          
  TYPE 2 DIABETES MELLITUS WITHOUT COMPLIC                     

 

 2018            ROSALBA PHILLIPS            Ot            E66.01         
   MORBID (SEVERE) OBESITY DUE TO EXCESS CA                     

 

 2018            ROSALBA PHILLIPS N            Ot            I25.10         
   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                      

 

 2018            ROSALBA PHILLIPS            Ot            M87.9          
  OSTEONECROSIS, UNSPECIFIED                     

 

 2018            ROSALBA PHILLIPS            Ot            Z68.42         
   BODY MASS INDEX (BMI) 45.0-49.9, ADULT                     

 

 2018            ROSALBA PHILLIPS            Ot            Z79.899        
    OTHER LONG TERM (CURRENT) DRUG THERAPY                     

 

 2018                         Ot            280.9            IRON DEFIC 
ANEMIA NOS                     

 

 2018            BRYCE MIRELES MD            Ot            B37.2        
    CANDIDIASIS OF SKIN AND NAIL                     

 

 2018            BRYCE MIRELES MD            Ot            D50.9        
    IRON DEFICIENCY ANEMIA, UNSPECIFIED                     

 

 2018            BRYCE MIRELES MD, Ot            E11.9        
    TYPE 2 DIABETES MELLITUS WITHOUT COMPLIC                     

 

 2018            BRYCE MIRELES MD, Ot            E66.01       
     MORBID (SEVERE) OBESITY DUE TO EXCESS CA                     

 

 2018            BRYCE MIRELES MD            Ot            E78.00       
     PURE HYPERCHOLESTEROLEMIA, UNSPECIFIED                     

 

 2018            BRYCE MIRELES MD            Ot            E87.5        
    HYPERKALEMIA                     

 

 2018            BRYCE MIRELES MD            Ot            I11.9        
    HYPERTENSIVE HEART DISEASE WITHOUT HEART                     

 

 2018            BRYCE MIRELES MD, Ot            I25.10       
     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                      

 

 2018            BRYCE MIRELES MD            Ot            I50.9        
    HEART FAILURE, UNSPECIFIED                     

 

 2018            BRYCE MIRELES MD            Ot            J45.30       
     MILD PERSISTENT ASTHMA, UNCOMPLICATED                     

 

 2018            BRYCE MIRELES MD            Ot            K21.9        
    GASTRO-ESOPHAGEAL REFLUX DISEASE WITHOUT                     

 

 2018            BRYCE MIRELES MD            Ot            L03.311      
      CELLULITIS OF ABDOMINAL WALL                     

 

 2018            BRYCE MIRELES MD            Ot            L27.0        
    GEN SKIN ERUPTION DUE TO DRUGS AND MEDS                      

 

 2018            BRYCE MIRELES MD            Ot            L29.8        
    OTHER PRURITUS                     

 

 2018            BRYCE MIRELES MD, Ot            M06.9        
    RHEUMATOID ARTHRITIS, UNSPECIFIED                     

 

 2018            BRYCE MIRELES MD, Ot            Z68.39       
     BODY MASS INDEX (BMI) 39.0-39.9, ADULT                     

 

 2018            BRYCE MIRELES MD, Ot            Z79.4        
    LONG TERM (CURRENT) USE OF INSULIN                     

 

 2018            BRYCE MIRELES MD, Ot            Z87.891      
      PERSONAL HISTORY OF NICOTINE DEPENDENCE                     

 

 2018            BRYCE MIRELES MD, Ot            Z91.14       
     PATIENT'S OTHER NONCOMPLIANCE WITH MEDIC                     

 

 2018            BRYCE MIRELES MD, Ot            Z95.5        
    PRESENCE OF CORONARY ANGIOPLASTY IMPLANT                     

 

 2018            BRYCE MIRELES MD, Ot            Z99.3        
    DEPENDENCE ON WHEELCHAIR                     

 

 2018            LEANDER FREIRE APRTAMIKO            Ot            I51.7    
        CARDIOMEGALY                     

 

 2018            LEANDER FREIRE APRN            Ot            J20.9    
        ACUTE BRONCHITIS, UNSPECIFIED                     

 

 2018            MURPHY LONG DO            Ot            B37.2  
          CANDIDIASIS OF SKIN AND NAIL                     

 

 2018            MURPHY LONG DO, Ot            D50.9  
          IRON DEFICIENCY ANEMIA, UNSPECIFIED                     

 

 2018            MURPHY LONG DO            Ot            E11.9  
          TYPE 2 DIABETES MELLITUS WITHOUT COMPLIC                     

 

 2018            MURPHY LONG DO            Ot            E66.01 
           MORBID (SEVERE) OBESITY DUE TO EXCESS CA                     

 

 2018            MURPHY LONG DO            Ot            E78.00 
           PURE HYPERCHOLESTEROLEMIA, UNSPECIFIED                     

 

 2018            MURPHY LONG DO            Ot            I11.9  
          HYPERTENSIVE HEART DISEASE WITHOUT HEART                     

 

 2018            MURPHY LONG DO            Ot            I25.10 
           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                      

 

 2018            MURPHY LONG DO, Ot            I50.9  
          HEART FAILURE, UNSPECIFIED                     

 

 2018            MURPHY LONG DO            Ot            J45.30 
           MILD PERSISTENT ASTHMA, UNCOMPLICATED                     

 

 2018            MURPHY LONG DO            Ot            K21.9  
          GASTRO-ESOPHAGEAL REFLUX DISEASE WITHOUT                     

 

 2018            MURPHY LONG DO            Ot            L03.311
            CELLULITIS OF ABDOMINAL WALL                     

 

 2018            DAMONJANMURPHY RUTLEDGE DO            Ot            M06.9  
          RHEUMATOID ARTHRITIS, UNSPECIFIED                     

 

 2018            AUDREYJOSEFINAMURPHY RUTLEDGE DO            Ot            R53.1  
          WEAKNESS                     

 

 2018            DAMONJANMURPHY RUTLEDGE DO            Ot            Z68.39 
           BODY MASS INDEX (BMI) 39.0-39.9, ADULT                     

 

 2018            MURPHY LONG DO            Ot            Z79.4  
          LONG TERM (CURRENT) USE OF INSULIN                     

 

 2018            AUDREYJOSEFINAMURPHY RUTLEDGE DO            Ot            Z87.891
            PERSONAL HISTORY OF NICOTINE DEPENDENCE                     

 

 2018            MURPHY LONG DO            Ot            Z91.14 
           PATIENT'S OTHER NONCOMPLIANCE WITH MEDIC                     

 

 2018            MURPHY LONG DO            Ot            Z95.5  
          PRESENCE OF CORONARY ANGIOPLASTY IMPLANT                     

 

 2018            MURPHY LONG DO            Ot            Z99.3  
          DEPENDENCE ON WHEELCHAIR                     

 

 2018            LEANDER FREIRE APRN            Ot            I51.7    
        CARDIOMEGALY                     

 

 2018            LEANDER FREIRE APRN            Ot            J20.9    
        ACUTE BRONCHITIS, UNSPECIFIED                     

 

 2018            LEANDER RFEIRE APRN            Ot            I51.7    
        CARDIOMEGALY                     

 

 2018            LEANDER FREIRE            Ot            J20.9    
        ACUTE BRONCHITIS, UNSPECIFIED                     

 

 2018            ROSALBA PHILLIPS            Ot            D50.9          
  IRON DEFICIENCY ANEMIA, UNSPECIFIED                     

 

 2018            ROSALBA PHILLIPS            Ot            D69.3          
  IMMUNE THROMBOCYTOPENIC PURPURA                     

 

 2018            ROSALBA PHILLIPS            Ot            E11.9          
  TYPE 2 DIABETES MELLITUS WITHOUT COMPLIC                     

 

 2018            ROSALBA PHILLIPS            Ot            E66.01         
   MORBID (SEVERE) OBESITY DUE TO EXCESS CA                     

 

 2018            ROSALBA PHILLIPS            Ot            I25.10         
   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                      

 

 2018            ROSALBA PHILLIPS            Ot            M87.9          
  OSTEONECROSIS, UNSPECIFIED                     

 

 2018            ROSALBA PHILLIPS            Ot            Z68.42         
   BODY MASS INDEX (BMI) 45.0-49.9, ADULT                     

 

 2018            ROSALBA PHILLIPS            Ot            Z79.899        
    OTHER LONG TERM (CURRENT) DRUG THERAPY                     

 

 05/15/2018            ALAN, BOBAN N            Ot            D50.9          
  IRON DEFICIENCY ANEMIA, UNSPECIFIED                     

 

 05/15/2018            ALANROSALBA N            Ot            D69.3          
  IMMUNE THROMBOCYTOPENIC PURPURA                     

 

 05/15/2018            ALANROSALBA N            Ot            E11.9          
  TYPE 2 DIABETES MELLITUS WITHOUT COMPLIC                     

 

 05/15/2018            ALANROSALBA N            Ot            E66.01         
   MORBID (SEVERE) OBESITY DUE TO EXCESS CA                     

 

 05/15/2018            ALAN BOBAN N            Ot            I25.10         
   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                      

 

 05/15/2018            ALANROSALBA N            Ot            M87.9          
  OSTEONECROSIS, UNSPECIFIED                     

 

 05/15/2018            ALANROSALBA N            Ot            Z68.42         
   BODY MASS INDEX (BMI) 45.0-49.9, ADULT                     

 

 05/15/2018            ALANROSALBA VILLALOBOS N            Ot            Z79.899        
    OTHER LONG TERM (CURRENT) DRUG THERAPY                     

 

 2018            ALANROSALBA N            Ot            D50.9          
  IRON DEFICIENCY ANEMIA, UNSPECIFIED                     

 

 2018            ALANROSALBA N            Ot            D69.3          
  IMMUNE THROMBOCYTOPENIC PURPURA                     

 

 2018            ALAN, IVETTAN N            Ot            E11.9          
  TYPE 2 DIABETES MELLITUS WITHOUT COMPLIC                     

 

 2018            ALANROSALBA N            Ot            E66.01         
   MORBID (SEVERE) OBESITY DUE TO EXCESS CA                     

 

 2018            ALAN BOBLUCY N            Ot            I25.10         
   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                      

 

 2018            ROSALBA PHILLIPS N            Ot            M87.9          
  OSTEONECROSIS, UNSPECIFIED                     

 

 2018            ALANROSALBA N            Ot            Z68.42         
   BODY MASS INDEX (BMI) 45.0-49.9, ADULT                     

 

 2018            ALANROSALBA N            Ot            Z79.899        
    OTHER LONG TERM (CURRENT) DRUG THERAPY                     

 

 2018            ALANIVETTAN N            Ot            D50.9          
  IRON DEFICIENCY ANEMIA, UNSPECIFIED                     

 

 2018            ALANROSALBA N            Ot            D69.3          
  IMMUNE THROMBOCYTOPENIC PURPURA                     

 

 2018            ALAN, BOBAN N            Ot            E11.9          
  TYPE 2 DIABETES MELLITUS WITHOUT COMPLIC                     

 

 2018            ALAN BOBAN N            Ot            E66.01         
   MORBID (SEVERE) OBESITY DUE TO EXCESS CA                     

 

 2018            ALAN, BOBAN N            Ot            I25.10         
   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                      

 

 2018            ROSALBA PHILLIPS N            Ot            M87.9          
  OSTEONECROSIS, UNSPECIFIED                     

 

 2018            ALAN BOBAN N            Ot            Z68.42         
   BODY MASS INDEX (BMI) 45.0-49.9, ADULT                     

 

 2018            ALAN BOBAN N            Ot            Z79.899        
    OTHER LONG TERM (CURRENT) DRUG THERAPY                     

 

 2018            ALAN BOBAN N            Ot            D50.9          
  IRON DEFICIENCY ANEMIA, UNSPECIFIED                     

 

 2018            ALAN, BOBAN N            Ot            D69.3          
  IMMUNE THROMBOCYTOPENIC PURPURA                     

 

 2018            ALAN, BOBAN N            Ot            E11.9          
  TYPE 2 DIABETES MELLITUS WITHOUT COMPLIC                     

 

 2018            ALAN, BOBAN N            Ot            E66.01         
   MORBID (SEVERE) OBESITY DUE TO EXCESS CA                     

 

 2018            ALAN, BOBAN N            Ot            I25.10         
   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                      

 

 2018            ALANROSALBA VILLALOBOS N            Ot            M87.9          
  OSTEONECROSIS, UNSPECIFIED                     

 

 2018            ALAN BOBLUCY N            Ot            Z68.42         
   BODY MASS INDEX (BMI) 45.0-49.9, ADULT                     

 

 2018            ALAN, BOBAN N            Ot            Z79.899        
    OTHER LONG TERM (CURRENT) DRUG THERAPY                     

 

 2018            ALAN BOBAN N            Ot            D50.9          
  IRON DEFICIENCY ANEMIA, UNSPECIFIED                     

 

 2018            ALAN, BOBAN N            Ot            D69.3          
  IMMUNE THROMBOCYTOPENIC PURPURA                     

 

 2018            ALAN, BOBAN N            Ot            E11.9          
  TYPE 2 DIABETES MELLITUS WITHOUT COMPLIC                     

 

 2018            ALAN, BOBAN N            Ot            E66.01         
   MORBID (SEVERE) OBESITY DUE TO EXCESS CA                     

 

 2018            ALAN, BOBAN N            Ot            I25.10         
   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                      

 

 2018            ALAN BOBAN N            Ot            M87.9          
  OSTEONECROSIS, UNSPECIFIED                     

 

 2018            ALAN BOBAN N            Ot            Z68.42         
   BODY MASS INDEX (BMI) 45.0-49.9, ADULT                     

 

 2018            ALAN, BOBAN N            Ot            Z79.899        
    OTHER LONG TERM (CURRENT) DRUG THERAPY                     

 

 2018            ABAD HANSON, LACY SAINI            Ot            E11.9 
           TYPE 2 DIABETES MELLITUS WITHOUT COMPLIC                     

 

 2018            LACY MELGOZA MD            Ot            E66.01
            MORBID (SEVERE) OBESITY DUE TO EXCESS CA                     

 

 2018            LACY MELGOZA MD, Ot            E78.00
            PURE HYPERCHOLESTEROLEMIA, UNSPECIFIED                     

 

 2018            LACY MELGOZA MD, Ot            F32.9 
           MAJOR DEPRESSIVE DISORDER, SINGLE EPISOD                     

 

 2018            LACY MELGOZA MD, Ot            F41.9 
           ANXIETY DISORDER, UNSPECIFIED                     

 

 2018            LACY MELGOZA MD, Ot            I10   
         ESSENTIAL (PRIMARY) HYPERTENSION                     

 

 2018            LACY MELGOZA MD, Ot            I25.10
            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                      

 

 2018            LACY MELGOZA MD, Ot            
J45.909            UNSPECIFIED ASTHMA, UNCOMPLICATED                     

 

 2018            LACY MELGOZA MD, Ot            K21.9 
           GASTRO-ESOPHAGEAL REFLUX DISEASE WITHOUT                     

 

 2018            LACY MELGOZA MD, Ot            M06.9 
           RHEUMATOID ARTHRITIS, UNSPECIFIED                     

 

 2018            LACY MELGOZA MD, Ot            
M25.551            PAIN IN RIGHT HIP                     

 

 2018            LACY MELGOZA MD, Ot            R53.1 
           WEAKNESS                     

 

 2018            LACY MELGOZA MD, Ot            Z79.4 
           LONG TERM (CURRENT) USE OF INSULIN                     

 

 2018            LACY MELGOZA MD, Ot            Z79.82
            LONG TERM (CURRENT) USE OF ASPIRIN                     

 

 2018            LACY MELGOZA MD, Ot            Z86.2 
           PRSNL HISTORY OF DIS OF THE BLD/BLD-FORM                     

 

 2018            LACY MELGOZA MD, Ot            
Z87.442            PERSONAL HISTORY OF URINARY CALCULI                     

 

 2018            LACY MELGOZA MD, Ot            
Z87.891            PERSONAL HISTORY OF NICOTINE DEPENDENCE                     

 

 2018            LACY MELGOZA MD, Ot            Z88.1 
           ALLERGY STATUS TO OTHER ANTIBIOTIC AGENT                     

 

 2018            LACY MELGOZA MD, Ot            Z88.8 
           ALLERGY STATUS TO OTH DRUG/MEDS/BIOL SUB                     

 

 2018            LACY MELGOZA MD, Ot            Z90.49
            ACQUIRED ABSENCE OF OTHER SPECIFIED PART                     

 

 2018            LACY MELGOZA MD, Ot            Z95.5 
           PRESENCE OF CORONARY ANGIOPLASTY IMPLANT                     

 

 2018            LACY MELGOZA MD            Ot            E11.9 
           TYPE 2 DIABETES MELLITUS WITHOUT COMPLIC                     

 

 2018            LACY MELGOZA MD, Ot            E66.01
            MORBID (SEVERE) OBESITY DUE TO EXCESS CA                     

 

 2018            LACY MELGOZA MD, Ot            E78.00
            PURE HYPERCHOLESTEROLEMIA, UNSPECIFIED                     

 

 2018            LACY MELGOZA MD, Ot            F32.9 
           MAJOR DEPRESSIVE DISORDER, SINGLE EPISOD                     

 

 2018            LACY MELGOZA MD, Ot            F41.9 
           ANXIETY DISORDER, UNSPECIFIED                     

 

 2018            LACY MELGOZA MD, Ot            I10   
         ESSENTIAL (PRIMARY) HYPERTENSION                     

 

 2018            LACY MELGOZA MD, Ot            I25.10
            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                      

 

 2018            LACY MELGOZA MD, Ot            
J45.909            UNSPECIFIED ASTHMA, UNCOMPLICATED                     

 

 2018            LACY MELGOZA MD, Ot            K21.9 
           GASTRO-ESOPHAGEAL REFLUX DISEASE WITHOUT                     

 

 2018            LACY MELGOZA MD, Ot            M06.9 
           RHEUMATOID ARTHRITIS, UNSPECIFIED                     

 

 2018            LACY MELGOZA MD, Ot            
M25.551            PAIN IN RIGHT HIP                     

 

 2018            LACY MELGOZA MD, Ot            R53.1 
           WEAKNESS                     

 

 2018            LACY MELGOZA MD, Ot            Z79.4 
           LONG TERM (CURRENT) USE OF INSULIN                     

 

 2018            LACY MELGOZA MD, Ot            Z79.82
            LONG TERM (CURRENT) USE OF ASPIRIN                     

 

 2018            LACY MELGOZA MD, Ot            Z86.2 
           PRSNL HISTORY OF DIS OF THE BLD/BLD-FORM                     

 

 2018            LACY MELGOZA MD, Ot            
Z87.442            PERSONAL HISTORY OF URINARY CALCULI                     

 

 2018            LACY MELGOZA MD, Ot            
Z87.891            PERSONAL HISTORY OF NICOTINE DEPENDENCE                     

 

 2018            LACY MELGOZA MD, Ot            Z88.1 
           ALLERGY STATUS TO OTHER ANTIBIOTIC AGENT                     

 

 2018            BRUEGGEMANN MD, LACY SAINI Ot            Z88.8 
           ALLERGY STATUS TO OTH DRUG/MEDS/BIOL SUB                     

 

 2018            ABAD HANSON, LACY SAINI Ot            Z90.49
            ACQUIRED ABSENCE OF OTHER SPECIFIED PART                     

 

 2018            ABAD HANSON, LACY SAINI Ot            Z95.5 
           PRESENCE OF CORONARY ANGIOPLASTY IMPLANT                     

 

 2018            ROSALBA PHILLIPS            Ot            D50.9          
  IRON DEFICIENCY ANEMIA, UNSPECIFIED                     

 

 2018            ALANROSALBA            Ot            D69.3          
  IMMUNE THROMBOCYTOPENIC PURPURA                     

 

 2018            ALANROSALBA N            Ot            E11.9          
  TYPE 2 DIABETES MELLITUS WITHOUT COMPLIC                     

 

 2018            ROSALBA PHILLIPS N            Ot            E66.01         
   MORBID (SEVERE) OBESITY DUE TO EXCESS CA                     

 

 2018            ROSALBA PHILLIPS            Ot            I25.10         
   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                      

 

 2018            ALANROSALBA            Ot            M87.9          
  OSTEONECROSIS, UNSPECIFIED                     

 

 2018            ALANROSALBA N            Ot            Z68.42         
   BODY MASS INDEX (BMI) 45.0-49.9, ADULT                     

 

 2018            ALANROSALBA N            Ot            Z79.899        
    OTHER LONG TERM (CURRENT) DRUG THERAPY                     

 

 2018            ALANROSALBA VILLALOBOS            Ot            D50.9          
  IRON DEFICIENCY ANEMIA, UNSPECIFIED                     

 

 2018            ALANROSALBA N            Ot            D69.3          
  IMMUNE THROMBOCYTOPENIC PURPURA                     

 

 2018            ALANROSALBA N            Ot            E11.9          
  TYPE 2 DIABETES MELLITUS WITHOUT COMPLIC                     

 

 2018            ALANROSALBA N            Ot            E66.01         
   MORBID (SEVERE) OBESITY DUE TO EXCESS CA                     

 

 2018            ALANROSALBA N            Ot            I25.10         
   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                      

 

 2018            ALANROSALBA N            Ot            M87.9          
  OSTEONECROSIS, UNSPECIFIED                     

 

 2018            ALANROSALBA N            Ot            Z68.42         
   BODY MASS INDEX (BMI) 45.0-49.9, ADULT                     

 

 2018            ALANROSALBA N            Ot            Z79.899        
    OTHER LONG TERM (CURRENT) DRUG THERAPY                     



                                                                               
                                                                               
                                                                               
                                                                               
                                                                               
                                                                               
                                                                               
                                                                               
                                                                               
                                                                               
                                                                               
                                                                               
                                                                               
                                                                               
                                                                               
                                                                               
                                                                               
                                                                               
                                                                               
                                                                               
                                                                               
                                                                               
                                                                               
                                                                               
                                                                               
                                                                               
        



Procedures

      





 Code            Description            Performed By            Performed On   
     

 

             00.40                                                             
        2013        

 

             00.46                                                             
        2013        

 

             00.66                                                             
        2013        

 

             36.07                                                             
        2013        

 

             37.22                                                             
        2013        

 

             88.53                                                             
        2013        

 

             88.56                                                             
        2013        

 

             38.93                                                             
        2015        

 

             00.66                                                             
        2015        

 

             37.22                                                             
        2015        

 

             88.56                                                             
        2015        



                                      



Results

      





 Test            Result            Range        









 Complete blood count (CBC) with automated white blood cell (WBC) differential 
- 17 16:55         









 Blood leukocytes automated count (number/volume)            5.7 10*3/uL       
     4.3-11.0        

 

 Blood erythrocytes automated count (number/volume)            3.80 10*6/uL    
        4.35-5.85        

 

 Venous blood hemoglobin measurement (mass/volume)            11.0 g/dL        
    11.5-16.0        

 

 Blood hematocrit (volume fraction)            35 %            35-52        

 

 Automated erythrocyte mean corpuscular volume            91 [foz_us]          
  80-99        

 

 Automated erythrocyte mean corpuscular hemoglobin (mass per erythrocyte)      
      29 pg            25-34        

 

 Automated erythrocyte mean corpuscular hemoglobin concentration measurement (
mass/volume)            32 g/dL            32-36        

 

 Automated erythrocyte distribution width ratio            18.6 %            
10.0-14.5        

 

 Automated blood platelet count (count/volume)            154 10*3/uL          
  130-400        

 

 Automated blood platelet mean volume measurement            11.0 [foz_us]     
       7.4-10.4        

 

 Automated blood neutrophils/100 leukocytes            71 %            42-75   
     

 

 Automated blood lymphocytes/100 leukocytes            19 %            12-44   
     

 

 Blood monocytes/100 leukocytes            8 %            0-12        

 

 Automated blood eosinophils/100 leukocytes            2 %            0-10     
   

 

 Automated blood basophils/100 leukocytes            0 %            0-10        

 

 Blood neutrophils automated count (number/volume)            4.1 10*3         
   1.8-7.8        

 

 Blood lymphocytes automated count (number/volume)            1.1 10*3         
   1.0-4.0        

 

 Blood monocytes automated count (number/volume)            0.4 10*3            
0.0-1.0        

 

 Automated eosinophil count            0.1 10*3/uL            0.0-0.3        

 

 Automated blood basophil count (count/volume)            0.0 10*3/uL          
  0.0-0.1        









 Influenza virus A and B antigen detection - 17 16:55         









 FLU RESULT            NEGATIVE FOR INFLUENZA A AND B ANTIGENS BY [facility]        









 Complete blood count (CBC) with automated white blood cell (WBC) differential 
- 17 13:35         









 Blood leukocytes automated count (number/volume)            10.7 10*3/uL      
      4.3-11.0        

 

 Blood erythrocytes automated count (number/volume)            3.87 10*6/uL    
        4.35-5.85        

 

 Venous blood hemoglobin measurement (mass/volume)            11.1 g/dL        
    11.5-16.0        

 

 Blood hematocrit (volume fraction)            35 %            35-52        

 

 Automated erythrocyte mean corpuscular volume            90 [foz_us]          
  80-99        

 

 Automated erythrocyte mean corpuscular hemoglobin (mass per erythrocyte)      
      29 pg            25-34        

 

 Automated erythrocyte mean corpuscular hemoglobin concentration measurement (
mass/volume)            32 g/dL            32-36        

 

 Automated erythrocyte distribution width ratio            18.4 %            
10.0-14.5        

 

 Automated blood platelet count (count/volume)            185 10*3/uL          
  130-400        

 

 Automated blood platelet mean volume measurement            11.3 [foz_us]     
       7.4-10.4        

 

 Automated blood neutrophils/100 leukocytes            82 %            42-75   
     

 

 Automated blood lymphocytes/100 leukocytes            10 %            12-44   
     

 

 Blood monocytes/100 leukocytes            7 %            0-12        

 

 Automated blood eosinophils/100 leukocytes            0 %            0-10     
   

 

 Automated blood basophils/100 leukocytes            0 %            0-10        

 

 Blood neutrophils automated count (number/volume)            8.8 10*3         
   1.8-7.8        

 

 Blood lymphocytes automated count (number/volume)            1.1 10*3         
   1.0-4.0        

 

 Blood monocytes automated count (number/volume)            0.8 10*3            
0.0-1.0        

 

 Automated eosinophil count            0.0 10*3/uL            0.0-0.3        

 

 Automated blood basophil count (count/volume)            0.0 10*3/uL          
  0.0-0.1        









 Bacterial blood culture - 17 13:35         









 Bacterial blood culture            NG             NR        









 Capillary blood glucose measurement by glucometer (mass/volume) - 17 13:
48         









 Capillary blood glucose measurement by glucometer (mass/volume)            198 
mg/dL                    









 Blood lactic acid measurement (moles/volume) - 17 14:19         









 Blood lactic acid measurement (moles/volume)            1.8 mmol/L            
0.5-2.0        









 PT panel in platelet poor plasma by coagulation assay - 17 14:19         









 Prothrombin time (PT) in platelet poor plasma by coagulation assay            
15.0 s            12.2-14.7        

 

 INR in platelet poor plasma or blood by coagulation assay            1.2      
       0.8-1.4        









 Comprehensive metabolic panel - 17 14:19         









 Serum or plasma sodium measurement (moles/volume)            132 mmol/L       
     135-145        

 

 Serum or plasma potassium measurement (moles/volume)            4.8 mmol/L    
        3.6-5.0        

 

 Serum or plasma chloride measurement (moles/volume)            100 mmol/L     
               

 

 Carbon dioxide            22 mmol/L            21-32        

 

 Serum or plasma anion gap determination (moles/volume)            10 mmol/L   
         5-14        

 

 Serum or plasma urea nitrogen measurement (mass/volume)            11 mg/dL   
         7-18        

 

 Serum or plasma creatinine measurement (mass/volume)            0.76 mg/dL    
        0.60-1.30        

 

 Serum or plasma urea nitrogen/creatinine mass ratio            14             
NRG        

 

 Serum or plasma creatinine measurement with calculation of estimated 
glomerular filtration rate            >             NRG        

 

 Serum or plasma glucose measurement (mass/volume)            180 mg/dL        
            

 

 Serum or plasma calcium measurement (mass/volume)            8.3 mg/dL        
    8.5-10.1        

 

 Serum or plasma total bilirubin measurement (mass/volume)            1.1 mg/dL
            0.1-1.0        

 

 Serum or plasma alkaline phosphatase measurement (enzymatic activity/volume)  
          138 U/L                    

 

 Serum or plasma aspartate aminotransferase measurement (enzymatic activity/
volume)            19 U/L            5-34        

 

 Serum or plasma alanine aminotransferase measurement (enzymatic activity/volume
)            17 U/L            0-55        

 

 Serum or plasma protein measurement (mass/volume)            6.3 g/dL         
   6.4-8.2        

 

 Serum or plasma albumin measurement (mass/volume)            3.5 g/dL         
   3.2-4.5        









 Bacterial blood culture - 17 14:19         









 Bacterial blood culture                         NRG        









 Complete urinalysis with reflex to culture - 17 15:05         









 Urine color determination            YELLOW             NRG        

 

 Urine clarity determination            SLIGHTLY CLOUDY             NRG        

 

 Urine pH measurement by test strip            6             5-9        

 

 Specific gravity of urine by test strip            1.010             1.016-
1.022        

 

 Urine protein assay by test strip, semi-quantitative            3+             
NEGATIVE        

 

 Urine glucose detection by automated test strip            1+             
NEGATIVE        

 

 Erythrocytes detection in urine sediment by light microscopy            1+    
         NEGATIVE        

 

 Urine ketones detection by automated test strip            NEGATIVE           
  NEGATIVE        

 

 Urine nitrite detection by test strip            NEGATIVE             NEGATIVE
        

 

 Urine total bilirubin detection by test strip            NEGATIVE             
NEGATIVE        

 

 Urine urobilinogen measurement by automated test strip (mass/volume)          
  NORMAL             NORMAL        

 

 Urine leukocyte esterase detection by dipstick            NEGATIVE             
NEGATIVE        

 

 Automated urine sediment erythrocyte count by microscopy (number/high power 
field)             [HPF]            NRG        

 

 Automated urine sediment leukocyte count by microscopy (number/high power field
)             [HPF]            NRG        

 

 Bacteria detection in urine sediment by light microscopy            TRACE     
        NRG        

 

 Squamous epithelial cells detection in urine sediment by light microscopy     
       2-5             NRG        

 

 Crystals detection in urine sediment by light microscopy            NONE      
       NRG        

 

 Casts detection in urine sediment by light microscopy            NONE         
    NRG        

 

 Mucus detection in urine sediment by light microscopy            NEGATIVE     
        NRG        

 

 Complete urinalysis with reflex to culture            NO             NRG      
  









 Arterial blood gas measurement - 17 16:06         









 Blood pCO2            42 mm[Hg]            35-45        

 

 Blood pO2            77 mm[Hg]            79-93        

 

 Arterial blood bicarbonate measurement (moles/volume)            25 mmol/L    
        23-27        

 

 Arterial blood base excess by calculation            0.4 mmol/L            -2.5
-2.5        

 

 Arterial blood oxygen saturation measurement            96 %            
        

 

 * Inhaled oxygen flow rate            2L             NRG        

 

 Arterial blood pH measurement with patient temperature correction            
7.40             7.37-7.43        

 

 Arterial blood carbon dioxide, total measurement (moles/volume)            
26.1 mmol/L            21.0-31.0        

 

 Body site            RT RAD             NRG        

 

 Assessment of wrist artery patency prior to arterial puncture            YES-
POS             NRG        

 

 Setting of ventilation mode            NO             NRG        

 

 Measurement of body temperature            101             NRG        









 Complete blood count (CBC) with automated white blood cell (WBC) differential 
- 17 09:04         









 Blood leukocytes automated count (number/volume)            11.2 10*3/uL      
      4.3-11.0        

 

 Blood erythrocytes automated count (number/volume)            3.71 10*6/uL    
        4.35-5.85        

 

 Venous blood hemoglobin measurement (mass/volume)            10.6 g/dL        
    11.5-16.0        

 

 Blood hematocrit (volume fraction)            34 %            35-52        

 

 Automated erythrocyte mean corpuscular volume            91 [foz_us]          
  80-99        

 

 Automated erythrocyte mean corpuscular hemoglobin (mass per erythrocyte)      
      29 pg            25-34        

 

 Automated erythrocyte mean corpuscular hemoglobin concentration measurement (
mass/volume)            32 g/dL            32-36        

 

 Automated erythrocyte distribution width ratio            18.7 %            
10.0-14.5        

 

 Automated blood platelet count (count/volume)            179 10*3/uL          
  130-400        

 

 Automated blood platelet mean volume measurement            10.9 [foz_us]     
       7.4-10.4        

 

 Automated blood neutrophils/100 leukocytes            79 %            42-75   
     

 

 Automated blood lymphocytes/100 leukocytes            12 %            12-44   
     

 

 Blood monocytes/100 leukocytes            9 %            0-12        

 

 Automated blood eosinophils/100 leukocytes            0 %            0-10     
   

 

 Automated blood basophils/100 leukocytes            0 %            0-10        

 

 Blood neutrophils automated count (number/volume)            8.8 10*3         
   1.8-7.8        

 

 Blood lymphocytes automated count (number/volume)            1.3 10*3         
   1.0-4.0        

 

 Blood monocytes automated count (number/volume)            1.0 10*3            
0.0-1.0        

 

 Automated eosinophil count            0.0 10*3/uL            0.0-0.3        

 

 Automated blood basophil count (count/volume)            0.0 10*3/uL          
  0.0-0.1        









 Comprehensive metabolic panel - 17 09:04         









 Serum or plasma sodium measurement (moles/volume)            132 mmol/L       
     135-145        

 

 Serum or plasma potassium measurement (moles/volume)            4.7 mmol/L    
        3.6-5.0        

 

 Serum or plasma chloride measurement (moles/volume)            99 mmol/L      
              

 

 Carbon dioxide            25 mmol/L            21-32        

 

 Serum or plasma anion gap determination (moles/volume)            8 mmol/L    
        5-14        

 

 Serum or plasma urea nitrogen measurement (mass/volume)            17 mg/dL   
         7-18        

 

 Serum or plasma creatinine measurement (mass/volume)            1.11 mg/dL    
        0.60-1.30        

 

 Serum or plasma urea nitrogen/creatinine mass ratio            15             
NRG        

 

 Serum or plasma creatinine measurement with calculation of estimated 
glomerular filtration rate            49             NRG        

 

 Serum or plasma glucose measurement (mass/volume)            154 mg/dL        
            

 

 Serum or plasma calcium measurement (mass/volume)            8.3 mg/dL        
    8.5-10.1        

 

 Serum or plasma total bilirubin measurement (mass/volume)            1.1 mg/dL
            0.1-1.0        

 

 Serum or plasma alkaline phosphatase measurement (enzymatic activity/volume)  
          125 U/L                    

 

 Serum or plasma aspartate aminotransferase measurement (enzymatic activity/
volume)            14 U/L            5-34        

 

 Serum or plasma alanine aminotransferase measurement (enzymatic activity/volume
)            15 U/L            0-55        

 

 Serum or plasma protein measurement (mass/volume)            6.3 g/dL         
   6.4-8.2        

 

 Serum or plasma albumin measurement (mass/volume)            3.4 g/dL         
   3.2-4.5        









 Capillary blood glucose measurement by glucometer (mass/volume) - 17 16:
22         









 Capillary blood glucose measurement by glucometer (mass/volume)            155 
mg/dL                    









 Capillary blood glucose measurement by glucometer (mass/volume) - 17 20:
39         









 Capillary blood glucose measurement by glucometer (mass/volume)            166 
mg/dL                    









 Capillary blood glucose measurement by glucometer (mass/volume) - 17 05:
59         









 Capillary blood glucose measurement by glucometer (mass/volume)            76 
mg/dL                    









 Capillary blood glucose measurement by glucometer (mass/volume) - 17 11:
08         









 Capillary blood glucose measurement by glucometer (mass/volume)            136 
mg/dL                    









 MICROALBUMIN/CREATININE RATIO, URINE - 17 10:40         









 CREATININE, RANDOM URINE            87 mg/dL                    

 

 MICROALBUMIN            27.2 mg/dL            See Note:        

 

 MICROALBUMIN/CREATININE RATIO, RANDOM URINE            313 mcg/mg creat       
     <30        









 DIFFERENTIAL, MANUAL - 18 14:01         









 ABSOLUTE NEUTROPHILS            3752 cells/uL            6620-7192        

 

 ABSOLUTE MONOCYTES            280 cells/uL            200-950        

 

 ABSOLUTE EOSINOPHILS            280 cells/uL                    

 

 ABSOLUTE BASOPHILS            0 cells/uL            0-200        

 

 NEUTROPHILS            67 %            NRG        

 

 LYMPHOCYTES            23 %            NRG        

 

 MONOCYTES            5 %            NRG        

 

 EOSINOPHILS            5 %            NRG        

 

 BASOPHILS            0 %            NRG        

 

 ABSOLUTE LYMPHOCYTES            1288 cells/uL            850-3900        

 

 PLATELET ESTIMATION            ADEQUATE             ADEQUATE        

 

 CBC MORPHOLOGY                         NORMAL        

 

 NOTE                         NRG        









 Complete blood count (CBC) with automated white blood cell (WBC) differential 
- 18 21:44         









 Blood leukocytes automated count (number/volume)            7.5 10*3/uL       
     4.3-11.0        

 

 Blood erythrocytes automated count (number/volume)            2.66 10*6/uL    
        4.35-5.85        

 

 Venous blood hemoglobin measurement (mass/volume)            6.8 g/dL         
   11.5-16.0        

 

 Blood hematocrit (volume fraction)            24 %            35-52        

 

 Automated erythrocyte mean corpuscular volume            89 [foz_us]          
  80-99        

 

 Automated erythrocyte mean corpuscular hemoglobin (mass per erythrocyte)      
      26 pg            25-34        

 

 Automated erythrocyte mean corpuscular hemoglobin concentration measurement (
mass/volume)            29 g/dL            32-36        

 

 Automated erythrocyte distribution width ratio            16.5 %            
10.0-14.5        

 

 Automated blood platelet count (count/volume)            203 10*3/uL          
  130-400        

 

 Automated blood platelet mean volume measurement            11.1 [foz_us]     
       7.4-10.4        

 

 Automated blood neutrophils/100 leukocytes            72 %            42-75   
     

 

 Automated blood lymphocytes/100 leukocytes            16 %            12-44   
     

 

 Blood monocytes/100 leukocytes            8 %            0-12        

 

 Automated blood eosinophils/100 leukocytes            3 %            0-10     
   

 

 Automated blood basophils/100 leukocytes            0 %            0-10        

 

 Blood neutrophils automated count (number/volume)            5.4 10*3         
   1.8-7.8        

 

 Blood lymphocytes automated count (number/volume)            1.2 10*3         
   1.0-4.0        

 

 Blood monocytes automated count (number/volume)            0.6 10*3            
0.0-1.0        

 

 Automated eosinophil count            0.2 10*3/uL            0.0-0.3        

 

 Automated blood basophil count (count/volume)            0.0 10*3/uL          
  0.0-0.1        









 PT panel in platelet poor plasma by coagulation assay - 18 21:44         









 Prothrombin time (PT) in platelet poor plasma by coagulation assay            
15.4 s            12.2-14.7        

 

 INR in platelet poor plasma or blood by coagulation assay            1.2      
       0.8-1.4        









 Activated partial thromboplastin time (aPTT) in platelet poor plasma 
bycoagulation assay - 18 21:44         









 Activated partial thromboplastin time (aPTT) in platelet poor plasma 
bycoagulation assay            32 s            24-35        









 Blood lactic acid measurement (moles/volume) - 18 21:44         









 Blood lactic acid measurement (moles/volume)            4.48 mmol/L            
0.50-2.00        









 Comprehensive metabolic panel - 18 21:44         









 Serum or plasma sodium measurement (moles/volume)            140 mmol/L       
     135-145        

 

 Serum or plasma potassium measurement (moles/volume)            5.4 mmol/L    
        3.6-5.0        

 

 Serum or plasma chloride measurement (moles/volume)            109 mmol/L     
               

 

 Carbon dioxide            20 mmol/L            21-32        

 

 Serum or plasma anion gap determination (moles/volume)            11 mmol/L   
         5-14        

 

 Serum or plasma urea nitrogen measurement (mass/volume)            36 mg/dL   
         7-18        

 

 Serum or plasma creatinine measurement (mass/volume)            1.18 mg/dL    
        0.60-1.30        

 

 Serum or plasma urea nitrogen/creatinine mass ratio            31             
NRG        

 

 Serum or plasma creatinine measurement with calculation of estimated 
glomerular filtration rate            45             NRG        

 

 Serum or plasma glucose measurement (mass/volume)            63 mg/dL         
           

 

 Serum or plasma calcium measurement (mass/volume)            8.6 mg/dL        
    8.5-10.1        

 

 Serum or plasma total bilirubin measurement (mass/volume)            0.5 mg/dL
            0.1-1.0        

 

 Serum or plasma alkaline phosphatase measurement (enzymatic activity/volume)  
          140 U/L                    

 

 Serum or plasma aspartate aminotransferase measurement (enzymatic activity/
volume)            28 U/L            5-34        

 

 Serum or plasma alanine aminotransferase measurement (enzymatic activity/volume
)            17 U/L            0-55        

 

 Serum or plasma protein measurement (mass/volume)            5.8 g/dL         
   6.4-8.2        

 

 Serum or plasma albumin measurement (mass/volume)            3.1 g/dL         
   3.2-4.5        









 Magnesium - 18 21:44         









 Magnesium            1.9 mg/dL            1.8-2.4        









 RED CELLS LEUKO REDUCED AS1 - 18 21:44         









 RED CELLS LEUKO REDUCED AS1                           TRANSFUSED     18 
0140               NRG        









 Blood type T Indirect antibody screen panel - 18 21:44         









 ABO+Rh group            OP             NRG        

 

 Transfusion band number            [id number]             NRG        

 

 Blood group antibody screen            NEGATIVE             NRG        









 Bacterial blood culture - 18 21:44         









 Bacterial blood culture            NG             NRG        









 Bacterial blood culture - 18 22:06         









 Bacterial blood culture            NG             NRG        









 Gram stain microscopy - 18 22:27         









 GRAM STAIN RESULT            MODERATE # GRAM POSITIVE RODS             NRG    
    









 Bacteria identification in isolate by anaerobe culture - 18 22:27        
 









 QUANTITY OF GROWTH            Scant Growth             NRG        

 

 Bacteria identification in isolate by anaerobe culture            [id number]   
          NRG        









 Bacteria identification in wound by culture - 18 22:27         









 Bacteria identification in wound by culture            [id number]             
NRG        

 

 FREE TEXT EXTERNAL            MORE THAN ONE COLONY TYPE, PLEASE             
NRG        

 

 QUANTITY OF GROWTH            Abundant Growth             NR        

 

 FREE TEXT ENTRY 2            NOTIFY MICRO IF FURTHER WORK-UP NEEDED.          
   NRG        









 Bacterial susceptibility panel - 18 22:27         









 Gentamicin susceptibility test by minimum inhibitory concentration            <
=            NRG        

 

 Trimethoprim/sulfamethoxazole susceptibility test by minimum 
inhibitoryconcentration            S             NRG        

 

 Ampicillin susceptibility test by minimum inhibitory concentration            <
=            NRG        

 

 Tobramycin susceptibility test by minimum inhibitory concentration            <
=            NRG        

 

 Cefazolin susceptibility test by minimum inhibitory concentration            <
=            NRG        

 

 Ceftriaxone susceptibility test by minimum inhibitory concentration            
<=            NRG        

 

 Ampicillin/sulbactam susceptibility test by minimum inhibitory concentration  
          S             NRG        

 

 Piperacillin/tazobactam susceptibility test by minimum inhibitory 
concentration            S             NRG        

 

 Ciprofloxacin susceptibility test by minimum inhibitory concentration         
   <=            NRG        

 

 Meropenem susceptibility test by minimum inhibitory concentration            <
=            NRG        

 

 Aztreonam susceptibility test by minimum inhibitory concentration            <
=            NRG        









 Serum or plasma lactate measurement (moles/volume) - 18 23:36         









 Serum or plasma lactate measurement (moles/volume)            3.66 mmol/L     
       0.50-2.00        









 Serum iron and total iron binding capacity panel - 18 23:36         









 Serum or plasma iron measurement (mass/volume)            77 %            35-
180        

 

 Total iron binding capacity and transferrin saturation measurement            
22 %            15-50        

 

 Iron binding capacity [mass/volume] in serum or plasma            351 %       
     280-380        

 

 UIBC (unsaturated iron binding capacity)            274 %               
     

 

 Serum or plasma ferritin measurement (mass/volume)            390.0 %         
   15.0-150.0        









 Complete urinalysis with reflex to culture - 18 03:13         









 Urine color determination            YELLOW             NRG        

 

 Urine clarity determination            SLIGHTLY CLOUDY             NRG        

 

 Urine pH measurement by test strip            5             5-9        

 

 Specific gravity of urine by test strip            1.020             1.016-
1.022        

 

 Urine protein assay by test strip, semi-quantitative            2+             
NEGATIVE        

 

 Urine glucose detection by automated test strip            NEGATIVE           
  NEGATIVE        

 

 Erythrocytes detection in urine sediment by light microscopy            4+    
         NEGATIVE        

 

 Urine ketones detection by automated test strip            NEGATIVE           
  NEGATIVE        

 

 Urine nitrite detection by test strip            NEGATIVE             NEGATIVE
        

 

 Urine total bilirubin detection by test strip            NEGATIVE             
NEGATIVE        

 

 Urine urobilinogen measurement by automated test strip (mass/volume)          
  NORMAL             NORMAL        

 

 Urine leukocyte esterase detection by dipstick            3+             
NEGATIVE        

 

 Automated urine sediment erythrocyte count by microscopy (number/high power 
field)             [HPF]            NRG        

 

 Automated urine sediment leukocyte count by microscopy (number/high power field
)             [HPF]            NRG        

 

 Bacteria detection in urine sediment by light microscopy            FEW       
      NRG        

 

 Squamous epithelial cells detection in urine sediment by light microscopy     
       5-10             NRG        

 

 Crystals detection in urine sediment by light microscopy            NONE      
       NRG        

 

 Casts detection in urine sediment by light microscopy            PRESENT      
       NRG        

 

 Mucus detection in urine sediment by light microscopy            NEGATIVE     
        NRG        

 

 Complete urinalysis with reflex to culture            YES             NRG     
   

 

 Hyaline casts detection in urine sediment by light microscopy            2-5  
           NRG        









 Bacterial urine culture - 18 03:13         









 URINE CULTURE RESULTS            <10,000/ML             NRG        









 Capillary blood glucose measurement by glucometer (mass/volume) - 18 06:
09         









 Capillary blood glucose measurement by glucometer (mass/volume)            158 
mg/dL                    









 Complete blood count (CBC) with automated white blood cell (WBC) differential 
- 18 10:34         









 Blood leukocytes automated count (number/volume)            6.5 10*3/uL       
     4.3-11.0        

 

 Blood erythrocytes automated count (number/volume)            3.11 10*6/uL    
        4.35-5.85        

 

 Venous blood hemoglobin measurement (mass/volume)            8.3 g/dL         
   11.5-16.0        

 

 Blood hematocrit (volume fraction)            28 %            35-52        

 

 Automated erythrocyte mean corpuscular volume            89 [foz_us]          
  80-99        

 

 Automated erythrocyte mean corpuscular hemoglobin (mass per erythrocyte)      
      27 pg            25-34        

 

 Automated erythrocyte mean corpuscular hemoglobin concentration measurement (
mass/volume)            30 g/dL            32-36        

 

 Automated erythrocyte distribution width ratio            16.5 %            
10.0-14.5        

 

 Automated blood platelet count (count/volume)            193 10*3/uL          
  130-400        

 

 Automated blood platelet mean volume measurement            11.2 [foz_us]     
       7.4-10.4        

 

 Automated blood neutrophils/100 leukocytes            93 %            42-75   
     

 

 Automated blood lymphocytes/100 leukocytes            4 %            12-44    
    

 

 Blood monocytes/100 leukocytes            3 %            0-12        

 

 Automated blood eosinophils/100 leukocytes            0 %            0-10     
   

 

 Automated blood basophils/100 leukocytes            0 %            0-10        

 

 Blood neutrophils automated count (number/volume)            6.1 10*3         
   1.8-7.8        

 

 Blood lymphocytes automated count (number/volume)            0.3 10*3         
   1.0-4.0        

 

 Blood monocytes automated count (number/volume)            0.2 10*3            
0.0-1.0        

 

 Automated eosinophil count            0.0 10*3/uL            0.0-0.3        

 

 Automated blood basophil count (count/volume)            0.0 10*3/uL          
  0.0-0.1        









 Blood lactic acid measurement (moles/volume) - 18 10:34         









 Blood lactic acid measurement (moles/volume)            3.14 mmol/L            
0.50-2.00        









 Comprehensive metabolic panel - 18 10:34         









 Serum or plasma sodium measurement (moles/volume)            135 mmol/L       
     135-145        

 

 Serum or plasma potassium measurement (moles/volume)            5.9 mmol/L    
        3.6-5.0        

 

 Serum or plasma chloride measurement (moles/volume)            106 mmol/L     
               

 

 Carbon dioxide            18 mmol/L            21-32        

 

 Serum or plasma anion gap determination (moles/volume)            11 mmol/L   
         5-14        

 

 Serum or plasma urea nitrogen measurement (mass/volume)            38 mg/dL   
         7-18        

 

 Serum or plasma creatinine measurement (mass/volume)            1.23 mg/dL    
        0.60-1.30        

 

 Serum or plasma urea nitrogen/creatinine mass ratio            31             
NRG        

 

 Serum or plasma creatinine measurement with calculation of estimated 
glomerular filtration rate            43             NRG        

 

 Serum or plasma glucose measurement (mass/volume)            285 mg/dL        
            

 

 Serum or plasma calcium measurement (mass/volume)            8.3 mg/dL        
    8.5-10.1        

 

 Serum or plasma total bilirubin measurement (mass/volume)            1.0 mg/dL
            0.1-1.0        

 

 Serum or plasma alkaline phosphatase measurement (enzymatic activity/volume)  
          139 U/L                    

 

 Serum or plasma aspartate aminotransferase measurement (enzymatic activity/
volume)            32 U/L            5-34        

 

 Serum or plasma alanine aminotransferase measurement (enzymatic activity/volume
)            21 U/L            0-55        

 

 Serum or plasma protein measurement (mass/volume)            5.9 g/dL         
   6.4-8.2        

 

 Serum or plasma albumin measurement (mass/volume)            3.2 g/dL         
   3.2-4.5        









 Blood manual differential performed detection - 18 10:34         









 Blood monocytes/100 leukocytes            3 %            NRG        

 

 Manual blood segmented neutrophils/100 leukocytes            89 %            
NRG        

 

 Blood band neutrophils/100 leukocytes            3 %            NRG        

 

 Manual blood lymphocytes/100 leukocytes            5 %            NRG        

 

 Manual eosinophils/100 leukocytes in nose            0 %            NRG        

 

 Manual blood basophils/100 leukocytes            0 %            NRG        

 

 Blood polychromasia detection by light microscopy            SLIGHT           
  NRG        

 

 Blood anisocytosis detection by light microscopy            SLIGHT             
NRG        

 

 Blood ovalocytes detection by light microscopy            SLIGHT             
NRG        

 

 Blood basophilic stippling detection by light microscopy            SLIGHT    
         NRG        









 Serum or plasma lactate measurement (moles/volume) - 18 12:45         









 Serum or plasma lactate measurement (moles/volume)            3.96 mmol/L     
       0.50-2.00        









 Whole blood basic metabolic panel - 18 14:53         









 Serum or plasma sodium measurement (moles/volume)            136 mmol/L       
     135-145        

 

 Serum or plasma potassium measurement (moles/volume)            5.3 mmol/L    
        3.6-5.0        

 

 Serum or plasma chloride measurement (moles/volume)            106 mmol/L     
               

 

 Carbon dioxide            19 mmol/L            21-32        

 

 Serum or plasma anion gap determination (moles/volume)            11 mmol/L   
         5-14        

 

 Serum or plasma urea nitrogen measurement (mass/volume)            36 mg/dL   
         7-18        

 

 Serum or plasma creatinine measurement (mass/volume)            1.16 mg/dL    
        0.60-1.30        

 

 Serum or plasma urea nitrogen/creatinine mass ratio            31             
NRG        

 

 Serum or plasma creatinine measurement with calculation of estimated 
glomerular filtration rate            46             NRG        

 

 Serum or plasma glucose measurement (mass/volume)            222 mg/dL        
            

 

 Serum or plasma calcium measurement (mass/volume)            8.4 mg/dL        
    8.5-10.1        









 Blood lactic acid measurement (moles/volume) - 18 14:53         









 Blood lactic acid measurement (moles/volume)            3.14 mmol/L            
0.50-2.00        









 Hemoglobin A1c - 18 14:53         









 Blood hemoglobin A1C measurement (mass/volume)            6.5 %            4.0-
5.6        

 

 MEAN BLOOD GLUCOSE            140 %            <=126        









 Serum or plasma lactate measurement (moles/volume) - 18 17:07         









 Serum or plasma lactate measurement (moles/volume)            2.43 mmol/L     
       0.50-2.00        









 Capillary blood glucose measurement by glucometer (mass/volume) - 18 20:
17         









 Capillary blood glucose measurement by glucometer (mass/volume)            199 
mg/dL                    









 Complete blood count (CBC) with automated white blood cell (WBC) differential 
- 18 05:33         









 Blood leukocytes automated count (number/volume)            4.2 10*3/uL       
     4.3-11.0        

 

 Blood erythrocytes automated count (number/volume)            2.91 10*6/uL    
        4.35-5.85        

 

 Venous blood hemoglobin measurement (mass/volume)            7.8 g/dL         
   11.5-16.0        

 

 Blood hematocrit (volume fraction)            26 %            35-52        

 

 Automated erythrocyte mean corpuscular volume            90 [foz_us]          
  80-99        

 

 Automated erythrocyte mean corpuscular hemoglobin (mass per erythrocyte)      
      27 pg            25-34        

 

 Automated erythrocyte mean corpuscular hemoglobin concentration measurement (
mass/volume)            30 g/dL            32-36        

 

 Automated erythrocyte distribution width ratio            17.9 %            
10.0-14.5        

 

 Automated blood platelet count (count/volume)            174 10*3/uL          
  130-400        

 

 Automated blood platelet mean volume measurement            10.9 [foz_us]     
       7.4-10.4        

 

 Automated blood neutrophils/100 leukocytes            68 %            42-75   
     

 

 Automated blood lymphocytes/100 leukocytes            17 %            12-44   
     

 

 Blood monocytes/100 leukocytes            12 %            0-12        

 

 Automated blood eosinophils/100 leukocytes            3 %            0-10     
   

 

 Automated blood basophils/100 leukocytes            0 %            0-10        

 

 Blood neutrophils automated count (number/volume)            2.9 10*3         
   1.8-7.8        

 

 Blood lymphocytes automated count (number/volume)            0.7 10*3         
   1.0-4.0        

 

 Blood monocytes automated count (number/volume)            0.5 10*3            
0.0-1.0        

 

 Automated eosinophil count            0.1 10*3/uL            0.0-0.3        

 

 Automated blood basophil count (count/volume)            0.0 10*3/uL          
  0.0-0.1        









 Blood lactic acid measurement (moles/volume) - 18 05:33         









 Blood lactic acid measurement (moles/volume)            1.04 mmol/L            
0.50-2.00        









 Comprehensive metabolic panel - 18 05:33         









 Serum or plasma sodium measurement (moles/volume)            139 mmol/L       
     135-145        

 

 Serum or plasma potassium measurement (moles/volume)            5.1 mmol/L    
        3.6-5.0        

 

 Serum or plasma chloride measurement (moles/volume)            110 mmol/L     
               

 

 Carbon dioxide            22 mmol/L            21-32        

 

 Serum or plasma anion gap determination (moles/volume)            7 mmol/L    
        5-14        

 

 Serum or plasma urea nitrogen measurement (mass/volume)            40 mg/dL   
         7-18        

 

 Serum or plasma creatinine measurement (mass/volume)            1.18 mg/dL    
        0.60-1.30        

 

 Serum or plasma urea nitrogen/creatinine mass ratio            34             
NRG        

 

 Serum or plasma creatinine measurement with calculation of estimated 
glomerular filtration rate            45             NRG        

 

 Serum or plasma glucose measurement (mass/volume)            109 mg/dL        
            

 

 Serum or plasma calcium measurement (mass/volume)            8.2 mg/dL        
    8.5-10.1        

 

 Serum or plasma total bilirubin measurement (mass/volume)            0.6 mg/dL
            0.1-1.0        

 

 Serum or plasma alkaline phosphatase measurement (enzymatic activity/volume)  
          124 U/L                    

 

 Serum or plasma aspartate aminotransferase measurement (enzymatic activity/
volume)            37 U/L            5-34        

 

 Serum or plasma alanine aminotransferase measurement (enzymatic activity/volume
)            26 U/L            0-55        

 

 Serum or plasma protein measurement (mass/volume)            5.5 g/dL         
   6.4-8.2        

 

 Serum or plasma albumin measurement (mass/volume)            3.0 g/dL         
   3.2-4.5        









 Capillary blood glucose measurement by glucometer (mass/volume) - 18 20:
40         









 Capillary blood glucose measurement by glucometer (mass/volume)            215 
mg/dL                    









 Complete blood count (CBC) with automated white blood cell (WBC) differential 
- 18 06:18         









 Blood leukocytes automated count (number/volume)            3.8 10*3/uL       
     4.3-11.0        

 

 Blood erythrocytes automated count (number/volume)            2.88 10*6/uL    
        4.35-5.85        

 

 Venous blood hemoglobin measurement (mass/volume)            7.8 g/dL         
   11.5-16.0        

 

 Blood hematocrit (volume fraction)            26 %            35-52        

 

 Automated erythrocyte mean corpuscular volume            91 [foz_us]          
  80-99        

 

 Automated erythrocyte mean corpuscular hemoglobin (mass per erythrocyte)      
      27 pg            25-34        

 

 Automated erythrocyte mean corpuscular hemoglobin concentration measurement (
mass/volume)            30 g/dL            32-36        

 

 Automated erythrocyte distribution width ratio            19.0 %            
10.0-14.5        

 

 Automated blood platelet count (count/volume)            173 10*3/uL          
  130-400        

 

 Automated blood platelet mean volume measurement            11.2 [foz_us]     
       7.4-10.4        

 

 Automated blood neutrophils/100 leukocytes            57 %            42-75   
     

 

 Automated blood lymphocytes/100 leukocytes            24 %            12-44   
     

 

 Blood monocytes/100 leukocytes            13 %            0-12        

 

 Automated blood eosinophils/100 leukocytes            5 %            0-10     
   

 

 Automated blood basophils/100 leukocytes            1 %            0-10        

 

 Blood neutrophils automated count (number/volume)            2.2 10*3         
   1.8-7.8        

 

 Blood lymphocytes automated count (number/volume)            0.9 10*3         
   1.0-4.0        

 

 Blood monocytes automated count (number/volume)            0.5 10*3            
0.0-1.0        

 

 Automated eosinophil count            0.2 10*3/uL            0.0-0.3        

 

 Automated blood basophil count (count/volume)            0.0 10*3/uL          
  0.0-0.1        









 Whole blood basic metabolic panel - 18 06:18         









 Serum or plasma sodium measurement (moles/volume)            139 mmol/L       
     135-145        

 

 Serum or plasma potassium measurement (moles/volume)            5.1 mmol/L    
        3.6-5.0        

 

 Serum or plasma chloride measurement (moles/volume)            110 mmol/L     
               

 

 Carbon dioxide            22 mmol/L            21-32        

 

 Serum or plasma anion gap determination (moles/volume)            7 mmol/L    
        5-14        

 

 Serum or plasma urea nitrogen measurement (mass/volume)            39 mg/dL   
         7-18        

 

 Serum or plasma creatinine measurement (mass/volume)            1.25 mg/dL    
        0.60-1.30        

 

 Serum or plasma urea nitrogen/creatinine mass ratio            31             
NRG        

 

 Serum or plasma creatinine measurement with calculation of estimated 
glomerular filtration rate            42             NRG        

 

 Serum or plasma glucose measurement (mass/volume)            144 mg/dL        
            

 

 Serum or plasma calcium measurement (mass/volume)            8.1 mg/dL        
    8.5-10.1        









 Capillary blood glucose measurement by glucometer (mass/volume) - 18 20:
33         









 Capillary blood glucose measurement by glucometer (mass/volume)            192 
mg/dL                    









 Whole blood basic metabolic panel - 18 05:07         









 Serum or plasma sodium measurement (moles/volume)            139 mmol/L       
     135-145        

 

 Serum or plasma potassium measurement (moles/volume)            4.9 mmol/L    
        3.6-5.0        

 

 Serum or plasma chloride measurement (moles/volume)            110 mmol/L     
               

 

 Carbon dioxide            21 mmol/L            21-32        

 

 Serum or plasma anion gap determination (moles/volume)            8 mmol/L    
        5-14        

 

 Serum or plasma urea nitrogen measurement (mass/volume)            27 mg/dL   
         7-18        

 

 Serum or plasma creatinine measurement (mass/volume)            0.89 mg/dL    
        0.60-1.30        

 

 Serum or plasma urea nitrogen/creatinine mass ratio            30             
NRG        

 

 Serum or plasma creatinine measurement with calculation of estimated 
glomerular filtration rate            >             NRG        

 

 Serum or plasma glucose measurement (mass/volume)            164 mg/dL        
            

 

 Serum or plasma calcium measurement (mass/volume)            8.1 mg/dL        
    8.5-10.1        









 Capillary blood glucose measurement by glucometer (mass/volume) - 18 21:
04         









 Capillary blood glucose measurement by glucometer (mass/volume)            172 
mg/dL                    









 Capillary blood glucose measurement by glucometer (mass/volume) - 18 22:
17         









 Capillary blood glucose measurement by glucometer (mass/volume)            223 
mg/dL                    









 Capillary blood glucose measurement by glucometer (mass/volume) - 18 06:
00         









 Capillary blood glucose measurement by glucometer (mass/volume)            167 
mg/dL                    









 [facility] - 18 16:55         









 GLUCOSE            284 mg/dL            65-99        

 

 UREA NITROGEN (BUN)            18 mg/dL            7-25        

 

 CREATININE            0.91 mg/dL            0.60-0.93        

 

 eGFR NON-AFR. AMERICAN            63 mL/min/1.73m2            > OR=60        

 

 eGFR             74 mL/min/1.73m2            > OR=60        

 

 BUN/CREATININE RATIO            NOT APPLICABLE (calc)            6-22        

 

 SODIUM            136 mmol/L            135-146        

 

 POTASSIUM            4.8 mmol/L            3.5-5.3        

 

 CHLORIDE            102 mmol/L                    

 

 CARBON DIOXIDE            28 mmol/L            20-31        

 

 CALCIUM            8.5 mg/dL            8.6-10.4        

 

 PROTEIN, TOTAL            5.7 g/dL            6.1-8.1        

 

 ALBUMIN            3.4 g/dL            3.6-5.1        

 

 GLOBULIN            2.3 g/dL (calc)            1.9-3.7        

 

 ALBUMIN/GLOBULIN RATIO            1.5 (calc)            1.0-2.5        

 

 BILIRUBIN, TOTAL            0.5 mg/dL            0.2-1.2        

 

 ALKALINE PHOSPHATASE            186 U/L                    

 

 AST            36 U/L            10-35        

 

 ALT            28 U/L            6-29        









 CMP - 18 11:43         









 GLUCOSE            323 mg/dL            65-99        

 

 UREA NITROGEN (BUN)            27 mg/dL            7-25        

 

 CREATININE            0.95 mg/dL            0.60-0.93        

 

 eGFR NON-AFR. AMERICAN            60 mL/min/1.73m2            > OR=60        

 

 eGFR             70 mL/min/1.73m2            > OR=60        

 

 BUN/CREATININE RATIO            28 (calc)            6-        

 

 SODIUM            133 mmol/L            135-146        

 

 POTASSIUM            5.2 mmol/L            3.5-5.3        

 

 CHLORIDE            98 mmol/L                    

 

 CARBON DIOXIDE            27 mmol/L            20-31        

 

 CALCIUM            8.8 mg/dL            8.6-10.4        

 

 PROTEIN, TOTAL            6.1 g/dL            6.1-8.1        

 

 ALBUMIN            3.4 g/dL            3.6-5.1        

 

 GLOBULIN            2.7 g/dL (calc)            1.9-3.7        

 

 ALBUMIN/GLOBULIN RATIO            1.3 (calc)            1.0-2.5        

 

 BILIRUBIN, TOTAL            0.7 mg/dL            0.2-1.2        

 

 ALKALINE PHOSPHATASE            153 U/L                    

 

 AST            26 U/L            10-35        

 

 ALT            25 U/L            6-29        









 Complete blood count (CBC) with automated white blood cell (WBC) differential 
- 18 21:39         









 Blood leukocytes automated count (number/volume)            6.8 10*3/uL       
     4.3-11.0        

 

 Blood erythrocytes automated count (number/volume)            3.72 10*6/uL    
        4.35-5.85        

 

 Venous blood hemoglobin measurement (mass/volume)            11.0 g/dL        
    11.5-16.0        

 

 Blood hematocrit (volume fraction)            34 %            35-52        

 

 Automated erythrocyte mean corpuscular volume            92 [foz_us]          
  80-99        

 

 Automated erythrocyte mean corpuscular hemoglobin (mass per erythrocyte)      
      30 pg            25-34        

 

 Automated erythrocyte mean corpuscular hemoglobin concentration measurement (
mass/volume)            32 g/dL            32-36        

 

 Automated erythrocyte distribution width ratio            13.8 %            
10.0-14.5        

 

 Automated blood platelet count (count/volume)            180 10*3/uL          
  130-400        

 

 Automated blood platelet mean volume measurement            11.0 [foz_us]     
       7.4-10.4        

 

 Automated blood neutrophils/100 leukocytes            68 %            42-75   
     

 

 Automated blood lymphocytes/100 leukocytes            22 %            12-44   
     

 

 Blood monocytes/100 leukocytes            7 %            0-12        

 

 Automated blood eosinophils/100 leukocytes            3 %            0-10     
   

 

 Automated blood basophils/100 leukocytes            0 %            0-10        

 

 Blood neutrophils automated count (number/volume)            4.6 10*3         
   1.8-7.8        

 

 Blood lymphocytes automated count (number/volume)            1.5 10*3         
   1.0-4.0        

 

 Blood monocytes automated count (number/volume)            0.5 10*3            
0.0-1.0        

 

 Automated eosinophil count            0.2 10*3/uL            0.0-0.3        

 

 Automated blood basophil count (count/volume)            0.0 10*3/uL          
  0.0-0.1        









 Whole blood basic metabolic panel - 18 21:39         









 Serum or plasma sodium measurement (moles/volume)            136 mmol/L       
     135-145        

 

 Serum or plasma potassium measurement (moles/volume)            4.9 mmol/L    
        3.6-5.0        

 

 Serum or plasma chloride measurement (moles/volume)            104 mmol/L     
               

 

 Carbon dioxide            21 mmol/L            21-32        

 

 Serum or plasma anion gap determination (moles/volume)            11 mmol/L   
         5-14        

 

 Serum or plasma urea nitrogen measurement (mass/volume)            29 mg/dL   
         7-18        

 

 Serum or plasma creatinine measurement (mass/volume)            1.07 mg/dL    
        0.60-1.30        

 

 Serum or plasma urea nitrogen/creatinine mass ratio            27             
NRG        

 

 Serum or plasma creatinine measurement with calculation of estimated 
glomerular filtration rate            51             NRG        

 

 Serum or plasma glucose measurement (mass/volume)            172 mg/dL        
            

 

 Serum or plasma calcium measurement (mass/volume)            8.8 mg/dL        
    8.5-10.1        









 URIC ACID, SERUM - 18 17:05         









 URIC ACID            5.7 mg/dL            2.5-7.0        









 CBC - 18 10:48         









 WHITE BLOOD CELL COUNT            7.2 Thousand/uL            3.8-10.8        

 

 RED BLOOD CELL COUNT            3.60 Million/uL            3.80-5.10        

 

 HEMOGLOBIN            10.7 g/dL            11.7-15.5        

 

 HEMATOCRIT            33.6 %            35.0-45.0        

 

 MCV            93.3 fL            80.0-100.0        

 

 MCH            29.7 pg            27.0-33.0        

 

 MCHC            31.8 g/dL            32.0-36.0        

 

 RDW            13.0 %            11.0-15.0        

 

 PLATELET COUNT            185 Thousand/uL            140-400        

 

 MPV            11.2 fL            7.5-12.5        

 

 ABSOLUTE NEUTROPHILS            5962 cells/uL            9206-2061        

 

 ABSOLUTE LYMPHOCYTES            842 cells/uL            850-3900        

 

 ABSOLUTE MONOCYTES            295 cells/uL            200-950        

 

 ABSOLUTE EOSINOPHILS            79 cells/uL                    

 

 ABSOLUTE BASOPHILS            22 cells/uL            0-200        

 

 NEUTROPHILS            82.8 %            NRG        

 

 LYMPHOCYTES            11.7 %            NRG        

 

 MONOCYTES            4.1 %            NRG        

 

 EOSINOPHILS            1.1 %            NRG        

 

 BASOPHILS            0.3 %            NRG        









 MAGNESIUM SERUM - 18 16:48         









 MAGNESIUM            1.7 mg/dL            1.5-2.5        









 MAGNESIUM SERUM - 18 15:48         









 MAGNESIUM            1.8 mg/dL            1.5-2.5        



                                                                               
                                                                     



Encounters

      





 ACCT No.            Visit Date/Time            Discharge            Status    
        Pt. Type            Provider            Facility            Loc./Unit  
          Complaint        

 

 [id number]            2018 14:40:00            2018 23:59:59    
        CLS            Outpatient            ROSALBA PHILLIPS            [facility]            ONC                     

 

 [id number]            2018 10:25:00            2018 00:01:00    
        DIS            Outpatient            ROSALBA PHILLIPS            [facility]            ONC                     

 

 [id number]            2018 12:54:00            2018 23:59:59    
        CLS            Preadmit            KT HANSON, BRADLY LAUREANO            [facility] 
[facility]            [facility]                     

 

 [id number]            2018 21:07:00            2018 00:32:00    
        DIS            Emergency            ABAD HANSON, LACY SAINI            
[facility]            ER            HIP PAIN        

 

 [id number]            2018 07:47:00            2018 23:59:59    
        CLS            Preadmit            CHIOMA HANSON, KEN MCDANIEL            [facility] 
[facility]            CARD            CAD,HTN,IRON DEFICIENCY
        

 

 [id number]            2018 07:26:00            2018 23:59:59    
        CLS            Preadmit            LEANDER FREIRE            [facility] 
[facility]            RAD            IMMUNE THROMBOCYTOPENIC 
PURPURA        

 

 [id number]            2018 08:43:00            05/15/2018 00:01:00    
        DIS            Outpatient            ROSALBA PHILLIPS            [facility]            ONC                     

 

 [id number]            2018 12:30:00            2018 23:59:59    
        CLS            Preadmit            VIOLETA ST MD            [facility] 
[facility]            RAD            RENAL STONE        

 

 [id number]            2018 11:14:00            2018 23:59:59    
        CLS            Preadmit            LEANDER FREIRE            [facility] 
[facility]            REHAB            R HIP PAIN        

 

 [id number]            2018 14:07:00            2018 13:55:00    
        DIS            Inpatient            BARNIDGE DOMURPHY E            
[facility]            4TH            SWB,CELLULITIS 
ABDOMINAL WALL        

 

 [id number]            2018 22:40:00            2018 13:59:00    
        DIS            Inpatient            BRYCE MIRELES MD            [facility] 
[facility]            4TH            ANEMIA, CELLULITIS PANNUS
, CANDIDAL INFECTION        

 

 [id number]            2018 16:51:00            2018 23:59:59    
        CLS            Preadmit            ISMAEL FREIREELE WM APRN            [facility] 
[facility]            CARD            R06.01        

 

 [id number]            2018 15:38:00            2018 23:59:59    
        CLS            Outpatient            LEANDER FREIRE APRN            
[facility]            RAD            J20.9        

 

 [id number]            10/23/2017 14:47:00            2018 16:30:00    
        DIS            Outpatient            ROSALBA PHILLIPS            [facility] 
[facility]            ONC                     

 

 [id number]            2017 14:52:00            2017 00:01:00    
        DIS            Outpatient            ROSALBA PHILLIPS            [facility]            ONC                     

 

 [id number]            2017 09:35:00            2017 00:01:00    
        DIS            Outpatient            ROSALBA PHILLIPS            [facility]            ONC                     

 

 [id number]            2017 13:19:00            2017 00:01:00    
        DIS            Outpatient            ROSALBA PHILLIPS            [facility]            ONC                     

 

 [id number]            2017 16:11:00            2017 12:20:00    
        DIS            Inpatient            CHEPE GRAHAM MD            [facility] 
[facility]            4TH            FEBRILE ILLNESS,FLU LIKE 
SYNDROME,AMS        

 

 [id number]            2017 15:40:00            2017 18:07:00    
        DIS            Emergency            CHEPE GRAHAM MD            [facility] 
[facility]            ER            COUGH/COLD SYMPTOMS      
  

 

 [id number]            2016 09:22:00            10/30/2016 00:01:00    
        DIS            Outpatient            ROSALBA PHILLIPS            [facility] 
[facility]            ONC                     

 

 [id number]            2016 14:21:00            2016 15:42:00    
        DIS            Emergency            ABISAI DANIELLE DO            [facility]            ER            FALL/LEFT RIB PAIN        

 

 [id number]            2016 14:14:00            2016 15:40:00    
        DIS            Emergency            SIXTO BULL            [facility] 
[facility]            ER            FALL/FEET PAIN        

 

 [id number]            2016 09:51:00            2016 00:01:00    
        DIS            Outpatient            ROSALBA PHILLIPS            [facility]            ONC                     

 

 [id number]            2016 09:53:00            2016 23:59:59    
        CLS            Outpatient            FOX NAVARRO ARNP            
[facility]            ONC                     

 

 [id number]            2016 13:58:00            2016 15:21:00    
        DIS            Emergency            ATUL MARES MD            
[facility]            ER            RIGHT LOWER LEG PAIN 
       

 

 [id number]            2016 10:30:00            2016 23:59:59    
        CLS            Outpatient            FOX NAVARRO S ARNP            
[facility]            ONC                     

 

 [id number]            2016 09:05:00            2016 23:59:59    
        CLS            Outpatient            VIOLETA ST MD            [facility] 
[facility]            RAD            RENAL STONE        

 

 [id number]            2016 09:46:00            2016 00:01:00    
        DIS            Outpatient            ROSALBA PHILLIPS            [facility]            ONC                     

 

 [id number]            2015 10:18:00            2015 23:59:59    
        CLS            Outpatient            KOKO DOTSON DO            Via 
[facility]            LAB                     

 

 [id number]            2015 09:44:00            2015 00:01:00    
        DIS

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
## 2018-09-08 NOTE — XMS REPORT
[facility]

 Created on: 2018



Madison Young

External Reference #: [id number]

: 1945

Sex: Female



Demographics







 Address  [address] [place]  [address]

 

 Preferred Language  Unknown

 

 Marital Status  Unknown

 

 Voodoo Affiliation  Unknown

 

 Race  Unknown

 

 Ethnic Group  Unknown





Author







 Author  FREIRELEANDER HILLIARD

 

 Organization  [facility]

 

 Address  [address] [place]  [address]



 

 Phone  [phone]







Care Team Providers







 Care Team Member Name  Role  Phone

 

 FREIRELEANDER HILLIARD  Unavailable  [phone]







PROBLEMS







 Type  Condition  ICD9-CM Code  NCT03-KV Code  Onset Dates  Condition Status  
SNOMED Code

 

 Problem  Anxiety disorder, unspecified     F41.9     Active  823530123

 

 Problem  Body mass index (BMI) of 45.0-49.9 in adult     Z68.42     Active  
395357282

 

 Problem  Morbid (severe) obesity due to excess calories     E66.01     Active  
928138567

 

 Problem  Iron deficiency anemia     D50.9     Active  17637316

 

 Problem  Non-adherence to medical treatment     Z91.19     Active  469822361

 

 Problem  Long-term insulin use     Z79.4     Active  428004684

 

 Problem  Recurrent major depressive disorder, in partial remission     F33.41 
    Active  96261768

 

 Problem  Skin ulcer, limited to breakdown of skin     L98.491     Active  
66970943

 

 Problem  Other iron deficiency anemia     D50.8     Active  35792342

 

 Problem  Avascular necrosis of bone of right hip     M87.051     Active  
692957322

 

 Problem  Coronary artery disease     I25.10     Active  29925332

 

 Problem  Chronic kidney disease (CKD) stage G3a/A3, moderately decreased 
glomerular filtration rate (GFR) between 45-59 mL/min/1.73 square meter and 
albuminuria creatinine ratio greater than 300 mg/g     N18.3     Active  
626791667

 

 Problem  Microalbuminuric diabetic nephropathy     E11.21     Active  413972909

 

 Problem  Immune thrombocytopenic purpura     D69.3     Active  683559274

 

 Problem  Hyperlipidemia, unspecified hyperlipidemia     E78.5     Active  
58392862

 

 Problem  GERD (gastroesophageal reflux disease)     K21.9     Active  101589824

 

 Problem  Asthma     J45.909     Active  658014357

 

 Problem  Type 2 diabetes mellitus with diabetic polyneuropathy     E11.42     
Active  29260893

 

 Problem  Hypertension     I10     Active  30077397







ALLERGIES

No Information



ENCOUNTERS







 Encounter  Location  Date  Diagnosis

 

 [facility]  [address] [address]  29 Aug, 2018   

 

 [place] [id number] [place] [address]-
[phone]     

 

 [place] [address] [place] [address]-
1769     

 

 [place] [id number] [place] [address]-
5822    Type 2 diabetes mellitus with diabetic polyneuropathy 
E11.42 ; Hypertension I10 ; Hyperlipidemia, unspecified hyperlipidemia E78.5 ; 
Body mass index (BMI) of 45.0-49.9 in adult Z68.42 ; Long-term insulin use 
Z79.4 ; Non-adherence to medical treatment Z91.19 ; Coronary artery disease 
I25.10 ; GERD (gastroesophageal reflux disease) K21.9 ; Asthma J45.909 and 
Recurrent major depressive disorder, in partial remission F33.41

 

 [place] [id number] [place] [address]193-
4402  22 May, 2018  Recurrent major depressive disorder, in partial remission 
F33.41 and Hypertension I10

 

 [place] [id number] [place] [address]-
2236  21 May, 2018  Immune thrombocytopenic purpura D69.3 and Iron deficiency 
anemia D50.9

 

 [place] [id number] [place] [address]32-
6704  21 May, 2018  Type 2 diabetes mellitus with diabetic polyneuropathy 
E11.42 ; Long-term insulin use Z79.4 ; Chronic kidney disease (CKD) stage G3a/A3
, moderately decreased glomerular filtration rate (GFR) between 45-59 mL/min/
1.73 square meter and albuminuria creatinine ratio greater than 300 mg/g N18.3 
; Hypertension I10 ; Hyperlipidemia, unspecified hyperlipidemia E78.5 ; 
Coronary artery disease I25.10 ; GERD (gastroesophageal reflux disease) K21.9 ; 
Immune thrombocytopenic purpura D69.3 ; Asthma J45.909 ; Morbid (severe) 
obesity due to excess calories E66.01 and Recurrent major depressive disorder, 
in partial remission F33.41

 

 [place] [address] [place] [address]-
5280  11 May, 2018  Chronic kidney disease (CKD) stage G3a/A3, moderately 
decreased glomerular filtration rate (GFR) between 45-59 mL/min/1.73 square 
meter and albuminuria creatinine ratio greater than 300 mg/g N18.3

 

 [place] [id number] [place] [address]32-
1591  02 May, 2018  Chronic kidney disease (CKD) stage G3a/A3, moderately 
decreased glomerular filtration rate (GFR) between 45-59 mL/min/1.73 square 
meter and albuminuria creatinine ratio greater than 300 mg/g N18.3

 

 [place] [id number] [place] [address]-
2254     

 

 [place] [id number] [place] [address] 8[address]01-
4738  28 Mar, 2018   

 

 [place] [id number] [place] [address]696-
3387  26 Mar, 2018  Immune thrombocytopenic purpura D69.3 ; Type 2 diabetes 
mellitus with diabetic polyneuropathy E11.42 ; Avascular necrosis of bone of 
right hip M87.051 ; Long-term insulin use Z79.4 ; GERD (gastroesophageal reflux 
disease) K21.9 ; Hyperlipidemia, unspecified hyperlipidemia E78.5 ; 
Hypertension I10 ; Recurrent major depressive disorder, in partial remission 
F33.41 ; Microalbuminuric diabetic nephropathy E11.21 ; Body mass index (BMI) 
of 45.0-49.9 in adult Z68.42 ; BMI 45.0-49.9, adult Z68.42 and Chronic kidney 
disease (CKD) stage G3a/A3, moderately decreased glomerular filtration rate (GFR
) between 45-59 mL/min/1.73 square meter and albuminuria creatinine ratio 
greater than 300 mg/g N18.3

 

 [place] [address] [place] [address]808-
9601  23 Mar, 2018   

 

 [place] [id number] [place] [address] 09901-
5769  23 Mar, 2018   

 

 [place] [address] [address]-
2884  19 Mar, 2018   

 

 [facility]  [address] [place] [address]20-
3457  14 Mar, 2018   

 

 [facility]  [address] [place] [address] 30367-
6551  13 Mar, 2018  Type 2 diabetes mellitus with diabetic polyneuropathy 
E11.42 ; Asthma J45.909 and Hypertension I10

 

 [facility]  [address] [place] 
[id number]  13 Mar, 2018  Skin ulcer, limited to breakdown of skin L98.491 ; 
Immune thrombocytopenic purpura D69.3 ; Avascular necrosis of bone of right hip 
M87.051 and Type 2 diabetes mellitus with diabetic polyneuropathy E11.42

 

 [place] [address] [place] [address] 41297-
8401  06 Mar, 2018  Asthma J45.909 and Type 2 diabetes mellitus with diabetic 
polyneuropathy E11.42

 

 [facility]  [address] [place] [address] 99442-
3405  01 Mar, 2018   

 

 [facility]  [address] [place] 
[id number]    Other iron deficiency anemia D50.8 ; Weakness R53.1 ; 
Type 2 diabetes mellitus with diabetic polyneuropathy E11.42 ; Cellulitis of 
trunk, unspecified site of trunk L03.319 ; Coronary artery disease I25.10 ; 
Avascular necrosis of bone of right hip M87.051 and Morbid (severe) obesity due 
to excess calories E66.01

 

 [facility]  [id number] 
[phone]     

 

 [facility]  [address] [place] [address] 11475-
6653     

 

 [facility] [place] [facility]  [address] [phone]  15 Feb, 2018  Yeast infection of the skin B37.2

 

 [place] [id number] [place] [address]237-
9048  15 Feb, 2018   

 

 [place] [address] [address]-
7035  15 Feb, 2018   

 

 [place] [address] [address]-
[address]     

 

 [facility]  [address] [phone]     

 

 [facility]  [address] [address]-
7710    Type 2 diabetes mellitus with diabetic polyneuropathy 
E11.42 ; Avascular necrosis of bone of right hip M87.051 ; Hyperlipidemia, 
unspecified hyperlipidemia E78.5 ; Hypertension I10 ; Anxiety disorder, 
unspecified F41.9 ; Immune thrombocytopenic purpura D69.3 ; Coronary artery 
disease I25.10 ; Orthopnea R06.01 ; Acute bronchitis, unspecified organism 
J20.9 ; Wound of left lower extremity, initial encounter S81.802A ; Body aches 
R52 and Debilitated R53.81

 

 [place] [address] [address]-
[address]     

 

 [place] [address] [address]-
7442    Type 2 diabetes mellitus with diabetic polyneuropathy 
E11.42 ; Encounter for immunization Z23 ; Hyperlipidemia, unspecified 
hyperlipidemia E78.5 ; Hypertension I10 ; Anxiety disorder, unspecified F41.9 ; 
Asthma J45.909 ; Body mass index (BMI) of 45.0-49.9 in adult Z68.42 and Morbid (
severe) obesity due to excess calories E66.01

 

 [place] [address] [address]-
1554    Type 2 diabetes mellitus with diabetic polyneuropathy 
E11.42 ; Hyperlipidemia, unspecified hyperlipidemia E78.5 ; Hypertension I10 
and GERD (gastroesophageal reflux disease) K21.9

 

 [place] [address] [address]-
3084  22 Mar, 2017  Type 2 diabetes mellitus with diabetic polyneuropathy E11.42

 

 [place] [address] [address]-
2585    Type 2 diabetes mellitus with diabetic polyneuropathy 
E11.42 ; Coronary artery disease I25.10 ; History of MI (myocardial infarction) 
I25.2 ; Immune thrombocytopenic purpura D69.3 ; GERD (gastroesophageal reflux 
disease) K21.9 ; Hyperlipidemia, unspecified hyperlipidemia E78.5 ; 
Hypertension I10 ; History of kidney stones Z87.442 and Anxiety disorder, 
unspecified F41.9

 

 [place] [address] [address]-
2191     

 

 [address] [address]-
3248  29 Sep, 2016  Coronary artery disease I25.10 ; History of MI (myocardial 
infarction) I25.2 ; History of kidney stones Z87.442 ; Type 2 diabetes mellitus 
with diabetic polyneuropathy E11.42 ; Avascular necrosis of bone of right hip 
M87.051 ; Hyperlipidemia, unspecified hyperlipidemia E78.5 ; Hypertension I10 ; 
Asthma J45.909 and Encounter for immunization Z23

 

 [place] [id number] [place] [address]-
5225  20 Sep, 2016   

 

 [address] [address]-
7191    Coronary artery disease I25.10 ; Type 2 diabetes mellitus 
with diabetic polyneuropathy E11.42 ; History of MI (myocardial infarction) 
I25.2 ; Immune thrombocytopenic purpura D69.3 ; Hyperlipidemia, unspecified 
hyperlipidemia E78.5 and Hypertension I10

 

 [address] [address]-
8835    Coronary artery disease I25.10 ; Lymphedema I89.0 ; History 
of MI (myocardial infarction) I25.2 ; History of kidney stones Z87.442 ; Immune 
thrombocytopenic purpura D69.3 ; Type 2 diabetes mellitus with diabetic 
polyneuropathy E11.42 ; Avascular necrosis of bone of right hip M87.051 ; GERD (
gastroesophageal reflux disease) K21.9 ; Hyperlipidemia, unspecified 
hyperlipidemia E78.5 ; Hypertension I10 and Asthma J45.909

 

 [place] [address] [place] [address] 9[address]57-
8738     

 

 [place] [id number] [place] [address] 71851-
5227     

 

 [place] [id number] [place] [address] 16646-
4203  02 Dec, 2015   

 

 [place] [address] [address]-
1135  02 Dec, 2015  Hyperlipidemia, unspecified hyperlipidemia E78.5

 

 [place] [id number] [place] [address] 18232-
6310     

 

 [place] [id number] [place] [address] 43665-
1721     

 

 [place] [id number] [place] [address] 88274-
2289    Allergic rhinitis J30.9

 

 [place] [address] [place] [address] 0[address]49-
1545    Type 2 diabetes mellitus with diabetic polyneuropathy 
E11.42 ; Routine adult health maintenance Z00.00 ; Coronary artery disease 
I25.10 ; History of MI (myocardial infarction) I25.2 ; History of kidney stones 
Z87.442 ; Immune thrombocytopenic purpura D69.3 ; Avascular necrosis of bone of 
right hip M87.051 and GERD (gastroesophageal reflux disease) K21.9







IMMUNIZATIONS

No Known Immunizations



SOCIAL HISTORY

Never Assessed



REASON FOR VISIT

Referral requested



PLAN OF CARE





VITAL SIGNS





MEDICATIONS

Unknown Medications



RESULTS

No Results



PROCEDURES

No Known procedures



INSTRUCTIONS





MEDICATIONS ADMINISTERED

No Known Medications



MEDICAL (GENERAL) HISTORY







 Type  Description  Date

 

 Medical History  Type II Diabetes   

 

 Medical History  CAD   

 

 Medical History  Acute MI x1   

 

 Medical History  Heart Cath x3   

 

 Medical History  Kidney Stones   

 

 Medical History  Lymphedema   

 

 Medical History  Immune thrombocytopenic purpura   

 

 Surgical History  Heart Stents x2   

 

 Surgical History  Cholecystectomy   

 

 Surgical History     

 

 Surgical History  [place] Teeth   

 

 Hospitalization History  [facility]  

 

 Hospitalization History  Sepsis/Toxic Shock  

 

 Hospitalization History  [facility]-flu/pneumonia  2017

 

 Hospitalization History  [facility], [place]- Anemia, cellulitis pannus, yeast 
infection. Discharged 2018

## 2018-09-08 NOTE — DIAGNOSTIC IMAGING REPORT
EXAMINATION: Chest radiograph, portable AP view.



DATE: September 8, 2018 at 1846 hours.



INDICATION: 72-year-old female, shortness of breath.



COMPARISON:  February 17, 2018.



FINDINGS: There are technical limitations of the exam relating to

patient body habitus and difficulties with exposure. There is

unchanged cardiomegaly. There is no identified pneumothorax.

There is no definite large pleural effusion. There are bilateral

interstitial opacities which appear fairly similar to the

comparison exam. There is somewhat of a bibasilar predominance.



IMPRESSION: 

1. Unchanged cardiomegaly with bilateral interstitial opacities

similar in appearance to comparison exam. Differential diagnostic

considerations would include pulmonary interstitial edema,

atypical infection, and chronic lung changes.



Dictated by: 



  Dictated on workstation # [id number]

## 2018-09-26 ENCOUNTER — HOSPITAL ENCOUNTER (EMERGENCY)
Dept: HOSPITAL 75 - ER | Age: 73
Discharge: HOME | End: 2018-09-26
Payer: MEDICARE

## 2018-09-26 VITALS — BODY MASS INDEX: 45.99 KG/M2 | WEIGHT: 293 LBS | HEIGHT: 67 IN

## 2018-09-26 VITALS — DIASTOLIC BLOOD PRESSURE: 87 MMHG | SYSTOLIC BLOOD PRESSURE: 158 MMHG

## 2018-09-26 DIAGNOSIS — I10: ICD-10-CM

## 2018-09-26 DIAGNOSIS — M06.9: ICD-10-CM

## 2018-09-26 DIAGNOSIS — E11.9: ICD-10-CM

## 2018-09-26 DIAGNOSIS — Z87.891: ICD-10-CM

## 2018-09-26 DIAGNOSIS — E66.01: ICD-10-CM

## 2018-09-26 DIAGNOSIS — D64.9: ICD-10-CM

## 2018-09-26 DIAGNOSIS — Z79.82: ICD-10-CM

## 2018-09-26 DIAGNOSIS — Z95.5: ICD-10-CM

## 2018-09-26 DIAGNOSIS — Z88.0: ICD-10-CM

## 2018-09-26 DIAGNOSIS — Z91.048: ICD-10-CM

## 2018-09-26 DIAGNOSIS — F32.9: ICD-10-CM

## 2018-09-26 DIAGNOSIS — Z82.49: ICD-10-CM

## 2018-09-26 DIAGNOSIS — Z88.8: ICD-10-CM

## 2018-09-26 DIAGNOSIS — E78.00: ICD-10-CM

## 2018-09-26 DIAGNOSIS — I25.10: ICD-10-CM

## 2018-09-26 DIAGNOSIS — E83.42: ICD-10-CM

## 2018-09-26 DIAGNOSIS — Z87.442: ICD-10-CM

## 2018-09-26 DIAGNOSIS — Z79.4: ICD-10-CM

## 2018-09-26 DIAGNOSIS — F41.9: ICD-10-CM

## 2018-09-26 DIAGNOSIS — Z79.51: ICD-10-CM

## 2018-09-26 DIAGNOSIS — J45.909: ICD-10-CM

## 2018-09-26 DIAGNOSIS — A08.4: Primary | ICD-10-CM

## 2018-09-26 DIAGNOSIS — K21.9: ICD-10-CM

## 2018-09-26 DIAGNOSIS — Z98.890: ICD-10-CM

## 2018-09-26 LAB
ALBUMIN SERPL-MCNC: 3.3 GM/DL (ref 3.2–4.5)
ALP SERPL-CCNC: 173 U/L (ref 40–136)
ALT SERPL-CCNC: 19 U/L (ref 0–55)
ANISOCYTOSIS BLD QL SMEAR: SLIGHT
APTT PPP: YELLOW S
BACTERIA #/AREA URNS HPF: (no result) /HPF
BASOPHILS # BLD AUTO: 0 10^3/UL (ref 0–0.1)
BASOPHILS NFR BLD AUTO: 0 % (ref 0–10)
BASOPHILS NFR BLD MANUAL: 0 %
BILIRUB SERPL-MCNC: 1.1 MG/DL (ref 0.1–1)
BILIRUB UR QL STRIP: NEGATIVE
BUN/CREAT SERPL: 21
CALCIUM SERPL-MCNC: 8.6 MG/DL (ref 8.5–10.1)
CHLORIDE SERPL-SCNC: 104 MMOL/L (ref 98–107)
CO2 SERPL-SCNC: 26 MMOL/L (ref 21–32)
CREAT SERPL-MCNC: 0.77 MG/DL (ref 0.6–1.3)
EOSINOPHIL # BLD AUTO: 0.1 10^3/UL (ref 0–0.3)
EOSINOPHIL NFR BLD AUTO: 1 % (ref 0–10)
EOSINOPHIL NFR BLD MANUAL: 0 %
ERYTHROCYTE [DISTWIDTH] IN BLOOD BY AUTOMATED COUNT: 15.8 % (ref 10–14.5)
FIBRINOGEN PPP-MCNC: CLEAR MG/DL
GFR SERPLBLD BASED ON 1.73 SQ M-ARVRAT: > 60 ML/MIN
GLUCOSE SERPL-MCNC: 119 MG/DL (ref 70–105)
GLUCOSE UR STRIP-MCNC: NEGATIVE MG/DL
HCT VFR BLD CALC: 37 % (ref 35–52)
HGB BLD-MCNC: 12 G/DL (ref 11.5–16)
KETONES UR QL STRIP: NEGATIVE
LEUKOCYTE ESTERASE UR QL STRIP: (no result)
LIPASE SERPL-CCNC: 17 U/L (ref 8–78)
LYMPHOCYTES # BLD AUTO: 0.7 X 10^3 (ref 1–4)
LYMPHOCYTES NFR BLD AUTO: 8 % (ref 12–44)
MAGNESIUM SERPL-MCNC: 1.7 MG/DL (ref 1.8–2.4)
MANUAL DIFFERENTIAL PERFORMED BLD QL: YES
MCH RBC QN AUTO: 31 PG (ref 25–34)
MCHC RBC AUTO-ENTMCNC: 33 G/DL (ref 32–36)
MCV RBC AUTO: 94 FL (ref 80–99)
MONOCYTES # BLD AUTO: 0.5 X 10^3 (ref 0–1)
MONOCYTES NFR BLD AUTO: 5 % (ref 0–12)
MONOCYTES NFR BLD: 3 %
NEUTROPHILS # BLD AUTO: 8 X 10^3 (ref 1.8–7.8)
NEUTROPHILS NFR BLD AUTO: 87 % (ref 42–75)
NEUTS BAND NFR BLD MANUAL: 80 %
NEUTS BAND NFR BLD: 10 %
NITRITE UR QL STRIP: NEGATIVE
PH UR STRIP: 6 [PH] (ref 5–9)
PLATELET # BLD: 172 10^3/UL (ref 130–400)
PMV BLD AUTO: 11.1 FL (ref 7.4–10.4)
POTASSIUM SERPL-SCNC: 4.3 MMOL/L (ref 3.6–5)
PROT SERPL-MCNC: 6.3 GM/DL (ref 6.4–8.2)
PROT UR QL STRIP: (no result)
RBC # BLD AUTO: 3.89 10^6/UL (ref 4.35–5.85)
RBC #/AREA URNS HPF: (no result) /HPF
SODIUM SERPL-SCNC: 136 MMOL/L (ref 135–145)
SP GR UR STRIP: 1.01 (ref 1.02–1.02)
SQUAMOUS #/AREA URNS HPF: (no result) /HPF
UROBILINOGEN UR-MCNC: NORMAL MG/DL
VARIANT LYMPHS NFR BLD MANUAL: 7 %
WBC # BLD AUTO: 9.3 10^3/UL (ref 4.3–11)
WBC #/AREA URNS HPF: (no result) /HPF

## 2018-09-26 PROCEDURE — 93005 ELECTROCARDIOGRAM TRACING: CPT

## 2018-09-26 PROCEDURE — 36415 COLL VENOUS BLD VENIPUNCTURE: CPT

## 2018-09-26 PROCEDURE — 86141 C-REACTIVE PROTEIN HS: CPT

## 2018-09-26 PROCEDURE — 84484 ASSAY OF TROPONIN QUANT: CPT

## 2018-09-26 PROCEDURE — 83735 ASSAY OF MAGNESIUM: CPT

## 2018-09-26 PROCEDURE — 81000 URINALYSIS NONAUTO W/SCOPE: CPT

## 2018-09-26 PROCEDURE — 83690 ASSAY OF LIPASE: CPT

## 2018-09-26 PROCEDURE — 85027 COMPLETE CBC AUTOMATED: CPT

## 2018-09-26 PROCEDURE — 80053 COMPREHEN METABOLIC PANEL: CPT

## 2018-09-26 PROCEDURE — 85007 BL SMEAR W/DIFF WBC COUNT: CPT

## 2018-09-26 NOTE — ED GI
General


Chief Complaint:  Hip/Pelvic Problems


Stated Complaint:  HIP PAIN


Source of Information:  Patient


Exam Limitations:  No Limitations





History of Present Illness


Date Seen by Provider:  Sep 26, 2018


Time Seen by Provider:  09:40


Initial Comments


The patient presents to the ER by private conveyance with her daughter-in-law 

and chief complaint that she for the past 4 days is been experiencing some 

nausea, vomiting and loose stools. She did  some Imodium and started 

taking that and yesterday had a regular bowel movement yesterday morning. She 

is not having any dysuria, hematuria or for urinary frequency but she does have 

a history of kidneys tones and kidney infections. She's not having any flank 

pain or fevers or chills. She is not had sweats. She is not nauseated 

presently. Her symptoms do not seem to be associated with eating. She has had 

her gallbladder out as well as a  and had to have a kidney stone 

retrieval surgically done.


Incidentally while getting herself out of her chair using her arms she said she 

felt like she strained her muscles on the back of her upper arm. She denies any 

pain in her joints. She does have a history of a right hip fracture that could 

not be repaired.





Allergies and Home Medications


Allergies


Coded Allergies:  


     vancomycin (Verified  Allergy, Intermediate, RASH, 18)


     heparin (Verified  Allergy, Unknown, 17)


Uncoded Allergies:  


     TAPE (Allergy, Mild, RASH, 17)





Home Medications


Albuterol Sulfate 6.7 Gm Hfa.aer.ad, 2 PUFF IH Q4H PRN for SHORTNESS OF BREATH, 

(Reported)


Aspirin 81 Mg Tablet.dr, 81 MG PO HS, (Reported)


Atorvastatin Calcium 10 Mg Tablet, 10 MG PO HS, (Reported)


Benazepril HCl 40 Mg Tab, 40 MG PO HS, (Reported)


Carvedilol 12.5 Mg Tablet, 12.5 MG PO BID, (Reported)


Cefdinir 300 Mg Capsule, 300 MG PO BID


   Prescribed by: ATUL MARES on 18


Citalopram Hydrobromide 20 Mg Tablet, 20 MG PO HS, (Reported)


Fluticasone/Salmeterol 1 Each Blst.w.dev, 1-2 PUFF IH DAILY PRN for SHORTNESS 

OF BREATH, (Reported)


Glipizide 5 Mg Tablet, 5 MG PO BID, (Reported)


Hydrochlorothiazide 12.5 Mg Capsule, 12.5 MG PO BID, (Reported)


Insulin Determir 1,000 Units/10 Ml Soln, 10 UNIT SQ HS


   Prescribed by: MURPHY LONG on 18


Insulin Lispro 100 Unit/1 Ml Insuln.pen, 6 UNIT SQ AC


   for glucose greater than 130 with meals 


   Prescribed by: MURPHY LONG on 18


Lactobacillus Combination No.4 1 Each Capsule, 1 CAP PO Q48H, (Reported)


Mupirocin 22 Gm Oint...g., TP TID PRN for RASH, (Reported)


Naproxen Sodium 220 Mg Tablet, 440 MG PO BID PRN for PAIN, (Reported)


   TAKES 2 (220MG) TABLETS 


Nystatin 15 Gm Cream..g., TP TID PRN for RASH, (Reported)


Pantoprazole Sodium 40 Mg Tablet.dr, 40 MG PO HS, (Reported)





Patient Home Medication List


Home Medication List Reviewed:  Yes





Review of Systems


Review of Systems


Constitutional:  No chills, No fever; malaise


EENTM:  No Blurred Vision, No Double Vision


Respiratory:  Denies Cough, Denies Shortness of Air


Cardiovascular:  Denies Chest Pain, Denies Edema, Denies Lightheadedness


Gastrointestinal:  Abdominal Pain (epigastric after vomiting); Denies 

Constipated; Diarrhea, Nausea, Poor Fluid Intake, Vomiting


Genitourinary:  Denies Burning, Denies Discharge, Denies Frequency, Denies 

Flank Pain


Musculoskeletal:  No back pain, No joint pain; muscle pain, muscle stiffness (

triceps)


Skin:  No lumps, No rash





Past Medical-Social-Family Hx


Patient Social History


Alcohol Use:  Denies Use


Alcohol Beverage of Choice:  Beer


Recreational Drug Use:  No


Smoking Status:  Former Smoker


Type Used:  Cigarettes


Former Smoker, Quit:  1977


2nd Hand Smoke Exposure:  No


Recent Hopitalizations:  No





Immunizations Up To Date


Tetanus Booster (TDap):  Unknown


PED Vaccines UTD:  Yes


Date of Pneumonia Vaccine:  Oct 1, 2016


Date of Influenza Vaccine:  Oct 1, 2017





Seasonal Allergies


Seasonal Allergies:  Yes





Past Medical History


Surgeries:  Yes


Cardiac, Coronary Stent, Gallbladder, Renal


Respiratory:  Yes


Asthma


Currently Using CPAP:  No


Currently Using BIPAP:  No


Cardiac:  Yes (stent)


Coronary Artery Disease, High Cholesterol, Hypertension


Neurological:  No


Reproductive Disorders:  No


Female Reproductive Disorders:  Denies


GYN History:  Menopausal


Sexually Transmitted Disease:  No


HIV/AIDS:  No


Genitourinary:  No


Bladder Infection, Kidney Stones


Gastrointestinal:  No


Gastroesophageal Reflux


Rheumatoid Arthritis, Foot Drop, Fractures


Endocrine:  Yes (DM TYPE II; MORBID OBESITY)


Diabetes, Insulin dep


HEENT:  No


Cancer:  No


Psychosocial:  Yes


Anxiety, Depression


Integumentary:  No


Recent Skin Changes


Blood Disorders:  Yes (anemia, platelets low, transfusion every 2-3 months)


Adverse Reaction/Blood Tranf:  No





Family Medical History





Cardiovascular disease


  19 MOTHER


Diabetes mellitus


  19 MOTHER


Paternal family history of emphysema


  19 FATHER


No Pertinent Family Hx





Physical Exam


Vital Signs





Vital Signs - First Documented








 18





 09:35


 


Temp 98.8


 


Pulse 105


 


Resp 18


 


B/P (MAP) 133/90 (104)


 


Pulse Ox 94





Capillary Refill :


Height/Weight/BMI


Height: 5'7.00"


Weight: 249lbs. 12.8oz. [id number]; 39.1 BMI


Method:Stated


General Appearance:  WD/WN, no apparent distress


HEENT:  PERRL/EOMI, pharynx normal (oral mucosa is dry)


Respiratory:  chest non-tender, lungs clear, normal breath sounds, no 

respiratory distress, no accessory muscle use


Cardiovascular:  normal peripheral pulses, regular rate, rhythm


Peripheral Pulses:  1+ Dorsalis Pedis (R), 1+ Left Dors-Pedis (L)


Gastrointestinal:  normal bowel sounds, non tender, soft


Neurologic/Psychiatric:  alert, normal mood/affect, oriented x 3


Skin:  normal color, warm/dry





Progress/Results/Core Measures


Results/Orders


Lab Results





Laboratory Tests








Test


 18


09:50 18


10:50 Range/Units


 


 


White Blood Count


 9.3 


 


 4.3-11.0


10^3/uL


 


Red Blood Count


 3.89 L


 


 4.35-5.85


10^6/uL


 


Hemoglobin 12.0   11.5-16.0  G/DL


 


Hematocrit 37   35-52  %


 


Mean Corpuscular Volume 94   80-99  FL


 


Mean Corpuscular Hemoglobin 31   25-34  PG


 


Mean Corpuscular Hemoglobin


Concent 33 


 


 32-36  G/DL





 


Red Cell Distribution Width 15.8 H  10.0-14.5  %


 


Platelet Count


 172 


 


 130-400


10^3/uL


 


Mean Platelet Volume 11.1 H  7.4-10.4  FL


 


Neutrophils (%) (Auto) 87 H  42-75  %


 


Lymphocytes (%) (Auto) 8 L  12-44  %


 


Monocytes (%) (Auto) 5   0-12  %


 


Eosinophils (%) (Auto) 1   0-10  %


 


Basophils (%) (Auto) 0   0-10  %


 


Neutrophils # (Auto) 8.0 H  1.8-7.8  X 10^3


 


Lymphocytes # (Auto) 0.7 L  1.0-4.0  X 10^3


 


Monocytes # (Auto) 0.5   0.0-1.0  X 10^3


 


Eosinophils # (Auto)


 0.1 


 


 0.0-0.3


10^3/uL


 


Basophils # (Auto)


 0.0 


 


 0.0-0.1


10^3/uL


 


Neutrophils % (Manual) 80    %


 


Lymphocytes % (Manual) 7    %


 


Monocytes % (Manual) 3    %


 


Eosinophils % (Manual) 0    %


 


Basophils % (Manual) 0    %


 


Band Neutrophils 10    %


 


Anisocytosis SLIGHT    


 


Sodium Level 136   135-145  MMOL/L


 


Potassium Level 4.3   3.6-5.0  MMOL/L


 


Chloride Level 104     MMOL/L


 


Carbon Dioxide Level 26   21-32  MMOL/L


 


Anion Gap 6   5-14  MMOL/L


 


Blood Urea Nitrogen 16   7-18  MG/DL


 


Creatinine


 0.77 


 


 0.60-1.30


MG/DL


 


Estimat Glomerular Filtration


Rate > 60 


 


  





 


BUN/Creatinine Ratio 21    


 


Glucose Level 119 H    MG/DL


 


Calcium Level 8.6   8.5-10.1  MG/DL


 


Corrected Calcium 9.2   8.5-10.1  MG/DL


 


Magnesium Level 1.7 L  1.8-2.4  MG/DL


 


Total Bilirubin 1.1 H  0.1-1.0  MG/DL


 


Aspartate Amino Transf


(AST/SGOT) 30 


 


 5-34  U/L





 


Alanine Aminotransferase


(ALT/SGPT) 19 


 


 0-55  U/L





 


Alkaline Phosphatase 173 H    U/L


 


Troponin I < 0.30   <0.30  NG/ML


 


C-Reactive Protein High


Sensitivity 1.43 H


 


 0.00-0.50


MG/DL


 


Total Protein 6.3 L  6.4-8.2  GM/DL


 


Albumin 3.3   3.2-4.5  GM/DL


 


Lipase 17   8-78  U/L


 


Urine Color  YELLOW   


 


Urine Clarity  CLEAR   


 


Urine pH  6  5-9  


 


Urine Specific Gravity  1.015 L 1.016-1.022  


 


Urine Protein  4+  NEGATIVE  


 


Urine Glucose (UA)  NEGATIVE  NEGATIVE  


 


Urine Ketones  NEGATIVE  NEGATIVE  


 


Urine Nitrite  NEGATIVE  NEGATIVE  


 


Urine Bilirubin  NEGATIVE  NEGATIVE  


 


Urine Urobilinogen  NORMAL  NORMAL  MG/DL


 


Urine Leukocyte Esterase  1+ H NEGATIVE  


 


Urine RBC (Auto)  1+ H NEGATIVE  


 


Urine RBC  0-2   /HPF


 


Urine WBC  2-5   /HPF


 


Urine Squamous Epithelial


Cells 


 2-5 


  /HPF





 


Urine Crystals  NONE   /LPF


 


Urine Bacteria  TRACE   /HPF


 


Urine Casts  NONE   /LPF


 


Urine Mucus  SMALL H  /LPF


 


Urine Culture Indicated  NO   








My Orders





Orders - MERCEDES VALADEZ


Cbc With Automated Diff (18 09:50)


Comprehensive Metabolic Panel (18 09:50)


Hs C Reactive Protein (18 09:50)


Lipase (18 09:50)


Magnesium (18 09:50)


Troponin I (18 09:50)


Ua Culture If Indicated (18 09:50)


Saline Lock/Iv-Start (18 09:50)


Ns Iv 1000 Ml (Sodium Chloride 0.9%) (18 09:50)


Continuous Ekg Monitoring (18 09:50)


Ekg Tracing (18 09:50)


Manual Differential (18 09:50)


Ondansetron Injection (Zofran Injectio (18 10:30)


Magnesium 1 Gm/100 Ml Ivpb (Magnesium Hammer (18 10:30)


Ondansetron Injection (Zofran Injectio (18 10:23)


 Consult (18 12:18)





Medications Given in ED





Current Medications








 Medications  Dose


 Ordered  Sig/Adelfo


 Route  Start Time


 Stop Time Status Last Admin


Dose Admin


 


 Magnesium Sulfate/


 Dextrose  100 ml @ 


 100 mls/hr  ONCE  ONCE


 IV  18 10:30


 18 11:29 DC 18 10:39


100 MLS/HR


 


 Ondansetron HCl  4 mg  ONCE  ONCE


 IVP  18 10:30


 18 10:31 DC 18 10:29


4 MG








Vital Signs/I&O











 18





 09:35


 


Temp 98.8


 


Pulse 105


 


Resp 18


 


B/P (MAP) 133/90 (104)


 


Pulse Ox 94











Progress


Progress Note #1:  


   Time:  10:24


Progress Note


Gastroenteritis versus UTI versus other. Her abdomen is nontender at this time 

and other than a heart rate above 90 she is not showing any real signs of 

distress. She does look a little dry on clinical exam so we'll start with a 

liter fluids check some labs including a urinalysis. She's not having any pain 

right now and she's not having any nausea right now so she doesn't want any 

medicines. My examination of her arms would be consistent with maybe a little 

muscular strain of the triceps or deltoids but there is no tenderness or 

crepitus in the joints.


Lastly given her age, nonspecific nausea and abdominal pain I would also 

consider atypical angina and get an EKG and troponin since been going on for 4 

days of syncopal problems should be sufficient to detect in STEMI.


The patient has started experiencing some nausea so we'll start her some Zofran.


Progress Note #2:  


   Time:  11:45


Progress Note


The patient has slightly low magnesium which is probably because of her 

diarrhea and recent nausea vomiting. Mr. labs or not that remarkable. We'll 

give her some fluids to take her up but she is asking for  

consult for possible placement. Have the ambulatory rehabilitation unit come 

down and examine her and see if she would be a candidate for their services and 

if not then we can get  to help her with placement.


Progress Note #3:  


   Time:  14:14


Progress Note


Acute rehabilitation unit could not accept her. The social workers at down and 

gave her some ideas for some and home health care nursing staff etc. We will 

also recommend she follow up with her primary care doctor and talk about doing 

home health care physical therapy for her upper body. Make sure she has some 

Zofran at the pharmacy to go home with. She received her IV magnesium bolus and 

she is looking a lot better after the fluids and electrolytes.


Initial ECG Impression Date:  Sep 26, 2018


Initial ECG Impression Time:  10:19


Initial ECG Rate:  98


Initial ECG Rhythm:  Normal Sinus


Initial ECG Intervals:  Normal


Initial ECG Impression:  Normal, Nonspecific Changes


Comment


No ST elevation or depression.





Departure


Impression





 Primary Impression:  


 Viral gastroenteritis


 Additional Impression:  


 Hypomagnesemia


Disposition:  01 HOME, SELF-CARE


Condition:  Improved





Departure-Patient Inst.


Decision time for Depature:  14:20


Referrals:  


[facility]


Primary Care Physician








LEANDER FREIRE (Family)


Primary Care Physician


Patient Instructions:  Viral Gastroenteritis, Adult (DC)





Add. Discharge Instructions:  


Drink plenty of fluids. I encourage once a day for the next couple days to use 

a sugar-free sports drink. 


If you are Having nausea you can take one tablet of Zofran place under tongue 

and allowed to dissolve and absorbed your mouth every 6 hours as needed. 


Follow-up with your PCP as needed and discussed further workup for your arm 

discomfort.


Return to the ER. Begin to have chest pain shortness of breath or intractable 

nausea vomiting or pain.


All discharge instructions reviewed with patient and/or family. Voiced 

understanding.


Scripts


Ondansetron (Ondansetron Odt) 4 Mg Tab.rapdis


4 MG PO Q6H PRN for NAUSEA/VOMITING, #8 TAB 0 Refills


   Prov: MERCEDES VALADEZ         18





Copy


Copies To 1:   INOCENTE GRANADOS TITUS J Sep 26, 2018 10:06

## 2018-09-27 ENCOUNTER — HOSPITAL ENCOUNTER (INPATIENT)
Dept: HOSPITAL 75 - ER | Age: 73
LOS: 4 days | Discharge: NURSING, REHAB OR LONG-TERM CARE | DRG: 603 | End: 2018-10-01
Attending: FAMILY MEDICINE | Admitting: FAMILY MEDICINE
Payer: MEDICARE

## 2018-09-27 VITALS — DIASTOLIC BLOOD PRESSURE: 79 MMHG | SYSTOLIC BLOOD PRESSURE: 181 MMHG

## 2018-09-27 VITALS — BODY MASS INDEX: 45.99 KG/M2 | WEIGHT: 293 LBS | HEIGHT: 67 IN

## 2018-09-27 VITALS — DIASTOLIC BLOOD PRESSURE: 72 MMHG | SYSTOLIC BLOOD PRESSURE: 181 MMHG

## 2018-09-27 DIAGNOSIS — Z91.19: ICD-10-CM

## 2018-09-27 DIAGNOSIS — Z23: ICD-10-CM

## 2018-09-27 DIAGNOSIS — F41.9: ICD-10-CM

## 2018-09-27 DIAGNOSIS — E66.01: ICD-10-CM

## 2018-09-27 DIAGNOSIS — I25.10: ICD-10-CM

## 2018-09-27 DIAGNOSIS — J45.909: ICD-10-CM

## 2018-09-27 DIAGNOSIS — Z87.891: ICD-10-CM

## 2018-09-27 DIAGNOSIS — K21.9: ICD-10-CM

## 2018-09-27 DIAGNOSIS — Z74.2: ICD-10-CM

## 2018-09-27 DIAGNOSIS — R09.02: ICD-10-CM

## 2018-09-27 DIAGNOSIS — Z79.4: ICD-10-CM

## 2018-09-27 DIAGNOSIS — E78.00: ICD-10-CM

## 2018-09-27 DIAGNOSIS — L30.4: ICD-10-CM

## 2018-09-27 DIAGNOSIS — M06.9: ICD-10-CM

## 2018-09-27 DIAGNOSIS — E11.9: ICD-10-CM

## 2018-09-27 DIAGNOSIS — L03.311: Primary | ICD-10-CM

## 2018-09-27 DIAGNOSIS — M87.9: ICD-10-CM

## 2018-09-27 DIAGNOSIS — M21.379: ICD-10-CM

## 2018-09-27 DIAGNOSIS — F32.9: ICD-10-CM

## 2018-09-27 DIAGNOSIS — R53.1: ICD-10-CM

## 2018-09-27 DIAGNOSIS — Z95.5: ICD-10-CM

## 2018-09-27 DIAGNOSIS — I10: ICD-10-CM

## 2018-09-27 LAB
ALBUMIN SERPL-MCNC: 3.4 GM/DL (ref 3.2–4.5)
ALP SERPL-CCNC: 191 U/L (ref 40–136)
ALT SERPL-CCNC: 22 U/L (ref 0–55)
APTT BLD: 33 SEC (ref 24–35)
ARTERIAL PATENCY WRIST A: (no result)
BASE EXCESS STD BLDA CALC-SCNC: -0.3 MMOL/L (ref -2.5–2.5)
BASOPHILS # BLD AUTO: 0 10^3/UL (ref 0–0.1)
BASOPHILS NFR BLD AUTO: 0 % (ref 0–10)
BDY SITE: (no result)
BILIRUB SERPL-MCNC: 1.3 MG/DL (ref 0.1–1)
BODY TEMPERATURE: 98.9
BUN/CREAT SERPL: 19
CALCIUM SERPL-MCNC: 8.7 MG/DL (ref 8.5–10.1)
CHLORIDE SERPL-SCNC: 103 MMOL/L (ref 98–107)
CO2 BLDA CALC-SCNC: 25.1 MMOL/L (ref 21–31)
CO2 SERPL-SCNC: 22 MMOL/L (ref 21–32)
CREAT SERPL-MCNC: 0.94 MG/DL (ref 0.6–1.3)
EOSINOPHIL # BLD AUTO: 0.1 10^3/UL (ref 0–0.3)
EOSINOPHIL NFR BLD AUTO: 1 % (ref 0–10)
ERYTHROCYTE [DISTWIDTH] IN BLOOD BY AUTOMATED COUNT: 16.2 % (ref 10–14.5)
GFR SERPLBLD BASED ON 1.73 SQ M-ARVRAT: 59 ML/MIN
GLUCOSE SERPL-MCNC: 235 MG/DL (ref 70–105)
HCT VFR BLD CALC: 38 % (ref 35–52)
HGB BLD-MCNC: 12.2 G/DL (ref 11.5–16)
INHALED O2 FLOW RATE: (no result) L/MIN
INR PPP: 1.1 (ref 0.8–1.4)
LYMPHOCYTES # BLD AUTO: 0.9 X 10^3 (ref 1–4)
LYMPHOCYTES NFR BLD AUTO: 8 % (ref 12–44)
MAGNESIUM SERPL-MCNC: 1.7 MG/DL (ref 1.8–2.4)
MANUAL DIFFERENTIAL PERFORMED BLD QL: NO
MCH RBC QN AUTO: 30 PG (ref 25–34)
MCHC RBC AUTO-ENTMCNC: 32 G/DL (ref 32–36)
MCV RBC AUTO: 95 FL (ref 80–99)
MONOCYTES # BLD AUTO: 0.7 X 10^3 (ref 0–1)
MONOCYTES NFR BLD AUTO: 6 % (ref 0–12)
NEUTROPHILS # BLD AUTO: 9.6 X 10^3 (ref 1.8–7.8)
NEUTROPHILS NFR BLD AUTO: 85 % (ref 42–75)
PCO2 BLDA: 40 MMHG (ref 35–45)
PH BLDA: 7.4 [PH] (ref 7.37–7.43)
PLATELET # BLD: 186 10^3/UL (ref 130–400)
PMV BLD AUTO: 11.6 FL (ref 7.4–10.4)
PO2 BLDA: 78 MMHG (ref 79–93)
POTASSIUM SERPL-SCNC: 4.5 MMOL/L (ref 3.6–5)
PROT SERPL-MCNC: 6.2 GM/DL (ref 6.4–8.2)
PROTHROMBIN TIME: 14.2 SEC (ref 12.2–14.7)
RBC # BLD AUTO: 4.03 10^6/UL (ref 4.35–5.85)
SAO2 % BLDA FROM PO2: 96 % (ref 94–100)
SODIUM SERPL-SCNC: 135 MMOL/L (ref 135–145)
VENTILATION MODE VENT: NO
WBC # BLD AUTO: 11.2 10^3/UL (ref 4.3–11)

## 2018-09-27 PROCEDURE — 94664 DEMO&/EVAL PT USE INHALER: CPT

## 2018-09-27 PROCEDURE — 87804 INFLUENZA ASSAY W/OPTIC: CPT

## 2018-09-27 PROCEDURE — 71045 X-RAY EXAM CHEST 1 VIEW: CPT

## 2018-09-27 PROCEDURE — 36600 WITHDRAWAL OF ARTERIAL BLOOD: CPT

## 2018-09-27 PROCEDURE — 80053 COMPREHEN METABOLIC PANEL: CPT

## 2018-09-27 PROCEDURE — 94760 N-INVAS EAR/PLS OXIMETRY 1: CPT

## 2018-09-27 PROCEDURE — 85610 PROTHROMBIN TIME: CPT

## 2018-09-27 PROCEDURE — 85025 COMPLETE CBC W/AUTO DIFF WBC: CPT

## 2018-09-27 PROCEDURE — 86141 C-REACTIVE PROTEIN HS: CPT

## 2018-09-27 PROCEDURE — 87040 BLOOD CULTURE FOR BACTERIA: CPT

## 2018-09-27 PROCEDURE — 94660 CPAP INITIATION&MGMT: CPT

## 2018-09-27 PROCEDURE — 82805 BLOOD GASES W/O2 SATURATION: CPT

## 2018-09-27 PROCEDURE — 85730 THROMBOPLASTIN TIME PARTIAL: CPT

## 2018-09-27 PROCEDURE — 94640 AIRWAY INHALATION TREATMENT: CPT

## 2018-09-27 PROCEDURE — 96375 TX/PRO/DX INJ NEW DRUG ADDON: CPT

## 2018-09-27 PROCEDURE — 90686 IIV4 VACC NO PRSV 0.5 ML IM: CPT

## 2018-09-27 PROCEDURE — 83735 ASSAY OF MAGNESIUM: CPT

## 2018-09-27 PROCEDURE — 82962 GLUCOSE BLOOD TEST: CPT

## 2018-09-27 PROCEDURE — 83605 ASSAY OF LACTIC ACID: CPT

## 2018-09-27 PROCEDURE — 96365 THER/PROPH/DIAG IV INF INIT: CPT

## 2018-09-27 PROCEDURE — 36415 COLL VENOUS BLD VENIPUNCTURE: CPT

## 2018-09-27 RX ADMIN — Medication SCH ML: at 20:28

## 2018-09-27 RX ADMIN — MICONAZOLE NITRATE SCH APPLIC: 20 POWDER TOPICAL at 20:28

## 2018-09-27 RX ADMIN — INSULIN ASPART SCH UNIT: 100 INJECTION, SOLUTION INTRAVENOUS; SUBCUTANEOUS at 20:28

## 2018-09-27 NOTE — XMS REPORT
[facility]

 Created on: 2018



Madison Young

External Reference #: [id number]

: 1945

Sex: Female



Demographics







 Address  [address] [place]  [address]

 

 Preferred Language  Unknown

 

 Marital Status  Unknown

 

 Adventist Affiliation  Unknown

 

 Race  Unknown

 

 Ethnic Group  Unknown





Author







 Author  FREIRELEANDER HILLIARD

 

 Organization  [facility]

 

 Address  [address] [place]  [address]



 

 Phone  [phone]







Care Team Providers







 Care Team Member Name  Role  Phone

 

 LEANDER FREIRE  Unavailable  [phone]







PROBLEMS







 Type  Condition  ICD9-CM Code  HLA37-DD Code  Onset Dates  Condition Status  
SNOMED Code

 

 Problem  Hypertension     I10     Active  64320670

 

 Problem  Morbid (severe) obesity due to excess calories     E66.01     Active  
403775735

 

 Problem  Anxiety disorder, unspecified     F41.9     Active  015647145

 

 Problem  Body mass index (BMI) of 50-59.9 in adult     Z68.43     Active  
666714367

 

 Problem  Peripheral edema     R60.9     Active  613389374

 

 Problem  Long-term insulin use     Z79.4     Active  832874366

 

 Problem  Recurrent major depressive disorder, in partial remission     F33.41 
    Active  26889635

 

 Problem  Iron deficiency anemia     D50.9     Active  49149935

 

 Problem  Non-adherence to medical treatment     Z91.19     Active  685342611

 

 Problem  Microalbuminuric diabetic nephropathy     E11.21     Active  776122192

 

 Problem  Avascular necrosis of bone of right hip     M87.051     Active  
454962099

 

 Problem  Chronic kidney disease (CKD) stage G3a/A3, moderately decreased 
glomerular filtration rate (GFR) between 45-59 mL/min/1.73 square meter and 
albuminuria creatinine ratio greater than 300 mg/g     N18.3     Active  
606918865

 

 Problem  Type 2 diabetes mellitus with diabetic polyneuropathy     E11.42     
Active  02442374

 

 Problem  Immune thrombocytopenic purpura     D69.3     Active  116097390

 

 Problem  Coronary artery disease     I25.10     Active  54907039

 

 Problem  Hyperlipidemia, unspecified hyperlipidemia     E78.5     Active  
51051803

 

 Problem  GERD (gastroesophageal reflux disease)     K21.9     Active  675997452

 

 Problem  Asthma     J45.909     Active  792363980







ALLERGIES







 Substance  Reaction  Event Type  Date  Status

 

 Heparin Sodium (Porcine) PF  Unknown  Drug Allergy  02 Aug, 2018  Active

 

 Adhesive  Unknown  Non Drug Allergy  02 Aug, 2018  Active







ENCOUNTERS







 Encounter  Location  Date  Diagnosis

 

 [facility]  [address], [place] [address]  14 Sep, 2018  Coronary artery disease I25.10 ; Peripheral edema R60.9 ; 
Avascular necrosis of bone of right hip M87.051 ; Morbid (severe) obesity due 
to excess calories E66.01 and Body mass index (BMI) of 50-59.9 in adult Z68.43

 

 [place] [id number] [place] [phone]
[phone]  10 Sep, 2018   

 

 [place] [id number] [place] [address]-
[phone]  06 Sep, 2018  Type 2 diabetes mellitus with diabetic polyneuropathy 
E11.42 ; Coronary artery disease I25.10 ; Hypertension I10 ; Long-term insulin 
use Z79.4 ; Body mass index (BMI) of 45.0-49.9 in adult Z68.42 ; Peripheral 
edema R60.9 and Avascular necrosis of bone of right hip M87.051

 

 [place] [id number] [place] [phone]-
[phone]  17 Aug, 2018  Peripheral edema R60.9

 

 [place] [id number] [place] [address]24-
6201  13 Aug, 2018  Hypertension I10 and Peripheral edema R60.9

 

 [place] [id number] [place] [address]04-
[phone]  09 Aug, 2018   

 

 [place] [id number] [place] [address] [phone]
[phone]  09 Aug, 2018  Hypertension I10 and Peripheral edema R60.9

 

 [place] [address] [place] [address]74-
[phone]  06 Aug, 2018  Hypertension I10 and Peripheral edema R60.9

 

 [place] [id number] [place] [address] 85762-
[phone]  02 Aug, 2018   

 

 [place] [address] [place] [address]-
[phone]  02 Aug, 2018  Hypertension I10 and Peripheral edema R60.9

 

 [place] [id number], [place] [address]-
9464  01 Aug, 2018   

 

 [place] [id number] [place] [address]77-
[phone]     

 

 [place] [id number] [address]-
9804     

 

 [place] [id number] [place] [address] 34233-
7898     

 

 [place] [id number] [place] [address] 20747-
9707    Diabetes mellitus due to underlying condition with foot 
ulcer E08.621 ; Non-pressure chronic ulcer of other part of left foot limited 
to breakdown of skin L97.521 and BMI 45.0-49.9, adult Z68.42

 

 [place] [id number] [place] [address] 92683-
[phone]    Acute idiopathic gout involving toe of left foot M10.072

 

 [place] [address] [place] [address] 63219-
[phone]     

 

 [place] [id number] [place] [address] 74877-
[phone]     

 

 [place] [address], [place] [address]
9797     

 

 [place] [id number] [address]
4868    Type 2 diabetes mellitus with diabetic polyneuropathy 
E11.42 ; Hypertension I10 ; Hyperlipidemia, unspecified hyperlipidemia E78.5 ; 
Body mass index (BMI) of 45.0-49.9 in adult Z68.42 ; Long-term insulin use 
Z79.4 ; Non-adherence to medical treatment Z91.19 ; Coronary artery disease 
I25.10 ; GERD (gastroesophageal reflux disease) K21.9 ; Asthma J45.909 and 
Recurrent major depressive disorder, in partial remission F33.41

 

 [place] [id number] [place] [address] 91722-
6195  22 May, 2018  Recurrent major depressive disorder, in partial remission 
F33.41 and Hypertension I10

 

 [place] [id number] [place] [address] 36758-
[phone]  21 May, 2018  Immune thrombocytopenic purpura D69.3 and Iron deficiency 
anemia D50.9

 

 [place] [id number] [place] [address] 44142-
4133  21 May, 2018  Type 2 diabetes mellitus with diabetic polyneuropathy 
E11.42 ; Long-term insulin use Z79.4 ; Chronic kidney disease (CKD) stage G3a/A3
, moderately decreased glomerular filtration rate (GFR) between 45-59 mL/min/
1.73 square meter and albuminuria creatinine ratio greater than 300 mg/g N18.3 
; Hypertension I10 ; Hyperlipidemia, unspecified hyperlipidemia E78.5 ; 
Coronary artery disease I25.10 ; GERD (gastroesophageal reflux disease) K21.9 ; 
Immune thrombocytopenic purpura D69.3 ; Asthma J45.909 ; Morbid (severe) 
obesity due to excess calories E66.01 and Recurrent major depressive disorder, 
in partial remission F33.41

 

 [place] [id number] [place] [address] 65157-
6343  11 May, 2018  Chronic kidney disease (CKD) stage G3a/A3, moderately 
decreased glomerular filtration rate (GFR) between 45-59 mL/min/1.73 square 
meter and albuminuria creatinine ratio greater than 300 mg/g N18.3

 

 [place] [id number] [place] [address] 27492-
9522  02 May, 2018  Chronic kidney disease (CKD) stage G3a/A3, moderately 
decreased glomerular filtration rate (GFR) between 45-59 mL/min/1.73 square 
meter and albuminuria creatinine ratio greater than 300 mg/g N18.3

 

 [place] [id number] [place] [address] 45583-
1760     

 

 [place] [id number] [place] [address] 57586-
2181  28 Mar, 2018   

 

 [place] [id number] [place] [address] 72128-
2523  26 Mar, 2018  Immune thrombocytopenic purpura D69.3 ; Type 2 diabetes 
mellitus with diabetic polyneuropathy E11.42 ; Avascular necrosis of bone of 
right hip M87.051 ; Long-term insulin use Z79.4 ; GERD (gastroesophageal reflux 
disease) K21.9 ; Hyperlipidemia, unspecified hyperlipidemia E78.5 ; 
Hypertension I10 ; Recurrent major depressive disorder, in partial remission 
F33.41 ; Microalbuminuric diabetic nephropathy E11.21 ; Body mass index (BMI) 
of 45.0-49.9 in adult Z68.42 ; BMI 45.0-49.9, adult Z68.42 and Chronic kidney 
disease (CKD) stage G3a/A3, moderately decreased glomerular filtration rate (GFR
) between 45-59 mL/min/1.73 square meter and albuminuria creatinine ratio 
greater than 300 mg/g N18.3

 

 [place] [id number] [place] [address] 90306-
9705  23 Mar, 2018   

 

 [place] [address] [address]-
2615  23 Mar, 2018   

 

 [place] [address] [phone]-
[phone]  19 Mar, 2018   

 

 [place] [address] [address]-
4610  14 Mar, 2018   

 

 [place] [id number] [place] [address] 91649-
4098  13 Mar, 2018  Type 2 diabetes mellitus with diabetic polyneuropathy 
E11.42 ; Asthma J45.909 and Hypertension I10

 

 [facility]  [address] [place] 
[id number]  13 Mar, 2018  Skin ulcer, limited to breakdown of skin L98.491 ; 
Immune thrombocytopenic purpura D69.3 ; Avascular necrosis of bone of right hip 
M87.051 and Type 2 diabetes mellitus with diabetic polyneuropathy E11.42

 

 [place] [id number] [place] [address] 68468-
8283  06 Mar, 2018  Asthma J45.909 and Type 2 diabetes mellitus with diabetic 
polyneuropathy E11.42

 

 [facility]  [address], [place] [address]-
3997  01 Mar, 2018   

 

 [facility]  [address] [place], [place] 
[id number]    Other iron deficiency anemia D50.8 ; Weakness R53.1 ; 
Type 2 diabetes mellitus with diabetic polyneuropathy E11.42 ; Cellulitis of 
trunk, unspecified site of trunk L03.319 ; Coronary artery disease I25.10 ; 
Avascular necrosis of bone of right hip M87.051 and Morbid (severe) obesity due 
to excess calories E66.01

 

 [facility]  [id number] [place] [address] 
[id number]     

 

 [place] [id number] [place] [address] 67940-
7083  16 2018   

 

 [facility] [place] [id number] [place] [address] [phone]
[phone]  15 Feb, 2018  Yeast infection of the skin B37.2

 

 [place] [address] [place] [address] 12139-
1610  15 Feb, 2018   

 

 [place] [id number] [place] [address] 58371-
7273  15 Feb, 2018   

 

 [facility]  [address] [place] [address] 08726-
5740     

 

 [facility] [place] [facility]  [id number] [place] [address] [phone]
[phone]     

 

 [facility]  [address] [place] [address]2498-
8961    Type 2 diabetes mellitus with diabetic polyneuropathy 
E11.42 ; Avascular necrosis of bone of right hip M87.051 ; Hyperlipidemia, 
unspecified hyperlipidemia E78.5 ; Hypertension I10 ; Anxiety disorder, 
unspecified F41.9 ; Immune thrombocytopenic purpura D69.3 ; Coronary artery 
disease I25.10 ; Orthopnea R06.01 ; Acute bronchitis, unspecified organism 
J20.9 ; Wound of left lower extremity, initial encounter S81.802A ; Body aches 
R52 and Debilitated R53.81

 

 [place] [address] [id number] [place] [address] 68612-
4956  12 2018   

 

 [place] [address] [id number] [place] [address] 19381-
7327    Type 2 diabetes mellitus with diabetic polyneuropathy 
E11.42 ; Encounter for immunization Z23 ; Hyperlipidemia, unspecified 
hyperlipidemia E78.5 ; Hypertension I10 ; Anxiety disorder, unspecified F41.9 ; 
Asthma J45.909 ; Body mass index (BMI) of 45.0-49.9 in adult Z68.42 and Morbid (
severe) obesity due to excess calories E66.01

 

 [place] [id number] [place] [address]-
1965    Type 2 diabetes mellitus with diabetic polyneuropathy 
E11.42 ; Hyperlipidemia, unspecified hyperlipidemia E78.5 ; Hypertension I10 
and GERD (gastroesophageal reflux disease) K21.9

 

 [place] [id number] [place] [address] 85691-
5670  22 Mar, 2017  Type 2 diabetes mellitus with diabetic polyneuropathy E11.42

 

 [place] [id number] [place] [address] 64392-
2337  28 2017  Type 2 diabetes mellitus with diabetic polyneuropathy 
E11.42 ; Coronary artery disease I25.10 ; History of MI (myocardial infarction) 
I25.2 ; Immune thrombocytopenic purpura D69.3 ; GERD (gastroesophageal reflux 
disease) K21.9 ; Hyperlipidemia, unspecified hyperlipidemia E78.5 ; 
Hypertension I10 ; History of kidney stones Z87.442 and Anxiety disorder, 
unspecified F41.9

 

 [place] [id number] [place] [address] 18115-
0506  14 2017   

 

 [address] [place] [address] 88687-
3445  29 Sep, 2016  Coronary artery disease I25.10 ; History of MI (myocardial 
infarction) I25.2 ; History of kidney stones Z87.442 ; Type 2 diabetes mellitus 
with diabetic polyneuropathy E11.42 ; Avascular necrosis of bone of right hip 
M87.051 ; Hyperlipidemia, unspecified hyperlipidemia E78.5 ; Hypertension I10 ; 
Asthma J45.909 and Encounter for immunization Z23

 

 [place] [id number] [place] [address] 83575-
5910  20 Sep, 2016   

 

 [place] [address] [address]-
6714    Coronary artery disease I25.10 ; Type 2 diabetes mellitus 
with diabetic polyneuropathy E11.42 ; History of MI (myocardial infarction) 
I25.2 ; Immune thrombocytopenic purpura D69.3 ; Hyperlipidemia, unspecified 
hyperlipidemia E78.5 and Hypertension I10

 

 [place] [address] [phone]-
7865    Coronary artery disease I25.10 ; Lymphedema I89.0 ; History 
of MI (myocardial infarction) I25.2 ; History of kidney stones Z87.442 ; Immune 
thrombocytopenic purpura D69.3 ; Type 2 diabetes mellitus with diabetic 
polyneuropathy E11.42 ; Avascular necrosis of bone of right hip M87.051 ; GERD (
gastroesophageal reflux disease) K21.9 ; Hyperlipidemia, unspecified 
hyperlipidemia E78.5 ; Hypertension I10 and Asthma J45.909

 

 [place] [id number] [place] [address] 13376-
4008     

 

 [place] [id number] [place] [address] 65497-
9978     

 

 [place] [id number] [place] [address] 22157-
7895  02 Dec, 2015   

 

 [place] [address] [phone]-
[phone]  02 Dec, 2015  Hyperlipidemia, unspecified hyperlipidemia E78.5

 

 [place] [id number] [place] [address] 95339-
3421     

 

 [place] [address] [place] [address]-
7435     

 

 [facility]  [address] [facility] [id number] [place] [address]-
5973    Allergic rhinitis J30.9

 

 [facility]  [address] [facility] [id number] [place] [address]-
1772    Type 2 diabetes mellitus with diabetic polyneuropathy 
E11.42 ; Routine adult health maintenance Z00.00 ; Coronary artery disease 
I25.10 ; History of MI (myocardial infarction) I25.2 ; History of kidney stones 
Z87.442 ; Immune thrombocytopenic purpura D69.3 ; Avascular necrosis of bone of 
right hip M87.051 and GERD (gastroesophageal reflux disease) K21.9







IMMUNIZATIONS

No Known Immunizations



SOCIAL HISTORY

Never Assessed



REASON FOR VISIT

Legs swelling  with blisters     -- elsa rene



PLAN OF CARE







 Activity  Details









  









 Follow Up  2 - 3 Days Reason:F/U edema







VITAL SIGNS







 Height  67 in  2018

 

 Weight  336.8 lbs  2018

 

 Temperature  98.0 degrees Fahrenheit  2018

 

 Heart Rate  86 bpm  2018

 

 Respiratory Rate  22   2018

 

 BMI  52.74 kg/m2  2018

 

 Blood pressure systolic  177 mmHg  2018

 

 Blood pressure diastolic  72 mmHg  2018







MEDICATIONS







 Medication  Instructions  Dosage  Frequency  Start Date  End Date  Duration  
Status

 

 Aspir-81 81 MG  Orally Once a day  1 tablet  24h     16 May, 2019     Active

 

 Benazepril HCl 40 mg  Orally Once a day  1 tablet  24h           Active

 

 Humalog KwikPen 100 UNIT/ML  Subcutaneous three times a day before meals  12 
units              Active

 

 Pantoprazole Sodium 40 mg  Orally Once a day  1 tablet  24h           Active

 

 Chlorthalidone 50 mg  Orally Once a day  1 tablet in the morning with food  
24h  02 Aug, 2018     30 day(s)  Active

 

 Liraglutide 18 MG/3ML  Subcutaneous Once a day  0.6mg daily for one week then 
increase to 1.2 mg daily  24h  22 Carlos Enrique, 2018  20 Sep, 2018  30 days  Active

 

 Torsemide 10 mg  Orally Once a day  1 tablet  24h  02 Aug, 2018     30 day(s)  
Active

 

 Potassium Chloride 10 MEQ  Orally Twice a day  1 tablet with food  12h  02 Aug
, 2018  1 Oct, 2018  30 day(s)  Active

 

 GlipiZIDE 5 mg  Orally 2 times a day  1 tablet  12h           Active

 

 ProAir  (90 Base) MCG/ACT  Inhalation every 4 hrs  2 puffs as needed  
4h          Active

 

 Advair Diskus           26 Mar, 2018        Active

 

 Atorvastatin Calcium 10 mg  Orally Once a day  1 tablet  24h      
    Active

 

 Nystatin           26 Mar, 2018        Active

 

 Levemir FlexTouch 100 UNIT/ML  Subcutaneous 2 times a day  20  units twice 
daily  12h          Active

 

 Carvedilol 12.5 MG  Orally 2 times a day  1 tablet  12h           Active

 

 Citalopram Hydrobromide 20 mg  Orally Once a day  1 tablet  24h           
Active

 

 Probiotic -                    Active

 

 Aleve 220 MG  Orally every 12 hrs  2 tablets with food or milk as needed  12h 
          Active







RESULTS

No Results



PROCEDURES







 Procedure  Date Ordered  Result  Body Site

 

 [facility] VISIT ESTABLISHED PATIENT  Aug 02, 2018      







INSTRUCTIONS





MEDICATIONS ADMINISTERED

No Known Medications



MEDICAL (GENERAL) HISTORY







 Type  Description  Date

 

 Medical History  Type II Diabetes   

 

 Medical History  CAD   

 

 Medical History  Acute MI x1   

 

 Medical History  Heart Cath x3   

 

 Medical History  Kidney Stones   

 

 Medical History  Lymphedema   

 

 Medical History  Immune thrombocytopenic purpura   

 

 Surgical History  Heart Stents x2   

 

 Surgical History  Cholecystectomy   

 

 Surgical History     

 

 Surgical History  [place] Teeth   

 

 Hospitalization History  [facility]  

 

 Hospitalization History  Sepsis/Toxic Shock  

 

 [facility] History  [facility]neum[facility]  2017

 

 [facility] History  [facility] Anemia, cellulitis pannus, yeast 
infection. Discharged 2018

## 2018-09-27 NOTE — ED GENERAL
General


Chief Complaint:  General Problems/Pain


Stated Complaint:  WEAKNESS


Nursing Triage Note:  


TO ED  PER EMS FROM HOME   WITH WEAKNESS  WAS SEEN IN ED YESTERDAY FOR 


SAMETHING. ON ADMIT PATIENT ASKING FOR WATER. NO CHANGE FROM YESTERDAY.


Nursing Sepsis Screen:  No Definite Risk


Source of Information:  Patient, EMS, Old Records


Exam Limitations:  No Limitations





History of Present Illness


Date Seen by Provider:  Sep 27, 2018


Time Seen by Provider:  12:27


Initial Comments


This 72-year-old woman presents to the emergency room with primary complaint of 

weakness.  She arrives via EMS.  She has history of avascular necrosis of the 

hips and normally is unable to ambulate.  However, she typically can transfer 

herself to the commode and bed.  She lives alone.  Today she is unable to do 

so.  She was seen in the ER yesterday and had a fairly thorough workup which 

included labs and UA.  No major abnormalities were found.  She was dismissed 

home after an evaluation by the acute rehabilitation unit which was unable to 

admit her.  Social work gave her some information on nursing services for the 

home and elsewhere.  EMS reports she had a fever at home greater than 102.  She 

did not have a fever yesterday.  Patient has had some nausea and vomiting over 

the past couple of days.  Patient has chronic intertrigo under her pannus 

folds.  She places socks in the fold to absorb moisture.





Allergies and Home Medications


Allergies


Coded Allergies:  


     vancomycin (Verified  Allergy, Intermediate, RASH, 18)


     heparin (Verified  Allergy, Unknown, 18)


Uncoded Allergies:  


     TAPE (Allergy, Mild, RASH, 17)





Home Medications


Albuterol Sulfate 1 Puff Puff, 2 PUFF IH Q4H PRN for SHORTNESS OF BREATH, (

Reported)


   1 PUFF = 90 MCG 


Aspirin 81 Mg Tablet.dr, 81 MG PO HS, (Reported)


Atorvastatin Calcium 10 Mg Tablet, 10 MG PO HS, (Reported)


   LAST FILLED #90 5-21-18 


Benazepril HCl 40 Mg Tab, 40 MG PO HS, (Reported)


   LAST FILLED #180 5-21-18 


Carvedilol 12.5 Mg Tablet, 12.5 MG PO BID, (Reported)


   LAST FILLED #180 5-21-18 


Citalopram Hydrobromide 20 Mg Tablet, 20 MG PO DAILY, (Reported)


Fluticasone/Salmeterol 1 Each Blst.w.dev, 1-2 PUFF IH DAILY PRN for SHORTNESS 

OF BREATH, (Reported)


   UNKNOWN LAST FILL DATE 


Glipizide 5 Mg Tablet, 5 MG PO BID, (Reported)


Hydrochlorothiazide 25 Mg Tablet, 25 MG PO DAILY, (Reported)


   LAST FILLED #90 18 


Insulin Detemir 100 Unit/1 Ml Insuln.pen, 20 UNITS SC BID, (Reported)


Insulin Lispro 100 Unit/1 Ml Insuln.pen, 12 UNIT SQ TIDAC, (Reported)


   LAST FILLED 18 


Lactobacillus Combination No.4 1 Each Capsule, 1 CAP PO DAILY, (Reported)


Nystatin 15 Gm Oint...g., TOP BID, (Reported)


Pantoprazole Sodium 40 Mg Tablet.dr, 40 MG PO DAILY, (Reported)


Potassium Chloride 10 Meq Tab.er.prt, 10 MEQ PO DAILY, (Reported)


Tramadol HCl 50 Mg Tablet, 50 MG PO Q6H PRN for PAIN-MODERATE, (Reported)





Patient Home Medication List


Home Medication List Reviewed:  Yes





Review of Systems


Review of Systems


Constitutional:  see HPI


EENTM:  no symptoms reported


Respiratory:  no symptoms reported


Cardiovascular:  no symptoms reported


Gastrointestinal:  see HPI


Genitourinary:  no symptoms reported


Pregnant:  No


Musculoskeletal:  see HPI


Skin:  see HPI


Psychiatric/Neurological:  See HPI


Hematologic/Lymphatic:  No Symptoms Reported


Immunological/Allergic:  no symptoms reported





Past Medical-Social-Family Hx


Past Med/Social Hx:  Reviewed and Corrections made


Patient Social History


Alcohol Use:  Denies Use


Number of Drinks Today:  AA


Alcohol Beverage of Choice:  Beer


Recreational Drug Use:  No


Type Used:  Cigarettes


Former Smoker, Quit:  1977


2nd Hand Smoke Exposure:  No


Recent Foreign Travel:  No


Contact w/Someone Who Travel:  No


Recent Infectious Disease Expo:  No


Recent Hopitalizations:  No





Immunizations Up To Date


Tetanus Booster (TDap):  Unknown


PED Vaccines UTD:  Yes


Date of Pneumonia Vaccine:  Oct 1, 2016


Date of Influenza Vaccine:  Oct 1, 2017





Seasonal Allergies


Seasonal Allergies:  Yes





Past Medical History


Surgeries:  Yes


Cardiac, Coronary Stent, Gallbladder, Renal


Respiratory:  Yes


Asthma


Currently Using CPAP:  No


Currently Using BIPAP:  No


Cardiac:  Yes (stent)


Coronary Artery Disease, High Cholesterol, Hypertension


Neurological:  No


Reproductive Disorders:  No


Female Reproductive Disorders:  Denies


GYN History:  Menopausal


Sexually Transmitted Disease:  No


HIV/AIDS:  No


Genitourinary:  Yes


Bladder Infection, Kidney Stones


Gastrointestinal:  Yes


Gastroesophageal Reflux


Musculoskeletal:  Yes (avascular necrosis of the right hip)


Rheumatoid Arthritis, Foot Drop, Fractures


Endocrine:  Yes (DM TYPE II; MORBID OBESITY)


Diabetes, Insulin dep


HEENT:  No


Cancer:  No


Psychosocial:  Yes


Anxiety, Depression


Integumentary:  No


Recent Skin Changes


Blood Disorders:  Yes (anemia, platelets low, transfusion every 2-3 months)


Adverse Reaction/Blood Tranf:  No





Family Medical History


Reviewed Nursing Family Hx





Cardiovascular disease


  19 MOTHER


Diabetes mellitus


  19 MOTHER


Paternal family history of emphysema


  19 FATHER


No Pertinent Family Hx





Physical Exam-Suspected Sepsis


Physical Exam


Vital Signs





Vital Signs - First Documented








 18





 12:20 15:42 01:12


 


Temp 99.0  


 


Pulse 99  


 


Resp 18  


 


B/P (MAP) 129/70 (89)  


 


Pulse Ox 96  


 


O2 Delivery  Nasal Cannula 


 


O2 Flow Rate  2.00 


 


FiO2   80





Capillary Refill : Less Than 3 Seconds








Blood Pressure Mean:  89








Height, Weight, BMI


Height: 5'7.00"


Weight: 300lbs. 12.8oz. 136.154448hk; 39.1 BMI


Method:Stated


General Appearance:  WD/WN, Mild Distress, Obese


HEENT:  PERRL/EOMI, Normal ENT Inspection, Pharynx Normal


Neck:  Normal Inspection


Respiratory:  No Accessory Muscle Use, No Respiratory Distress, Wheezing (subtle

)


Cardiovascular:  Regular Rate, Rhythm, No Murmur, Normal Peripheral Pulses


Gastrointestinal:  Normal Bowel Sounds, Non Tender, Soft


Extremity:  Pedal Edema, Swelling (chronic with LORIN hose in place)


Neurologic/Psychiatric:  Alert, Oriented x3, No Motor/Sensory Deficits, Normal 

Mood/Affect, CNs II-XII Norm as Tested, Other (initially alert and oriented 

with no focal deficits.  She had waxing and waning confusion throughout the ER 

stay.)


Skin:  normal color, warm/dry, other (wet intertrigo beneath the pannus)





Focused Exam


Lactate Level


18 12:48: Lactic Acid Level 1.71








Progress/Results/Core Measures


Suspected Sepsis


Recent Fever Within 48 Hours:  No


Infection Criteria Present:  None


New/Unexplained  Altered Menta:  No


Sepsis Screen:  No Definite Risk


SIRS


Temperature:99.0 


Pulse: 99 


Respiratory Rate: 18


 


Laboratory Tests


18 12:48: White Blood Count 11.2H


18 05:30: White Blood Count 8.2


Blood Pressure 129 /70 


Mean: 89


 


18 12:48: Lactic Acid Level 1.71


Laboratory Tests


18 12:48: 


Creatinine 0.94, INR Comment 1.1, Platelet Count 186, Total Bilirubin 1.3H


18 05:30: 


Creatinine 0.97, Platelet Count 167, Total Bilirubin 1.2H








Results/Orders


Lab Results





Laboratory Tests








Test


 18


12:48 18


15:50 18


16:02 18


18:20 Range/Units


 


 


White Blood Count


 11.2 H


 


 


 


 4.3-11.0


10^3/uL


 


Red Blood Count


 4.03 L


 


 


 


 4.35-5.85


10^6/uL


 


Hemoglobin 12.2     11.5-16.0  G/DL


 


Hematocrit 38     35-52  %


 


Mean Corpuscular Volume 95     80-99  FL


 


Mean Corpuscular Hemoglobin 30     25-34  PG


 


Mean Corpuscular Hemoglobin


Concent 32 


 


 


 


 32-36  G/DL





 


Red Cell Distribution Width 16.2 H    10.0-14.5  %


 


Platelet Count


 186 


 


 


 


 130-400


10^3/uL


 


Mean Platelet Volume 11.6 H    7.4-10.4  FL


 


Neutrophils (%) (Auto) 85 H    42-75  %


 


Lymphocytes (%) (Auto) 8 L    12-44  %


 


Monocytes (%) (Auto) 6     0-12  %


 


Eosinophils (%) (Auto) 1     0-10  %


 


Basophils (%) (Auto) 0     0-10  %


 


Neutrophils # (Auto) 9.6 H    1.8-7.8  X 10^3


 


Lymphocytes # (Auto) 0.9 L    1.0-4.0  X 10^3


 


Monocytes # (Auto) 0.7     0.0-1.0  X 10^3


 


Eosinophils # (Auto)


 0.1 


 


 


 


 0.0-0.3


10^3/uL


 


Basophils # (Auto)


 0.0 


 


 


 


 0.0-0.1


10^3/uL


 


Prothrombin Time 14.2     12.2-14.7  SEC


 


INR Comment 1.1     0.8-1.4  


 


Activated Partial


Thromboplast Time 33 


 


 


 


 24-35  SEC





 


Sodium Level 135     135-145  MMOL/L


 


Potassium Level 4.5     3.6-5.0  MMOL/L


 


Chloride Level 103       MMOL/L


 


Carbon Dioxide Level 22     21-32  MMOL/L


 


Anion Gap 10     5-14  MMOL/L


 


Blood Urea Nitrogen 18     7-18  MG/DL


 


Creatinine


 0.94 


 


 


 


 0.60-1.30


MG/DL


 


Estimat Glomerular Filtration


Rate 59 


 


 


 


  





 


BUN/Creatinine Ratio 19      


 


Glucose Level 235 H      MG/DL


 


Lactic Acid Level


 1.71 


 


 


 


 0.50-2.00


MMOL/L


 


Calcium Level 8.7     8.5-10.1  MG/DL


 


Corrected Calcium 9.2     8.5-10.1  MG/DL


 


Magnesium Level 1.7 L    1.8-2.4  MG/DL


 


Total Bilirubin 1.3 H    0.1-1.0  MG/DL


 


Aspartate Amino Transf


(AST/SGOT) 31 


 


 


 


 5-34  U/L





 


Alanine Aminotransferase


(ALT/SGPT) 22 


 


 


 


 0-55  U/L





 


Alkaline Phosphatase 191 H      U/L


 


C-Reactive Protein High


Sensitivity 5.22 H


 


 


 


 0.00-0.50


MG/DL


 


Total Protein 6.2 L    6.4-8.2  GM/DL


 


Albumin 3.4     3.2-4.5  GM/DL


 


Blood Gas Puncture Site  RT RAD     


 


Blood Gas Patient Temperature  98.9     


 


Arterial Blood pH  7.40    7.37-7.43  


 


Arterial Blood Partial


Pressure CO2 


 40 


 


 


 35-45  MMHG





 


Arterial Blood Partial


Pressure O2 


 78 L


 


 


 79-93  MMHG





 


Arterial Blood HCO3  24    23-27  MMOL/L


 


Arterial Blood Total CO2


 


 25.1 


 


 


 21.0-31.0


MMOL/L


 


Arterial Blood Oxygen


Saturation 


 96 


 


 


   %





 


Arterial Blood Base Excess


 


 -0.3 


 


 


 -2.5-2.5


MMOL/L


 


Roque Test  YES-POS     


 


Blood Gas Ventilator Setting  NO     


 


Blood Gas Inspired Oxygen  2L     


 


Glucometer   165 H 157 H   MG/DL


 


Test


 18


19:57 18


00:02 18


00:10 18


05:14 Range/Units


 


 


Glucometer 167 H  164 H 161 H   MG/DL


 


Blood Gas Puncture Site  R RAD     


 


Blood Gas Patient Temperature  100.7     


 


Arterial Blood pH  7.37    7.37-7.43  


 


Arterial Blood Partial


Pressure CO2 


 45 


 


 


 35-45  MMHG





 


Arterial Blood Partial


Pressure O2 


 525 H


 


 


 79-93  MMHG





 


Arterial Blood HCO3  24    23-27  MMOL/L


 


Arterial Blood Total CO2


 


 25.7 


 


 


 21.0-31.0


MMOL/L


 


Arterial Blood Oxygen


Saturation 


 100 


 


 


   %





 


Arterial Blood Base Excess


 


 0.0 


 


 


 -2.5-2.5


MMOL/L


 


Roque Test  YES-POS     


 


Blood Gas Ventilator Setting  NO     


 


Blood Gas Inspired Oxygen  80% BIPAP     


 


Test


 18


05:30 18


11:16 18


16:58 18


20:01 Range/Units


 


 


White Blood Count


 8.2 


 


 


 


 4.3-11.0


10^3/uL


 


Red Blood Count


 3.68 L


 


 


 


 4.35-5.85


10^6/uL


 


Hemoglobin 11.3 L    11.5-16.0  G/DL


 


Hematocrit 35     35-52  %


 


Mean Corpuscular Volume 95     80-99  FL


 


Mean Corpuscular Hemoglobin 31     25-34  PG


 


Mean Corpuscular Hemoglobin


Concent 32 


 


 


 


 32-36  G/DL





 


Red Cell Distribution Width 16.1 H    10.0-14.5  %


 


Platelet Count


 167 


 


 


 


 130-400


10^3/uL


 


Mean Platelet Volume 11.2 H    7.4-10.4  FL


 


Neutrophils (%) (Auto) 79 H    42-75  %


 


Lymphocytes (%) (Auto) 13     12-44  %


 


Monocytes (%) (Auto) 8     0-12  %


 


Eosinophils (%) (Auto) 0     0-10  %


 


Basophils (%) (Auto) 0     0-10  %


 


Neutrophils # (Auto) 6.5     1.8-7.8  X 10^3


 


Lymphocytes # (Auto) 1.0     1.0-4.0  X 10^3


 


Monocytes # (Auto) 0.6     0.0-1.0  X 10^3


 


Eosinophils # (Auto)


 0.0 


 


 


 


 0.0-0.3


10^3/uL


 


Basophils # (Auto)


 0.0 


 


 


 


 0.0-0.1


10^3/uL


 


Sodium Level 135     135-145  MMOL/L


 


Potassium Level 4.8     3.6-5.0  MMOL/L


 


Chloride Level 103       MMOL/L


 


Carbon Dioxide Level 25     21-32  MMOL/L


 


Anion Gap 7     5-14  MMOL/L


 


Blood Urea Nitrogen 21 H    7-18  MG/DL


 


Creatinine


 0.97 


 


 


 


 0.60-1.30


MG/DL


 


Estimat Glomerular Filtration


Rate 57 


 


 


 


  





 


BUN/Creatinine Ratio 22      


 


Glucose Level 145 H      MG/DL


 


Calcium Level 8.7     8.5-10.1  MG/DL


 


Corrected Calcium 9.6     8.5-10.1  MG/DL


 


Total Bilirubin 1.2 H    0.1-1.0  MG/DL


 


Aspartate Amino Transf


(AST/SGOT) 26 


 


 


 


 5-34  U/L





 


Alanine Aminotransferase


(ALT/SGPT) 17 


 


 


 


 0-55  U/L





 


Alkaline Phosphatase 156 H      U/L


 


C-Reactive Protein High


Sensitivity 8.73 H


 


 


 


 0.00-0.50


MG/DL


 


Total Protein 5.8 L    6.4-8.2  GM/DL


 


Albumin 2.9 L    3.2-4.5  GM/DL


 


Glucometer  130 H 157 H 142 H   MG/DL


 


Test


 18


06:14 


 


 


 Range/Units


 


 


Glucometer 112 H      MG/DL








Micro Results





Microbiology


18 Blood Culture - Preliminary, Resulted


          No growth


18 Blood Culture - Preliminary, Resulted


          No growth


18 Influenza Types A,B Antigen (KIM) - Final, Complete


          





My Orders





Orders - LACY MELGOZA MD


Cbc With Automated Diff (18 12:25)


Comprehensive Metabolic Panel (18 12:25)


Blood Culture (18 12:25)


Sputum Culture (18 12:25)


Urinalysis (18 12:25)


Urine Culture (18 12:25)


Protime With Inr (18 12:25)


Partial Thromboplastin Time (18 12:25)


Chest 1 View, Ap/Pa Only (18 12:25)


Saline Lock/Iv-Start (18 12:25)


Saline Lock/Iv-Start (18 12:25)


Vital Signs Adult Sepsis Patie Q15M (18 12:25)


O2 (18 12:25)


Lactic Acid Analyzer (18 12:25)


Hs C Reactive Protein (18 13:26)


Influenza A And B Antigens (18 13:26)


Magnesium (18 13:26)


Albuterol/Ipra Inhalation Soln (Duoneb I (18 15:30)


Svn Small Volume Nebulizer (18 15:29)


Arterial Blood Gas (18 15:34)


Accucheck Stat ONCE (18 16:01)


Ketorolac Injection (Toradol Injection) (18 16:15)


Iohexol Injection (Omnipaque 350 Mg/Ml 1 (18 16:30)


Sodium Chloride Flush (Catheter Flush Sy (18 16:30)


Ns (Ivpb) (Sodium Chloride 0.9%) (18 16:30)


Pharmacy Communication (Pharmacy Communi (18 16:22)


Ceftriaxone  For Iv Use (Rocephin  For I (18 17:15)





Medications Given in ED





Vital Signs/I&O











 18





 23:05 00:28 01:00 03:00


 


Temp  96.7  


 


Pulse  89 80 


 


Resp  18  


 


B/P (MAP)  134/75 (94)  


 


Pulse Ox 98 97  96


 


O2 Delivery Nasal Cannula Nasal Cannula  Nasal Cannula


 


O2 Flow Rate 4.00 2.00  3.00


 


    





 18 





 04:00 07:00 07:37 


 


Temp 97.0   


 


Pulse 83 92  


 


Resp 17   


 


B/P (MAP) 126/64 (84)   


 


Pulse Ox 95  96 


 


O2 Delivery Nasal Cannula  Nasal Cannula 


 


O2 Flow Rate 2.00  2.00 





Capillary Refill : Less Than 3 Seconds








Blood Pressure Mean:  89











Point of Care Testing


Finger Stick Blood Glucose:  165


Progress Note :  


Progress Note


Patient was seen and examined upon arrival.  Septic workup was pursued because 

of reported fever.  However, she did not have a fever in the ER.  No definite 

source of infection was identified.  UA was not obtained due body habitus 

creating a difficult scenario for obtaining a catheter specimen.  Patient did 

have a UA performed yesterday which showed no evidence of infection.  Chest x-

ray was clear.  There was a slight elevation in WBC and CRP.  Patient was 

empirically treated with Rocephin after blood cultures were collected.  Patient 

has intertrigo under the pannus and nystatin was ordered for treatment of that.

  Patient had waxing and waning altered mental status and seemed very confused 

at times.  An ABG was performed and was unremarkable.  Patient received a 

DuoNeb treatment for perceived wheezing.  This did not have any impact on her 

mental status.  Patient normally does not have problems with COPD and does not 

normally require oxygen.  Patient was generally weak and was not even able to 

sit up on her own.  Because of the altered mental status CT angiogram of the 

head and neck was ordered.  However, patient refused the study.  Ultimately it 

was determined the patient was nonfunctional to care for herself at home where 

she lives alone.  Admission was discussed with Dr. Edgar.  Although no definite 

source of infection was identified, Rocephin was started empirically due to 

reported fever and subtle change in labs.  Patient has no history of MRSA by 

her account so Rocephin alone was chosen as the initial antibiotic.  Social 

work will be consulted for placement.  Patient's family does not believe she is 

safe at home or able to care for herself at home.





Diagnostic Imaging





   Diagonstic Imaging:  Xray


   Plain Films/CT/US/NM/MRI:  chest


Comments


Chest x-ray viewed by me and report reviewed.  See report below:





NAME:      DEV SCHUMACHER


MED REC#:   [id number]


ACCOUNT#:   [id number]


PT STATUS:   REG ER


:      1945


PHYSICIAN:    LACY MELGOZA MD


ADMIT DATE:   18/ER


***Signed***


Date of Exam:   18





CHEST 1 VIEW, AP/PA ONLY


 





INDICATION: Weakness.





Portable chest at 01:13 p.m.





FINDINGS: There is cardiomegaly. Pulmonary vascularity is normal.


Lungs are clear. There are no effusions or pneumothoraces.





IMPRESSION: Cardiomegaly.





Dictated by: 





  Dictated on workstation # [id number]





[id number]





Dict:      18 1328


Trans:      18 1510





Interpreted by:         ATUL GUIDYR MD


Electronically signed by:   ATUL GUIDRY MD 18 1510





Departure


Communication (Admissions)


Time/Spoke to Admitting Phy:  16:50


Dr. Edgar





Impression





 Primary Impression:  


 Altered mental status


 Qualified Codes:  R41.82 - Altered mental status, unspecified


 Additional Impressions:  


 Nausea and vomiting


 Qualified Codes:  R11.2 - Nausea with vomiting, unspecified


 Weakness generalized


 Intertrigo


 reported fever


 inability to care for self


 Morbid obesity


 Noncompliance


Disposition:   ADMITTED AS INPATIENT


Condition:  Stable





Admissions


Decision to Admit Reason:  Admit from ER (General)


Decision to Admit/Date:  Sep 27, 2018


Time/Decision to Admit Time:  16:50





Departure-Patient Inst.


Referrals:  


[facility] (PCP)


Primary Care Physician








LEANDER FREIRE (Family)


Primary Care Physician











LACY MELGOZA MD Sep 27, 2018 17:14

## 2018-09-27 NOTE — XMS REPORT
[facility]

 Created on: 09/15/2018



Madison Young

External Reference #: [id number]

: 1945

Sex: Female



Demographics







 Address  [address] [place]  [address]

 

 Preferred Language  Unknown

 

 Marital Status  Unknown

 

 [employer] Affiliation  Unknown

 

 Race  Unknown

 

 Ethnic Group  Unknown





Author







 Author  TANI  LEANDER

 

 Organization  [facility]

 

 Address  [address] [place]  [address]



 

 Phone  [phone]







Care Team Providers







 Care Team Member Name  Role  Phone

 

 FREIRELEADNER HILLIARD  Unavailable  [phone]







PROBLEMS







 Type  Condition  ICD9-CM Code  PQE53-NU Code  Onset Dates  Condition Status  
SNOMED Code

 

 Problem  Hypertension     I10     Active  36924096

 

 Problem  Morbid (severe) obesity due to excess calories     E66.01     Active  
336416808

 

 Problem  Anxiety disorder, unspecified     F41.9     Active  130793783

 

 Problem  Body mass index (BMI) of 50-59.9 in adult     Z68.43     Active  
821201867

 

 Problem  Peripheral edema     R60.9     Active  671688914

 

 Problem  Long-term insulin use     Z79.4     Active  304315650

 

 Problem  Recurrent major depressive disorder, in partial remission     F33.41 
    Active  80481037

 

 Problem  Iron deficiency anemia     D50.9     Active  74643217

 

 Problem  Non-adherence to medical treatment     Z91.19     Active  948442856

 

 Problem  Microalbuminuric diabetic nephropathy     E11.21     Active  311558170

 

 Problem  Avascular necrosis of bone of right hip     M87.051     Active  
956636995

 

 Problem  Chronic kidney disease (CKD) stage G3a/A3, moderately decreased 
glomerular filtration rate (GFR) between 45-59 mL/min/1.73 square meter and 
albuminuria creatinine ratio greater than 300 mg/g     N18.3     Active  
814925917

 

 Problem  Type 2 diabetes mellitus with diabetic polyneuropathy     E11.42     
Active  42389355

 

 Problem  Immune thrombocytopenic purpura     D69.3     Active  212481043

 

 Problem  Coronary artery disease     I25.10     Active  25159402

 

 Problem  Hyperlipidemia, unspecified hyperlipidemia     E78.5     Active  
16110229

 

 Problem  GERD (gastroesophageal reflux disease)     K21.9     Active  350858289

 

 Problem  Asthma     J45.909     Active  432670005







ALLERGIES







 Substance  Reaction  Event Type  Date  Status

 

 Heparin Sodium (Porcine) PF  Unknown  Drug Allergy  06 Aug, 2018  Active

 

 Chlorthalidone  diarrhea  Drug Allergy  06 Aug, 2018  Active

 

 Adhesive  Unknown  Non Drug Allergy  06 Aug, 2018  Active







ENCOUNTERS







 Encounter  Location  Date  Diagnosis

 

 [place] [id number] [place] [id number] [place] [phone]-
[phone]  14 Sep, 2018  Coronary artery disease I25.10 ; Peripheral edema R60.9 ; 
Avascular necrosis of bone of right hip M87.051 ; Morbid (severe) obesity due 
to excess calories E66.01 and Body mass index (BMI) of 50-59.9 in adult Z68.43

 

 [place] [address] [phone]-
[phone]  10 Sep, 2018   

 

 [place] [address] [place] [id number] [place] [address] 51847-
2301  06 Sep, 2018  Type 2 diabetes mellitus with diabetic polyneuropathy 
E11.42 ; Coronary artery disease I25.10 ; Hypertension I10 ; Long-term insulin 
use Z79.4 ; Body mass index (BMI) of 45.0-49.9 in adult Z68.42 ; Peripheral 
edema R60.9 and Avascular necrosis of bone of right hip M87.051

 

 [place] [address] [place] [id number] [place] [address] [phone]-
[phone]  17 Aug, 2018  Peripheral edema R60.9

 

 [place] [address] [place] [id number] [place] [address] [phone]-
[phone]  13 Aug, 2018  Hypertension I10 and Peripheral edema R60.9

 

 [place] [address] [place] [id number] [place] [address] 55305-
[phone]  09 Aug, 2018   

 

 [place] [address] [place] [id number] [place] [address] [phone]-
7711  09 Aug, 2018  Hypertension I10 and Peripheral edema R60.9

 

 [place] [address] [place] [id number] [place] [address] 26066-
[phone]  06 Aug, 2018  Hypertension I10 and Peripheral edema R60.9

 

 [place] [address] [place] [id number] [place] [address] [phone]
[phone]  02 Aug, 2018   

 

 [place] [address] [place] [id number] [place] [address] 65774-
[phone]  02 Aug, 2018  Hypertension I10 and Peripheral edema R60.9

 

 [place] [address], [place] [address]-
9819  01 Aug, 2018   

 

 [place] [address] [place] [id number] [place] [address] 89120-
[phone]     

 

 [place] [address] [place] [address] 08129-
0922     

 

 [place] [address] [place] [id number] [place] [address] 67204-
5911     

 

 [place] [address] [place] [address] 50474-
1000    Diabetes mellitus due to underlying condition with foot 
ulcer E08.621 ; Non-pressure chronic ulcer of other part of left foot limited 
to breakdown of skin L97.521 and BMI 45.0-49.9, adult Z68.42

 

 [place] [address] [place] [id number] [place] [address] 98143-
4403  17 2018  Acute idiopathic gout involving toe of left foot M10.072

 

 [place] [address] [place] [id number] [place] [address] 17721-
0901     

 

 [place] [address] [place] [id number] [place] [address] 60470-
6012     

 

 [place] [address] [address]-
7458     

 

 [place] [address] [place] [id number] [place] [address] 29675-
8135    Type 2 diabetes mellitus with diabetic polyneuropathy 
E11.42 ; Hypertension I10 ; Hyperlipidemia, unspecified hyperlipidemia E78.5 ; 
Body mass index (BMI) of 45.0-49.9 in adult Z68.42 ; Long-term insulin use 
Z79.4 ; Non-adherence to medical treatment Z91.19 ; Coronary artery disease 
I25.10 ; GERD (gastroesophageal reflux disease) K21.9 ; Asthma J45.909 and 
Recurrent major depressive disorder, in partial remission F33.41

 

 [place] [address] [place] [id number] [place] [address] [phone]-
[phone]  22 May, 2018  Recurrent major depressive disorder, in partial remission 
F33.41 and Hypertension I10

 

 [place] [address] [place] [id number] [place] [address] 02782-
[phone]  21 May, 2018  Immune thrombocytopenic purpura D69.3 and Iron deficiency 
anemia D50.9

 

 [place] [address] [address]-
8826  21 May, 2018  Type 2 diabetes mellitus with diabetic polyneuropathy 
E11.42 ; Long-term insulin use Z79.4 ; Chronic kidney disease (CKD) stage G3a/A3
, moderately decreased glomerular filtration rate (GFR) between 45-59 mL/min/
1.73 square meter and albuminuria creatinine ratio greater than 300 mg/g N18.3 
; Hypertension I10 ; Hyperlipidemia, unspecified hyperlipidemia E78.5 ; 
Coronary artery disease I25.10 ; GERD (gastroesophageal reflux disease) K21.9 ; 
Immune thrombocytopenic purpura D69.3 ; Asthma J45.909 ; Morbid (severe) 
obesity due to excess calories E66.01 and Recurrent major depressive disorder, 
in partial remission F33.41

 

 [place] [address] [place] [id number] [place] [address] 25058-
1298  11 May, 2018  Chronic kidney disease (CKD) stage G3a/A3, moderately 
decreased glomerular filtration rate (GFR) between 45-59 mL/min/1.73 square 
meter and albuminuria creatinine ratio greater than 300 mg/g N18.3

 

 [place] [address] [place] [id number] [place] [address] 89995-
9768  02 May, 2018  Chronic kidney disease (CKD) stage G3a/A3, moderately 
decreased glomerular filtration rate (GFR) between 45-59 mL/min/1.73 square 
meter and albuminuria creatinine ratio greater than 300 mg/g N18.3

 

 [place] [address] [place] [id number] [place] [address] 80953-
7588     

 

 [place] [address] [place] [id number] [place] [address] 34750-
4095  28 Mar, 2018   

 

 [place] [address] [place] [address] 47811-
5073  26 Mar, 2018  Immune thrombocytopenic purpura D69.3 ; Type 2 diabetes 
mellitus with diabetic polyneuropathy E11.42 ; Avascular necrosis of bone of 
right hip M87.051 ; Long-term insulin use Z79.4 ; GERD (gastroesophageal reflux 
disease) K21.9 ; Hyperlipidemia, unspecified hyperlipidemia E78.5 ; 
Hypertension I10 ; Recurrent major depressive disorder, in partial remission 
F33.41 ; Microalbuminuric diabetic nephropathy E11.21 ; Body mass index (BMI) 
of 45.0-49.9 in adult Z68.42 ; BMI 45.0-49.9, adult Z68.42 and Chronic kidney 
disease (CKD) stage G3a/A3, moderately decreased glomerular filtration rate (GFR
) between 45-59 mL/min/1.73 square meter and albuminuria creatinine ratio 
greater than 300 mg/g N18.3

 

 [place] [address] [place] [id number] [place] [address] 18374-
3465  23 Mar, 2018   

 

 [place] [address] [place] [id number] [place] [address] 60292-
4893  23 Mar, 2018   

 

 [place] [address] [place] [id number] [place] [address] [phone]-
1958  19 Mar, 2018   

 

 [place] [address] [place] [id number] [place] [address] 69719-
5127  14 Mar, 2018   

 

 [place] [address] [place] [id number] [place] [address] 21326-
0148  13 Mar, 2018  Type 2 diabetes mellitus with diabetic polyneuropathy 
E11.42 ; Asthma J45.909 and Hypertension I10

 

 [facility]  [address] [place], [place] 
[id number]  13 Mar, 2018  Skin ulcer, limited to breakdown of skin L98.491 ; 
Immune thrombocytopenic purpura D69.3 ; Avascular necrosis of bone of right hip 
M87.051 and Type 2 diabetes mellitus with diabetic polyneuropathy E11.42

 

 [place] [address] [place] [id number] [place] [address] 10551-
5767  06 Mar, 2018  Asthma J45.909 and Type 2 diabetes mellitus with diabetic 
polyneuropathy E11.42

 

 [place] [address] [place] [address] 06809-
9635  01 Mar, 2018   

 

 [facility]  [address] [place], [place] 
[id number]    Other iron deficiency anemia D50.8 ; Weakness R53.1 ; 
Type 2 diabetes mellitus with diabetic polyneuropathy E11.42 ; Cellulitis of 
trunk, unspecified site of trunk L03.319 ; Coronary artery disease I25.10 ; 
Avascular necrosis of bone of right hip M87.051 and Morbid (severe) obesity due 
to excess calories E66.01

 

 [place] [address] [place] [id number] [place] [address] 
[id number]     

 

 [place] [address] [place] [id number] [place] [address] 53000-
2522     

 

 [facility] [place] [address] [place] [address] [id number] [place] [address] [phone]
[phone]  15 Feb, 2018  Yeast infection of the skin B37.2

 

 [place] [address] [place] [id number] [place] [address] 44565-
3570  15 Feb, 2018   

 

 [place] [address] [place] [id number] [place] [address] 68397-
9367  15 Feb, 2018   

 

 [place] [address] [place] [id number] [place] [address] 48615-
[phone]     

 

 [facility] [place] [facility]  [address] [id number] [place] [address] [phone]
[phone]     

 

 [place] [address] [place] [id number] [place] [address] 95193-
7403    Type 2 diabetes mellitus with diabetic polyneuropathy 
E11.42 ; Avascular necrosis of bone of right hip M87.051 ; Hyperlipidemia, 
unspecified hyperlipidemia E78.5 ; Hypertension I10 ; Anxiety disorder, 
unspecified F41.9 ; Immune thrombocytopenic purpura D69.3 ; Coronary artery 
disease I25.10 ; Orthopnea R06.01 ; Acute bronchitis, unspecified organism 
J20.9 ; Wound of left lower extremity, initial encounter S81.802A ; Body aches 
R52 and Debilitated R53.81

 

 [place] [id number] [place] [address] 45446-
3557  12 2018   

 

 [address] [address]-
3849    Type 2 diabetes mellitus with diabetic polyneuropathy 
E11.42 ; Encounter for immunization Z23 ; Hyperlipidemia, unspecified 
hyperlipidemia E78.5 ; Hypertension I10 ; Anxiety disorder, unspecified F41.9 ; 
Asthma J45.909 ; Body mass index (BMI) of 45.0-49.9 in adult Z68.42 and Morbid (
severe) obesity due to excess calories E66.01

 

 [address] [address]-
[phone]    Type 2 diabetes mellitus with diabetic polyneuropathy 
E11.42 ; Hyperlipidemia, unspecified hyperlipidemia E78.5 ; Hypertension I10 
and GERD (gastroesophageal reflux disease) K21.9

 

 [address] [address]-
[phone]  22 Mar, 2017  Type 2 diabetes mellitus with diabetic polyneuropathy E11.42

 

 [place] [id number] [place] [address] 30832-
6839  28 2017  Type 2 diabetes mellitus with diabetic polyneuropathy 
E11.42 ; Coronary artery disease I25.10 ; History of MI (myocardial infarction) 
I25.2 ; Immune thrombocytopenic purpura D69.3 ; GERD (gastroesophageal reflux 
disease) K21.9 ; Hyperlipidemia, unspecified hyperlipidemia E78.5 ; 
Hypertension I10 ; History of kidney stones Z87.442 and Anxiety disorder, 
unspecified F41.9

 

 [place] [id number] [place] [address] 84212-
5797  14 2017   

 

 [address] [address]-
7414  29 Sep, 2016  Coronary artery disease I25.10 ; History of MI (myocardial 
infarction) I25.2 ; History of kidney stones Z87.442 ; Type 2 diabetes mellitus 
with diabetic polyneuropathy E11.42 ; Avascular necrosis of bone of right hip 
M87.051 ; Hyperlipidemia, unspecified hyperlipidemia E78.5 ; Hypertension I10 ; 
Asthma J45.909 and Encounter for immunization Z23

 

 [place] [address] [address]-
8481  20 Sep, 2016   

 

 [place] [address] 308[address]-
9238    Coronary artery disease I25.10 ; Type 2 diabetes mellitus 
with diabetic polyneuropathy E11.42 ; History of MI (myocardial infarction) 
I25.2 ; Immune thrombocytopenic purpura D69.3 ; Hyperlipidemia, unspecified 
hyperlipidemia E78.5 and Hypertension I10

 

 [place] [address] [address]-
5809    Coronary artery disease I25.10 ; Lymphedema I89.0 ; History 
of MI (myocardial infarction) I25.2 ; History of kidney stones Z87.442 ; Immune 
thrombocytopenic purpura D69.3 ; Type 2 diabetes mellitus with diabetic 
polyneuropathy E11.42 ; Avascular necrosis of bone of right hip M87.051 ; GERD (
gastroesophageal reflux disease) K21.9 ; Hyperlipidemia, unspecified 
hyperlipidemia E78.5 ; Hypertension I10 and Asthma J45.909

 

 [place] [address] [place] [id number] [place] [address] 69627-
4138     

 

 [place] [address] [address]-
4368     

 

 [place] [address] [place] [id number] [place] [address] 31427-
9310  02 Dec, 2015   

 

 [place] [address] [address]-
7113  02 Dec, 2015  Hyperlipidemia, unspecified hyperlipidemia E78.5

 

 [place] [address] [place] [id number] [place] [address] 16514-
4255     

 

 [place] [address] [facility] [id number] [place] [address]-
6016     

 

 [facility]  [address] [facility] [id number] [address]
8163    Allergic rhinitis J30.9

 

 [facility]  [address] [facility] [id number] [address]-
5091    Type 2 diabetes mellitus with diabetic polyneuropathy 
E11.42 ; Routine adult health maintenance Z00.00 ; Coronary artery disease 
I25.10 ; History of MI (myocardial infarction) I25.2 ; History of kidney stones 
Z87.442 ; Immune thrombocytopenic purpura D69.3 ; Avascular necrosis of bone of 
right hip M87.051 and GERD (gastroesophageal reflux disease) K21.9







IMMUNIZATIONS

No Known Immunizations



SOCIAL HISTORY

Never Assessed



REASON FOR VISIT

follow up----KACYennettJESSICA



PLAN OF CARE







 Activity  Details









  









 Follow Up  2 - 3 Days Reason:thursday w/Freire







VITAL SIGNS







 Height  67 in  2018

 

 Weight  336.7 lbs  2018

 

 Temperature  97.7 degrees Fahrenheit  2018

 

 Heart Rate  90 bpm  2018

 

 Respiratory Rate  20   2018

 

 BMI  52.73 kg/m2  2018

 

 Blood pressure systolic  166 mmHg  2018

 

 Blood pressure diastolic  74 mmHg  2018







MEDICATIONS







 Medication  Instructions  Dosage  Frequency  Start Date  End Date  Duration  
Status

 

 Benazepril HCl 40 mg  Orally Once a day  1 tablet  24h           Active

 

 Levemir FlexTouch 100 UNIT/ML  Subcutaneous 2 times a day  20  units twice 
daily  12h          Active

 

 Torsemide 20 mg  Orally Once a day  1 tablet  24h  06 Aug, 2018     30 days  
Active

 

 ProAir  (90 Base) MCG/ACT  Inhalation every 4 hrs  2 puffs as needed  
4h          Active

 

 GlipiZIDE 5 mg  Orally 2 times a day  1 tablet  12h           Active

 

 Hydrochlorothiazide 50 mg  Orally Once a day  1 tablet in the morning  24h  06 
Aug, 2018     30 day(s)  Active

 

 Humalog KwikPen 100 UNIT/ML  Subcutaneous three times a day before meals  12 
units              Active

 

 Atorvastatin Calcium 10 mg  Orally Once a day  1 tablet  24h      
    Active

 

 Liraglutide 18 MG/3ML  Subcutaneous Once a day  0.6mg daily for one week then 
increase to 1.2 mg daily  24h  22 Carlos Enrique, 2018  20 Sep, 2018     Active

 

 Citalopram Hydrobromide 20 mg  Orally Once a day  1 tablet  24h           
Active

 

 Nystatin           26 Mar, 2018        Active

 

 Aleve 220 MG  Orally every 12 hrs  2 tablets with food or milk as needed  12h 
          Active

 

 Carvedilol 12.5 MG  Orally 2 times a day  1 tablet  12h           Active

 

 Potassium Chloride 10 MEQ  Orally Twice a day  1 tablet with food  12h  02 Aug
, 2018  1 Oct, 2018  30 day(s)  Active

 

 Advair Diskus           26 Mar, 2018        Active

 

 Aspir-81 81 MG  Orally Once a day  1 tablet  24h     16 May, 2019     Active

 

 Torsemide 10 mg  Orally Once a day  1 tablet  24h  02 Aug, 2018     30 day(s)  
Active

 

 Pantoprazole Sodium 40 mg  Orally Once a day  1 tablet  24h           Active

 

 Probiotic -                    Active







RESULTS

No Results



PROCEDURES







 Procedure  Date Ordered  Result  Body Site

 

 [facility]      

 

 FQ VISIT ESTABLISHED PATIENT  Aug 06, 2018      







INSTRUCTIONS





MEDICATIONS ADMINISTERED

No Known Medications



MEDICAL (GENERAL) HISTORY







 Type  Description  Date

 

 Medical History  Type II Diabetes   

 

 Medical History  CAD   

 

 Medical History  Acute MI x1   

 

 Medical History  Heart Cath x3   

 

 Medical History  Kidney Stones   

 

 Medical History  Lymphedema   

 

 Medical History  Immune thrombocytopenic purpura   

 

 Surgical History  Heart Stents x2   

 

 Surgical History  Cholecystectomy   

 

 Surgical History     

 

 Surgical History  [place] Teeth   

 

 Hospitalization History  [facility]  

 

 Hospitalization History  Sepsis/Toxic Shock  

 

 Hospitalization History  [facility]-flu/pneumonia  2017

 

 Hospitalization History  [facility]- Anemia, cellulitis pannus, yeast 
infection. Discharged 2018

## 2018-09-27 NOTE — XMS REPORT
[facility]

 Created on: 2018



Madison Young

External Reference #: [id number]

: 1945

Sex: Female



Demographics







 Address  [address] [place]  [address]

 

 Preferred Language  Unknown

 

 Marital Status  Unknown

 

 Restorationist Affiliation  Unknown

 

 Race  Unknown

 

 Ethnic Group  Unknown





Author







 Author  LEANDER FREIRE

 

 Organization  [facility]

 

 Address  [address] [place]  [address]



 

 Phone  [phone]







Care Team Providers







 Care Team Member Name  Role  Phone

 

 LEANDER FREIRE  Unavailable  [phone]







PROBLEMS







 Type  Condition  ICD9-CM Code  SVB44-JD Code  Onset Dates  Condition Status  
SNOMED Code

 

 Problem  Hypertension     I10     Active  90990470

 

 Problem  Morbid (severe) obesity due to excess calories     E66.01     Active  
960291406

 

 Problem  Anxiety disorder, unspecified     F41.9     Active  500245277

 

 Problem  Body mass index (BMI) of 50-59.9 in adult     Z68.43     Active  
480290895

 

 Problem  Peripheral edema     R60.9     Active  575710915

 

 Problem  Long-term insulin use     Z79.4     Active  276801069

 

 Problem  Recurrent major depressive disorder, in partial remission     F33.41 
    Active  67987018

 

 Problem  Iron deficiency anemia     D50.9     Active  22783722

 

 Problem  Non-adherence to medical treatment     Z91.19     Active  626695832

 

 Problem  Microalbuminuric diabetic nephropathy     E11.21     Active  944872718

 

 Problem  Avascular necrosis of bone of right hip     M87.051     Active  
861346659

 

 Problem  Chronic kidney disease (CKD) stage G3a/A3, moderately decreased 
glomerular filtration rate (GFR) between 45-59 mL/min/1.73 square meter and 
albuminuria creatinine ratio greater than 300 mg/g     N18.3     Active  
545723477

 

 Problem  Type 2 diabetes mellitus with diabetic polyneuropathy     E11.42     
Active  40586218

 

 Problem  Immune thrombocytopenic purpura     D69.3     Active  587289680

 

 Problem  Coronary artery disease     I25.10     Active  41056016

 

 Problem  Hyperlipidemia, unspecified hyperlipidemia     E78.5     Active  
41985590

 

 Problem  GERD (gastroesophageal reflux disease)     K21.9     Active  613540482

 

 Problem  Asthma     J45.909     Active  713901618







ALLERGIES

No Information



ENCOUNTERS







 Encounter  Location  Date  Diagnosis

 

 [facility]  [address] [facility] [id number] [address]  14 Sep, 2018  Coronary artery disease I25.10 ; Peripheral edema R60.9 ; 
Avascular necrosis of bone of right hip M87.051 ; Morbid (severe) obesity due 
to excess calories E66.01 and Body mass index (BMI) of 50-59.9 in adult Z68.43

 

 [facility]  [id number] [place] [address]-
0895  10 Sep, 2018   

 

 [place] [id number] [place] [address]-
8208  06 Sep, 2018  Type 2 diabetes mellitus with diabetic polyneuropathy 
E11.42 ; Coronary artery disease I25.10 ; Hypertension I10 ; Long-term insulin 
use Z79.4 ; Body mass index (BMI) of 45.0-49.9 in adult Z68.42 ; Peripheral 
edema R60.9 and Avascular necrosis of bone of right hip M87.051

 

 [place] [id number] [place] [address]-
2883  17 Aug, 2018  Peripheral edema R60.9

 

 [place] [id number] [place] [address]-
8089  13 Aug, 2018  Hypertension I10 and Peripheral edema R60.9

 

 [place] [id number] [place] [address]-
1270  09 Aug, 2018   

 

 [place] [id number] [place] [address]-
2712  09 Aug, 2018  Hypertension I10 and Peripheral edema R60.9

 

 [place] [id number] [place] [address]-
0038  06 Aug, 2018  Hypertension I10 and Peripheral edema R60.9

 

 [place] [id number] [place] [address]-
6525  02 Aug, 2018   

 

 [place] [id number] [place] [address]-
5729  02 Aug, 2018  Hypertension I10 and Peripheral edema R60.9

 

 [place] [id number] [place] [address]-
1268  01 Aug, 2018   

 

 [place] [address] [address]
0819     

 

 [place] [address] [address]
4440     

 

 [facility]  [address] [address]
7205     

 

 [place] [id number] [place] [address]-
3229    Diabetes mellitus due to underlying condition with foot 
ulcer E08.621 ; Non-pressure chronic ulcer of other part of left foot limited 
to breakdown of skin L97.521 and BMI 45.0-49.9, adult Z68.42

 

 [place] [id number] [address]
5244    Acute idiopathic gout involving toe of left foot M10.072

 

 [place] [id number] [place] [address]-
4171     

 

 [place] [id number] [place] [address]-
9752     

 

 [place] [address] [place] [address]-
3607     

 

 [place] [address], [place] [address]
0848    Type 2 diabetes mellitus with diabetic polyneuropathy 
E11.42 ; Hypertension I10 ; Hyperlipidemia, unspecified hyperlipidemia E78.5 ; 
Body mass index (BMI) of 45.0-49.9 in adult Z68.42 ; Long-term insulin use 
Z79.4 ; Non-adherence to medical treatment Z91.19 ; Coronary artery disease 
I25.10 ; GERD (gastroesophageal reflux disease) K21.9 ; Asthma J45.909 and 
Recurrent major depressive disorder, in partial remission F33.41

 

 [place] [address], [place] [address]
4227  22 May, 2018  Recurrent major depressive disorder, in partial remission 
F33.41 and Hypertension I10

 

 [facility]  [address], [place] [address]
6204  21 May, 2018  Immune thrombocytopenic purpura D69.3 and Iron deficiency 
anemia D50.9

 

 [place] [id number] [place] [address]-
8519  21 May, 2018  Type 2 diabetes mellitus with diabetic polyneuropathy 
E11.42 ; Long-term insulin use Z79.4 ; Chronic kidney disease (CKD) stage G3a/A3
, moderately decreased glomerular filtration rate (GFR) between 45-59 mL/min/
1.73 square meter and albuminuria creatinine ratio greater than 300 mg/g N18.3 
; Hypertension I10 ; Hyperlipidemia, unspecified hyperlipidemia E78.5 ; 
Coronary artery disease I25.10 ; GERD (gastroesophageal reflux disease) K21.9 ; 
Immune thrombocytopenic purpura D69.3 ; Asthma J45.909 ; Morbid (severe) 
obesity due to excess calories E66.01 and Recurrent major depressive disorder, 
in partial remission F33.41

 

 [place] [id number] [place] [address]-
2844  11 May, 2018  Chronic kidney disease (CKD) stage G3a/A3, moderately 
decreased glomerular filtration rate (GFR) between 45-59 mL/min/1.73 square 
meter and albuminuria creatinine ratio greater than 300 mg/g N18.3

 

 [place] [id number] [place] [address]-
1852  02 May, 2018  Chronic kidney disease (CKD) stage G3a/A3, moderately 
decreased glomerular filtration rate (GFR) between 45-59 mL/min/1.73 square 
meter and albuminuria creatinine ratio greater than 300 mg/g N18.3

 

 [place] [address] [place] [address]31-
4092     

 

 [place] [id number] [place] [address] 18531-
0627  28 Mar, 2018   

 

 [place] [id number] [place] [address]-
4110  26 Mar, 2018  Immune thrombocytopenic purpura D69.3 ; Type 2 diabetes 
mellitus with diabetic polyneuropathy E11.42 ; Avascular necrosis of bone of 
right hip M87.051 ; Long-term insulin use Z79.4 ; GERD (gastroesophageal reflux 
disease) K21.9 ; Hyperlipidemia, unspecified hyperlipidemia E78.5 ; 
Hypertension I10 ; Recurrent major depressive disorder, in partial remission 
F33.41 ; Microalbuminuric diabetic nephropathy E11.21 ; Body mass index (BMI) 
of 45.0-49.9 in adult Z68.42 ; BMI 45.0-49.9, adult Z68.42 and Chronic kidney 
disease (CKD) stage G3a/A3, moderately decreased glomerular filtration rate (GFR
) between 45-59 mL/min/1.73 square meter and albuminuria creatinine ratio 
greater than 300 mg/g N18.3

 

 [place] [address] [address]-
4072  23 Mar, 2018   

 

 [place] [address] [address]-
3621  23 Mar, 2018   

 

 [place] [address] [address]-
6624  19 Mar, 2018   

 

 [place] [address] [address]-
9904  14 Mar, 2018   

 

 [place] [address] [address]-
7333  13 Mar, 2018  Type 2 diabetes mellitus with diabetic polyneuropathy 
E11.42 ; Asthma J45.909 and Hypertension I10

 

 [facility]  [address] [place] 
[id number]  13 Mar, 2018  Skin ulcer, limited to breakdown of skin L98.491 ; 
Immune thrombocytopenic purpura D69.3 ; Avascular necrosis of bone of right hip 
M87.051 and Type 2 diabetes mellitus with diabetic polyneuropathy E11.42

 

 [place] [address] [address]-
9928  06 Mar, 2018  Asthma J45.909 and Type 2 diabetes mellitus with diabetic 
polyneuropathy E11.42

 

 [place] [address] [address]-
6095  01 Mar, 2018   

 

 [facility]  [address]   [place], [place] 
[id number]    Other iron deficiency anemia D50.8 ; Weakness R53.1 ; 
Type 2 diabetes mellitus with diabetic polyneuropathy E11.42 ; Cellulitis of 
trunk, unspecified site of trunk L03.319 ; Coronary artery disease I25.10 ; 
Avascular necrosis of bone of right hip M87.051 and Morbid (severe) obesity due 
to excess calories E66.01

 

 [facility]  [address] 
[id number]  23 2018   

 

 [place] [address] [address]-
5536     

 

 [facility] [place] [address] [phone]  15 Feb, 2018  Yeast infection of the skin B37.2

 

 [place] [address] [address]-
7103  15 Feb, 2018   

 

 [facility]  [address] [address]-
9296  15 Feb, 2018   

 

 [place] [address] [address]-
8664     

 

 [facility]  [id number] [place] [phone]     

 

 [place] [address] [address]-
1151    Type 2 diabetes mellitus with diabetic polyneuropathy 
E11.42 ; Avascular necrosis of bone of right hip M87.051 ; Hyperlipidemia, 
unspecified hyperlipidemia E78.5 ; Hypertension I10 ; Anxiety disorder, 
unspecified F41.9 ; Immune thrombocytopenic purpura D69.3 ; Coronary artery 
disease I25.10 ; Orthopnea R06.01 ; Acute bronchitis, unspecified organism 
J20.9 ; Wound of left lower extremity, initial encounter S81.802A ; Body aches 
R52 and Debilitated R53.81

 

 [address] [place] [address]-
7771  12 2018   

 

 [address] [address]-
7926    Type 2 diabetes mellitus with diabetic polyneuropathy 
E11.42 ; Encounter for immunization Z23 ; Hyperlipidemia, unspecified 
hyperlipidemia E78.5 ; Hypertension I10 ; Anxiety disorder, unspecified F41.9 ; 
Asthma J45.909 ; Body mass index (BMI) of 45.0-49.9 in adult Z68.42 and Morbid (
severe) obesity due to excess calories E66.01

 

 [address] [address]-
6116    Type 2 diabetes mellitus with diabetic polyneuropathy 
E11.42 ; Hyperlipidemia, unspecified hyperlipidemia E78.5 ; Hypertension I10 
and GERD (gastroesophageal reflux disease) K21.9

 

 [place] [id number] [place] [address]-
2335  22 Mar, 2017  Type 2 diabetes mellitus with diabetic polyneuropathy E11.42

 

 [place] [id number] [place] [address]-
4362    Type 2 diabetes mellitus with diabetic polyneuropathy 
E11.42 ; Coronary artery disease I25.10 ; History of MI (myocardial infarction) 
I25.2 ; Immune thrombocytopenic purpura D69.3 ; GERD (gastroesophageal reflux 
disease) K21.9 ; Hyperlipidemia, unspecified hyperlipidemia E78.5 ; 
Hypertension I10 ; History of kidney stones Z87.442 and Anxiety disorder, 
unspecified F41.9

 

 [place] [id number] [place] [address]-
4015     

 

 [place] [id number] [place] [address]-
5101  29 Sep, 2016  Coronary artery disease I25.10 ; History of MI (myocardial 
infarction) I25.2 ; History of kidney stones Z87.442 ; Type 2 diabetes mellitus 
with diabetic polyneuropathy E11.42 ; Avascular necrosis of bone of right hip 
M87.051 ; Hyperlipidemia, unspecified hyperlipidemia E78.5 ; Hypertension I10 ; 
Asthma J45.909 and Encounter for immunization Z23

 

 [place] [id number] [place] [address]-
5547  20 Sep, 2016   

 

 [place] [id number] [place] [address]11-
7993    Coronary artery disease I25.10 ; Type 2 diabetes mellitus 
with diabetic polyneuropathy E11.42 ; History of MI (myocardial infarction) 
I25.2 ; Immune thrombocytopenic purpura D69.3 ; Hyperlipidemia, unspecified 
hyperlipidemia E78.5 and Hypertension I10

 

 [place] [address] [address]-
9743    Coronary artery disease I25.10 ; Lymphedema I89.0 ; History 
of MI (myocardial infarction) I25.2 ; History of kidney stones Z87.442 ; Immune 
thrombocytopenic purpura D69.3 ; Type 2 diabetes mellitus with diabetic 
polyneuropathy E11.42 ; Avascular necrosis of bone of right hip M87.051 ; GERD (
gastroesophageal reflux disease) K21.9 ; Hyperlipidemia, unspecified 
hyperlipidemia E78.5 ; Hypertension I10 and Asthma J45.909

 

 [place] [id number] [place] [address] 15127-
7508     

 

 [place] [address] [place] [address] 81291-
1413     

 

 [place] [id number] [place] [address]-
3498  02 Dec, 2015   

 

 [place] [id number] [place] [address]-
6259  02 Dec, 2015  Hyperlipidemia, unspecified hyperlipidemia E78.5

 

 [place] [id number] [place] [address] 13669-
5566     

 

 [place] [address] [place] [address] 09794-
7656     

 

 [place] [id number] [place] [address]
5294    Allergic rhinitis J30.9

 

 [facility]  [address] [facility] [id number] [place] [address]
1727    Type 2 diabetes mellitus with diabetic polyneuropathy 
E11.42 ; Routine adult health maintenance Z00.00 ; Coronary artery disease 
I25.10 ; History of MI (myocardial infarction) I25.2 ; History of kidney stones 
Z87.442 ; Immune thrombocytopenic purpura D69.3 ; Avascular necrosis of bone of 
right hip M87.051 and GERD (gastroesophageal reflux disease) K21.9







IMMUNIZATIONS

No Known Immunizations



SOCIAL HISTORY

Never Assessed



REASON FOR VISIT

Scheduling



PLAN OF CARE





VITAL SIGNS





MEDICATIONS

No Known Medications



RESULTS

No Results



PROCEDURES

No Known procedures



INSTRUCTIONS





MEDICATIONS ADMINISTERED

No Known Medications



MEDICAL (GENERAL) HISTORY







 Type  Description  Date

 

 Medical History  Type II Diabetes   

 

 Medical History  CAD   

 

 Medical History  Acute MI x1   

 

 Medical History  Heart Cath x3   

 

 Medical History  Kidney Stones   

 

 Medical History  Lymphedema   

 

 Medical History  Immune thrombocytopenic purpura   

 

 Surgical History  Heart Stents x2   

 

 Surgical History  Cholecystectomy   

 

 Surgical History     

 

 Surgical History  [place] [facility]   

 

 Hospitalization History  [facility]  

 

 Hospitalization History  Sepsis/Toxic Shock  

 

 Hospitalization History  [facility]-flu[facility]pneumonia  2017

 

 Hospitalization History  [facility]- Anemia, cellulitis pannus, yeast 
infection. Discharged 2018

## 2018-09-27 NOTE — XMS REPORT
[facility]

 Created on: 2018



Madison Young

External Reference #: [id number]

: 1945

Sex: Female



Demographics







 Address  [address] [place]  [address]

 

 Preferred Language  Unknown

 

 Marital Status  Unknown

 

 Jew Affiliation  Unknown

 

 Race  Unknown

 

 Ethnic Group  Unknown





Author







 Author  TANI  LEANDER

 

 Organization  [facility]

 

 Address  [address] [place]  [address]



 

 Phone  [phone]







Care Team Providers







 Care Team Member Name  Role  Phone

 

 FREIRELEANDER HILLIARD  Unavailable  [phone]







PROBLEMS







 Type  Condition  ICD9-CM Code  EFO08-NZ Code  Onset Dates  Condition Status  
SNOMED Code

 

 Problem  Hypertension     I10     Active  61014623

 

 Problem  Morbid (severe) obesity due to excess calories     E66.01     Active  
664068421

 

 Problem  Anxiety disorder, unspecified     F41.9     Active  505277000

 

 Problem  Body mass index (BMI) of 50-59.9 in adult     Z68.43     Active  
520634356

 

 Problem  Peripheral edema     R60.9     Active  864020708

 

 Problem  Long-term insulin use     Z79.4     Active  811440313

 

 Problem  Recurrent major depressive disorder, in partial remission     F33.41 
    Active  99299743

 

 Problem  Iron deficiency anemia     D50.9     Active  30172241

 

 Problem  Non-adherence to medical treatment     Z91.19     Active  020882669

 

 Problem  Microalbuminuric diabetic nephropathy     E11.21     Active  670621152

 

 Problem  Avascular necrosis of bone of right hip     M87.051     Active  
301375906

 

 Problem  Chronic kidney disease (CKD) stage G3a/A3, moderately decreased 
glomerular filtration rate (GFR) between 45-59 mL/min/1.73 square meter and 
albuminuria creatinine ratio greater than 300 mg/g     N18.3     Active  
285201019

 

 Problem  Type 2 diabetes mellitus with diabetic polyneuropathy     E11.42     
Active  68490816

 

 Problem  Immune thrombocytopenic purpura     D69.3     Active  231663865

 

 Problem  Coronary artery disease     I25.10     Active  96959508

 

 Problem  Hyperlipidemia, unspecified hyperlipidemia     E78.5     Active  
95556122

 

 Problem  GERD (gastroesophageal reflux disease)     K21.9     Active  494705357

 

 Problem  Asthma     J45.909     Active  793338731







ALLERGIES







 Substance  Reaction  Event Type  Date  Status

 

 Heparin Sodium (Porcine) PF  Unknown  Drug Allergy  09 Aug, 2018  Active

 

 Chlorthalidone  diarrhea  Drug Allergy  09 Aug, 2018  Active

 

 Adhesive  Unknown  Non Drug Allergy  09 Aug, 2018  Active







ENCOUNTERS







 Encounter  Location  Date  Diagnosis

 

 [place] [id number], [place] [address]  14 Sep, 2018  Coronary artery disease I25.10 ; Peripheral edema R60.9 ; 
Avascular necrosis of bone of right hip M87.051 ; Morbid (severe) obesity due 
to excess calories E66.01 ; Body mass index (BMI) of 50-59.9 in adult Z68.43 
and Congestive heart failure, unspecified HF chronicity, unspecified heart 
failure type I50.9

 

 [place] [address] [place] [id number] [place] [phone]-
[phone]  10 Sep, 2018   

 

 [place] [address] [place] [id number] [place] [address]-
[phone]  06 Sep, 2018  Type 2 diabetes mellitus with diabetic polyneuropathy 
E11.42 ; Coronary artery disease I25.10 ; Hypertension I10 ; Long-term insulin 
use Z79.4 ; Body mass index (BMI) of 45.0-49.9 in adult Z68.42 ; Peripheral 
edema R60.9 and Avascular necrosis of bone of right hip M87.051

 

 [place] [address] [place] [address]-
[phone]  17 Aug, 2018  Peripheral edema R60.9

 

 [place] [address] [place] [address]-
[phone]  13 Aug, 2018  Hypertension I10 and Peripheral edema R60.9

 

 [place] [address], [place] [address]40  09 Aug, 2018   

 

 [place] [address] [place] [address]-
6696  09 Aug, 2018  Hypertension I10 and Peripheral edema R60.9

 

 [place] [address] [place] [id number] [place] [phone]-
[phone]  06 Aug, 2018  Hypertension I10 and Peripheral edema R60.9

 

 [place] [address] [place] [address]-
[phone]  02 Aug, 2018   

 

 [place] [address] [place] [id number] [place] [phone]-
[phone]  02 Aug, 2018  Hypertension I10 and Peripheral edema R60.9

 

 [place] [address] [place] [id number] [place] [phone]-
[phone]  01 Aug, 2018   

 

 [place] [address] [place] [id number] [place] [address]-
[phone]     

 

 [place] [address] [place] [id number] [place] [address]-
4414     

 

 [place] [address] [place] [id number] [place] [address]188-
4263     

 

 [place] [address] [place] [id number] [place] [address]-
[phone]    Diabetes mellitus due to underlying condition with foot 
ulcer E08.621 ; Non-pressure chronic ulcer of other part of left foot limited 
to breakdown of skin L97.521 and BMI 45.0-49.9, adult Z68.42

 

 [place] [address] [place] [id number] [place] [address] [phone]-
[phone]    Acute idiopathic gout involving toe of left foot M10.072

 

 [place] [address] [place] [id number] [place] [address] 8[address]32-
[phone]     

 

 [place] [address] [place] [id number] [place] [address]-
5525     

 

 [place] [address] [place] [address] 75408-
[phone]     

 

 [place] [address] [place] [address] 50885-
5676    Type 2 diabetes mellitus with diabetic polyneuropathy 
E11.42 ; Hypertension I10 ; Hyperlipidemia, unspecified hyperlipidemia E78.5 ; 
Body mass index (BMI) of 45.0-49.9 in adult Z68.42 ; Long-term insulin use 
Z79.4 ; Non-adherence to medical treatment Z91.19 ; Coronary artery disease 
I25.10 ; GERD (gastroesophageal reflux disease) K21.9 ; Asthma J45.909 and 
Recurrent major depressive disorder, in partial remission F33.41

 

 [place] [id number] [place] [address]-
6211  22 May, 2018  Recurrent major depressive disorder, in partial remission 
F33.41 and Hypertension I10

 

 [place] [address] [place] [id number] [place] [address] 59783-
[phone]  21 May, 2018  Immune thrombocytopenic purpura D69.3 and Iron deficiency 
anemia D50.9

 

 [place] [address] [place] [id number] [place] [address]-
4058  21 May, 2018  Type 2 diabetes mellitus with diabetic polyneuropathy 
E11.42 ; Long-term insulin use Z79.4 ; Chronic kidney disease (CKD) stage G3a/A3
, moderately decreased glomerular filtration rate (GFR) between 45-59 mL/min/
1.73 square meter and albuminuria creatinine ratio greater than 300 mg/g N18.3 
; Hypertension I10 ; Hyperlipidemia, unspecified hyperlipidemia E78.5 ; 
Coronary artery disease I25.10 ; GERD (gastroesophageal reflux disease) K21.9 ; 
Immune thrombocytopenic purpura D69.3 ; Asthma J45.909 ; Morbid (severe) 
obesity due to excess calories E66.01 and Recurrent major depressive disorder, 
in partial remission F33.41

 

 [place] [address] [place] [address]-
4630  11 May, 2018  Chronic kidney disease (CKD) stage G3a/A3, moderately 
decreased glomerular filtration rate (GFR) between 45-59 mL/min/1.73 square 
meter and albuminuria creatinine ratio greater than 300 mg/g N18.3

 

 [place] [address] [place] [address] 41240-
0815  02 May, 2018  Chronic kidney disease (CKD) stage G3a/A3, moderately 
decreased glomerular filtration rate (GFR) between 45-59 mL/min/1.73 square 
meter and albuminuria creatinine ratio greater than 300 mg/g N18.3

 

 [place] [address] [place] [address] 04878-
2674     

 

 [place] [address] [place] [id number] [place] [address] [phone]
[phone]  28 Mar, 2018   

 

 [place] [address] [place] [id number] [place] [address] [phone]
[phone]  26 Mar, 2018  Immune thrombocytopenic purpura D69.3 ; Type 2 diabetes 
mellitus with diabetic polyneuropathy E11.42 ; Avascular necrosis of bone of 
right hip M87.051 ; Long-term insulin use Z79.4 ; GERD (gastroesophageal reflux 
disease) K21.9 ; Hyperlipidemia, unspecified hyperlipidemia E78.5 ; 
Hypertension I10 ; Recurrent major depressive disorder, in partial remission 
F33.41 ; Microalbuminuric diabetic nephropathy E11.21 ; Body mass index (BMI) 
of 45.0-49.9 in adult Z68.42 ; BMI 45.0-49.9, adult Z68.42 and Chronic kidney 
disease (CKD) stage G3a/A3, moderately decreased glomerular filtration rate (GFR
) between 45-59 mL/min/1.73 square meter and albuminuria creatinine ratio 
greater than 300 mg/g N18.3

 

 [place] [address] [place] [id number] [place] [address] [phone]
[phone]  23 Mar, 2018   

 

 [place] [address] [place] [id number] [place] [address] 60625-
[phone]  23 Mar, 2018   

 

 [place] [address] [place] [id number] [place] [address] 43927-
[phone]  19 Mar, 2018   

 

 [place] [address] [place] [id number] [place] [address] 05900-
[phone]  14 Mar, 2018   

 

 [place] [address] [place] [id number] [place] [address] 42591-
5930  13 Mar, 2018  Type 2 diabetes mellitus with diabetic polyneuropathy 
E11.42 ; Asthma J45.909 and Hypertension I10

 

 [facility]  [address] [place], [place] 
[id number]  13 Mar, 2018  Skin ulcer, limited to breakdown of skin L98.491 ; 
Immune thrombocytopenic purpura D69.3 ; Avascular necrosis of bone of right hip 
M87.051 and Type 2 diabetes mellitus with diabetic polyneuropathy E11.42

 

 [place] [address] [place] [address] [address]-
0457  06 Mar, 2018  Asthma J45.909 and Type 2 diabetes mellitus with diabetic 
polyneuropathy E11.42

 

 [place] [address] [address]-
2205  01 Mar, 2018   

 

 [facility]  [address]   [place], [place] 
[id number]    Other iron deficiency anemia D50.8 ; Weakness R53.1 ; 
Type 2 diabetes mellitus with diabetic polyneuropathy E11.42 ; Cellulitis of 
trunk, unspecified site of trunk L03.319 ; Coronary artery disease I25.10 ; 
Avascular necrosis of bone of right hip M87.051 and Morbid (severe) obesity due 
to excess calories E66.01

 

 [place] [address] 
[id number]     

 

 [place] [address] [address]-
5119     

 

 [facility]  [facility] [address] [phone]  15 Feb, 2018  Yeast infection of the skin B37.2

 

 [place] [address] [address]-
5177  15 Feb, 2018   

 

 [place] [address] [phone]-
[phone]  15 Feb, 2018   

 

 [place] [address] [address]-
6043     

 

 [facility]  [address] [phone]     

 

 [place] [address] [address]-
0650    Type 2 diabetes mellitus with diabetic polyneuropathy 
E11.42 ; Avascular necrosis of bone of right hip M87.051 ; Hyperlipidemia, 
unspecified hyperlipidemia E78.5 ; Hypertension I10 ; Anxiety disorder, 
unspecified F41.9 ; Immune thrombocytopenic purpura D69.3 ; Coronary artery 
disease I25.10 ; Orthopnea R06.01 ; Acute bronchitis, unspecified organism 
J20.9 ; Wound of left lower extremity, initial encounter S81.802A ; Body aches 
R52 and Debilitated R53.81

 

 [place] [id number] [place] [address]-
9031  12 2018   

 

 [address] [address]-
5692    Type 2 diabetes mellitus with diabetic polyneuropathy 
E11.42 ; Encounter for immunization Z23 ; Hyperlipidemia, unspecified 
hyperlipidemia E78.5 ; Hypertension I10 ; Anxiety disorder, unspecified F41.9 ; 
Asthma J45.909 ; Body mass index (BMI) of 45.0-49.9 in adult Z68.42 and Morbid (
severe) obesity due to excess calories E66.01

 

 [address] [phone]-
6031    Type 2 diabetes mellitus with diabetic polyneuropathy 
E11.42 ; Hyperlipidemia, unspecified hyperlipidemia E78.5 ; Hypertension I10 
and GERD (gastroesophageal reflux disease) K21.9

 

 [address] [address]-
7770  22 Mar, 2017  Type 2 diabetes mellitus with diabetic polyneuropathy E11.42

 

 [place] [id number] [place] [address] [phone]-
[phone]  28 2017  Type 2 diabetes mellitus with diabetic polyneuropathy 
E11.42 ; Coronary artery disease I25.10 ; History of MI (myocardial infarction) 
I25.2 ; Immune thrombocytopenic purpura D69.3 ; GERD (gastroesophageal reflux 
disease) K21.9 ; Hyperlipidemia, unspecified hyperlipidemia E78.5 ; 
Hypertension I10 ; History of kidney stones Z87.442 and Anxiety disorder, 
unspecified F41.9

 

 [place] [id number] [place] [address] 73429-
[phone]  14 2017   

 

 [address] [address]-
9510  29 Sep, 2016  Coronary artery disease I25.10 ; History of MI (myocardial 
infarction) I25.2 ; History of kidney stones Z87.442 ; Type 2 diabetes mellitus 
with diabetic polyneuropathy E11.42 ; Avascular necrosis of bone of right hip 
M87.051 ; Hyperlipidemia, unspecified hyperlipidemia E78.5 ; Hypertension I10 ; 
Asthma J45.909 and Encounter for immunization Z23

 

 [place] [address] [address]-
5275  20 Sep, 2016   

 

 [place] [address] [address]-
1161    Coronary artery disease I25.10 ; Type 2 diabetes mellitus 
with diabetic polyneuropathy E11.42 ; History of MI (myocardial infarction) 
I25.2 ; Immune thrombocytopenic purpura D69.3 ; Hyperlipidemia, unspecified 
hyperlipidemia E78.5 and Hypertension I10

 

 [place] [address] [address]-
6397    Coronary artery disease I25.10 ; Lymphedema I89.0 ; History 
of MI (myocardial infarction) I25.2 ; History of kidney stones Z87.442 ; Immune 
thrombocytopenic purpura D69.3 ; Type 2 diabetes mellitus with diabetic 
polyneuropathy E11.42 ; Avascular necrosis of bone of right hip M87.051 ; GERD (
gastroesophageal reflux disease) K21.9 ; Hyperlipidemia, unspecified 
hyperlipidemia E78.5 ; Hypertension I10 and Asthma J45.909

 

 [place] [address] [place] [id number] [place] [address] [phone]-
[phone]     

 

 [place] [address] [address]-
[phone]     

 

 [place] [address] [address]-
[phone]  02 Dec, 2015   

 

 [place] [address] [address]-
3168  02 Dec, 2015  Hyperlipidemia, unspecified hyperlipidemia E78.5

 

 [place] [address] [place] [address]-
3708     

 

 [facility]  [address] [facility] [id number] [address]-
4621     

 

 [facility]  [address] [facility] [id number] [address]-
4542    Allergic rhinitis J30.9

 

 [facility]  [address] [facility] [id number] [address]-
0448    Type 2 diabetes mellitus with diabetic polyneuropathy 
E11.42 ; Routine adult health maintenance Z00.00 ; Coronary artery disease 
I25.10 ; History of MI (myocardial infarction) I25.2 ; History of kidney stones 
Z87.442 ; Immune thrombocytopenic purpura D69.3 ; Avascular necrosis of bone of 
right hip M87.051 and GERD (gastroesophageal reflux disease) K21.9







IMMUNIZATIONS

No Known Immunizations



SOCIAL HISTORY

Never Assessed



REASON FOR VISIT

Fluid check/ Yuliana- GRACIELA [place] RN



PLAN OF CARE







 Activity  Details









  









 Follow Up  2 - 3 Days Reason:follow up edema







VITAL SIGNS







 Height  67 in  2018

 

 Weight  330.7 lbs  2018

 

 Temperature  98.0 degrees Fahrenheit  2018

 

 Heart Rate  80 bpm  2018

 

 Respiratory Rate  24   2018

 

 BMI  51.79 kg/m2  2018

 

 Blood pressure systolic  148 mmHg  2018

 

 Blood pressure diastolic  88 mmHg  2018







MEDICATIONS







 Medication  Instructions  Dosage  Frequency  Start Date  End Date  Duration  
Status

 

 Potassium Chloride 10 MEQ  Orally Twice a day  1 tablet with food  12h  02 Aug
, 2018  1 Oct, 2018  30 day(s)  Active

 

 Probiotic -                    Active

 

 Torsemide 20 mg  Orally Once a day  1 tablet  24h        30 days  Active

 

 Nystatin           26 Mar, 2018        Active

 

 Carvedilol 12.5 MG  Orally 2 times a day  1 tablet  12h           Active

 

 Aleve 220 MG  Orally every 12 hrs  2 tablets with food or milk as needed  12h 
          Active

 

 Levemir FlexTouch 100 UNIT/ML  Subcutaneous 2 times a day  20  units twice 
daily  12h          Active

 

 Pantoprazole Sodium 40 mg  Orally Once a day  1 tablet  24h           Active

 

 Aspir-81 81 MG  Orally Once a day  1 tablet  24h     16 May, 2019     Active

 

 Atorvastatin Calcium 10 mg  Orally Once a day  1 tablet  24h      
    Active

 

 Liraglutide 18 MG/3ML  Subcutaneous Once a day  0.6mg daily for one week then 
increase to 1.2 mg daily  24h  22 Carlos Enrique, 2018  20 Sep, 2018     Active

 

 Advair Diskus           26 Mar, 2018        Active

 

 Benazepril HCl 40 mg  Orally Once a day  1 tablet  24h           Active

 

 Hydrochlorothiazide 50 mg  Orally Once a day  1  1/2 tablets in the morning  
24h        30 day(s)  Active

 

 Citalopram Hydrobromide 20 mg  Orally Once a day  1 tablet  24h           
Active

 

 Humalog KwikPen 100 UNIT/ML  Subcutaneous three times a day before meals  12 
units              Active

 

 ProAir  (90 Base) MCG/ACT  Inhalation every 4 hrs  2 puffs as needed  
4h          Active

 

 GlipiZIDE 5 mg  Orally 2 times a day  1 tablet  12h           Active







RESULTS

No Results



PROCEDURES







 Procedure  Date Ordered  Result  Body Site

 

 [facility] VISIT ESTABLISHED PATIENT  Aug 09, 2018      







INSTRUCTIONS





MEDICATIONS ADMINISTERED

No Known Medications



MEDICAL (GENERAL) HISTORY







 Type  Description  Date

 

 Medical History  Type II Diabetes   

 

 Medical History  CAD   

 

 Medical History  Acute MI x1   

 

 Medical History  Heart Cath x3   

 

 Medical History  Kidney Stones   

 

 Medical History  Lymphedema   

 

 Medical History  Immune thrombocytopenic purpura   

 

 Surgical History  Heart Stents x2   

 

 Surgical History  Cholecystectomy   

 

 Surgical History     

 

 Surgical History  [place] Teeth   

 

 Hospitalization History  [facility]  

 

 Hospitalization History  Sepsis/Toxic Shock  

 

 Hospitalization History  [facility]-flu[facility]pneumonia  2017

 

 Hospitalization History  [facility]- Anemia, cellulitis pannus, yeast 
infection. Discharged 2018

## 2018-09-27 NOTE — XMS REPORT
[facility]

 Created on: 2018



Madison Young

External Reference #: [id number]

: 1945

Sex: Female



Demographics







 Address  [address] [place]  [address]

 

 Preferred Language  Unknown

 

 Marital Status  Unknown

 

 [employer]ism Affiliation  Unknown

 

 Race  Unknown

 

 Ethnic Group  Unknown





Author







 Author  TANI  LEANDER

 

 Organization  [facility]

 

 Address  [address] [place]  [address]



 

 Phone  [phone]







Care Team Providers







 Care Team Member Name  Role  Phone

 

 TANI  LEANDER  Unavailable  [phone]







PROBLEMS







 Type  Condition  ICD9-CM Code  NNN80-BV Code  Onset Dates  Condition Status  
SNOMED Code

 

 Problem  Long-term insulin use     Z79.4     Active  737420307

 

 Problem  Skin ulcer, limited to breakdown of skin     L98.491     Active  
67661164

 

 Problem  Other iron deficiency anemia     D50.8     Active  70181949

 

 Problem  Peripheral edema     R60.9     Active  156587486

 

 Problem  Coronary artery disease     I25.10     Active  80354157

 

 Problem  Diabetes mellitus due to underlying condition with foot ulcer     
E08.621     Active  823092587

 

 Problem  Type 2 diabetes mellitus with diabetic polyneuropathy     E11.42     
Active  99815670

 

 Problem  Microalbuminuric diabetic nephropathy     E11.21     Active  405652503

 

 Problem  Iron deficiency anemia     D50.9     Active  19916075

 

 Problem  Non-adherence to medical treatment     Z91.19     Active  246047734

 

 Problem  Non-pressure chronic ulcer of other part of left foot limited to 
breakdown of skin     L97.521     Active  637779851

 

 Problem  Acute idiopathic gout involving toe of left foot     M10.072     
Active  80762006

 

 Problem  Immune thrombocytopenic purpura     D69.3     Active  897560644

 

 Problem  Hyperlipidemia, unspecified hyperlipidemia     E78.5     Active  
12636222

 

 Problem  GERD (gastroesophageal reflux disease)     K21.9     Active  160957819

 

 Problem  Avascular necrosis of bone of right hip     M87.051     Active  
698074895

 

 Problem  Anxiety disorder, unspecified     F41.9     Active  233418300

 

 Problem  Morbid (severe) obesity due to excess calories     E66.01     Active  
752463425

 

 Problem  Asthma     J45.909     Active  624743540

 

 Problem  Body mass index (BMI) of 45.0-49.9 in adult     Z68.42     Active  
082274544

 

 Problem  Chronic kidney disease (CKD) stage G3a/A3, moderately decreased 
glomerular filtration rate (GFR) between 45-59 mL/min/1.73 square meter and 
albuminuria creatinine ratio greater than 300 mg/g     N18.3     Active  
881915886

 

 Problem  Hypertension     I10     Active  99819472

 

 Problem  Recurrent major depressive disorder, in partial remission     F33.41 
    Active  46307056







ALLERGIES

No Information



ENCOUNTERS







 Encounter  Location  Date  Diagnosis

 

 [place] [address] [place] [id number] [place] [address]1993-
4908  14 Sep, 2018   

 

 [place] [address] [phone]-
8230  10 Sep, 2018   

 

 [place] [address] [place] [id number] [place] [address] 15724-
9833  06 Sep, 2018  Type 2 diabetes mellitus with diabetic polyneuropathy 
E11.42 ; Coronary artery disease I25.10 ; Hypertension I10 ; Long-term insulin 
use Z79.4 ; Body mass index (BMI) of 45.0-49.9 in adult Z68.42 ; Peripheral 
edema R60.9 and Avascular necrosis of bone of right hip M87.051

 

 [place] [address] [place] [id number] [place] [address] 51529-
3545  17 Aug, 2018  Peripheral edema R60.9

 

 [place] [address] [place] [id number] [place] [address] [phone]-
[phone]  13 Aug, 2018  Hypertension I10 and Peripheral edema R60.9

 

 [place] [address] [place] [id number] [place] [address] [phone]-
[phone]  09 Aug, 2018   

 

 [place] [address] [place] [id number] [place] [address] 80230-
[phone]  09 Aug, 2018  Hypertension I10 and Peripheral edema R60.9

 

 [place] [address] [place] [id number] [place] [address] 98698-
8031  06 Aug, 2018  Hypertension I10 and Peripheral edema R60.9

 

 [place] [address] [place] [id number] [place] [address] [phone]-
[phone]  02 Aug, 2018   

 

 [place] [address] [place] [id number] [place] [address] 50823-
[phone]  02 Aug, 2018  Hypertension I10 and Peripheral edema R60.9

 

 [place] [address] [address]-
4933  01 Aug, 2018   

 

 [place] [address] [place] [id number] [place] [address] 75757-
[phone]     

 

 [place] [address] [place] [id number] [place] [address] 95512-
[phone]     

 

 [place] [address] [place] [id number] [place] [address] 10612-
3505     

 

 [place] [address] [place] [id number] [place] [address] 54603-
6868    Diabetes mellitus due to underlying condition with foot 
ulcer E08.621 ; Non-pressure chronic ulcer of other part of left foot limited 
to breakdown of skin L97.521 and BMI 45.0-49.9, adult Z68.42

 

 [place] [address] [place] [id number] [place] [address] 46475-
1239  17 2018  Acute idiopathic gout involving toe of left foot M10.072

 

 [place] [address] [place] [id number] [place] [address] 81449-
[phone]     

 

 [place] [address] [place] [id number] [place] [address] 57982-
[phone]     

 

 [place] [address] [place] [address] 97426-
[phone]     

 

 [place] [address] [place] [id number] [place] [address] 51835-
0882    Type 2 diabetes mellitus with diabetic polyneuropathy 
E11.42 ; Hypertension I10 ; Hyperlipidemia, unspecified hyperlipidemia E78.5 ; 
Body mass index (BMI) of 45.0-49.9 in adult Z68.42 ; Long-term insulin use 
Z79.4 ; Non-adherence to medical treatment Z91.19 ; Coronary artery disease 
I25.10 ; GERD (gastroesophageal reflux disease) K21.9 ; Asthma J45.909 and 
Recurrent major depressive disorder, in partial remission F33.41

 

 [place] [address] [place] [address] 85714-
3969  22 May, 2018  Recurrent major depressive disorder, in partial remission 
F33.41 and Hypertension I10

 

 [place] [address] [place] [id number] [place] [address] 60018-
2672  21 May, 2018  Immune thrombocytopenic purpura D69.3 and Iron deficiency 
anemia D50.9

 

 [place] [address] [address]-
[phone]  21 May, 2018  Type 2 diabetes mellitus with diabetic polyneuropathy 
E11.42 ; Long-term insulin use Z79.4 ; Chronic kidney disease (CKD) stage G3a/A3
, moderately decreased glomerular filtration rate (GFR) between 45-59 mL/min/
1.73 square meter and albuminuria creatinine ratio greater than 300 mg/g N18.3 
; Hypertension I10 ; Hyperlipidemia, unspecified hyperlipidemia E78.5 ; 
Coronary artery disease I25.10 ; GERD (gastroesophageal reflux disease) K21.9 ; 
Immune thrombocytopenic purpura D69.3 ; Asthma J45.909 ; Morbid (severe) 
obesity due to excess calories E66.01 and Recurrent major depressive disorder, 
in partial remission F33.41

 

 [place] [address] [place] [id number] [place] [address] 51701-
3797  11 May, 2018  Chronic kidney disease (CKD) stage G3a/A3, moderately 
decreased glomerular filtration rate (GFR) between 45-59 mL/min/1.73 square 
meter and albuminuria creatinine ratio greater than 300 mg/g N18.3

 

 [place] [address] [place] [id number] [place] [address] 40046-
6466  02 May, 2018  Chronic kidney disease (CKD) stage G3a/A3, moderately 
decreased glomerular filtration rate (GFR) between 45-59 mL/min/1.73 square 
meter and albuminuria creatinine ratio greater than 300 mg/g N18.3

 

 [place] [address] [place] [id number] [place] [address] 47452-
0293     

 

 [place] [address] [place] [id number] [place] [address] 92441-
8574  28 Mar, 2018   

 

 [place] [address] [place] [address] 59068-
8342  26 Mar, 2018  Immune thrombocytopenic purpura D69.3 ; Type 2 diabetes 
mellitus with diabetic polyneuropathy E11.42 ; Avascular necrosis of bone of 
right hip M87.051 ; Long-term insulin use Z79.4 ; GERD (gastroesophageal reflux 
disease) K21.9 ; Hyperlipidemia, unspecified hyperlipidemia E78.5 ; 
Hypertension I10 ; Recurrent major depressive disorder, in partial remission 
F33.41 ; Microalbuminuric diabetic nephropathy E11.21 ; Body mass index (BMI) 
of 45.0-49.9 in adult Z68.42 ; BMI 45.0-49.9, adult Z68.42 and Chronic kidney 
disease (CKD) stage G3a/A3, moderately decreased glomerular filtration rate (GFR
) between 45-59 mL/min/1.73 square meter and albuminuria creatinine ratio 
greater than 300 mg/g N18.3

 

 [place] [address] [place] [id number] [place] [address] 71046-
4166  23 Mar, 2018   

 

 [place] [address] [place] [id number] [place] [address] 59864-
6946  23 Mar, 2018   

 

 [place] [address] [address]-
8138  19 Mar, 2018   

 

 [place] [address] [place] [id number] [place] [address] 69726-
0765  14 Mar, 2018   

 

 [place] [address] [place] [id number] [place] [address] 14792-
0857  13 Mar, 2018  Type 2 diabetes mellitus with diabetic polyneuropathy 
E11.42 ; Asthma J45.909 and Hypertension I10

 

 [facility]  [address] [place], [place] 
[id number]  13 Mar, 2018  Skin ulcer, limited to breakdown of skin L98.491 ; 
Immune thrombocytopenic purpura D69.3 ; Avascular necrosis of bone of right hip 
M87.051 and Type 2 diabetes mellitus with diabetic polyneuropathy E11.42

 

 [place] [address] [place] [id number] [place] [address] 63744-
5280  06 Mar, 2018  Asthma J45.909 and Type 2 diabetes mellitus with diabetic 
polyneuropathy E11.42

 

 [place] [address] [place] [id number] [place] [address] 88831-
2524  01 Mar, 2018   

 

 [facility]  [address] [place] 
[id number]    Other iron deficiency anemia D50.8 ; Weakness R53.1 ; 
Type 2 diabetes mellitus with diabetic polyneuropathy E11.42 ; Cellulitis of 
trunk, unspecified site of trunk L03.319 ; Coronary artery disease I25.10 ; 
Avascular necrosis of bone of right hip M87.051 and Morbid (severe) obesity due 
to excess calories E66.01

 

 [place] [address] 
[id number]     

 

 [place] [address] [address]-
9190     

 

 [facility] [place] [address] [phone]  15 Feb, 2018  Yeast infection of the skin B37.2

 

 [place] [address] [place] [id number] [place] [address] 56364-
[phone]  15 Feb, 2018   

 

 [place] [address] [address]-
5221  15 Feb, 2018   

 

 [place] [address] [place] [id number] [place] [address] 68023-
9436     

 

 [facility] [place] [facility]  [id number] [place] [address] 14831
[phone]     

 

 [place] [address] [place] [id number] [place] [address] 91217-
2666    Type 2 diabetes mellitus with diabetic polyneuropathy 
E11.42 ; Avascular necrosis of bone of right hip M87.051 ; Hyperlipidemia, 
unspecified hyperlipidemia E78.5 ; Hypertension I10 ; Anxiety disorder, 
unspecified F41.9 ; Immune thrombocytopenic purpura D69.3 ; Coronary artery 
disease I25.10 ; Orthopnea R06.01 ; Acute bronchitis, unspecified organism 
J20.9 ; Wound of left lower extremity, initial encounter S81.802A ; Body aches 
R52 and Debilitated R53.81

 

 [place] [id number] [place] [address] 38440-
0836  12 2018   

 

 [address] [address]-
8373    Type 2 diabetes mellitus with diabetic polyneuropathy 
E11.42 ; Encounter for immunization Z23 ; Hyperlipidemia, unspecified 
hyperlipidemia E78.5 ; Hypertension I10 ; Anxiety disorder, unspecified F41.9 ; 
Asthma J45.909 ; Body mass index (BMI) of 45.0-49.9 in adult Z68.42 and Morbid (
severe) obesity due to excess calories E66.01

 

 [address] [address]-
9103    Type 2 diabetes mellitus with diabetic polyneuropathy 
E11.42 ; Hyperlipidemia, unspecified hyperlipidemia E78.5 ; Hypertension I10 
and GERD (gastroesophageal reflux disease) K21.9

 

 [place] [id number] [place] [address] 27438-
5753  22 Mar, 2017  Type 2 diabetes mellitus with diabetic polyneuropathy E11.42

 

 [place] [id number] [place] [address] 95634-
2618    Type 2 diabetes mellitus with diabetic polyneuropathy 
E11.42 ; Coronary artery disease I25.10 ; History of MI (myocardial infarction) 
I25.2 ; Immune thrombocytopenic purpura D69.3 ; GERD (gastroesophageal reflux 
disease) K21.9 ; Hyperlipidemia, unspecified hyperlipidemia E78.5 ; 
Hypertension I10 ; History of kidney stones Z87.442 and Anxiety disorder, 
unspecified F41.9

 

 [place] [id number] [place] [address] 85092-
0958     

 

 [address] [phone]-
6458  29 Sep, 2016  Coronary artery disease I25.10 ; History of MI (myocardial 
infarction) I25.2 ; History of kidney stones Z87.442 ; Type 2 diabetes mellitus 
with diabetic polyneuropathy E11.42 ; Avascular necrosis of bone of right hip 
M87.051 ; Hyperlipidemia, unspecified hyperlipidemia E78.5 ; Hypertension I10 ; 
Asthma J45.909 and Encounter for immunization Z23

 

 [place] [address] [place] [id number] [place] [address] 70514-
8784  20 Sep, 2016   

 

 [place] [address] [address]-
2309    Coronary artery disease I25.10 ; Type 2 diabetes mellitus 
with diabetic polyneuropathy E11.42 ; History of MI (myocardial infarction) 
I25.2 ; Immune thrombocytopenic purpura D69.3 ; Hyperlipidemia, unspecified 
hyperlipidemia E78.5 and Hypertension I10

 

 [place] [address] [place] [id number] [place] [address] 69506-
3481    Coronary artery disease I25.10 ; Lymphedema I89.0 ; History 
of MI (myocardial infarction) I25.2 ; History of kidney stones Z87.442 ; Immune 
thrombocytopenic purpura D69.3 ; Type 2 diabetes mellitus with diabetic 
polyneuropathy E11.42 ; Avascular necrosis of bone of right hip M87.051 ; GERD (
gastroesophageal reflux disease) K21.9 ; Hyperlipidemia, unspecified 
hyperlipidemia E78.5 ; Hypertension I10 and Asthma J45.909

 

 [place] [address] [place] [id number] [place] [address] 28488-
6230     

 

 [place] [address] [place] [id number] [place] [address] 55909-
8696     

 

 [place] [address] [place] [id number] [place] [address] 58234-
2570  02 Dec, 2015   

 

 [place] [address] [place] [id number] [place] [address] 26299-
9943  02 Dec, 2015  Hyperlipidemia, unspecified hyperlipidemia E78.5

 

 [place] [address] [place] [id number] [place] [address] 27349-
[phone]     

 

 [facility]  [address] [facility] 2[facility] [id number] [place] [address]-
6062     

 

 [facility]  [address] [facility] 2] [id number] [address]-
2383    Allergic rhinitis J30.9

 

 [facility]  [address] [facility][note 2] [id number] [address]-
8961    Type 2 diabetes mellitus with diabetic polyneuropathy 
E11.42 ; Routine adult health maintenance Z00.00 ; Coronary artery disease 
I25.10 ; History of MI (myocardial infarction) I25.2 ; History of kidney stones 
Z87.442 ; Immune thrombocytopenic purpura D69.3 ; Avascular necrosis of bone of 
right hip M87.051 and GERD (gastroesophageal reflux disease) K21.9







IMMUNIZATIONS

No Known Immunizations



SOCIAL HISTORY

Never Assessed



REASON FOR VISIT

[facility]



PLAN OF CARE





VITAL SIGNS





MEDICATIONS

Unknown Medications



RESULTS

No Results



PROCEDURES

No Known procedures



INSTRUCTIONS





MEDICATIONS ADMINISTERED

No Known Medications



MEDICAL (GENERAL) HISTORY







 Type  Description  Date

 

 Medical History  Type II Diabetes   

 

 Medical History  CAD   

 

 Medical History  Acute MI x1   

 

 Medical History  Heart Cath x3   

 

 Medical History  Kidney Stones   

 

 Medical History  Lymphedema   

 

 Medical History  Immune thrombocytopenic purpura   

 

 Surgical History  Heart Stents x2   

 

 Surgical History  Cholecystectomy   

 

 Surgical History     

 

 Surgical History  [place]   

 

 Hospitalization History  [facility]  

 

 Hospitalization History  Sepsis/Toxic Shock  

 

 Hospitalization History  [facility]-flu/pneumonia  2017

 

 Hospitalization History  [facility]- Anemia, cellulitis pannus, yeast 
infection. Discharged 2018

## 2018-09-27 NOTE — XMS REPORT
[facility]

 Created on: 2018



Madison Young

External Reference #: [id number]

: 1945

Sex: Female



Demographics







 Address  [address] [place]  [address]

 

 Preferred Language  Unknown

 

 Marital Status  Unknown

 

 Taoism Affiliation  Unknown

 

 Race  Unknown

 

 Ethnic Group  Unknown





Author







 Author  TANI  LEANDER

 

 Organization  [facility]

 

 Address  [address] [place]  [address]



 

 Phone  [phone]







Care Team Providers







 Care Team Member Name  Role  Phone

 

 TANI  LEANDER  Unavailable  [phone]







PROBLEMS







 Type  Condition  ICD9-CM Code  HOX70-OY Code  Onset Dates  Condition Status  
SNOMED Code

 

 Problem  Long-term insulin use     Z79.4     Active  428329221

 

 Problem  Skin ulcer, limited to breakdown of skin     L98.491     Active  
02326834

 

 Problem  Other iron deficiency anemia     D50.8     Active  47435084

 

 Problem  Peripheral edema     R60.9     Active  788432012

 

 Problem  Coronary artery disease     I25.10     Active  94532191

 

 Problem  Diabetes mellitus due to underlying condition with foot ulcer     
E08.621     Active  768814407

 

 Problem  Type 2 diabetes mellitus with diabetic polyneuropathy     E11.42     
Active  98381000

 

 Problem  Microalbuminuric diabetic nephropathy     E11.21     Active  172730537

 

 Problem  Iron deficiency anemia     D50.9     Active  79546291

 

 Problem  Non-adherence to medical treatment     Z91.19     Active  386971742

 

 Problem  Non-pressure chronic ulcer of other part of left foot limited to 
breakdown of skin     L97.521     Active  935497221

 

 Problem  Acute idiopathic gout involving toe of left foot     M10.072     
Active  79251727

 

 Problem  Immune thrombocytopenic purpura     D69.3     Active  085340433

 

 Problem  Hyperlipidemia, unspecified hyperlipidemia     E78.5     Active  
64964375

 

 Problem  GERD (gastroesophageal reflux disease)     K21.9     Active  734412439

 

 Problem  Avascular necrosis of bone of right hip     M87.051     Active  
160406782

 

 Problem  Anxiety disorder, unspecified     F41.9     Active  707575099

 

 Problem  Morbid (severe) obesity due to excess calories     E66.01     Active  
298293454

 

 Problem  Asthma     J45.909     Active  014597187

 

 Problem  Body mass index (BMI) of 45.0-49.9 in adult     Z68.42     Active  
995818861

 

 Problem  Chronic kidney disease (CKD) stage G3a/A3, moderately decreased 
glomerular filtration rate (GFR) between 45-59 mL/min/1.73 square meter and 
albuminuria creatinine ratio greater than 300 mg/g     N18.3     Active  
758350743

 

 Problem  Hypertension     I10     Active  94726627

 

 Problem  Recurrent major depressive disorder, in partial remission     F33.41 
    Active  76387249







ALLERGIES

No Information



ENCOUNTERS







 Encounter  Location  Date  Diagnosis

 

 [place] [address] [address]  06 Sep, 2018  Type 2 diabetes mellitus with diabetic polyneuropathy 
E11.42 ; Coronary artery disease I25.10 ; GERD (gastroesophageal reflux disease
) K21.9 ; Hyperlipidemia, unspecified hyperlipidemia E78.5 ; Hypertension I10 ; 
Asthma J45.909 ; Long-term insulin use Z79.4 ; Body mass index (BMI) of 45.0-
49.9 in adult Z68.42 ; Morbid (severe) obesity due to excess calories E66.01 ; 
Peripheral edema R60.9 and Avascular necrosis of bone of right hip M87.051

 

 [place] [address] [address]-
5[address]  17 Aug, 2018  Peripheral edema R60.9

 

 [place] [address] [address]-
7042  13 Aug, 2018  Hypertension I10 and Peripheral edema R60.9

 

 [place] [address] [address]-
0618  09 Aug, 2018   

 

 [place] [address] [address]-
6946  09 Aug, 2018  Hypertension I10 and Peripheral edema R60.9

 

 [place] [address] [address]-
3966  06 Aug, 2018  Hypertension I10 and Peripheral edema R60.9

 

 [place] [address] [address]-
5780  02 Aug, 2018   

 

 [place] [address] [address]-
[phone]  02 Aug, 2018  Hypertension I10 and Peripheral edema R60.9

 

 [place] [address] [place] [address]-
[address]  01 Aug, 2018   

 

 [place] [address] [place] [id number] [place] [address]-
9845     

 

 [place] [address] [place] [id number] [place] [address]-
1496     

 

 [place] [address] [place] [id number] [place] [phone]-
0000     

 

 [place] [address] [place] [id number] [place] [address]-
4293    Diabetes mellitus due to underlying condition with foot 
ulcer E08.621 ; Non-pressure chronic ulcer of other part of left foot limited 
to breakdown of skin L97.521 and BMI 45.0-49.9, adult Z68.42

 

 [place] [address] [place] [id number] [place] [address]-
4300    Acute idiopathic gout involving toe of left foot M10.072

 

 [place] [address] [place] [id number] [place] [address]160-
0756     

 

 [place] [address] [place] [id number] [place] [address]-
1976     

 

 [place] [address] [place] [id number] [place] [address]209-
7262     

 

 [place] [address] [place] [id number] [place] [address]281-
9818    Type 2 diabetes mellitus with diabetic polyneuropathy 
E11.42 ; Hypertension I10 ; Hyperlipidemia, unspecified hyperlipidemia E78.5 ; 
Body mass index (BMI) of 45.0-49.9 in adult Z68.42 ; Long-term insulin use 
Z79.4 ; Non-adherence to medical treatment Z91.19 ; Coronary artery disease 
I25.10 ; GERD (gastroesophageal reflux disease) K21.9 ; Asthma J45.909 and 
Recurrent major depressive disorder, in partial remission F33.41

 

 [place] [address] [place] [id number] [place] [address]-
8046  22 May, 2018  Recurrent major depressive disorder, in partial remission 
F33.41 and Hypertension I10

 

 [place] [address] [place] [id number] [place] [address]205-
1206  21 May, 2018  Immune thrombocytopenic purpura D69.3 and Iron deficiency 
anemia D50.9

 

 [place] [address] [place] [id number] [place] [address]-
5025  21 May, 2018  Type 2 diabetes mellitus with diabetic polyneuropathy 
E11.42 ; Long-term insulin use Z79.4 ; Chronic kidney disease (CKD) stage G3a/A3
, moderately decreased glomerular filtration rate (GFR) between 45-59 mL/min/
1.73 square meter and albuminuria creatinine ratio greater than 300 mg/g N18.3 
; Hypertension I10 ; Hyperlipidemia, unspecified hyperlipidemia E78.5 ; 
Coronary artery disease I25.10 ; GERD (gastroesophageal reflux disease) K21.9 ; 
Immune thrombocytopenic purpura D69.3 ; Asthma J45.909 ; Morbid (severe) 
obesity due to excess calories E66.01 and Recurrent major depressive disorder, 
in partial remission F33.41

 

 [place] [address] [place] [id number] [place] [address]-
2992  11 May, 2018  Chronic kidney disease (CKD) stage G3a/A3, moderately 
decreased glomerular filtration rate (GFR) between 45-59 mL/min/1.73 square 
meter and albuminuria creatinine ratio greater than 300 mg/g N18.3

 

 [place] [address] [place] [address]-
8412  02 May, 2018  Chronic kidney disease (CKD) stage G3a/A3, moderately 
decreased glomerular filtration rate (GFR) between 45-59 mL/min/1.73 square 
meter and albuminuria creatinine ratio greater than 300 mg/g N18.3

 

 [place] [address] [place] [id number] [place] [address]607-
0095     

 

 [place] [address] [place] [id number] [place] [address]32-
8002  28 Mar, 2018   

 

 [place] [address] [place] [id number] [place] [address]-
1558  26 Mar, 2018  Immune thrombocytopenic purpura D69.3 ; Type 2 diabetes 
mellitus with diabetic polyneuropathy E11.42 ; Avascular necrosis of bone of 
right hip M87.051 ; Long-term insulin use Z79.4 ; GERD (gastroesophageal reflux 
disease) K21.9 ; Hyperlipidemia, unspecified hyperlipidemia E78.5 ; 
Hypertension I10 ; Recurrent major depressive disorder, in partial remission 
F33.41 ; Microalbuminuric diabetic nephropathy E11.21 ; Body mass index (BMI) 
of 45.0-49.9 in adult Z68.42 ; BMI 45.0-49.9, adult Z68.42 and Chronic kidney 
disease (CKD) stage G3a/A3, moderately decreased glomerular filtration rate (GFR
) between 45-59 mL/min/1.73 square meter and albuminuria creatinine ratio 
greater than 300 mg/g N18.3

 

 [place] [address] [place] [id number] [place] [address]-
7419  23 Mar, 2018   

 

 [place] [address] [address]-
6735  23 Mar, 2018   

 

 [place] [address] [address]-
8432  19 Mar, 2018   

 

 [place] [address] [address]-
0890  14 Mar, 2018   

 

 [place] [address] [place] [id number] [place] [address]-
4934  13 Mar, 2018  Type 2 diabetes mellitus with diabetic polyneuropathy 
E11.42 ; Asthma J45.909 and Hypertension I10

 

 [facility]  [address] [place], [place] 
[id number]  13 Mar, 2018  Skin ulcer, limited to breakdown of skin L98.491 ; 
Immune thrombocytopenic purpura D69.3 ; Avascular necrosis of bone of right hip 
M87.051 and Type 2 diabetes mellitus with diabetic polyneuropathy E11.42

 

 [place] [address] [place] [id number] [place] [address] 0[address]-
8682  06 Mar, 2018  Asthma J45.909 and Type 2 diabetes mellitus with diabetic 
polyneuropathy E11.42

 

 [place] [address] [address]-
4134  01 Mar, 2018   

 

 [facility]  [address] [place], [place] 
[id number]    Other iron deficiency anemia D50.8 ; Weakness R53.1 ; 
Type 2 diabetes mellitus with diabetic polyneuropathy E11.42 ; Cellulitis of 
trunk, unspecified site of trunk L03.319 ; Coronary artery disease I25.10 ; 
Avascular necrosis of bone of right hip M87.051 and Morbid (severe) obesity due 
to excess calories E66.01

 

 [facility] [place]  [address] [place] [id number] [place] [address] 
[id number]     

 

 [place] [address] [place] [id number] [place] [address]-
3229     

 

 [facility] [place] [address] [place] [id number] [place] [phone]
[phone]  15 Feb, 2018  Yeast infection of the skin B37.2

 

 [place] [address] [place] [id number] [place] [address]51-
2840  15 Feb, 2018   

 

 [place] [address] [place] [id number] [place] [address]-
4403  15 Feb, 2018   

 

 [facility]  [address] [place] [address]-
4010     

 

 [facility] [place] [address] [place] [id number] [place] [address]
[phone]     

 

 [facility]  [address] [place] [address] 20284-
7613    Type 2 diabetes mellitus with diabetic polyneuropathy 
E11.42 ; Avascular necrosis of bone of right hip M87.051 ; Hyperlipidemia, 
unspecified hyperlipidemia E78.5 ; Hypertension I10 ; Anxiety disorder, 
unspecified F41.9 ; Immune thrombocytopenic purpura D69.3 ; Coronary artery 
disease I25.10 ; Orthopnea R06.01 ; Acute bronchitis, unspecified organism 
J20.9 ; Wound of left lower extremity, initial encounter S81.802A ; Body aches 
R52 and Debilitated R53.81

 

 [place] [id number] [place] [address]1109-
8980  12 2018   

 

 [place] [address] [place] [id number] [place] [address]78-
9100    Type 2 diabetes mellitus with diabetic polyneuropathy 
E11.42 ; Encounter for immunization Z23 ; Hyperlipidemia, unspecified 
hyperlipidemia E78.5 ; Hypertension I10 ; Anxiety disorder, unspecified F41.9 ; 
Asthma J45.909 ; Body mass index (BMI) of 45.0-49.9 in adult Z68.42 and Morbid (
severe) obesity due to excess calories E66.01

 

 [place] [id number] [place] [address] 60959-
5883    Type 2 diabetes mellitus with diabetic polyneuropathy 
E11.42 ; Hyperlipidemia, unspecified hyperlipidemia E78.5 ; Hypertension I10 
and GERD (gastroesophageal reflux disease) K21.9

 

 [place] [id number] [place] [address]-
8263  22 Mar, 2017  Type 2 diabetes mellitus with diabetic polyneuropathy E11.42

 

 [place] [id number] [place] [address] 04269-
4868  28 2017  Type 2 diabetes mellitus with diabetic polyneuropathy 
E11.42 ; Coronary artery disease I25.10 ; History of MI (myocardial infarction) 
I25.2 ; Immune thrombocytopenic purpura D69.3 ; GERD (gastroesophageal reflux 
disease) K21.9 ; Hyperlipidemia, unspecified hyperlipidemia E78.5 ; 
Hypertension I10 ; History of kidney stones Z87.442 and Anxiety disorder, 
unspecified F41.9

 

 [place] [address] [place] [id number] [place] [address]-
7083     

 

 [place] [id number] [place] [address]-
5064  29 Sep, 2016  Coronary artery disease I25.10 ; History of MI (myocardial 
infarction) I25.2 ; History of kidney stones Z87.442 ; Type 2 diabetes mellitus 
with diabetic polyneuropathy E11.42 ; Avascular necrosis of bone of right hip 
M87.051 ; Hyperlipidemia, unspecified hyperlipidemia E78.5 ; Hypertension I10 ; 
Asthma J45.909 and Encounter for immunization Z23

 

 [place] [address] [address]-
6324  20 Sep, 2016   

 

 [place] [address] [address]-
1892    Coronary artery disease I25.10 ; Type 2 diabetes mellitus 
with diabetic polyneuropathy E11.42 ; History of MI (myocardial infarction) 
I25.2 ; Immune thrombocytopenic purpura D69.3 ; Hyperlipidemia, unspecified 
hyperlipidemia E78.5 and Hypertension I10

 

 [place] [address] [address]-
3725    Coronary artery disease I25.10 ; Lymphedema I89.0 ; History 
of MI (myocardial infarction) I25.2 ; History of kidney stones Z87.442 ; Immune 
thrombocytopenic purpura D69.3 ; Type 2 diabetes mellitus with diabetic 
polyneuropathy E11.42 ; Avascular necrosis of bone of right hip M87.051 ; GERD (
gastroesophageal reflux disease) K21.9 ; Hyperlipidemia, unspecified 
hyperlipidemia E78.5 ; Hypertension I10 and Asthma J45.909

 

 [place] [id number] [place] [address]13-
7123     

 

 [address] [address]-
7095     

 

 [address] [address]-
7187  02 Dec, 2015   

 

 [address] [address]-
8335  02 Dec, 2015  Hyperlipidemia, unspecified hyperlipidemia E78.5

 

 [place] [address] [place] [id number] [place] [address]808-
[phone]     

 

 [address] [phone]-
[phone]     

 

 [facility]  [address] [facility] [id number] [place] [address]-
3970    Allergic rhinitis J30.9

 

 [facility]  [address] [facility] [id number] [place] [address]-
3646    Type 2 diabetes mellitus with diabetic polyneuropathy 
E11.42 ; Routine adult health maintenance Z00.00 ; Coronary artery disease 
I25.10 ; History of MI (myocardial infarction) I25.2 ; History of kidney stones 
Z87.442 ; Immune thrombocytopenic purpura D69.3 ; Avascular necrosis of bone of 
right hip M87.051 and GERD (gastroesophageal reflux disease) K21.9







IMMUNIZATIONS

No Known Immunizations



SOCIAL HISTORY

Never Assessed



REASON FOR VISIT

Triage--[facility]



PLAN OF CARE





VITAL SIGNS





MEDICATIONS

Unknown Medications



RESULTS

No Results



PROCEDURES

No Known procedures



INSTRUCTIONS





MEDICATIONS ADMINISTERED

No Known Medications



MEDICAL (GENERAL) HISTORY







 Type  Description  Date

 

 Medical History  Type II Diabetes   

 

 Medical History  CAD   

 

 Medical History  Acute MI x1   

 

 Medical History  Heart Cath x3   

 

 Medical History  Kidney Stones   

 

 Medical History  Lymphedema   

 

 Medical History  Immune thrombocytopenic purpura   

 

 Surgical History  Heart Stents x2   

 

 Surgical History  Cholecystectomy   

 

 Surgical History     

 

 Surgical History  Rosenberg Teeth   

 

 Hospitalization History  [facility]  

 

 Hospitalization History  Sepsis/Toxic Shock  

 

 Hospitalization History  [facility]-flu/pneumonia  2017

 

 Hospitalization History  [facility]- Anemia, cellulitis pannus, yeast 
infection. Discharged 2018

## 2018-09-27 NOTE — XMS REPORT
[facility]

 Created on: 2018



Madison Young

External Reference #: [id number]

: 1945

Sex: Female



Demographics







 Address  [address] [place]  [address]

 

 Preferred Language  Unknown

 

 Marital Status  Unknown

 

 Jew Affiliation  Unknown

 

 Race  Unknown

 

 Ethnic Group  Unknown





Author







 Author  TANI  LEANDER

 

 Organization  [facility]

 

 Address  [address] [place]  [address]



 

 Phone  [phone]







Care Team Providers







 Care Team Member Name  Role  Phone

 

 FREIRELEANDER HILLIARD  Unavailable  [phone]







PROBLEMS







 Type  Condition  ICD9-CM Code  CXF43-HD Code  Onset Dates  Condition Status  
SNOMED Code

 

 Problem  Hypertension     I10     Active  94443591

 

 Problem  Morbid (severe) obesity due to excess calories     E66.01     Active  
185073033

 

 Problem  Anxiety disorder, unspecified     F41.9     Active  303941987

 

 Problem  Body mass index (BMI) of 50-59.9 in adult     Z68.43     Active  
996761610

 

 Problem  Peripheral edema     R60.9     Active  103060830

 

 Problem  Long-term insulin use     Z79.4     Active  911618143

 

 Problem  Recurrent major depressive disorder, in partial remission     F33.41 
    Active  93951756

 

 Problem  Iron deficiency anemia     D50.9     Active  90358941

 

 Problem  Non-adherence to medical treatment     Z91.19     Active  716181390

 

 Problem  Microalbuminuric diabetic nephropathy     E11.21     Active  017587878

 

 Problem  Avascular necrosis of bone of right hip     M87.051     Active  
537579626

 

 Problem  Chronic kidney disease (CKD) stage G3a/A3, moderately decreased 
glomerular filtration rate (GFR) between 45-59 mL/min/1.73 square meter and 
albuminuria creatinine ratio greater than 300 mg/g     N18.3     Active  
827127235

 

 Problem  Type 2 diabetes mellitus with diabetic polyneuropathy     E11.42     
Active  02910652

 

 Problem  Immune thrombocytopenic purpura     D69.3     Active  687119574

 

 Problem  Coronary artery disease     I25.10     Active  84994629

 

 Problem  Hyperlipidemia, unspecified hyperlipidemia     E78.5     Active  
49232209

 

 Problem  GERD (gastroesophageal reflux disease)     K21.9     Active  511161306

 

 Problem  Asthma     J45.909     Active  622067641







ALLERGIES







 Substance  Reaction  Event Type  Date  Status

 

 Heparin Sodium (Porcine) PF  Unknown  Drug Allergy  13 Aug, 2018  Active

 

 Chlorthalidone  diarrhea  Drug Allergy  13 Aug, 2018  Active

 

 Adhesive  Unknown  Non Drug Allergy  13 Aug, 2018  Active







ENCOUNTERS







 Encounter  Location  Date  Diagnosis

 

 [place] [address] [place] [address] [phone]-
[phone]  14 Sep, 2018  Coronary artery disease I25.10 ; Congestive heart failure, 
unspecified HF chronicity, unspecified heart failure type I50.9 ; Peripheral 
edema R60.9 ; Avascular necrosis of bone of right hip M87.051 ; Morbid (severe) 
obesity due to excess calories E66.01 and Body mass index (BMI) of 50-59.9 in 
adult Z68.43

 

 [place] [address] [place] [id number] [place] [address] 55491-
[phone]  10 Sep, 2018   

 

 [place] [address] [place] [id number] [place] [address] 10349-
[phone]  06 Sep, 2018  Type 2 diabetes mellitus with diabetic polyneuropathy 
E11.42 ; Coronary artery disease I25.10 ; Hypertension I10 ; Long-term insulin 
use Z79.4 ; Body mass index (BMI) of 45.0-49.9 in adult Z68.42 ; Peripheral 
edema R60.9 and Avascular necrosis of bone of right hip M87.051

 

 [place] [address] [place] [id number] [place] [address] [phone]
[phone]  17 Aug, 2018  Peripheral edema R60.9

 

 [place] [address] [place] [id number] [place] [address] [phone]-
[phone]  13 Aug, 2018  Hypertension I10 and Peripheral edema R60.9

 

 [place] [address] [place] [address] [phone]
[phone]  09 Aug, 2018   

 

 [place] [address] [place] [id number] [place] [address] [phone]
[phone]  09 Aug, 2018  Hypertension I10 and Peripheral edema R60.9

 

 [place] [address] [place] [id number] [place] [address] [phone]
[phone]  06 Aug, 2018  Hypertension I10 and Peripheral edema R60.9

 

 [place] [address] [place] [id number] [place] [address] [phone]-
[phone]  02 Aug, 2018   

 

 [place] [address] [place] [id number] [place] [address] 78448-
[phone]  02 Aug, 2018  Hypertension I10 and Peripheral edema R60.9

 

 [place] [address] [place] [id number] [place] [address] [phone]
[phone]  01 Aug, 2018   

 

 [place] [address] [place] [id number] [place] [address] [phone]-
[phone]     

 

 [place] [address] [place] [id number] [place] [address] 47466-
[phone]     

 

 [place] [address] [place] [id number] [place] [address] 69566-
[phone]     

 

 [place] [address] [place] [id number] [place] [address] 35444-
7231    Diabetes mellitus due to underlying condition with foot 
ulcer E08.621 ; Non-pressure chronic ulcer of other part of left foot limited 
to breakdown of skin L97.521 and BMI 45.0-49.9, adult Z68.42

 

 [place] [address] [place] [id number] [place] [address] [phone]-
[phone]    Acute idiopathic gout involving toe of left foot M10.072

 

 [place] [address] [place] [id number] [place] [address] 10739-
[phone]     

 

 [place] [address] [place] [id number] [place] [address] 16938-
3709     

 

 [place] [address] [place] [address] 19466-
4524     

 

 [place] [address] [place] [address] 74698-
2715    Type 2 diabetes mellitus with diabetic polyneuropathy 
E11.42 ; Hypertension I10 ; Hyperlipidemia, unspecified hyperlipidemia E78.5 ; 
Body mass index (BMI) of 45.0-49.9 in adult Z68.42 ; Long-term insulin use 
Z79.4 ; Non-adherence to medical treatment Z91.19 ; Coronary artery disease 
I25.10 ; GERD (gastroesophageal reflux disease) K21.9 ; Asthma J45.909 and 
Recurrent major depressive disorder, in partial remission F33.41

 

 [place] [address] [place] [address] 78972-
[phone]  22 May, 2018  Recurrent major depressive disorder, in partial remission 
F33.41 and Hypertension I10

 

 [place] [address] [place] [id number] [place] [address] 26115-
8415  21 May, 2018  Immune thrombocytopenic purpura D69.3 and Iron deficiency 
anemia D50.9

 

 [place] [address] [place] [id number] [place] [address] 76181-
8459  21 May, 2018  Type 2 diabetes mellitus with diabetic polyneuropathy 
E11.42 ; Long-term insulin use Z79.4 ; Chronic kidney disease (CKD) stage G3a/A3
, moderately decreased glomerular filtration rate (GFR) between 45-59 mL/min/
1.73 square meter and albuminuria creatinine ratio greater than 300 mg/g N18.3 
; Hypertension I10 ; Hyperlipidemia, unspecified hyperlipidemia E78.5 ; 
Coronary artery disease I25.10 ; GERD (gastroesophageal reflux disease) K21.9 ; 
Immune thrombocytopenic purpura D69.3 ; Asthma J45.909 ; Morbid (severe) 
obesity due to excess calories E66.01 and Recurrent major depressive disorder, 
in partial remission F33.41

 

 [place] [address] [place] [address] [phone]-
[phone]  11 May, 2018  Chronic kidney disease (CKD) stage G3a/A3, moderately 
decreased glomerular filtration rate (GFR) between 45-59 mL/min/1.73 square 
meter and albuminuria creatinine ratio greater than 300 mg/g N18.3

 

 [place] [address] [place] [address] 49451-
5476  02 May, 2018  Chronic kidney disease (CKD) stage G3a/A3, moderately 
decreased glomerular filtration rate (GFR) between 45-59 mL/min/1.73 square 
meter and albuminuria creatinine ratio greater than 300 mg/g N18.3

 

 [place] [address] [place] [address] [phone]-
[phone]     

 

 [place] [address] [place] [id number] [place] [address] [phone]
[phone]  28 Mar, 2018   

 

 [place] [address] [place] [address] [place] [address] [phone]
[phone]  26 Mar, 2018  Immune thrombocytopenic purpura D69.3 ; Type 2 diabetes 
mellitus with diabetic polyneuropathy E11.42 ; Avascular necrosis of bone of 
right hip M87.051 ; Long-term insulin use Z79.4 ; GERD (gastroesophageal reflux 
disease) K21.9 ; Hyperlipidemia, unspecified hyperlipidemia E78.5 ; 
Hypertension I10 ; Recurrent major depressive disorder, in partial remission 
F33.41 ; Microalbuminuric diabetic nephropathy E11.21 ; Body mass index (BMI) 
of 45.0-49.9 in adult Z68.42 ; BMI 45.0-49.9, adult Z68.42 and Chronic kidney 
disease (CKD) stage G3a/A3, moderately decreased glomerular filtration rate (GFR
) between 45-59 mL/min/1.73 square meter and albuminuria creatinine ratio 
greater than 300 mg/g N18.3

 

 [place] [address] [place] [address] [place] [address] [phone]
[phone]  23 Mar, 2018   

 

 [place] [address] [place] [address] [place] [address] [phone]-
[phone]  23 Mar, 2018   

 

 [place] [address] [place] [address] [place] [address] 08509-
5655  19 Mar, 2018   

 

 [place] [address] [place] [address] [place] [address] 47018-
2610  14 Mar, 2018   

 

 [place] [address] [place] [address] [place] [address] 15997-
[phone]  13 Mar, 2018  Type 2 diabetes mellitus with diabetic polyneuropathy 
E11.42 ; Asthma J45.909 and Hypertension I10

 

 [facility]  [address] [place], [place] 
[phone]  13 Mar, 2018  Skin ulcer, limited to breakdown of skin L98.491 ; 
Immune thrombocytopenic purpura D69.3 ; Avascular necrosis of bone of right hip 
M87.051 and Type 2 diabetes mellitus with diabetic polyneuropathy E11.42

 

 [place] [address] [place] [address] [phone]-
[phone]  06 Mar, 2018  Asthma J45.909 and Type 2 diabetes mellitus with diabetic 
polyneuropathy E11.42

 

 [place] [address] [phone]-
[phone]  01 Mar, 2018   

 

 [facility] [place]  [address]   [place], [place] 
[id number]    Other iron deficiency anemia D50.8 ; Weakness R53.1 ; 
Type 2 diabetes mellitus with diabetic polyneuropathy E11.42 ; Cellulitis of 
trunk, unspecified site of trunk L03.319 ; Coronary artery disease I25.10 ; 
Avascular necrosis of bone of right hip M87.051 and Morbid (severe) obesity due 
to excess calories E66.01

 

 [place] [address] 
[id number]     

 

 [place] [address] [phone]-
[phone]     

 

 [facility]  [facility] [address] [phone]  15 Feb, 2018  Yeast infection of the skin B37.2

 

 [place] [address] [phone]-
[phone]  15 Feb, 2018   

 

 [place] [address] [phone]-
[phone]  15 Feb, 2018   

 

 [place] [address] [phone]-
[phone]     

 

 [facility]  [address] [phone]     

 

 [place] [address] [address]-
9630    Type 2 diabetes mellitus with diabetic polyneuropathy 
E11.42 ; Avascular necrosis of bone of right hip M87.051 ; Hyperlipidemia, 
unspecified hyperlipidemia E78.5 ; Hypertension I10 ; Anxiety disorder, 
unspecified F41.9 ; Immune thrombocytopenic purpura D69.3 ; Coronary artery 
disease I25.10 ; Orthopnea R06.01 ; Acute bronchitis, unspecified organism 
J20.9 ; Wound of left lower extremity, initial encounter S81.802A ; Body aches 
R52 and Debilitated R53.81

 

 [place] [id number] [place] [address] [phone]-
2063  12 2018   

 

 [address] [address]-
9409    Type 2 diabetes mellitus with diabetic polyneuropathy 
E11.42 ; Encounter for immunization Z23 ; Hyperlipidemia, unspecified 
hyperlipidemia E78.5 ; Hypertension I10 ; Anxiety disorder, unspecified F41.9 ; 
Asthma J45.909 ; Body mass index (BMI) of 45.0-49.9 in adult Z68.42 and Morbid (
severe) obesity due to excess calories E66.01

 

 [address] [address]-
9322    Type 2 diabetes mellitus with diabetic polyneuropathy 
E11.42 ; Hyperlipidemia, unspecified hyperlipidemia E78.5 ; Hypertension I10 
and GERD (gastroesophageal reflux disease) K21.9

 

 [address] [phone]-
9490  22 Mar, 2017  Type 2 diabetes mellitus with diabetic polyneuropathy E11.42

 

 [place] [id number] [place] [address] 34930-
8244  28 2017  Type 2 diabetes mellitus with diabetic polyneuropathy 
E11.42 ; Coronary artery disease I25.10 ; History of MI (myocardial infarction) 
I25.2 ; Immune thrombocytopenic purpura D69.3 ; GERD (gastroesophageal reflux 
disease) K21.9 ; Hyperlipidemia, unspecified hyperlipidemia E78.5 ; 
Hypertension I10 ; History of kidney stones Z87.442 and Anxiety disorder, 
unspecified F41.9

 

 [place] [id number] [place] [address] 93306-
2865  14 2017   

 

 [address] [address]-
5662  29 Sep, 2016  Coronary artery disease I25.10 ; History of MI (myocardial 
infarction) I25.2 ; History of kidney stones Z87.442 ; Type 2 diabetes mellitus 
with diabetic polyneuropathy E11.42 ; Avascular necrosis of bone of right hip 
M87.051 ; Hyperlipidemia, unspecified hyperlipidemia E78.5 ; Hypertension I10 ; 
Asthma J45.909 and Encounter for immunization Z23

 

 [place] [address] [phone]-
4226  20 Sep, 2016   

 

 [place] [address] [address]-
6242    Coronary artery disease I25.10 ; Type 2 diabetes mellitus 
with diabetic polyneuropathy E11.42 ; History of MI (myocardial infarction) 
I25.2 ; Immune thrombocytopenic purpura D69.3 ; Hyperlipidemia, unspecified 
hyperlipidemia E78.5 and Hypertension I10

 

 [place] [address] [address]-
7491    Coronary artery disease I25.10 ; Lymphedema I89.0 ; History 
of MI (myocardial infarction) I25.2 ; History of kidney stones Z87.442 ; Immune 
thrombocytopenic purpura D69.3 ; Type 2 diabetes mellitus with diabetic 
polyneuropathy E11.42 ; Avascular necrosis of bone of right hip M87.051 ; GERD (
gastroesophageal reflux disease) K21.9 ; Hyperlipidemia, unspecified 
hyperlipidemia E78.5 ; Hypertension I10 and Asthma J45.909

 

 [place] [address] [place] [address] [place] [address] [phone]-
[phone]     

 

 [place] [address] [address]-
5016     

 

 [place] [address] [address]-
[phone]  02 Dec, 2015   

 

 [place] [address] [phone]-
0615  02 Dec, 2015  Hyperlipidemia, unspecified hyperlipidemia E78.5

 

 [place] [address] [place] [address]-
9633     

 

 [facility]  [address] [facility] [id number] [address]-
3246     

 

 [facility]  [address] [facility] [id number] [place] [address]-
9425    Allergic rhinitis J30.9

 

 [facility]  [address] [facility] [id number] [place] [address]-
8619    Type 2 diabetes mellitus with diabetic polyneuropathy 
E11.42 ; Routine adult health maintenance Z00.00 ; Coronary artery disease 
I25.10 ; History of MI (myocardial infarction) I25.2 ; History of kidney stones 
Z87.442 ; Immune thrombocytopenic purpura D69.3 ; Avascular necrosis of bone of 
right hip M87.051 and GERD (gastroesophageal reflux disease) K21.9







IMMUNIZATIONS

No Known Immunizations



SOCIAL HISTORY

Never Assessed



REASON FOR VISIT

f/u----[email or web address]



PLAN OF CARE







 Activity  Details









  









 Follow Up  3 Months, prn Reason:CHM/HTN/DM







VITAL SIGNS







 Height  67 in  2018

 

 Weight  326.5 lbs  2018

 

 Temperature  98.7 degrees Fahrenheit  2018

 

 Heart Rate  90 bpm  2018

 

 Respiratory Rate  20   2018

 

 BMI  51.13 kg/m2  2018

 

 Blood pressure systolic  152 mmHg  2018

 

 Blood pressure diastolic  84 mmHg  2018







MEDICATIONS







 Medication  Instructions  Dosage  Frequency  Start Date  End Date  Duration  
Status

 

 Atorvastatin Calcium 10 mg  Orally Once a day  1 tablet  24h      
    Active

 

 Pantoprazole Sodium 40 mg  Orally Once a day  1 tablet  24h           Active

 

 Aspir-81 81 MG  Orally Once a day  1 tablet  24h     16 May, 2019     Active

 

 Carvedilol 12.5 MG  Orally 2 times a day  1 tablet  12h           Active

 

 Benazepril HCl 40 mg  Orally Once a day  1 tablet  24h           Active

 

 Hydrochlorothiazide 50 mg  Orally Once a day  1  1/2 tablets in the morning  
24h        30 day(s)  Active

 

 Citalopram Hydrobromide 20 mg  Orally Once a day  1 tablet  24h           
Active

 

 Advair Diskus           26 Mar, 2018        Active

 

 GlipiZIDE 5 mg  Orally 2 times a day  1 tablet  12h           Active

 

 Levemir FlexTouch 100 UNIT/ML  Subcutaneous 2 times a day  20  units twice 
daily  12h  19 2015        Active

 

 Humalog KwikPen 100 UNIT/ML  Subcutaneous three times a day before meals  12 
units              Active

 

 Torsemide 20 mg  Orally Once a day  1 tablet  24h        30 days  Active

 

 ProAir  (90 Base) MCG/ACT  Inhalation every 4 hrs  2 puffs as needed  
4h          Active

 

 Probiotic -                    Active

 

 Liraglutide 18 MG/3ML  Subcutaneous Once a day  0.6mg daily for one week then 
increase to 1.2 mg daily  24h  22 Carlos Enrique, 2018  20 Sep, 2018     Active

 

 Nystatin           26 Mar, 2018        Active

 

 Potassium Chloride 10 MEQ  Orally Twice a day  1 tablet with food  12h  02 Aug
, 2018  1 Oct, 2018  30 day(s)  Active

 

 Aleve 220 MG  Orally every 12 hrs  2 tablets with food or milk as needed  12h 
          Active







RESULTS

No Results



PROCEDURES







 Procedure  Date Ordered  Result  Body Site

 

 [facility]  [facility]      

 

 VENCLAUDIA, ROUTINE*  Aug 13, 2018      

 

 [facility] VISIT ESTABLISHED PATIENT  Aug 13, 2018      







INSTRUCTIONS





MEDICATIONS ADMINISTERED

No Known Medications



MEDICAL (GENERAL) HISTORY







 Type  Description  Date

 

 Medical History  Type II Diabetes   

 

 Medical History  CAD   

 

 Medical History  Acute MI x1   

 

 Medical History  Heart Cath x3   

 

 Medical History  Kidney Stones   

 

 Medical History  Lymphedema   

 

 Medical History  Immune thrombocytopenic purpura   

 

 Surgical History  Heart Stents x2   

 

 Surgical History  Cholecystectomy   

 

 Surgical History     

 

 Surgical History  Long Bottom Teeth   

 

 Hospitalization History  [facility]  

 

 Hospitalization History  Sepsis/Toxic Shock  

 

 Hospitalization History  [facility]-flu[facility]pneumonia  2017

 

 Hospitalization History  [facility] [place]- Anemia, cellulitis pannus, yeast 
infection. Discharged 2018

## 2018-09-27 NOTE — XMS REPORT
[facility]

 Created on: 2018



Madison Young

External Reference #: [id number]

: 1945

Sex: Female



Demographics







 Address  [address] [place]  [address]

 

 Preferred Language  Unknown

 

 Marital Status  Unknown

 

 Gnosticist Affiliation  Unknown

 

 Race  Unknown

 

 Ethnic Group  Unknown





Author







 Author  TANI  LEANDER

 

 Organization  [facility]

 

 Address  [address] [place]  [address]



 

 Phone  [phone]







Care Team Providers







 Care Team Member Name  Role  Phone

 

 TANI  LEANDER  Unavailable  [phone]







PROBLEMS







 Type  Condition  ICD9-CM Code  YTG93-US Code  Onset Dates  Condition Status  
SNOMED Code

 

 Problem  Long-term insulin use     Z79.4     Active  390807655

 

 Problem  Skin ulcer, limited to breakdown of skin     L98.491     Active  
33447221

 

 Problem  Other iron deficiency anemia     D50.8     Active  46185043

 

 Problem  Peripheral edema     R60.9     Active  224622927

 

 Problem  Coronary artery disease     I25.10     Active  52298105

 

 Problem  Diabetes mellitus due to underlying condition with foot ulcer     
E08.621     Active  908133357

 

 Problem  Type 2 diabetes mellitus with diabetic polyneuropathy     E11.42     
Active  28865715

 

 Problem  Microalbuminuric diabetic nephropathy     E11.21     Active  217942335

 

 Problem  Iron deficiency anemia     D50.9     Active  24118465

 

 Problem  Non-adherence to medical treatment     Z91.19     Active  192290633

 

 Problem  Non-pressure chronic ulcer of other part of left foot limited to 
breakdown of skin     L97.521     Active  215743764

 

 Problem  Acute idiopathic gout involving toe of left foot     M10.072     
Active  27549711

 

 Problem  Immune thrombocytopenic purpura     D69.3     Active  741490967

 

 Problem  Hyperlipidemia, unspecified hyperlipidemia     E78.5     Active  
04309734

 

 Problem  GERD (gastroesophageal reflux disease)     K21.9     Active  191091638

 

 Problem  Avascular necrosis of bone of right hip     M87.051     Active  
515476302

 

 Problem  Anxiety disorder, unspecified     F41.9     Active  555243860

 

 Problem  Morbid (severe) obesity due to excess calories     E66.01     Active  
539320287

 

 Problem  Asthma     J45.909     Active  816275836

 

 Problem  Body mass index (BMI) of 45.0-49.9 in adult     Z68.42     Active  
041021133

 

 Problem  Chronic kidney disease (CKD) stage G3a/A3, moderately decreased 
glomerular filtration rate (GFR) between 45-59 mL/min/1.73 square meter and 
albuminuria creatinine ratio greater than 300 mg/g     N18.3     Active  
894622085

 

 Problem  Hypertension     I10     Active  39766255

 

 Problem  Recurrent major depressive disorder, in partial remission     F33.41 
    Active  20730722







ALLERGIES

No Information



ENCOUNTERS







 Encounter  Location  Date  Diagnosis

 

 [place] [address] [address]  06 Sep, 2018  Type 2 diabetes mellitus with diabetic polyneuropathy 
E11.42 ; Coronary artery disease I25.10 ; GERD (gastroesophageal reflux disease
) K21.9 ; Hyperlipidemia, unspecified hyperlipidemia E78.5 ; Hypertension I10 ; 
Asthma J45.909 ; Long-term insulin use Z79.4 ; Body mass index (BMI) of 45.0-
49.9 in adult Z68.42 ; Morbid (severe) obesity due to excess calories E66.01 ; 
Peripheral edema R60.9 and Avascular necrosis of bone of right hip M87.051

 

 [place] [address] [address]-
2585  17 Aug, 2018  Peripheral edema R60.9

 

 [place] [address] [address]-
6004  13 Aug, 2018  Hypertension I10 and Peripheral edema R60.9

 

 [place] [address] [address]-
7339  09 Aug, 2018   

 

 [place] [address] [address]-
4034  09 Aug, 2018  Hypertension I10 and Peripheral edema R60.9

 

 [place] [address] [address]-
6322  06 Aug, 2018  Hypertension I10 and Peripheral edema R60.9

 

 [place] [address] [address]-
5600  02 Aug, 2018   

 

 [place] [address] [address]-
5203  02 Aug, 2018  Hypertension I10 and Peripheral edema R60.9

 

 [place] [address] [place] [address]-
7642  01 Aug, 2018   

 

 [place] [address] [place] [id number] [place] [address]-
1114     

 

 [place] [address] [place] [id number] [place] [address]-
5011     

 

 [place] [address] [place] [id number] [place] [address]-
[phone]     

 

 [place] [address] [place] [id number] [place] [address]-
4014    Diabetes mellitus due to underlying condition with foot 
ulcer E08.621 ; Non-pressure chronic ulcer of other part of left foot limited 
to breakdown of skin L97.521 and BMI 45.0-49.9, adult Z68.42

 

 [place] [address] [place] [id number] [place] [address]-
5076    Acute idiopathic gout involving toe of left foot M10.072

 

 [place] [address] [place] [id number] [place] [address]-
1495     

 

 [place] [address] [place] [id number] [place] [address]-
7530     

 

 [place] [address] [place] [id number] [place] [address]-
0050     

 

 [place] [address] [place] [id number] [place] [address]-
6521    Type 2 diabetes mellitus with diabetic polyneuropathy 
E11.42 ; Hypertension I10 ; Hyperlipidemia, unspecified hyperlipidemia E78.5 ; 
Body mass index (BMI) of 45.0-49.9 in adult Z68.42 ; Long-term insulin use 
Z79.4 ; Non-adherence to medical treatment Z91.19 ; Coronary artery disease 
I25.10 ; GERD (gastroesophageal reflux disease) K21.9 ; Asthma J45.909 and 
Recurrent major depressive disorder, in partial remission F33.41

 

 [place] [address] [place] [id number] [place] [address]-
0166  22 May, 2018  Recurrent major depressive disorder, in partial remission 
F33.41 and Hypertension I10

 

 [place] [address] [place] [id number] [place] [address]-
1438  21 May, 2018  Immune thrombocytopenic purpura D69.3 and Iron deficiency 
anemia D50.9

 

 [place] [address] [place] [id number] [place] [address]-
4614  21 May, 2018  Type 2 diabetes mellitus with diabetic polyneuropathy 
E11.42 ; Long-term insulin use Z79.4 ; Chronic kidney disease (CKD) stage G3a/A3
, moderately decreased glomerular filtration rate (GFR) between 45-59 mL/min/
1.73 square meter and albuminuria creatinine ratio greater than 300 mg/g N18.3 
; Hypertension I10 ; Hyperlipidemia, unspecified hyperlipidemia E78.5 ; 
Coronary artery disease I25.10 ; GERD (gastroesophageal reflux disease) K21.9 ; 
Immune thrombocytopenic purpura D69.3 ; Asthma J45.909 ; Morbid (severe) 
obesity due to excess calories E66.01 and Recurrent major depressive disorder, 
in partial remission F33.41

 

 [place] [address] [place] [id number] [place] [address]-
2291  11 May, 2018  Chronic kidney disease (CKD) stage G3a/A3, moderately 
decreased glomerular filtration rate (GFR) between 45-59 mL/min/1.73 square 
meter and albuminuria creatinine ratio greater than 300 mg/g N18.3

 

 [place] [address] [place] [address]-
4997  02 May, 2018  Chronic kidney disease (CKD) stage G3a/A3, moderately 
decreased glomerular filtration rate (GFR) between 45-59 mL/min/1.73 square 
meter and albuminuria creatinine ratio greater than 300 mg/g N18.3

 

 [place] [address] [place] [id number] [place] [address]-
0977     

 

 [place] [address] [place] [id number] [place] [address]-
5887  28 Mar, 2018   

 

 [place] [address] [place] [id number] [place] [address]-
1380  26 Mar, 2018  Immune thrombocytopenic purpura D69.3 ; Type 2 diabetes 
mellitus with diabetic polyneuropathy E11.42 ; Avascular necrosis of bone of 
right hip M87.051 ; Long-term insulin use Z79.4 ; GERD (gastroesophageal reflux 
disease) K21.9 ; Hyperlipidemia, unspecified hyperlipidemia E78.5 ; 
Hypertension I10 ; Recurrent major depressive disorder, in partial remission 
F33.41 ; Microalbuminuric diabetic nephropathy E11.21 ; Body mass index (BMI) 
of 45.0-49.9 in adult Z68.42 ; BMI 45.0-49.9, adult Z68.42 and Chronic kidney 
disease (CKD) stage G3a/A3, moderately decreased glomerular filtration rate (GFR
) between 45-59 mL/min/1.73 square meter and albuminuria creatinine ratio 
greater than 300 mg/g N18.3

 

 [place] [address] [place] [id number] [place] [address]-
5243  23 Mar, 2018   

 

 [place] [address] [address]-
2554  23 Mar, 2018   

 

 [place] [address] [address]-
6880  19 Mar, 2018   

 

 [place] [address] [address]-
0192  14 Mar, 2018   

 

 [place] [address] [place] [id number] [place] [address]-
8263  13 Mar, 2018  Type 2 diabetes mellitus with diabetic polyneuropathy 
E11.42 ; Asthma J45.909 and Hypertension I10

 

 [facility]  [address] [place], [place] 
[phone]  13 Mar, 2018  Skin ulcer, limited to breakdown of skin L98.491 ; 
Immune thrombocytopenic purpura D69.3 ; Avascular necrosis of bone of right hip 
M87.051 and Type 2 diabetes mellitus with diabetic polyneuropathy E11.42

 

 [place] [address] [place] [id number] [place] [address]-
5836  06 Mar, 2018  Asthma J45.909 and Type 2 diabetes mellitus with diabetic 
polyneuropathy E11.42

 

 [place] [address] [address]  01 Mar, 2018   

 

 [facility]  [address] [place], [place] 
[id number]    Other iron deficiency anemia D50.8 ; Weakness R53.1 ; 
Type 2 diabetes mellitus with diabetic polyneuropathy E11.42 ; Cellulitis of 
trunk, unspecified site of trunk L03.319 ; Coronary artery disease I25.10 ; 
Avascular necrosis of bone of right hip M87.051 and Morbid (severe) obesity due 
to excess calories E66.01

 

 [facility]  [address] [place] [id number] [place] [address] 
[id number]     

 

 [place] [address] [place] [id number] [place] [address]-
1218     

 

 [facility] [place] [address] [place] [id number] [place] [phone]  15 Feb, 2018  Yeast infection of the skin B37.2

 

 [place] [address] [place] [id number] [place] [address]-
1368  15 Feb, 2018   

 

 [place] [address] [place] [id number] [place] [address]-
5416  15 Feb, 2018   

 

 [facility]  [address] [place] [address]-
3434     

 

 [facility] [place] [address] [place] [id number] [place] [phone]
[phone]     

 

 [facility]  [address] [place] [address]-
6499    Type 2 diabetes mellitus with diabetic polyneuropathy 
E11.42 ; Avascular necrosis of bone of right hip M87.051 ; Hyperlipidemia, 
unspecified hyperlipidemia E78.5 ; Hypertension I10 ; Anxiety disorder, 
unspecified F41.9 ; Immune thrombocytopenic purpura D69.3 ; Coronary artery 
disease I25.10 ; Orthopnea R06.01 ; Acute bronchitis, unspecified organism 
J20.9 ; Wound of left lower extremity, initial encounter S81.802A ; Body aches 
R52 and Debilitated R53.81

 

 [place] [id number] [place] [address]-
5598  12 2018   

 

 [place] [address] [place] [id number] [place] [address]-
6516    Type 2 diabetes mellitus with diabetic polyneuropathy 
E11.42 ; Encounter for immunization Z23 ; Hyperlipidemia, unspecified 
hyperlipidemia E78.5 ; Hypertension I10 ; Anxiety disorder, unspecified F41.9 ; 
Asthma J45.909 ; Body mass index (BMI) of 45.0-49.9 in adult Z68.42 and Morbid (
severe) obesity due to excess calories E66.01

 

 [place] [id number] [place] [address]-
9128    Type 2 diabetes mellitus with diabetic polyneuropathy 
E11.42 ; Hyperlipidemia, unspecified hyperlipidemia E78.5 ; Hypertension I10 
and GERD (gastroesophageal reflux disease) K21.9

 

 [place] [id number] [place] [address]-
0666  22 Mar, 2017  Type 2 diabetes mellitus with diabetic polyneuropathy E11.42

 

 [place] [id number] [place] [address]-
2092  28 2017  Type 2 diabetes mellitus with diabetic polyneuropathy 
E11.42 ; Coronary artery disease I25.10 ; History of MI (myocardial infarction) 
I25.2 ; Immune thrombocytopenic purpura D69.3 ; GERD (gastroesophageal reflux 
disease) K21.9 ; Hyperlipidemia, unspecified hyperlipidemia E78.5 ; 
Hypertension I10 ; History of kidney stones Z87.442 and Anxiety disorder, 
unspecified F41.9

 

 [place] [address] [place] [id number] [place] [address]-
3103     

 

 [place] [id number] [place] [address]-
1931  29 Sep, 2016  Coronary artery disease I25.10 ; History of MI (myocardial 
infarction) I25.2 ; History of kidney stones Z87.442 ; Type 2 diabetes mellitus 
with diabetic polyneuropathy E11.42 ; Avascular necrosis of bone of right hip 
M87.051 ; Hyperlipidemia, unspecified hyperlipidemia E78.5 ; Hypertension I10 ; 
Asthma J45.909 and Encounter for immunization Z23

 

 [place] [address] [address]-
9457  20 Sep, 2016   

 

 [place] [address] [address]-
5742    Coronary artery disease I25.10 ; Type 2 diabetes mellitus 
with diabetic polyneuropathy E11.42 ; History of MI (myocardial infarction) 
I25.2 ; Immune thrombocytopenic purpura D69.3 ; Hyperlipidemia, unspecified 
hyperlipidemia E78.5 and Hypertension I10

 

 [place] [address] [address]-
3104    Coronary artery disease I25.10 ; Lymphedema I89.0 ; History 
of MI (myocardial infarction) I25.2 ; History of kidney stones Z87.442 ; Immune 
thrombocytopenic purpura D69.3 ; Type 2 diabetes mellitus with diabetic 
polyneuropathy E11.42 ; Avascular necrosis of bone of right hip M87.051 ; GERD (
gastroesophageal reflux disease) K21.9 ; Hyperlipidemia, unspecified 
hyperlipidemia E78.5 ; Hypertension I10 and Asthma J45.909

 

 [place] [id number] [place] [address]-
7260     

 

 [address] [address]-
4286     

 

 [address] [address]-
5419  02 Dec, 2015   

 

 [address] [address]-
5308  02 Dec, 2015  Hyperlipidemia, unspecified hyperlipidemia E78.5

 

 [place] [address] [place] [id number] [place] [address]-
[phone]     

 

 [address] [address]-
4713     

 

 [facility]  [address] [facility] [id number] [address]-
8190    Allergic rhinitis J30.9

 

 [facility]  [address] [facility] [id number] [place] [address]-
6747    Type 2 diabetes mellitus with diabetic polyneuropathy 
E11.42 ; Routine adult health maintenance Z00.00 ; Coronary artery disease 
I25.10 ; History of MI (myocardial infarction) I25.2 ; History of kidney stones 
Z87.442 ; Immune thrombocytopenic purpura D69.3 ; Avascular necrosis of bone of 
right hip M87.051 and GERD (gastroesophageal reflux disease) K21.9







IMMUNIZATIONS

No Known Immunizations



SOCIAL HISTORY

Never Assessed



REASON FOR VISIT

Requests return call



PLAN OF CARE





VITAL SIGNS





MEDICATIONS

Unknown Medications



RESULTS

No Results



PROCEDURES

No Known procedures



INSTRUCTIONS





MEDICATIONS ADMINISTERED

No Known Medications



MEDICAL (GENERAL) HISTORY







 Type  Description  Date

 

 Medical History  Type II Diabetes   

 

 Medical History  CAD   

 

 Medical History  Acute MI x1   

 

 Medical History  Heart Cath x3   

 

 Medical History  Kidney Stones   

 

 Medical History  Lymphedema   

 

 Medical History  Immune thrombocytopenic purpura   

 

 Surgical History  Heart Stents x2   

 

 Surgical History  Cholecystectomy   

 

 Surgical History     

 

 Surgical History  McCamey Teeth   

 

 Hospitalization History  [facility]  

 

 Hospitalization History  Sepsis/Toxic Shock  

 

 Hospitalization History  [facility]-flu/pneumonia  2017

 

 Hospitalization History  [facility]- Anemia, cellulitis pannus, yeast 
infection. Discharged 2018

## 2018-09-27 NOTE — DIAGNOSTIC IMAGING REPORT
INDICATION: Weakness.



Portable chest at 01:13 p.m.



FINDINGS: There is cardiomegaly. Pulmonary vascularity is normal.

Lungs are clear. There are no effusions or pneumothoraces.



IMPRESSION: Cardiomegaly.



Dictated by: 



  Dictated on workstation # [id number]

## 2018-09-27 NOTE — XMS REPORT
[facility]

 Created on: 2018



Madison Young

External Reference #: [id number]

: 1945

Sex: Female



Demographics







 Address  [address] [place]  [address]

 

 Preferred Language  Unknown

 

 Marital Status  Unknown

 

 Buddhist Affiliation  Unknown

 

 Race  Unknown

 

 Ethnic Group  Unknown





Author







 Author  FREIRELEANDER HILLIARD

 

 Organization  [facility]

 

 Address  [address] [place]  [address]



 

 Phone  [phone]







Care Team Providers







 Care Team Member Name  Role  Phone

 

 LEANDER FREIRE  Unavailable  [phone]







PROBLEMS







 Type  Condition  ICD9-CM Code  QWB11-LK Code  Onset Dates  Condition Status  
SNOMED Code

 

 Problem  Hypertension     I10     Active  23067913

 

 Problem  Morbid (severe) obesity due to excess calories     E66.01     Active  
561460564

 

 Problem  Anxiety disorder, unspecified     F41.9     Active  154084140

 

 Problem  Body mass index (BMI) of 50-59.9 in adult     Z68.43     Active  
324290326

 

 Problem  Peripheral edema     R60.9     Active  260881830

 

 Problem  Long-term insulin use     Z79.4     Active  140983682

 

 Problem  Recurrent major depressive disorder, in partial remission     F33.41 
    Active  06189602

 

 Problem  Iron deficiency anemia     D50.9     Active  91869414

 

 Problem  Non-adherence to medical treatment     Z91.19     Active  311925675

 

 Problem  Microalbuminuric diabetic nephropathy     E11.21     Active  653630463

 

 Problem  Avascular necrosis of bone of right hip     M87.051     Active  
532811838

 

 Problem  Chronic kidney disease (CKD) stage G3a/A3, moderately decreased 
glomerular filtration rate (GFR) between 45-59 mL/min/1.73 square meter and 
albuminuria creatinine ratio greater than 300 mg/g     N18.3     Active  
389604309

 

 Problem  Type 2 diabetes mellitus with diabetic polyneuropathy     E11.42     
Active  43803759

 

 Problem  Immune thrombocytopenic purpura     D69.3     Active  285003826

 

 Problem  Coronary artery disease     I25.10     Active  12377887

 

 Problem  Hyperlipidemia, unspecified hyperlipidemia     E78.5     Active  
96694618

 

 Problem  GERD (gastroesophageal reflux disease)     K21.9     Active  517575288

 

 Problem  Asthma     J45.909     Active  254318319







ALLERGIES

No Information



ENCOUNTERS







 Encounter  Location  Date  Diagnosis

 

 [facility]  [address] [facility] [id number] [address]  14 Sep, 2018  Coronary artery disease I25.10 ; Congestive heart failure, 
unspecified HF chronicity, unspecified heart failure type I50.9 ; Peripheral 
edema R60.9 ; Avascular necrosis of bone of right hip M87.051 ; Morbid (severe) 
obesity due to excess calories E66.01 and Body mass index (BMI) of 50-59.9 in 
adult Z68.43

 

 [place] [id number] [place] [phone]-
[phone]  10 Sep, 2018   

 

 [place] [id number] [place] [address] 80696-
[phone]  06 Sep, 2018  Type 2 diabetes mellitus with diabetic polyneuropathy 
E11.42 ; Coronary artery disease I25.10 ; Hypertension I10 ; Long-term insulin 
use Z79.4 ; Body mass index (BMI) of 45.0-49.9 in adult Z68.42 ; Peripheral 
edema R60.9 and Avascular necrosis of bone of right hip M87.051

 

 [place] [address] [phone]-
[phone]  17 Aug, 2018  Peripheral edema R60.9

 

 [place] [id number] [place] [phone]-
[phone]  13 Aug, 2018  Hypertension I10 and Peripheral edema R60.9

 

 [place] [id number] [place] [address] [phone]-
[phone]  09 Aug, 2018   

 

 [place] [id number] [place] [address]394-
[phone]  09 Aug, 2018  Hypertension I10 and Peripheral edema R60.9

 

 [place] [id number] [place] [address] [phone]-
[phone]  06 Aug, 2018  Hypertension I10 and Peripheral edema R60.9

 

 [place] [id number] [place] [address] [phone]-
[phone]  02 Aug, 2018   

 

 [place] [id number] [place] [address] [phone]-
[phone]  02 Aug, 2018  Hypertension I10 and Peripheral edema R60.9

 

 [place] [address] [place] [address]-
9787  01 Aug, 2018   

 

 [place] [id number] [place] [address] 57892-
[phone]     

 

 [place] [id number] [place] [address] 42409-
2366     

 

 [place] [id number] [place] [address] 28831-
1822     

 

 [place] [id number] [place] [address] [phone]-
[phone]    Diabetes mellitus due to underlying condition with foot 
ulcer E08.621 ; Non-pressure chronic ulcer of other part of left foot limited 
to breakdown of skin L97.521 and BMI 45.0-49.9, adult Z68.42

 

 [place] [id number] [place] [address] [phone]-
1145    Acute idiopathic gout involving toe of left foot M10.072

 

 [place] [id number] [place] [address] 89444-
[phone]     

 

 [place] [id number] [place] [address] 98732-
[phone]     

 

 [place] [id number] [place] [address] 85714-
[phone]     

 

 [place] [id number] [place] [address] 05632-
9029    Type 2 diabetes mellitus with diabetic polyneuropathy 
E11.42 ; Hypertension I10 ; Hyperlipidemia, unspecified hyperlipidemia E78.5 ; 
Body mass index (BMI) of 45.0-49.9 in adult Z68.42 ; Long-term insulin use 
Z79.4 ; Non-adherence to medical treatment Z91.19 ; Coronary artery disease 
I25.10 ; GERD (gastroesophageal reflux disease) K21.9 ; Asthma J45.909 and 
Recurrent major depressive disorder, in partial remission F33.41

 

 [place] [id number] [place] [address] 90707-
[phone]  22 May, 2018  Recurrent major depressive disorder, in partial remission 
F33.41 and Hypertension I10

 

 [place] [id number] [place] [address] [phone]-
[phone]  21 May, 2018  Immune thrombocytopenic purpura D69.3 and Iron deficiency 
anemia D50.9

 

 [place] [id number] [place] [address] [phone]-
[phone]  21 May, 2018  Type 2 diabetes mellitus with diabetic polyneuropathy 
E11.42 ; Long-term insulin use Z79.4 ; Chronic kidney disease (CKD) stage G3a/A3
, moderately decreased glomerular filtration rate (GFR) between 45-59 mL/min/
1.73 square meter and albuminuria creatinine ratio greater than 300 mg/g N18.3 
; Hypertension I10 ; Hyperlipidemia, unspecified hyperlipidemia E78.5 ; 
Coronary artery disease I25.10 ; GERD (gastroesophageal reflux disease) K21.9 ; 
Immune thrombocytopenic purpura D69.3 ; Asthma J45.909 ; Morbid (severe) 
obesity due to excess calories E66.01 and Recurrent major depressive disorder, 
in partial remission F33.41

 

 [place] [id number] [place] [address] [phone]-
[phone]  11 May, 2018  Chronic kidney disease (CKD) stage G3a/A3, moderately 
decreased glomerular filtration rate (GFR) between 45-59 mL/min/1.73 square 
meter and albuminuria creatinine ratio greater than 300 mg/g N18.3

 

 [place] [address] [place] [address]60-
3819  02 May, 2018  Chronic kidney disease (CKD) stage G3a/A3, moderately 
decreased glomerular filtration rate (GFR) between 45-59 mL/min/1.73 square 
meter and albuminuria creatinine ratio greater than 300 mg/g N18.3

 

 [place] [id number] [place] [address] 25160-
6867     

 

 [place] [id number] [place] [address] 93153-
8197  28 Mar, 2018   

 

 [place] [id number] [place] [address] 91311-
5570  26 Mar, 2018  Immune thrombocytopenic purpura D69.3 ; Type 2 diabetes 
mellitus with diabetic polyneuropathy E11.42 ; Avascular necrosis of bone of 
right hip M87.051 ; Long-term insulin use Z79.4 ; GERD (gastroesophageal reflux 
disease) K21.9 ; Hyperlipidemia, unspecified hyperlipidemia E78.5 ; 
Hypertension I10 ; Recurrent major depressive disorder, in partial remission 
F33.41 ; Microalbuminuric diabetic nephropathy E11.21 ; Body mass index (BMI) 
of 45.0-49.9 in adult Z68.42 ; BMI 45.0-49.9, adult Z68.42 and Chronic kidney 
disease (CKD) stage G3a/A3, moderately decreased glomerular filtration rate (GFR
) between 45-59 mL/min/1.73 square meter and albuminuria creatinine ratio 
greater than 300 mg/g N18.3

 

 [place] [address] [phone]-
[phone]  23 Mar, 2018   

 

 [place] [address] [address]-
4723  23 Mar, 2018   

 

 [place] [address] [address]-
[phone]  19 Mar, 2018   

 

 [place] [address] [address]-
5988  14 Mar, 2018   

 

 [place] [id number] [place] [address]-
[phone]  13 Mar, 2018  Type 2 diabetes mellitus with diabetic polyneuropathy 
E11.42 ; Asthma J45.909 and Hypertension I10

 

 [facility]  [address] [place] 
[id number]  13 Mar, 2018  Skin ulcer, limited to breakdown of skin L98.491 ; 
Immune thrombocytopenic purpura D69.3 ; Avascular necrosis of bone of right hip 
M87.051 and Type 2 diabetes mellitus with diabetic polyneuropathy E11.42

 

 [place] [id number] [place] [address] 07098-
3222  06 Mar, 2018  Asthma J45.909 and Type 2 diabetes mellitus with diabetic 
polyneuropathy E11.42

 

 [place] [address] [address]-
3686  01 Mar, 2018   

 

 [facility]  [address] [place], [place] 
[id number]    Other iron deficiency anemia D50.8 ; Weakness R53.1 ; 
Type 2 diabetes mellitus with diabetic polyneuropathy E11.42 ; Cellulitis of 
trunk, unspecified site of trunk L03.319 ; Coronary artery disease I25.10 ; 
Avascular necrosis of bone of right hip M87.051 and Morbid (severe) obesity due 
to excess calories E66.01

 

 [facility]  [id number] [place] [address] 
[id number]     

 

 [place] [id number] [place] [address] 48712-
6985  16 2018   

 

 [facility] [place] [id number] [place] [phone]
[phone]  15 Feb, 2018  Yeast infection of the skin B37.2

 

 [place] [address] [address]-
1979  15 Feb, 2018   

 

 [place] [id number] [place] [address] 39731-
2320  15 Feb, 2018   

 

 [place] [address] [place] [address] 51483-
3973     

 

 [facility] [place] [address] [place] [address] [phone]
[phone]     

 

 [place] [address] [place] [address] 87867-
0916  12 2018  Type 2 diabetes mellitus with diabetic polyneuropathy 
E11.42 ; Avascular necrosis of bone of right hip M87.051 ; Hyperlipidemia, 
unspecified hyperlipidemia E78.5 ; Hypertension I10 ; Anxiety disorder, 
unspecified F41.9 ; Immune thrombocytopenic purpura D69.3 ; Coronary artery 
disease I25.10 ; Orthopnea R06.01 ; Acute bronchitis, unspecified organism 
J20.9 ; Wound of left lower extremity, initial encounter S81.802A ; Body aches 
R52 and Debilitated R53.81

 

 [place] [address] [address]-
9643  12 2018   

 

 [place] [id number] [place] [address] 42083-
7373    Type 2 diabetes mellitus with diabetic polyneuropathy 
E11.42 ; Encounter for immunization Z23 ; Hyperlipidemia, unspecified 
hyperlipidemia E78.5 ; Hypertension I10 ; Anxiety disorder, unspecified F41.9 ; 
Asthma J45.909 ; Body mass index (BMI) of 45.0-49.9 in adult Z68.42 and Morbid (
severe) obesity due to excess calories E66.01

 

 [place] [id number] [place] [address] 82539-
0684    Type 2 diabetes mellitus with diabetic polyneuropathy 
E11.42 ; Hyperlipidemia, unspecified hyperlipidemia E78.5 ; Hypertension I10 
and GERD (gastroesophageal reflux disease) K21.9

 

 [place] [id number] [place] [address] 48050-
8750  22 Mar, 2017  Type 2 diabetes mellitus with diabetic polyneuropathy E11.42

 

 [place] [address] [place] [address] 24187-
3985  28 2017  Type 2 diabetes mellitus with diabetic polyneuropathy 
E11.42 ; Coronary artery disease I25.10 ; History of MI (myocardial infarction) 
I25.2 ; Immune thrombocytopenic purpura D69.3 ; GERD (gastroesophageal reflux 
disease) K21.9 ; Hyperlipidemia, unspecified hyperlipidemia E78.5 ; 
Hypertension I10 ; History of kidney stones Z87.442 and Anxiety disorder, 
unspecified F41.9

 

 [place] [address] [place] [address] 90901-
0549     

 

 [place] [id number] [place] [address] 00981-
5935  29 Sep, 2016  Coronary artery disease I25.10 ; History of MI (myocardial 
infarction) I25.2 ; History of kidney stones Z87.442 ; Type 2 diabetes mellitus 
with diabetic polyneuropathy E11.42 ; Avascular necrosis of bone of right hip 
M87.051 ; Hyperlipidemia, unspecified hyperlipidemia E78.5 ; Hypertension I10 ; 
Asthma J45.909 and Encounter for immunization Z23

 

 [place] [address] [address]-
6661  20 Sep, 2016   

 

 [place] [address] [address]-
8719    Coronary artery disease I25.10 ; Type 2 diabetes mellitus 
with diabetic polyneuropathy E11.42 ; History of MI (myocardial infarction) 
I25.2 ; Immune thrombocytopenic purpura D69.3 ; Hyperlipidemia, unspecified 
hyperlipidemia E78.5 and Hypertension I10

 

 [place] [address] [address]-
9766    Coronary artery disease I25.10 ; Lymphedema I89.0 ; History 
of MI (myocardial infarction) I25.2 ; History of kidney stones Z87.442 ; Immune 
thrombocytopenic purpura D69.3 ; Type 2 diabetes mellitus with diabetic 
polyneuropathy E11.42 ; Avascular necrosis of bone of right hip M87.051 ; GERD (
gastroesophageal reflux disease) K21.9 ; Hyperlipidemia, unspecified 
hyperlipidemia E78.5 ; Hypertension I10 and Asthma J45.909

 

 [place] [address] [address]-
3376     

 

 [place] [address] [address]-
1992     

 

 [place] [address] [address]-
1503  02 Dec, 2015   

 

 [place] [address] [address]-
1068  02 Dec, 2015  Hyperlipidemia, unspecified hyperlipidemia E78.5

 

 [place] [address] [address]-
5567     

 

 [place] [address] [address]-
9387     

 

 [facility]  [address] [facility] [id number] [place] [address]-
9080    Allergic rhinitis J30.9

 

 [facility]  [address] [facility] [id number] [place] [address]-
5107    Type 2 diabetes mellitus with diabetic polyneuropathy 
E11.42 ; Routine adult health maintenance Z00.00 ; Coronary artery disease 
I25.10 ; History of MI (myocardial infarction) I25.2 ; History of kidney stones 
Z87.442 ; Immune thrombocytopenic purpura D69.3 ; Avascular necrosis of bone of 
right hip M87.051 and GERD (gastroesophageal reflux disease) K21.9







IMMUNIZATIONS

No Known Immunizations



SOCIAL HISTORY

Never Assessed



REASON FOR VISIT





PLAN OF CARE





VITAL SIGNS





MEDICATIONS







 Medication  Instructions  Dosage  Frequency  Start Date  End Date  Duration  
Status

 

 Potassium Chloride 10 MEQ  Orally Once a day  1 tablet with food  24h  02 Aug, 
2018  1 Oct, 2018     Active







RESULTS

No Results



PROCEDURES

No Known procedures



INSTRUCTIONS





MEDICATIONS ADMINISTERED

No Known Medications



MEDICAL (GENERAL) HISTORY







 Type  Description  Date

 

 Medical History  Type II Diabetes   

 

 Medical History  CAD   

 

 Medical History  Acute MI x1   

 

 Medical History  Heart Cath x3   

 

 Medical History  Kidney Stones   

 

 Medical History  Lymphedema   

 

 Medical History  Immune thrombocytopenic purpura   

 

 Surgical History  Heart Stents x2   

 

 Surgical History  Cholecystectomy   

 

 Surgical History     

 

 Surgical History  [place] Teeth   

 

 Hospitalization History  [facility]  

 

 Hospitalization History  Sepsis/Toxic Shock  

 

 Hospitalization History  [facility]-flu/pneumonia  2017

 

 Hospitalization History  [facility]- Anemia, cellulitis pannus, yeast 
infection. Discharged 2018

## 2018-09-27 NOTE — XMS REPORT
[facility]

 Created on: 2018



Madison Young

External Reference #: [id number]

: 1945

Sex: Female



Demographics







 Address  [address] [place]  [address]

 

 Preferred Language  Unknown

 

 Marital Status  Unknown

 

 Shinto Affiliation  Unknown

 

 Race  Unknown

 

 Ethnic Group  Unknown





Author







 Author  TANI  LEANDER

 

 Organization  [facility]

 

 Address  [address] [place]  [address]



 

 Phone  [phone]







Care Team Providers







 Care Team Member Name  Role  Phone

 

 TANI  LEANDER  Unavailable  [phone]







PROBLEMS







 Type  Condition  ICD9-CM Code  NYN17-LB Code  Onset Dates  Condition Status  
SNOMED Code

 

 Problem  Long-term insulin use     Z79.4     Active  064806045

 

 Problem  Skin ulcer, limited to breakdown of skin     L98.491     Active  
35729063

 

 Problem  Other iron deficiency anemia     D50.8     Active  54321541

 

 Problem  Peripheral edema     R60.9     Active  587396081

 

 Problem  Coronary artery disease     I25.10     Active  66134752

 

 Problem  Diabetes mellitus due to underlying condition with foot ulcer     
E08.621     Active  114019279

 

 Problem  Type 2 diabetes mellitus with diabetic polyneuropathy     E11.42     
Active  78895788

 

 Problem  Microalbuminuric diabetic nephropathy     E11.21     Active  835761540

 

 Problem  Iron deficiency anemia     D50.9     Active  51431999

 

 Problem  Non-adherence to medical treatment     Z91.19     Active  040538765

 

 Problem  Non-pressure chronic ulcer of other part of left foot limited to 
breakdown of skin     L97.521     Active  152518197

 

 Problem  Acute idiopathic gout involving toe of left foot     M10.072     
Active  86190682

 

 Problem  Immune thrombocytopenic purpura     D69.3     Active  956567675

 

 Problem  Hyperlipidemia, unspecified hyperlipidemia     E78.5     Active  
93768398

 

 Problem  GERD (gastroesophageal reflux disease)     K21.9     Active  794476357

 

 Problem  Avascular necrosis of bone of right hip     M87.051     Active  
977053328

 

 Problem  Anxiety disorder, unspecified     F41.9     Active  944697117

 

 Problem  Morbid (severe) obesity due to excess calories     E66.01     Active  
067507967

 

 Problem  Asthma     J45.909     Active  308300908

 

 Problem  Body mass index (BMI) of 45.0-49.9 in adult     Z68.42     Active  
276794769

 

 Problem  Chronic kidney disease (CKD) stage G3a/A3, moderately decreased 
glomerular filtration rate (GFR) between 45-59 mL/min/1.73 square meter and 
albuminuria creatinine ratio greater than 300 mg/g     N18.3     Active  
100689487

 

 Problem  Hypertension     I10     Active  39978488

 

 Problem  Recurrent major depressive disorder, in partial remission     F33.41 
    Active  51399287







ALLERGIES







 Substance  Reaction  Event Type  Date  Status

 

 Heparin Sodium (Porcine) PF  Unknown  Drug Allergy    Active

 

 Adhesive  Unknown  Non Drug Allergy    Active







ENCOUNTERS







 Encounter  Location  Date  Diagnosis

 

 [place] [address] [address]
4876  14 Sep, 2018   

 

 [place] [address] [address] [place] [id number] [place] [phone]-
[phone]  10 Sep, 2018   

 

 [place] [address] [address]  06 Sep, 2018  Type 2 diabetes mellitus with diabetic polyneuropathy 
E11.42 ; Coronary artery disease I25.10 ; Hypertension I10 ; Long-term insulin 
use Z79.4 ; Body mass index (BMI) of 45.0-49.9 in adult Z68.42 ; Peripheral 
edema R60.9 and Avascular necrosis of bone of right hip M87.051

 

 [place] [address] [place] [id number] [place] [phone]
[phone]  17 Aug, 2018  Peripheral edema R60.9

 

 [place] [address] [place] [id number] [place] [address]274-
[phone]  13 Aug, 2018  Hypertension I10 and Peripheral edema R60.9

 

 [place] [address] [place] [id number] [place] [phone]
[phone]  09 Aug, 2018   

 

 [place] [address] [address] [place] [id number] [place] [phone]
[phone]  09 Aug, 2018  Hypertension I10 and Peripheral edema R60.9

 

 [place] [address] [place] [id number] [place] [phone]
[phone]  06 Aug, 2018  Hypertension I10 and Peripheral edema R60.9

 

 [place] [address] [place] [id number] [place] [phone]
[phone]  02 Aug, 2018   

 

 [place] [address], [place] [address]-
76[address]  02 Aug, 2018  Hypertension I10 and Peripheral edema R60.9

 

 [place] [address] [place] [id number] [address]-
2199  01 Aug, 2018   

 

 [place] [address] [address]-
3116     

 

 [place] [address] [place] [id number] [place] [address]-
[phone]     

 

 [place] [address] [place] [address] 6[address]-
[phone]     

 

 [place] [address] [place] [id number] [place] [address]-
[phone]    Diabetes mellitus due to underlying condition with foot 
ulcer E08.621 ; Non-pressure chronic ulcer of other part of left foot limited 
to breakdown of skin L97.521 and BMI 45.0-49.9, adult Z68.42

 

 [place] [address] [address]-
[address]83    Acute idiopathic gout involving toe of left foot M10.072

 

 [place] [address] [address]-
0782     

 

 [place] [address] [address]-
1589     

 

 [place] [address] [address]-
1078     

 

 [place] [address], [place] [address]-
2259    Type 2 diabetes mellitus with diabetic polyneuropathy 
E11.42 ; Hypertension I10 ; Hyperlipidemia, unspecified hyperlipidemia E78.5 ; 
Body mass index (BMI) of 45.0-49.9 in adult Z68.42 ; Long-term insulin use 
Z79.4 ; Non-adherence to medical treatment Z91.19 ; Coronary artery disease 
I25.10 ; GERD (gastroesophageal reflux disease) K21.9 ; Asthma J45.909 and 
Recurrent major depressive disorder, in partial remission F33.41

 

 [place] [address] [place] [id number] [place] [phone]
[phone]  22 May, 2018  Recurrent major depressive disorder, in partial remission 
F33.41 and Hypertension I10

 

 [place] [address] [place] [id number] [place] [address] 69655-
[phone]  21 May, 2018  Immune thrombocytopenic purpura D69.3 and Iron deficiency 
anemia D50.9

 

 [place] [address] [place] [id number] [place] [address] 68715-
3087  21 May, 2018  Type 2 diabetes mellitus with diabetic polyneuropathy 
E11.42 ; Long-term insulin use Z79.4 ; Chronic kidney disease (CKD) stage G3a/A3
, moderately decreased glomerular filtration rate (GFR) between 45-59 mL/min/
1.73 square meter and albuminuria creatinine ratio greater than 300 mg/g N18.3 
; Hypertension I10 ; Hyperlipidemia, unspecified hyperlipidemia E78.5 ; 
Coronary artery disease I25.10 ; GERD (gastroesophageal reflux disease) K21.9 ; 
Immune thrombocytopenic purpura D69.3 ; Asthma J45.909 ; Morbid (severe) 
obesity due to excess calories E66.01 and Recurrent major depressive disorder, 
in partial remission F33.41

 

 [place] [address] [place] [address]-
[phone]  11 May, 2018  Chronic kidney disease (CKD) stage G3a/A3, moderately 
decreased glomerular filtration rate (GFR) between 45-59 mL/min/1.73 square 
meter and albuminuria creatinine ratio greater than 300 mg/g N18.3

 

 [place] [address] [address]-
5922  02 May, 2018  Chronic kidney disease (CKD) stage G3a/A3, moderately 
decreased glomerular filtration rate (GFR) between 45-59 mL/min/1.73 square 
meter and albuminuria creatinine ratio greater than 300 mg/g N18.3

 

 [place] [address] [place] [id number] [place] [address] 63752-
7254     

 

 [place] [address] [place] [id number] [place] [address] [phone]-
[phone]  28 Mar, 2018   

 

 [place] [address] [address]-
6861  26 Mar, 2018  Immune thrombocytopenic purpura D69.3 ; Type 2 diabetes 
mellitus with diabetic polyneuropathy E11.42 ; Avascular necrosis of bone of 
right hip M87.051 ; Long-term insulin use Z79.4 ; GERD (gastroesophageal reflux 
disease) K21.9 ; Hyperlipidemia, unspecified hyperlipidemia E78.5 ; 
Hypertension I10 ; Recurrent major depressive disorder, in partial remission 
F33.41 ; Microalbuminuric diabetic nephropathy E11.21 ; Body mass index (BMI) 
of 45.0-49.9 in adult Z68.42 ; BMI 45.0-49.9, adult Z68.42 and Chronic kidney 
disease (CKD) stage G3a/A3, moderately decreased glomerular filtration rate (GFR
) between 45-59 mL/min/1.73 square meter and albuminuria creatinine ratio 
greater than 300 mg/g N18.3

 

 [place] [address] [place] [id number] [place] [address]16-
9996  23 Mar, 2018   

 

 [place] [address] [address]-
0560  23 Mar, 2018   

 

 [place] [address] [place] [id number] [place] [address]-
3513  19 Mar, 2018   

 

 [place] [address] [place] [id number] [place] [address] [phone]-
3083  14 Mar, 2018   

 

 [place] [address] [address]-
8273  13 Mar, 2018  Type 2 diabetes mellitus with diabetic polyneuropathy 
E11.42 ; Asthma J45.909 and Hypertension I10

 

 [facility]  [address] [place], [place] 
[id number]  13 Mar, 2018  Skin ulcer, limited to breakdown of skin L98.491 ; 
Immune thrombocytopenic purpura D69.3 ; Avascular necrosis of bone of right hip 
M87.051 and Type 2 diabetes mellitus with diabetic polyneuropathy E11.42

 

 [place] [address] [address] [place] [id number] [place] [address]97-
9947  06 Mar, 2018  Asthma J45.909 and Type 2 diabetes mellitus with diabetic 
polyneuropathy E11.42

 

 [place] [address] [place] [id number] [place] [address]07-
8103  01 Mar, 2018   

 

 [address] [facility]  [address] [place], [place] 
[id number]    Other iron deficiency anemia D50.8 ; Weakness R53.1 ; 
Type 2 diabetes mellitus with diabetic polyneuropathy E11.42 ; Cellulitis of 
trunk, unspecified site of trunk L03.319 ; Coronary artery disease I25.10 ; 
Avascular necrosis of bone of right hip M87.051 and Morbid (severe) obesity due 
to excess calories E66.01

 

 [place] [address] [place] [id number] [place] [address] 
[phone]     

 

 [place] [address] [address]-
0656     

 

 [facility]  [facility] [address] [place] [address] [place] [phone]
[phone]  15 Feb, 2018  Yeast infection of the skin B37.2

 

 [place] [address] [address] [place] [id number] [place] [address] 69612-
9110  15 Feb, 2018   

 

 [place] [address] [address] [place] [id number] [place] [address] 12602-
1727  15 Feb, 2018   

 

 [place] [address] [address] [place] [id number] [place] [address] 12252-
1324     

 

 [facility]  [address] [place] [address] [place] [phone]
[phone]     

 

 [place] [address] [address]-
7994    Type 2 diabetes mellitus with diabetic polyneuropathy 
E11.42 ; Avascular necrosis of bone of right hip M87.051 ; Hyperlipidemia, 
unspecified hyperlipidemia E78.5 ; Hypertension I10 ; Anxiety disorder, 
unspecified F41.9 ; Immune thrombocytopenic purpura D69.3 ; Coronary artery 
disease I25.10 ; Orthopnea R06.01 ; Acute bronchitis, unspecified organism 
J20.9 ; Wound of left lower extremity, initial encounter S81.802A ; Body aches 
R52 and Debilitated R53.81

 

 [place] [id number] [place] [address]-
8652  12 2018   

 

 [address] [address]-
3677    Type 2 diabetes mellitus with diabetic polyneuropathy 
E11.42 ; Encounter for immunization Z23 ; Hyperlipidemia, unspecified 
hyperlipidemia E78.5 ; Hypertension I10 ; Anxiety disorder, unspecified F41.9 ; 
Asthma J45.909 ; Body mass index (BMI) of 45.0-49.9 in adult Z68.42 and Morbid (
severe) obesity due to excess calories E66.01

 

 [address] [address]-
5494    Type 2 diabetes mellitus with diabetic polyneuropathy 
E11.42 ; Hyperlipidemia, unspecified hyperlipidemia E78.5 ; Hypertension I10 
and GERD (gastroesophageal reflux disease) K21.9

 

 [address] [address]-
5305  22 Mar, 2017  Type 2 diabetes mellitus with diabetic polyneuropathy E11.42

 

 [place] [id number] [place] [address]-
3378    Type 2 diabetes mellitus with diabetic polyneuropathy 
E11.42 ; Coronary artery disease I25.10 ; History of MI (myocardial infarction) 
I25.2 ; Immune thrombocytopenic purpura D69.3 ; GERD (gastroesophageal reflux 
disease) K21.9 ; Hyperlipidemia, unspecified hyperlipidemia E78.5 ; 
Hypertension I10 ; History of kidney stones Z87.442 and Anxiety disorder, 
unspecified F41.9

 

 [place] [address] [place] [address] [phone]-
[phone]  14 2017   

 

 [address] [place] [address]-
5657  29 Sep, 2016  Coronary artery disease I25.10 ; History of MI (myocardial 
infarction) I25.2 ; History of kidney stones Z87.442 ; Type 2 diabetes mellitus 
with diabetic polyneuropathy E11.42 ; Avascular necrosis of bone of right hip 
M87.051 ; Hyperlipidemia, unspecified hyperlipidemia E78.5 ; Hypertension I10 ; 
Asthma J45.909 and Encounter for immunization Z23

 

 [address] [address]-
7416  20 Sep, 2016   

 

 [address] [address]-
9503    Coronary artery disease I25.10 ; Type 2 diabetes mellitus 
with diabetic polyneuropathy E11.42 ; History of MI (myocardial infarction) 
I25.2 ; Immune thrombocytopenic purpura D69.3 ; Hyperlipidemia, unspecified 
hyperlipidemia E78.5 and Hypertension I10

 

 [address] [address]-
9806    Coronary artery disease I25.10 ; Lymphedema I89.0 ; History 
of MI (myocardial infarction) I25.2 ; History of kidney stones Z87.442 ; Immune 
thrombocytopenic purpura D69.3 ; Type 2 diabetes mellitus with diabetic 
polyneuropathy E11.42 ; Avascular necrosis of bone of right hip M87.051 ; GERD (
gastroesophageal reflux disease) K21.9 ; Hyperlipidemia, unspecified 
hyperlipidemia E78.5 ; Hypertension I10 and Asthma J45.909

 

 [place] [address] [place] [id number] [place] [phone]-
[phone]     

 

 [place] [address] [address]-
8597     

 

 [place] [address] [address]-
2222  02 Dec, 2015   

 

 [place] [address] [address]-
7890  02 Dec, 2015  Hyperlipidemia, unspecified hyperlipidemia E78.5

 

 [place] [address] [facility] [id number] [place] [address]-
[address]     

 

 [facility]  [address] [facility] [id number] [address]
5448     

 

 [facility]  [address] [facility] [id number] [address]-
7755    Allergic rhinitis J30.9

 

 [facility]  [address] [facility] [id number] [address]-
1536    Type 2 diabetes mellitus with diabetic polyneuropathy 
E11.42 ; Routine adult health maintenance Z00.00 ; Coronary artery disease 
I25.10 ; History of MI (myocardial infarction) I25.2 ; History of kidney stones 
Z87.442 ; Immune thrombocytopenic purpura D69.3 ; Avascular necrosis of bone of 
right hip M87.051 and GERD (gastroesophageal reflux disease) K21.9







IMMUNIZATIONS

No Known Immunizations



SOCIAL HISTORY

Never Assessed



REASON FOR VISIT

cut and irritation on left foot-awoods



PLAN OF CARE







 Activity  Details









  









 Follow Up  3 Months, prn Reason:chm/dm







VITAL SIGNS







 Height  67 in  2018

 

 Weight  305 lbs  2018

 

 Temperature  98.8 degrees Fahrenheit  2018

 

 Heart Rate  86 bpm  2018

 

 Respiratory Rate  22   2018

 

 BMI  47.76 kg/m2  2018

 

 Blood pressure systolic  168 mmHg  2018

 

 Blood pressure diastolic  86 mmHg  2018







MEDICATIONS







 Medication  Instructions  Dosage  Frequency  Start Date  End Date  Duration  
Status

 

 GlipiZIDE 5 mg  Orally 2 times a day  1 tablet  12h           Active

 

 Citalopram Hydrobromide 20 mg  Orally Once a day  1 tablet  24h           
Active

 

 Humalog KwikPen 100 UNIT/ML  Subcutaneous three times a day before meals  12 
units              Active

 

 Pantoprazole Sodium 40 mg  Orally Once a day  1 tablet  24h           Active

 

 Aleve 220 MG  Orally every 12 hrs  2 tablets with food or milk as needed  12h 
          Active

 

 Atorvastatin Calcium 10 mg  Orally Once a day  1 tablet  24h      
    Active

 

 Probiotic -                    Active

 

 Nystatin           26 Mar, 2018        Active

 

 Advair Diskus           26 Mar, 2018        Active

 

 ProAir  (90 Base) MCG/ACT  Inhalation every 4 hrs  2 puffs as needed  
4h          Active

 

 Aspir-81 81 MG  Orally Once a day  1 tablet  24h     16 May, 2019     Active

 

 PredniSONE 20 mg  Orally Once a day  2 tablets  24h    05 days  Active

 

 Benazepril HCl 40 mg  Orally Once a day  1 tablet  24h           Active

 

 Levemir FlexTouch 100 UNIT/ML  Subcutaneous 2 times a day  20  units twice 
daily  12h          Active

 

 Liraglutide 18 MG/3ML  Subcutaneous Once a day  0.6mg daily for one week then 
increase to 1.2 mg daily  24h  22 Carlos Enrique, 2018  20 Sep, 2018  30 days  Active

 

 Carvedilol 12.5 MG  Orally 2 times a day  1 tablet  12h           Active

 

 Hydrochlorothiazide 25 MG  Orally Once a day  1 tablet  24h           Active







RESULTS

No Results



PROCEDURES







 Procedure  Date Ordered  Result  Body Site

 

 X-RAY EXAM OF FOOT  2018      

 

 LAB NOT BILLED BY [facility]  2018      

 

 [facility] VISIT ESTABLISHED PATIENT  2018      

 

 STEPHAN, ROUTINE*  2018      







INSTRUCTIONS





MEDICATIONS ADMINISTERED

No Known Medications



MEDICAL (GENERAL) HISTORY







 Type  Description  Date

 

 Medical History  Type II Diabetes   

 

 Medical History  CAD   

 

 Medical History  Acute MI x1   

 

 Medical History  Heart Cath x3   

 

 Medical History  Kidney Stones   

 

 Medical History  Lymphedema   

 

 Medical History  Immune thrombocytopenic purpura   

 

 Surgical History  Heart Stents x2   

 

 Surgical History  Cholecystectomy   

 

 Surgical History     

 

 Surgical History  Manitou Teeth   

 

 Hospitalization History  [facility]  

 

 Hospitalization History  Sepsis/Toxic Shock  

 

 Hospitalization History  [facility]-flu/pneumonia  2017

 

 Hospitalization History  [facility]- Anemia, cellulitis pannus, yeast 
infection. Discharged 2018

## 2018-09-27 NOTE — XMS REPORT
[facility]

 Created on: 2018



Madison Young

External Reference #: [id number]

: 1945

Sex: Female



Demographics







 Address  [address] [place]  [address]

 

 Preferred Language  Unknown

 

 Marital Status  Unknown

 

 [employer] Affiliation  Unknown

 

 Race  Unknown

 

 Ethnic Group  Unknown





Author







 Author  BEE FONSECA

 

 [facility]

 

 Address  [address] [place]  [address]



 

 Phone  [phone]







Care Team Providers







 Care Team Member Name  Role  Phone

 

 BEE FONSECA  Unavailable  [phone]







PROBLEMS







 Type  Condition  ICD9-CM Code  ZDX42-SL Code  Onset Dates  Condition Status  
SNOMED Code

 

 Problem  Long-term insulin use     Z79.4     Active  597026699

 

 Problem  Skin ulcer, limited to breakdown of skin     L98.491     Active  
09013651

 

 Problem  Other iron deficiency anemia     D50.8     Active  47062095

 

 Problem  Peripheral edema     R60.9     Active  922575318

 

 Problem  Coronary artery disease     I25.10     Active  52569630

 

 Problem  Diabetes mellitus due to underlying condition with foot ulcer     
E08.621     Active  462226698

 

 Problem  Type 2 diabetes mellitus with diabetic polyneuropathy     E11.42     
Active  97661656

 

 Problem  Microalbuminuric diabetic nephropathy     E11.21     Active  592000152

 

 Problem  Iron deficiency anemia     D50.9     Active  12326087

 

 Problem  Non-adherence to medical treatment     Z91.19     Active  667590980

 

 Problem  Non-pressure chronic ulcer of other part of left foot limited to 
breakdown of skin     L97.521     Active  269888494

 

 Problem  Acute idiopathic gout involving toe of left foot     M10.072     
Active  62896729

 

 Problem  Immune thrombocytopenic purpura     D69.3     Active  321144804

 

 Problem  Hyperlipidemia, unspecified hyperlipidemia     E78.5     Active  
81789970

 

 Problem  GERD (gastroesophageal reflux disease)     K21.9     Active  341514915

 

 Problem  Avascular necrosis of bone of right hip     M87.051     Active  
034519847

 

 Problem  Anxiety disorder, unspecified     F41.9     Active  261003850

 

 Problem  Morbid (severe) obesity due to excess calories     E66.01     Active  
414510603

 

 Problem  Asthma     J45.909     Active  188004797

 

 Problem  Body mass index (BMI) of 45.0-49.9 in adult     Z68.42     Active  
779340756

 

 Problem  Chronic kidney disease (CKD) stage G3a/A3, moderately decreased 
glomerular filtration rate (GFR) between 45-59 mL/min/1.73 square meter and 
albuminuria creatinine ratio greater than 300 mg/g     N18.3     Active  
348643358

 

 Problem  Hypertension     I10     Active  31550328

 

 Problem  Recurrent major depressive disorder, in partial remission     F33.41 
    Active  24702611







ALLERGIES







 Substance  Reaction  Event Type  Date  Status

 

 Heparin Sodium (Porcine) PF  Unknown  Drug Allergy    Active

 

 Adhesive  Unknown  Non Drug Allergy    Active







ENCOUNTERS







 Encounter  Location  Date  Diagnosis

 

 [place] [address] [phone]
[phone]  14 Sep, 2018   

 

 [place] [address] [address]-
[address]12  10 Sep, 2018   

 

 [place] [address] [address]-
0097  06 Sep, 2018  Type 2 diabetes mellitus with diabetic polyneuropathy 
E11.42 ; Coronary artery disease I25.10 ; Hypertension I10 ; Long-term insulin 
use Z79.4 ; Body mass index (BMI) of 45.0-49.9 in adult Z68.42 ; Peripheral 
edema R60.9 and Avascular necrosis of bone of right hip M87.051

 

 [place] [address] [address]-
[phone]  17 Aug, 2018  Peripheral edema R60.9

 

 [place] [address] [place] [id number] [place] [phone]-
[phone]  13 Aug, 2018  Hypertension I10 and Peripheral edema R60.9

 

 [place] [address] [place] [id number] [place] [phone]-
4848  09 Aug, 2018   

 

 [place] [address] [phone]-
9707  09 Aug, 2018  Hypertension I10 and Peripheral edema R60.9

 

 [place] [address] [address]-
7206  06 Aug, 2018  Hypertension I10 and Peripheral edema R60.9

 

 [place] [address] [address]-
5678  02 Aug, 2018   

 

 [place] [id number] [address]  02 Aug, 2018  Hypertension I10 and Peripheral edema R60.9

 

 [place] [address] [place] [id number] [place] [address]-
1348  01 Aug, 2018   

 

 [place] [address] [place] [id number] [place] [address]-
7134     

 

 [place] [address] [place] [id number] [place] [phone]-
[phone]     

 

 [place] [address] [place] [id number] [place] [address]-
7725     

 

 [place] [address] [place] [id number] [place] [address]-
3027    Diabetes mellitus due to underlying condition with foot 
ulcer E08.621 ; Non-pressure chronic ulcer of other part of left foot limited 
to breakdown of skin L97.521 and BMI 45.0-49.9, adult Z68.42

 

 [place] [address] [place] [address]-
5265    Acute idiopathic gout involving toe of left foot M10.072

 

 [place] [address] [address]-
[address]     

 

 [place] [address] [address]-
[address]     

 

 [place] [id number] [address]-
9616     

 

 [place] [id number], [place] [address]-
4620    Type 2 diabetes mellitus with diabetic polyneuropathy 
E11.42 ; Hypertension I10 ; Hyperlipidemia, unspecified hyperlipidemia E78.5 ; 
Body mass index (BMI) of 45.0-49.9 in adult Z68.42 ; Long-term insulin use 
Z79.4 ; Non-adherence to medical treatment Z91.19 ; Coronary artery disease 
I25.10 ; GERD (gastroesophageal reflux disease) K21.9 ; Asthma J45.909 and 
Recurrent major depressive disorder, in partial remission F33.41

 

 [place] [address] [place] [id number] [place] [address]-
4183  22 May, 2018  Recurrent major depressive disorder, in partial remission 
F33.41 and Hypertension I10

 

 [place] [address] [place] [id number] [place] [address]-
8588  21 May, 2018  Immune thrombocytopenic purpura D69.3 and Iron deficiency 
anemia D50.9

 

 [place] [address] [place] [id number] [place] [address]-
6005  21 May, 2018  Type 2 diabetes mellitus with diabetic polyneuropathy 
E11.42 ; Long-term insulin use Z79.4 ; Chronic kidney disease (CKD) stage G3a/A3
, moderately decreased glomerular filtration rate (GFR) between 45-59 mL/min/
1.73 square meter and albuminuria creatinine ratio greater than 300 mg/g N18.3 
; Hypertension I10 ; Hyperlipidemia, unspecified hyperlipidemia E78.5 ; 
Coronary artery disease I25.10 ; GERD (gastroesophageal reflux disease) K21.9 ; 
Immune thrombocytopenic purpura D69.3 ; Asthma J45.909 ; Morbid (severe) 
obesity due to excess calories E66.01 and Recurrent major depressive disorder, 
in partial remission F33.41

 

 [place] [address] [place] [address]-
5472  11 May, 2018  Chronic kidney disease (CKD) stage G3a/A3, moderately 
decreased glomerular filtration rate (GFR) between 45-59 mL/min/1.73 square 
meter and albuminuria creatinine ratio greater than 300 mg/g N18.3

 

 [place] [address] [place] [address]-
6827  02 May, 2018  Chronic kidney disease (CKD) stage G3a/A3, moderately 
decreased glomerular filtration rate (GFR) between 45-59 mL/min/1.73 square 
meter and albuminuria creatinine ratio greater than 300 mg/g N18.3

 

 [place] [address] [place] [address]-
6155     

 

 [place] [address] [place] [address]-
8311  28 Mar, 2018   

 

 [place] [address] [place] [id number] [place] [address]-
4397  26 Mar, 2018  Immune thrombocytopenic purpura D69.3 ; Type 2 diabetes 
mellitus with diabetic polyneuropathy E11.42 ; Avascular necrosis of bone of 
right hip M87.051 ; Long-term insulin use Z79.4 ; GERD (gastroesophageal reflux 
disease) K21.9 ; Hyperlipidemia, unspecified hyperlipidemia E78.5 ; 
Hypertension I10 ; Recurrent major depressive disorder, in partial remission 
F33.41 ; Microalbuminuric diabetic nephropathy E11.21 ; Body mass index (BMI) 
of 45.0-49.9 in adult Z68.42 ; BMI 45.0-49.9, adult Z68.42 and Chronic kidney 
disease (CKD) stage G3a/A3, moderately decreased glomerular filtration rate (GFR
) between 45-59 mL/min/1.73 square meter and albuminuria creatinine ratio 
greater than 300 mg/g N18.3

 

 [place] [address] [place] [address]-
9538  23 Mar, 2018   

 

 [place] [address] [place] [id number] [place] [address]-
5728  23 Mar, 2018   

 

 [place] [address] [place] [id number] [place] [address]-
4582  19 Mar, 2018   

 

 [place] [address] [place] [id number] [place] [address]-
8172  14 Mar, 2018   

 

 [place] [address] [place] [id number] [place] [address]-
8596  13 Mar, 2018  Type 2 diabetes mellitus with diabetic polyneuropathy 
E11.42 ; Asthma J45.909 and Hypertension I10

 

 [facility]  [address] [place], [place] 
[id number]  13 Mar, 2018  Skin ulcer, limited to breakdown of skin L98.491 ; 
Immune thrombocytopenic purpura D69.3 ; Avascular necrosis of bone of right hip 
M87.051 and Type 2 diabetes mellitus with diabetic polyneuropathy E11.42

 

 [facility]  [address] [place] [address]-
2287  06 Mar, 2018  Asthma J45.909 and Type 2 diabetes mellitus with diabetic 
polyneuropathy E11.42

 

 [place] [address] [place] [id number] [place] [address]-
2674  01 Mar, 2018   

 

 [facility]  [address] [place], [place] 
[id number]    Other iron deficiency anemia D50.8 ; Weakness R53.1 ; 
Type 2 diabetes mellitus with diabetic polyneuropathy E11.42 ; Cellulitis of 
trunk, unspecified site of trunk L03.319 ; Coronary artery disease I25.10 ; 
Avascular necrosis of bone of right hip M87.051 and Morbid (severe) obesity due 
to excess calories E66.01

 

 [place] [address] [place] [id number] [place] [address] 
[id number]     

 

 [place] [address] [address]-
96[address]     

 

 [facility]  [facility] [address] [place] [id number] [place] [phone]
[phone]  15 Feb, 2018  Yeast infection of the skin B37.2

 

 [place] [address] [place] [id number] [place] [address]-
0558  15 Feb, 2018   

 

 [place] [address] [place] [id number] [place] [address]-
4993  15 Feb, 2018   

 

 [place] [address] [place] [id number] [place] [address]-
[phone]     

 

 [facility]  [address] [place] [id number] [place] [phone]
[phone]     

 

 [place] [address] [place] [id number] [place] [address]-
5431    Type 2 diabetes mellitus with diabetic polyneuropathy 
E11.42 ; Avascular necrosis of bone of right hip M87.051 ; Hyperlipidemia, 
unspecified hyperlipidemia E78.5 ; Hypertension I10 ; Anxiety disorder, 
unspecified F41.9 ; Immune thrombocytopenic purpura D69.3 ; Coronary artery 
disease I25.10 ; Orthopnea R06.01 ; Acute bronchitis, unspecified organism 
J20.9 ; Wound of left lower extremity, initial encounter S81.802A ; Body aches 
R52 and Debilitated R53.81

 

 [place] [id number] [place] [address]-
5205  12 2018   

 

 [address] [address]-
0186    Type 2 diabetes mellitus with diabetic polyneuropathy 
E11.42 ; Encounter for immunization Z23 ; Hyperlipidemia, unspecified 
hyperlipidemia E78.5 ; Hypertension I10 ; Anxiety disorder, unspecified F41.9 ; 
Asthma J45.909 ; Body mass index (BMI) of 45.0-49.9 in adult Z68.42 and Morbid (
severe) obesity due to excess calories E66.01

 

 [address] [address]-
8553    Type 2 diabetes mellitus with diabetic polyneuropathy 
E11.42 ; Hyperlipidemia, unspecified hyperlipidemia E78.5 ; Hypertension I10 
and GERD (gastroesophageal reflux disease) K21.9

 

 [place] [id number] [place] [address]67-
7129  22 Mar, 2017  Type 2 diabetes mellitus with diabetic polyneuropathy E11.42

 

 [place] [id number] [place] [address]-
9444  28 2017  Type 2 diabetes mellitus with diabetic polyneuropathy 
E11.42 ; Coronary artery disease I25.10 ; History of MI (myocardial infarction) 
I25.2 ; Immune thrombocytopenic purpura D69.3 ; GERD (gastroesophageal reflux 
disease) K21.9 ; Hyperlipidemia, unspecified hyperlipidemia E78.5 ; 
Hypertension I10 ; History of kidney stones Z87.442 and Anxiety disorder, 
unspecified F41.9

 

 [place] [id number] [place] [address] 16141-
9274  14 2017   

 

 [place] [address] [place] [address]-
3595  29 Sep, 2016  Coronary artery disease I25.10 ; History of MI (myocardial 
infarction) I25.2 ; History of kidney stones Z87.442 ; Type 2 diabetes mellitus 
with diabetic polyneuropathy E11.42 ; Avascular necrosis of bone of right hip 
M87.051 ; Hyperlipidemia, unspecified hyperlipidemia E78.5 ; Hypertension I10 ; 
Asthma J45.909 and Encounter for immunization Z23

 

 [place] [address] [address]-
0384  20 Sep, 2016   

 

 [address] [address]-
8953    Coronary artery disease I25.10 ; Type 2 diabetes mellitus 
with diabetic polyneuropathy E11.42 ; History of MI (myocardial infarction) 
I25.2 ; Immune thrombocytopenic purpura D69.3 ; Hyperlipidemia, unspecified 
hyperlipidemia E78.5 and Hypertension I10

 

 [address] [address]-
5859    Coronary artery disease I25.10 ; Lymphedema I89.0 ; History 
of MI (myocardial infarction) I25.2 ; History of kidney stones Z87.442 ; Immune 
thrombocytopenic purpura D69.3 ; Type 2 diabetes mellitus with diabetic 
polyneuropathy E11.42 ; Avascular necrosis of bone of right hip M87.051 ; GERD (
gastroesophageal reflux disease) K21.9 ; Hyperlipidemia, unspecified 
hyperlipidemia E78.5 ; Hypertension I10 and Asthma J45.909

 

 [place] [address] [place] [id number] [place] [address]-
[phone]     

 

 [address] [address]-
5032     

 

 [place] [id number] [place] [address]-
3353  02 Dec, 2015   

 

 [place] [id number] [place] [address]-
9704  02 Dec, 2015  Hyperlipidemia, unspecified hyperlipidemia E78.5

 

 [facility]  [address] [facility] [id number] [address]-
2029     

 

 [facility]  [address] [facility] [id number] [address]-
8746     

 

 [facility]  [address] [facility] [id number] [address]-
0756    Allergic rhinitis J30.9

 

 [place] [address] [facility] [id number] [address]
3557    Type 2 diabetes mellitus with diabetic polyneuropathy 
E11.42 ; Routine adult health maintenance Z00.00 ; Coronary artery disease 
I25.10 ; History of MI (myocardial infarction) I25.2 ; History of kidney stones 
Z87.442 ; Immune thrombocytopenic purpura D69.3 ; Avascular necrosis of bone of 
right hip M87.051 and GERD (gastroesophageal reflux disease) K21.9







IMMUNIZATIONS







 Vaccine  Route  Administration Date  Status

 

 ROCEPHIN 500 MG (IM)  IM Intramuscular  2018  Administered







SOCIAL HISTORY

Never Assessed



REASON FOR VISIT

foot ulcer., was seen by Hemant Clemons on the  for same issue., Patient 
recalls dropping steamed food on her foot last Saturday.-awoods



PLAN OF CARE







 Activity  Details









  









 Follow Up  prn Reason:







VITAL SIGNS







 Height  67 in  2018

 

 Weight  305 lbs  2018

 

 Temperature  98.1 degrees Fahrenheit  2018

 

 Heart Rate  69 bpm  2018

 

 Respiratory Rate  22   2018

 

 BMI  47.76 kg/m2  2018

 

 Blood pressure systolic  172 mmHg  2018

 

 Blood pressure diastolic  82 mmHg  2018







MEDICATIONS







 Medication  Instructions  Dosage  Frequency  Start Date  End Date  Duration  
Status

 

 Probiotic -                    Active

 

 Aspir-81 81 MG  Orally Once a day  1 tablet  24h     16 May, 2019     Active

 

 Doxycycline Hyclate 100 mg  Orally twice a day  1 capsule  12h    10 day(s)  Active

 

 Liraglutide 18 MG/3ML  Subcutaneous Once a day  0.6mg daily for one week then 
increase to 1.2 mg daily  24h  22 Carlos Enrique, 2018  20 Sep, 2018  30 days  Active

 

 Carvedilol 12.5 MG  Orally 2 times a day  1 tablet  12h           Active

 

 Benazepril HCl 40 mg  Orally Once a day  1 tablet  24h           Active

 

 Advair Diskus           26 Mar, 2018        Active

 

 Hydrochlorothiazide 25 MG  Orally Once a day  1 tablet  24h           Active

 

 Humalog KwikPen 100 UNIT/ML  Subcutaneous three times a day before meals  12 
units              Active

 

 Pantoprazole Sodium 40 mg  Orally Once a day  1 tablet  24h           Active

 

 Aleve 220 MG  Orally every 12 hrs  2 tablets with food or milk as needed  12h 
          Active

 

 GlipiZIDE 5 mg  Orally 2 times a day  1 tablet  12h           Active

 

 Atorvastatin Calcium 10 mg  Orally Once a day  1 tablet  24h      
    Active

 

 Citalopram Hydrobromide 20 mg  Orally Once a day  1 tablet  24h           
Active

 

 ProAir  (90 Base) MCG/ACT  Inhalation every 4 hrs  2 puffs as needed  
4h          Active

 

 Nystatin           26 Mar, 2018        Active

 

 Levemir FlexTouch 100 UNIT/ML  Subcutaneous 2 times a day  20  units twice 
daily  12h          Active







RESULTS

No Results



PROCEDURES







 Procedure  Date Ordered  Result  Body Site

 

 [facility]      

 

 ROCEPHIN 500 MG (IM)  2018      

 

 FQ VISIT ESTABLISHED PATIENT  2018      

 

 THER/PROPH/DIAG INJ, SC/IM  2018      







INSTRUCTIONS





MEDICATIONS ADMINISTERED

No Known Medications



MEDICAL (GENERAL) HISTORY







 Type  Description  Date

 

 Medical History  Type II Diabetes   

 

 Medical History  CAD   

 

 Medical History  Acute MI x1   

 

 Medical History  Heart Cath x3   

 

 Medical History  Kidney Stones   

 

 Medical History  Lymphedema   

 

 Medical History  Immune thrombocytopenic purpura   

 

 Surgical History  Heart Stents x2   

 

 Surgical History  Cholecystectomy   

 

 Surgical History     

 

 Surgical History  Kerkhoven Teeth   

 

 Hospitalization History  [facility]  

 

 Hospitalization History  Sepsis/Toxic Shock  

 

 Hospitalization History  [facility]pneumonia  2017

 

 Hospitalization History  [facility]- Anemia, cellulitis pannus, yeast 
infection. Discharged 2018

## 2018-09-27 NOTE — XMS REPORT
[facility]

 Created on: 2018



Madison Young

External Reference #: [id number]

: 1945

Sex: Female



Demographics







 Address  [address] [place]  [address]

 

 Preferred Language  Unknown

 

 Marital Status  Unknown

 

 Mandaen Affiliation  Unknown

 

 Race  Unknown

 

 Ethnic Group  Unknown





Author







 Author  TANI  LEANDER

 

 Organization  [facility]

 

 Address  [address] [place]  [address]



 

 Phone  [phone]







Care Team Providers







 Care Team Member Name  Role  Phone

 

 TANI  LEANDER  Unavailable  [phone]







PROBLEMS







 Type  Condition  ICD9-CM Code  YAO20-UR Code  Onset Dates  Condition Status  
SNOMED Code

 

 Problem  Long-term insulin use     Z79.4     Active  [id number]

 

 Problem  Skin ulcer, limited to breakdown of skin     L98.491     Active  
41138855

 

 Problem  Other iron deficiency anemia     D50.8     Active  58609429

 

 Problem  Peripheral edema     R60.9     Active  534993657

 

 Problem  Coronary artery disease     I25.10     Active  05998490

 

 Problem  Diabetes mellitus due to underlying condition with foot ulcer     
E08.621     Active  772411924

 

 Problem  Type 2 diabetes mellitus with diabetic polyneuropathy     E11.42     
Active  00086269

 

 Problem  Microalbuminuric diabetic nephropathy     E11.21     Active  064523353

 

 Problem  Iron deficiency anemia     D50.9     Active  91002108

 

 Problem  Non-adherence to medical treatment     Z91.19     Active  799537291

 

 Problem  Non-pressure chronic ulcer of other part of left foot limited to 
breakdown of skin     L97.521     Active  581100881

 

 Problem  Acute idiopathic gout involving toe of left foot     M10.072     
Active  37629262

 

 Problem  Immune thrombocytopenic purpura     D69.3     Active  201019238

 

 Problem  Hyperlipidemia, unspecified hyperlipidemia     E78.5     Active  
99615022

 

 Problem  GERD (gastroesophageal reflux disease)     K21.9     Active  894797368

 

 Problem  Avascular necrosis of bone of right hip     M87.051     Active  
736132444

 

 Problem  Anxiety disorder, unspecified     F41.9     Active  400202336

 

 Problem  Morbid (severe) obesity due to excess calories     E66.01     Active  
252020866

 

 Problem  Asthma     J45.909     Active  644949304

 

 Problem  Body mass index (BMI) of 45.0-49.9 in adult     Z68.42     Active  
450011718

 

 Problem  Chronic kidney disease (CKD) stage G3a/A3, moderately decreased 
glomerular filtration rate (GFR) between 45-59 mL/min/1.73 square meter and 
albuminuria creatinine ratio greater than 300 mg/g     N18.3     Active  
544344632

 

 Problem  Hypertension     I10     Active  31145211

 

 Problem  Recurrent major depressive disorder, in partial remission     F33.41 
    Active  70095196







ALLERGIES

No Information



ENCOUNTERS







 Encounter  Location  Date  Diagnosis

 

 [place] [address] [place] [id number] [place] [address]258-
9425  14 Sep, 2018   

 

 [place] [address] [address]
2746  10 Sep, 2018   

 

 [place] [address] [place] [id number] [place] [address] 58130-
5107  06 Sep, 2018  Type 2 diabetes mellitus with diabetic polyneuropathy 
E11.42 ; Coronary artery disease I25.10 ; Hypertension I10 ; Long-term insulin 
use Z79.4 ; Body mass index (BMI) of 45.0-49.9 in adult Z68.42 ; Peripheral 
edema R60.9 and Avascular necrosis of bone of right hip M87.051

 

 [place] [address] [place] [address] [id number] [place] [address] 65694-
5593  17 Aug, 2018  Peripheral edema R60.9

 

 [place] [address] [place] [address] [id number] [place] [address] 99532-
4159  13 Aug, 2018  Hypertension I10 and Peripheral edema R60.9

 

 [place] [address] [place] [address] [id number] [place] [address] 95905-
8917  09 Aug, 2018   

 

 [place] [address] [place] [id number] [place] [address] 38333-
5989  09 Aug, 2018  Hypertension I10 and Peripheral edema R60.9

 

 [place] [address] [place] [id number] [place] [address] 15414-
3957  06 Aug, 2018  Hypertension I10 and Peripheral edema R60.9

 

 [place] [address] [place] [address] [id number] [place] [address] 14881-
[phone]  02 Aug, 2018   

 

 [place] [address] [place] [id number] [place] [address] 14234-
4827  02 Aug, 2018  Hypertension I10 and Peripheral edema R60.9

 

 [place] [address], [place] [address]-
8202  01 Aug, 2018   

 

 [place] [address] [place] [id number] [place] [address] 93247-
9748     

 

 [place] [address] [place] [address] [id number] [place] [address] 01433-
[phone]     

 

 [place] [address] [place] [address] [id number] [place] [address] 29738-
9727     

 

 [place] [address] [place] [id number] [place] [address] 91262-
7622    Diabetes mellitus due to underlying condition with foot 
ulcer E08.621 ; Non-pressure chronic ulcer of other part of left foot limited 
to breakdown of skin L97.521 and BMI 45.0-49.9, adult Z68.42

 

 [place] [address] [place] [address] [id number] [place] [address] 24664-
6651  17 2018  Acute idiopathic gout involving toe of left foot M10.072

 

 [place] [address] [place] [id number] [place] [address] 96038-
9519     

 

 [place] [address] [place] [id number] [place] [address] 35954-
2393     

 

 [place] [address] [place] [address] 83544-
7697     

 

 [place] [id number] [place] [address] 18500-
5069    Type 2 diabetes mellitus with diabetic polyneuropathy 
E11.42 ; Hypertension I10 ; Hyperlipidemia, unspecified hyperlipidemia E78.5 ; 
Body mass index (BMI) of 45.0-49.9 in adult Z68.42 ; Long-term insulin use 
Z79.4 ; Non-adherence to medical treatment Z91.19 ; Coronary artery disease 
I25.10 ; GERD (gastroesophageal reflux disease) K21.9 ; Asthma J45.909 and 
Recurrent major depressive disorder, in partial remission F33.41

 

 [place] [address] [place] [address] 94795-
5727  22 May, 2018  Recurrent major depressive disorder, in partial remission 
F33.41 and Hypertension I10

 

 [place] [address] [place] [id number] [place] [address] 58669-
6571  21 May, 2018  Immune thrombocytopenic purpura D69.3 and Iron deficiency 
anemia D50.9

 

 [place] [address] [address]-
6509  21 May, 2018  Type 2 diabetes mellitus with diabetic polyneuropathy 
E11.42 ; Long-term insulin use Z79.4 ; Chronic kidney disease (CKD) stage G3a/A3
, moderately decreased glomerular filtration rate (GFR) between 45-59 mL/min/
1.73 square meter and albuminuria creatinine ratio greater than 300 mg/g N18.3 
; Hypertension I10 ; Hyperlipidemia, unspecified hyperlipidemia E78.5 ; 
Coronary artery disease I25.10 ; GERD (gastroesophageal reflux disease) K21.9 ; 
Immune thrombocytopenic purpura D69.3 ; Asthma J45.909 ; Morbid (severe) 
obesity due to excess calories E66.01 and Recurrent major depressive disorder, 
in partial remission F33.41

 

 [place] [id number] [place] [address] 36698-
4313  11 May, 2018  Chronic kidney disease (CKD) stage G3a/A3, moderately 
decreased glomerular filtration rate (GFR) between 45-59 mL/min/1.73 square 
meter and albuminuria creatinine ratio greater than 300 mg/g N18.3

 

 [place] [address] [place] [id number] [place] [address] 80268-
0733  02 May, 2018  Chronic kidney disease (CKD) stage G3a/A3, moderately 
decreased glomerular filtration rate (GFR) between 45-59 mL/min/1.73 square 
meter and albuminuria creatinine ratio greater than 300 mg/g N18.3

 

 [place] [id number] [place] [address] 69749-
[phone]     

 

 [place] [address] [place] [id number] [place] [address] 00523-
3910  28 Mar, 2018   

 

 [place] [address] [place] [address] 29129-
3146  26 Mar, 2018  Immune thrombocytopenic purpura D69.3 ; Type 2 diabetes 
mellitus with diabetic polyneuropathy E11.42 ; Avascular necrosis of bone of 
right hip M87.051 ; Long-term insulin use Z79.4 ; GERD (gastroesophageal reflux 
disease) K21.9 ; Hyperlipidemia, unspecified hyperlipidemia E78.5 ; 
Hypertension I10 ; Recurrent major depressive disorder, in partial remission 
F33.41 ; Microalbuminuric diabetic nephropathy E11.21 ; Body mass index (BMI) 
of 45.0-49.9 in adult Z68.42 ; BMI 45.0-49.9, adult Z68.42 and Chronic kidney 
disease (CKD) stage G3a/A3, moderately decreased glomerular filtration rate (GFR
) between 45-59 mL/min/1.73 square meter and albuminuria creatinine ratio 
greater than 300 mg/g N18.3

 

 [place] [id number] [place] [address] 32102-
3670  23 Mar, 2018   

 

 [place] [address] [place] [id number] [place] [address] 86045-
[phone]  23 Mar, 2018   

 

 [place] [address] [address]-
3005  19 Mar, 2018   

 

 [place] [address] [place] [address] [id number] [place] [address] 98567-
2279  14 Mar, 2018   

 

 [place] [id number] [place] [address] 12157-
7199  13 Mar, 2018  Type 2 diabetes mellitus with diabetic polyneuropathy 
E11.42 ; Asthma J45.909 and Hypertension I10

 

 [facility]  [address] [place] 
[id number]  13 Mar, 2018  Skin ulcer, limited to breakdown of skin L98.491 ; 
Immune thrombocytopenic purpura D69.3 ; Avascular necrosis of bone of right hip 
M87.051 and Type 2 diabetes mellitus with diabetic polyneuropathy E11.42

 

 [place] [id number] [place] [address] 46432-
7223  06 Mar, 2018  Asthma J45.909 and Type 2 diabetes mellitus with diabetic 
polyneuropathy E11.42

 

 [place] [id number] [place] [address] 80261-
9705  01 Mar, 2018   

 

 [facility]  [address] [place] 
[id number]    Other iron deficiency anemia D50.8 ; Weakness R53.1 ; 
Type 2 diabetes mellitus with diabetic polyneuropathy E11.42 ; Cellulitis of 
trunk, unspecified site of trunk L03.319 ; Coronary artery disease I25.10 ; 
Avascular necrosis of bone of right hip M87.051 and Morbid (severe) obesity due 
to excess calories E66.01

 

 [place] [address] 
[id number]     

 

 [place] [address] [address]-
8465     

 

 [facility] [place] [address] [address]
[phone]  15 Feb, 2018  Yeast infection of the skin B37.2

 

 [place] [id number] [place] [address] 44134-
8698  15 Feb, 2018   

 

 [place] [address] [address]-
8435  15 Feb, 2018   

 

 [place] [id number] [place] [address] 15232-
0077     

 

 [facility] [place] [facility]  [id number] [place] [address]
[phone]     

 

 [place] [address] [place] [id number] [place] [address] 47359-
5403    Type 2 diabetes mellitus with diabetic polyneuropathy 
E11.42 ; Avascular necrosis of bone of right hip M87.051 ; Hyperlipidemia, 
unspecified hyperlipidemia E78.5 ; Hypertension I10 ; Anxiety disorder, 
unspecified F41.9 ; Immune thrombocytopenic purpura D69.3 ; Coronary artery 
disease I25.10 ; Orthopnea R06.01 ; Acute bronchitis, unspecified organism 
J20.9 ; Wound of left lower extremity, initial encounter S81.802A ; Body aches 
R52 and Debilitated R53.81

 

 [place] [id number] [place] [address] 30169-
5730  12 2018   

 

 [address] [address]-
6498    Type 2 diabetes mellitus with diabetic polyneuropathy 
E11.42 ; Encounter for immunization Z23 ; Hyperlipidemia, unspecified 
hyperlipidemia E78.5 ; Hypertension I10 ; Anxiety disorder, unspecified F41.9 ; 
Asthma J45.909 ; Body mass index (BMI) of 45.0-49.9 in adult Z68.42 and Morbid (
severe) obesity due to excess calories E66.01

 

 [address] [address]-
3842    Type 2 diabetes mellitus with diabetic polyneuropathy 
E11.42 ; Hyperlipidemia, unspecified hyperlipidemia E78.5 ; Hypertension I10 
and GERD (gastroesophageal reflux disease) K21.9

 

 [place] [id number] [place] [address] 00150-
7189  22 Mar, 2017  Type 2 diabetes mellitus with diabetic polyneuropathy E11.42

 

 [place] [id number] [place] [address] 77976-
7677    Type 2 diabetes mellitus with diabetic polyneuropathy 
E11.42 ; Coronary artery disease I25.10 ; History of MI (myocardial infarction) 
I25.2 ; Immune thrombocytopenic purpura D69.3 ; GERD (gastroesophageal reflux 
disease) K21.9 ; Hyperlipidemia, unspecified hyperlipidemia E78.5 ; 
Hypertension I10 ; History of kidney stones Z87.442 and Anxiety disorder, 
unspecified F41.9

 

 [place] [id number] [place] [address] 31067-
4986     

 

 [address] [address]-
2694  29 Sep, 2016  Coronary artery disease I25.10 ; History of MI (myocardial 
infarction) I25.2 ; History of kidney stones Z87.442 ; Type 2 diabetes mellitus 
with diabetic polyneuropathy E11.42 ; Avascular necrosis of bone of right hip 
M87.051 ; Hyperlipidemia, unspecified hyperlipidemia E78.5 ; Hypertension I10 ; 
Asthma J45.909 and Encounter for immunization Z23

 

 [place] [address] [place] [id number] [place] [address] 42244-
6578  20 Sep, 2016   

 

 [place] [address] [address]-
3274    Coronary artery disease I25.10 ; Type 2 diabetes mellitus 
with diabetic polyneuropathy E11.42 ; History of MI (myocardial infarction) 
I25.2 ; Immune thrombocytopenic purpura D69.3 ; Hyperlipidemia, unspecified 
hyperlipidemia E78.5 and Hypertension I10

 

 [place] [address] [place] [id number] [place] [address] 67757-
1614    Coronary artery disease I25.10 ; Lymphedema I89.0 ; History 
of MI (myocardial infarction) I25.2 ; History of kidney stones Z87.442 ; Immune 
thrombocytopenic purpura D69.3 ; Type 2 diabetes mellitus with diabetic 
polyneuropathy E11.42 ; Avascular necrosis of bone of right hip M87.051 ; GERD (
gastroesophageal reflux disease) K21.9 ; Hyperlipidemia, unspecified 
hyperlipidemia E78.5 ; Hypertension I10 and Asthma J45.909

 

 [place] [address] [place] [id number] [place] [address] 31158-
6759     

 

 [place] [address] [place] [id number] [place] [address] 36459-
7211     

 

 [place] [address] [place] [id number] [place] [address] [phone]-
[phone]  02 Dec, 2015   

 

 [place] [address] [place] [id number] [place] [address] 79160-
4508  02 Dec, 2015  Hyperlipidemia, unspecified hyperlipidemia E78.5

 

 [place] [address] [place] [address] [id number] [place] [address] 58064-
3120     

 

 [facility]  [address] [facility] [id number] [address]-
5074     

 

 [facility]  [address] [facility] [id number] [address]-
5286    Allergic rhinitis J30.9

 

 [facility]  [address] [facility] [id number] [address]-
8030    Type 2 diabetes mellitus with diabetic polyneuropathy 
E11.42 ; Routine adult health maintenance Z00.00 ; Coronary artery disease 
I25.10 ; History of MI (myocardial infarction) I25.2 ; History of kidney stones 
Z87.442 ; Immune thrombocytopenic purpura D69.3 ; Avascular necrosis of bone of 
right hip M87.051 and GERD (gastroesophageal reflux disease) K21.9







IMMUNIZATIONS

No Known Immunizations



SOCIAL HISTORY

Never Assessed



REASON FOR VISIT

medication questions



PLAN OF CARE





VITAL SIGNS





MEDICATIONS

Unknown Medications



RESULTS

No Results



PROCEDURES

No Known procedures



INSTRUCTIONS





MEDICATIONS ADMINISTERED

No Known Medications



MEDICAL (GENERAL) HISTORY







 Type  Description  Date

 

 Medical History  Type II Diabetes   

 

 Medical History  CAD   

 

 Medical History  Acute MI x1   

 

 Medical History  Heart Cath x3   

 

 Medical History  Kidney Stones   

 

 Medical History  Lymphedema   

 

 Medical History  Immune thrombocytopenic purpura   

 

 Surgical History  Heart Stents x2   

 

 Surgical History  Cholecystectomy   

 

 Surgical History     

 

 Surgical History  [place]   

 

 Hospitalization History  [facility]  

 

 Hospitalization History  Sepsis/Toxic Shock  

 

 Hospitalization History  [facility]-flu/pneumonia  2017

 

 Hospitalization History  [facility]- Anemia, cellulitis pannus, yeast 
infection. Discharged 2018

## 2018-09-28 VITALS — SYSTOLIC BLOOD PRESSURE: 141 MMHG | DIASTOLIC BLOOD PRESSURE: 65 MMHG

## 2018-09-28 VITALS — DIASTOLIC BLOOD PRESSURE: 81 MMHG | SYSTOLIC BLOOD PRESSURE: 177 MMHG

## 2018-09-28 VITALS — DIASTOLIC BLOOD PRESSURE: 72 MMHG | SYSTOLIC BLOOD PRESSURE: 140 MMHG

## 2018-09-28 VITALS — SYSTOLIC BLOOD PRESSURE: 149 MMHG | DIASTOLIC BLOOD PRESSURE: 69 MMHG

## 2018-09-28 VITALS — DIASTOLIC BLOOD PRESSURE: 65 MMHG | SYSTOLIC BLOOD PRESSURE: 139 MMHG

## 2018-09-28 VITALS — SYSTOLIC BLOOD PRESSURE: 163 MMHG | DIASTOLIC BLOOD PRESSURE: 71 MMHG

## 2018-09-28 LAB
ALBUMIN SERPL-MCNC: 2.9 GM/DL (ref 3.2–4.5)
ALP SERPL-CCNC: 156 U/L (ref 40–136)
ALT SERPL-CCNC: 17 U/L (ref 0–55)
ARTERIAL PATENCY WRIST A: (no result)
BASE EXCESS STD BLDA CALC-SCNC: 0 MMOL/L (ref -2.5–2.5)
BASOPHILS # BLD AUTO: 0 10^3/UL (ref 0–0.1)
BASOPHILS NFR BLD AUTO: 0 % (ref 0–10)
BDY SITE: (no result)
BILIRUB SERPL-MCNC: 1.2 MG/DL (ref 0.1–1)
BODY TEMPERATURE: 100.7
BUN/CREAT SERPL: 22
CALCIUM SERPL-MCNC: 8.7 MG/DL (ref 8.5–10.1)
CHLORIDE SERPL-SCNC: 103 MMOL/L (ref 98–107)
CO2 BLDA CALC-SCNC: 25.7 MMOL/L (ref 21–31)
CO2 SERPL-SCNC: 25 MMOL/L (ref 21–32)
CREAT SERPL-MCNC: 0.97 MG/DL (ref 0.6–1.3)
EOSINOPHIL # BLD AUTO: 0 10^3/UL (ref 0–0.3)
EOSINOPHIL NFR BLD AUTO: 0 % (ref 0–10)
ERYTHROCYTE [DISTWIDTH] IN BLOOD BY AUTOMATED COUNT: 16.1 % (ref 10–14.5)
GFR SERPLBLD BASED ON 1.73 SQ M-ARVRAT: 57 ML/MIN
GLUCOSE SERPL-MCNC: 145 MG/DL (ref 70–105)
HCT VFR BLD CALC: 35 % (ref 35–52)
HGB BLD-MCNC: 11.3 G/DL (ref 11.5–16)
INHALED O2 FLOW RATE: (no result) L/MIN
LYMPHOCYTES # BLD AUTO: 1 X 10^3 (ref 1–4)
LYMPHOCYTES NFR BLD AUTO: 13 % (ref 12–44)
MANUAL DIFFERENTIAL PERFORMED BLD QL: NO
MCH RBC QN AUTO: 31 PG (ref 25–34)
MCHC RBC AUTO-ENTMCNC: 32 G/DL (ref 32–36)
MCV RBC AUTO: 95 FL (ref 80–99)
MONOCYTES # BLD AUTO: 0.6 X 10^3 (ref 0–1)
MONOCYTES NFR BLD AUTO: 8 % (ref 0–12)
NEUTROPHILS # BLD AUTO: 6.5 X 10^3 (ref 1.8–7.8)
NEUTROPHILS NFR BLD AUTO: 79 % (ref 42–75)
PCO2 BLDA: 45 MMHG (ref 35–45)
PH BLDA: 7.37 [PH] (ref 7.37–7.43)
PLATELET # BLD: 167 10^3/UL (ref 130–400)
PMV BLD AUTO: 11.2 FL (ref 7.4–10.4)
PO2 BLDA: 525 MMHG (ref 79–93)
POTASSIUM SERPL-SCNC: 4.8 MMOL/L (ref 3.6–5)
PROT SERPL-MCNC: 5.8 GM/DL (ref 6.4–8.2)
RBC # BLD AUTO: 3.68 10^6/UL (ref 4.35–5.85)
SAO2 % BLDA FROM PO2: 100 % (ref 94–100)
SODIUM SERPL-SCNC: 135 MMOL/L (ref 135–145)
VENTILATION MODE VENT: NO
WBC # BLD AUTO: 8.2 10^3/UL (ref 4.3–11)

## 2018-09-28 RX ADMIN — SODIUM CHLORIDE SCH MLS/HR: 900 INJECTION INTRAVENOUS at 08:41

## 2018-09-28 RX ADMIN — Medication SCH ML: at 05:16

## 2018-09-28 RX ADMIN — MICONAZOLE NITRATE SCH APPLIC: 20 POWDER TOPICAL at 08:43

## 2018-09-28 RX ADMIN — INSULIN ASPART SCH UNIT: 100 INJECTION, SOLUTION INTRAVENOUS; SUBCUTANEOUS at 20:27

## 2018-09-28 RX ADMIN — INSULIN ASPART SCH UNIT: 100 INJECTION, SOLUTION INTRAVENOUS; SUBCUTANEOUS at 11:23

## 2018-09-28 RX ADMIN — ALBUTEROL SULFATE SCH MG: 2.5 SOLUTION RESPIRATORY (INHALATION) at 11:08

## 2018-09-28 RX ADMIN — Medication SCH ML: at 14:17

## 2018-09-28 RX ADMIN — ALBUTEROL SULFATE SCH MG: 2.5 SOLUTION RESPIRATORY (INHALATION) at 20:29

## 2018-09-28 RX ADMIN — ALBUTEROL SULFATE SCH MG: 2.5 SOLUTION RESPIRATORY (INHALATION) at 23:05

## 2018-09-28 RX ADMIN — INSULIN ASPART SCH UNIT: 100 INJECTION, SOLUTION INTRAVENOUS; SUBCUTANEOUS at 05:16

## 2018-09-28 RX ADMIN — Medication SCH ML: at 20:36

## 2018-09-28 RX ADMIN — INSULIN ASPART SCH UNIT: 100 INJECTION, SOLUTION INTRAVENOUS; SUBCUTANEOUS at 17:34

## 2018-09-28 RX ADMIN — ALBUTEROL SULFATE SCH MG: 2.5 SOLUTION RESPIRATORY (INHALATION) at 15:15

## 2018-09-28 RX ADMIN — ALBUTEROL SULFATE SCH MG: 2.5 SOLUTION RESPIRATORY (INHALATION) at 07:07

## 2018-09-28 RX ADMIN — MICONAZOLE NITRATE SCH APPLIC: 20 POWDER TOPICAL at 20:36

## 2018-09-28 RX ADMIN — ALBUTEROL SULFATE SCH MG: 2.5 SOLUTION RESPIRATORY (INHALATION) at 02:31

## 2018-09-28 NOTE — OCCUPATIONAL THERAPY EVAL
OT Evaluation-General/PLF


Medical Diagnosis


Admission Date


Sep 27, 2018 at 17:22


Medical Diagnosis:  weakness


Onset Date:  Sep 27, 2018





Therapy Diagnosis


Therapy Diagnosis:  impaired self care skills/weakness





Height/Weight


Height (Feet):  5


Height (Inches):  7.00


Weight (Pounds):  328


Weight (Ounces):  2.0





Precautions


Precautions/Isolations:  Fall Prevention, Standard Precautions, Pressure Ulcer


Safety Interventions:  None





Referral


Physician:  Herve





Medical History


Pertinent Medical History:  Arthritis, CAD, DM, GERD, Heart Failure, HTN, 

Neuropathy, OA, Rheumatoid Arthritis


Additional Medical History


avascular necrosis, coronary stent, high cholesterol, RA, morbid obesity, 

anxiety, depression, anemia


Reviewed History:  Yes





Social History


Home:  Single Level


Current Living Status:  Alone





ADL-Prior Level of Function


ADL PLOF Comments


Pt states she is normally able to complete basic self care tasks without 

assistance at w/c level. Transfers from bed to w/c or BSC. No ambulation 

secondary to avascular necrosis of hip. Has recently had difficulty performing 

ADL tasks. Pt states she has multiple family members nearby who check on her.


DME/Equipment:  Bath Chair, Grab Bars, Shower





OT Current Status


Subjective


RN states pt is okay for bed level therapy. No OOB at this time secondary to 

bed rest.


Pt in bed, agrees to therapy. States she has 8/10 pain in right hip





Mental Status/Objective


Patient Orientation:  Person, Place


Attachments:  Hernandez Catheter, IV, Oxygen





Current


Glasses/Contacts:  Yes


Hand Dominance:  Right


Upper Extremity ROM


Right UE grossly functional


Decreased left shoulder ROM


Upper Extremity Coordination


Decreased


Upper Extremity Strength


decreased bilateral UE





ADL-Treatment


ADL-Current


 Pt participated in UE assessment while in supine. Pt states she is 

uncomfortable in the bed. Assist x2 required to scoot up in bed and reposition. 

Education provided regarding role of OT and plan of care. Pt states 

understanding of education and agrees to plan. Pt resting in bed with needs met 

and daughter in law present after session.


              Functional Morgan Measure


0=Not Assessed/NA   4=Minimal Assistance


1=Total Assistance   5=Supervision or Setup


2=Maximal Assistance   6=Modified Morgan


3=Moderate Assistance   7=Complete IndependenceIRFPAI Quality Coding Scale











6 Independent with activity with or without an assistive device


 


5  Patient requires set up or clean up by helper.  Patient completes activity  

by  themselves


 


4 Supervision or touching assist (CGA). [place] provide cues , steadying assist


 


3 The helper provides less than half the effort to complete the activity


 


2 The helper provides more than half the effort to complete the activity


 


1 Dependent.  The helper does all the effort to complete an activity 


 


7 Patient refused to complete or attempt activity


 


9 The patient did not perform the activity before the current illness or injury


 


88 Not attempted due to Medical conditions or safety concerns











Education


OT Patient Education:  Rehab process


Teaching Recipient:  Patient


Teaching Methods:  Discussion


Response to Teaching:  Reinforcement Needed





OT Short Term Goals


Short Term Goals


1=Demonstrate adherence to instructed precautions during ADL tasks.


2=Patient will verbalize/demonstrate understanding of assistive devices/

modifications for ADL.


3=Patient will improve strength/tolerance for activity to enable patient to 

perform ADL's.





OT Long Term Goals


Long Term Goals


Time Frame:  Oct 12, 2018


Eating (FIM):  5


Grooming(FIM):  5


Upper Body Dressing(FIM):  5


Toilet/Commode Transfer(FIM):  4


Additional Goals:  2-Verbalize Understanding, 3-ImproveStrength/Daniel


1=Demonstrate adherence to instructed precautions during ADL tasks.


2=Patient will verbalize/demonstrate understanding of assistive devices/

modifications for ADL.


3=Patient will improve strength/tolerance for activity to enable patient to 

perform ADL's.





OT Education/Plan


Problem List/Assessment


Assessment:  Decreased Activ Tolerance, Decreased Safety Aware, Decreased UE 

Strength, Dependent Transfers, Impaired Funct Balance, Impaired Self-Care Skills


Pt to benefit from skilled OT intervention for ADL training, transfers, 

strengthening and safety education to increase level of function and allow safe 

discharge plan.





Discharge Recommendations


Plan/Recommendations:  Continue POC





Treatment Plan/Plan of Care


Treatment,Training & Education:  Yes


Patient would benefit from OT for education, treatment and training to promote 

independence in ADL's, mobility, safety and/or upper extremity function for ADL'

s.


Plan of Care:  ADL Retraining, Functional Mobility, UE Funct Exercise/Act


Treatment Duration:  Oct 12, 2018


Frequency:  5 times per week


Estimated Hrs Per Day:  .25 hour per day


Rehab Potential:  Fair





Time/GCodes


Start Time:  11:24


Stop Time:  11:44


Total Time Billed (hr/min):  20


Billed Treatment Time


1 visit, EVM(20minutes)











CODI COLEMAN OT Sep 28, 2018 14:30

## 2018-09-28 NOTE — HISTORY & PHYSICAL-SURGICAL
History of Present Illness


History of Present Illness


Reason for visit/HPI


Pt is a 73 yo F who presented to the ED yesterday via EMS for increased 

weakness. Pt lives at home alone and is unable to ambulate due to avascular 

necrosis of both hips and morbid obesity. Typically she can transfer herself 

from the bed to the toilet but cannot even sit herself up at this time. She 

went to the ED the day before yesterday w/ the same complaint and had an 

unremarkable workup, including UA, and no infectious or metabolic cause for 

weakness was found. She was D/C to home because she did not qualify for Acute 

Rehab placement. She endorses fever at home as well as nausea and vomiting all 

week that has limited her ability to tolerate anything PO. She was asking for 

water upon arrival in the ED. She was also complaining of wheezing and SOB in 

the ED, received a nebulizer treatment, and follow up ABG was WNL. CXR did not 

reveal any infectious process. Overnight after admission, she was 

intermittently agitated and required some Ativan to help her relax. Per nursing 

staff, she also became increasingly SOB w/ O2 sats of 93% on 2L RA and so the 

decision was made to place her on BiPAP. Upon waking her up for evaluation this 

morning, pt also complaining of R hip pain, and she states she usually prefers 

to lay on her L side.


Date of Admission


Sep 27, 2018 at 17:22


Date Seen by a Provider:  Sep 28, 2018


Time Seen by a Provider:  10:42


I consulted on this patient on


18


 15:30


Attending Physician


Liat Edgar MD


Admitting Physician


[place]


Consult








Allergies and Home Medications


Allergies


Coded Allergies:  


     vancomycin (Verified  Allergy, Intermediate, RASH, 18)


     heparin (Verified  Allergy, Unknown, 18)


Uncoded Allergies:  


     TAPE (Allergy, Mild, RASH, 17)





Home Medications


Albuterol Sulfate 1 Puff Puff, 2 PUFF IH Q4H PRN for SHORTNESS OF BREATH, (

Reported)


   1 PUFF = 90 MCG 


Aspirin 81 Mg Tablet.dr, 81 MG PO HS, (Reported)


Atorvastatin Calcium 10 Mg Tablet, 10 MG PO HS, (Reported)


   LAST FILLED #90 18 


Benazepril HCl 40 Mg Tab, 40 MG PO HS, (Reported)


   LAST FILLED #180 18 


Carvedilol 12.5 Mg Tablet, 12.5 MG PO BID, (Reported)


   LAST FILLED #180 18 


Citalopram Hydrobromide 20 Mg Tablet, 20 MG PO DAILY, (Reported)


Fluticasone/Salmeterol 1 Each Blst.w.dev, 1-2 PUFF IH DAILY PRN for SHORTNESS 

OF BREATH, (Reported)


   UNKNOWN LAST FILL DATE 


Glipizide 5 Mg Tablet, 5 MG PO BID, (Reported)


Hydrochlorothiazide 25 Mg Tablet, 25 MG PO DAILY, (Reported)


   LAST FILLED #90 18 


Insulin Detemir 100 Unit/1 Ml Insuln.pen, 20 UNITS SC BID, (Reported)


Insulin Lispro 100 Unit/1 Ml Insuln.pen, 12 UNIT SQ TIDAC, (Reported)


   LAST FILLED 18 


Lactobacillus Combination No.4 1 Each Capsule, 1 CAP PO DAILY, (Reported)


Nystatin 15 Gm Oint...g., TOP BID, (Reported)


Pantoprazole Sodium 40 Mg Tablet.dr, 40 MG PO DAILY, (Reported)


Potassium Chloride 10 Meq Tab.er.prt, 10 MEQ PO DAILY, (Reported)


Tramadol HCl 50 Mg Tablet, 50 MG PO Q6H PRN for PAIN-MODERATE, (Reported)





Patient Home Medication List


Home Medication List Reviewed:  Yes





Past Medical-Social-Family Hx


Patient Social History


Alcohol Use:  Denies Use


Number of Drinks Today:  AA


Recreational Drug Use:  No


Smoking Status:  Former Smoker


Former Smoker, Quit:  1977


Type Used:  Cigarettes


2nd Hand Smoke Exposure:  No


Recent Foreign Travel:  No


Contact w/Someone Who Travel:  No


Recent Infectious Disease Expo:  No


Recent Hopitalizations:  No


Physical Abuse Screen:  No


Sexual Abuse:  No





Immunizations Up To Date


Tetanus Booster (TDap):  Unknown


PED Vaccines UTD:  Yes


Date of Pneumonia Vaccine:  Oct 1, 2016


Date of Influenza Vaccine:  Oct 1, 2017





Seasonal Allergies


Seasonal Allergies:  Yes





Surgeries


History of Surgeries:  Yes


Surgeries:  Cardiac, Coronary Stent, Gallbladder, Renal





Respiratory


History of Respiratory Disorde:  Yes


Respiratory Disorders:  Asthma





Cardiovascular


History of Cardiac Disorders:  Yes (stent)


Cardiac Disorders:  Coronary Artery Disease, High Cholesterol, Hypertension





Neurological


History of Neurological Disord:  No





Reproductive System


Hx Reproductive Disorders:  No


Sexually Transmitted Disease:  No


HIV/AIDS:  No


Female Reproductive Disorders:  Denies


GYN History:  Menopausal





Genitourinary


History of Genitourinary Disor:  Yes


Genitourinary Disorders:  Bladder Infection, Kidney Stones





Gastrointestinal


History of Gastrointestinal Di:  Yes


Gastrointestinal Disorders:  Gastroesophageal Reflux





Musculoskeletal


History of Musculoskeletal Dis:  Yes (avascular necrosis of the right hip)


Musculoskeletal Disorders:  Rheumatoid Arthritis, Foot Drop, Fractures





Endocrine


History of Endocrine Disorders:  Yes (DM TYPE II; MORBID OBESITY)


Endocrine Disorders:  Diabetes, Insulin dep





HEENT


History of HEENT Disorders:  No





Cancer


History of Cancer:  No





Psychosocial


History of Psychiatric Problem:  Yes


Behavioral Health Disorders:  Anxiety, Depression





Integumentary


History of Skin or Integumenta:  No


Skin/Integumentary Disorders:  Recent Skin Changes





Blood Transfusions


History of Blood Disorders:  Yes (anemia, platelets low, transfusion every 2-3 

months)


Adverse Reaction to a Blood Tr:  No





Family Medical History


Significant Family History:  No Pertinent Family Hx


Family Medial History:  


Cardiovascular disease


  19 MOTHER


Diabetes mellitus


  19 MOTHER


Paternal family history of emphysema


  19 FATHER





Review of Systems


Constitutional:  No chills, No diaphoresis; fever, weakness


EENTM:  No blurred vision, No throat pain


Respiratory:  No cough; short of breath, wheezing


Cardiovascular:  No chest pain; edema; No palpitations


Gastrointestinal:  No abdominal pain, No constipation, No diarrhea; nausea, 

vomiting


Genitourinary:  No dysuria, No frequency, No pain


Musculoskeletal:  joint pain (R hip)


Skin:  dryness, other (Intertrigo beneath pannus)


Psychiatric/Neurological:  Anxiety, Depressed; Denies Headache, Denies Numbness





Physical Exam


Vital Signs





Vital Signs - First Documented








 18





 12:20 15:42 01:12


 


Temp 99.0  


 


Pulse 99  


 


Resp 18  


 


B/P (MAP) 129/70 (89)  


 


Pulse Ox 96  


 


O2 Delivery  Nasal Cannula 


 


O2 Flow Rate  2.00 


 


FiO2   80





Capillary Refill : Less Than 3 SecondsLess Than 3 Seconds


Height, Weight, BMI


Height: 5'7.00"


Weight: 328lbs. 2.0oz. 148.225019ct; 47.1 BMI


Method:Stated


General Appearance:  WD/WN, Mild Distress, Obese


HEENT:  PERRL/EOMI; No Scleral Icterus (L), No Scleral Icterus (R)


Neck:  Full Range of Motion, Non Tender, Supple


Respiratory:  Chest Non Tender, Lungs Clear, Normal Breath Sounds, No Accessory 

Muscle Use, No Respiratory Distress (BiPAP in place)


Cardiovascular:  Regular Rate, Rhythm, No Murmur, Normal Peripheral Pulses


Gastrointestinal:  Normal Bowel Sounds, No Organomegaly, Non Tender, Soft


Extremity:  Pedal Edema (1+ pitting bilaterally w/ overlying dry skin), Other (

TTP over R greater trochanter w/o deformity. Active ROM severely limited 2/2 

body habitus and pain.)


Neurologic/Psychiatric:  Alert, Oriented x3, Normal Mood/Affect, CNs II-XII 

Norm as Tested, Other (Pt is bedbound at baseline)


Skin:  Normal Color, Warm/Dry, Other (Beefy red plaque w/ sattelite lesions 

beneath pannus)





Data Review


Labs


Laboratory Tests


18 11:16: Glucometer 130H


18 16:58: Glucometer 157H


18 20:01: Glucometer 142H


18 06:14: Glucometer 112H





Microbiology


18 Blood Culture - Preliminary, Resulted


          No growth


18 Influenza Types A,B Antigen (KIM) - Final, Complete


          








Radiology


Per Radiologist, CXR from 18 shows pulmonary vascular congestion w/ likely 

pulmonary edema. Since study was technically limited, also cannot exclude 

superimposed pulmonary infiltrate. Follow up imaging recommended.





Assessment/Plan


Assessment/Plan


Admission Diagonsis


AMS, weakness


Admission Status:  Inpatient Order (span 2 midnights)


Reason for Inpatient Admission:  


Pt lives at home and due to increased weakness, AMS, and new onset oxygen


requirement, is unfit for d/c to home. She is admitted for stabilization


and likely placement in LTC facility.


Assessment/Plan


Weakness, AMS, and new onset oxygen requirement.


Unfit for d/c home. 


Admitted for stabilization and likely placement in LTC facility.





Diagnosis/Problems


Diagnosis/Problems





(1) Weakness generalized


Status:  Acute


Assessment & Plan:  Social work will be consulted for placement.  Patient's 

family does not believe she is safe at home or able to care for herself at home.





(2) Altered mental status


Status:  Acute


Assessment & Plan:  Pt was A&Ox3 today. Will continue to provide IVF, pain 

control, and Rocephin. Will monitor her mental status for changes.





(3) Hypoxemia


Status:  Acute


Assessment & Plan:  CXR was a technically limited study, so it is difficult to 

tell whether her confusion and dyspnea are due to an acute pulmonary process. 


Will continue to treat emprically w/ Rocephin.


Also continue home meds for asthma.


Will call RT to remove BiPAP today. If pt can tolerate being on RA, she can eat 

diabetic meal.





(4) Nausea and vomiting


Status:  Resolved


Assessment & Plan:  Pt not complaining of nausea this morning. Will see how she 

tolerates PO diet.





(5) Right hip pain


Assessment & Plan:  Can administer another round of ketorolac, and then 

continue patient on home regimen of Naproxen. Continue to monitor.





(6) Intertrigo


Status:  Acute


Assessment & Plan:  Miconazole powder application was started last night.


Will add Diflucan. 





(7) Morbid obesity


Status:  Chronic


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


Status:  Chronic


Assessment & Plan:  Diabetic diet and sliding scale insulin w/ regular glucose 

finger sticks to monitor





(9) DVT prophylaxis


Status:  Acute


Assessment & Plan:  Pt is allergic to heparin, so Lovenox cannot be used. Will 

place SCDs. 








Clinical Quality Measures


DVT/VTE Risk/Contraindication:


Risk Factor Score Per Nursin


RFS Level Per Nursing on Admit:  4+=Very High





Problem Qualifiers





(1) Altered mental status:  


Altered mental status type:  unspecified  Qualified Codes:  R41.82 - Altered 

mental status, unspecified


(2) Nausea and vomiting:  


Vomiting type:  unspecified  Vomiting Intractability:  non-intractable  

Qualified Codes:  R11.2 - Nausea with vomiting, unspecified








CAROLYNE ROMO MEDICAL STUDENT Sep 28, 2018 11:39

## 2018-09-28 NOTE — DIAGNOSTIC IMAGING REPORT
INDICATION:  Increasing shortness of air..



TECHNIQUE:  Single view chest 12.23 a.m..



CORRELATION STUDY:  09/27/2018



FINDINGS: 

Examination is technically limited. There is a scattered areas of

infiltrate-like density versus edema throughout both lung fields

most pronounced centrally in the perihilar distribution. This

does suggest pulmonary edema. Adversely changed from prior study.

 Cardiac enlargement. Vasculature obscured.



IMPRESSION: 

1. Fairly compromised examination. Overall appears to be

worsening severity pulmonary vascular congestion and edema.

Superimposed infiltrate would be difficult to exclude. Followup

imaging recommended.     



Dictated by: 



  Dictated on workstation # [id number]

## 2018-09-29 VITALS — DIASTOLIC BLOOD PRESSURE: 79 MMHG | SYSTOLIC BLOOD PRESSURE: 160 MMHG

## 2018-09-29 VITALS — SYSTOLIC BLOOD PRESSURE: 154 MMHG | DIASTOLIC BLOOD PRESSURE: 68 MMHG

## 2018-09-29 VITALS — SYSTOLIC BLOOD PRESSURE: 126 MMHG | DIASTOLIC BLOOD PRESSURE: 64 MMHG

## 2018-09-29 VITALS — SYSTOLIC BLOOD PRESSURE: 134 MMHG | DIASTOLIC BLOOD PRESSURE: 75 MMHG

## 2018-09-29 VITALS — DIASTOLIC BLOOD PRESSURE: 63 MMHG | SYSTOLIC BLOOD PRESSURE: 137 MMHG

## 2018-09-29 VITALS — SYSTOLIC BLOOD PRESSURE: 157 MMHG | DIASTOLIC BLOOD PRESSURE: 72 MMHG

## 2018-09-29 RX ADMIN — FLUCONAZOLE SCH MG: 100 TABLET ORAL at 07:59

## 2018-09-29 RX ADMIN — INSULIN ASPART SCH UNIT: 100 INJECTION, SOLUTION INTRAVENOUS; SUBCUTANEOUS at 16:30

## 2018-09-29 RX ADMIN — Medication SCH ML: at 06:05

## 2018-09-29 RX ADMIN — ALBUTEROL SULFATE SCH MG: 2.5 SOLUTION RESPIRATORY (INHALATION) at 11:26

## 2018-09-29 RX ADMIN — MICONAZOLE NITRATE SCH APPLIC: 20 POWDER TOPICAL at 22:29

## 2018-09-29 RX ADMIN — CARVEDILOL SCH MG: 12.5 TABLET, FILM COATED ORAL at 22:27

## 2018-09-29 RX ADMIN — LISINOPRIL SCH MG: 40 TABLET ORAL at 22:28

## 2018-09-29 RX ADMIN — ALBUTEROL SULFATE SCH MG: 2.5 SOLUTION RESPIRATORY (INHALATION) at 18:35

## 2018-09-29 RX ADMIN — ALBUTEROL SULFATE SCH MG: 2.5 SOLUTION RESPIRATORY (INHALATION) at 23:05

## 2018-09-29 RX ADMIN — INSULIN ASPART SCH UNIT: 100 INJECTION, SOLUTION INTRAVENOUS; SUBCUTANEOUS at 06:39

## 2018-09-29 RX ADMIN — ASPIRIN SCH MG: 81 TABLET ORAL at 22:28

## 2018-09-29 RX ADMIN — ALBUTEROL SULFATE SCH MG: 2.5 SOLUTION RESPIRATORY (INHALATION) at 14:47

## 2018-09-29 RX ADMIN — ALBUTEROL SULFATE SCH MG: 2.5 SOLUTION RESPIRATORY (INHALATION) at 07:37

## 2018-09-29 RX ADMIN — INSULIN ASPART SCH UNIT: 100 INJECTION, SOLUTION INTRAVENOUS; SUBCUTANEOUS at 11:35

## 2018-09-29 RX ADMIN — Medication SCH ML: at 22:29

## 2018-09-29 RX ADMIN — MICONAZOLE NITRATE SCH APPLIC: 20 POWDER TOPICAL at 08:00

## 2018-09-29 RX ADMIN — FLUTICASONE PROPIONATE AND SALMETEROL XINAFOATE SCH PUFF: 45; 21 AEROSOL, METERED RESPIRATORY (INHALATION) at 18:38

## 2018-09-29 RX ADMIN — SODIUM CHLORIDE SCH MLS/HR: 900 INJECTION INTRAVENOUS at 08:00

## 2018-09-29 RX ADMIN — Medication SCH ML: at 17:34

## 2018-09-29 RX ADMIN — INSULIN ASPART SCH UNIT: 100 INJECTION, SOLUTION INTRAVENOUS; SUBCUTANEOUS at 22:22

## 2018-09-29 RX ADMIN — ATORVASTATIN CALCIUM SCH MG: 10 TABLET, FILM COATED ORAL at 22:29

## 2018-09-29 NOTE — HISTORY & PHYSICIAL ([facility])
CAROLYNE ROMO MEDICAL STUDENT 18 1035:


HPI


History of Present Illness:


Pt is a 73 yo F who presented to the ED yesterday via EMS for increased 

weakness. Pt lives at home alone and is unable to ambulate due to avascular 

necrosis of both hips and morbid obesity. Typically she can transfer herself 

from the bed to the toilet but cannot even sit herself up at this time. She 

went to the ED the day before yesterday w/ the same complaint and had an 

unremarkable workup, including UA, and no infectious or metabolic cause for 

weakness was found. She was D/C to home because she did not qualify for Acute 

Rehab placement. She endorses fever at home as well as nausea and vomiting all 

week that has limited her ability to tolerate anything PO. She was asking for 

water upon arrival in the ED. She was also complaining of wheezing and SOB in 

the ED, received a nebulizer treatment, and follow up ABG was WNL. CXR did not 

reveal any infectious process. Overnight after admission, she was 

intermittently agitated and required some Ativan to help her relax. Per nursing 

staff, she also became increasingly SOB w/ O2 sats of 93% on 2L RA and so the 

decision was made to place her on BiPAP. Upon waking her up for evaluation this 

morning, pt also complaining of R hip pain, and she states she usually prefers 

to lay on her L side.


Source:  patient


Exam Limitations:  no limitations


Date seen by provider:  Sep 28, 2018


Time Seen by Provider:  10:11


Attending Physician


Bryce Mireles MD


[facility] [place],[facility]


Consult





Date of Admission


Sep 27, 2018 at 17:22





Home Medications


Home Medications


Reviewed patient Home Medication Reconciliation performed by pharmacy 

medication reconciliations technician and/or nursing.


Patients Allergies have been reviewed.





Allergies


Coded Allergies:  


     vancomycin (Verified  Allergy, Intermediate, RASH, 18)


     heparin (Verified  Allergy, Unknown, 18)


Uncoded Allergies:  


     TAPE (Allergy, Mild, RASH, 17)





PMH-Social-Family Hx


Patient Social History


Alcohol Use:  Denies Use


Recreational Drug Use:  No


Smoking Status:  Former Smoker


Former smoker/When Quit:  Oct 8, 1984


Type Used:  Cigarettes


2nd Hand Smoke Exposure:  No


Recent Foreign Travel:  No


Contact w/other who traveled:  No


Recent Hopitalizations:  No


Recent Infectious Disease Expo:  No


Physical Abuse Screen:  No


Sexual Abuse:  No





Immunizations Up To Date


Tetanus Booster (TDap):  Unknown


Date of Pneumonia Vaccine:  Oct 1, 2016


Date of Influenza Vaccine:  Oct 1, 2017





Past Medical History





Normocytic Anemia with Fe Def: follows with Dr Brewer


IDDM


CAD s/p stenting


Asthma


Morbid Obesity





Family Medical History


Significant Family History:  No Pertinent Family Hx


Family History:  


Cardiovascular disease


  19 MOTHER


Diabetes mellitus


  19 MOTHER


Paternal family history of emphysema


  19 FATHER





Review of Systems ([facility])


Constitutional:  No chills, No diaphoresis; fever, weakness


EENTM:  no symptoms reported


Respiratory:  No cough; short of breath, wheezing


Cardiovascular:  No chest pain, No palpitations, No syncope


Gastrointestinal:  No abdominal pain, No constipation, No diarrhea; nausea, 

vomiting


Genitourinary:  No dysuria, No frequency, No pain


Musculoskeletal:  joint pain (R hip)


Skin:  lesions


Psychiatric/Neurological:  Anxiety, Depressed (at baseline)





Reviewed Test Results


Reviewed Test Results


Lab


Laboratory Tests


18 12:48: 


White Blood Count 11.2H, Red Blood Count 4.03L, Hemoglobin 12.2, Hematocrit 38, 

Mean Corpuscular Volume 95, Mean Corpuscular Hemoglobin 30, Mean Corpuscular 

Hemoglobin Concent 32, Red Cell Distribution Width 16.2H, Platelet Count 186, 

Mean Platelet Volume 11.6H, Neutrophils (%) (Auto) 85H, Lymphocytes (%) (Auto) 

8L, Monocytes (%) (Auto) 6, Eosinophils (%) (Auto) 1, Basophils (%) (Auto) 0, 

Neutrophils # (Auto) 9.6H, Lymphocytes # (Auto) 0.9L, Monocytes # (Auto) 0.7, 

Eosinophils # (Auto) 0.1, Basophils # (Auto) 0.0, Prothrombin Time 14.2, INR 

Comment 1.1, Activated Partial Thromboplast Time 33, Sodium Level 135, 

Potassium Level 4.5, Chloride Level 103, Carbon Dioxide Level 22, Anion Gap 10, 

Blood Urea Nitrogen 18, Creatinine 0.94, Estimat Glomerular Filtration Rate 59, 

BUN/Creatinine Ratio 19, Glucose Level 235H, Lactic Acid Level 1.71, Calcium 

Level 8.7, Corrected Calcium 9.2, Magnesium Level 1.7L, Total Bilirubin 1.3H, 

Aspartate Amino Transf (AST/SGOT) 31, Alanine Aminotransferase (ALT/SGPT) 22, 

Alkaline Phosphatase 191H, C-Reactive Protein High Sensitivity 5.22H, Total 

Protein 6.2L, Albumin 3.4


18 15:50: 


Blood Gas Puncture Site RT RAD, Blood Gas Patient Temperature 98.9, Arterial 

Blood pH 7.40, Arterial Blood Partial Pressure CO2 40, Arterial Blood Partial 

Pressure O2 78L, Arterial Blood HCO3 24, Arterial Blood Total CO2 25.1, 

Arterial Blood Oxygen Saturation 96, Arterial Blood Base Excess -0.3, Roque 

Test YES-POS, Blood Gas Ventilator Setting NO, Blood Gas Inspired Oxygen 2L


18 16:02: Glucometer 165H


18 18:20: Glucometer 157H


18 19:57: Glucometer 167H


18 00:02: 


Blood Gas Puncture Site R RAD, Blood Gas Patient Temperature 100.7, Arterial 

Blood pH 7.37, Arterial Blood Partial Pressure CO2 45, Arterial Blood Partial 

Pressure O2 525H, Arterial Blood HCO3 24, Arterial Blood Total CO2 25.7, 

Arterial Blood Oxygen Saturation 100, Arterial Blood Base Excess 0.0, Roque 

Test YES-POS, Blood Gas Ventilator Setting NO, Blood Gas Inspired Oxygen 80% 

BIPAP


18 00:10: Glucometer 164H


18 05:14: Glucometer 161H


18 05:30: 


White Blood Count 8.2, Red Blood Count 3.68L, Hemoglobin 11.3L, Hematocrit 35, 

Mean Corpuscular Volume 95, Mean Corpuscular Hemoglobin 31, Mean Corpuscular 

Hemoglobin Concent 32, Red Cell Distribution Width 16.1H, Platelet Count 167, 

Mean Platelet Volume 11.2H, Neutrophils (%) (Auto) 79H, Lymphocytes (%) (Auto) 

13, Monocytes (%) (Auto) 8, Eosinophils (%) (Auto) 0, Basophils (%) (Auto) 0, 

Neutrophils # (Auto) 6.5, Lymphocytes # (Auto) 1.0, Monocytes # (Auto) 0.6, 

Eosinophils # (Auto) 0.0, Basophils # (Auto) 0.0, Sodium Level 135, Potassium 

Level 4.8, Chloride Level 103, Carbon Dioxide Level 25, Anion Gap 7, Blood Urea 

Nitrogen 21H, Creatinine 0.97, Estimat Glomerular Filtration Rate 57, BUN/

Creatinine Ratio 22, Glucose Level 145H, Calcium Level 8.7, Corrected Calcium 

9.6, Total Bilirubin 1.2H, Aspartate Amino Transf (AST/SGOT) 26, Alanine 

Aminotransferase (ALT/SGPT) 17, Alkaline Phosphatase 156H, C-Reactive Protein 

High Sensitivity 8.73H, Total Protein 5.8L, Albumin 2.9L


18 11:16: Glucometer 130H


18 16:58: Glucometer 157H


18 20:01: Glucometer 142H


18 06:14: Glucometer 112H


Radiology


Per Radiologist, CXR from 18 shows pulmonary vascular congestion w/ likely 

pulmonary edema. Since study was technically limited, also cannot exclude 

superimposed pulmonary infiltrate. Follow up imaging recommended.





Physical Exam-([facility])


Physical Exam


Vital Signs





 VS - Last 72 Hours, by Label








 18





 12:20 15:42 17:29 17:48


 


Temp 99.0  99.0 101.8


 


Pulse 99  100 100


 


Resp  24


 


B/P (MAP) 129/70 (89)  142/76 181/79 (113)


 


Pulse Ox 96  96 88


 


O2 Delivery  Nasal Cannula  Room Air


 


O2 Flow Rate  2.00  


 


    





 18





 19:00 19:26 20:00 20:45


 


Temp    101.3


 


Pulse 101   100


 


Resp    24


 


B/P (MAP)    181/72 (108)


 


Pulse Ox    95


 


O2 Delivery  Nasal Cannula Nasal Cannula Nasal Cannula


 


O2 Flow Rate  2.00 2.00 2.00


 


    





 18





 23:37 23:40 00:03 00:50


 


Temp   99.5 


 


Pulse  96 102 


 


Resp  


 


B/P (MAP)   177/81 (113) 


 


Pulse Ox  99 93 99


 


O2 Delivery Nasal Cannula  Nasal Cannula 


 


O2 Flow Rate 2.00 100.00 2.00 80.00


 


    





 18





 01:00 01:12 02:31 04:02


 


Temp    97.7


 


Pulse 91 92 8 82


 


Resp    24


 


B/P (MAP)    141/65 (90)


 


Pulse Ox  99 98 99


 


O2 Delivery    Nasal Cannula


 


O2 Flow Rate   50.00 2.00


 


FiO2  80  


 


    





 18





 07:00 07:08 08:00 08:00


 


Temp    97.4


 


Pulse 69 72  73


 


Resp  24  12


 


B/P (MAP)    139/65 (89)


 


Pulse Ox  98  99


 


O2 Delivery   NIV Bilevel NIV Bilevel


 


O2 Flow Rate  45.00  


 


FiO2   40 


 


    





 18





 11:08 11:11 12:00 13:00


 


Temp   97.7 


 


Pulse 77  79 80


 


Resp 21  18 


 


B/P (MAP)   149/69 (95) 


 


Pulse Ox 98 95 99 


 


O2 Delivery  Nasal Cannula Nasal Cannula 


 


O2 Flow Rate 40.00 5.00 2.00 


 


    





 18





 15:55 19:00 20:00 20:05


 


Temp 98.9   97.7


 


Pulse 84 90  94


 


Resp 20   20


 


B/P (MAP) 163/71 (101)   140/72 (94)


 


Pulse Ox 98   95


 


O2 Delivery Nasal Cannula  Nasal Cannula Nasal Cannula


 


O2 Flow Rate 4.00  4.00 2.00


 


    





 18





 23:05 00:28 01:00 03:00


 


Temp  96.7  


 


Pulse  89 80 


 


Resp  18  


 


B/P (MAP)  134/75 (94)  


 


Pulse Ox 98 97  96


 


O2 Delivery Nasal Cannula Nasal Cannula  Nasal Cannula


 


O2 Flow Rate 4.00 2.00  3.00


 


    





 18 





 04:00 07:00 07:37 


 


Temp 97.0   


 


Pulse 83 92  


 


Resp 17   


 


B/P (MAP) 126/64 (84)   


 


Pulse Ox 95  96 


 


O2 Delivery Nasal Cannula  Nasal Cannula 


 


O2 Flow Rate 2.00  2.00 





Capillary Refill : Less Than 3 SecondsLess Than 3 Seconds


General Appearance:  WD/WN, mild distress, obese


HEENT:  PERRL/EOMI; No scleral icterus (R), No scleral icterus (L)


Neck:  non-tender, full range of motion, supple


Respiratory:  chest non-tender, lungs clear, normal breath sounds, no 

respiratory distress (BiPAP in place), no accessory muscle use


Cardiovascular:  regular rate, rhythm, no murmur


Gastrointestinal:  normal bowel sounds, non tender, soft, no organomegaly


Extremities:  pedal edema (1+ pitting bilaterally), other (R greater trochanter 

TTP but no gross deformity. Active ROM limited 2/2 pain and obesity)


Neurologic/Psychiatric:  CNs II-XII nml as tested, alert, normal mood/affect, 

oriented x 3; No sensory deficit


Skin:  normal color, warm/dry, other (beefy red plaque w/ satellite lesions 

underneath pannus)





Assessment/Plan


Assessment/Plan


Admission Status:  Inpatient Order (span 2 midnights)


Reason for Inpatient Admission:  


Admitting diagnosis of AMS, weakness, and new O2 requirement necessitates


hospital management until pt can be placed in LTC facility.





(1) Weakness generalized


Status:  Acute


Assessment & Plan:  Social work will be consulted for placement.  Patient's 

family does not believe she is safe at home or able to care for herself at home.





(2) Altered mental status


Status:  Acute


Assessment & Plan:  Pt was A&Ox3 today. Will continue to provide IVF, pain 

control, and Rocephin. Will monitor her mental status for changes.


Qualifiers:  


   Qualified Codes:  R41.82 - Altered mental status, unspecified


(3) Hypoxemia


Status:  Acute


Assessment & Plan:  CXR was a technically limited study, so it is difficult to 

tell whether her confusion and dyspnea are due to an acute pulmonary process. 


Will continue to treat emprically w/ Rocephin.


Also continue home meds for asthma.


Will call RT to remove BiPAP today. If pt can tolerate being on RA, she can eat 

diabetic meal.





(4) Nausea and vomiting


Status:  Resolved


Assessment & Plan:  Pt not complaining of nausea this morning. Will see how she 

tolerates PO diet.


Qualifiers:  


   Qualified Codes:  R11.2 - Nausea with vomiting, unspecified


(5) Right hip pain


Assessment & Plan:  Can administer another round of ketorolac, and then 

continue patient on home regimen of Naproxen. Continue to monitor.





(6) Intertrigo


Status:  Acute


Assessment & Plan:  Miconazole powder application was started last night.


Will add Diflucan. 





(7) Morbid obesity


Status:  Chronic


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


Status:  Chronic


Assessment & Plan:  Diabetic diet and sliding scale insulin w/ regular glucose 

finger sticks to monitor





(9) DVT prophylaxis


Status:  Acute


Assessment & Plan:  Pt is allergic to heparin, so Lovenox cannot be used. Will 

place SCDs. 








Clinical Quality Measures


DVT/VTE Risk/Contraindication:


Risk Factor Score Per Nursin


RFS Level Per Nursing on Admit:  4+=Very High





Copy


Copies To 1:   [facility], BRYCE Cunningham MD 18 1317:


Home Medications


Allergies


Coded Allergies:  


     vancomycin (Verified  Allergy, Intermediate, RASH, 18)


     heparin (Verified  Allergy, Unknown, 18)


Uncoded Allergies:  


     TAPE (Allergy, Mild, RASH, 17)





PMH-Social-Family Hx


Patient Social History


Living Status:  Living at home by herself





Family Medical History


Family History:  


Cardiovascular disease


  19 MOTHER


Diabetes mellitus


  19 MOTHER


Paternal family history of emphysema


  19 FATHER





Physical Exam-([facility])


Physical Exam


General Appearance:  WD/WN, obese (morbidly)


HEENT:  PERRL/EOMI


Respiratory:  chest non-tender, lungs clear, normal breath sounds, no 

respiratory distress (BiPAP in place), no accessory muscle use


Cardiovascular:  regular rate, rhythm, no murmur


Gastrointestinal:  normal bowel sounds, non tender, soft, no organomegaly


Extremities:  pedal edema (1+ pitting bilaterally), other (R greater trochanter 

TTP but no gross deformity. Active ROM limited 2/2 pain and obesity)


Neurologic/Psychiatric:  CNs II-XII nml as tested, alert, normal mood/affect, 

oriented x 3


Skin:  other (beefy red plaque w/ satellite lesions underneath pannus, + odor)





Assessment/Plan


Assessment/Plan


Admission Status:  Inpatient Order (span 2 midnights)


Reason for Inpatient Admission:  


Requiring Bipap and oxygen supplementation





(1) Weakness generalized


Status:  Acute


Assessment & Plan:  Likely multifactorial deconditioning vs infection


Social work will be consulted for placement.  Patient's family does not believe 

she is safe at home or able to care for herself at home.





(2) Altered mental status


Status:  Acute


Assessment & Plan:  Resolved this AM


Qualifiers:  


   Qualified Codes:  R41.82 - Altered mental status, unspecified


(3) Hypoxemia


Status:  Acute


Assessment & Plan:  CXR was a technically limited study, so it is difficult to 

tell whether her confusion and dyspnea are due to an acute pulmonary process. 


Will continue to treat emprically w/ Rocephin.


Also continue home meds for asthma.


Patient started on Bipap overnight, will transition to NC and titrate as 

tolerated





(4) Nausea and vomiting


Status:  Resolved


Assessment & Plan:  Resolved


Qualifiers:  


   Qualified Codes:  R11.2 - Nausea with vomiting, unspecified


(5) Right hip pain


Assessment & Plan:  Chronic, will continue home meds





(6) Intertrigo


Status:  Acute


Assessment & Plan:  Miconazole powder application was started last night.


Will add Diflucan PO





(7) Morbid obesity


Status:  Chronic


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


Status:  Chronic


Assessment & Plan:  Diabetic diet and sliding scale insulin w/ regular glucose 

finger sticks to monitor


Holding home Long acting insulin due to poor diet





(9) DVT prophylaxis


Status:  Acute


Assessment & Plan:  CAROLYNE Cruz MEDICAL STUDENT Sep 29, 2018 10:35


BRYCE MIRELES MD Sep 30, 2018 13:17

## 2018-09-29 NOTE — PROGRESS NOTE ([facility])
Subjective


Subjective/Events-last exam


Patient states that she would like to get up to chair. States that she is 

having alot of pain in her Right hip and would like to lay on her left side 

when in bed. Tolerating PO diet. + BM last night


Review of Systems


Date Seen by Provider:  Sep 29, 2018


Time Seen by Provider:  11:00


General:  No Chills; Fatigue


Pulmonary:  Dyspnea


Cardiovascular:  No: Chest Pain, Palpitations


Gastrointestinal:  No: Nausea, Vomiting, Abdominal Pain





Focused Exam


Lactate Level


18 12:48: Lactic Acid Level 1.71





Objective


Exam


Last Set of Vital Signs





Vital Signs








  Date Time  Temp Pulse Resp B/P (MAP) Pulse Ox O2 Delivery O2 Flow Rate FiO2


 


18 11:27     95 Nasal Cannula 1.00 


 


18 08:00 96.8 89 18 137/63 (87)    


 


18 08:00        40





Capillary Refill : Less Than 3 SecondsLess Than 3 Seconds


I&O











Intake and Output 


 


 18





 00:00


 


Intake Total 940 ml


 


Output Total 750 ml


 


Balance 190 ml


 


 


 


Intake Oral 940 ml


 


Output Urine Total 750 ml








General:  Alert, Oriented X3, Other (Morbidly obese female)


Lungs:  Clear to Auscultation, Normal Air Movement


Heart:  Regular Rate, No Murmurs


Abdomen:  Normal Bowel Sounds, Soft, No Tenderness, No Masses


Extremities:  Other (2+ pitting edema bilaterally)


Skin:  Other (erythema improving on pannus, small open abrasion in right fold, 

no drainage.)


Psych/Mental Status:  Mental Status NL, Mood NL





Results/Procedures


Lab


Laboratory Tests


18 16:58: Glucometer 157H


18 20:01: Glucometer 142H


18 06:14: Glucometer 112H


18 11:23: Glucometer 147H





Microbiology


18 Blood Culture - Preliminary, Resulted


          No growth


18 Influenza Types A,B Antigen (KIM) - Final, Complete


Radiology


Per Radiologist, CXR from 18 shows pulmonary vascular congestion w/ likely 

pulmonary edema. Since study was technically limited, also cannot exclude 

superimposed pulmonary infiltrate. Follow up imaging recommended.





Assessment/Plan


Assessment/Plan





(1) Cellulitis, abdominal wall


Status:  Acute


Assessment & Plan:  : Improving with PO Diflucan and topical, keep area 

clean and dry





(2) Altered mental status


Status:  Resolved


Qualifiers:  


   Qualified Codes:  R41.82 - Altered mental status, unspecified


(3) Insulin dependent diabetes mellitus with complications


Status:  Chronic


Assessment & Plan:  : Continue holding long acting Insulin due to poor 

appetite





(4) Morbid obesity


Status:  Chronic


Assessment & Plan:  : Patient is wheel chair bound and unable to care for 

herself, SNF placement would be recommended, patient and family in agreement, 

SW consulted





(5) DVT prophylaxis


Status:  Acute


Assessment & Plan:  - Lovenox








Clinical Quality Measures


DVT/VTE Risk/Contraindication:


Risk Factor Score Per Nursin


RFS Level Per Nursing on Admit:  4+=Very High











BRYCE MIRELES MD Sep 29, 2018 12:09

## 2018-09-30 VITALS — SYSTOLIC BLOOD PRESSURE: 138 MMHG | DIASTOLIC BLOOD PRESSURE: 64 MMHG

## 2018-09-30 VITALS — DIASTOLIC BLOOD PRESSURE: 80 MMHG | SYSTOLIC BLOOD PRESSURE: 178 MMHG

## 2018-09-30 VITALS — DIASTOLIC BLOOD PRESSURE: 83 MMHG | SYSTOLIC BLOOD PRESSURE: 145 MMHG

## 2018-09-30 VITALS — SYSTOLIC BLOOD PRESSURE: 135 MMHG | DIASTOLIC BLOOD PRESSURE: 63 MMHG

## 2018-09-30 VITALS — SYSTOLIC BLOOD PRESSURE: 127 MMHG | DIASTOLIC BLOOD PRESSURE: 57 MMHG

## 2018-09-30 VITALS — SYSTOLIC BLOOD PRESSURE: 173 MMHG | DIASTOLIC BLOOD PRESSURE: 72 MMHG

## 2018-09-30 LAB
ALBUMIN SERPL-MCNC: 2.7 GM/DL (ref 3.2–4.5)
ALP SERPL-CCNC: 141 U/L (ref 40–136)
ALT SERPL-CCNC: 15 U/L (ref 0–55)
BASOPHILS # BLD AUTO: 0 10^3/UL (ref 0–0.1)
BASOPHILS NFR BLD AUTO: 0 % (ref 0–10)
BILIRUB SERPL-MCNC: 0.8 MG/DL (ref 0.1–1)
BUN/CREAT SERPL: 27
CALCIUM SERPL-MCNC: 8.4 MG/DL (ref 8.5–10.1)
CHLORIDE SERPL-SCNC: 105 MMOL/L (ref 98–107)
CO2 SERPL-SCNC: 21 MMOL/L (ref 21–32)
CREAT SERPL-MCNC: 0.77 MG/DL (ref 0.6–1.3)
EOSINOPHIL # BLD AUTO: 0.2 10^3/UL (ref 0–0.3)
EOSINOPHIL NFR BLD AUTO: 3 % (ref 0–10)
ERYTHROCYTE [DISTWIDTH] IN BLOOD BY AUTOMATED COUNT: 15.6 % (ref 10–14.5)
GFR SERPLBLD BASED ON 1.73 SQ M-ARVRAT: > 60 ML/MIN
GLUCOSE SERPL-MCNC: 148 MG/DL (ref 70–105)
HCT VFR BLD CALC: 34 % (ref 35–52)
HGB BLD-MCNC: 11.1 G/DL (ref 11.5–16)
LYMPHOCYTES # BLD AUTO: 1.1 X 10^3 (ref 1–4)
LYMPHOCYTES NFR BLD AUTO: 19 % (ref 12–44)
MANUAL DIFFERENTIAL PERFORMED BLD QL: NO
MCH RBC QN AUTO: 31 PG (ref 25–34)
MCHC RBC AUTO-ENTMCNC: 33 G/DL (ref 32–36)
MCV RBC AUTO: 95 FL (ref 80–99)
MONOCYTES # BLD AUTO: 0.7 X 10^3 (ref 0–1)
MONOCYTES NFR BLD AUTO: 12 % (ref 0–12)
NEUTROPHILS # BLD AUTO: 3.7 X 10^3 (ref 1.8–7.8)
NEUTROPHILS NFR BLD AUTO: 65 % (ref 42–75)
PLATELET # BLD: 158 10^3/UL (ref 130–400)
PMV BLD AUTO: 11.4 FL (ref 7.4–10.4)
POTASSIUM SERPL-SCNC: 4.2 MMOL/L (ref 3.6–5)
PROT SERPL-MCNC: 5.5 GM/DL (ref 6.4–8.2)
RBC # BLD AUTO: 3.58 10^6/UL (ref 4.35–5.85)
SODIUM SERPL-SCNC: 135 MMOL/L (ref 135–145)
WBC # BLD AUTO: 5.7 10^3/UL (ref 4.3–11)

## 2018-09-30 RX ADMIN — FLUTICASONE PROPIONATE AND SALMETEROL XINAFOATE SCH PUFF: 45; 21 AEROSOL, METERED RESPIRATORY (INHALATION) at 18:59

## 2018-09-30 RX ADMIN — CARVEDILOL SCH MG: 12.5 TABLET, FILM COATED ORAL at 21:45

## 2018-09-30 RX ADMIN — FLUCONAZOLE SCH MG: 100 TABLET ORAL at 09:21

## 2018-09-30 RX ADMIN — PANTOPRAZOLE SODIUM SCH MG: 40 TABLET, DELAYED RELEASE ORAL at 09:21

## 2018-09-30 RX ADMIN — Medication SCH ML: at 06:51

## 2018-09-30 RX ADMIN — ALBUTEROL SULFATE SCH MG: 2.5 SOLUTION RESPIRATORY (INHALATION) at 18:59

## 2018-09-30 RX ADMIN — SODIUM CHLORIDE SCH MLS/HR: 900 INJECTION INTRAVENOUS at 10:22

## 2018-09-30 RX ADMIN — FLUTICASONE PROPIONATE AND SALMETEROL XINAFOATE SCH PUFF: 45; 21 AEROSOL, METERED RESPIRATORY (INHALATION) at 07:49

## 2018-09-30 RX ADMIN — LIDOCAINE HYDROCHLORIDE SCH ML: 10 INJECTION, SOLUTION INFILTRATION; PERINEURAL at 11:11

## 2018-09-30 RX ADMIN — ALBUTEROL SULFATE SCH MG: 2.5 SOLUTION RESPIRATORY (INHALATION) at 02:25

## 2018-09-30 RX ADMIN — LISINOPRIL SCH MG: 40 TABLET ORAL at 21:45

## 2018-09-30 RX ADMIN — INSULIN ASPART SCH UNIT: 100 INJECTION, SOLUTION INTRAVENOUS; SUBCUTANEOUS at 16:37

## 2018-09-30 RX ADMIN — ATORVASTATIN CALCIUM SCH MG: 10 TABLET, FILM COATED ORAL at 21:45

## 2018-09-30 RX ADMIN — MICONAZOLE NITRATE SCH APPLIC: 20 POWDER TOPICAL at 21:49

## 2018-09-30 RX ADMIN — ALBUTEROL SULFATE SCH MG: 2.5 SOLUTION RESPIRATORY (INHALATION) at 10:35

## 2018-09-30 RX ADMIN — CARVEDILOL SCH MG: 12.5 TABLET, FILM COATED ORAL at 09:21

## 2018-09-30 RX ADMIN — CEFTRIAXONE SODIUM SCH MG: 1 INJECTION, POWDER, FOR SOLUTION INTRAMUSCULAR; INTRAVENOUS at 11:11

## 2018-09-30 RX ADMIN — ALBUTEROL SULFATE SCH MG: 2.5 SOLUTION RESPIRATORY (INHALATION) at 14:28

## 2018-09-30 RX ADMIN — INSULIN ASPART SCH UNIT: 100 INJECTION, SOLUTION INTRAVENOUS; SUBCUTANEOUS at 06:34

## 2018-09-30 RX ADMIN — INSULIN ASPART SCH UNIT: 100 INJECTION, SOLUTION INTRAVENOUS; SUBCUTANEOUS at 21:41

## 2018-09-30 RX ADMIN — MICONAZOLE NITRATE SCH APPLIC: 20 POWDER TOPICAL at 09:22

## 2018-09-30 RX ADMIN — ASPIRIN SCH MG: 81 TABLET ORAL at 21:45

## 2018-09-30 RX ADMIN — ALBUTEROL SULFATE SCH MG: 2.5 SOLUTION RESPIRATORY (INHALATION) at 22:26

## 2018-09-30 RX ADMIN — INSULIN ASPART SCH UNIT: 100 INJECTION, SOLUTION INTRAVENOUS; SUBCUTANEOUS at 11:21

## 2018-09-30 RX ADMIN — ALBUTEROL SULFATE SCH MG: 2.5 SOLUTION RESPIRATORY (INHALATION) at 07:43

## 2018-09-30 NOTE — PROGRESS NOTE (SOAP)
Subjective


Subjective/Events-last exam


Patient up sitting in wheel chair today. States that she is feeling much 

better. Daughter in room with her and they are both wanting SNF placement with 

[facility] placement following rehab.


Review of Systems


Date Seen by Provider:  Sep 30, 2018


Time Seen by Provider:  11:35


Pulmonary:  Dyspnea


Cardiovascular:  No: Chest Pain, Palpitations


Gastrointestinal:  No: Nausea, Vomiting


Genitourinary:  Frequency


Neurological:  Weakness





Objective


Exam


Last Set of Vital Signs





Vital Signs








  Date Time  Temp Pulse Resp B/P (MAP) Pulse Ox O2 Delivery O2 Flow Rate FiO2


 


18 12:00 96.6 80 20 145/83 (103) 92 Room Air  


 


18 07:43       2.00 


 


18 08:00        40





Capillary Refill : Less Than 3 SecondsLess Than 3 Seconds


I&O











Intake and Output 


 


 18





 00:00


 


Intake Total 1860 ml


 


Output Total 1000 ml


 


Balance 860 ml


 


 


 


Intake Oral 1810 ml


 


IV Total 50 ml


 


Output Urine Total 1000 ml


 


# Voids 3


 


# Bowel Movements 1








General:  Alert, Oriented X3, Cooperative, No Acute Distress


HEENT:  Mucous Memb Moist/Pink


Lungs:  Clear to Auscultation, Normal Air Movement


Heart:  Regular Rate, No Murmurs


Abdomen:  Normal Bowel Sounds, Soft, No Tenderness, No Hepatosplenomegaly, No 

Masses


Extremities:  Other (2+ pitting edema bilaterally)


Skin:  Other (Erythema improving on abdomin, odor is improved)


Neuro:  Normal Speech, Cranial Nerves 3-12 NL


Psych/Mental Status:  Mental Status NL, Mood NL





Results/Procedures


Lab


Laboratory Tests


18 16:34: Glucometer 154H


18 20:02: Glucometer 179H


18 05:20: 


White Blood Count 5.7, Red Blood Count 3.58L, Hemoglobin 11.1L, Hematocrit 34L, 

Mean Corpuscular Volume 95, Mean Corpuscular Hemoglobin 31, Mean Corpuscular 

Hemoglobin Concent 33, Red Cell Distribution Width 15.6H, Platelet Count 158, 

Mean Platelet Volume 11.4H, Neutrophils (%) (Auto) 65, Lymphocytes (%) (Auto) 19

, Monocytes (%) (Auto) 12, Eosinophils (%) (Auto) 3, Basophils (%) (Auto) 0, 

Neutrophils # (Auto) 3.7, Lymphocytes # (Auto) 1.1, Monocytes # (Auto) 0.7, 

Eosinophils # (Auto) 0.2, Basophils # (Auto) 0.0, Sodium Level 135, Potassium 

Level 4.2, Chloride Level 105, Carbon Dioxide Level 21, Anion Gap 9, Blood Urea 

Nitrogen 21H, Creatinine 0.77, Estimat Glomerular Filtration Rate > 60, BUN/

Creatinine Ratio 27, Glucose Level 148H, Calcium Level 8.4L, Corrected Calcium 

9.4, Total Bilirubin 0.8, Aspartate Amino Transf (AST/SGOT) 21, Alanine 

Aminotransferase (ALT/SGPT) 15, Alkaline Phosphatase 141H, Total Protein 5.5L, 

Albumin 2.7L


18 05:38: Glucometer 134H


18 11:17: Glucometer 203H





Microbiology


18 Blood Culture - Preliminary, Resulted


          No growth


18 Influenza Types A,B Antigen (KIM) - Final, Complete


Radiology


Per Radiologist, CXR from 18 shows pulmonary vascular congestion w/ likely 

pulmonary edema. Since study was technically limited, also cannot exclude 

superimposed pulmonary infiltrate. Follow up imaging recommended.





Assessment/Plan


Assessment/Plan





(1) Cellulitis, abdominal wall


Status:  Acute


Assessment & Plan:  : Improving with PO Diflucan and topical, keep area 

clean and dry


: Continue with PO Diflucan for 7 day course, Will need wound care at SNF





(2) Insulin dependent diabetes mellitus with complications


Status:  Chronic


Assessment & Plan:  : Continue holding long acting Insulin due to poor 

appetite


: Will start long acting at reduced dose tonight





(3) Morbid obesity


Status:  Chronic


Assessment & Plan:  : Patient is wheel chair bound and unable to care for 

herself, SNF placement would be recommended, patient and family in agreement, 

SW consulted





(4) Altered mental status


Status:  Resolved


Qualifiers:  


   Qualified Codes:  R41.82 - Altered mental status, unspecified


(5) DVT prophylaxis


Status:  Acute


Assessment & Plan:  - Lovenox








Clinical Quality Measures


DVT/VTE Risk/Contraindication:


Risk Factor Score Per Nursin


RFS Level Per Nursing on Admit:  4+=Very High











BRYCE MIRELES MD Sep 30, 2018 13:25

## 2018-10-01 VITALS — SYSTOLIC BLOOD PRESSURE: 152 MMHG | DIASTOLIC BLOOD PRESSURE: 74 MMHG

## 2018-10-01 VITALS — DIASTOLIC BLOOD PRESSURE: 80 MMHG | SYSTOLIC BLOOD PRESSURE: 148 MMHG

## 2018-10-01 VITALS — DIASTOLIC BLOOD PRESSURE: 76 MMHG | SYSTOLIC BLOOD PRESSURE: 166 MMHG

## 2018-10-01 VITALS — DIASTOLIC BLOOD PRESSURE: 88 MMHG | SYSTOLIC BLOOD PRESSURE: 158 MMHG

## 2018-10-01 VITALS — DIASTOLIC BLOOD PRESSURE: 76 MMHG | SYSTOLIC BLOOD PRESSURE: 170 MMHG

## 2018-10-01 RX ADMIN — INSULIN ASPART SCH UNIT: 100 INJECTION, SOLUTION INTRAVENOUS; SUBCUTANEOUS at 05:28

## 2018-10-01 RX ADMIN — CARVEDILOL SCH MG: 12.5 TABLET, FILM COATED ORAL at 08:59

## 2018-10-01 RX ADMIN — ALBUTEROL SULFATE SCH MG: 2.5 SOLUTION RESPIRATORY (INHALATION) at 06:31

## 2018-10-01 RX ADMIN — PANTOPRAZOLE SODIUM SCH MG: 40 TABLET, DELAYED RELEASE ORAL at 08:59

## 2018-10-01 RX ADMIN — INSULIN ASPART SCH UNIT: 100 INJECTION, SOLUTION INTRAVENOUS; SUBCUTANEOUS at 11:00

## 2018-10-01 RX ADMIN — FLUCONAZOLE SCH MG: 100 TABLET ORAL at 08:58

## 2018-10-01 RX ADMIN — LIDOCAINE HYDROCHLORIDE SCH ML: 10 INJECTION, SOLUTION INFILTRATION; PERINEURAL at 09:00

## 2018-10-01 RX ADMIN — FLUTICASONE PROPIONATE AND SALMETEROL XINAFOATE SCH PUFF: 45; 21 AEROSOL, METERED RESPIRATORY (INHALATION) at 11:13

## 2018-10-01 RX ADMIN — ALBUTEROL SULFATE SCH MG: 2.5 SOLUTION RESPIRATORY (INHALATION) at 02:35

## 2018-10-01 RX ADMIN — MICONAZOLE NITRATE SCH APPLIC: 20 POWDER TOPICAL at 09:01

## 2018-10-01 RX ADMIN — CEFTRIAXONE SODIUM SCH MG: 1 INJECTION, POWDER, FOR SOLUTION INTRAMUSCULAR; INTRAVENOUS at 09:00

## 2018-10-01 RX ADMIN — ALBUTEROL SULFATE SCH MG: 2.5 SOLUTION RESPIRATORY (INHALATION) at 11:12

## 2018-10-01 NOTE — DISCHARGE INST-SKILLED NURSING
Discharge Inst-Skilled NF


Patient Instructions


Patient Problems:  


Weakness generalized


Right hip pain


Intertrigo


Morbid obesity


Insulin dependent diabetes mellitus with complications


Goal:  


-strength building


-wound care


-medical compliance


-PT/OT/ST


Patient Instructions:  


-medications as prescribed


-wound care as directed at facility


-therapies as directed





Consult/Follow Up/Orders


Follow up appt.:  


LEIGHTON ESTES will see patient at facility in the next week


Skilled NF Admit to:  [facility]


Certifications [facility]


I certify that SNF services are required to be given on an inpatient basis 

because of the above named patient's need for skilled nursing care on a 

continuing basis for the conditions(s) for which he/she was receiving inpatient 

hospital services prior to his/her transfer to the SNF.


Skilled Nursing Facility Order:  Nursing Services, Occupational Ther-Evaluate & 

Treat, Physical Therapy-Evaluate & Treat, Wound Care-Eval/Treat


Discharge Diet:  Low Sodium Diet, ADA Diet


Daily Activity as Tolerated:  Yes





New & Resume Previous Orders


New & Resume Previous Orders


-routine vital signs


-diabetic, low salt diet


-medications as ordered


-therapy as ordered





Discharge Medications


New, Converted or Re-Newed RX:  RX on Chart


New Medications:  


Fluconazole (Fluconazole) 100 Mg Tablet


100 MG PO DAILY for 5 Days, #5 TAB





Miconazole Nitrate (Lotrimin AF) 90 Gm Powder


0 GM TOP BID for 10 Days, #1 EA





 


Continued Medications:  


Albuterol Sulfate (Proair Hfa) 1 Puff Puff


2 PUFF IH Q4H PRN for SHORTNESS OF BREATH, PUFF


1 PUFF = 90 MCG


Aspirin (Aspirin EC) 81 Mg Tablet.dr


81 MG PO HS, TAB





Atorvastatin Calcium (Atorvastatin Calcium) 10 Mg Tablet


10 MG PO HS, TAB


LAST FILLED #90 5-21-18


Benazepril HCl (Benazepril HCl) 40 Mg Tab


40 MG PO HS, TAB


LAST FILLED #180 5-21-18


Carvedilol (Carvedilol) 12.5 Mg Tablet


12.5 MG PO BID, TAB


LAST FILLED #180 5-21-18


Citalopram Hydrobromide (Citalopram HBr) 20 Mg Tablet


20 MG PO DAILY, TAB





Fluticasone/Salmeterol (Advair 100-50 Diskus) 1 Each Blst.w.dev


1-2 PUFF IH DAILY PRN for SHORTNESS OF BREATH, EA


UNKNOWN LAST FILL DATE


Glipizide (Glipizide) 5 Mg Tablet


5 MG PO BID, TAB





Hydrochlorothiazide (Hydrochlorothiazide) 25 Mg Tablet


25 MG PO DAILY, TAB


LAST FILLED #90 5-21-18


Insulin Detemir (Levemir Flextouch) 100 Unit/1 Ml Insuln.pen


20 UNITS SC BID, EA





Insulin Lispro (Humalog Kwikpen) 100 Unit/1 Ml Insuln.pen


12 UNIT SQ TIDAC, EA


LAST FILLED 5-21-18


Lactobacillus Combination No.4 (Probiotic) 1 Each Capsule


1 CAP PO DAILY, CAP





Nystatin (Nystatin) 15 Gm Oint...g.


TOP BID, EA





Pantoprazole Sodium (Pantoprazole Sodium) 40 Mg Tablet.dr


40 MG PO DAILY, TAB





Potassium Chloride (Potassium Chloride) 10 Meq Tab.er.prt


10 MEQ PO DAILY, TAB





Tramadol HCl (Tramadol HCl) 50 Mg Tablet


50 MG PO Q6H PRN for PAIN-MODERATE for 7 Days, #28 TAB (This prescription has 

been renewed)








Vee Long 


Oct 1, 2018 


14:05











VEE LONG DO Oct 1, 2018 14:09

## 2018-10-01 NOTE — DISCHARGE SUMMARY
Diagnosis/Chief Complaint


Date of Admission


Sep 27, 2018 at 17:22


Date of Discharge


10/1/18


Admission Diagnosis


Admission Diagnosis


(1) Weakness generalized


Status:  Acute


Assessment & Plan:  Social work will be consulted for placement.  Patient's 

family does not believe she is safe at home or able to care for herself at home.





(2) Altered mental status


Status:  Acute


Assessment & Plan:  Pt was A&Ox3 today. Will continue to provide IVF, pain 

control, and Rocephin. Will monitor her mental status for changes.


Qualifiers:  


   Qualified Codes:  R41.82 - Altered mental status, unspecified


(3) Hypoxemia


Status:  Acute


Assessment & Plan:  CXR was a technically limited study, so it is difficult to 

tell whether her confusion and dyspnea are due to an acute pulmonary process. 


Will continue to treat emprically w/ Rocephin.


Also continue home meds for asthma.


Will call RT to remove BiPAP today. If pt can tolerate being on RA, she can eat 

diabetic meal.





(4) Nausea and vomiting


Status:  Resolved


Assessment & Plan:  Pt not complaining of nausea this morning. Will see how she 

tolerates PO diet.


Qualifiers:  


   Qualified Codes:  R11.2 - Nausea with vomiting, unspecified


(5) Right hip pain


Assessment & Plan:  Can administer another round of ketorolac, and then 

continue patient on home regimen of Naproxen. Continue to monitor.





(6) Intertrigo


Status:  Acute


Assessment & Plan:  Miconazole powder application was started last night.


Will add Diflucan. 





(7) Morbid obesity


Status:  Chronic


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


Status:  Chronic


Assessment & Plan:  Diabetic diet and sliding scale insulin w/ regular glucose 

finger sticks to monitor





(9) DVT prophylaxis


Status:  Acute


Assessment & Plan:  Pt is allergic to heparin, so Lovenox cannot be used. Will 

place SCDs. 








Discharge Diagnosis


(1) Cellulitis, abdominal wall


Status:  Acute


Assessment & Plan:  : Improving with PO Diflucan and topical, keep area 

clean and dry


: Continue with PO Diflucan for 7 day course, Will need wound care at SNF


10/1: Discharge to SNF; wound care, 5 additional days of diflucan ordered to 

continue at [facility]





(2) Insulin dependent diabetes mellitus with complications


Status:  Chronic


Assessment & Plan:  : Continue holding long acting Insulin due to poor 

appetite


: Will start long acting at reduced dose tonight


10/1: Resume home insulin, accuchecks AC and HS





(3) Morbid obesity


Status:  Chronic


Assessment & Plan:  : Patient is wheel chair bound and unable to care for 

herself, SNF placement would be recommended, patient and family in agreement, 

SW consulted


10/1: Patient has been placed at [facility]





(4) Altered mental status


Status:  Resolved


Qualifiers:  


   Qualified Codes:  R41.82 - Altered mental status, unspecified


(5) DVT prophylaxis


Status:  Acute


Assessment & Plan:  - Lovenox





Chief Complaint/HPI


Chief Complaint/HPI


Pt is a 71 yo F who presented to the ED yesterday via EMS for increased 

weakness. Pt lives at home alone and is unable to ambulate due to avascular 

necrosis of both hips and morbid obesity. Typically she can transfer herself 

from the bed to the toilet but cannot even sit herself up at this time. She 

went to the ED the day before yesterday w/ the same complaint and had an 

unremarkable workup, including UA, and no infectious or metabolic cause for 

weakness was found. She was D/C to home because she did not qualify for Acute 

Rehab placement. She endorses fever at home as well as nausea and vomiting all 

week that has limited her ability to tolerate anything PO. She was asking for 

water upon arrival in the ED. She was also complaining of wheezing and SOB in 

the ED, received a nebulizer treatment, and follow up ABG was WNL. CXR did not 

reveal any infectious process. Overnight after admission, she was 

intermittently agitated and required some Ativan to help her relax. Per nursing 

staff, she also became increasingly SOB w/ O2 sats of 93% on 2L RA and so the 

decision was made to place her on BiPAP. Upon waking her up for evaluation this 

morning, pt also complaining of R hip pain, and she states she usually prefers 

to lay on her L side.





Discharge Summary-Simple/Stand


Consultations





Discharge Physical Examination


Allergies:  


Coded Allergies:  


     vancomycin (Verified  Allergy, Intermediate, RASH, 18)


     heparin (Verified  Allergy, Unknown, 18)


Uncoded Allergies:  


     TAPE (Allergy, Mild, RASH, 17)


Vitals & I&Os





Vital Sign - Last 12Hours








  Date Time  Temp Pulse Resp B/P (MAP) Pulse Ox O2 Delivery O2 Flow Rate FiO2


 


10/1/18 11:13     94 Room Air  


 


10/1/18 08:00 96.6 81 20 170/76 (107)    


 


18 07:43       2.00 


 


18 08:00        40














Intake and Output 


 


 10/1/18





 00:00


 


Intake Total 1160 ml


 


Balance 1160 ml








General Appearance:  Alert, Oriented X3, Cooperative, No Acute Distress


HEENT:  Atraumatic, EOMI, Mucous Memb Moist/[place]


Respiratory:  Clear to Auscultation, Normal Air Movement


Cardiovascular:  Regular Rate, Normal S1, Normal S2


Abdominal:  Normal Bowel Sounds, Soft, No Tenderness, Other (morbidly obese)


Extremities:  No Cyanosis, Other (bilateral lower extremity edema)


Skin:  Other (pannicular yeast rash, redness improving)


Neuro:  Normal Speech, Normal Tone, Cranial Nerves 3-12 NL


Psych/Mental Status:  Mental Status NL, Mood NL





Hospital Course


See final discharge diagnosis.


Radiology Reviewed


Per Radiologist, CXR from 18 shows pulmonary vascular congestion w/ likely 

pulmonary edema. Since study was technically limited, also cannot exclude 

superimposed pulmonary infiltrate. Follow up imaging recommended.





Discussion & Recommendations


Pt has shown no signs of infection, will discontinue Rocephin upon discharge 

from hospital.





Discharge


Condition at discharge


stable


Instructions to patient/family


Please see electronic discharge instructions given to patient.


Discharge Medications


Reviewed and agree with Discharge Medication list on patient's Discharge 

Instruction sheet





Clinical Quality Measures


DVT/VTE Risk/Contraindication:


Risk Factor Score Per Nursin


RFS Level Per Nursing on Admit:  4+=Very High





Copy


Copies To 1:   [facility]/MURPHY MEADOWS DO Oct 1, 2018 13:59

## 2018-10-05 NOTE — PHYSICIAN QUERY CLARIFICATION
PQ-Further Specificity


Admission/Discharge


Admission Date: Sep 27, 2018 at 17:22 


Discharge Date:  Oct 1, 2018 at 15:00





The medical record reflects the following clinical scenario:





History/Risk Factors:


Avascular necrosis nicolette hips, MOB, RA diabetes





Clinical Findings:


T101.8, P 101. R 24, WBC 11.2, Lactic acid 1.71. weakness, AMS, fever, N/V, 

hypoxia





Treatment:


Bipap, IV Rocephin





Question:  Can you further specify the fever, AMS, N/V, hypoxemia, weakness per 

the clinical indicators above? Please document below.





1. Cellulitis of the abdominal wall (POA)


-suspected cause of fever, altered mental status, nausea and vomiting





2. Avascular necrosis bilateral hips





3. fever, AMS, N/V, hypoxemia, generalized weakness etiology unknown


-pt's weakness has been longstanding, with multiple admissions and trips to the 

ED, as well as a previous attempt at placement as pt seemed unable to care for 

herself at home





3. Other, with explanation of the clinical findings.





4. Clinically undetermined, no explanation for the clinical findings.


-my information is significantly limited, as the only reason the patient 

remained in the hospital at the time I saw her was because placement had to be 

arranged and was set up for the date of discharge.  On that date, which I saw 

her, the patient was well.





PHYSICIAN RESPONSE


Can you specify per above:  Other, explanation/clinical finding (please see 

notes under specify)








In responding to this query, please exercise your independent professional 

judgment.  The purpose of this communication is to more accurately reflect the 

complexity of your patients condition. The fact that a question is asked does 

not imply that any particular answer is desired or expected.  





Thank you for your timely response to this clarification.      


   


Requestors name: Lisa   





Phone # [phone]








THIS PHYSICIAN QUERY FORM IS A PERMANENT PART OF THE MEDICAL RECORD











LISA VALDOVINOS Oct 5, 2018 10:55


MURPHY LONG DO Oct 6, 2018 19:49

## 2018-10-14 ENCOUNTER — HOSPITAL ENCOUNTER (EMERGENCY)
Dept: HOSPITAL 75 - ER | Age: 73
LOS: 1 days | Discharge: HOME | End: 2018-10-15
Payer: MEDICARE

## 2018-10-14 ENCOUNTER — HOSPITAL ENCOUNTER (OUTPATIENT)
Dept: HOSPITAL 75 - CVS | Age: 73
End: 2018-10-14
Attending: INTERNAL MEDICINE
Payer: COMMERCIAL

## 2018-10-14 VITALS — BODY MASS INDEX: 45.99 KG/M2 | WEIGHT: 293 LBS | HEIGHT: 67 IN

## 2018-10-14 DIAGNOSIS — F41.9: ICD-10-CM

## 2018-10-14 DIAGNOSIS — Z88.0: ICD-10-CM

## 2018-10-14 DIAGNOSIS — Z87.442: ICD-10-CM

## 2018-10-14 DIAGNOSIS — Z91.048: ICD-10-CM

## 2018-10-14 DIAGNOSIS — R30.0: Primary | ICD-10-CM

## 2018-10-14 DIAGNOSIS — I25.10: ICD-10-CM

## 2018-10-14 DIAGNOSIS — F32.9: ICD-10-CM

## 2018-10-14 DIAGNOSIS — Z79.4: ICD-10-CM

## 2018-10-14 DIAGNOSIS — E78.00: ICD-10-CM

## 2018-10-14 DIAGNOSIS — Z86.19: ICD-10-CM

## 2018-10-14 DIAGNOSIS — M06.9: ICD-10-CM

## 2018-10-14 DIAGNOSIS — Z79.52: ICD-10-CM

## 2018-10-14 DIAGNOSIS — Z79.51: ICD-10-CM

## 2018-10-14 DIAGNOSIS — Z87.891: ICD-10-CM

## 2018-10-14 DIAGNOSIS — R41.82: ICD-10-CM

## 2018-10-14 DIAGNOSIS — K21.9: ICD-10-CM

## 2018-10-14 DIAGNOSIS — E11.9: ICD-10-CM

## 2018-10-14 DIAGNOSIS — E66.01: ICD-10-CM

## 2018-10-14 DIAGNOSIS — Z82.49: ICD-10-CM

## 2018-10-14 DIAGNOSIS — Z79.82: ICD-10-CM

## 2018-10-14 DIAGNOSIS — B34.9: Primary | ICD-10-CM

## 2018-10-14 DIAGNOSIS — J45.909: ICD-10-CM

## 2018-10-14 DIAGNOSIS — Z88.8: ICD-10-CM

## 2018-10-14 DIAGNOSIS — I10: ICD-10-CM

## 2018-10-14 DIAGNOSIS — Z95.5: ICD-10-CM

## 2018-10-14 LAB
ALBUMIN SERPL-MCNC: 3.2 GM/DL (ref 3.2–4.5)
ALP SERPL-CCNC: 157 U/L (ref 40–136)
ALT SERPL-CCNC: 21 U/L (ref 0–55)
APTT BLD: 31 SEC (ref 24–35)
APTT PPP: YELLOW S
BACTERIA #/AREA URNS HPF: (no result) /HPF
BASOPHILS # BLD AUTO: 0 10^3/UL (ref 0–0.1)
BASOPHILS NFR BLD AUTO: 0 % (ref 0–10)
BILIRUB SERPL-MCNC: 1.7 MG/DL (ref 0.1–1)
BILIRUB UR QL STRIP: NEGATIVE
BUN/CREAT SERPL: 16
CALCIUM SERPL-MCNC: 8.9 MG/DL (ref 8.5–10.1)
CHLORIDE SERPL-SCNC: 99 MMOL/L (ref 98–107)
CO2 SERPL-SCNC: 19 MMOL/L (ref 21–32)
CREAT SERPL-MCNC: 0.8 MG/DL (ref 0.6–1.3)
EOSINOPHIL # BLD AUTO: 0 10^3/UL (ref 0–0.3)
EOSINOPHIL NFR BLD AUTO: 0 % (ref 0–10)
ERYTHROCYTE [DISTWIDTH] IN BLOOD BY AUTOMATED COUNT: 15.6 % (ref 10–14.5)
FIBRINOGEN PPP-MCNC: CLEAR MG/DL
GFR SERPLBLD BASED ON 1.73 SQ M-ARVRAT: > 60 ML/MIN
GLUCOSE SERPL-MCNC: 230 MG/DL (ref 70–105)
GLUCOSE UR STRIP-MCNC: (no result) MG/DL
HCT VFR BLD CALC: 40 % (ref 35–52)
HGB BLD-MCNC: 13 G/DL (ref 11.5–16)
HYALINE CASTS #/AREA URNS LPF: (no result) /LPF
INR PPP: 1.2 (ref 0.8–1.4)
KETONES UR QL STRIP: NEGATIVE
LEUKOCYTE ESTERASE UR QL STRIP: NEGATIVE
LYMPHOCYTES # BLD AUTO: 0.9 X 10^3 (ref 1–4)
LYMPHOCYTES NFR BLD AUTO: 8 % (ref 12–44)
MANUAL DIFFERENTIAL PERFORMED BLD QL: YES
MCH RBC QN AUTO: 30 PG (ref 25–34)
MCHC RBC AUTO-ENTMCNC: 33 G/DL (ref 32–36)
MCV RBC AUTO: 92 FL (ref 80–99)
MONOCYTES # BLD AUTO: 0.6 X 10^3 (ref 0–1)
MONOCYTES NFR BLD AUTO: 5 % (ref 0–12)
MONOCYTES NFR BLD: 4 %
NEUTROPHILS # BLD AUTO: 9.7 X 10^3 (ref 1.8–7.8)
NEUTROPHILS NFR BLD AUTO: 87 % (ref 42–75)
NEUTS BAND NFR BLD MANUAL: 88 %
NEUTS BAND NFR BLD: 1 %
NITRITE UR QL STRIP: NEGATIVE
PH UR STRIP: 7 [PH] (ref 5–9)
PLATELET # BLD: 174 10^3/UL (ref 130–400)
PMV BLD AUTO: 10.9 FL (ref 7.4–10.4)
POTASSIUM SERPL-SCNC: 4.8 MMOL/L (ref 3.6–5)
PROT SERPL-MCNC: 6.4 GM/DL (ref 6.4–8.2)
PROT UR QL STRIP: (no result)
PROTHROMBIN TIME: 15.3 SEC (ref 12.2–14.7)
RBC # BLD AUTO: 4.37 10^6/UL (ref 4.35–5.85)
RBC #/AREA URNS HPF: (no result) /HPF
SODIUM SERPL-SCNC: 131 MMOL/L (ref 135–145)
SP GR UR STRIP: 1.01 (ref 1.02–1.02)
SQUAMOUS #/AREA URNS HPF: (no result) /HPF
UROBILINOGEN UR-MCNC: 1 MG/DL
VARIANT LYMPHS NFR BLD MANUAL: 7 %
WBC # BLD AUTO: 11.2 10^3/UL (ref 4.3–11)
WBC #/AREA URNS HPF: (no result) /HPF

## 2018-10-14 PROCEDURE — 87804 INFLUENZA ASSAY W/OPTIC: CPT

## 2018-10-14 PROCEDURE — 83605 ASSAY OF LACTIC ACID: CPT

## 2018-10-14 PROCEDURE — 85027 COMPLETE CBC AUTOMATED: CPT

## 2018-10-14 PROCEDURE — 81000 URINALYSIS NONAUTO W/SCOPE: CPT

## 2018-10-14 PROCEDURE — 87088 URINE BACTERIA CULTURE: CPT

## 2018-10-14 PROCEDURE — 85730 THROMBOPLASTIN TIME PARTIAL: CPT

## 2018-10-14 PROCEDURE — 85007 BL SMEAR W/DIFF WBC COUNT: CPT

## 2018-10-14 PROCEDURE — 96374 THER/PROPH/DIAG INJ IV PUSH: CPT

## 2018-10-14 PROCEDURE — 80053 COMPREHEN METABOLIC PANEL: CPT

## 2018-10-14 PROCEDURE — 87040 BLOOD CULTURE FOR BACTERIA: CPT

## 2018-10-14 PROCEDURE — 36415 COLL VENOUS BLD VENIPUNCTURE: CPT

## 2018-10-14 PROCEDURE — 96375 TX/PRO/DX INJ NEW DRUG ADDON: CPT

## 2018-10-14 PROCEDURE — 71045 X-RAY EXAM CHEST 1 VIEW: CPT

## 2018-10-14 PROCEDURE — 85610 PROTHROMBIN TIME: CPT

## 2018-10-14 NOTE — ED GU-FEMALE
General


Chief Complaint:  -Female


Stated Complaint:  FREQUENT URINATION


Source:  patient


Exam Limitations:  no limitations





History of Present Illness


Date Seen by Provider:  Oct 14, 2018


Time Seen by Provider:  10:10


Initial Comments


Patient is a 72-year-old female who was brought to the emergency room by 

[facility] from via [facility] with reports of urinary frequency, 

fevers, nausea. Nursing home staff reports that patient has had mild confused,  

frequent urination, and high fevers the last 2 days and nausea and dry heaves 

that started tonight. Staff reported this to Dr. Edgar and she ordered for a 

urinalysis, urine culture, and Cipro to be started today. The patient did 

receive 1 dose of ciprofloxacin this evening. Family wanted the patient sent to 

the hospital for evaluation due to long history of urosepsis.


Timing/Duration:  yesterday


Associated Symptoms:  fever/chills, nausea/vomiting, urinary frequency





Allergies and Home Medications


Allergies


Coded Allergies:  


     vancomycin (Verified  Allergy, Intermediate, RASH, 9/27/18)


     heparin (Verified  Allergy, Unknown, 9/27/18)


Uncoded Allergies:  


     TAPE (Allergy, Mild, RASH, 1/31/17)





Home Medications


Albuterol Sulfate 1 Puff Puff, 2 PUFF IH Q4H PRN for SHORTNESS OF BREATH, (

Reported)


   1 PUFF = 90 MCG 


Aspirin 81 Mg Tablet.dr, 81 MG PO HS, (Reported)


Atorvastatin Calcium 10 Mg Tablet, 10 MG PO HS, (Reported)


   LAST FILLED #90 5-21-18 


Benazepril HCl 40 Mg Tab, 40 MG PO HS, (Reported)


   LAST FILLED #180 5-21-18 


Carvedilol 12.5 Mg Tablet, 12.5 MG PO BID, (Reported)


   LAST FILLED #180 5-21-18 


Citalopram Hydrobromide 20 Mg Tablet, 20 MG PO DAILY, (Reported)


Fluconazole 100 Mg Tablet, 100 MG PO DAILY


   Prescribed by: MURPHY LONG on 10/1/18 1404


Fluticasone/Salmeterol 1 Each Blst.w.dev, 1-2 PUFF IH DAILY PRN for SHORTNESS 

OF BREATH, (Reported)


   UNKNOWN LAST FILL DATE 


Glipizide 5 Mg Tablet, 5 MG PO BID, (Reported)


Hydrochlorothiazide 25 Mg Tablet, 25 MG PO DAILY, (Reported)


   LAST FILLED #90 5-21-18 


Insulin Detemir 100 Unit/1 Ml Insuln.pen, 20 UNITS SC BID, (Reported)


Insulin Lispro 100 Unit/1 Ml Insuln.pen, 12 UNIT SQ TIDAC, (Reported)


   LAST FILLED 5-21-18 


Lactobacillus Combination No.4 1 Each Capsule, 1 CAP PO DAILY, (Reported)


Miconazole Nitrate 90 Gm Powder, 0 GM TOP BID


   Prescribed by: MURPHY LONG on 10/1/18 1404


Nystatin 15 Gm Oint...g., TOP BID, (Reported)


Pantoprazole Sodium 40 Mg Tablet.dr, 40 MG PO DAILY, (Reported)


Potassium Chloride 10 Meq Tab.er.prt, 10 MEQ PO DAILY, (Reported)


Tramadol HCl 50 Mg Tablet, 50 MG PO Q6H PRN for PAIN-MODERATE


   Prescribed by: MURPHY LONG on 10/1/18 1404





Patient Home Medication List


Home Medication List Reviewed:  Yes





Review of Systems


Review of Systems


Constitutional:  see HPI, chills, fever


Gastrointestinal:  see HPI, nausea, vomiting


Genitourinary:  see HPI, frequency, urgency





All Other Systemes Reviewed


Negative Unless Noted:  Yes





Past Medical-Social-Family Hx


Past Med/Social Hx:  Reviewed Nursing Past Med/Soc Hx


Patient Social History


Alcohol Beverage of Choice:  Beer


Type Used:  Cigarettes


Former Smoker, Quit:  Jan 31, 1977


2nd Hand Smoke Exposure:  No


Recent Foreign Travel:  No


Contact w/Someone Who Travel:  No


Recent Hopitalizations:  No





Immunizations Up To Date


Tetanus Booster (TDap):  Unknown


PED Vaccines UTD:  Yes


Date of Pneumonia Vaccine:  Oct 1, 2016


Date of Influenza Vaccine:  Oct 1, 2017





Seasonal Allergies


Seasonal Allergies:  Yes





Past Medical History


Surgeries:  Yes


Cardiac, Coronary Stent, Gallbladder, Renal


Respiratory:  Yes


Asthma


Currently Using CPAP:  No


Currently Using BIPAP:  No


Cardiac:  Yes (stent)


Coronary Artery Disease, High Cholesterol, Hypertension


Neurological:  No


Reproductive Disorders:  No


Female Reproductive Disorders:  Denies


GYN History:  Menopausal


Sexually Transmitted Disease:  No


HIV/AIDS:  No


Genitourinary:  Yes


Bladder Infection, Kidney Stones


Gastrointestinal:  Yes


Gastroesophageal Reflux


Musculoskeletal:  Yes (avascular necrosis of the right hip)


Rheumatoid Arthritis, Foot Drop, Fractures


Endocrine:  Yes (DM TYPE II; MORBID OBESITY)


Diabetes, Insulin dep


HEENT:  No


Cancer:  No


Psychosocial:  Yes


Anxiety, Depression


Integumentary:  No


Recent Skin Changes


Blood Disorders:  Yes (anemia, platelets low, transfusion every 2-3 months)


Adverse Reaction/Blood Tranf:  No





Family Medical History


Reviewed Nursing Family Hx





Cardiovascular disease


  19 MOTHER


Diabetes mellitus


  19 MOTHER


Paternal family history of emphysema


  19 FATHER


No Pertinent Family Hx





Physical Exam


Vital Signs





Vital Signs - First Documented








 10/14/18





 22:12


 


Temp 100.4


 


Pulse 91


 


Resp 18


 


B/P (MAP) 176/81 (112)


 


Pulse Ox 95


 


O2 Delivery Nasal Cannula


 


O2 Flow Rate 2.00





Capillary Refill :


Height, Weight, BMI


Height: 5'7.00"


Weight: 328lbs. 2.0oz. 148.230437wd; 47.1 BMI


Method:Stated


General Appearance:  WD/WN, no apparent distress


Neck:  non-tender, full range of motion, supple, normal inspection


Cardiovascular:  normal peripheral pulses, regular rate, rhythm, no edema, no 

gallop, no JVD, no murmur


Respiratory:  chest non-tender, lungs clear, normal breath sounds, no 

respiratory distress, no accessory muscle use, respiratory distress


Gastrointestinal:  normal bowel sounds, non tender, soft, no organomegaly, no 

pulsatile mass, abnormal bowel sounds


Neurologic/Psychiatric:  alert, normal mood/affect, oriented x 3





Focused Exam


Lactate Level


10/14/18 22:20: Lactic Acid Level 1.83





Lactic Acid Level





Laboratory Tests








Test


 10/14/18


22:20


 


Lactic Acid Level


 1.83 MMOL/L


(0.50-2.00)











Progress/Results/Core Measures


Suspected Sepsis


SIRS


Temperature:100.4 


Pulse:  


Respiratory Rate: 


 


Laboratory Tests


10/14/18 22:20: White Blood Count 11.2H


Blood Pressure  / 


Mean: 


 





10/14/18 22:20: Lactic Acid Level 1.83








Laboratory Tests


10/14/18 22:20: 


Creatinine 0.80, INR Comment 1.2, Platelet Count 174, Total Bilirubin 1.7H





Results/Orders


Lab Results





Laboratory Tests








Test


 10/14/18


19:20 10/14/18


22:20 Range/Units


 


 


Urine Color YELLOW    


 


Urine Clarity CLEAR    


 


Urine pH 7   5-9  


 


Urine Specific Gravity 1.010 L  1.016-1.022  


 


Urine Protein 4+   NEGATIVE  


 


Urine Glucose (UA) 1+ H  NEGATIVE  


 


Urine Ketones NEGATIVE   NEGATIVE  


 


Urine Nitrite NEGATIVE   NEGATIVE  


 


Urine Bilirubin NEGATIVE   NEGATIVE  


 


Urine Urobilinogen 1   NORMAL  MG/DL


 


Urine Leukocyte Esterase NEGATIVE   NEGATIVE  


 


Urine RBC (Auto) 1+ H  NEGATIVE  


 


Urine RBC NONE    /HPF


 


Urine WBC 0-2    /HPF


 


Urine Squamous Epithelial


Cells RARE 


 


  /HPF





 


Urine Crystals NONE    /LPF


 


Urine Bacteria FEW H   /HPF


 


Urine Casts PRESENT    /LPF


 


Urine Hyaline Casts 0-2 H   /LPF


 


Urine Mucus SMALL H   /LPF


 


Urine Culture Indicated NO    


 


White Blood Count


 


 11.2 H


 4.3-11.0


10^3/uL


 


Red Blood Count


 


 4.37 


 4.35-5.85


10^6/uL


 


Hemoglobin  13.0  11.5-16.0  G/DL


 


Hematocrit  40  35-52  %


 


Mean Corpuscular Volume  92  80-99  FL


 


Mean Corpuscular Hemoglobin  30  25-34  PG


 


Mean Corpuscular Hemoglobin


Concent 


 33 


 32-36  G/DL





 


Red Cell Distribution Width  15.6 H 10.0-14.5  %


 


Platelet Count


 


 174 


 130-400


10^3/uL


 


Mean Platelet Volume  10.9 H 7.4-10.4  FL


 


Neutrophils (%) (Auto)  87 H 42-75  %


 


Lymphocytes (%) (Auto)  8 L 12-44  %


 


Monocytes (%) (Auto)  5  0-12  %


 


Eosinophils (%) (Auto)  0  0-10  %


 


Basophils (%) (Auto)  0  0-10  %


 


Neutrophils # (Auto)  9.7 H 1.8-7.8  X 10^3


 


Lymphocytes # (Auto)  0.9 L 1.0-4.0  X 10^3


 


Monocytes # (Auto)  0.6  0.0-1.0  X 10^3


 


Eosinophils # (Auto)


 


 0.0 


 0.0-0.3


10^3/uL


 


Basophils # (Auto)


 


 0.0 


 0.0-0.1


10^3/uL


 


Neutrophils % (Manual)  88   %


 


Lymphocytes % (Manual)  7   %


 


Monocytes % (Manual)  4   %


 


Band Neutrophils  1   %


 


Prothrombin Time  15.3 H 12.2-14.7  SEC


 


INR Comment  1.2  0.8-1.4  


 


Activated Partial


Thromboplast Time 


 31 


 24-35  SEC





 


Sodium Level  131 L 135-145  MMOL/L


 


Potassium Level  4.8  3.6-5.0  MMOL/L


 


Chloride Level  99    MMOL/L


 


Carbon Dioxide Level  19 L 21-32  MMOL/L


 


Anion Gap  13  5-14  MMOL/L


 


Blood Urea Nitrogen  13  7-18  MG/DL


 


Creatinine


 


 0.80 


 0.60-1.30


MG/DL


 


Estimat Glomerular Filtration


Rate 


 > 60 


  





 


BUN/Creatinine Ratio  16   


 


Glucose Level  230 H   MG/DL


 


Lactic Acid Level


 


 1.83 


 0.50-2.00


MMOL/L


 


Calcium Level  8.9  8.5-10.1  MG/DL


 


Corrected Calcium  9.5  8.5-10.1  MG/DL


 


Total Bilirubin  1.7 H 0.1-1.0  MG/DL


 


Aspartate Amino Transf


(AST/SGOT) 


 27 


 5-34  U/L





 


Alanine Aminotransferase


(ALT/SGPT) 


 21 


 0-55  U/L





 


Alkaline Phosphatase  157 H   U/L


 


Total Protein  6.4  6.4-8.2  GM/DL


 


Albumin  3.2  3.2-4.5  GM/DL








Micro Results





Microbiology


10/14/18 Influenza Types A,B Antigen (KIM) - Final, Complete


           





My Orders





Orders - JACOBY CHUNG


Ondansetron Injection (Zofran Injectio (10/14/18 22:30)


Cbc With Automated Diff (10/14/18 22:18)


Comprehensive Metabolic Panel (10/14/18 22:18)


Blood Culture (10/14/18 22:18)


Protime With Inr (10/14/18 22:18)


Partial Thromboplastin Time (10/14/18 22:18)


Saline Lock/Iv-Start (10/14/18 22:18)


Vital Signs Adult Sepsis Patie Q15M (10/14/18 22:18)


O2 (10/14/18 22:18)


Remove Rings In Anticipation O (10/14/18 22:18)


Lactic Acid Analyzer (10/14/18 22:18)


Acetaminophen  Tablet (Tylenol  Tablet) (10/14/18 22:30)


Ua Culture If Indicated (10/14/18 22:27)


Manual Differential (10/14/18 22:20)


Influenza A And B Antigens (10/14/18 22:49)


Chest 1 View, Ap/Pa Only (10/14/18 22:51)


Ceftriaxone  For Iv Use (Rocephin  For I (10/14/18 23:30)





Medications Given in ED





Current Medications








 Medications  Dose


 Ordered  Sig/Adelfo


 Route  Start Time


 Stop Time Status Last Admin


Dose Admin


 


 Acetaminophen  1,000 mg  ONCE  ONCE


 PO  10/14/18 22:30


 10/14/18 22:31 DC 10/14/18 22:30


1,000 MG


 


 Ondansetron HCl  4 mg  ONCE  ONCE


 IVP  10/14/18 22:30


 10/14/18 22:31 DC 10/14/18 22:30


4 MG








Vital Signs/I&O











 10/14/18 10/14/18 10/14/18





 22:12 22:20 22:30


 


Temp 100.4  100.4


 


Pulse 91  


 


Resp 18  


 


B/P (MAP) 176/81 (112)  


 


Pulse Ox 95 95 


 


O2 Delivery Nasal Cannula Nasal Cannula 


 


O2 Flow Rate 2.00 2.00 





Capillary Refill :


Progress Note :  


   Time:  23:23


Progress Note


I have spoke to Dr. Edgar at this time. Laboratory findings and imaging studies 

with her. She agrees that the patient can be sent home given normal findings, 

she wishes the patient to continue the ciprofloxacin as prescribed and she will 

have Shahnaz Montoya NP see her tomorrow at the nursing home. The patient agrees 

with plan of care, plans for discharge, return precautions were given. Son and 

daughter also in agreement.





Departure


Impression





 Primary Impression:  


 Viral illness


 Additional Impression:  


 Fever


Disposition:  01 HOME, SELF-CARE


Condition:  Stable/Unchanged





Departure-Patient Inst.


Decision time for Depature:  23:40


Referrals:  


[facility] (PCP)


Primary Care Physician








LEANDER FREIRE (Family)


Primary Care Physician


Patient Instructions:  Fever, Adult (DC)





Add. Discharge Instructions:  


Continue the ciprofloxacin as previously prescribed. Tylenol and ibuprofen as 

directed by the bottle for fever. Brooke Toledo will see you tomorrow at the 

nursing home. Return back to the emergency room for any worsening symptoms or 

concerns as needed. All discharge instructions reviewed with patient and/or 

family. Voiced understanding.











JACOBY CHUNG Oct 14, 2018 22:35

## 2018-10-15 VITALS — SYSTOLIC BLOOD PRESSURE: 145 MMHG | DIASTOLIC BLOOD PRESSURE: 107 MMHG

## 2018-10-15 NOTE — DIAGNOSTIC IMAGING REPORT
INDICATION: Fever. Altered mental status.



COMPARISON: 09/28/2018.



FINDINGS: Single frontal radiographic view of the chest was

obtained and demonstrates moderate cardiomegaly and pulmonary

vascular congestion. Pulmonary interstitium is also mildly

diffusely prominent. There is no focal alveolar consolidation,

large effusion, nor pneumothorax. Bony structures show no gross

acute abnormalities.



IMPRESSION:

1. Cardiomegaly with sequela of CHF including mild interstitial

pulmonary edema.



Dictated by: 



  Dictated on workstation # [id number]

## 2018-12-05 LAB
ALBUMIN SERPL-MCNC: 3.3 GM/DL (ref 3.2–4.5)
ALP SERPL-CCNC: 165 U/L (ref 40–136)
ALT SERPL-CCNC: 19 U/L (ref 0–55)
BASOPHILS # BLD AUTO: 0 10^3/UL (ref 0–0.1)
BASOPHILS NFR BLD AUTO: 0 % (ref 0–10)
BILIRUB SERPL-MCNC: 0.7 MG/DL (ref 0.1–1)
BUN/CREAT SERPL: 24
CALCIUM SERPL-MCNC: 8.8 MG/DL (ref 8.5–10.1)
CHLORIDE SERPL-SCNC: 109 MMOL/L (ref 98–107)
CO2 SERPL-SCNC: 22 MMOL/L (ref 21–32)
CREAT SERPL-MCNC: 0.82 MG/DL (ref 0.6–1.3)
EOSINOPHIL # BLD AUTO: 0.2 10^3/UL (ref 0–0.3)
EOSINOPHIL NFR BLD AUTO: 4 % (ref 0–10)
ERYTHROCYTE [DISTWIDTH] IN BLOOD BY AUTOMATED COUNT: 15.4 % (ref 10–14.5)
GFR SERPLBLD BASED ON 1.73 SQ M-ARVRAT: > 60 ML/MIN
GLUCOSE SERPL-MCNC: 131 MG/DL (ref 70–105)
HCT VFR BLD CALC: 35 % (ref 35–52)
HGB BLD-MCNC: 10.7 G/DL (ref 11.5–16)
LYMPHOCYTES # BLD AUTO: 1.3 X 10^3 (ref 1–4)
LYMPHOCYTES NFR BLD AUTO: 25 % (ref 12–44)
MANUAL DIFFERENTIAL PERFORMED BLD QL: NO
MCH RBC QN AUTO: 30 PG (ref 25–34)
MCHC RBC AUTO-ENTMCNC: 31 G/DL (ref 32–36)
MCV RBC AUTO: 96 FL (ref 80–99)
MONOCYTES # BLD AUTO: 0.3 X 10^3 (ref 0–1)
MONOCYTES NFR BLD AUTO: 6 % (ref 0–12)
NEUTROPHILS # BLD AUTO: 3.4 X 10^3 (ref 1.8–7.8)
NEUTROPHILS NFR BLD AUTO: 65 % (ref 42–75)
PLATELET # BLD: 149 10^3/UL (ref 130–400)
PMV BLD AUTO: 11 FL (ref 7.4–10.4)
POTASSIUM SERPL-SCNC: 4.5 MMOL/L (ref 3.6–5)
PROT SERPL-MCNC: 6.3 GM/DL (ref 6.4–8.2)
SODIUM SERPL-SCNC: 139 MMOL/L (ref 135–145)
WBC # BLD AUTO: 5.3 10^3/UL (ref 4.3–11)

## 2018-12-21 ENCOUNTER — HOSPITAL ENCOUNTER (INPATIENT)
Dept: HOSPITAL 75 - ER | Age: 73
LOS: 5 days | Discharge: NURSING, REHAB OR LONG-TERM CARE | DRG: 194 | End: 2018-12-26
Attending: FAMILY MEDICINE | Admitting: FAMILY MEDICINE
Payer: MEDICARE

## 2018-12-21 VITALS — WEIGHT: 293 LBS | HEIGHT: 64 IN | BODY MASS INDEX: 50.02 KG/M2

## 2018-12-21 VITALS — SYSTOLIC BLOOD PRESSURE: 160 MMHG | DIASTOLIC BLOOD PRESSURE: 75 MMHG

## 2018-12-21 VITALS — SYSTOLIC BLOOD PRESSURE: 134 MMHG | DIASTOLIC BLOOD PRESSURE: 58 MMHG

## 2018-12-21 DIAGNOSIS — J81.1: ICD-10-CM

## 2018-12-21 DIAGNOSIS — E83.42: ICD-10-CM

## 2018-12-21 DIAGNOSIS — M06.9: ICD-10-CM

## 2018-12-21 DIAGNOSIS — Z87.891: ICD-10-CM

## 2018-12-21 DIAGNOSIS — J45.30: ICD-10-CM

## 2018-12-21 DIAGNOSIS — J44.1: ICD-10-CM

## 2018-12-21 DIAGNOSIS — F41.9: ICD-10-CM

## 2018-12-21 DIAGNOSIS — K21.9: ICD-10-CM

## 2018-12-21 DIAGNOSIS — I10: ICD-10-CM

## 2018-12-21 DIAGNOSIS — Z87.39: ICD-10-CM

## 2018-12-21 DIAGNOSIS — R79.89: ICD-10-CM

## 2018-12-21 DIAGNOSIS — Z99.3: ICD-10-CM

## 2018-12-21 DIAGNOSIS — D50.9: ICD-10-CM

## 2018-12-21 DIAGNOSIS — Z66: ICD-10-CM

## 2018-12-21 DIAGNOSIS — Z79.4: ICD-10-CM

## 2018-12-21 DIAGNOSIS — E78.00: ICD-10-CM

## 2018-12-21 DIAGNOSIS — M19.91: ICD-10-CM

## 2018-12-21 DIAGNOSIS — J44.0: ICD-10-CM

## 2018-12-21 DIAGNOSIS — Z95.5: ICD-10-CM

## 2018-12-21 DIAGNOSIS — E66.01: ICD-10-CM

## 2018-12-21 DIAGNOSIS — R09.02: ICD-10-CM

## 2018-12-21 DIAGNOSIS — L30.4: ICD-10-CM

## 2018-12-21 DIAGNOSIS — A04.72: ICD-10-CM

## 2018-12-21 DIAGNOSIS — F32.9: ICD-10-CM

## 2018-12-21 DIAGNOSIS — E11.8: ICD-10-CM

## 2018-12-21 DIAGNOSIS — I25.10: ICD-10-CM

## 2018-12-21 DIAGNOSIS — J18.9: Primary | ICD-10-CM

## 2018-12-21 LAB
ALBUMIN SERPL-MCNC: 3.1 GM/DL (ref 3.2–4.5)
ALP SERPL-CCNC: 152 U/L (ref 40–136)
ALT SERPL-CCNC: 21 U/L (ref 0–55)
APTT BLD: 32 SEC (ref 24–35)
APTT PPP: YELLOW S
ARTERIAL PATENCY WRIST A: POSITIVE
BACTERIA #/AREA URNS HPF: (no result) /HPF
BASE EXCESS STD BLDA CALC-SCNC: -0.2 MMOL/L (ref -2.5–2.5)
BASOPHILS # BLD AUTO: 0 10^3/UL (ref 0–0.1)
BASOPHILS NFR BLD AUTO: 0 % (ref 0–10)
BDY SITE: (no result)
BILIRUB SERPL-MCNC: 1 MG/DL (ref 0.1–1)
BILIRUB UR QL STRIP: NEGATIVE
BODY TEMPERATURE: 101.8
BUN/CREAT SERPL: 24
CALCIUM SERPL-MCNC: 8.1 MG/DL (ref 8.5–10.1)
CHLORIDE SERPL-SCNC: 104 MMOL/L (ref 98–107)
CK MB SERPL-MCNC: 0.8 NG/ML (ref ?–6.6)
CK SERPL-CCNC: 39 U/L (ref 29–168)
CO2 BLDA CALC-SCNC: 25.2 MMOL/L (ref 21–31)
CO2 SERPL-SCNC: 22 MMOL/L (ref 21–32)
CREAT SERPL-MCNC: 0.79 MG/DL (ref 0.6–1.3)
EOSINOPHIL # BLD AUTO: 0 10^3/UL (ref 0–0.3)
EOSINOPHIL NFR BLD AUTO: 1 % (ref 0–10)
ERYTHROCYTE [DISTWIDTH] IN BLOOD BY AUTOMATED COUNT: 15.3 % (ref 10–14.5)
FIBRINOGEN PPP-MCNC: CLEAR MG/DL
GFR SERPLBLD BASED ON 1.73 SQ M-ARVRAT: > 60 ML/MIN
GLUCOSE SERPL-MCNC: 126 MG/DL (ref 70–105)
GLUCOSE UR STRIP-MCNC: NEGATIVE MG/DL
HCT VFR BLD CALC: 33 % (ref 35–52)
HGB BLD-MCNC: 10.4 G/DL (ref 11.5–16)
INHALED O2 FLOW RATE: 4 L/MIN
INR PPP: 1.2 (ref 0.8–1.4)
KETONES UR QL STRIP: NEGATIVE
LEUKOCYTE ESTERASE UR QL STRIP: NEGATIVE
LYMPHOCYTES # BLD AUTO: 0.5 X 10^3 (ref 1–4)
LYMPHOCYTES NFR BLD AUTO: 9 % (ref 12–44)
MAGNESIUM SERPL-MCNC: 1.4 MG/DL (ref 1.8–2.4)
MANUAL DIFFERENTIAL PERFORMED BLD QL: NO
MCH RBC QN AUTO: 29 PG (ref 25–34)
MCHC RBC AUTO-ENTMCNC: 31 G/DL (ref 32–36)
MCV RBC AUTO: 93 FL (ref 80–99)
MONOCYTES # BLD AUTO: 0.2 X 10^3 (ref 0–1)
MONOCYTES NFR BLD AUTO: 4 % (ref 0–12)
MYOGLOBIN SERPL-MCNC: 36.7 NG/ML (ref 10–92)
NEUTROPHILS # BLD AUTO: 4.8 X 10^3 (ref 1.8–7.8)
NEUTROPHILS NFR BLD AUTO: 86 % (ref 42–75)
NITRITE UR QL STRIP: NEGATIVE
PCO2 BLDA: 43 MMHG (ref 35–45)
PH BLDA: 7.38 [PH] (ref 7.37–7.43)
PH UR STRIP: 6 [PH] (ref 5–9)
PLATELET # BLD: 160 10^3/UL (ref 130–400)
PMV BLD AUTO: 11.5 FL (ref 7.4–10.4)
PO2 BLDA: 97 MMHG (ref 79–93)
POTASSIUM SERPL-SCNC: 4.3 MMOL/L (ref 3.6–5)
PROT SERPL-MCNC: 5.9 GM/DL (ref 6.4–8.2)
PROT UR QL STRIP: (no result)
PROTHROMBIN TIME: 15.2 SEC (ref 12.2–14.7)
RBC # BLD AUTO: 3.59 10^6/UL (ref 4.35–5.85)
RBC #/AREA URNS HPF: (no result) /HPF
SAO2 % BLDA FROM PO2: 98 % (ref 94–100)
SODIUM SERPL-SCNC: 136 MMOL/L (ref 135–145)
SP GR UR STRIP: 1.01 (ref 1.02–1.02)
SQUAMOUS #/AREA URNS HPF: (no result) /HPF
TSH SERPL DL<=0.05 MIU/L-ACNC: 1.34 UIU/ML (ref 0.35–4.94)
UROBILINOGEN UR-MCNC: 1 MG/DL
VENTILATION MODE VENT: YES
WBC # BLD AUTO: 5.6 10^3/UL (ref 4.3–11)
WBC #/AREA URNS HPF: (no result) /HPF

## 2018-12-21 PROCEDURE — 93005 ELECTROCARDIOGRAM TRACING: CPT

## 2018-12-21 PROCEDURE — 82550 ASSAY OF CK (CPK): CPT

## 2018-12-21 PROCEDURE — 82962 GLUCOSE BLOOD TEST: CPT

## 2018-12-21 PROCEDURE — 87493 C DIFF AMPLIFIED PROBE: CPT

## 2018-12-21 PROCEDURE — 71045 X-RAY EXAM CHEST 1 VIEW: CPT

## 2018-12-21 PROCEDURE — 85025 COMPLETE CBC W/AUTO DIFF WBC: CPT

## 2018-12-21 PROCEDURE — 83880 ASSAY OF NATRIURETIC PEPTIDE: CPT

## 2018-12-21 PROCEDURE — 80053 COMPREHEN METABOLIC PANEL: CPT

## 2018-12-21 PROCEDURE — 84484 ASSAY OF TROPONIN QUANT: CPT

## 2018-12-21 PROCEDURE — 87040 BLOOD CULTURE FOR BACTERIA: CPT

## 2018-12-21 PROCEDURE — 87449 NOS EACH ORGANISM AG IA: CPT

## 2018-12-21 PROCEDURE — 94760 N-INVAS EAR/PLS OXIMETRY 1: CPT

## 2018-12-21 PROCEDURE — 94640 AIRWAY INHALATION TREATMENT: CPT

## 2018-12-21 PROCEDURE — 80048 BASIC METABOLIC PNL TOTAL CA: CPT

## 2018-12-21 PROCEDURE — 84443 ASSAY THYROID STIM HORMONE: CPT

## 2018-12-21 PROCEDURE — 83874 ASSAY OF MYOGLOBIN: CPT

## 2018-12-21 PROCEDURE — 51702 INSERT TEMP BLADDER CATH: CPT

## 2018-12-21 PROCEDURE — 93041 RHYTHM ECG TRACING: CPT

## 2018-12-21 PROCEDURE — 82805 BLOOD GASES W/O2 SATURATION: CPT

## 2018-12-21 PROCEDURE — 82553 CREATINE MB FRACTION: CPT

## 2018-12-21 PROCEDURE — 85610 PROTHROMBIN TIME: CPT

## 2018-12-21 PROCEDURE — 81000 URINALYSIS NONAUTO W/SCOPE: CPT

## 2018-12-21 PROCEDURE — 85730 THROMBOPLASTIN TIME PARTIAL: CPT

## 2018-12-21 PROCEDURE — 83735 ASSAY OF MAGNESIUM: CPT

## 2018-12-21 PROCEDURE — 96365 THER/PROPH/DIAG IV INF INIT: CPT

## 2018-12-21 PROCEDURE — 83605 ASSAY OF LACTIC ACID: CPT

## 2018-12-21 PROCEDURE — 87324 CLOSTRIDIUM AG IA: CPT

## 2018-12-21 PROCEDURE — 96375 TX/PRO/DX INJ NEW DRUG ADDON: CPT

## 2018-12-21 PROCEDURE — 87804 INFLUENZA ASSAY W/OPTIC: CPT

## 2018-12-21 PROCEDURE — 85027 COMPLETE CBC AUTOMATED: CPT

## 2018-12-21 PROCEDURE — 36600 WITHDRAWAL OF ARTERIAL BLOOD: CPT

## 2018-12-21 PROCEDURE — 84145 PROCALCITONIN (PCT): CPT

## 2018-12-21 PROCEDURE — 36415 COLL VENOUS BLD VENIPUNCTURE: CPT

## 2018-12-21 RX ADMIN — SODIUM CHLORIDE SCH MLS/HR: 900 INJECTION INTRAVENOUS at 23:26

## 2018-12-21 NOTE — XMS REPORT
[facility]

 Created on: 10/26/2018



Madison Young

External Reference #: [id number]

: 1945

Sex: Female



Demographics







 Address  [address] [place]  [address]

 

 Preferred Language  Unknown

 

 Marital Status  Unknown

 

 Tenriism Affiliation  Unknown

 

 Race  Unknown

 

 Ethnic Group  Unknown





Author







 Author  SHAZIA MANUEL

 

 [facility]

 

 Address  [address] [place]  [address]



 

 Phone  [phone]







Care Team Providers







 Care Team Member Name  Role  Phone

 

 SHAZIA MANUEL  Unavailable  [phone]







PROBLEMS







 Type  Condition  ICD9-CM Code  [id number] Code  Onset Dates  Condition Status  
SNOMED Code

 

 Problem  Anxiety disorder, unspecified     F41.9     Active  348310668

 

 Problem  Long-term insulin use     Z79.4     Active  500342876

 

 Problem  Morbid (severe) obesity due to excess calories     E66.01     Active  
942976220

 

 Problem  Other iron deficiency anemia     D50.8     Active  63118402

 

 Problem  Body mass index (BMI) of 50-59.9 in adult     Z68.43     Active  
381739208

 

 Problem  Non-adherence to medical treatment     Z91.19     Active  906712764

 

 Problem  Recurrent major depressive disorder, in partial remission     F33.41 
    Active  95803190

 

 Problem  Peripheral edema     R60.9     Active  787571362

 

 Problem  Iron deficiency anemia     D50.9     Active  70086161

 

 Problem  Avascular necrosis of bone of right hip     M87.051     Active  
658570980

 

 Problem  Coronary artery disease     I25.10     Active  99563452

 

 Problem  Chronic kidney disease (CKD) stage G3a/A3, moderately decreased 
glomerular filtration rate (GFR) between 45-59 mL/min/1.73 square meter and 
albuminuria creatinine ratio greater than 300 mg/g     N18.3     Active  
723250281

 

 Problem  Microalbuminuric diabetic nephropathy     E11.21     Active  498179831

 

 Problem  Immune thrombocytopenic purpura     D69.3     Active  690516993

 

 Problem  Hyperlipidemia, unspecified hyperlipidemia     E78.5     Active  
46024981

 

 Problem  GERD (gastroesophageal reflux disease)     K21.9     Active  104340691

 

 Problem  Asthma     J45.909     Active  392992315

 

 Problem  Type 2 diabetes mellitus with diabetic polyneuropathy     E11.42     
Active  93193658

 

 Problem  Hypertension     I10     Active  33086946







ALLERGIES

No Information



ENCOUNTERS







 Encounter  Location  Date  Diagnosis

 

 [facility]  [address], [place] [address]
1699  25 Oct, 2018   

 

 [place] [address], [place] [address]  25 Oct, 2018   

 

 [facility]  [address] [place], [place] 
[id number]  24 Oct, 2018  Type 2 diabetes mellitus with diabetic polyneuropathy 
E11.42

 

 [place] [address], [place] [address]
7[address]  23 Oct, 2018  Type 2 diabetes mellitus with diabetic polyneuropathy E11.42

 

 [place] [address] [place] [address]-
0441  22 Oct, 2018   

 

 [place] [address] [place] [address]781-
3666  09 Oct, 2018   

 

 [facility]  [address] [place], [place] 
[id number]  02 Oct, 2018  Avascular necrosis of bone of right hip M87.051 ; Type 
2 diabetes mellitus with diabetic polyneuropathy E11.42 ; Other iron deficiency 
anemia D50.8 ; Morbid (severe) obesity due to excess calories E66.01 ; 
Peripheral edema R60.9 ; Hyperkalemia E87.5 and Chronic kidney disease (CKD) 
stage G3a/A3, moderately decreased glomerular filtration rate (GFR) between 45-
59 mL/min/1.73 square meter and albuminuria creatinine ratio greater than 300 mg
/g N18.3

 

 [place] [address], [place] [address]  14 Sep, 2018  Coronary artery disease I25.10 ; Congestive heart failure, 
unspecified HF chronicity, unspecified heart failure type I50.9 ; Peripheral 
edema R60.9 ; Avascular necrosis of bone of right hip M87.051 ; Morbid (severe) 
obesity due to excess calories E66.01 and Body mass index (BMI) of 50-59.9 in 
adult Z68.43

 

 [place] [address], [place] [address]  10 Sep, 2018   

 

 [place] [address], [place] [address]
9376  06 Sep, 2018  Type 2 diabetes mellitus with diabetic polyneuropathy 
E11.42 ; Coronary artery disease I25.10 ; Hypertension I10 ; Long-term insulin 
use Z79.4 ; Body mass index (BMI) of 45.0-49.9 in adult Z68.42 ; Peripheral 
edema R60.9 and Avascular necrosis of bone of right hip M87.051

 

 [facility]  [address] [place] [id number] [place] [address]-
1883  17 Aug, 2018  Peripheral edema R60.9

 

 [facility]  [address] [place] [id number] [place] [address]-
8445  13 Aug, 2018  Hypertension I10 and Peripheral edema R60.9

 

 [facility]  [address] [place] [id number] [place] [address]55-
5197  09 Aug, 2018   

 

 [facility]  [address] [place] [id number] [place] [phone]-
5356  09 Aug, 2018  Hypertension I10 and Peripheral edema R60.9

 

 [facility]  [address] [place] [id number] [place] [address] 23392-
7966  06 Aug, 2018  Hypertension I10 and Peripheral edema R60.9

 

 [facility]  [address] [place] [id number] [place] [address] 34706-
1691  02 Aug, 2018   

 

 [facility]  [address] [place] [id number] [place] [address] 58381-
2162  02 Aug, 2018  Hypertension I10 and Peripheral edema R60.9

 

 [facility]  [address] [place] [id number] [place] [address] 27925-
0721  01 Aug, 2018   

 

 [facility]  [address] [place] [id number] [place] [address] 05489-
1684     

 

 [facility]  [address] [place] [id number] [place] [address] 52241-
2457     

 

 [facility]  [address] [place] [id number] [place] [address] 86423-
2969     

 

 [facility]  [address] [place] [id number] [place] [address]117-
0869    Diabetes mellitus due to underlying condition with foot 
ulcer E08.621 ; Non-pressure chronic ulcer of other part of left foot limited 
to breakdown of skin L97.521 and BMI 45.0-49.9, adult Z68.42

 

 [place] [address] [place] [address]298-
9548    Acute idiopathic gout involving toe of left foot M10.072

 

 [place] [address] [place] [id number] [place] [address] 08127-
5257     

 

 [place] [address] [place] [address] [place] [address] 35989-
7634     

 

 [place] [address] [place] [id number] [place] [address] 41478-
0533     

 

 [place] [id number] [place] [address]66-
8828    Type 2 diabetes mellitus with diabetic polyneuropathy 
E11.42 ; Hypertension I10 ; Hyperlipidemia, unspecified hyperlipidemia E78.5 ; 
Body mass index (BMI) of 45.0-49.9 in adult Z68.42 ; Long-term insulin use 
Z79.4 ; Non-adherence to medical treatment Z91.19 ; Coronary artery disease 
I25.10 ; GERD (gastroesophageal reflux disease) K21.9 ; Asthma J45.909 and 
Recurrent major depressive disorder, in partial remission F33.41

 

 [place] [id number] [place] [address]-
7797  22 May, 2018  Recurrent major depressive disorder, in partial remission 
F33.41 and Hypertension I10

 

 [place] [address] [place] [address] 85826-
6711  21 May, 2018  Immune thrombocytopenic purpura D69.3 and Iron deficiency 
anemia D50.9

 

 [place] [address] [place] [address] 45896-
0784  21 May, 2018  Type 2 diabetes mellitus with diabetic polyneuropathy 
E11.42 ; Long-term insulin use Z79.4 ; Chronic kidney disease (CKD) stage G3a/A3
, moderately decreased glomerular filtration rate (GFR) between 45-59 mL/min/
1.73 square meter and albuminuria creatinine ratio greater than 300 mg/g N18.3 
; Hypertension I10 ; Hyperlipidemia, unspecified hyperlipidemia E78.5 ; 
Coronary artery disease I25.10 ; GERD (gastroesophageal reflux disease) K21.9 ; 
Immune thrombocytopenic purpura D69.3 ; Asthma J45.909 ; Morbid (severe) 
obesity due to excess calories E66.01 and Recurrent major depressive disorder, 
in partial remission F33.41

 

 [place] [address] [place] [id number] [place] [address]-
7495  11 May, 2018  Chronic kidney disease (CKD) stage G3a/A3, moderately 
decreased glomerular filtration rate (GFR) between 45-59 mL/min/1.73 square 
meter and albuminuria creatinine ratio greater than 300 mg/g N18.3

 

 [place] [address] [place] [id number] [place] [address] 569[address]-
6049  02 May, 2018  Chronic kidney disease (CKD) stage G3a/A3, moderately 
decreased glomerular filtration rate (GFR) between 45-59 mL/min/1.73 square 
meter and albuminuria creatinine ratio greater than 300 mg/g N18.3

 

 [place] [address] [place] [id number] [place] [address] 24755-
0666     

 

 [place] [address] [place] [id number] [place] [address] 61851-
7534  28 Mar, 2018   

 

 [place] [address] [place] [id number] [place] [address] 07919-
0469  26 Mar, 2018  Immune thrombocytopenic purpura D69.3 ; Type 2 diabetes 
mellitus with diabetic polyneuropathy E11.42 ; Avascular necrosis of bone of 
right hip M87.051 ; Long-term insulin use Z79.4 ; GERD (gastroesophageal reflux 
disease) K21.9 ; Hyperlipidemia, unspecified hyperlipidemia E78.5 ; 
Hypertension I10 ; Recurrent major depressive disorder, in partial remission 
F33.41 ; Microalbuminuric diabetic nephropathy E11.21 ; Body mass index (BMI) 
of 45.0-49.9 in adult Z68.42 ; BMI 45.0-49.9, adult Z68.42 and Chronic kidney 
disease (CKD) stage G3a/A3, moderately decreased glomerular filtration rate (GFR
) between 45-59 mL/min/1.73 square meter and albuminuria creatinine ratio 
greater than 300 mg/g N18.3

 

 [place] [address] [place] [id number] [place] [address] 37197-
3848  23 Mar, 2018   

 

 [place] [address] [address]-
5242  23 Mar, 2018   

 

 [place] [address] [place] [id number] [place] [address] 97903-
7760  19 Mar, 2018   

 

 [place] [address] [address]-
8731  14 Mar, 2018   

 

 [place] [address] [place] [id number] [place] [address] 43505-
9494  13 Mar, 2018  Type 2 diabetes mellitus with diabetic polyneuropathy 
E11.42 ; Asthma J45.909 and Hypertension I10

 

 [facility]  [address] [place] 
[id number]  13 Mar, 2018  Skin ulcer, limited to breakdown of skin L98.491 ; 
Immune thrombocytopenic purpura D69.3 ; Avascular necrosis of bone of right hip 
M87.051 and Type 2 diabetes mellitus with diabetic polyneuropathy E11.42

 

 [place] [address] [place] [address]109-
2913  06 Mar, 2018  Asthma J45.909 and Type 2 diabetes mellitus with diabetic 
polyneuropathy E11.42

 

 [place] [address] [place] [id number] [place] [address] 44769-
4112  01 Mar, 2018   

 

 [facility]  [address] 
[id number]    Other iron deficiency anemia D50.8 ; Weakness R53.1 ; 
Type 2 diabetes mellitus with diabetic polyneuropathy E11.42 ; Cellulitis of 
trunk, unspecified site of trunk L03.319 ; Coronary artery disease I25.10 ; 
Avascular necrosis of bone of right hip M87.051 and Morbid (severe) obesity due 
to excess calories E66.01

 

 [place] [address] 
[id number]     

 

 [facility]  [address] [place] [address] [place] [address] 71707-
7044     

 

 [facility]  [address] [place] [address] [place] [address] [phone]
[phone]  15 Feb, 2018  Yeast infection of the skin B37.2

 

 [place] [address] [place] [id number] [place] [address] 49821-
8967  15 Feb, 2018   

 

 [facility]  [address] [place] [address] 18147-
8123  15 Feb, 2018   

 

 [place] [address] [place] [id number] [place] [address] 06950-
3127     

 

 [facility] [place] [address] [place] [id number] [place] [address] 86978
[phone]     

 

 [facility]  [address] [place] [address] 31064-
7859    Type 2 diabetes mellitus with diabetic polyneuropathy 
E11.42 ; Avascular necrosis of bone of right hip M87.051 ; Hyperlipidemia, 
unspecified hyperlipidemia E78.5 ; Hypertension I10 ; Anxiety disorder, 
unspecified F41.9 ; Immune thrombocytopenic purpura D69.3 ; Coronary artery 
disease I25.10 ; Orthopnea R06.01 ; Acute bronchitis, unspecified organism 
J20.9 ; Wound of left lower extremity, initial encounter S81.802A ; Body aches 
R52 and Debilitated R53.81

 

 [place] [address] [place] [address] 17873-
6143     

 

 [place] [address] [place] [id number] [place] [address] 25899-
4454    Type 2 diabetes mellitus with diabetic polyneuropathy 
E11.42 ; Encounter for immunization Z23 ; Hyperlipidemia, unspecified 
hyperlipidemia E78.5 ; Hypertension I10 ; Anxiety disorder, unspecified F41.9 ; 
Asthma J45.909 ; Body mass index (BMI) of 45.0-49.9 in adult Z68.42 and Morbid (
severe) obesity due to excess calories E66.01

 

 [address] [address]-
6054    Type 2 diabetes mellitus with diabetic polyneuropathy 
E11.42 ; Hyperlipidemia, unspecified hyperlipidemia E78.5 ; Hypertension I10 
and GERD (gastroesophageal reflux disease) K21.9

 

 [address] [address]-
9316  22 Mar, 2017  Type 2 diabetes mellitus with diabetic polyneuropathy E11.42

 

 [address] [address]-
3057    Type 2 diabetes mellitus with diabetic polyneuropathy 
E11.42 ; Coronary artery disease I25.10 ; History of MI (myocardial infarction) 
I25.2 ; Immune thrombocytopenic purpura D69.3 ; GERD (gastroesophageal reflux 
disease) K21.9 ; Hyperlipidemia, unspecified hyperlipidemia E78.5 ; 
Hypertension I10 ; History of kidney stones Z87.442 and Anxiety disorder, 
unspecified F41.9

 

 [address] [address]-
6262     

 

 [address] [address]-
7032  29 Sep, 2016  Coronary artery disease I25.10 ; History of MI (myocardial 
infarction) I25.2 ; History of kidney stones Z87.442 ; Type 2 diabetes mellitus 
with diabetic polyneuropathy E11.42 ; Avascular necrosis of bone of right hip 
M87.051 ; Hyperlipidemia, unspecified hyperlipidemia E78.5 ; Hypertension I10 ; 
Asthma J45.909 and Encounter for immunization Z23

 

 [address] [address]-
3481  20 Sep, 2016   

 

 [address] [address]-
5618    Coronary artery disease I25.10 ; Type 2 diabetes mellitus 
with diabetic polyneuropathy E11.42 ; History of MI (myocardial infarction) 
I25.2 ; Immune thrombocytopenic purpura D69.3 ; Hyperlipidemia, unspecified 
hyperlipidemia E78.5 and Hypertension I10

 

 [place] [address] [address]-
5601    Coronary artery disease I25.10 ; Lymphedema I89.0 ; History 
of MI (myocardial infarction) I25.2 ; History of kidney stones Z87.442 ; Immune 
thrombocytopenic purpura D69.3 ; Type 2 diabetes mellitus with diabetic 
polyneuropathy E11.42 ; Avascular necrosis of bone of right hip M87.051 ; GERD (
gastroesophageal reflux disease) K21.9 ; Hyperlipidemia, unspecified 
hyperlipidemia E78.5 ; Hypertension I10 and Asthma J45.909

 

 [place] [address] [address]-
9168     

 

 [place] [address] [address]-
2783     

 

 [place] [address] [address]-
4649  02 Dec, 2015   

 

 [place] [address] [address]-
0903  02 Dec, 2015  Hyperlipidemia, unspecified hyperlipidemia E78.5

 

 [place] [address] [address]-
9184     

 

 [place] [address] [address]-
3407     

 

 [place] [address] [address]-
[phone]    Allergic rhinitis J30.9

 

 [place] [address] [address]-
8603    Type 2 diabetes mellitus with diabetic polyneuropathy 
E11.42 ; Routine adult health maintenance Z00.00 ; Coronary artery disease 
I25.10 ; History of MI (myocardial infarction) I25.2 ; History of kidney stones 
Z87.442 ; Immune thrombocytopenic purpura D69.3 ; Avascular necrosis of bone of 
right hip M87.051 and GERD (gastroesophageal reflux disease) K21.9







IMMUNIZATIONS

No Known Immunizations



SOCIAL HISTORY

Never Assessed



REASON FOR VISIT

medication list update



PLAN OF CARE





VITAL SIGNS





MEDICATIONS

Unknown Medications



RESULTS

No Results



PROCEDURES

No Known procedures



INSTRUCTIONS





MEDICATIONS ADMINISTERED

No Known Medications



MEDICAL (GENERAL) HISTORY







 Type  Description  Date

 

 Medical History  Type II Diabetes   

 

 Medical History  CAD   

 

 Medical History  Acute MI x1   

 

 Medical History  Heart Cath x3   

 

 Medical History  Kidney Stones   

 

 Medical History  Lymphedema   

 

 Medical History  Immune thrombocytopenic purpura   

 

 Surgical History  Heart Stents x2   

 

 Surgical History  Cholecystectomy   

 

 Surgical History     

 

 Surgical History  [place] Te[place]   

 

 Hospitalization History  [facility]  

 

 Hospitalization History  Sepsis/Toxic Shock  

 

 Hospitalization History  [facility]neumonia  2017

 

 Hospitalization History  [facility], [place]- Anemia, cellulitis pannus, yeast 
infection. Discharged 2018

## 2018-12-21 NOTE — XMS REPORT
[facility]

 Created on: 10/24/2018



Madison Young

External Reference #: [id number]

: 1945

Sex: Female



Demographics







 Address  [address] [place]  [address]

 

 Preferred Language  Unknown

 

 Marital Status  Unknown

 

 Church Affiliation  Unknown

 

 Race  Unknown

 

 Ethnic Group  Unknown





Author







 Author  SHAZIA MANUEL

 

 [facility]

 

 Address  [address] [place]  [address]



 

 Phone  [phone]







Care Team Providers







 Care Team Member Name  Role  Phone

 

 SHAZIA MANUEL  Unavailable  [phone]







PROBLEMS







 Type  Condition  ICD9-CM Code  ZEA68-AJ Code  Onset Dates  Condition Status  
SNOMED Code

 

 Problem  Anxiety disorder, unspecified     F41.9     Active  577645264

 

 Problem  Long-term insulin use     Z79.4     Active  356766258

 

 Problem  Morbid (severe) obesity due to excess calories     E66.01     Active  
783433287

 

 Problem  Other iron deficiency anemia     D50.8     Active  65184409

 

 Problem  Body mass index (BMI) of 50-59.9 in adult     Z68.43     Active  
674482317

 

 Problem  Non-adherence to medical treatment     Z91.19     Active  212693430

 

 Problem  Recurrent major depressive disorder, in partial remission     F33.41 
    Active  52454770

 

 Problem  Peripheral edema     R60.9     Active  336544645

 

 Problem  Iron deficiency anemia     D50.9     Active  30195586

 

 Problem  Avascular necrosis of bone of right hip     M87.051     Active  
173451071

 

 Problem  Coronary artery disease     I25.10     Active  71334247

 

 Problem  Chronic kidney disease (CKD) stage G3a/A3, moderately decreased 
glomerular filtration rate (GFR) between 45-59 mL/min/1.73 square meter and 
albuminuria creatinine ratio greater than 300 mg/g     N18.3     Active  
667935939

 

 Problem  Microalbuminuric diabetic nephropathy     E11.21     Active  802397218

 

 Problem  Immune thrombocytopenic purpura     D69.3     Active  430440415

 

 Problem  Hyperlipidemia, unspecified hyperlipidemia     E78.5     Active  
31379447

 

 Problem  GERD (gastroesophageal reflux disease)     K21.9     Active  406456174

 

 Problem  Asthma     J45.909     Active  784831553

 

 Problem  Type 2 diabetes mellitus with diabetic polyneuropathy     E11.42     
Active  00326209

 

 Problem  Hypertension     I10     Active  44173878







ALLERGIES

No Information



ENCOUNTERS







 Encounter  Location  Date  Diagnosis

 

 [facility]  [address], [place] [address]  23 Oct, 2018  Type 2 diabetes mellitus with diabetic polyneuropathy E11.42

 

 [address], [place] [address]
7358  22 Oct, 2018   

 

 [address] [place] [address]-
3138  09 Oct, 2018   

 

 [facility]  [address] [place], [place] 
[id number]  02 Oct, 2018  Avascular necrosis of bone of right hip M87.051 ; Type 
2 diabetes mellitus with diabetic polyneuropathy E11.42 ; Other iron deficiency 
anemia D50.8 ; Morbid (severe) obesity due to excess calories E66.01 ; 
Peripheral edema R60.9 ; Hyperkalemia E87.5 and Chronic kidney disease (CKD) 
stage G3a/A3, moderately decreased glomerular filtration rate (GFR) between 45-
59 mL/min/1.73 square meter and albuminuria creatinine ratio greater than 300 mg
/g N18.3

 

 [address] [place] [address]-
9213  14 Sep, 2018  Coronary artery disease I25.10 ; Congestive heart failure, 
unspecified HF chronicity, unspecified heart failure type I50.9 ; Peripheral 
edema R60.9 ; Avascular necrosis of bone of right hip M87.051 ; Morbid (severe) 
obesity due to excess calories E66.01 and Body mass index (BMI) of 50-59.9 in 
adult Z68.43

 

 [address], [place] [address]
7006  10 Sep, 2018   

 

 [address] [place] [address]-
5360  06 Sep, 2018  Type 2 diabetes mellitus with diabetic polyneuropathy 
E11.42 ; Coronary artery disease I25.10 ; Hypertension I10 ; Long-term insulin 
use Z79.4 ; Body mass index (BMI) of 45.0-49.9 in adult Z68.42 ; Peripheral 
edema R60.9 and Avascular necrosis of bone of right hip M87.051

 

 [place] [id number] [place] [address]-
4557  17 Aug, 2018  Peripheral edema R60.9

 

 [facility]  [address] [place] [address]-
4088  13 Aug, 2018  Hypertension I10 and Peripheral edema R60.9

 

 [facility]  [id number] [address]
8383  09 Aug, 2018   

 

 [facility]  [id number] [place] [address]-
8228  09 Aug, 2018  Hypertension I10 and Peripheral edema R60.9

 

 [facility]  [id number] [place] [address]-
6786  06 Aug, 2018  Hypertension I10 and Peripheral edema R60.9

 

 [facility]  [id number] [place] [address]-
3301  02 Aug, 2018   

 

 [facility]  [id number] [place] [address]-
7145  02 Aug, 2018  Hypertension I10 and Peripheral edema R60.9

 

 [facility]  [id number] [place] [address]-
1840  01 Aug, 2018   

 

 [facility]  [id number] [place] [address] 85871-
2111     

 

 [facility]  [address], [place] [address]-
7472     

 

 [facility]  [id number] [place] [address]-
0712     

 

 [facility]  [address], [place] [address]
9862    Diabetes mellitus due to underlying condition with foot 
ulcer E08.621 ; Non-pressure chronic ulcer of other part of left foot limited 
to breakdown of skin L97.521 and BMI 45.0-49.9, adult Z68.42

 

 [facility]  [address], [place] [address]
1864    Acute idiopathic gout involving toe of left foot M10.072

 

 [place] [address] [address]
9835     

 

 [place] [address] [place] [address]-
0310     

 

 [place] [address] [place] [address]-
5983     

 

 [id number] [place] [address]-
9369    Type 2 diabetes mellitus with diabetic polyneuropathy 
E11.42 ; Hypertension I10 ; Hyperlipidemia, unspecified hyperlipidemia E78.5 ; 
Body mass index (BMI) of 45.0-49.9 in adult Z68.42 ; Long-term insulin use 
Z79.4 ; Non-adherence to medical treatment Z91.19 ; Coronary artery disease 
I25.10 ; GERD (gastroesophageal reflux disease) K21.9 ; Asthma J45.909 and 
Recurrent major depressive disorder, in partial remission F33.41

 

 [place] [id number] [place] [address]-
3507  22 May, 2018  Recurrent major depressive disorder, in partial remission 
F33.41 and Hypertension I10

 

 [place] [address] [place] [address]-
3762  21 May, 2018  Immune thrombocytopenic purpura D69.3 and Iron deficiency 
anemia D50.9

 

 [place] [address] [place] [address]-
7946  21 May, 2018  Type 2 diabetes mellitus with diabetic polyneuropathy 
E11.42 ; Long-term insulin use Z79.4 ; Chronic kidney disease (CKD) stage G3a/A3
, moderately decreased glomerular filtration rate (GFR) between 45-59 mL/min/
1.73 square meter and albuminuria creatinine ratio greater than 300 mg/g N18.3 
; Hypertension I10 ; Hyperlipidemia, unspecified hyperlipidemia E78.5 ; 
Coronary artery disease I25.10 ; GERD (gastroesophageal reflux disease) K21.9 ; 
Immune thrombocytopenic purpura D69.3 ; Asthma J45.909 ; Morbid (severe) 
obesity due to excess calories E66.01 and Recurrent major depressive disorder, 
in partial remission F33.41

 

 [place] [address] [place] [address]-
2396  11 May, 2018  Chronic kidney disease (CKD) stage G3a/A3, moderately 
decreased glomerular filtration rate (GFR) between 45-59 mL/min/1.73 square 
meter and albuminuria creatinine ratio greater than 300 mg/g N18.3

 

 [place] [id number] [place] [address]-
3958  02 May, 2018  Chronic kidney disease (CKD) stage G3a/A3, moderately 
decreased glomerular filtration rate (GFR) between 45-59 mL/min/1.73 square 
meter and albuminuria creatinine ratio greater than 300 mg/g N18.3

 

 [place] [address] [place] [address]-
6908     

 

 [place] [address] [place] [address]-
3455  28 Mar, 2018   

 

 [place] [id number] [place] [address] 14368-
7913  26 Mar, 2018  Immune thrombocytopenic purpura D69.3 ; Type 2 diabetes 
mellitus with diabetic polyneuropathy E11.42 ; Avascular necrosis of bone of 
right hip M87.051 ; Long-term insulin use Z79.4 ; GERD (gastroesophageal reflux 
disease) K21.9 ; Hyperlipidemia, unspecified hyperlipidemia E78.5 ; 
Hypertension I10 ; Recurrent major depressive disorder, in partial remission 
F33.41 ; Microalbuminuric diabetic nephropathy E11.21 ; Body mass index (BMI) 
of 45.0-49.9 in adult Z68.42 ; BMI 45.0-49.9, adult Z68.42 and Chronic kidney 
disease (CKD) stage G3a/A3, moderately decreased glomerular filtration rate (GFR
) between 45-59 mL/min/1.73 square meter and albuminuria creatinine ratio 
greater than 300 mg/g N18.3

 

 [place] [address], [place] [address]
9947  23 Mar, 2018   

 

 [place] [id number] [place] [address]-
9387  23 Mar, 2018   

 

 [facility]  [id number], [place] [address]
3763  19 Mar, 2018   

 

 [facility]  [address], [place] [address]-
3936  14 Mar, 2018   

 

 [facility]  [id number], [place] [address]
2960  13 Mar, 2018  Type 2 diabetes mellitus with diabetic polyneuropathy 
E11.42 ; Asthma J45.909 and Hypertension I10

 

 [facility]  [address] [place] 
[id number]  13 Mar, 2018  Skin ulcer, limited to breakdown of skin L98.491 ; 
Immune thrombocytopenic purpura D69.3 ; Avascular necrosis of bone of right hip 
M87.051 and Type 2 diabetes mellitus with diabetic polyneuropathy E11.42

 

 [place] [id number] [place] [address] 56[address]-
4685  06 Mar, 2018  Asthma J45.909 and Type 2 diabetes mellitus with diabetic 
polyneuropathy E11.42

 

 [place] [address] [facility] [id number] [address]
8153  01 Mar, 2018   

 

 [facility]  [address] [place] 
[id number]    Other iron deficiency anemia D50.8 ; Weakness R53.1 ; 
Type 2 diabetes mellitus with diabetic polyneuropathy E11.42 ; Cellulitis of 
trunk, unspecified site of trunk L03.319 ; Coronary artery disease I25.10 ; 
Avascular necrosis of bone of right hip M87.051 and Morbid (severe) obesity due 
to excess calories E66.01

 

 [facility]  [id number] 
[id number]     

 

 [facility]  [id number], [place] [address]-
1157     

 

 [facility]  [id number], [place] [phone]  15 Feb, 2018  Yeast infection of the skin B37.2

 

 [place] [address] [place] [address]-
9747  15 Feb, 2018   

 

 [facility]  [address] [place] [address] 78085-
5390  15 Feb, 2018   

 

 [facility]  [address] [place] [address] 64709-
4370     

 

 [facility]  [address] [phone]     

 

 [facility]  [address] [place] [address] 73725-
6537    Type 2 diabetes mellitus with diabetic polyneuropathy 
E11.42 ; Avascular necrosis of bone of right hip M87.051 ; Hyperlipidemia, 
unspecified hyperlipidemia E78.5 ; Hypertension I10 ; Anxiety disorder, 
unspecified F41.9 ; Immune thrombocytopenic purpura D69.3 ; Coronary artery 
disease I25.10 ; Orthopnea R06.01 ; Acute bronchitis, unspecified organism 
J20.9 ; Wound of left lower extremity, initial encounter S81.802A ; Body aches 
R52 and Debilitated R53.81

 

 [place] [address] [place] [address] 10183-
2557     

 

 [place] [address] [place] [address] 30349-
6012  02 2017  Type 2 diabetes mellitus with diabetic polyneuropathy 
E11.42 ; Encounter for immunization Z23 ; Hyperlipidemia, unspecified 
hyperlipidemia E78.5 ; Hypertension I10 ; Anxiety disorder, unspecified F41.9 ; 
Asthma J45.909 ; Body mass index (BMI) of 45.0-49.9 in adult Z68.42 and Morbid (
severe) obesity due to excess calories E66.01

 

 [place] [address] [place] [address] 26478-
8432    Type 2 diabetes mellitus with diabetic polyneuropathy 
E11.42 ; Hyperlipidemia, unspecified hyperlipidemia E78.5 ; Hypertension I10 
and GERD (gastroesophageal reflux disease) K21.9

 

 [place] [id number] [place] [address] 08569-
4957  22 Mar, 2017  Type 2 diabetes mellitus with diabetic polyneuropathy E11.42

 

 [place] [address] [address]-
5007    Type 2 diabetes mellitus with diabetic polyneuropathy 
E11.42 ; Coronary artery disease I25.10 ; History of MI (myocardial infarction) 
I25.2 ; Immune thrombocytopenic purpura D69.3 ; GERD (gastroesophageal reflux 
disease) K21.9 ; Hyperlipidemia, unspecified hyperlipidemia E78.5 ; 
Hypertension I10 ; History of kidney stones Z87.442 and Anxiety disorder, 
unspecified F41.9

 

 [place] [address] [address]-
7911     

 

 [address] [address]-
7569  29 Sep, 2016  Coronary artery disease I25.10 ; History of MI (myocardial 
infarction) I25.2 ; History of kidney stones Z87.442 ; Type 2 diabetes mellitus 
with diabetic polyneuropathy E11.42 ; Avascular necrosis of bone of right hip 
M87.051 ; Hyperlipidemia, unspecified hyperlipidemia E78.5 ; Hypertension I10 ; 
Asthma J45.909 and Encounter for immunization Z23

 

 [place] [address] [place] [address]-
6864  20 Sep, 2016   

 

 [address] [address]-
8807    Coronary artery disease I25.10 ; Type 2 diabetes mellitus 
with diabetic polyneuropathy E11.42 ; History of MI (myocardial infarction) 
I25.2 ; Immune thrombocytopenic purpura D69.3 ; Hyperlipidemia, unspecified 
hyperlipidemia E78.5 and Hypertension I10

 

 [place] [address] [place] [address] 93117-
4188    Coronary artery disease I25.10 ; Lymphedema I89.0 ; History 
of MI (myocardial infarction) I25.2 ; History of kidney stones Z87.442 ; Immune 
thrombocytopenic purpura D69.3 ; Type 2 diabetes mellitus with diabetic 
polyneuropathy E11.42 ; Avascular necrosis of bone of right hip M87.051 ; GERD (
gastroesophageal reflux disease) K21.9 ; Hyperlipidemia, unspecified 
hyperlipidemia E78.5 ; Hypertension I10 and Asthma J45.909

 

 [place] [address] [place] [address] [place] [address] 11712-
5925     

 

 [place] [address] [place] [address] 21867-
9707     

 

 [place] [address] [place] [address] [place] [address] 02136-
6000  02 Dec, 2015   

 

 [place] [address] [place] [address] 09075-
7648  02 Dec, 2015  Hyperlipidemia, unspecified hyperlipidemia E78.5

 

 [place] [address] [place] [address] 74186-
4614     

 

 [place] [address] [place] [address] 23029-
2758     

 

 [place] [address] [place] [address]20-
1769    Allergic rhinitis J30.9

 

 [place] [address] [place] [address] 88281-
2329    Type 2 diabetes mellitus with diabetic polyneuropathy 
E11.42 ; Routine adult health maintenance Z00.00 ; Coronary artery disease 
I25.10 ; History of MI (myocardial infarction) I25.2 ; History of kidney stones 
Z87.442 ; Immune thrombocytopenic purpura D69.3 ; Avascular necrosis of bone of 
right hip M87.051 and GERD (gastroesophageal reflux disease) K21.9







IMMUNIZATIONS

No Known Immunizations



SOCIAL HISTORY

Never Assessed



REASON FOR VISIT

elevated blood sugar



PLAN OF CARE





VITAL SIGNS





MEDICATIONS

Unknown Medications



RESULTS

No Results



PROCEDURES

No Known procedures



INSTRUCTIONS





MEDICATIONS ADMINISTERED

No Known Medications



MEDICAL (GENERAL) HISTORY







 Type  Description  Date

 

 Medical History  Type II Diabetes   

 

 Medical History  CAD   

 

 Medical History  Acute MI x1   

 

 Medical History  Heart Cath x3   

 

 Medical History  Kidney Stones   

 

 Medical History  Lymphedema   

 

 Medical History  Immune thrombocytopenic purpura   

 

 Surgical History  Heart Stents x2   

 

 Surgical History  Cholecystectomy   

 

 Surgical History     

 

 Surgical History  Smyrna Teeth   

 

 Hospitalization History  ITP  

 

 Hospitalization History  Sepsis/Toxic Shock  

 

 Hospitalization History  [facility]-flu[facility]pneumonia  2017

 

 Hospitalization History  [facility], operated by [facility]- Anemia, cellulitis pannus, yeast 
infection. Discharged 2018

## 2018-12-21 NOTE — XMS REPORT
[facility]

 Created on: 10/26/2018



Madison Young

External Reference #: [id number]

: 1945

Sex: Female



Demographics







 Address  [address] [place]  [address]

 

 Preferred Language  Unknown

 

 Marital Status  Unknown

 

 Adventism Affiliation  Unknown

 

 Race  Unknown

 

 Ethnic Group  Unknown





Author







 Author  SHAZIA MANUEL

 

 [facility]

 

 Address  [address] [place]  [address]



 

 Phone  [phone]







Care Team Providers







 Care Team Member Name  Role  Phone

 

 SHAZIA MANUEL  Unavailable  [phone]







PROBLEMS







 Type  Condition  ICD9-CM Code  ELT51-HL Code  Onset Dates  Condition Status  
SNOMED Code

 

 Problem  Anxiety disorder, unspecified     F41.9     Active  279601498

 

 Problem  Long-term insulin use     Z79.4     Active  738570749

 

 Problem  Morbid (severe) obesity due to excess calories     E66.01     Active  
741136681

 

 Problem  Other iron deficiency anemia     D50.8     Active  12534276

 

 Problem  Body mass index (BMI) of 50-59.9 in adult     Z68.43     Active  
485368671

 

 Problem  Non-adherence to medical treatment     Z91.19     Active  576323444

 

 Problem  Recurrent major depressive disorder, in partial remission     F33.41 
    Active  78353734

 

 Problem  Peripheral edema     R60.9     Active  829983011

 

 Problem  Iron deficiency anemia     D50.9     Active  80673723

 

 Problem  Avascular necrosis of bone of right hip     M87.051     Active  
035293150

 

 Problem  Coronary artery disease     I25.10     Active  09142618

 

 Problem  Chronic kidney disease (CKD) stage G3a/A3, moderately decreased 
glomerular filtration rate (GFR) between 45-59 mL/min/1.73 square meter and 
albuminuria creatinine ratio greater than 300 mg/g     N18.3     Active  
053216937

 

 Problem  Microalbuminuric diabetic nephropathy     E11.21     Active  590325287

 

 Problem  Immune thrombocytopenic purpura     D69.3     Active  023207214

 

 Problem  Hyperlipidemia, unspecified hyperlipidemia     E78.5     Active  
11067578

 

 Problem  GERD (gastroesophageal reflux disease)     K21.9     Active  595309414

 

 Problem  Asthma     J45.909     Active  926667375

 

 Problem  Type 2 diabetes mellitus with diabetic polyneuropathy     E11.42     
Active  63509196

 

 Problem  Hypertension     I10     Active  51877090







ALLERGIES

No Information



ENCOUNTERS







 Encounter  Location  Date  Diagnosis

 

 [facility]  [address], [place] [address]  25 Oct, 2018   

 

 [place] [address], [place] [address]  25 Oct, 2018   

 

 [facility]  [address] [place], [place] 
[id number]  24 Oct, 2018  Type 2 diabetes mellitus with diabetic polyneuropathy 
E11.42

 

 [place] [address], [place] [address]  23 Oct, 2018  Type 2 diabetes mellitus with diabetic polyneuropathy E11.42

 

 [place] [address] [place] [address]
1633  22 Oct, 2018   

 

 [place] [address] [place] [address]-
7909  09 Oct, 2018   

 

 [facility]  [address] [place], [place] 
[id number]  02 Oct, 2018  Avascular necrosis of bone of right hip M87.051 ; Type 
2 diabetes mellitus with diabetic polyneuropathy E11.42 ; Other iron deficiency 
anemia D50.8 ; Morbid (severe) obesity due to excess calories E66.01 ; 
Peripheral edema R60.9 ; Hyperkalemia E87.5 and Chronic kidney disease (CKD) 
stage G3a/A3, moderately decreased glomerular filtration rate (GFR) between 45-
59 mL/min/1.73 square meter and albuminuria creatinine ratio greater than 300 mg
/g N18.3

 

 [place] [address], [place] [address]  14 Sep, 2018  Coronary artery disease I25.10 ; Congestive heart failure, 
unspecified HF chronicity, unspecified heart failure type I50.9 ; Peripheral 
edema R60.9 ; Avascular necrosis of bone of right hip M87.051 ; Morbid (severe) 
obesity due to excess calories E66.01 and Body mass index (BMI) of 50-59.9 in 
adult Z68.43

 

 [place] [address], [place] [address]  10 Sep, 2018   

 

 [place] [address], [place] [address]  06 Sep, 2018  Type 2 diabetes mellitus with diabetic polyneuropathy 
E11.42 ; Coronary artery disease I25.10 ; Hypertension I10 ; Long-term insulin 
use Z79.4 ; Body mass index (BMI) of 45.0-49.9 in adult Z68.42 ; Peripheral 
edema R60.9 and Avascular necrosis of bone of right hip M87.051

 

 [facility]  [address] [place] [id number] [place] [address]-
1097  17 Aug, 2018  Peripheral edema R60.9

 

 [facility]  [address] [place] [id number] [place] [address]-
0169  13 Aug, 2018  Hypertension I10 and Peripheral edema R60.9

 

 [facility]  [address] [place] [id number] [place] [address]-
4237  09 Aug, 2018   

 

 [facility]  [address] [place] [id number] [place] [address]-
[phone]  09 Aug, 2018  Hypertension I10 and Peripheral edema R60.9

 

 [facility]  [address] [place] [id number] [place] [address]-
7808  06 Aug, 2018  Hypertension I10 and Peripheral edema R60.9

 

 [facility]  [address] [place] [id number] [place] [address]-
2655  02 Aug, 2018   

 

 [facility]  [address] [place] [id number] [place] [address]-
9996  02 Aug, 2018  Hypertension I10 and Peripheral edema R60.9

 

 [facility]  [address] [place] [id number] [place] [address]-
3077  01 Aug, 2018   

 

 [facility]  [address] [place] [id number] [place] [address]-
[phone]     

 

 [facility]  [address] [place] [id number] [place] [address] 8[address]-
6508     

 

 [facility]  [address] [place] [id number] [place] [address]-
7881     

 

 [facility]  [address] [place] [id number] [place] [address]-
2069    Diabetes mellitus due to underlying condition with foot 
ulcer E08.621 ; Non-pressure chronic ulcer of other part of left foot limited 
to breakdown of skin L97.521 and BMI 45.0-49.9, adult Z68.42

 

 [place] [address] [place] [address]-
3096    Acute idiopathic gout involving toe of left foot M10.072

 

 [place] [address] [place] [address] [place] [address]-
7362     

 

 [place] [address] [place] [address] [place] [address]-
0025     

 

 [place] [address] [place] [address] [place] [address]-
0980     

 

 [place] [address] [place] [address]-
7672    Type 2 diabetes mellitus with diabetic polyneuropathy 
E11.42 ; Hypertension I10 ; Hyperlipidemia, unspecified hyperlipidemia E78.5 ; 
Body mass index (BMI) of 45.0-49.9 in adult Z68.42 ; Long-term insulin use 
Z79.4 ; Non-adherence to medical treatment Z91.19 ; Coronary artery disease 
I25.10 ; GERD (gastroesophageal reflux disease) K21.9 ; Asthma J45.909 and 
Recurrent major depressive disorder, in partial remission F33.41

 

 [place] [address] [place] [address]-
7266  22 May, 2018  Recurrent major depressive disorder, in partial remission 
F33.41 and Hypertension I10

 

 [place] [address] [place] [address]-
7256  21 May, 2018  Immune thrombocytopenic purpura D69.3 and Iron deficiency 
anemia D50.9

 

 [place] [address] [place] [address]-
1601  21 May, 2018  Type 2 diabetes mellitus with diabetic polyneuropathy 
E11.42 ; Long-term insulin use Z79.4 ; Chronic kidney disease (CKD) stage G3a/A3
, moderately decreased glomerular filtration rate (GFR) between 45-59 mL/min/
1.73 square meter and albuminuria creatinine ratio greater than 300 mg/g N18.3 
; Hypertension I10 ; Hyperlipidemia, unspecified hyperlipidemia E78.5 ; 
Coronary artery disease I25.10 ; GERD (gastroesophageal reflux disease) K21.9 ; 
Immune thrombocytopenic purpura D69.3 ; Asthma J45.909 ; Morbid (severe) 
obesity due to excess calories E66.01 and Recurrent major depressive disorder, 
in partial remission F33.41

 

 [place] [address] [place] [id number] [place] [address]-
9141  11 May, 2018  Chronic kidney disease (CKD) stage G3a/A3, moderately 
decreased glomerular filtration rate (GFR) between 45-59 mL/min/1.73 square 
meter and albuminuria creatinine ratio greater than 300 mg/g N18.3

 

 [place] [address] [place] [id number] [place] [address]-
9521  02 May, 2018  Chronic kidney disease (CKD) stage G3a/A3, moderately 
decreased glomerular filtration rate (GFR) between 45-59 mL/min/1.73 square 
meter and albuminuria creatinine ratio greater than 300 mg/g N18.3

 

 [place] [address] [place] [address] [place] [address]-
5774     

 

 [place] [address] [place] [address] [place] [address] 8[address]-
5245  28 Mar, 2018   

 

 [place] [address] [place] [id number] [place] [address]-
3606  26 Mar, 2018  Immune thrombocytopenic purpura D69.3 ; Type 2 diabetes 
mellitus with diabetic polyneuropathy E11.42 ; Avascular necrosis of bone of 
right hip M87.051 ; Long-term insulin use Z79.4 ; GERD (gastroesophageal reflux 
disease) K21.9 ; Hyperlipidemia, unspecified hyperlipidemia E78.5 ; 
Hypertension I10 ; Recurrent major depressive disorder, in partial remission 
F33.41 ; Microalbuminuric diabetic nephropathy E11.21 ; Body mass index (BMI) 
of 45.0-49.9 in adult Z68.42 ; BMI 45.0-49.9, adult Z68.42 and Chronic kidney 
disease (CKD) stage G3a/A3, moderately decreased glomerular filtration rate (GFR
) between 45-59 mL/min/1.73 square meter and albuminuria creatinine ratio 
greater than 300 mg/g N18.3

 

 [place] [address] [place] [id number] [place] [address]-
8663  23 Mar, 2018   

 

 [place] [address] [address]-
5982  23 Mar, 2018   

 

 [place] [address] [place] [id number] [place] [address]-
3894  19 Mar, 2018   

 

 [place] [address] [address]-
6486  14 Mar, 2018   

 

 [place] [address] [place] [id number] [place] [address]-
0832  13 Mar, 2018  Type 2 diabetes mellitus with diabetic polyneuropathy 
E11.42 ; Asthma J45.909 and Hypertension I10

 

 [facility]  [address] [place] 
[id number]  13 Mar, 2018  Skin ulcer, limited to breakdown of skin L98.491 ; 
Immune thrombocytopenic purpura D69.3 ; Avascular necrosis of bone of right hip 
M87.051 and Type 2 diabetes mellitus with diabetic polyneuropathy E11.42

 

 [place] [address] [place] [address]-
8815  06 Mar, 2018  Asthma J45.909 and Type 2 diabetes mellitus with diabetic 
polyneuropathy E11.42

 

 [place] [address] [place] [address] [place] [address]-
0463  01 Mar, 2018   

 

 [facility]  [address] 
[id number]    Other iron deficiency anemia D50.8 ; Weakness R53.1 ; 
Type 2 diabetes mellitus with diabetic polyneuropathy E11.42 ; Cellulitis of 
trunk, unspecified site of trunk L03.319 ; Coronary artery disease I25.10 ; 
Avascular necrosis of bone of right hip M87.051 and Morbid (severe) obesity due 
to excess calories E66.01

 

 [place] [id number] 
[id number]     

 

 [facility]  [address] [place] [address] [place] [address]-
6313     

 

 [facility]  [address] [place] [id number] [place] [phone]  15 Feb, 2018  Yeast infection of the skin B37.2

 

 [place] [address] [place] [address] [place] [address]-
5385  15 Feb, 2018   

 

 [facility]  [address] [place] [address]-
0504  15 Feb, 2018   

 

 [place] [address] [place] [address] [place] [address] 21[address]-
4781     

 

 [facility] [place] [address] [place] [address] [place] [address]
[phone]     

 

 [facility]  [address] [place] [address]-
6985    Type 2 diabetes mellitus with diabetic polyneuropathy 
E11.42 ; Avascular necrosis of bone of right hip M87.051 ; Hyperlipidemia, 
unspecified hyperlipidemia E78.5 ; Hypertension I10 ; Anxiety disorder, 
unspecified F41.9 ; Immune thrombocytopenic purpura D69.3 ; Coronary artery 
disease I25.10 ; Orthopnea R06.01 ; Acute bronchitis, unspecified organism 
J20.9 ; Wound of left lower extremity, initial encounter S81.802A ; Body aches 
R52 and Debilitated R53.81

 

 [place] [address] [place] [address]663-
5156     

 

 [place] [address] [place] [address] [place] [address] 11423-
6231    Type 2 diabetes mellitus with diabetic polyneuropathy 
E11.42 ; Encounter for immunization Z23 ; Hyperlipidemia, unspecified 
hyperlipidemia E78.5 ; Hypertension I10 ; Anxiety disorder, unspecified F41.9 ; 
Asthma J45.909 ; Body mass index (BMI) of 45.0-49.9 in adult Z68.42 and Morbid (
severe) obesity due to excess calories E66.01

 

 [address] [address]-
7206    Type 2 diabetes mellitus with diabetic polyneuropathy 
E11.42 ; Hyperlipidemia, unspecified hyperlipidemia E78.5 ; Hypertension I10 
and GERD (gastroesophageal reflux disease) K21.9

 

 [address] [address]-
2900  22 Mar, 2017  Type 2 diabetes mellitus with diabetic polyneuropathy E11.42

 

 [address] [address]-
8651    Type 2 diabetes mellitus with diabetic polyneuropathy 
E11.42 ; Coronary artery disease I25.10 ; History of MI (myocardial infarction) 
I25.2 ; Immune thrombocytopenic purpura D69.3 ; GERD (gastroesophageal reflux 
disease) K21.9 ; Hyperlipidemia, unspecified hyperlipidemia E78.5 ; 
Hypertension I10 ; History of kidney stones Z87.442 and Anxiety disorder, 
unspecified F41.9

 

 [address] [address]-
8710     

 

 [address] [address]-
1389  29 Sep, 2016  Coronary artery disease I25.10 ; History of MI (myocardial 
infarction) I25.2 ; History of kidney stones Z87.442 ; Type 2 diabetes mellitus 
with diabetic polyneuropathy E11.42 ; Avascular necrosis of bone of right hip 
M87.051 ; Hyperlipidemia, unspecified hyperlipidemia E78.5 ; Hypertension I10 ; 
Asthma J45.909 and Encounter for immunization Z23

 

 [address] [address]-
0314  20 Sep, 2016   

 

 [address] [address]-
8106    Coronary artery disease I25.10 ; Type 2 diabetes mellitus 
with diabetic polyneuropathy E11.42 ; History of MI (myocardial infarction) 
I25.2 ; Immune thrombocytopenic purpura D69.3 ; Hyperlipidemia, unspecified 
hyperlipidemia E78.5 and Hypertension I10

 

 [place] [address] [address]-
9487    Coronary artery disease I25.10 ; Lymphedema I89.0 ; History 
of MI (myocardial infarction) I25.2 ; History of kidney stones Z87.442 ; Immune 
thrombocytopenic purpura D69.3 ; Type 2 diabetes mellitus with diabetic 
polyneuropathy E11.42 ; Avascular necrosis of bone of right hip M87.051 ; GERD (
gastroesophageal reflux disease) K21.9 ; Hyperlipidemia, unspecified 
hyperlipidemia E78.5 ; Hypertension I10 and Asthma J45.909

 

 [place] [address] [address]-
93[address]     

 

 [place] [address] [address]-
[phone]     

 

 [place] [address] [address]-
6164  02 Dec, 2015   

 

 [place] [address] [address]-
7820  02 Dec, 2015  Hyperlipidemia, unspecified hyperlipidemia E78.5

 

 [place] [address] [address]-
2911     

 

 [place] [address] [address]-
1874     

 

 [place] [address] [address]-
0229    Allergic rhinitis J30.9

 

 [place] [address] [address]-
3667    Type 2 diabetes mellitus with diabetic polyneuropathy 
E11.42 ; Routine adult health maintenance Z00.00 ; Coronary artery disease 
I25.10 ; History of MI (myocardial infarction) I25.2 ; History of kidney stones 
Z87.442 ; Immune thrombocytopenic purpura D69.3 ; Avascular necrosis of bone of 
right hip M87.051 and GERD (gastroesophageal reflux disease) K21.9







IMMUNIZATIONS

No Known Immunizations



SOCIAL HISTORY

Never Assessed



REASON FOR VISIT

Medication question



PLAN OF CARE





VITAL SIGNS





MEDICATIONS







 Medication  Instructions  Dosage  Frequency  Start Date  End Date  Duration  
Status

 

 Tresiba FlexTouch 100 UNIT/ML  Subcutaneous daily at hs  20u     24 Oct, 2018 
    30 days  Active







RESULTS

No Results



PROCEDURES

No Known procedures



INSTRUCTIONS





MEDICATIONS ADMINISTERED

No Known Medications



MEDICAL (GENERAL) HISTORY







 Type  Description  Date

 

 Medical History  Type II Diabetes   

 

 Medical History  CAD   

 

 Medical History  Acute MI x1   

 

 Medical History  Heart Cath x3   

 

 Medical History  Kidney Stones   

 

 Medical History  Lymphedema   

 

 Medical History  Immune thrombocytopenic purpura   

 

 Surgical History  Heart Stents x2   

 

 Surgical History  Cholecystectomy   

 

 Surgical History     

 

 Surgical History  [place] Teeth   

 

 Hospitalization History  [facility]  

 

 Hospitalization History  Sepsis/Toxic Shock  

 

 Hospitalization History  [facility]pneumonia  2017

 

 Hospitalization History  [facility]- Anemia, cellulitis pannus, yeast 
infection. Discharged 2018

## 2018-12-21 NOTE — DIAGNOSTIC IMAGING REPORT
INDICATION: Shortness of breath.



COMPARISON: 10/14/2018. 



FINDINGS: Single view of the chest was obtained.



FINDINGS: Cardiac enlargement with central vascular congestion.

No pneumothorax or effusion is seen. Osseous structures are

normal. 



IMPRESSION: Cardiac enlargement with central vascular congestion.





Dictated by: 



  Dictated on workstation # [id number]

## 2018-12-21 NOTE — XMS REPORT
[facility]

 Created on: 2018



Madison Young

External Reference #: [id number]

: 1945

Sex: Female



Demographics







 Address  [address] [place]  [address]

 

 Preferred Language  Unknown

 

 Marital Status  Unknown

 

 [employer] Affiliation  Unknown

 

 Race  Unknown

 

 Ethnic Group  Unknown





Author







 Author  SHAZIA MANUEL

 

 [facility]

 

 Address  [address] [place]  [address]



 

 Phone  [phone]







Care Team Providers







 Care Team Member Name  Role  Phone

 

 SHAZIA MANUEL  Unavailable  [phone]







PROBLEMS







 Type  Condition  ICD9-CM Code  BXC10-OA Code  Onset Dates  Condition Status  
SNOMED Code

 

 Problem  Morbid (severe) obesity due to excess calories     E66.01     Active  
370868849

 

 Problem  Non-adherence to medical treatment     Z91.19     Active  330485060

 

 Problem  Recurrent major depressive disorder, in partial remission     F33.41 
    Active  53484708

 

 Problem  Type 2 diabetes mellitus with hyperglycemia     E11.65     Active  
23089676

 

 Problem  Chronic kidney disease (CKD) stage G3a/A3, moderately decreased 
glomerular filtration rate (GFR) between 45-59 mL/min/1.73 square meter and 
albuminuria creatinine ratio greater than 300 mg/g     N18.3     Active  
456364493

 

 Problem  Long term current use of insulin     Z79.4     Active  573567298

 

 Problem  Peripheral edema     R60.9     Active  526779620

 

 Problem  Iron deficiency anemia     D50.9     Active  07693943

 

 Problem  Other iron deficiency anemia     D50.8     Active  92406179

 

 Problem  Body mass index (BMI) of 50-59.9 in adult     Z68.43     Active  
822874993

 

 Problem  Immune thrombocytopenic purpura     D69.3     Active  841784158

 

 Problem  Coronary artery disease     I25.10     Active  40353489

 

 Problem  Microalbuminuric diabetic nephropathy     E11.21     Active  840155352

 

 Problem  Type 2 diabetes mellitus with diabetic polyneuropathy     E11.42     
Active  66711841

 

 Problem  Hyperlipidemia, unspecified hyperlipidemia     E78.5     Active  
69545793

 

 Problem  Asthma     J45.909     Active  235507988

 

 Problem  GERD (gastroesophageal reflux disease)     K21.9     Active  276170769

 

 Problem  Hypertension     I10     Active  56653004

 

 Problem  Avascular necrosis of bone of right hip     M87.051     Active  
626918793

 

 Problem  Anxiety disorder, unspecified     F41.9     Active  768469365







ALLERGIES

No Information



ENCOUNTERS







 Encounter  Location  Date  Diagnosis

 

 [place] [id number] [address]    Type 2 diabetes mellitus with diabetic polyneuropathy 
E11.42 ; Hypertension I10 ; Hyperlipidemia, unspecified hyperlipidemia E78.5 ; 
Coronary artery disease I25.10 ; GERD (gastroesophageal reflux disease) K21.9 
and Avascular necrosis of bone of right hip M87.051

 

 [facility]  [address] [place], [place] 
[id number]  30 Oct, 2018  Type 2 diabetes mellitus with hyperglycemia E11.65 and 
Long term current use of insulin Z79.4

 

 [place] [id number] [address]
1068  25 Oct, 2018   

 

 [place] [address] [place] [id number] [place] [phone]-
[phone]  25 Oct, 2018   

 

 [facility]  [address] [place] 
[id number]  24 Oct, 2018  Type 2 diabetes mellitus with diabetic polyneuropathy 
E11.42

 

 [place] [id number] [address]  23 Oct, 2018  Type 2 diabetes mellitus with diabetic polyneuropathy E11.42

 

 [place] [address] [place] [address]20-
[phone]  22 Oct, 2018   

 

 [place] [address] [place] [phone]-
[phone]  09 Oct, 2018   

 

 [facility]  [address] [place] 
[id number]  02 Oct, 2018  Avascular necrosis of bone of right hip M87.051 ; Type 
2 diabetes mellitus with diabetic polyneuropathy E11.42 ; Other iron deficiency 
anemia D50.8 ; Morbid (severe) obesity due to excess calories E66.01 ; 
Peripheral edema R60.9 ; Hyperkalemia E87.5 and Chronic kidney disease (CKD) 
stage G3a/A3, moderately decreased glomerular filtration rate (GFR) between 45-
59 mL/min/1.73 square meter and albuminuria creatinine ratio greater than 300 mg
/g N18.3

 

 [place] [address] [place] [address]-
0983  14 Sep, 2018  Coronary artery disease I25.10 ; Congestive heart failure, 
unspecified HF chronicity, unspecified heart failure type I50.9 ; Peripheral 
edema R60.9 ; Avascular necrosis of bone of right hip M87.051 ; Morbid (severe) 
obesity due to excess calories E66.01 and Body mass index (BMI) of 50-59.9 in 
adult Z68.43

 

 [place] [address] [place] [id number] [place] [address]40-
9584  10 Sep, 2018   

 

 [place] [address] [place] [id number] [place] [phone]-
[phone]  06 Sep, 2018  Type 2 diabetes mellitus with diabetic polyneuropathy 
E11.42 ; Coronary artery disease I25.10 ; Hypertension I10 ; Long-term insulin 
use Z79.4 ; Body mass index (BMI) of 45.0-49.9 in adult Z68.42 ; Peripheral 
edema R60.9 and Avascular necrosis of bone of right hip M87.051

 

 [place] [address] [place] [id number] [place] [phone]-
4194  17 Aug, 2018  Peripheral edema R60.9

 

 [place] [address] [place] [id number] [place] [address] [phone]-
[phone]  13 Aug, 2018  Hypertension I10 and Peripheral edema R60.9

 

 [place] [address] [place] [id number] [place] [address] [phone]-
[phone]  09 Aug, 2018   

 

 [place] [address] [place] [id number] [place] [phone]-
[phone]  09 Aug, 2018  Hypertension I10 and Peripheral edema R60.9

 

 [place] [address] [place] [id number] [place] [phone]-
[phone]  06 Aug, 2018  Hypertension I10 and Peripheral edema R60.9

 

 [place] [address] [place] [id number] [place] [address] [phone]-
[phone]  02 Aug, 2018   

 

 [place] [address] [place] [id number] [place] [phone]-
[phone]  02 Aug, 2018  Hypertension I10 and Peripheral edema R60.9

 

 [place] [id number] [address]
1737  01 Aug, 2018   

 

 [place] [address] [place] [id number] [place] [address] [phone]-
[phone]     

 

 [place] [id number] [address]27     

 

 [place] [address] [place] [id number] [place] [address]-
[phone]     

 

 [place] [address] [address]
8692    Diabetes mellitus due to underlying condition with foot 
ulcer E08.621 ; Non-pressure chronic ulcer of other part of left foot limited 
to breakdown of skin L97.521 and BMI 45.0-49.9, adult Z68.42

 

 [place] [address] [place] [id number] [place] [address]687-
0775    Acute idiopathic gout involving toe of left foot M10.072

 

 [place] [address] [address]
6197     

 

 [place] [address] [address]
7709     

 

 [place] [address] [address]
6614     

 

 [place] [address] [place] [address] 65[address]-
6534    Type 2 diabetes mellitus with diabetic polyneuropathy 
E11.42 ; Hypertension I10 ; Hyperlipidemia, unspecified hyperlipidemia E78.5 ; 
Body mass index (BMI) of 45.0-49.9 in adult Z68.42 ; Long-term insulin use 
Z79.4 ; Non-adherence to medical treatment Z91.19 ; Coronary artery disease 
I25.10 ; GERD (gastroesophageal reflux disease) K21.9 ; Asthma J45.909 and 
Recurrent major depressive disorder, in partial remission F33.41

 

 [place] [address] [place] [id number] [place] [address]80-
8126  22 May, 2018  Recurrent major depressive disorder, in partial remission 
F33.41 and Hypertension I10

 

 [place] [address] [place] [id number] [place] [address] 93866-
6746  21 May, 2018  Immune thrombocytopenic purpura D69.3 and Iron deficiency 
anemia D50.9

 

 [place] [address] [place] [id number] [place] [address] 03877-
[phone]  21 May, 2018  Type 2 diabetes mellitus with diabetic polyneuropathy 
E11.42 ; Long-term insulin use Z79.4 ; Chronic kidney disease (CKD) stage G3a/A3
, moderately decreased glomerular filtration rate (GFR) between 45-59 mL/min/
1.73 square meter and albuminuria creatinine ratio greater than 300 mg/g N18.3 
; Hypertension I10 ; Hyperlipidemia, unspecified hyperlipidemia E78.5 ; 
Coronary artery disease I25.10 ; GERD (gastroesophageal reflux disease) K21.9 ; 
Immune thrombocytopenic purpura D69.3 ; Asthma J45.909 ; Morbid (severe) 
obesity due to excess calories E66.01 and Recurrent major depressive disorder, 
in partial remission F33.41

 

 [place] [address] [place] [id number] [place] [address] [phone]-
[phone]  11 May, 2018  Chronic kidney disease (CKD) stage G3a/A3, moderately 
decreased glomerular filtration rate (GFR) between 45-59 mL/min/1.73 square 
meter and albuminuria creatinine ratio greater than 300 mg/g N18.3

 

 [place] [address] [place] [address] 18757-
[phone]  02 May, 2018  Chronic kidney disease (CKD) stage G3a/A3, moderately 
decreased glomerular filtration rate (GFR) between 45-59 mL/min/1.73 square 
meter and albuminuria creatinine ratio greater than 300 mg/g N18.3

 

 [place] [address] [place] [address] 14968-
6900     

 

 [place] [address] [place] [address] 84899-
[phone]  28 Mar, 2018   

 

 [place] [address] [place] [id number] [place] [address] 02025-
3954  26 Mar, 2018  Immune thrombocytopenic purpura D69.3 ; Type 2 diabetes 
mellitus with diabetic polyneuropathy E11.42 ; Avascular necrosis of bone of 
right hip M87.051 ; Long-term insulin use Z79.4 ; GERD (gastroesophageal reflux 
disease) K21.9 ; Hyperlipidemia, unspecified hyperlipidemia E78.5 ; 
Hypertension I10 ; Recurrent major depressive disorder, in partial remission 
F33.41 ; Microalbuminuric diabetic nephropathy E11.21 ; Body mass index (BMI) 
of 45.0-49.9 in adult Z68.42 ; BMI 45.0-49.9, adult Z68.42 and Chronic kidney 
disease (CKD) stage G3a/A3, moderately decreased glomerular filtration rate (GFR
) between 45-59 mL/min/1.73 square meter and albuminuria creatinine ratio 
greater than 300 mg/g N18.3

 

 [place] [address] [place] [id number] [place] [address] 12021-
3106  23 Mar, 2018   

 

 [place] [address] [place] [id number] [place] [address] [phone]-
4777  23 Mar, 2018   

 

 [place] [address] [place] [id number] [place] [address] [phone]-
[phone]  19 Mar, 2018   

 

 [place] [address] [place] [id number] [place] [address] 13601-
0350  14 Mar, 2018   

 

 [place] [address] [place] [id number] [place] [address] 17815-
5665  13 Mar, 2018  Type 2 diabetes mellitus with diabetic polyneuropathy 
E11.42 ; Asthma J45.909 and Hypertension I10

 

 [facility]  [address] [place], [place] 
[id number]  13 Mar, 2018  Skin ulcer, limited to breakdown of skin L98.491 ; 
Immune thrombocytopenic purpura D69.3 ; Avascular necrosis of bone of right hip 
M87.051 and Type 2 diabetes mellitus with diabetic polyneuropathy E11.42

 

 [place] [address] [place] [id number] [place] [address] 15045-
4539  06 Mar, 2018  Asthma J45.909 and Type 2 diabetes mellitus with diabetic 
polyneuropathy E11.42

 

 [facility]  [address] [place] [address] 85053-
2020  01 Mar, 2018   

 

 [facility]  [address] [place] 
[id number]    Other iron deficiency anemia D50.8 ; Weakness R53.1 ; 
Type 2 diabetes mellitus with diabetic polyneuropathy E11.42 ; Cellulitis of 
trunk, unspecified site of trunk L03.319 ; Coronary artery disease I25.10 ; 
Avascular necrosis of bone of right hip M87.051 and Morbid (severe) obesity due 
to excess calories E66.01

 

 [facility]  [id number] [place] [address] 
[id number]     

 

 [place] [address] [place] [id number] [place] [address] 06813-
8546  16 2018   

 

 [facility] [place] [address] [place] [id number] [place] [address] [phone]
[phone]  15 Feb, 2018  Yeast infection of the skin B37.2

 

 [place] [address] [place] [id number] [place] [address] 97965-
5384  15 Feb, 2018   

 

 [place] [address] [place] [id number] [place] [address] 85885-
9910  15 Feb, 2018   

 

 [place] [address] [place] [id number] [place] [address] [phone]-
0040     

 

 [facility] [place] [facility]  [address] [place] [id number] [place] [address] [phone]
[phone]     

 

 [place] [address] [place] [address] [phone]-
1596    Type 2 diabetes mellitus with diabetic polyneuropathy 
E11.42 ; Avascular necrosis of bone of right hip M87.051 ; Hyperlipidemia, 
unspecified hyperlipidemia E78.5 ; Hypertension I10 ; Anxiety disorder, 
unspecified F41.9 ; Immune thrombocytopenic purpura D69.3 ; Coronary artery 
disease I25.10 ; Orthopnea R06.01 ; Acute bronchitis, unspecified organism 
J20.9 ; Wound of left lower extremity, initial encounter S81.802A ; Body aches 
R52 and Debilitated R53.81

 

 [place] [id number] [place] [address] 10970-
7717  12 2018   

 

 [place] [id number] [place] [address] 53600-
2913  02 2017  Type 2 diabetes mellitus with diabetic polyneuropathy 
E11.42 ; Encounter for immunization Z23 ; Hyperlipidemia, unspecified 
hyperlipidemia E78.5 ; Hypertension I10 ; Anxiety disorder, unspecified F41.9 ; 
Asthma J45.909 ; Body mass index (BMI) of 45.0-49.9 in adult Z68.42 and Morbid (
severe) obesity due to excess calories E66.01

 

 [place] [id number] [place] [address] 55594-
5195    Type 2 diabetes mellitus with diabetic polyneuropathy 
E11.42 ; Hyperlipidemia, unspecified hyperlipidemia E78.5 ; Hypertension I10 
and GERD (gastroesophageal reflux disease) K21.9

 

 [place] [address] [place] [id number] [place] [address] [phone]-
2640  22 Mar, 2017  Type 2 diabetes mellitus with diabetic polyneuropathy E11.42

 

 [place] [address] [place] [id number] [place] [address] 88314-
7176  28 2017  Type 2 diabetes mellitus with diabetic polyneuropathy 
E11.42 ; Coronary artery disease I25.10 ; History of MI (myocardial infarction) 
I25.2 ; Immune thrombocytopenic purpura D69.3 ; GERD (gastroesophageal reflux 
disease) K21.9 ; Hyperlipidemia, unspecified hyperlipidemia E78.5 ; 
Hypertension I10 ; History of kidney stones Z87.442 and Anxiety disorder, 
unspecified F41.9

 

 [place] [address] [place] [id number] [place] [address] 42768-
1408  14 2017   

 

 [place] [id number] [place] [address] 54893-
2091  29 Sep, 2016  Coronary artery disease I25.10 ; History of MI (myocardial 
infarction) I25.2 ; History of kidney stones Z87.442 ; Type 2 diabetes mellitus 
with diabetic polyneuropathy E11.42 ; Avascular necrosis of bone of right hip 
M87.051 ; Hyperlipidemia, unspecified hyperlipidemia E78.5 ; Hypertension I10 ; 
Asthma J45.909 and Encounter for immunization Z23

 

 [place] [address] [place] [id number] [place] [address] 09416-
9714  20 Sep, 2016   

 

 [place] [address] [address]-
2698    Coronary artery disease I25.10 ; Type 2 diabetes mellitus 
with diabetic polyneuropathy E11.42 ; History of MI (myocardial infarction) 
I25.2 ; Immune thrombocytopenic purpura D69.3 ; Hyperlipidemia, unspecified 
hyperlipidemia E78.5 and Hypertension I10

 

 [place] [address] [address]-
0721    Coronary artery disease I25.10 ; Lymphedema I89.0 ; History 
of MI (myocardial infarction) I25.2 ; History of kidney stones Z87.442 ; Immune 
thrombocytopenic purpura D69.3 ; Type 2 diabetes mellitus with diabetic 
polyneuropathy E11.42 ; Avascular necrosis of bone of right hip M87.051 ; GERD (
gastroesophageal reflux disease) K21.9 ; Hyperlipidemia, unspecified 
hyperlipidemia E78.5 ; Hypertension I10 and Asthma J45.909

 

 [place] [address] [place] [id number] [place] [address] [phone]-
[phone]     

 

 [place] [address] [place] [id number] [place] [address] [phone]-
[phone]     

 

 [place] [address] [place] [id number] [place] [address] [phone]-
[phone]  02 Dec, 2015   

 

 [place] [address] [phone]-
[phone]  02 Dec, 2015  Hyperlipidemia, unspecified hyperlipidemia E78.5

 

 [place] [address] [place] [id number] [place] [address] 56395-
[phone]     

 

 [place] [address] [facility] [id number] [place] [address]-
8038     

 

 [facility]  [address] [facility] [id number] [address]-
6263    Allergic rhinitis J30.9

 

 [facility]  [address] [facility] [id number] [place] [address]-
0479    Type 2 diabetes mellitus with diabetic polyneuropathy 
E11.42 ; Routine adult health maintenance Z00.00 ; Coronary artery disease 
I25.10 ; History of MI (myocardial infarction) I25.2 ; History of kidney stones 
Z87.442 ; Immune thrombocytopenic purpura D69.3 ; Avascular necrosis of bone of 
right hip M87.051 and GERD (gastroesophageal reflux disease) K21.9







IMMUNIZATIONS

No Known Immunizations



SOCIAL HISTORY

Never Assessed



REASON FOR VISIT

Orders to move to Assisted Living



PLAN OF CARE





VITAL SIGNS





MEDICATIONS

Unknown Medications



RESULTS

No Results



PROCEDURES

No Known procedures



INSTRUCTIONS





MEDICATIONS ADMINISTERED

No Known Medications



MEDICAL (GENERAL) HISTORY







 Type  Description  Date

 

 Medical History  Type II Diabetes   

 

 Medical History  CAD   

 

 Medical History  Acute MI x1   

 

 Medical History  Heart Cath x3   

 

 Medical History  Kidney Stones   

 

 Medical History  Lymphedema   

 

 Medical History  Immune thrombocytopenic purpura   

 

 Surgical History  Heart Stents x2   

 

 Surgical History  Cholecystectomy   

 

 Surgical History     

 

 Surgical History  [place] Teeth   

 

 Hospitalization History  [facility]  

 

 Hospitalization History  Sepsis/Toxic Shock  

 

 Hospitalization History  [facility]-flu[facility]neumonia  2017

 

 Hospitalization History  [facility]- Anemia, cellulitis pannus, yeast 
infection. Discharged 2018

## 2018-12-21 NOTE — XMS REPORT
[facility]

 Created on: 2018



Madison Young

External Reference #: [id number]

: 1945

Sex: Female



Demographics







 Address  [address] [place]  [address]

 

 Preferred Language  Unknown

 

 Marital Status  Unknown

 

 Anglican Affiliation  Unknown

 

 Race  Unknown

 

 Ethnic Group  Unknown





Author







 Author  SHAZIA MANUEL

 

 [facility]

 

 Address  [address] [place]  [address]



 

 Phone  [phone]







Care Team Providers







 Care Team Member Name  Role  Phone

 

 SHAZIA MANUEL  Unavailable  [phone]







PROBLEMS







 Type  Condition  ICD9-CM Code  OVA78-HE Code  Onset Dates  Condition Status  
SNOMED Code

 

 Problem  Morbid (severe) obesity due to excess calories     E66.01     Active  
081942384

 

 Problem  Non-adherence to medical treatment     Z91.19     Active  745925299

 

 Problem  Recurrent major depressive disorder, in partial remission     F33.41 
    Active  76051963

 

 Problem  Type 2 diabetes mellitus with hyperglycemia     E11.65     Active  
14947234

 

 Problem  Chronic kidney disease (CKD) stage G3a/A3, moderately decreased 
glomerular filtration rate (GFR) between 45-59 mL/min/1.73 square meter and 
albuminuria creatinine ratio greater than 300 mg/g     N18.3     Active  
744220026

 

 Problem  Long term current use of insulin     Z79.4     Active  819302207

 

 Problem  Peripheral edema     R60.9     Active  026403448

 

 Problem  Iron deficiency anemia     D50.9     Active  35198706

 

 Problem  Other iron deficiency anemia     D50.8     Active  55705376

 

 Problem  Body mass index (BMI) of 50-59.9 in adult     Z68.43     Active  
311867973

 

 Problem  Immune thrombocytopenic purpura     D69.3     Active  268937457

 

 Problem  Coronary artery disease     I25.10     Active  49875426

 

 Problem  Microalbuminuric diabetic nephropathy     E11.21     Active  487784700

 

 Problem  Type 2 diabetes mellitus with diabetic polyneuropathy     E11.42     
Active  72387297

 

 Problem  Hyperlipidemia, unspecified hyperlipidemia     E78.5     Active  
06867287

 

 Problem  Asthma     J45.909     Active  706662396

 

 Problem  GERD (gastroesophageal reflux disease)     K21.9     Active  561197053

 

 Problem  Hypertension     I10     Active  73538214

 

 Problem  Avascular necrosis of bone of right hip     M87.051     Active  
923488397

 

 Problem  Anxiety disorder, unspecified     F41.9     Active  868725718







ALLERGIES

No Information



ENCOUNTERS







 Encounter  Location  Date  Diagnosis

 

 [place] [address], [place] [address]    Hypertension I10 and Avascular necrosis of bone of right 
hip M87.051

 

 [place] [address] [address]-
9226    Type 2 diabetes mellitus with diabetic polyneuropathy 
E11.42 ; Hypertension I10 ; Hyperlipidemia, unspecified hyperlipidemia E78.5 ; 
Coronary artery disease I25.10 ; GERD (gastroesophageal reflux disease) K21.9 
and Avascular necrosis of bone of right hip M87.051

 

 [facility]  [address] [place], [place] 
[id number]  30 Oct, 2018  Type 2 diabetes mellitus with hyperglycemia E11.65 and 
Long term current use of insulin Z79.4

 

 [place] [address], [place] [address]  25 Oct, 2018   

 

 [place] [address] [place] [id number] [place] [address]-
9393  25 Oct, 2018   

 

 [facility]  [address] [place], [place] 
[id number]  24 Oct, 2018  Type 2 diabetes mellitus with diabetic polyneuropathy 
E11.42

 

 [place] [address], [place] [address]  23 Oct, 2018  Type 2 diabetes mellitus with diabetic polyneuropathy E11.42

 

 [place] [address], [place] [address]
3283  22 Oct, 2018   

 

 [place] [address] [place] [address]-
0146  09 Oct, 2018   

 

 [facility]  [address] [place] 
[id number]  02 Oct, 2018  Avascular necrosis of bone of right hip M87.051 ; Type 
2 diabetes mellitus with diabetic polyneuropathy E11.42 ; Other iron deficiency 
anemia D50.8 ; Morbid (severe) obesity due to excess calories E66.01 ; 
Peripheral edema R60.9 ; Hyperkalemia E87.5 and Chronic kidney disease (CKD) 
stage G3a/A3, moderately decreased glomerular filtration rate (GFR) between 45-
59 mL/min/1.73 square meter and albuminuria creatinine ratio greater than 300 mg
/g N18.3

 

 [place] [address] [place] [id number] [place] [address]-
0734  14 Sep, 2018  Coronary artery disease I25.10 ; Congestive heart failure, 
unspecified HF chronicity, unspecified heart failure type I50.9 ; Peripheral 
edema R60.9 ; Avascular necrosis of bone of right hip M87.051 ; Morbid (severe) 
obesity due to excess calories E66.01 and Body mass index (BMI) of 50-59.9 in 
adult Z68.43

 

 [place] [address] [place] [id number] [place] [address]-
8390  10 Sep, 2018   

 

 [place] [address] [address]-
7[address]  06 Sep, 2018  Type 2 diabetes mellitus with diabetic polyneuropathy 
E11.42 ; Coronary artery disease I25.10 ; Hypertension I10 ; Long-term insulin 
use Z79.4 ; Body mass index (BMI) of 45.0-49.9 in adult Z68.42 ; Peripheral 
edema R60.9 and Avascular necrosis of bone of right hip M87.051

 

 [place] [address] [place] [id number] [place] [address]-
4973  17 Aug, 2018  Peripheral edema R60.9

 

 [place] [address] [place] [id number] [place] [address]-
0281  13 Aug, 2018  Hypertension I10 and Peripheral edema R60.9

 

 [place] [address] [place] [id number] [place] [address]-
4974  09 Aug, 2018   

 

 [place] [address] [place] [id number] [place] [address]-
4551  09 Aug, 2018  Hypertension I10 and Peripheral edema R60.9

 

 [place] [address] [place] [id number] [place] [address]-
8322  06 Aug, 2018  Hypertension I10 and Peripheral edema R60.9

 

 [place] [address] [phone]-
4914  02 Aug, 2018   

 

 [facility]  [address], [place] [address]  02 Aug, 2018  Hypertension I10 and Peripheral edema R60.9

 

 [place] [address], [place] [address]
2676  01 Aug, 2018   

 

 [facility]  [address], [place] [address]-
5[address]     

 

 [place] [address], [place] [address]
5583     

 

 [place] [address] [place] [address]-
2334     

 

 [place] [address], [place] [address]96    Diabetes mellitus due to underlying condition with foot 
ulcer E08.621 ; Non-pressure chronic ulcer of other part of left foot limited 
to breakdown of skin L97.521 and BMI 45.0-49.9, adult Z68.42

 

 [place] [address], [place] [address]    Acute idiopathic gout involving toe of left foot M10.072

 

 [place] [address], [place] [address]-
[address]36     

 

 [place] [address], [place] [address]     

 

 [place] [address], [place] [address]
2104     

 

 [place] [address], [place] [address]
1780    Type 2 diabetes mellitus with diabetic polyneuropathy 
E11.42 ; Hypertension I10 ; Hyperlipidemia, unspecified hyperlipidemia E78.5 ; 
Body mass index (BMI) of 45.0-49.9 in adult Z68.42 ; Long-term insulin use 
Z79.4 ; Non-adherence to medical treatment Z91.19 ; Coronary artery disease 
I25.10 ; GERD (gastroesophageal reflux disease) K21.9 ; Asthma J45.909 and 
Recurrent major depressive disorder, in partial remission F33.41

 

 [place] [address] [place] [id number] [place] [address]-
1978  22 May, 2018  Recurrent major depressive disorder, in partial remission 
F33.41 and Hypertension I10

 

 [place] [address] [place] [id number] [place] [address]-
9430  21 May, 2018  Immune thrombocytopenic purpura D69.3 and Iron deficiency 
anemia D50.9

 

 [place] [address] [address]-
6634  21 May, 2018  Type 2 diabetes mellitus with diabetic polyneuropathy 
E11.42 ; Long-term insulin use Z79.4 ; Chronic kidney disease (CKD) stage G3a/A3
, moderately decreased glomerular filtration rate (GFR) between 45-59 mL/min/
1.73 square meter and albuminuria creatinine ratio greater than 300 mg/g N18.3 
; Hypertension I10 ; Hyperlipidemia, unspecified hyperlipidemia E78.5 ; 
Coronary artery disease I25.10 ; GERD (gastroesophageal reflux disease) K21.9 ; 
Immune thrombocytopenic purpura D69.3 ; Asthma J45.909 ; Morbid (severe) 
obesity due to excess calories E66.01 and Recurrent major depressive disorder, 
in partial remission F33.41

 

 [place] [address] [place] [id number] [place] [address]-
9383  11 May, 2018  Chronic kidney disease (CKD) stage G3a/A3, moderately 
decreased glomerular filtration rate (GFR) between 45-59 mL/min/1.73 square 
meter and albuminuria creatinine ratio greater than 300 mg/g N18.3

 

 [place] [address] [place] [id number] [place] [address]-
5197  02 May, 2018  Chronic kidney disease (CKD) stage G3a/A3, moderately 
decreased glomerular filtration rate (GFR) between 45-59 mL/min/1.73 square 
meter and albuminuria creatinine ratio greater than 300 mg/g N18.3

 

 [place] [address] [address]-
9138     

 

 [place] [address] [place] [id number] [place] [address]-
3019  28 Mar, 2018   

 

 [place] [address] [address]-
7124  26 Mar, 2018  Immune thrombocytopenic purpura D69.3 ; Type 2 diabetes 
mellitus with diabetic polyneuropathy E11.42 ; Avascular necrosis of bone of 
right hip M87.051 ; Long-term insulin use Z79.4 ; GERD (gastroesophageal reflux 
disease) K21.9 ; Hyperlipidemia, unspecified hyperlipidemia E78.5 ; 
Hypertension I10 ; Recurrent major depressive disorder, in partial remission 
F33.41 ; Microalbuminuric diabetic nephropathy E11.21 ; Body mass index (BMI) 
of 45.0-49.9 in adult Z68.42 ; BMI 45.0-49.9, adult Z68.42 and Chronic kidney 
disease (CKD) stage G3a/A3, moderately decreased glomerular filtration rate (GFR
) between 45-59 mL/min/1.73 square meter and albuminuria creatinine ratio 
greater than 300 mg/g N18.3

 

 [place] [address] [place] [id number] [place] [address]-
5465  23 Mar, 2018   

 

 [place] [address] [place] [id number] [place] [address]-
3035  23 Mar, 2018   

 

 [place] [address] [place] [id number] [place] [address]-
5786  19 Mar, 2018   

 

 [place] [address] [place] [id number] [place] [address]-
8802  14 Mar, 2018   

 

 [place] [address] [place] [id number] [place] [address]-
6620  13 Mar, 2018  Type 2 diabetes mellitus with diabetic polyneuropathy 
E11.42 ; Asthma J45.909 and Hypertension I10

 

 [facility]  [address] [place], [place] 
[id number]  13 Mar, 2018  Skin ulcer, limited to breakdown of skin L98.491 ; 
Immune thrombocytopenic purpura D69.3 ; Avascular necrosis of bone of right hip 
M87.051 and Type 2 diabetes mellitus with diabetic polyneuropathy E11.42

 

 [facility]  [address] [place] [id number] [place] [address]-
7010  06 Mar, 2018  Asthma J45.909 and Type 2 diabetes mellitus with diabetic 
polyneuropathy E11.42

 

 [place] [address] [place] [id number] [place] [address]-
6689  01 Mar, 2018   

 

 [facility]  [address]   [place], [place] 
[id number]    Other iron deficiency anemia D50.8 ; Weakness R53.1 ; 
Type 2 diabetes mellitus with diabetic polyneuropathy E11.42 ; Cellulitis of 
trunk, unspecified site of trunk L03.319 ; Coronary artery disease I25.10 ; 
Avascular necrosis of bone of right hip M87.051 and Morbid (severe) obesity due 
to excess calories E66.01

 

 [facility]  [address] 
[id number]     

 

 [place] [address] [address]-
5076  16 2018   

 

 [facility]  [address] [phone]  15 Feb, 2018  Yeast infection of the skin B37.2

 

 [facility]  [address] [place] [id number] [place] [address]-
8810  15 Feb, 2018   

 

 [place] [address] [address]-
1737  15 Feb, 2018   

 

 [facility]  [address] [place] [id number] [place] [address]-
7403     

 

 [facility]  [address] [phone]     

 

 [facility]  [address] [place] [id number] [place] [address]-
2838    Type 2 diabetes mellitus with diabetic polyneuropathy 
E11.42 ; Avascular necrosis of bone of right hip M87.051 ; Hyperlipidemia, 
unspecified hyperlipidemia E78.5 ; Hypertension I10 ; Anxiety disorder, 
unspecified F41.9 ; Immune thrombocytopenic purpura D69.3 ; Coronary artery 
disease I25.10 ; Orthopnea R06.01 ; Acute bronchitis, unspecified organism 
J20.9 ; Wound of left lower extremity, initial encounter S81.802A ; Body aches 
R52 and Debilitated R53.81

 

 [address] [address]-
7752  12 2018   

 

 [address] [address]-
1752    Type 2 diabetes mellitus with diabetic polyneuropathy 
E11.42 ; Encounter for immunization Z23 ; Hyperlipidemia, unspecified 
hyperlipidemia E78.5 ; Hypertension I10 ; Anxiety disorder, unspecified F41.9 ; 
Asthma J45.909 ; Body mass index (BMI) of 45.0-49.9 in adult Z68.42 and Morbid (
severe) obesity due to excess calories E66.01

 

 [address] [address]-
3692    Type 2 diabetes mellitus with diabetic polyneuropathy 
E11.42 ; Hyperlipidemia, unspecified hyperlipidemia E78.5 ; Hypertension I10 
and GERD (gastroesophageal reflux disease) K21.9

 

 [address] [phone]-
[phone]  22 Mar, 2017  Type 2 diabetes mellitus with diabetic polyneuropathy E11.42

 

 [place] [id number] [place] [address]-
9866    Type 2 diabetes mellitus with diabetic polyneuropathy 
E11.42 ; Coronary artery disease I25.10 ; History of MI (myocardial infarction) 
I25.2 ; Immune thrombocytopenic purpura D69.3 ; GERD (gastroesophageal reflux 
disease) K21.9 ; Hyperlipidemia, unspecified hyperlipidemia E78.5 ; 
Hypertension I10 ; History of kidney stones Z87.442 and Anxiety disorder, 
unspecified F41.9

 

 [address] [phone]-
3205  14 2017   

 

 [place] [address] [place] [id number] [place] [address]-
8580  29 Sep, 2016  Coronary artery disease I25.10 ; History of MI (myocardial 
infarction) I25.2 ; History of kidney stones Z87.442 ; Type 2 diabetes mellitus 
with diabetic polyneuropathy E11.42 ; Avascular necrosis of bone of right hip 
M87.051 ; Hyperlipidemia, unspecified hyperlipidemia E78.5 ; Hypertension I10 ; 
Asthma J45.909 and Encounter for immunization Z23

 

 [place] [address] [address]-
0594  20 Sep, 2016   

 

 [address] [address]-
6448    Coronary artery disease I25.10 ; Type 2 diabetes mellitus 
with diabetic polyneuropathy E11.42 ; History of MI (myocardial infarction) 
I25.2 ; Immune thrombocytopenic purpura D69.3 ; Hyperlipidemia, unspecified 
hyperlipidemia E78.5 and Hypertension I10

 

 [place] [address] [place] [id number] [place] [address]-
6804    Coronary artery disease I25.10 ; Lymphedema I89.0 ; History 
of MI (myocardial infarction) I25.2 ; History of kidney stones Z87.442 ; Immune 
thrombocytopenic purpura D69.3 ; Type 2 diabetes mellitus with diabetic 
polyneuropathy E11.42 ; Avascular necrosis of bone of right hip M87.051 ; GERD (
gastroesophageal reflux disease) K21.9 ; Hyperlipidemia, unspecified 
hyperlipidemia E78.5 ; Hypertension I10 and Asthma J45.909

 

 [place] [address] [place] [id number] [place] [address]-
2354     

 

 [address] [address]-
9267     

 

 [place] [id number] [place] [address]-
9426  02 Dec, 2015   

 

 [place] [address] [phone]-
[phone]  02 Dec, 2015  Hyperlipidemia, unspecified hyperlipidemia E78.5

 

 [facility]  [address] [facility] [id number] [place] [phone]
8774     

 

 [facility]  [address] [facility] [id number] [address]-
8033     

 

 [facility]  [address] [facility] [id number] [address]
1738    Allergic rhinitis J30.9

 

 [facility]  [address] [facility] [id number] [address]-
3530    Type 2 diabetes mellitus with diabetic polyneuropathy 
E11.42 ; Routine adult health maintenance Z00.00 ; Coronary artery disease 
I25.10 ; History of MI (myocardial infarction) I25.2 ; History of kidney stones 
Z87.442 ; Immune thrombocytopenic purpura D69.3 ; Avascular necrosis of bone of 
right hip M87.051 and GERD (gastroesophageal reflux disease) K21.9







IMMUNIZATIONS

No Known Immunizations



SOCIAL HISTORY

Never Assessed



REASON FOR VISIT

medication changes, per nursing home



PLAN OF CARE





VITAL SIGNS





MEDICATIONS







 Medication  Instructions  Dosage  Frequency  Start Date  End Date  Duration  
Status

 

 Naproxen Sodium 550 MG  Orally 2 times a day  1 tablet with food or milk as 
needed  12h  02 Oct, 2018     30 days  Active







RESULTS

No Results



PROCEDURES

No Known procedures



INSTRUCTIONS





MEDICATIONS ADMINISTERED

No Known Medications



MEDICAL (GENERAL) HISTORY







 Type  Description  Date

 

 Medical History  Type II Diabetes   

 

 Medical History  CAD   

 

 Medical History  Acute MI x1   

 

 Medical History  Heart Cath x3   

 

 Medical History  Kidney Stones   

 

 Medical History  Lymphedema   

 

 Medical History  Immune thrombocytopenic purpura   

 

 Surgical History  Heart Stents x2   

 

 Surgical History  Cholecystectomy   

 

 Surgical History     

 

 Surgical History  [place] Teeth   

 

 Hospitalization History  [facility]  

 

 Hospitalization History  Sepsis/Toxic Shock  

 

 Hospitalization History  [facility]-flu/pneumonia  2017

 

 Hospitalization History  [facility]- Anemia, cellulitis pannus, yeast 
infection. Discharged 2018

## 2018-12-21 NOTE — XMS REPORT
[facility]

 Created on: 10/31/2018



Madison Young

External Reference #: [id number]

: 1945

Sex: Female



Demographics







 Address  [address] [place]  [address]

 

 Preferred Language  Unknown

 

 Marital Status  Unknown

 

 Anabaptist Affiliation  Unknown

 

 Race  Unknown

 

 Ethnic Group  Unknown





Author







 Author  SHAZIA MANUEL

 

 [facility]

 

 Address  [address] [place]  [address]



 

 Phone  [phone]







Care Team Providers







 Care Team Member Name  Role  Phone

 

 SHAZIA MANUEL  Unavailable  [phone]







PROBLEMS







 Type  Condition  ICD9-CM Code  AVY64-DD Code  Onset Dates  Condition Status  
SNOMED Code

 

 Problem  Morbid (severe) obesity due to excess calories     E66.01     Active  
196214972

 

 Problem  Non-adherence to medical treatment     Z91.19     Active  061288074

 

 Problem  Recurrent major depressive disorder, in partial remission     F33.41 
    Active  72800624

 

 Problem  Type 2 diabetes mellitus with hyperglycemia     E11.65     Active  
22772072

 

 Problem  Chronic kidney disease (CKD) stage G3a/A3, moderately decreased 
glomerular filtration rate (GFR) between 45-59 mL/min/1.73 square meter and 
albuminuria creatinine ratio greater than 300 mg/g     N18.3     Active  
891106907

 

 Problem  Long term current use of insulin     Z79.4     Active  247449532

 

 Problem  Peripheral edema     R60.9     Active  388936823

 

 Problem  Iron deficiency anemia     D50.9     Active  29347828

 

 Problem  Other iron deficiency anemia     D50.8     Active  88143926

 

 Problem  Body mass index (BMI) of 50-59.9 in adult     Z68.43     Active  
305562124

 

 Problem  Immune thrombocytopenic purpura     D69.3     Active  730980029

 

 Problem  Coronary artery disease     I25.10     Active  86272615

 

 Problem  Microalbuminuric diabetic nephropathy     E11.21     Active  959608786

 

 Problem  Type 2 diabetes mellitus with diabetic polyneuropathy     E11.42     
Active  35631687

 

 Problem  Hyperlipidemia, unspecified hyperlipidemia     E78.5     Active  
07960410

 

 Problem  Asthma     J45.909     Active  870100289

 

 Problem  GERD (gastroesophageal reflux disease)     K21.9     Active  678983428

 

 Problem  Hypertension     I10     Active  88027637

 

 Problem  Avascular necrosis of bone of right hip     M87.051     Active  
422443886

 

 Problem  Anxiety disorder, unspecified     F41.9     Active  705624353







ALLERGIES

No Information



ENCOUNTERS







 Encounter  Location  Date  Diagnosis

 

 Via [facility], [place], operated by [facility]  [address] [place], [place] 
[id number]  30 Oct, 2018  Type 2 diabetes mellitus with hyperglycemia E11.65 and 
Long term current use of insulin Z79.4

 

 [place] [address], [place] [address]  25 Oct, 2018   

 

 [place] [address] [address]  25 Oct, 2018   

 

 [address] [facility]  [address] [place], [place] 
[id number]  24 Oct, 2018  Type 2 diabetes mellitus with diabetic polyneuropathy 
E11.42

 

 [place] [address], [place] [address]  23 Oct, 2018  Type 2 diabetes mellitus with diabetic polyneuropathy E11.42

 

 [place] [address], [place] [address]  22 Oct, 2018   

 

 [place] [address] [place] [address]-
2674  09 Oct, 2018   

 

 [facility]  [address] [place] 
[id number]  02 Oct, 2018  Avascular necrosis of bone of right hip M87.051 ; Type 
2 diabetes mellitus with diabetic polyneuropathy E11.42 ; Other iron deficiency 
anemia D50.8 ; Morbid (severe) obesity due to excess calories E66.01 ; 
Peripheral edema R60.9 ; Hyperkalemia E87.5 and Chronic kidney disease (CKD) 
stage G3a/A3, moderately decreased glomerular filtration rate (GFR) between 45-
59 mL/min/1.73 square meter and albuminuria creatinine ratio greater than 300 mg
/g N18.3

 

 [place] [id number] [address]  14 Sep, 2018  Coronary artery disease I25.10 ; Congestive heart failure, 
unspecified HF chronicity, unspecified heart failure type I50.9 ; Peripheral 
edema R60.9 ; Avascular necrosis of bone of right hip M87.051 ; Morbid (severe) 
obesity due to excess calories E66.01 and Body mass index (BMI) of 50-59.9 in 
adult Z68.43

 

 [facility]  [address] [place] [phone]-
8346  10 Sep, 2018   

 

 [facility]  [id number] [place] [address]-
9345  06 Sep, 2018  Type 2 diabetes mellitus with diabetic polyneuropathy 
E11.42 ; Coronary artery disease I25.10 ; Hypertension I10 ; Long-term insulin 
use Z79.4 ; Body mass index (BMI) of 45.0-49.9 in adult Z68.42 ; Peripheral 
edema R60.9 and Avascular necrosis of bone of right hip M87.051

 

 [facility]  [id number] [place] [address]-
7675  17 Aug, 2018  Peripheral edema R60.9

 

 [place] [id number] [place] [phone]-
1559  13 Aug, 2018  Hypertension I10 and Peripheral edema R60.9

 

 [place] [id number] [place] [address]-
6513  09 Aug, 2018   

 

 [facility]  [id number] [place] [phone]-
1123  09 Aug, 2018  Hypertension I10 and Peripheral edema R60.9

 

 [facility]  [id number] [place] [address] 75952-
7261  06 Aug, 2018  Hypertension I10 and Peripheral edema R60.9

 

 [facility]  [id number] [place] [phone]-
1554  02 Aug, 2018   

 

 [facility]  [id number] [place] [address]-
0365  02 Aug, 2018  Hypertension I10 and Peripheral edema R60.9

 

 [facility]  [id number] [place] [address] [phone]-
[phone]  01 Aug, 2018   

 

 [facility]  [id number] [place] [address] 15922-
0262     

 

 [facility]  [id number] [place] [address] [phone]-
[phone]     

 

 [place] [address] [place] [address]56-
4767     

 

 [place] [id number] [place] [address]-
3544    Diabetes mellitus due to underlying condition with foot 
ulcer E08.621 ; Non-pressure chronic ulcer of other part of left foot limited 
to breakdown of skin L97.521 and BMI 45.0-49.9, adult Z68.42

 

 [place] [address] [place] [id number] [place] [address]24-
4141    Acute idiopathic gout involving toe of left foot M10.072

 

 [place] [address] [place] [address]-
0863     

 

 [place] [address] [place] [id number] [place] [address]-
4073     

 

 [place] [address] [place] [id number] [place] [address] 43069-
5939     

 

 [place] [address] [place] [id number] [place] [address] 26696-
4198    Type 2 diabetes mellitus with diabetic polyneuropathy 
E11.42 ; Hypertension I10 ; Hyperlipidemia, unspecified hyperlipidemia E78.5 ; 
Body mass index (BMI) of 45.0-49.9 in adult Z68.42 ; Long-term insulin use 
Z79.4 ; Non-adherence to medical treatment Z91.19 ; Coronary artery disease 
I25.10 ; GERD (gastroesophageal reflux disease) K21.9 ; Asthma J45.909 and 
Recurrent major depressive disorder, in partial remission F33.41

 

 [place] [address] [place] [address] [id number] [place] [address]-
0746  22 May, 2018  Recurrent major depressive disorder, in partial remission 
F33.41 and Hypertension I10

 

 [place] [address] [place] [address]-
8799  21 May, 2018  Immune thrombocytopenic purpura D69.3 and Iron deficiency 
anemia D50.9

 

 [place] [address], [place] [address]
1118  21 May, 2018  Type 2 diabetes mellitus with diabetic polyneuropathy 
E11.42 ; Long-term insulin use Z79.4 ; Chronic kidney disease (CKD) stage G3a/A3
, moderately decreased glomerular filtration rate (GFR) between 45-59 mL/min/
1.73 square meter and albuminuria creatinine ratio greater than 300 mg/g N18.3 
; Hypertension I10 ; Hyperlipidemia, unspecified hyperlipidemia E78.5 ; 
Coronary artery disease I25.10 ; GERD (gastroesophageal reflux disease) K21.9 ; 
Immune thrombocytopenic purpura D69.3 ; Asthma J45.909 ; Morbid (severe) 
obesity due to excess calories E66.01 and Recurrent major depressive disorder, 
in partial remission F33.41

 

 [place] [address] [place] [address]235-
8625  11 May, 2018  Chronic kidney disease (CKD) stage G3a/A3, moderately 
decreased glomerular filtration rate (GFR) between 45-59 mL/min/1.73 square 
meter and albuminuria creatinine ratio greater than 300 mg/g N18.3

 

 [place] [address] [place] [address]-
0449  02 May, 2018  Chronic kidney disease (CKD) stage G3a/A3, moderately 
decreased glomerular filtration rate (GFR) between 45-59 mL/min/1.73 square 
meter and albuminuria creatinine ratio greater than 300 mg/g N18.3

 

 [place] [address] [place] [id number] [place] [address] 48725-
8331     

 

 [place] [address] [place] [address] [id number] [place] [address] 12414-
2182  28 Mar, 2018   

 

 [place] [address] [place] [id number] [place] [address]-
3714  26 Mar, 2018  Immune thrombocytopenic purpura D69.3 ; Type 2 diabetes 
mellitus with diabetic polyneuropathy E11.42 ; Avascular necrosis of bone of 
right hip M87.051 ; Long-term insulin use Z79.4 ; GERD (gastroesophageal reflux 
disease) K21.9 ; Hyperlipidemia, unspecified hyperlipidemia E78.5 ; 
Hypertension I10 ; Recurrent major depressive disorder, in partial remission 
F33.41 ; Microalbuminuric diabetic nephropathy E11.21 ; Body mass index (BMI) 
of 45.0-49.9 in adult Z68.42 ; BMI 45.0-49.9, adult Z68.42 and Chronic kidney 
disease (CKD) stage G3a/A3, moderately decreased glomerular filtration rate (GFR
) between 45-59 mL/min/1.73 square meter and albuminuria creatinine ratio 
greater than 300 mg/g N18.3

 

 [place] [address] [address]-
4633  23 Mar, 2018   

 

 [place] [address] [address]-
7389  23 Mar, 2018   

 

 [place] [address] [phone]-
8216  19 Mar, 2018   

 

 [place] [address] [address]-
8162  14 Mar, 2018   

 

 [place] [id number] [place] [address] 30847-
9834  13 Mar, 2018  Type 2 diabetes mellitus with diabetic polyneuropathy 
E11.42 ; Asthma J45.909 and Hypertension I10

 

 [facility]  [address] [place] 
[id number]  13 Mar, 2018  Skin ulcer, limited to breakdown of skin L98.491 ; 
Immune thrombocytopenic purpura D69.3 ; Avascular necrosis of bone of right hip 
M87.051 and Type 2 diabetes mellitus with diabetic polyneuropathy E11.42

 

 [place] [address] [place] [address] [id number] [place] [address] 71767-
8043  06 Mar, 2018  Asthma J45.909 and Type 2 diabetes mellitus with diabetic 
polyneuropathy E11.42

 

 [place] [address] [address]-
2231  01 Mar, 2018   

 

 [facility]  [address] [place] 
[id number]    Other iron deficiency anemia D50.8 ; Weakness R53.1 ; 
Type 2 diabetes mellitus with diabetic polyneuropathy E11.42 ; Cellulitis of 
trunk, unspecified site of trunk L03.319 ; Coronary artery disease I25.10 ; 
Avascular necrosis of bone of right hip M87.051 and Morbid (severe) obesity due 
to excess calories E66.01

 

 [facility]  [id number] [place] [address] 
[id number]  23 2018   

 

 [facility]  [id number] [place] [phone]-
[phone]     

 

 [facility]  [id number] [place] [phone]
-[phone]  15 Feb, 2018  Yeast infection of the skin B37.2

 

 [place] [address] [place] [id number] [place] [address] 90990-
2701  15 Feb, 2018   

 

 [place] [address] [id number] [place] [address] 84306-
9893  15 Feb, 2018   

 

 [place] [id number] [place] [address] 48916-
5301     

 

 [facility] [place] [facility]  [id number] [place] [address] [phone]
-[phone]     

 

 [place] [id number] [place] [address] 80607-
2442    Type 2 diabetes mellitus with diabetic polyneuropathy 
E11.42 ; Avascular necrosis of bone of right hip M87.051 ; Hyperlipidemia, 
unspecified hyperlipidemia E78.5 ; Hypertension I10 ; Anxiety disorder, 
unspecified F41.9 ; Immune thrombocytopenic purpura D69.3 ; Coronary artery 
disease I25.10 ; Orthopnea R06.01 ; Acute bronchitis, unspecified organism 
J20.9 ; Wound of left lower extremity, initial encounter S81.802A ; Body aches 
R52 and Debilitated R53.81

 

 [place] [address] [place] [address] 48770-
6963  12 2018   

 

 [place] [address] [address]
9770    Type 2 diabetes mellitus with diabetic polyneuropathy 
E11.42 ; Encounter for immunization Z23 ; Hyperlipidemia, unspecified 
hyperlipidemia E78.5 ; Hypertension I10 ; Anxiety disorder, unspecified F41.9 ; 
Asthma J45.909 ; Body mass index (BMI) of 45.0-49.9 in adult Z68.42 and Morbid (
severe) obesity due to excess calories E66.01

 

 [address] [address]-
9054    Type 2 diabetes mellitus with diabetic polyneuropathy 
E11.42 ; Hyperlipidemia, unspecified hyperlipidemia E78.5 ; Hypertension I10 
and GERD (gastroesophageal reflux disease) K21.9

 

 [address] [address]-
5759  22 Mar, 2017  Type 2 diabetes mellitus with diabetic polyneuropathy E11.42

 

 [address] [address]-
5604    Type 2 diabetes mellitus with diabetic polyneuropathy 
E11.42 ; Coronary artery disease I25.10 ; History of MI (myocardial infarction) 
I25.2 ; Immune thrombocytopenic purpura D69.3 ; GERD (gastroesophageal reflux 
disease) K21.9 ; Hyperlipidemia, unspecified hyperlipidemia E78.5 ; 
Hypertension I10 ; History of kidney stones Z87.442 and Anxiety disorder, 
unspecified F41.9

 

 [address] [address]-
9075     

 

 [address] [address]-
3902  29 Sep, 2016  Coronary artery disease I25.10 ; History of MI (myocardial 
infarction) I25.2 ; History of kidney stones Z87.442 ; Type 2 diabetes mellitus 
with diabetic polyneuropathy E11.42 ; Avascular necrosis of bone of right hip 
M87.051 ; Hyperlipidemia, unspecified hyperlipidemia E78.5 ; Hypertension I10 ; 
Asthma J45.909 and Encounter for immunization Z23

 

 [address] [address]-
7044  20 Sep, 2016   

 

 [place] [address] [id number] [place] [address] 01865-
8258    Coronary artery disease I25.10 ; Type 2 diabetes mellitus 
with diabetic polyneuropathy E11.42 ; History of MI (myocardial infarction) 
I25.2 ; Immune thrombocytopenic purpura D69.3 ; Hyperlipidemia, unspecified 
hyperlipidemia E78.5 and Hypertension I10

 

 [place] [address] [address]-
1526    Coronary artery disease I25.10 ; Lymphedema I89.0 ; History 
of MI (myocardial infarction) I25.2 ; History of kidney stones Z87.442 ; Immune 
thrombocytopenic purpura D69.3 ; Type 2 diabetes mellitus with diabetic 
polyneuropathy E11.42 ; Avascular necrosis of bone of right hip M87.051 ; GERD (
gastroesophageal reflux disease) K21.9 ; Hyperlipidemia, unspecified 
hyperlipidemia E78.5 ; Hypertension I10 and Asthma J45.909

 

 [place] [address] [id number] [place] [address] 52671-
0661     

 

 [place] [address] [place] [id number] [place] [address] 49281-
3111     

 

 [place] [address] [place] [address] 79939-
6880  02 Dec, 2015   

 

 [place] [id number] [place] [address] 18005-
1138  02 Dec, 2015  Hyperlipidemia, unspecified hyperlipidemia E78.5

 

 [place] [id number] [place] [address] 91334-
9836     

 

 [place] [address] [address]-
5536     

 

 [place] [address] [place] [id number] [place] [address] [phone]-
8232    Allergic rhinitis J30.9

 

 [place] [address] [address]-
6834    Type 2 diabetes mellitus with diabetic polyneuropathy 
E11.42 ; Routine adult health maintenance Z00.00 ; Coronary artery disease 
I25.10 ; History of MI (myocardial infarction) I25.2 ; History of kidney stones 
Z87.442 ; Immune thrombocytopenic purpura D69.3 ; Avascular necrosis of bone of 
right hip M87.051 and GERD (gastroesophageal reflux disease) K21.9







IMMUNIZATIONS

No Known Immunizations



SOCIAL HISTORY

Never Assessed



REASON FOR VISIT





PLAN OF CARE





VITAL SIGNS





MEDICATIONS

Unknown Medications



RESULTS

No Results



PROCEDURES

No Known procedures



INSTRUCTIONS





MEDICATIONS ADMINISTERED

No Known Medications



MEDICAL (GENERAL) HISTORY







 Type  Description  Date

 

 Medical History  Type II Diabetes   

 

 Medical History  CAD   

 

 Medical History  Acute MI x1   

 

 Medical History  Heart Cath x3   

 

 Medical History  Kidney Stones   

 

 Medical History  Lymphedema   

 

 Medical History  Immune thrombocytopenic purpura   

 

 Surgical History  Heart Stents x2   

 

 Surgical History  Cholecystectomy   

 

 Surgical History     

 

 Surgical History  Boswell Teeth   

 

 Hospitalization History  [facility]P  

 

 Hospitalization History  Sepsis/Toxic Shock  

 

 Hospitalization History  [facility]/pneumonia  2017

 

 Hospitalization History  [facility], [place], operated by [facility]- Anemia, cellulitis pannus, yeast 
infection. Discharged 2018

## 2018-12-21 NOTE — XMS REPORT
[facility]

 Created on: 2018



Madison Young

External Reference #: [id number]

: 1945

Sex: Female



Demographics







 Address  [address] [place]  [address]

 

 Preferred Language  Unknown

 

 Marital Status  Unknown

 

 Yazdanism Affiliation  Unknown

 

 Race  Unknown

 

 Ethnic Group  Unknown





Author







 Author  SHAZIA MANUEL

 

 [facility]

 

 Address  [address] [place]  [address]



 

 Phone  [phone]







Care Team Providers







 Care Team Member Name  Role  Phone

 

 SHAZIA MANUEL  Unavailable  [phone]







PROBLEMS







 Type  Condition  ICD9-CM Code  QRJ45-OX Code  Onset Dates  Condition Status  
SNOMED Code

 

 Problem  Morbid (severe) obesity due to excess calories     E66.01     Active  
965489296

 

 Problem  Non-adherence to medical treatment     Z91.19     Active  348854194

 

 Problem  Recurrent major depressive disorder, in partial remission     F33.41 
    Active  25406984

 

 Problem  Type 2 diabetes mellitus with hyperglycemia     E11.65     Active  
14198087

 

 Problem  Chronic kidney disease (CKD) stage G3a/A3, moderately decreased 
glomerular filtration rate (GFR) between 45-59 mL/min/1.73 square meter and 
albuminuria creatinine ratio greater than 300 mg/g     N18.3     Active  
287624215

 

 Problem  Long term current use of insulin     Z79.4     Active  490885768

 

 Problem  Peripheral edema     R60.9     Active  375615864

 

 Problem  Iron deficiency anemia     D50.9     Active  17548428

 

 Problem  Other iron deficiency anemia     D50.8     Active  05705879

 

 Problem  Body mass index (BMI) of 50-59.9 in adult     Z68.43     Active  
444419956

 

 Problem  Immune thrombocytopenic purpura     D69.3     Active  803677071

 

 Problem  Coronary artery disease     I25.10     Active  16416499

 

 Problem  Microalbuminuric diabetic nephropathy     E11.21     Active  759455889

 

 Problem  Type 2 diabetes mellitus with diabetic polyneuropathy     E11.42     
Active  07929109

 

 Problem  Hyperlipidemia, unspecified hyperlipidemia     E78.5     Active  
55749757

 

 Problem  Asthma     J45.909     Active  915285042

 

 Problem  GERD (gastroesophageal reflux disease)     K21.9     Active  990739474

 

 Problem  Hypertension     I10     Active  02231003

 

 Problem  Avascular necrosis of bone of right hip     M87.051     Active  
536577690

 

 Problem  Anxiety disorder, unspecified     F41.9     Active  078026943







ALLERGIES

No Information



ENCOUNTERS







 Encounter  Location  Date  Diagnosis

 

 [place] [id number], [place] [address]
1851    Type 2 diabetes mellitus with hyperglycemia E11.65

 

 [place] [address], [place] [address]76    Avascular necrosis of bone of right hip M87.051

 

 [place] [id number], [place] [address]
4880    Hypertension I10 and Avascular necrosis of bone of right 
hip M87.051

 

 [place] [id number], [place] [address]-
1834    Type 2 diabetes mellitus with diabetic polyneuropathy 
E11.42 ; Hypertension I10 ; Hyperlipidemia, unspecified hyperlipidemia E78.5 ; 
Coronary artery disease I25.10 ; GERD (gastroesophageal reflux disease) K21.9 
and Avascular necrosis of bone of right hip M87.051

 

 [facility]  [address] [place] 
[id number]  30 Oct, 2018  Type 2 diabetes mellitus with hyperglycemia E11.65 and 
Long term current use of insulin Z79.4

 

 [place] [address], [place] [address]  25 Oct, 2018   

 

 [place] [id number], [place] [address]
68[address]  25 Oct, 2018   

 

 [address] [facility]  [address] [place] 
[id number]  24 Oct, 2018  Type 2 diabetes mellitus with diabetic polyneuropathy 
E11.42

 

 [place] [address] [facility] [id number] [address]116  23 Oct, 2018  Type 2 diabetes mellitus with diabetic polyneuropathy E11.42

 

 [place] [id number], [place] [address]
0964  22 Oct, 2018   

 

 [place] [id number] [address]-
2983  09 Oct, 2018   

 

 [facility]  [address] [place] 
[id number]  02 Oct, 2018  Avascular necrosis of bone of right hip M87.051 ; Type 
2 diabetes mellitus with diabetic polyneuropathy E11.42 ; Other iron deficiency 
anemia D50.8 ; Morbid (severe) obesity due to excess calories E66.01 ; 
Peripheral edema R60.9 ; Hyperkalemia E87.5 and Chronic kidney disease (CKD) 
stage G3a/A3, moderately decreased glomerular filtration rate (GFR) between 45-
59 mL/min/1.73 square meter and albuminuria creatinine ratio greater than 300 mg
/g N18.3

 

 [place] [id number] [place] [address]-
0326  14 Sep, 2018  Coronary artery disease I25.10 ; Congestive heart failure, 
unspecified HF chronicity, unspecified heart failure type I50.9 ; Peripheral 
edema R60.9 ; Avascular necrosis of bone of right hip M87.051 ; Morbid (severe) 
obesity due to excess calories E66.01 and Body mass index (BMI) of 50-59.9 in 
adult Z68.43

 

 [place] [id number] [place] [address]-
0158  10 Sep, 2018   

 

 [place] [id number] [place] [address]-
3926  06 Sep, 2018  Type 2 diabetes mellitus with diabetic polyneuropathy 
E11.42 ; Coronary artery disease I25.10 ; Hypertension I10 ; Long-term insulin 
use Z79.4 ; Body mass index (BMI) of 45.0-49.9 in adult Z68.42 ; Peripheral 
edema R60.9 and Avascular necrosis of bone of right hip M87.051

 

 [place] [id number] [place] [address]-
6967  17 Aug, 2018  Peripheral edema R60.9

 

 [place] [id number] [place] [address]-
2361  13 Aug, 2018  Hypertension I10 and Peripheral edema R60.9

 

 [place] [id number] [place] [address]-
7272  09 Aug, 2018   

 

 [place] [id number] [place] [address]-
0370  09 Aug, 2018  Hypertension I10 and Peripheral edema R60.9

 

 [facility]  [address] [place] [address]-
5065  06 Aug, 2018  Hypertension I10 and Peripheral edema R60.9

 

 [facility]  [id number] [place] [address]-
9604  02 Aug, 2018   

 

 [facility]  [id number] [place] [address]-
6228  02 Aug, 2018  Hypertension I10 and Peripheral edema R60.9

 

 [facility]  [id number] [place] [address]-
3599  01 Aug, 2018   

 

 [facility]  [id number] [place] [address]-
6066     

 

 [facility]  [id number] [place] [address]-
9661     

 

 [facility]  [id number] [place] [address]-
5193     

 

 [facility]  [id number] [place] [address]-
3524    Diabetes mellitus due to underlying condition with foot 
ulcer E08.621 ; Non-pressure chronic ulcer of other part of left foot limited 
to breakdown of skin L97.521 and BMI 45.0-49.9, adult Z68.42

 

 [facility]  [address] [place] [address]-
9774    Acute idiopathic gout involving toe of left foot M10.072

 

 [facility]  [id number], [place] [address]-
4171     

 

 [facility]  [id number] [place] [address]-
5617     

 

 [facility]  [address], [place] [address]
1105     

 

 [facility]  [facility] [id number] [place] [address]-
1440    Type 2 diabetes mellitus with diabetic polyneuropathy 
E11.42 ; Hypertension I10 ; Hyperlipidemia, unspecified hyperlipidemia E78.5 ; 
Body mass index (BMI) of 45.0-49.9 in adult Z68.42 ; Long-term insulin use 
Z79.4 ; Non-adherence to medical treatment Z91.19 ; Coronary artery disease 
I25.10 ; GERD (gastroesophageal reflux disease) K21.9 ; Asthma J45.909 and 
Recurrent major depressive disorder, in partial remission F33.41

 

 [address] [address]-
8894  22 May, 2018  Recurrent major depressive disorder, in partial remission 
F33.41 and Hypertension I10

 

 [address] [address]-
9836  21 May, 2018  Immune thrombocytopenic purpura D69.3 and Iron deficiency 
anemia D50.9

 

 [address] [address]-
2750  21 May, 2018  Type 2 diabetes mellitus with diabetic polyneuropathy 
E11.42 ; Long-term insulin use Z79.4 ; Chronic kidney disease (CKD) stage G3a/A3
, moderately decreased glomerular filtration rate (GFR) between 45-59 mL/min/
1.73 square meter and albuminuria creatinine ratio greater than 300 mg/g N18.3 
; Hypertension I10 ; Hyperlipidemia, unspecified hyperlipidemia E78.5 ; 
Coronary artery disease I25.10 ; GERD (gastroesophageal reflux disease) K21.9 ; 
Immune thrombocytopenic purpura D69.3 ; Asthma J45.909 ; Morbid (severe) 
obesity due to excess calories E66.01 and Recurrent major depressive disorder, 
in partial remission F33.41

 

 [address] [address]-
7239  11 May, 2018  Chronic kidney disease (CKD) stage G3a/A3, moderately 
decreased glomerular filtration rate (GFR) between 45-59 mL/min/1.73 square 
meter and albuminuria creatinine ratio greater than 300 mg/g N18.3

 

 [address] [place] [address]-
8834  02 May, 2018  Chronic kidney disease (CKD) stage G3a/A3, moderately 
decreased glomerular filtration rate (GFR) between 45-59 mL/min/1.73 square 
meter and albuminuria creatinine ratio greater than 300 mg/g N18.3

 

 [facility]  [address] [place] [address]-
8100     

 

 [facility]  [id number] [place] [address]-
5189  28 Mar, 2018   

 

 [place] [id number] [place] [address]-
4284  26 Mar, 2018  Immune thrombocytopenic purpura D69.3 ; Type 2 diabetes 
mellitus with diabetic polyneuropathy E11.42 ; Avascular necrosis of bone of 
right hip M87.051 ; Long-term insulin use Z79.4 ; GERD (gastroesophageal reflux 
disease) K21.9 ; Hyperlipidemia, unspecified hyperlipidemia E78.5 ; 
Hypertension I10 ; Recurrent major depressive disorder, in partial remission 
F33.41 ; Microalbuminuric diabetic nephropathy E11.21 ; Body mass index (BMI) 
of 45.0-49.9 in adult Z68.42 ; BMI 45.0-49.9, adult Z68.42 and Chronic kidney 
disease (CKD) stage G3a/A3, moderately decreased glomerular filtration rate (GFR
) between 45-59 mL/min/1.73 square meter and albuminuria creatinine ratio 
greater than 300 mg/g N18.3

 

 [place] [address] [place] [address]-
0822  23 Mar, 2018   

 

 [facility]  [address], [place] [address]-
5478  23 Mar, 2018   

 

 [place] [id number] [place] [address]-
7776  19 Mar, 2018   

 

 [facility]  [id number] [place] [address] 7[address]-
6100  14 Mar, 2018   

 

 [facility]  [address] [place] [address]-
2107  13 Mar, 2018  Type 2 diabetes mellitus with diabetic polyneuropathy 
E11.42 ; Asthma J45.909 and Hypertension I10

 

 Via [facility]  [address] [place] 
[id number]  13 Mar, 2018  Skin ulcer, limited to breakdown of skin L98.491 ; 
Immune thrombocytopenic purpura D69.3 ; Avascular necrosis of bone of right hip 
M87.051 and Type 2 diabetes mellitus with diabetic polyneuropathy E11.42

 

 [place] [id number] [place] [address]-
2837  06 Mar, 2018  Asthma J45.909 and Type 2 diabetes mellitus with diabetic 
polyneuropathy E11.42

 

 [place] [id number] [place] [address]-
8506  01 Mar, 2018   

 

 [facility] [employer]  [address] [place] 
[id number]    Other iron deficiency anemia D50.8 ; Weakness R53.1 ; 
Type 2 diabetes mellitus with diabetic polyneuropathy E11.42 ; Cellulitis of 
trunk, unspecified site of trunk L03.319 ; Coronary artery disease I25.10 ; 
Avascular necrosis of bone of right hip M87.051 and Morbid (severe) obesity due 
to excess calories E66.01

 

 [facility]  [id number] [place] [address] 
[id number]     

 

 [facility]  [address] [place] [address]-
7013     

 

 [facility]  [address] [place] [phone]
[phone]  15 Feb, 2018  Yeast infection of the skin B37.2

 

 [facility]  [id number] [place] [address] 01841-
9561  15 Feb, 2018   

 

 [facility]  [id number] [place] [address] 82704-
0836  15 Feb, 2018   

 

 [facility]  [address] [place] [address] 83632-
4263     

 

 [facility]  [id number] [place] [phone]
[phone]     

 

 [address] [address]-
6882    Type 2 diabetes mellitus with diabetic polyneuropathy 
E11.42 ; Avascular necrosis of bone of right hip M87.051 ; Hyperlipidemia, 
unspecified hyperlipidemia E78.5 ; Hypertension I10 ; Anxiety disorder, 
unspecified F41.9 ; Immune thrombocytopenic purpura D69.3 ; Coronary artery 
disease I25.10 ; Orthopnea R06.01 ; Acute bronchitis, unspecified organism 
J20.9 ; Wound of left lower extremity, initial encounter S81.802A ; Body aches 
R52 and Debilitated R53.81

 

 [place] [address] [address]-
3054     

 

 [address] [address]-
1522    Type 2 diabetes mellitus with diabetic polyneuropathy 
E11.42 ; Encounter for immunization Z23 ; Hyperlipidemia, unspecified 
hyperlipidemia E78.5 ; Hypertension I10 ; Anxiety disorder, unspecified F41.9 ; 
Asthma J45.909 ; Body mass index (BMI) of 45.0-49.9 in adult Z68.42 and Morbid (
severe) obesity due to excess calories E66.01

 

 [address] [address]-
4837    Type 2 diabetes mellitus with diabetic polyneuropathy 
E11.42 ; Hyperlipidemia, unspecified hyperlipidemia E78.5 ; Hypertension I10 
and GERD (gastroesophageal reflux disease) K21.9

 

 [place] [id number] [place] [address] 57962-
9114  22 Mar, 2017  Type 2 diabetes mellitus with diabetic polyneuropathy E11.42

 

 [place] [address] [address]-
0245    Type 2 diabetes mellitus with diabetic polyneuropathy 
E11.42 ; Coronary artery disease I25.10 ; History of MI (myocardial infarction) 
I25.2 ; Immune thrombocytopenic purpura D69.3 ; GERD (gastroesophageal reflux 
disease) K21.9 ; Hyperlipidemia, unspecified hyperlipidemia E78.5 ; 
Hypertension I10 ; History of kidney stones Z87.442 and Anxiety disorder, 
unspecified F41.9

 

 [place] [id number] [place] [address]481-
4967  14 2017   

 

 [place] [address] [address]-
9811  29 Sep, 2016  Coronary artery disease I25.10 ; History of MI (myocardial 
infarction) I25.2 ; History of kidney stones Z87.442 ; Type 2 diabetes mellitus 
with diabetic polyneuropathy E11.42 ; Avascular necrosis of bone of right hip 
M87.051 ; Hyperlipidemia, unspecified hyperlipidemia E78.5 ; Hypertension I10 ; 
Asthma J45.909 and Encounter for immunization Z23

 

 [place] [id number] [place] [address]-
0829  20 Sep, 2016   

 

 [place] [address] [address]-
5110    Coronary artery disease I25.10 ; Type 2 diabetes mellitus 
with diabetic polyneuropathy E11.42 ; History of MI (myocardial infarction) 
I25.2 ; Immune thrombocytopenic purpura D69.3 ; Hyperlipidemia, unspecified 
hyperlipidemia E78.5 and Hypertension I10

 

 [place] [id number] [place] [address]-
8967    Coronary artery disease I25.10 ; Lymphedema I89.0 ; History 
of MI (myocardial infarction) I25.2 ; History of kidney stones Z87.442 ; Immune 
thrombocytopenic purpura D69.3 ; Type 2 diabetes mellitus with diabetic 
polyneuropathy E11.42 ; Avascular necrosis of bone of right hip M87.051 ; GERD (
gastroesophageal reflux disease) K21.9 ; Hyperlipidemia, unspecified 
hyperlipidemia E78.5 ; Hypertension I10 and Asthma J45.909

 

 [place] [id number] [place] [address]4586-
2861     

 

 [address] [address]-
3536     

 

 [facility]  [id number], [place] [address]-
9205  02 Dec, 2015   

 

 [facility]  [address] [address]-
4645  02 Dec, 2015  Hyperlipidemia, unspecified hyperlipidemia E78.5

 

 [facility]  [address], [place] [address]-
7951     

 

 [facility]  [address], [place] [address]-
8972     

 

 [facility]  [address], [place] [address]-
7060    Allergic rhinitis J30.9

 

 [place] [address], [place] [address]-
2654    Type 2 diabetes mellitus with diabetic polyneuropathy 
E11.42 ; Routine adult health maintenance Z00.00 ; Coronary artery disease 
I25.10 ; History of MI (myocardial infarction) I25.2 ; History of kidney stones 
Z87.442 ; Immune thrombocytopenic purpura D69.3 ; Avascular necrosis of bone of 
right hip M87.051 and GERD (gastroesophageal reflux disease) K21.9







IMMUNIZATIONS

No Known Immunizations



SOCIAL HISTORY

Never Assessed



REASON FOR VISIT

Nursing home



PLAN OF CARE





VITAL SIGNS





MEDICATIONS







 Medication  Instructions  Dosage  Frequency  Start Date  End Date  Duration  
Status

 

 Levemir FlexTouch 100 UNIT/ML  Subcutaneous Once a day  20 units in AM only  
24h       30 days  Active







RESULTS

No Results



PROCEDURES

No Known procedures



INSTRUCTIONS





MEDICATIONS ADMINISTERED

No Known Medications



MEDICAL (GENERAL) HISTORY







 Type  Description  Date

 

 Medical History  Type II Diabetes   

 

 Medical History  CAD   

 

 Medical History  Acute MI x1   

 

 Medical History  Heart Cath x3   

 

 Medical History  Kidney Stones   

 

 Medical History  Lymphedema   

 

 Medical History  Immune thrombocytopenic purpura   

 

 Surgical History  Heart Stents x2   

 

 Surgical History  Cholecystectomy   

 

 Surgical History     

 

 Surgical History  [place] Teeth   

 

 Hospitalization History  [facility]  

 

 Hospitalization History  Sepsis/Toxic Shock  

 

 Hospitalization History  [facility]-flu[facility]pneumonia  2017

 

 Hospitalization History  [facility]- Anemia, cellulitis pannus, yeast 
infection. Discharged 2018

## 2018-12-21 NOTE — XMS REPORT
[facility]

 Created on: 10/24/2018



Madison Young

External Reference #: [id number]

: 1945

Sex: Female



Demographics







 Address  [address] [place]  [address]

 

 Preferred Language  Unknown

 

 Marital Status  Unknown

 

 Cheondoism Affiliation  Unknown

 

 Race  Unknown

 

 Ethnic Group  Unknown





Author







 Author  SHAZIA MANUEL

 

 [facility]

 

 Address  [address] [place]  [address]



 

 Phone  [phone]







Care Team Providers







 Care Team Member Name  Role  Phone

 

 SHAZIA MANUEL  Unavailable  [phone]







PROBLEMS







 Type  Condition  ICD9-CM Code  WQO77-WG Code  Onset Dates  Condition Status  
SNOMED Code

 

 Problem  Anxiety disorder, unspecified     F41.9     Active  131142095

 

 Problem  Long-term insulin use     Z79.4     Active  321001929

 

 Problem  Morbid (severe) obesity due to excess calories     E66.01     Active  
304527116

 

 Problem  Other iron deficiency anemia     D50.8     Active  77671186

 

 Problem  Body mass index (BMI) of 50-59.9 in adult     Z68.43     Active  
312292823

 

 Problem  Non-adherence to medical treatment     Z91.19     Active  348594727

 

 Problem  Recurrent major depressive disorder, in partial remission     F33.41 
    Active  49970244

 

 Problem  Peripheral edema     R60.9     Active  214143880

 

 Problem  Iron deficiency anemia     D50.9     Active  71820349

 

 Problem  Avascular necrosis of bone of right hip     M87.051     Active  
400593020

 

 Problem  Coronary artery disease     I25.10     Active  46852912

 

 Problem  Chronic kidney disease (CKD) stage G3a/A3, moderately decreased 
glomerular filtration rate (GFR) between 45-59 mL/min/1.73 square meter and 
albuminuria creatinine ratio greater than 300 mg/g     N18.3     Active  
177430691

 

 Problem  Microalbuminuric diabetic nephropathy     E11.21     Active  609803935

 

 Problem  Immune thrombocytopenic purpura     D69.3     Active  735218277

 

 Problem  Hyperlipidemia, unspecified hyperlipidemia     E78.5     Active  
24070215

 

 Problem  GERD (gastroesophageal reflux disease)     K21.9     Active  195334551

 

 Problem  Asthma     J45.909     Active  263788986

 

 Problem  Type 2 diabetes mellitus with diabetic polyneuropathy     E11.42     
Active  12388480

 

 Problem  Hypertension     I10     Active  53269985







ALLERGIES

No Information



ENCOUNTERS







 Encounter  Location  Date  Diagnosis

 

 [facility], [place]  [id number] [address]  23 Oct, 2018  Type 2 diabetes mellitus with diabetic polyneuropathy E11.42

 

 [id number] [address]  22 Oct, 2018   

 

 [address] [place] [address]-
3478  09 Oct, 2018   

 

 [facility]  [address] [place], [place] 
[id number]  02 Oct, 2018  Avascular necrosis of bone of right hip M87.051 ; Type 
2 diabetes mellitus with diabetic polyneuropathy E11.42 ; Other iron deficiency 
anemia D50.8 ; Morbid (severe) obesity due to excess calories E66.01 ; 
Peripheral edema R60.9 ; Hyperkalemia E87.5 and Chronic kidney disease (CKD) 
stage G3a/A3, moderately decreased glomerular filtration rate (GFR) between 45-
59 mL/min/1.73 square meter and albuminuria creatinine ratio greater than 300 mg
/g N18.3

 

 [address] [place] [address]-
9091  14 Sep, 2018  Coronary artery disease I25.10 ; Congestive heart failure, 
unspecified HF chronicity, unspecified heart failure type I50.9 ; Peripheral 
edema R60.9 ; Avascular necrosis of bone of right hip M87.051 ; Morbid (severe) 
obesity due to excess calories E66.01 and Body mass index (BMI) of 50-59.9 in 
adult Z68.43

 

 [address] [address]
2978  10 Sep, 2018   

 

 [address] [place] [address]-
1856  06 Sep, 2018  Type 2 diabetes mellitus with diabetic polyneuropathy 
E11.42 ; Coronary artery disease I25.10 ; Hypertension I10 ; Long-term insulin 
use Z79.4 ; Body mass index (BMI) of 45.0-49.9 in adult Z68.42 ; Peripheral 
edema R60.9 and Avascular necrosis of bone of right hip M87.051

 

 [place] [id number] [place] [address]-
9558  17 Aug, 2018  Peripheral edema R60.9

 

 [facility], [place]  [address] [place] [address]-
0469  13 Aug, 2018  Hypertension I10 and Peripheral edema R60.9

 

 [facility], [place]  [id number] [address]
4190  09 Aug, 2018   

 

 [facility], [place]  [id number] [place] [address]-
1657  09 Aug, 2018  Hypertension I10 and Peripheral edema R60.9

 

 [facility], [place]  [id number] [place] [address]-
3940  06 Aug, 2018  Hypertension I10 and Peripheral edema R60.9

 

 [facility], [place]  [id number] [place] [address]-
1870  02 Aug, 2018   

 

 [facility], [place]  [id number] [place] [address]-
9265  02 Aug, 2018  Hypertension I10 and Peripheral edema R60.9

 

 [facility], [place]  [id number] [place] [address]-
5822  01 Aug, 2018   

 

 [facility], [place]  [id number] [place] [address]-
2510     

 

 [facility], [place]  [address] [address]
1130     

 

 [facility], [place]  [id number] [place] [address]-
4440     

 

 [facility], [place]  [address] [address]
1333    Diabetes mellitus due to underlying condition with foot 
ulcer E08.621 ; Non-pressure chronic ulcer of other part of left foot limited 
to breakdown of skin L97.521 and BMI 45.0-49.9, adult Z68.42

 

 [facility], [place]  [address] [address]
0176    Acute idiopathic gout involving toe of left foot M10.072

 

 [place] [address] [address]
0099     

 

 [place] [id number] [place] [address] 870[address]-
0637     

 

 [place] [id number] [place] [address]-
5703     

 

 [place] [id number] [place] [address]-
1265    Type 2 diabetes mellitus with diabetic polyneuropathy 
E11.42 ; Hypertension I10 ; Hyperlipidemia, unspecified hyperlipidemia E78.5 ; 
Body mass index (BMI) of 45.0-49.9 in adult Z68.42 ; Long-term insulin use 
Z79.4 ; Non-adherence to medical treatment Z91.19 ; Coronary artery disease 
I25.10 ; GERD (gastroesophageal reflux disease) K21.9 ; Asthma J45.909 and 
Recurrent major depressive disorder, in partial remission F33.41

 

 [place] [id number] [place] [address]-
7489  22 May, 2018  Recurrent major depressive disorder, in partial remission 
F33.41 and Hypertension I10

 

 [place] [id number] [place] [address] 90151-
4837  21 May, 2018  Immune thrombocytopenic purpura D69.3 and Iron deficiency 
anemia D50.9

 

 [place] [id number] [place] [address]-
6896  21 May, 2018  Type 2 diabetes mellitus with diabetic polyneuropathy 
E11.42 ; Long-term insulin use Z79.4 ; Chronic kidney disease (CKD) stage G3a/A3
, moderately decreased glomerular filtration rate (GFR) between 45-59 mL/min/
1.73 square meter and albuminuria creatinine ratio greater than 300 mg/g N18.3 
; Hypertension I10 ; Hyperlipidemia, unspecified hyperlipidemia E78.5 ; 
Coronary artery disease I25.10 ; GERD (gastroesophageal reflux disease) K21.9 ; 
Immune thrombocytopenic purpura D69.3 ; Asthma J45.909 ; Morbid (severe) 
obesity due to excess calories E66.01 and Recurrent major depressive disorder, 
in partial remission F33.41

 

 [place] [address] [place] [address] 73768-
5713  11 May, 2018  Chronic kidney disease (CKD) stage G3a/A3, moderately 
decreased glomerular filtration rate (GFR) between 45-59 mL/min/1.73 square 
meter and albuminuria creatinine ratio greater than 300 mg/g N18.3

 

 [place] [id number] [place] [address] 9[address]85-
2533  02 May, 2018  Chronic kidney disease (CKD) stage G3a/A3, moderately 
decreased glomerular filtration rate (GFR) between 45-59 mL/min/1.73 square 
meter and albuminuria creatinine ratio greater than 300 mg/g N18.3

 

 [place] [id number] [place] [address] 62966-
0568     

 

 [place] [id number] [place] [address] 74936-
6132  28 Mar, 2018   

 

 [place] [id number] [place] [address]-
2817  26 Mar, 2018  Immune thrombocytopenic purpura D69.3 ; Type 2 diabetes 
mellitus with diabetic polyneuropathy E11.42 ; Avascular necrosis of bone of 
right hip M87.051 ; Long-term insulin use Z79.4 ; GERD (gastroesophageal reflux 
disease) K21.9 ; Hyperlipidemia, unspecified hyperlipidemia E78.5 ; 
Hypertension I10 ; Recurrent major depressive disorder, in partial remission 
F33.41 ; Microalbuminuric diabetic nephropathy E11.21 ; Body mass index (BMI) 
of 45.0-49.9 in adult Z68.42 ; BMI 45.0-49.9, adult Z68.42 and Chronic kidney 
disease (CKD) stage G3a/A3, moderately decreased glomerular filtration rate (GFR
) between 45-59 mL/min/1.73 square meter and albuminuria creatinine ratio 
greater than 300 mg/g N18.3

 

 [place] [address], [place] [address]-
3334  23 Mar, 2018   

 

 [place] [id number] [place] [address]-
8098  23 Mar, 2018   

 

 [facility], [place]  [id number] [address]-
5713  19 Mar, 2018   

 

 [facility], [place]  [address], [place] [address]
5631  14 Mar, 2018   

 

 [facility], [place]  [id number] [address]-
0103  13 Mar, 2018  Type 2 diabetes mellitus with diabetic polyneuropathy 
E11.42 ; Asthma J45.909 and Hypertension I10

 

 [facility]  [address] [place] 
[id number]  13 Mar, 2018  Skin ulcer, limited to breakdown of skin L98.491 ; 
Immune thrombocytopenic purpura D69.3 ; Avascular necrosis of bone of right hip 
M87.051 and Type 2 diabetes mellitus with diabetic polyneuropathy E11.42

 

 [place] [id number] [place] [address] 98831-
2063  06 Mar, 2018  Asthma J45.909 and Type 2 diabetes mellitus with diabetic 
polyneuropathy E11.42

 

 [place] [address] [facility] [id number] [address]
0879  01 Mar, 2018   

 

 [facility]  [address] [place] 
[id number]    Other iron deficiency anemia D50.8 ; Weakness R53.1 ; 
Type 2 diabetes mellitus with diabetic polyneuropathy E11.42 ; Cellulitis of 
trunk, unspecified site of trunk L03.319 ; Coronary artery disease I25.10 ; 
Avascular necrosis of bone of right hip M87.051 and Morbid (severe) obesity due 
to excess calories E66.01

 

 [facility]  [address] [id number] 
[id number]     

 

 [facility], [place]  [id number] [address]-
1224     

 

 [facility]  [id number] [phone]  15 Feb, 2018  Yeast infection of the skin B37.2

 

 [place] [address] [place] [address]-
6175  15 Feb, 2018   

 

 [facility], [place]  [id number] [place] [address] 83719-
8340  15 Feb, 2018   

 

 [facility], [place]  [id number] [place] [address] 84115-
5835     

 

 [facility]  [address] [phone]     

 

 [facility], [place]  [id number] [place] [address] 06171-
1623    Type 2 diabetes mellitus with diabetic polyneuropathy 
E11.42 ; Avascular necrosis of bone of right hip M87.051 ; Hyperlipidemia, 
unspecified hyperlipidemia E78.5 ; Hypertension I10 ; Anxiety disorder, 
unspecified F41.9 ; Immune thrombocytopenic purpura D69.3 ; Coronary artery 
disease I25.10 ; Orthopnea R06.01 ; Acute bronchitis, unspecified organism 
J20.9 ; Wound of left lower extremity, initial encounter S81.802A ; Body aches 
R52 and Debilitated R53.81

 

 [place] [id number] [place] [address] 58279-
3520     

 

 [place] [id number] [place] [address] 60558-
0970  02 2017  Type 2 diabetes mellitus with diabetic polyneuropathy 
E11.42 ; Encounter for immunization Z23 ; Hyperlipidemia, unspecified 
hyperlipidemia E78.5 ; Hypertension I10 ; Anxiety disorder, unspecified F41.9 ; 
Asthma J45.909 ; Body mass index (BMI) of 45.0-49.9 in adult Z68.42 and Morbid (
severe) obesity due to excess calories E66.01

 

 [place] [address] [place] [address]-
3491    Type 2 diabetes mellitus with diabetic polyneuropathy 
E11.42 ; Hyperlipidemia, unspecified hyperlipidemia E78.5 ; Hypertension I10 
and GERD (gastroesophageal reflux disease) K21.9

 

 [place] [id number] [place] [address] 43677-
5935  22 Mar, 2017  Type 2 diabetes mellitus with diabetic polyneuropathy E11.42

 

 [place] [address] [address]-
2795    Type 2 diabetes mellitus with diabetic polyneuropathy 
E11.42 ; Coronary artery disease I25.10 ; History of MI (myocardial infarction) 
I25.2 ; Immune thrombocytopenic purpura D69.3 ; GERD (gastroesophageal reflux 
disease) K21.9 ; Hyperlipidemia, unspecified hyperlipidemia E78.5 ; 
Hypertension I10 ; History of kidney stones Z87.442 and Anxiety disorder, 
unspecified F41.9

 

 [place] [address] [address]-
5680     

 

 [address] [address]-
1873  29 Sep, 2016  Coronary artery disease I25.10 ; History of MI (myocardial 
infarction) I25.2 ; History of kidney stones Z87.442 ; Type 2 diabetes mellitus 
with diabetic polyneuropathy E11.42 ; Avascular necrosis of bone of right hip 
M87.051 ; Hyperlipidemia, unspecified hyperlipidemia E78.5 ; Hypertension I10 ; 
Asthma J45.909 and Encounter for immunization Z23

 

 [place] [id number] [place] [address] 04414-
5495  20 Sep, 2016   

 

 [address] [address]-
8759    Coronary artery disease I25.10 ; Type 2 diabetes mellitus 
with diabetic polyneuropathy E11.42 ; History of MI (myocardial infarction) 
I25.2 ; Immune thrombocytopenic purpura D69.3 ; Hyperlipidemia, unspecified 
hyperlipidemia E78.5 and Hypertension I10

 

 [place] [id number] [place] [address]75-
8810    Coronary artery disease I25.10 ; Lymphedema I89.0 ; History 
of MI (myocardial infarction) I25.2 ; History of kidney stones Z87.442 ; Immune 
thrombocytopenic purpura D69.3 ; Type 2 diabetes mellitus with diabetic 
polyneuropathy E11.42 ; Avascular necrosis of bone of right hip M87.051 ; GERD (
gastroesophageal reflux disease) K21.9 ; Hyperlipidemia, unspecified 
hyperlipidemia E78.5 ; Hypertension I10 and Asthma J45.909

 

 [place] [address] [address]-
0901     

 

 [place] [id number] [place] [address] 67797-
3638     

 

 [place] [id number] [place] [address] 22209-
9437  02 Dec, 2015   

 

 [place] [id number] [place] [address] 71078-
1651  02 Dec, 2015  Hyperlipidemia, unspecified hyperlipidemia E78.5

 

 [place] [id number] [place] [address] 41196-
4826     

 

 [place] [id number] [place] [address] 83420-
1891     

 

 [place] [id number] [place] [address] 10738-
9522    Allergic rhinitis J30.9

 

 [place] [id number] [place] [address] 82293-
6683    Type 2 diabetes mellitus with diabetic polyneuropathy 
E11.42 ; Routine adult health maintenance Z00.00 ; Coronary artery disease 
I25.10 ; History of MI (myocardial infarction) I25.2 ; History of kidney stones 
Z87.442 ; Immune thrombocytopenic purpura D69.3 ; Avascular necrosis of bone of 
right hip M87.051 and GERD (gastroesophageal reflux disease) K21.9







IMMUNIZATIONS

No Known Immunizations



SOCIAL HISTORY

Never Assessed



REASON FOR VISIT

medication changes



PLAN OF CARE





VITAL SIGNS





MEDICATIONS







 Medication  Instructions  Dosage  Frequency  Start Date  End Date  Duration  
Status

 

 Humalog 100 UNIT/ML  Subcutaneous with meals  inject 12 units     23 Oct, 2018
     daily  Active

 

 Levemir FlexTouch 100 UNIT/ML  Subcutaneous at bedtime  Inject 20 units     23 
Oct, 2018     daily  Active







RESULTS

No Results



PROCEDURES

No Known procedures



INSTRUCTIONS





MEDICATIONS ADMINISTERED

No Known Medications



MEDICAL (GENERAL) HISTORY







 Type  Description  Date

 

 Medical History  Type II Diabetes   

 

 Medical History  CAD   

 

 Medical History  Acute MI x1   

 

 Medical History  Heart Cath x3   

 

 Medical History  Kidney Stones   

 

 Medical History  Lymphedema   

 

 Medical History  Immune thrombocytopenic purpura   

 

 Surgical History  Heart Stents x2   

 

 Surgical History  Cholecystectomy   

 

 Surgical History     

 

 Surgical History  Volga Teeth   

 

 Hospitalization History  [facility]  

 

 Hospitalization History  Sepsis/Toxic Shock  

 

 Hospitalization History  [facility]neumonia  2017

 

 Hospitalization History  [facility]- Anemia, cellulitis pannus, yeast 
infection. Discharged 2018

## 2018-12-21 NOTE — XMS REPORT
[facility]

 Created on: 10/06/2018



Madison Young

External Reference #: [id number]

: 1945

Sex: Female



Demographics







 Address  [address] [place]  [address]

 

 Preferred Language  Unknown

 

 Marital Status  Unknown

 

 Zoroastrianism Affiliation  Unknown

 

 Race  Unknown

 

 Ethnic Group  Unknown





Author







 Author  Jacqui  LEANDER

 

 Organization  [facility]

 

 Address  [address] [place]  [address]



 

 Phone  [phone]







Care Team Providers







 Care Team Member Name  Role  Phone

 

 LEANDER Osman  Unavailable  [phone]







PROBLEMS







 Type  Condition  ICD9-CM Code  HPI78-NF Code  Onset Dates  Condition Status  
SNOMED Code

 

 Problem  Anxiety disorder, unspecified     F41.9     Active  231780364

 

 Problem  Long-term insulin use     Z79.4     Active  204401161

 

 Problem  Morbid (severe) obesity due to excess calories     E66.01     Active  
692825080

 

 Problem  Other iron deficiency anemia     D50.8     Active  98372762

 

 Problem  Body mass index (BMI) of 50-59.9 in adult     Z68.43     Active  
587478145

 

 Problem  Non-adherence to medical treatment     Z91.19     Active  490626586

 

 Problem  Recurrent major depressive disorder, in partial remission     F33.41 
    Active  74335902

 

 Problem  Peripheral edema     R60.9     Active  335252474

 

 Problem  Iron deficiency anemia     D50.9     Active  33310765

 

 Problem  Avascular necrosis of bone of right hip     M87.051     Active  
578759584

 

 Problem  Coronary artery disease     I25.10     Active  03138033

 

 Problem  Chronic kidney disease (CKD) stage G3a/A3, moderately decreased 
glomerular filtration rate (GFR) between 45-59 mL/min/1.73 square meter and 
albuminuria creatinine ratio greater than 300 mg/g     N18.3     Active  
688172471

 

 Problem  Microalbuminuric diabetic nephropathy     E11.21     Active  968198774

 

 Problem  Immune thrombocytopenic purpura     D69.3     Active  102303439

 

 Problem  Hyperlipidemia, unspecified hyperlipidemia     E78.5     Active  
82375838

 

 Problem  GERD (gastroesophageal reflux disease)     K21.9     Active  771075003

 

 Problem  Asthma     J45.909     Active  979052774

 

 Problem  Type 2 diabetes mellitus with diabetic polyneuropathy     E11.42     
Active  08671348

 

 Problem  Hypertension     I10     Active  83627461







ALLERGIES







 Substance  Reaction  Event Type  Date  Status

 

 Heparin Sodium (Porcine) PF  Unknown  Drug Allergy  14 Sep, 2018  Active

 

 Chlorthalidone  diarrhea  Drug Allergy  14 Sep, 2018  Active

 

 Adhesive  Unknown  Non Drug Allergy  14 Sep, 2018  Active







ENCOUNTERS







 Encounter  Location  Date  Diagnosis

 

 [id number] [place] 
[id number]  02 Oct, 2018  Avascular necrosis of bone of right hip M87.051 ; Type 
2 diabetes mellitus with diabetic polyneuropathy E11.42 ; Other iron deficiency 
anemia D50.8 ; Morbid (severe) obesity due to excess calories E66.01 ; 
Peripheral edema R60.9 ; Hyperkalemia E87.5 and Chronic kidney disease (CKD) 
stage G3a/A3, moderately decreased glomerular filtration rate (GFR) between 45-
59 mL/min/1.73 square meter and albuminuria creatinine ratio greater than 300 mg
/g N18.3

 

 [place] [id number] [place] [address]-
4873  14 Sep, 2018  Coronary artery disease I25.10 ; Congestive heart failure, 
unspecified HF chronicity, unspecified heart failure type I50.9 ; Peripheral 
edema R60.9 ; Avascular necrosis of bone of right hip M87.051 ; Morbid (severe) 
obesity due to excess calories E66.01 and Body mass index (BMI) of 50-59.9 in 
adult Z68.43

 

 [place] [id number] [place] [address]-
6395  10 Sep, 2018   

 

 [place] [id number] [place] [address]-
3797  06 Sep, 2018  Type 2 diabetes mellitus with diabetic polyneuropathy 
E11.42 ; Coronary artery disease I25.10 ; Hypertension I10 ; Long-term insulin 
use Z79.4 ; Body mass index (BMI) of 45.0-49.9 in adult Z68.42 ; Peripheral 
edema R60.9 and Avascular necrosis of bone of right hip M87.051

 

 [place] [id number] [place] [address]-
[phone]  17 Aug, 2018  Peripheral edema R60.9

 

 [place] [id number] [place] [address]-
4432  13 Aug, 2018  Hypertension I10 and Peripheral edema R60.9

 

 [place] [id number] [address]
22[address]  09 Aug, 2018   

 

 [facility]  [address] [place] [address]-
9848  09 Aug, 2018  Hypertension I10 and Peripheral edema R60.9

 

 [facility]  [address] [address]-
1261  06 Aug, 2018  Hypertension I10 and Peripheral edema R60.9

 

 [facility]  [id number] [place] [address]-
3726  02 Aug, 2018   

 

 [facility]  [address] [place] [address]-
9838  02 Aug, 2018  Hypertension I10 and Peripheral edema R60.9

 

 [facility]  [id number] [address]
2156  01 Aug, 2018   

 

 [facility]  [id number] [place] [address]-
0608     

 

 [facility]  [address] [address]     

 

 [facility]  [id number] [place] [address]-
[phone]     

 

 [facility]  [address] [address]-
2734    Diabetes mellitus due to underlying condition with foot 
ulcer E08.621 ; Non-pressure chronic ulcer of other part of left foot limited 
to breakdown of skin L97.521 and BMI 45.0-49.9, adult Z68.42

 

 [facility]  [address] [address]-
9710    Acute idiopathic gout involving toe of left foot M10.072

 

 [facility]  [address] [address]-
1410     

 

 [facility]  [id number] [address]-
1169     

 

 [place] [address] [place] [address]-
1984     

 

 [place] [address] [id number] [place] [address]-
4539    Type 2 diabetes mellitus with diabetic polyneuropathy 
E11.42 ; Hypertension I10 ; Hyperlipidemia, unspecified hyperlipidemia E78.5 ; 
Body mass index (BMI) of 45.0-49.9 in adult Z68.42 ; Long-term insulin use 
Z79.4 ; Non-adherence to medical treatment Z91.19 ; Coronary artery disease 
I25.10 ; GERD (gastroesophageal reflux disease) K21.9 ; Asthma J45.909 and 
Recurrent major depressive disorder, in partial remission F33.41

 

 [place] [id number] [place] [address]-
2284  22 May, 2018  Recurrent major depressive disorder, in partial remission 
F33.41 and Hypertension I10

 

 [place] [id number] [place] [address]-
5614  21 May, 2018  Immune thrombocytopenic purpura D69.3 and Iron deficiency 
anemia D50.9

 

 [place] [id number] [place] [address]-
7866  21 May, 2018  Type 2 diabetes mellitus with diabetic polyneuropathy 
E11.42 ; Long-term insulin use Z79.4 ; Chronic kidney disease (CKD) stage G3a/A3
, moderately decreased glomerular filtration rate (GFR) between 45-59 mL/min/
1.73 square meter and albuminuria creatinine ratio greater than 300 mg/g N18.3 
; Hypertension I10 ; Hyperlipidemia, unspecified hyperlipidemia E78.5 ; 
Coronary artery disease I25.10 ; GERD (gastroesophageal reflux disease) K21.9 ; 
Immune thrombocytopenic purpura D69.3 ; Asthma J45.909 ; Morbid (severe) 
obesity due to excess calories E66.01 and Recurrent major depressive disorder, 
in partial remission F33.41

 

 [place] [id number] [place] [address]-
4283  11 May, 2018  Chronic kidney disease (CKD) stage G3a/A3, moderately 
decreased glomerular filtration rate (GFR) between 45-59 mL/min/1.73 square 
meter and albuminuria creatinine ratio greater than 300 mg/g N18.3

 

 [place] [id number] [place] [address]-
8272  02 May, 2018  Chronic kidney disease (CKD) stage G3a/A3, moderately 
decreased glomerular filtration rate (GFR) between 45-59 mL/min/1.73 square 
meter and albuminuria creatinine ratio greater than 300 mg/g N18.3

 

 [place] [id number] [place] [address]-
6480     

 

 [place] [id number] [place] [address] 18166-
8768  28 Mar, 2018   

 

 [place] [id number] [place] [address]-
1594  26 Mar, 2018  Immune thrombocytopenic purpura D69.3 ; Type 2 diabetes 
mellitus with diabetic polyneuropathy E11.42 ; Avascular necrosis of bone of 
right hip M87.051 ; Long-term insulin use Z79.4 ; GERD (gastroesophageal reflux 
disease) K21.9 ; Hyperlipidemia, unspecified hyperlipidemia E78.5 ; 
Hypertension I10 ; Recurrent major depressive disorder, in partial remission 
F33.41 ; Microalbuminuric diabetic nephropathy E11.21 ; Body mass index (BMI) 
of 45.0-49.9 in adult Z68.42 ; BMI 45.0-49.9, adult Z68.42 and Chronic kidney 
disease (CKD) stage G3a/A3, moderately decreased glomerular filtration rate (GFR
) between 45-59 mL/min/1.73 square meter and albuminuria creatinine ratio 
greater than 300 mg/g N18.3

 

 [place] [address] [address]67  23 Mar, 2018   

 

 [place] [id number] [place] [address] 65[address]-
2127  23 Mar, 2018   

 

 [place] [id number] [place] [address]-
0210  19 Mar, 2018   

 

 [place] [id number] [place] [address]-
7738  14 Mar, 2018   

 

 [facility]  [id number] [address]-
4292  13 Mar, 2018  Type 2 diabetes mellitus with diabetic polyneuropathy 
E11.42 ; Asthma J45.909 and Hypertension I10

 

 [facility]  [address] [place] 
[id number]  13 Mar, 2018  Skin ulcer, limited to breakdown of skin L98.491 ; 
Immune thrombocytopenic purpura D69.3 ; Avascular necrosis of bone of right hip 
M87.051 and Type 2 diabetes mellitus with diabetic polyneuropathy E11.42

 

 [place] [address] [place] [address]-
5221  06 Mar, 2018  Asthma J45.909 and Type 2 diabetes mellitus with diabetic 
polyneuropathy E11.42

 

 [place] [address] [place] [address] 48694-
0646  01 Mar, 2018   

 

 [facility]  [address] [place] 
[id number]    Other iron deficiency anemia D50.8 ; Weakness R53.1 ; 
Type 2 diabetes mellitus with diabetic polyneuropathy E11.42 ; Cellulitis of 
trunk, unspecified site of trunk L03.319 ; Coronary artery disease I25.10 ; 
Avascular necrosis of bone of right hip M87.051 and Morbid (severe) obesity due 
to excess calories E66.01

 

 [facility]  [id number] 
[id number]     

 

 [facility]  [address] [address]-
6557     

 

 [facility]  [address] [phone]  15 Feb, 2018  Yeast infection of the skin B37.2

 

 [facility]  [address] [place] [address]-
3482  15 Feb, 2018   

 

 [facility]  [address] [place] [address] 78[address]-
1116  15 Feb, 2018   

 

 [facility]  [id number] [place] [address]-
9446     

 

 [facility]  [address] [phone]     

 

 [facility]  [address] [address]-
5928    Type 2 diabetes mellitus with diabetic polyneuropathy 
E11.42 ; Avascular necrosis of bone of right hip M87.051 ; Hyperlipidemia, 
unspecified hyperlipidemia E78.5 ; Hypertension I10 ; Anxiety disorder, 
unspecified F41.9 ; Immune thrombocytopenic purpura D69.3 ; Coronary artery 
disease I25.10 ; Orthopnea R06.01 ; Acute bronchitis, unspecified organism 
J20.9 ; Wound of left lower extremity, initial encounter S81.802A ; Body aches 
R52 and Debilitated R53.81

 

 [address] [address]-
5134     

 

 [address] [address]-
5731    Type 2 diabetes mellitus with diabetic polyneuropathy 
E11.42 ; Encounter for immunization Z23 ; Hyperlipidemia, unspecified 
hyperlipidemia E78.5 ; Hypertension I10 ; Anxiety disorder, unspecified F41.9 ; 
Asthma J45.909 ; Body mass index (BMI) of 45.0-49.9 in adult Z68.42 and Morbid (
severe) obesity due to excess calories E66.01

 

 [place] [id number] [place] [address]-
8269    Type 2 diabetes mellitus with diabetic polyneuropathy 
E11.42 ; Hyperlipidemia, unspecified hyperlipidemia E78.5 ; Hypertension I10 
and GERD (gastroesophageal reflux disease) K21.9

 

 [place] [id number] [place] [address]-
3933  22 Mar, 2017  Type 2 diabetes mellitus with diabetic polyneuropathy E11.42

 

 [address] [address]-
4066    Type 2 diabetes mellitus with diabetic polyneuropathy 
E11.42 ; Coronary artery disease I25.10 ; History of MI (myocardial infarction) 
I25.2 ; Immune thrombocytopenic purpura D69.3 ; GERD (gastroesophageal reflux 
disease) K21.9 ; Hyperlipidemia, unspecified hyperlipidemia E78.5 ; 
Hypertension I10 ; History of kidney stones Z87.442 and Anxiety disorder, 
unspecified F41.9

 

 [place] [address] [address]-
6[address]  14 2017   

 

 [place] [address] [address]-
5001  29 Sep, 2016  Coronary artery disease I25.10 ; History of MI (myocardial 
infarction) I25.2 ; History of kidney stones Z87.442 ; Type 2 diabetes mellitus 
with diabetic polyneuropathy E11.42 ; Avascular necrosis of bone of right hip 
M87.051 ; Hyperlipidemia, unspecified hyperlipidemia E78.5 ; Hypertension I10 ; 
Asthma J45.909 and Encounter for immunization Z23

 

 [place] [address] [address]-
3750  20 Sep, 2016   

 

 [place] [address] [address]-
5050    Coronary artery disease I25.10 ; Type 2 diabetes mellitus 
with diabetic polyneuropathy E11.42 ; History of MI (myocardial infarction) 
I25.2 ; Immune thrombocytopenic purpura D69.3 ; Hyperlipidemia, unspecified 
hyperlipidemia E78.5 and Hypertension I10

 

 [place] [address] [address]-
0769    Coronary artery disease I25.10 ; Lymphedema I89.0 ; History 
of MI (myocardial infarction) I25.2 ; History of kidney stones Z87.442 ; Immune 
thrombocytopenic purpura D69.3 ; Type 2 diabetes mellitus with diabetic 
polyneuropathy E11.42 ; Avascular necrosis of bone of right hip M87.051 ; GERD (
gastroesophageal reflux disease) K21.9 ; Hyperlipidemia, unspecified 
hyperlipidemia E78.5 ; Hypertension I10 and Asthma J45.909

 

 [place] [address] [address]-
3172     

 

 [facility]  [id number] [place] [address]-
2911     

 

 [place] [id number] [place] [address]29-
9764  02 Dec, 2015   

 

 [place] [address] [address]-
0324  02 Dec, 2015  Hyperlipidemia, unspecified hyperlipidemia E78.5

 

 [place] [id number] [place] [address]-
0065     

 

 [place] [id number] [place] [address]32-
2724     

 

 [place] [id number] [place] [address]-
3817    Allergic rhinitis J30.9

 

 [place] [id number] [place] [address] 56[address]-
2948    Type 2 diabetes mellitus with diabetic polyneuropathy 
E11.42 ; Routine adult health maintenance Z00.00 ; Coronary artery disease 
I25.10 ; History of MI (myocardial infarction) I25.2 ; History of kidney stones 
Z87.442 ; Immune thrombocytopenic purpura D69.3 ; Avascular necrosis of bone of 
right hip M87.051 and GERD (gastroesophageal reflux disease) K21.9







IMMUNIZATIONS

No Known Immunizations



SOCIAL HISTORY

Never Assessed



REASON FOR VISIT

[facility] follow up- was in ER for pulmonary edema. 2018. Pt states that she 
is feeling better.  IAN Padgett



PLAN OF CARE







 Activity  Details









  









 Follow Up  3 Months, prn Reason:CHM/DM w/ Zeina

 

 Pending Test  Echo 2D 



 



VITAL SIGNS







 Height  67 in  2018

 

 Weight  333 lbs  2018

 

 Temperature  99.0 degrees Fahrenheit  2018

 

 Heart Rate  77 bpm  2018

 

 Respiratory Rate  20   2018

 

 Oximetry  on room air:94 %  2018

 

 BMI  52.15 kg/m2  2018

 

 Blood pressure systolic  140 mmHg  2018

 

 Blood pressure diastolic  82 mmHg  2018







MEDICATIONS







 Medication  Instructions  Dosage  Frequency  Start Date  End Date  Duration  
Status

 

 Citalopram Hydrobromide 20 mg  Orally Once a day  1 tablet  24h           
Active

 

 Advair Diskus           26 Mar, 2018        Active

 

 Tramadol HCl 50 mg  Orally every 6 hrs  1 tablet as needed  6h  14 Sep, 2018  
12 Oct, 2018  28 days  Active

 

 Aspir-81 81 MG  Orally Once a day  1 tablet  24h     16 May, 2019     Active

 

 Pantoprazole Sodium 40 mg  Orally Once a day  1 tablet  24h           Active

 

 Carvedilol 12.5 MG  Orally 2 times a day  1 tablet  12h           Active

 

 Nystatin 837268 UNIT/GM  Externally Twice a day  1 application to affected 
area  12h  10 Sep, 2018  30 Sep, 2018  10 days  Active

 

 Potassium Chloride 10 MEQ  Orally Once a day  1 tablet with food  24h  02 Aug, 
2018  1 Oct, 2018     Active

 

 Benazepril HCl 40 mg  Orally Once a day  1 tablet  24h           Active

 

 Levemir FlexTouch 100 UNIT/ML  Subcutaneous 2 times a day  15  units twice 
daily  12h       12 months  Active

 

 Humalog KwikPen 100 UNIT/ML  Subcutaneous three times a day before meals  5 
units           30 days  Active

 

 Probiotic -                    Active

 

 Nystatin           26 Mar, 2018        Active

 

 ProAir  (90 Base) MCG/ACT  Inhalation every 4 hrs  2 puffs as needed  
4h          Active

 

 Hydrochlorothiazide 50 mg  Orally Once a day  1  1/2 tablets in the morning  
24h        30 day(s)  Active

 

 GlipiZIDE 5 mg  Orally 2 times a day  1 tablet  12h           Active

 

 Atorvastatin Calcium 10 mg  Orally Once a day  1 tablet  24h      
    Active

 

 Cefdinir 300 MG  Orally 2 times a day  as directed  12h  09 Sep, 2018  15 Sep, 
2018     Active







RESULTS

No Results



PROCEDURES







 Procedure  Date Ordered  Result  Body Site

 

 [facility] VISIT ESTABLISHED PATIENT  2018      







INSTRUCTIONS





MEDICATIONS ADMINISTERED

No Known Medications



MEDICAL (GENERAL) HISTORY







 Type  Description  Date

 

 Medical History  Type II Diabetes   

 

 Medical History  CAD   

 

 Medical History  Acute MI x1   

 

 Medical History  Heart Cath x3   

 

 Medical History  Kidney Stones   

 

 Medical History  Lymphedema   

 

 Medical History  Immune thrombocytopenic purpura   

 

 Surgical History  Heart Stents x2   

 

 Surgical History  Cholecystectomy   

 

 Surgical History     

 

 Surgical History  Fallentimber Teeth   

 

 Hospitalization History  ITP  

 

 Hospitalization History  Sepsis/Toxic Shock  

 

 [facility]  2017

 

 Hospitalization History  [facility]- Anemia, cellulitis pannus, yeast 
infection. Discharged 2018

## 2018-12-21 NOTE — XMS REPORT
[facility]

 Created on: 10/02/2018



Madison Young

External Reference #: [id number]

: 1945

Sex: Female



Demographics







 Address  [address] [place]  [address]

 

 Preferred Language  Unknown

 

 Marital Status  Unknown

 

 Hindu Affiliation  Unknown

 

 Race  Unknown

 

 Ethnic Group  Unknown





Author







 Author  FREIRELEANDER

 

 Organization  [facility]

 

 Address  [address] [place]  [address]



 

 Phone  [phone]







Care Team Providers







 Care Team Member Name  Role  Phone

 

 LEANDER FREIRE  Unavailable  [phone]







PROBLEMS







 Type  Condition  ICD9-CM Code  PFU30-VT Code  Onset Dates  Condition Status  
SNOMED Code

 

 Problem  Hypertension     I10     Active  28786017

 

 Problem  Morbid (severe) obesity due to excess calories     E66.01     Active  
638613117

 

 Problem  Anxiety disorder, unspecified     F41.9     Active  871393138

 

 Problem  Body mass index (BMI) of 50-59.9 in adult     Z68.43     Active  
850113714

 

 Problem  Peripheral edema     R60.9     Active  190248466

 

 Problem  Long-term insulin use     Z79.4     Active  885029664

 

 Problem  Recurrent major depressive disorder, in partial remission     F33.41 
    Active  27913579

 

 Problem  Iron deficiency anemia     D50.9     Active  20214266

 

 Problem  Non-adherence to medical treatment     Z91.19     Active  529919242

 

 Problem  Microalbuminuric diabetic nephropathy     E11.21     Active  405795253

 

 Problem  Avascular necrosis of bone of right hip     M87.051     Active  
243099472

 

 Problem  Chronic kidney disease (CKD) stage G3a/A3, moderately decreased 
glomerular filtration rate (GFR) between 45-59 mL/min/1.73 square meter and 
albuminuria creatinine ratio greater than 300 mg/g     N18.3     Active  
572182612

 

 Problem  Type 2 diabetes mellitus with diabetic polyneuropathy     E11.42     
Active  21398946

 

 Problem  Immune thrombocytopenic purpura     D69.3     Active  376865052

 

 Problem  Coronary artery disease     I25.10     Active  69858795

 

 Problem  Hyperlipidemia, unspecified hyperlipidemia     E78.5     Active  
92743042

 

 Problem  GERD (gastroesophageal reflux disease)     K21.9     Active  243787272

 

 Problem  Asthma     J45.909     Active  451194850







ALLERGIES







 Substance  Reaction  Event Type  Date  Status

 

 Heparin Sodium (Porcine) PF  Unknown  Drug Allergy  06 Sep, 2018  Active

 

 Chlorthalidone  diarrhea  Drug Allergy  06 Sep, 2018  Active

 

 Adhesive  Unknown  Non Drug Allergy  06 Sep, 2018  Active







ENCOUNTERS







 Encounter  Location  Date  Diagnosis

 

 [place] [address] [place] [address]291-
1747  14 Sep, 2018  Coronary artery disease I25.10 ; Congestive heart failure, 
unspecified HF chronicity, unspecified heart failure type I50.9 ; Peripheral 
edema R60.9 ; Avascular necrosis of bone of right hip M87.051 ; Morbid (severe) 
obesity due to excess calories E66.01 and Body mass index (BMI) of 50-59.9 in 
adult Z68.43

 

 [place] [address] [place] [id number] [place] [address] 64911-
[phone]  10 Sep, 2018   

 

 [place] [address] [place] [id number] [place] [address] 55448-
5738  06 Sep, 2018  Type 2 diabetes mellitus with diabetic polyneuropathy 
E11.42 ; Coronary artery disease I25.10 ; Hypertension I10 ; Long-term insulin 
use Z79.4 ; Body mass index (BMI) of 45.0-49.9 in adult Z68.42 ; Peripheral 
edema R60.9 and Avascular necrosis of bone of right hip M87.051

 

 [place] [address] [place] [id number] [place] [address] 25092-
9540  17 Aug, 2018  Peripheral edema R60.9

 

 [place] [address] [place] [id number] [place] [address] [phone]-
[phone]  13 Aug, 2018  Hypertension I10 and Peripheral edema R60.9

 

 [place] [address] [place] [address] 43782-
7196  09 Aug, 2018   

 

 [place] [address] [place] [id number] [place] [address] 20215-
[phone]  09 Aug, 2018  Hypertension I10 and Peripheral edema R60.9

 

 [place] [address] [place] [id number] [place] [address] 65953-
0103  06 Aug, 2018  Hypertension I10 and Peripheral edema R60.9

 

 [place] [address] [place] [id number] [place] [address] [phone]-
[phone]  02 Aug, 2018   

 

 [place] [address] [place] [id number] [place] [address] [phone]-
[phone]  02 Aug, 2018  Hypertension I10 and Peripheral edema R60.9

 

 [place] [address] [place] [id number] [place] [address] [phone]-
[phone]  01 Aug, 2018   

 

 [place] [address] [place] [id number] [place] [address] 80651-
6526     

 

 [place] [address] [place] [id number] [place] [address] 99298-
8665     

 

 [place] [address] [place] [id number] [place] [address] 53730-
4343     

 

 [place] [address] [place] [id number] [place] [address] 86463-
7026    Diabetes mellitus due to underlying condition with foot 
ulcer E08.621 ; Non-pressure chronic ulcer of other part of left foot limited 
to breakdown of skin L97.521 and BMI 45.0-49.9, adult Z68.42

 

 [place] [address] [place] [id number] [place] [address] 74929-
[phone]    Acute idiopathic gout involving toe of left foot M10.072

 

 [place] [address] [place] [id number] [place] [address] 66110-
[phone]     

 

 [place] [address] [place] [id number] [place] [address] 71959-
7313     

 

 [place] [address] [place] [address] 01221-
4640     

 

 [place] [address] [place] [address] 27654-
0656    Type 2 diabetes mellitus with diabetic polyneuropathy 
E11.42 ; Hypertension I10 ; Hyperlipidemia, unspecified hyperlipidemia E78.5 ; 
Body mass index (BMI) of 45.0-49.9 in adult Z68.42 ; Long-term insulin use 
Z79.4 ; Non-adherence to medical treatment Z91.19 ; Coronary artery disease 
I25.10 ; GERD (gastroesophageal reflux disease) K21.9 ; Asthma J45.909 and 
Recurrent major depressive disorder, in partial remission F33.41

 

 [place] [id number] [place] [address] 68460-
[phone]  22 May, 2018  Recurrent major depressive disorder, in partial remission 
F33.41 and Hypertension I10

 

 [place] [address] [place] [id number] [place] [address] 25551-
9706  21 May, 2018  Immune thrombocytopenic purpura D69.3 and Iron deficiency 
anemia D50.9

 

 [place] [address] [place] [id number] [place] [address] 81760-
5437  21 May, 2018  Type 2 diabetes mellitus with diabetic polyneuropathy 
E11.42 ; Long-term insulin use Z79.4 ; Chronic kidney disease (CKD) stage G3a/A3
, moderately decreased glomerular filtration rate (GFR) between 45-59 mL/min/
1.73 square meter and albuminuria creatinine ratio greater than 300 mg/g N18.3 
; Hypertension I10 ; Hyperlipidemia, unspecified hyperlipidemia E78.5 ; 
Coronary artery disease I25.10 ; GERD (gastroesophageal reflux disease) K21.9 ; 
Immune thrombocytopenic purpura D69.3 ; Asthma J45.909 ; Morbid (severe) 
obesity due to excess calories E66.01 and Recurrent major depressive disorder, 
in partial remission F33.41

 

 [place] [address] [place] [address] [phone]-
[phone]  11 May, 2018  Chronic kidney disease (CKD) stage G3a/A3, moderately 
decreased glomerular filtration rate (GFR) between 45-59 mL/min/1.73 square 
meter and albuminuria creatinine ratio greater than 300 mg/g N18.3

 

 [place] [address] [place] [address] 98627-
7983  02 May, 2018  Chronic kidney disease (CKD) stage G3a/A3, moderately 
decreased glomerular filtration rate (GFR) between 45-59 mL/min/1.73 square 
meter and albuminuria creatinine ratio greater than 300 mg/g N18.3

 

 [place] [address] [place] [address] 67430-
6539     

 

 [place] [address] [place] [id number] [place] [address] [phone]
[phone]  28 Mar, 2018   

 

 [place] [address] [place] [id number] [place] [address] [phone]
[phone]  26 Mar, 2018  Immune thrombocytopenic purpura D69.3 ; Type 2 diabetes 
mellitus with diabetic polyneuropathy E11.42 ; Avascular necrosis of bone of 
right hip M87.051 ; Long-term insulin use Z79.4 ; GERD (gastroesophageal reflux 
disease) K21.9 ; Hyperlipidemia, unspecified hyperlipidemia E78.5 ; 
Hypertension I10 ; Recurrent major depressive disorder, in partial remission 
F33.41 ; Microalbuminuric diabetic nephropathy E11.21 ; Body mass index (BMI) 
of 45.0-49.9 in adult Z68.42 ; BMI 45.0-49.9, adult Z68.42 and Chronic kidney 
disease (CKD) stage G3a/A3, moderately decreased glomerular filtration rate (GFR
) between 45-59 mL/min/1.73 square meter and albuminuria creatinine ratio 
greater than 300 mg/g N18.3

 

 [place] [address] [place] [id number] [place] [address] [phone]
[phone]  23 Mar, 2018   

 

 [place] [address] [place] [id number] [place] [address] 05523-
4550  23 Mar, 2018   

 

 [place] [address] [place] [id number] [place] [address] 59566-
9218  19 Mar, 2018   

 

 [place] [address] [place] [id number] [place] [address] 75598-
2961  14 Mar, 2018   

 

 [place] [address] [place] [id number] [place] [address] 87887-
8931  13 Mar, 2018  Type 2 diabetes mellitus with diabetic polyneuropathy 
E11.42 ; Asthma J45.909 and Hypertension I10

 

 [facility]  [address] [place], [place] 
[id number]  13 Mar, 2018  Skin ulcer, limited to breakdown of skin L98.491 ; 
Immune thrombocytopenic purpura D69.3 ; Avascular necrosis of bone of right hip 
M87.051 and Type 2 diabetes mellitus with diabetic polyneuropathy E11.42

 

 [place] [address] [place] [address] [address]-
2400  06 Mar, 2018  Asthma J45.909 and Type 2 diabetes mellitus with diabetic 
polyneuropathy E11.42

 

 [place] [address] [address]-
4343  01 Mar, 2018   

 

 [facility]  [address]   [place], [place] 
[id number]    Other iron deficiency anemia D50.8 ; Weakness R53.1 ; 
Type 2 diabetes mellitus with diabetic polyneuropathy E11.42 ; Cellulitis of 
trunk, unspecified site of trunk L03.319 ; Coronary artery disease I25.10 ; 
Avascular necrosis of bone of right hip M87.051 and Morbid (severe) obesity due 
to excess calories E66.01

 

 [place] [address] 
[id number]     

 

 [place] [address] [address]-
8210     

 

 [facility]  [facility] [address] [phone]  15 Feb, 2018  Yeast infection of the skin B37.2

 

 [place] [address] [phone]-
[phone]  15 Feb, 2018   

 

 [place] [address] [address]-
5609  15 Feb, 2018   

 

 [place] [address] 0[address]-
[phone]     

 

 [facility]  [address] [phone]     

 

 [place] [address] [address]-
3876    Type 2 diabetes mellitus with diabetic polyneuropathy 
E11.42 ; Avascular necrosis of bone of right hip M87.051 ; Hyperlipidemia, 
unspecified hyperlipidemia E78.5 ; Hypertension I10 ; Anxiety disorder, 
unspecified F41.9 ; Immune thrombocytopenic purpura D69.3 ; Coronary artery 
disease I25.10 ; Orthopnea R06.01 ; Acute bronchitis, unspecified organism 
J20.9 ; Wound of left lower extremity, initial encounter S81.802A ; Body aches 
R52 and Debilitated R53.81

 

 [place] [id number] [place] [address] 78909-
6338  12 2018   

 

 [address] [address]-
1337    Type 2 diabetes mellitus with diabetic polyneuropathy 
E11.42 ; Encounter for immunization Z23 ; Hyperlipidemia, unspecified 
hyperlipidemia E78.5 ; Hypertension I10 ; Anxiety disorder, unspecified F41.9 ; 
Asthma J45.909 ; Body mass index (BMI) of 45.0-49.9 in adult Z68.42 and Morbid (
severe) obesity due to excess calories E66.01

 

 [address] [address]-
0239    Type 2 diabetes mellitus with diabetic polyneuropathy 
E11.42 ; Hyperlipidemia, unspecified hyperlipidemia E78.5 ; Hypertension I10 
and GERD (gastroesophageal reflux disease) K21.9

 

 [address] [address]-
3670  22 Mar, 2017  Type 2 diabetes mellitus with diabetic polyneuropathy E11.42

 

 [place] [id number] [place] [address] [phone]-
[phone]  28 2017  Type 2 diabetes mellitus with diabetic polyneuropathy 
E11.42 ; Coronary artery disease I25.10 ; History of MI (myocardial infarction) 
I25.2 ; Immune thrombocytopenic purpura D69.3 ; GERD (gastroesophageal reflux 
disease) K21.9 ; Hyperlipidemia, unspecified hyperlipidemia E78.5 ; 
Hypertension I10 ; History of kidney stones Z87.442 and Anxiety disorder, 
unspecified F41.9

 

 [place] [id number] [place] [address] 42180-
0720  14 2017   

 

 [address] [address]-
[phone]  29 Sep, 2016  Coronary artery disease I25.10 ; History of MI (myocardial 
infarction) I25.2 ; History of kidney stones Z87.442 ; Type 2 diabetes mellitus 
with diabetic polyneuropathy E11.42 ; Avascular necrosis of bone of right hip 
M87.051 ; Hyperlipidemia, unspecified hyperlipidemia E78.5 ; Hypertension I10 ; 
Asthma J45.909 and Encounter for immunization Z23

 

 [place] [address] [phone]-
1412  20 Sep, 2016   

 

 [place] [address] [phone]-
0752    Coronary artery disease I25.10 ; Type 2 diabetes mellitus 
with diabetic polyneuropathy E11.42 ; History of MI (myocardial infarction) 
I25.2 ; Immune thrombocytopenic purpura D69.3 ; Hyperlipidemia, unspecified 
hyperlipidemia E78.5 and Hypertension I10

 

 [place] [address] [address]-
3266    Coronary artery disease I25.10 ; Lymphedema I89.0 ; History 
of MI (myocardial infarction) I25.2 ; History of kidney stones Z87.442 ; Immune 
thrombocytopenic purpura D69.3 ; Type 2 diabetes mellitus with diabetic 
polyneuropathy E11.42 ; Avascular necrosis of bone of right hip M87.051 ; GERD (
gastroesophageal reflux disease) K21.9 ; Hyperlipidemia, unspecified 
hyperlipidemia E78.5 ; Hypertension I10 and Asthma J45.909

 

 [place] [address] [place] [id number] [place] [address] 69290-
6185     

 

 [place] [address] [address]-
5416     

 

 [place] [address] [address]-
4981  02 Dec, 2015   

 

 [place] [address] [address]-
[phone]  02 Dec, 2015  Hyperlipidemia, unspecified hyperlipidemia E78.5

 

 [place] [address] [place] [address]-
8272     

 

 [facility]  [address] [facility] [id number] [address]-
7057     

 

 [facility]  [address] [facility] [id number] [address]-
2342    Allergic rhinitis J30.9

 

 [facility]  [address] [facility] [id number] [address]-
8751    Type 2 diabetes mellitus with diabetic polyneuropathy 
E11.42 ; Routine adult health maintenance Z00.00 ; Coronary artery disease 
I25.10 ; History of MI (myocardial infarction) I25.2 ; History of kidney stones 
Z87.442 ; Immune thrombocytopenic purpura D69.3 ; Avascular necrosis of bone of 
right hip M87.051 and GERD (gastroesophageal reflux disease) K21.9







IMMUNIZATIONS

No Known Immunizations



SOCIAL HISTORY

Never Assessed



REASON FOR VISIT

Diabetes--tcuppettRN, edema followup, needing refill on victoza



PLAN OF CARE







 Activity  Details









  









 Follow Up  3 Months, prn Reason:chm/htn w/ Zeina







VITAL SIGNS







 Height  67 in  2018

 

 Weight  331.3 lbs  2018

 

 Temperature  98.4 degrees Fahrenheit  2018

 

 Heart Rate  88 bpm  2018

 

 Respiratory Rate  20   2018

 

 BMI  51.88 kg/m2  2018

 

 Blood pressure systolic  140 mmHg  2018

 

 Blood pressure diastolic  72 mmHg  2018







MEDICATIONS







 Medication  Instructions  Dosage  Frequency  Start Date  End Date  Duration  
Status

 

 Atorvastatin Calcium 10 mg  Orally Once a day  1 tablet  24h      
    Active

 

 ProAir  (90 Base) MCG/ACT  Inhalation every 4 hrs  2 puffs as needed  
4h          Active

 

 Humalog KwikPen 100 UNIT/ML  Subcutaneous three times a day before meals  5 
units           30 days  Active

 

 Pantoprazole Sodium 40 mg  Orally Once a day  1 tablet  24h           Active

 

 Aleve 220 MG  Orally every 12 hrs  2 tablets with food or milk as needed  12h 
          Active

 

 Aspir-81 81 MG  Orally Once a day  1 tablet  24h     16 May, 2019     Active

 

 Carvedilol 12.5 MG  Orally 2 times a day  1 tablet  12h           Active

 

 Levemir FlexTouch 100 UNIT/ML  Subcutaneous 2 times a day  15  units twice 
daily  12h  19 2015     12 months  Active

 

 Tramadol HCl 50 mg  Orally 2 times a day  1 tablet as needed  12h  06 Sep, 
2018  13 Sep, 2018  07 days  Active

 

 Citalopram Hydrobromide 20 mg  Orally Once a day  1 tablet  24h           
Active

 

 Probiotic -                    Active

 

 Nystatin           26 Mar, 2018        Active

 

 GlipiZIDE 5 mg  Orally 2 times a day  1 tablet  12h           Active

 

 Benazepril HCl 40 mg  Orally Once a day  1 tablet  24h           Active

 

 Potassium Chloride 10 MEQ  Orally Once a day  1 tablet with food  24h  02 Aug, 
2018  1 Oct, 2018     Active

 

 Hydrochlorothiazide 50 mg  Orally Once a day  1  1/2 tablets in the morning  
24h        30 day(s)  Active

 

 Advair Diskus           26 Mar, 2018        Active







RESULTS

No Results



PROCEDURES







 Procedure  Date Ordered  Result  Body Site

 

 [facility] VISIT ESTABLISHED PATIENT  2018      







INSTRUCTIONS





MEDICATIONS ADMINISTERED

No Known Medications



MEDICAL (GENERAL) HISTORY







 Type  Description  Date

 

 Medical History  Type II Diabetes   

 

 Medical History  CAD   

 

 Medical History  Acute MI x1   

 

 Medical History  Heart Cath x3   

 

 Medical History  Kidney Stones   

 

 Medical History  Lymphedema   

 

 Medical History  Immune thrombocytopenic purpura   

 

 Surgical History  Heart Stents x2   

 

 Surgical History  Cholecystectomy   

 

 Surgical History     

 

 Surgical History  [place] Teeth   

 

 Hospitalization History  [facility]  

 

 Hospitalization History  Sepsis/Toxic Shock  

 

 Hospitalization History  [facility]-flu[facility]pneumonia  2017

 

 Hospitalization History  [facility]- Anemia, cellulitis pannus, yeast 
infection. Discharged 2018

## 2018-12-21 NOTE — XMS REPORT
[facility]

 Created on: 10/31/2018



Madison Young

External Reference #: [id number]

: 1945

Sex: Female



Demographics







 Address  [address] [place]  [address]

 

 Preferred Language  Unknown

 

 Marital Status  Unknown

 

 Judaism Affiliation  Unknown

 

 Race  Unknown

 

 Ethnic Group  Unknown





Author







 Author  SHAZIA MANUEL

 

 [facility]

 

 Address  [address] [place]  [address]



 

 Phone  [phone]







Care Team Providers







 Care Team Member Name  Role  Phone

 

 SHAZIA MANUEL  Unavailable  [phone]







PROBLEMS







 Type  Condition  ICD9-CM Code  IIM22-MP Code  Onset Dates  Condition Status  
SNOMED Code

 

 Problem  Morbid (severe) obesity due to excess calories     E66.01     Active  
785763417

 

 Problem  Non-adherence to medical treatment     Z91.19     Active  407592534

 

 Problem  Recurrent major depressive disorder, in partial remission     F33.41 
    Active  46074964

 

 Problem  Type 2 diabetes mellitus with hyperglycemia     E11.65     Active  
77850558

 

 Problem  Chronic kidney disease (CKD) stage G3a/A3, moderately decreased 
glomerular filtration rate (GFR) between 45-59 mL/min/1.73 square meter and 
albuminuria creatinine ratio greater than 300 mg/g     N18.3     Active  
271615111

 

 Problem  Long term current use of insulin     Z79.4     Active  994332499

 

 Problem  Peripheral edema     R60.9     Active  128167346

 

 Problem  Iron deficiency anemia     D50.9     Active  76423267

 

 Problem  Other iron deficiency anemia     D50.8     Active  16821063

 

 Problem  Body mass index (BMI) of 50-59.9 in adult     Z68.43     Active  
540943173

 

 Problem  Immune thrombocytopenic purpura     D69.3     Active  324420079

 

 Problem  Coronary artery disease     I25.10     Active  32523249

 

 Problem  Microalbuminuric diabetic nephropathy     E11.21     Active  033040572

 

 Problem  Type 2 diabetes mellitus with diabetic polyneuropathy     E11.42     
Active  33134662

 

 Problem  Hyperlipidemia, unspecified hyperlipidemia     E78.5     Active  
71935820

 

 Problem  Asthma     J45.909     Active  489362435

 

 Problem  GERD (gastroesophageal reflux disease)     K21.9     Active  429900496

 

 Problem  Hypertension     I10     Active  30085794

 

 Problem  Avascular necrosis of bone of right hip     M87.051     Active  
195446487

 

 Problem  Anxiety disorder, unspecified     F41.9     Active  251310745







ALLERGIES







 Substance  Reaction  Event Type  Date  Status

 

 Heparin Sodium (Porcine) PF  Unknown  Drug Allergy  30 Oct, 2018  Active

 

 Chlorthalidone  diarrhea  Drug Allergy  30 Oct, 2018  Active

 

 Adhesive  Unknown  Non Drug Allergy  30 Oct, 2018  Active







ENCOUNTERS







 Encounter  Location  Date  Diagnosis

 

 [facility]  [address] [place] 
[id number]  30 Oct, 2018  Type 2 diabetes mellitus with hyperglycemia E11.65 and 
Long term current use of insulin Z79.4

 

 [place] [address] [address]
7531  25 Oct, 2018   

 

 [place] [address] [place] [id number] [place] [phone]-
[phone]  25 Oct, 2018   

 

 [facility]  [address] [place] 
[id number]  24 Oct, 2018  Type 2 diabetes mellitus with diabetic polyneuropathy 
E11.42

 

 [place] [address] [place] [id number] [place] [address] 24478-
5132  23 Oct, 2018  Type 2 diabetes mellitus with diabetic polyneuropathy E11.42

 

 [place] [address] [place] [id number] [place] [address]015-
[phone]  22 Oct, 2018   

 

 [place] [address] [place] [id number] [place] [address] 88228-
4650  09 Oct, 2018   

 

 [facility]  [address] [place] 
[id number]  02 Oct, 2018  Avascular necrosis of bone of right hip M87.051 ; Type 
2 diabetes mellitus with diabetic polyneuropathy E11.42 ; Other iron deficiency 
anemia D50.8 ; Morbid (severe) obesity due to excess calories E66.01 ; 
Peripheral edema R60.9 ; Hyperkalemia E87.5 and Chronic kidney disease (CKD) 
stage G3a/A3, moderately decreased glomerular filtration rate (GFR) between 45-
59 mL/min/1.73 square meter and albuminuria creatinine ratio greater than 300 mg
/g N18.3

 

 [place] [address] [place] [phone]
[phone]  14 Sep, 2018  Coronary artery disease I25.10 ; Congestive heart failure, 
unspecified HF chronicity, unspecified heart failure type I50.9 ; Peripheral 
edema R60.9 ; Avascular necrosis of bone of right hip M87.051 ; Morbid (severe) 
obesity due to excess calories E66.01 and Body mass index (BMI) of 50-59.9 in 
adult Z68.43

 

 [facility]  [address] [place] [id number] [place] [address]-
[phone]  10 Sep, 2018   

 

 [facility]  [address] [place] [id number] [place] [address] 26273-
[phone]  06 Sep, 2018  Type 2 diabetes mellitus with diabetic polyneuropathy 
E11.42 ; Coronary artery disease I25.10 ; Hypertension I10 ; Long-term insulin 
use Z79.4 ; Body mass index (BMI) of 45.0-49.9 in adult Z68.42 ; Peripheral 
edema R60.9 and Avascular necrosis of bone of right hip M87.051

 

 [place] [address] [place] [id number] [place] [address] 13417-
1419  17 Aug, 2018  Peripheral edema R60.9

 

 [place] [address] [place] [id number] [place] [address] 35309-
3541  13 Aug, 2018  Hypertension I10 and Peripheral edema R60.9

 

 [place] [address] [place] [id number] [place] [address] 76042-
[phone]  09 Aug, 2018   

 

 [place] [address] [place] [id number] [place] [address] 31046-
[phone]  09 Aug, 2018  Hypertension I10 and Peripheral edema R60.9

 

 [place] [address] [place] [id number] [place] [address] 97908-
4985  06 Aug, 2018  Hypertension I10 and Peripheral edema R60.9

 

 [facility]  [address] [place] [id number] [place] [address] 71122-
[phone]  02 Aug, 2018   

 

 [place] [address] [place] [id number] [place] [address] [phone]-
7502  02 Aug, 2018  Hypertension I10 and Peripheral edema R60.9

 

 [place] [address] [place] [id number] [place] [address] [phone]-
[phone]  01 Aug, 2018   

 

 [place] [address] [place] [id number] [place] [address] 51405-
8154     

 

 [place] [address] [address]-
9555     

 

 [place] [address] [place] [address] 48849-
7222     

 

 [place] [address] [place] [id number] [place] [address] 77567-
2958    Diabetes mellitus due to underlying condition with foot 
ulcer E08.621 ; Non-pressure chronic ulcer of other part of left foot limited 
to breakdown of skin L97.521 and BMI 45.0-49.9, adult Z68.42

 

 [place] [address] [place] [id number] [place] [address] 68068-
2055    Acute idiopathic gout involving toe of left foot M10.072

 

 [place] [address] [place] [id number] [place] [address] 98235-
3524     

 

 [place] [address] [place] [id number] [place] [address] 49077-
6375     

 

 [place] [address] [place] [address] 40749-
0528     

 

 [place] [address] [address]
6198    Type 2 diabetes mellitus with diabetic polyneuropathy 
E11.42 ; Hypertension I10 ; Hyperlipidemia, unspecified hyperlipidemia E78.5 ; 
Body mass index (BMI) of 45.0-49.9 in adult Z68.42 ; Long-term insulin use 
Z79.4 ; Non-adherence to medical treatment Z91.19 ; Coronary artery disease 
I25.10 ; GERD (gastroesophageal reflux disease) K21.9 ; Asthma J45.909 and 
Recurrent major depressive disorder, in partial remission F33.41

 

 [place] [address] [address]-
8292  22 May, 2018  Recurrent major depressive disorder, in partial remission 
F33.41 and Hypertension I10

 

 [place] [address] [place] [id number] [place] [address] 42412-
2221  21 May, 2018  Immune thrombocytopenic purpura D69.3 and Iron deficiency 
anemia D50.9

 

 [place] [address] [place] [id number] [place] [address] 84490-
3149  21 May, 2018  Type 2 diabetes mellitus with diabetic polyneuropathy 
E11.42 ; Long-term insulin use Z79.4 ; Chronic kidney disease (CKD) stage G3a/A3
, moderately decreased glomerular filtration rate (GFR) between 45-59 mL/min/
1.73 square meter and albuminuria creatinine ratio greater than 300 mg/g N18.3 
; Hypertension I10 ; Hyperlipidemia, unspecified hyperlipidemia E78.5 ; 
Coronary artery disease I25.10 ; GERD (gastroesophageal reflux disease) K21.9 ; 
Immune thrombocytopenic purpura D69.3 ; Asthma J45.909 ; Morbid (severe) 
obesity due to excess calories E66.01 and Recurrent major depressive disorder, 
in partial remission F33.41

 

 [place] [address] [place] [id number] [place] [address] 37379-
2885  11 May, 2018  Chronic kidney disease (CKD) stage G3a/A3, moderately 
decreased glomerular filtration rate (GFR) between 45-59 mL/min/1.73 square 
meter and albuminuria creatinine ratio greater than 300 mg/g N18.3

 

 [place] [address] [place] [id number] [place] [address] 39911-
8475  02 May, 2018  Chronic kidney disease (CKD) stage G3a/A3, moderately 
decreased glomerular filtration rate (GFR) between 45-59 mL/min/1.73 square 
meter and albuminuria creatinine ratio greater than 300 mg/g N18.3

 

 [place] [address] [place] [address] 30811-
9052     

 

 [place] [address] [place] [id number] [place] [address] 52369-
5034  28 Mar, 2018   

 

 [place] [address] [place] [id number] [place] [address] 32754-
6836  26 Mar, 2018  Immune thrombocytopenic purpura D69.3 ; Type 2 diabetes 
mellitus with diabetic polyneuropathy E11.42 ; Avascular necrosis of bone of 
right hip M87.051 ; Long-term insulin use Z79.4 ; GERD (gastroesophageal reflux 
disease) K21.9 ; Hyperlipidemia, unspecified hyperlipidemia E78.5 ; 
Hypertension I10 ; Recurrent major depressive disorder, in partial remission 
F33.41 ; Microalbuminuric diabetic nephropathy E11.21 ; Body mass index (BMI) 
of 45.0-49.9 in adult Z68.42 ; BMI 45.0-49.9, adult Z68.42 and Chronic kidney 
disease (CKD) stage G3a/A3, moderately decreased glomerular filtration rate (GFR
) between 45-59 mL/min/1.73 square meter and albuminuria creatinine ratio 
greater than 300 mg/g N18.3

 

 [place] [address] [phone]-
3403  23 Mar, 2018   

 

 [place] [address] [address]-
0715  23 Mar, 2018   

 

 [place] [address] [address]-
1209  19 Mar, 2018   

 

 [place] [address] [address]-
9145  14 Mar, 2018   

 

 [place] [address] [address]-
5681  13 Mar, 2018  Type 2 diabetes mellitus with diabetic polyneuropathy 
E11.42 ; Asthma J45.909 and Hypertension I10

 

 [facility]  [address] [place] 
[id number]  13 Mar, 2018  Skin ulcer, limited to breakdown of skin L98.491 ; 
Immune thrombocytopenic purpura D69.3 ; Avascular necrosis of bone of right hip 
M87.051 and Type 2 diabetes mellitus with diabetic polyneuropathy E11.42

 

 [place] [address] [address]-
1126  06 Mar, 2018  Asthma J45.909 and Type 2 diabetes mellitus with diabetic 
polyneuropathy E11.42

 

 [place] [address] [place] [id number] [place] [address] 17750-
5667  01 Mar, 2018   

 

 [facility] [employer]  [address] [place] 
[id number]    Other iron deficiency anemia D50.8 ; Weakness R53.1 ; 
Type 2 diabetes mellitus with diabetic polyneuropathy E11.42 ; Cellulitis of 
trunk, unspecified site of trunk L03.319 ; Coronary artery disease I25.10 ; 
Avascular necrosis of bone of right hip M87.051 and Morbid (severe) obesity due 
to excess calories E66.01

 

 [facility]  [address] 
[id number]     

 

 [place] [address] [address]-
0530     

 

 [facility] [place] [address] [phone]  15 Feb, 2018  Yeast infection of the skin B37.2

 

 [address] [phone]-
[phone]  15 Feb, 2018   

 

 [place] [address] [address]-
[phone]  15 Feb, 2018   

 

 [place] [address] [address]-
[phone]     

 

 [facility] [place] [address] [place] [id number] [place] [address] [phone]
[phone]     

 

 [place] [address] [place] [id number] [place] [address] 76476-
9352    Type 2 diabetes mellitus with diabetic polyneuropathy 
E11.42 ; Avascular necrosis of bone of right hip M87.051 ; Hyperlipidemia, 
unspecified hyperlipidemia E78.5 ; Hypertension I10 ; Anxiety disorder, 
unspecified F41.9 ; Immune thrombocytopenic purpura D69.3 ; Coronary artery 
disease I25.10 ; Orthopnea R06.01 ; Acute bronchitis, unspecified organism 
J20.9 ; Wound of left lower extremity, initial encounter S81.802A ; Body aches 
R52 and Debilitated R53.81

 

 [place] [address] [address]-
6659  12 2018   

 

 [place] [address] [place] [address] 05955-
1518    Type 2 diabetes mellitus with diabetic polyneuropathy 
E11.42 ; Encounter for immunization Z23 ; Hyperlipidemia, unspecified 
hyperlipidemia E78.5 ; Hypertension I10 ; Anxiety disorder, unspecified F41.9 ; 
Asthma J45.909 ; Body mass index (BMI) of 45.0-49.9 in adult Z68.42 and Morbid (
severe) obesity due to excess calories E66.01

 

 [place] [address] [place] [address] 66157-
0507    Type 2 diabetes mellitus with diabetic polyneuropathy 
E11.42 ; Hyperlipidemia, unspecified hyperlipidemia E78.5 ; Hypertension I10 
and GERD (gastroesophageal reflux disease) K21.9

 

 [place] [address] [place] [address] 21586-
8215  22 Mar, 2017  Type 2 diabetes mellitus with diabetic polyneuropathy E11.42

 

 [place] [address] [place] [address] 01929-
9476    Type 2 diabetes mellitus with diabetic polyneuropathy 
E11.42 ; Coronary artery disease I25.10 ; History of MI (myocardial infarction) 
I25.2 ; Immune thrombocytopenic purpura D69.3 ; GERD (gastroesophageal reflux 
disease) K21.9 ; Hyperlipidemia, unspecified hyperlipidemia E78.5 ; 
Hypertension I10 ; History of kidney stones Z87.442 and Anxiety disorder, 
unspecified F41.9

 

 [place] [address] [place] [address] 99096-
2548     

 

 [place] [address] [place] [address] 97856-
6332  29 Sep, 2016  Coronary artery disease I25.10 ; History of MI (myocardial 
infarction) I25.2 ; History of kidney stones Z87.442 ; Type 2 diabetes mellitus 
with diabetic polyneuropathy E11.42 ; Avascular necrosis of bone of right hip 
M87.051 ; Hyperlipidemia, unspecified hyperlipidemia E78.5 ; Hypertension I10 ; 
Asthma J45.909 and Encounter for immunization Z23

 

 [place] [address] [place] [id number] [place] [address] 49601-
1788  20 Sep, 2016   

 

 [place] [address] [address]-
3414    Coronary artery disease I25.10 ; Type 2 diabetes mellitus 
with diabetic polyneuropathy E11.42 ; History of MI (myocardial infarction) 
I25.2 ; Immune thrombocytopenic purpura D69.3 ; Hyperlipidemia, unspecified 
hyperlipidemia E78.5 and Hypertension I10

 

 [place] [address] [place] [id number] [place] [address] 67076-
8823    Coronary artery disease I25.10 ; Lymphedema I89.0 ; History 
of MI (myocardial infarction) I25.2 ; History of kidney stones Z87.442 ; Immune 
thrombocytopenic purpura D69.3 ; Type 2 diabetes mellitus with diabetic 
polyneuropathy E11.42 ; Avascular necrosis of bone of right hip M87.051 ; GERD (
gastroesophageal reflux disease) K21.9 ; Hyperlipidemia, unspecified 
hyperlipidemia E78.5 ; Hypertension I10 and Asthma J45.909

 

 [place] [address] [place] [id number] [place] [address] 28581-
4285     

 

 [place] [address] [place] [id number] [place] [address] 58107-
5335     

 

 [place] [address] [place] [id number] [place] [address] 63494-
[phone]  02 Dec, 2015   

 

 [place] [address] [place] [id number] [place] [address] 55957-
9323  02 Dec, 2015  Hyperlipidemia, unspecified hyperlipidemia E78.5

 

 [place] [address] [place] [id number] [place] [address] 01138-
[phone]     

 

 [place] [address] [place] [id number] [place] [address] 42408-
8286     

 

 [place] [address] [place] [id number] [place] [address] 64406-
2552    Allergic rhinitis J30.9

 

 [facility]  [address] [facility] [id number] [place] [address]-
1619    Type 2 diabetes mellitus with diabetic polyneuropathy 
E11.42 ; Routine adult health maintenance Z00.00 ; Coronary artery disease 
I25.10 ; History of MI (myocardial infarction) I25.2 ; History of kidney stones 
Z87.442 ; Immune thrombocytopenic purpura D69.3 ; Avascular necrosis of bone of 
right hip M87.051 and GERD (gastroesophageal reflux disease) K21.9







IMMUNIZATIONS

No Known Immunizations



SOCIAL HISTORY

Never Assessed



REASON FOR VISIT

Discuss insulin --IAN Borrero



PLAN OF CARE







 Activity  Details









  









 Follow Up  prn Reason:







VITAL SIGNS





MEDICATIONS







 Medication  Instructions  Dosage  Frequency  Start Date  End Date  Duration  
Status

 

 Carvedilol 12.5 MG  Orally 2 times a day  1 tablet  12h           Active

 

 ProAir  (90 Base) MCG/ACT  Inhalation every 4 hrs  2 puffs as needed  
4h          Active

 

 Aspir-81 81 MG  Orally Once a day  1 tablet  24h     16 May, 2019     Active

 

 Pantoprazole Sodium 40 mg  Orally Once a day  1 tablet  24h           Active

 

 Naproxen Sodium 550 MG  Orally every 12 hrs prn  1 tablet with food or milk as 
needed     02 Oct, 2018        Active

 

 Hydrochlorothiazide 25 MG  Orally Once a day  1 tablet in the morning  24h    
       Active

 

 Probiotic -  Orally Once a day  1 capsule  24h           Active

 

 Klor-Con 10 10 MEQ  Orally Twice a day  1 tablet with food  12h        30 day(s
)  Active

 

 Levemir FlexTouch 100 UNIT/ML  Subcutaneous Once a day  20 units in AM only  
24h          Active

 

 Benazepril HCl 40 mg  Orally Once a day  1 tablet  24h           Active

 

 Atorvastatin Calcium 10 mg  Orally Once a day  1 tablet  24h      
    Active

 

 GlipiZIDE 5 mg  Orally 2 times a day  1 tablet  12h           Active

 

 Fluticasone-Salmeterol 113-14 MCG/ACT  Inhalation Once a day  1-2 puff as 
needed  24h           Active

 

 Citalopram Hydrobromide 20 mg  Orally Once a day  1 tablet  24h           
Active







RESULTS

No Results



PROCEDURES







 Procedure  Date Ordered  Result  Body Site

 

 Minor complication (15 mins)  Oct 30, 2018      







INSTRUCTIONS





MEDICATIONS ADMINISTERED

No Known Medications



MEDICAL (GENERAL) HISTORY







 Type  Description  Date

 

 Medical History  Type II Diabetes   

 

 Medical History  CAD   

 

 Medical History  Acute MI x1   

 

 Medical History  Heart Cath x3   

 

 Medical History  Kidney Stones   

 

 Medical History  Lymphedema   

 

 Medical History  Immune thrombocytopenic purpura   

 

 Surgical History  Heart Stents x2   

 

 Surgical History  Cholecystectomy   

 

 Surgical History     

 

 Surgical History  [place]ve[place] Teeth   

 

 Hospitalization History  [facility]  

 

 Hospitalization History  Sepsis/Toxic Shock  

 

 Hospitalization History  [facility]-flu[facility]pneumonia  2017

 

 Hospitalization History  [facility]- Anemia, cellulitis pannus, yeast 
infection. Discharged 2018

## 2018-12-21 NOTE — XMS REPORT
[facility]

 Created on: 2018



Madison Young

External Reference #: [id number]

: 1945

Sex: Female



Demographics







 Address  [address] [place]  [address]

 

 Preferred Language  Unknown

 

 Marital Status  Unknown

 

 Restorationism Affiliation  Unknown

 

 Race  Unknown

 

 Ethnic Group  Unknown





Author







 Author  FREIRELEANDER HILLIARD

 

 Organization  [facility]

 

 Address  [address] [place]  [address]



 

 Phone  [phone]







Care Team Providers







 Care Team Member Name  Role  Phone

 

 LEANDER FREIRE  Unavailable  [phone]







PROBLEMS







 Type  Condition  ICD9-CM Code  KPT86-CN Code  Onset Dates  Condition Status  
SNOMED Code

 

 Problem  Hypertension     I10     Active  28700693

 

 Problem  Morbid (severe) obesity due to excess calories     E66.01     Active  
820067433

 

 Problem  Anxiety disorder, unspecified     F41.9     Active  036011131

 

 Problem  Body mass index (BMI) of 50-59.9 in adult     Z68.43     Active  
982227423

 

 Problem  Peripheral edema     R60.9     Active  261031818

 

 Problem  Long-term insulin use     Z79.4     Active  607293718

 

 Problem  Recurrent major depressive disorder, in partial remission     F33.41 
    Active  04151968

 

 Problem  Iron deficiency anemia     D50.9     Active  53310771

 

 Problem  Non-adherence to medical treatment     Z91.19     Active  111573214

 

 Problem  Microalbuminuric diabetic nephropathy     E11.21     Active  903218388

 

 Problem  Avascular necrosis of bone of right hip     M87.051     Active  
850812450

 

 Problem  Chronic kidney disease (CKD) stage G3a/A3, moderately decreased 
glomerular filtration rate (GFR) between 45-59 mL/min/1.73 square meter and 
albuminuria creatinine ratio greater than 300 mg/g     N18.3     Active  
901335193

 

 Problem  Type 2 diabetes mellitus with diabetic polyneuropathy     E11.42     
Active  92431010

 

 Problem  Immune thrombocytopenic purpura     D69.3     Active  337461019

 

 Problem  Coronary artery disease     I25.10     Active  65434482

 

 Problem  Hyperlipidemia, unspecified hyperlipidemia     E78.5     Active  
82117585

 

 Problem  GERD (gastroesophageal reflux disease)     K21.9     Active  237953317

 

 Problem  Asthma     J45.909     Active  835062560







ALLERGIES

No Information



ENCOUNTERS







 Encounter  Location  Date  Diagnosis

 

 [facility]  [address] [id number] [address]  14 Sep, 2018  Coronary artery disease I25.10 ; Congestive heart failure, 
unspecified HF chronicity, unspecified heart failure type I50.9 ; Peripheral 
edema R60.9 ; Avascular necrosis of bone of right hip M87.051 ; Morbid (severe) 
obesity due to excess calories E66.01 and Body mass index (BMI) of 50-59.9 in 
adult Z68.43

 

 [place] [id number] [place] [address]
[phone]  10 Sep, 2018   

 

 [place] [id number] [place] [address]-
[phone]  06 Sep, 2018  Type 2 diabetes mellitus with diabetic polyneuropathy 
E11.42 ; Coronary artery disease I25.10 ; Hypertension I10 ; Long-term insulin 
use Z79.4 ; Body mass index (BMI) of 45.0-49.9 in adult Z68.42 ; Peripheral 
edema R60.9 and Avascular necrosis of bone of right hip M87.051

 

 [place] [address] [phone]  17 Aug, 2018  Peripheral edema R60.9

 

 [place] [address] [id number] [place] [address]-
[phone]  13 Aug, 2018  Hypertension I10 and Peripheral edema R60.9

 

 [place] [address] [id number] [place] [address] [phone]-
[phone]  09 Aug, 2018   

 

 [place] [id number] [place] [phone]-
[phone]  09 Aug, 2018  Hypertension I10 and Peripheral edema R60.9

 

 [place] [id number] [place] [address] [phone]-
[phone]  06 Aug, 2018  Hypertension I10 and Peripheral edema R60.9

 

 [place] [id number] [place] [phone]-
[phone]  02 Aug, 2018   

 

 [place] [id number] [place] [address] [phone]
[phone]  02 Aug, 2018  Hypertension I10 and Peripheral edema R60.9

 

 [place] [address] [place] [address]-
9257  01 Aug, 2018   

 

 [place] [id number] [place] [address]-
[phone]     

 

 [place] [id number] [place] [phone]-
[phone]     

 

 [place] [id number] [place] [address]51-
[phone]     

 

 [place] [id number] [place] [address]697-
2078    Diabetes mellitus due to underlying condition with foot 
ulcer E08.621 ; Non-pressure chronic ulcer of other part of left foot limited 
to breakdown of skin L97.521 and BMI 45.0-49.9, adult Z68.42

 

 [place] [id number] [place] [address]-
[phone]    Acute idiopathic gout involving toe of left foot M10.072

 

 [place] [id number] [place] [address] 20[address]-
0954     

 

 [place] [id number] [place] [address] 34618-
[phone]     

 

 [place] [id number] [place] [address] 75328-
2511     

 

 [place] [id number] [place] [address] 6[address]10-
[phone]    Type 2 diabetes mellitus with diabetic polyneuropathy 
E11.42 ; Hypertension I10 ; Hyperlipidemia, unspecified hyperlipidemia E78.5 ; 
Body mass index (BMI) of 45.0-49.9 in adult Z68.42 ; Long-term insulin use 
Z79.4 ; Non-adherence to medical treatment Z91.19 ; Coronary artery disease 
I25.10 ; GERD (gastroesophageal reflux disease) K21.9 ; Asthma J45.909 and 
Recurrent major depressive disorder, in partial remission F33.41

 

 [place] [id number] [place] [address]98-
[phone]  22 May, 2018  Recurrent major depressive disorder, in partial remission 
F33.41 and Hypertension I10

 

 [place] [id number] [place] [address] 20363-
[phone]  21 May, 2018  Immune thrombocytopenic purpura D69.3 and Iron deficiency 
anemia D50.9

 

 [place] [id number] [place] [address] 86369-
7484  21 May, 2018  Type 2 diabetes mellitus with diabetic polyneuropathy 
E11.42 ; Long-term insulin use Z79.4 ; Chronic kidney disease (CKD) stage G3a/A3
, moderately decreased glomerular filtration rate (GFR) between 45-59 mL/min/
1.73 square meter and albuminuria creatinine ratio greater than 300 mg/g N18.3 
; Hypertension I10 ; Hyperlipidemia, unspecified hyperlipidemia E78.5 ; 
Coronary artery disease I25.10 ; GERD (gastroesophageal reflux disease) K21.9 ; 
Immune thrombocytopenic purpura D69.3 ; Asthma J45.909 ; Morbid (severe) 
obesity due to excess calories E66.01 and Recurrent major depressive disorder, 
in partial remission F33.41

 

 [place] [id number] [place] [address] 80662-
9217  11 May, 2018  Chronic kidney disease (CKD) stage G3a/A3, moderately 
decreased glomerular filtration rate (GFR) between 45-59 mL/min/1.73 square 
meter and albuminuria creatinine ratio greater than 300 mg/g N18.3

 

 [place] [address] [place] [address] 89144-
2320  02 May, 2018  Chronic kidney disease (CKD) stage G3a/A3, moderately 
decreased glomerular filtration rate (GFR) between 45-59 mL/min/1.73 square 
meter and albuminuria creatinine ratio greater than 300 mg/g N18.3

 

 [place] [id number] [place] [address] 28777-
[phone]     

 

 [place] [id number] [place] [address] [phone]-
2960  28 Mar, 2018   

 

 [place] [id number] [place] [address] 42755-
1849  26 Mar, 2018  Immune thrombocytopenic purpura D69.3 ; Type 2 diabetes 
mellitus with diabetic polyneuropathy E11.42 ; Avascular necrosis of bone of 
right hip M87.051 ; Long-term insulin use Z79.4 ; GERD (gastroesophageal reflux 
disease) K21.9 ; Hyperlipidemia, unspecified hyperlipidemia E78.5 ; 
Hypertension I10 ; Recurrent major depressive disorder, in partial remission 
F33.41 ; Microalbuminuric diabetic nephropathy E11.21 ; Body mass index (BMI) 
of 45.0-49.9 in adult Z68.42 ; BMI 45.0-49.9, adult Z68.42 and Chronic kidney 
disease (CKD) stage G3a/A3, moderately decreased glomerular filtration rate (GFR
) between 45-59 mL/min/1.73 square meter and albuminuria creatinine ratio 
greater than 300 mg/g N18.3

 

 [place] [address] [phone]-
[phone]  23 Mar, 2018   

 

 [place] [address] [address]-
6157  23 Mar, 2018   

 

 [place] [address] [phone]-
[phone]  19 Mar, 2018   

 

 [place] [address] [phone]-
0050  14 Mar, 2018   

 

 [place] [id number] [place] [address] 56761-
1629  13 Mar, 2018  Type 2 diabetes mellitus with diabetic polyneuropathy 
E11.42 ; Asthma J45.909 and Hypertension I10

 

 [facility]  [address] [place], [place] 
[id number]  13 Mar, 2018  Skin ulcer, limited to breakdown of skin L98.491 ; 
Immune thrombocytopenic purpura D69.3 ; Avascular necrosis of bone of right hip 
M87.051 and Type 2 diabetes mellitus with diabetic polyneuropathy E11.42

 

 [place] [id number] [place] [address] 387[address]-
1062  06 Mar, 2018  Asthma J45.909 and Type 2 diabetes mellitus with diabetic 
polyneuropathy E11.42

 

 [place] [id number] [address]
96[address]  01 Mar, 2018   

 

 [facility]  [address] [place], [place] 
[id number]    Other iron deficiency anemia D50.8 ; Weakness R53.1 ; 
Type 2 diabetes mellitus with diabetic polyneuropathy E11.42 ; Cellulitis of 
trunk, unspecified site of trunk L03.319 ; Coronary artery disease I25.10 ; 
Avascular necrosis of bone of right hip M87.051 and Morbid (severe) obesity due 
to excess calories E66.01

 

 [facility]  [id number] [place] [address] 
[phone]     

 

 [place] [id number] [place] [address] 62294-
6082  16 2018   

 

 [facility] [place] [id number] [place] [address] [phone]
[phone]  15 Feb, 2018  Yeast infection of the skin B37.2

 

 [place] [id number] [address]-
8575  15 Feb, 2018   

 

 [place] [id number] [place] [address] 85659-
5090  15 Feb, 2018   

 

 [place] [address] [place] [address] 27784-
[phone]     

 

 [facility] [place] [address] [place] [address] 91531
[phone]     

 

 [place] [address] [place] [address] 67897-
4014  12 2018  Type 2 diabetes mellitus with diabetic polyneuropathy 
E11.42 ; Avascular necrosis of bone of right hip M87.051 ; Hyperlipidemia, 
unspecified hyperlipidemia E78.5 ; Hypertension I10 ; Anxiety disorder, 
unspecified F41.9 ; Immune thrombocytopenic purpura D69.3 ; Coronary artery 
disease I25.10 ; Orthopnea R06.01 ; Acute bronchitis, unspecified organism 
J20.9 ; Wound of left lower extremity, initial encounter S81.802A ; Body aches 
R52 and Debilitated R53.81

 

 [place] [address] [address]-
0660  12 2018   

 

 [place] [id number] [place] [address] 60[address]-
0676    Type 2 diabetes mellitus with diabetic polyneuropathy 
E11.42 ; Encounter for immunization Z23 ; Hyperlipidemia, unspecified 
hyperlipidemia E78.5 ; Hypertension I10 ; Anxiety disorder, unspecified F41.9 ; 
Asthma J45.909 ; Body mass index (BMI) of 45.0-49.9 in adult Z68.42 and Morbid (
severe) obesity due to excess calories E66.01

 

 [place] [address] [id number] [place] [address] 95967-
2824    Type 2 diabetes mellitus with diabetic polyneuropathy 
E11.42 ; Hyperlipidemia, unspecified hyperlipidemia E78.5 ; Hypertension I10 
and GERD (gastroesophageal reflux disease) K21.9

 

 [place] [id number] [place] [address] 40589-
0636  22 Mar, 2017  Type 2 diabetes mellitus with diabetic polyneuropathy E11.42

 

 [place] [address] [place] [address] 16048-
8907  28 2017  Type 2 diabetes mellitus with diabetic polyneuropathy 
E11.42 ; Coronary artery disease I25.10 ; History of MI (myocardial infarction) 
I25.2 ; Immune thrombocytopenic purpura D69.3 ; GERD (gastroesophageal reflux 
disease) K21.9 ; Hyperlipidemia, unspecified hyperlipidemia E78.5 ; 
Hypertension I10 ; History of kidney stones Z87.442 and Anxiety disorder, 
unspecified F41.9

 

 [place] [address] [place] [address] 68938-
[phone]     

 

 [place] [id number] [place] [address]76-
8802  29 Sep, 2016  Coronary artery disease I25.10 ; History of MI (myocardial 
infarction) I25.2 ; History of kidney stones Z87.442 ; Type 2 diabetes mellitus 
with diabetic polyneuropathy E11.42 ; Avascular necrosis of bone of right hip 
M87.051 ; Hyperlipidemia, unspecified hyperlipidemia E78.5 ; Hypertension I10 ; 
Asthma J45.909 and Encounter for immunization Z23

 

 [place] [address] [address]-
7759  20 Sep, 2016   

 

 [place] [address] [address]-
0498    Coronary artery disease I25.10 ; Type 2 diabetes mellitus 
with diabetic polyneuropathy E11.42 ; History of MI (myocardial infarction) 
I25.2 ; Immune thrombocytopenic purpura D69.3 ; Hyperlipidemia, unspecified 
hyperlipidemia E78.5 and Hypertension I10

 

 [place] [address] [address]-
8284    Coronary artery disease I25.10 ; Lymphedema I89.0 ; History 
of MI (myocardial infarction) I25.2 ; History of kidney stones Z87.442 ; Immune 
thrombocytopenic purpura D69.3 ; Type 2 diabetes mellitus with diabetic 
polyneuropathy E11.42 ; Avascular necrosis of bone of right hip M87.051 ; GERD (
gastroesophageal reflux disease) K21.9 ; Hyperlipidemia, unspecified 
hyperlipidemia E78.5 ; Hypertension I10 and Asthma J45.909

 

 [place] [address] [address]-
[phone]     

 

 [place] [address] [address]-
3725     

 

 [place] [address] [phone]-
[phone]  02 Dec, 2015   

 

 [place] [address] [address]-
6189  02 Dec, 2015  Hyperlipidemia, unspecified hyperlipidemia E78.5

 

 [place] [address] [address]-
[phone]     

 

 [place] [address] [address]-
5026     

 

 [facility]  [address] [id number] [place] [address]-
0908    Allergic rhinitis J30.9

 

 [facility]  [address] [id number] [place] [address]-
0411    Type 2 diabetes mellitus with diabetic polyneuropathy 
E11.42 ; Routine adult health maintenance Z00.00 ; Coronary artery disease 
I25.10 ; History of MI (myocardial infarction) I25.2 ; History of kidney stones 
Z87.442 ; Immune thrombocytopenic purpura D69.3 ; Avascular necrosis of bone of 
right hip M87.051 and GERD (gastroesophageal reflux disease) K21.9







IMMUNIZATIONS

No Known Immunizations



SOCIAL HISTORY

Never Assessed



REASON FOR VISIT

ER visit



PLAN OF CARE





VITAL SIGNS





MEDICATIONS







 Medication  Instructions  Dosage  Frequency  Start Date  End Date  Duration  
Status

 

 Carvedilol 12.5 MG  Orally 2 times a day  1 tablet  12h           Active

 

 Aleve 220 MG  Orally every 12 hrs  2 tablets with food or milk as needed  12h 
          Active

 

 Levemir FlexTouch 100 UNIT/ML  Subcutaneous 2 times a day  15  units twice 
daily  12h       12 months  Active

 

 Pantoprazole Sodium 40 mg  Orally Once a day  1 tablet  24h           Active

 

 Benazepril HCl 40 mg  Orally Once a day  1 tablet  24h           Active

 

 Potassium Chloride 10 MEQ  Orally Once a day  1 tablet with food  24h  02 Aug, 
2018  1 Oct, 2018     Active

 

 Citalopram Hydrobromide 20 mg  Orally Once a day  1 tablet  24h           
Active

 

 Nystatin           26 Mar, 2018        Not-Taking

 

 Torsemide 20 mg  Orally Once a day  1 tablet  24h        30 days  Not-Taking

 

 Humalog KwikPen 100 UNIT/ML  Subcutaneous three times a day before meals  5 
units           30 days  Active

 

 Advair Diskus           26 Mar, 2018        Active

 

 Nystatin 168541 UNIT/GM  Externally Twice a day  1 application to affected 
area  12h  10 Sep, 2018  30 Sep, 2018  10 days  Active

 

 Hydrochlorothiazide 50 mg  Orally Once a day  1  1/2 tablets in the morning  
24h        30 day(s)  Active

 

 Tramadol HCl 50 mg  Orally 2 times a day  1 tablet as needed  12h  06 Sep, 
2018  13 Sep, 2018  7 days  Active

 

 ProAir  (90 Base) MCG/ACT  Inhalation every 4 hrs  2 puffs as needed  
4h          Active

 

 Aspir-81 81 MG  Orally Once a day  1 tablet  24h     16 May, 2019     Active

 

 GlipiZIDE 5 mg  Orally 2 times a day  1 tablet  12h           Active

 

 Probiotic -                    Active

 

 Atorvastatin Calcium 10 mg  Orally Once a day  1 tablet  24h      
    Active

 

 Cefdinir 300 MG  Orally 2 times a day  as directed  12h  09 Sep, 2018  15 Sep, 
2018     Active







RESULTS

No Results



PROCEDURES

No Known procedures



INSTRUCTIONS





MEDICATIONS ADMINISTERED

No Known Medications



MEDICAL (GENERAL) HISTORY







 Type  Description  Date

 

 Medical History  Type II Diabetes   

 

 Medical History  CAD   

 

 Medical History  Acute MI x1   

 

 Medical History  Heart Cath x3   

 

 Medical History  Kidney Stones   

 

 Medical History  Lymphedema   

 

 Medical History  Immune thrombocytopenic purpura   

 

 Surgical History  Heart Stents x2   

 

 Surgical History  Cholecystectomy   

 

 Surgical History     

 

 Surgical History  [place]   

 

 Hospitalization History  [facility]  

 

 Hospitalization History  Sepsis/Toxic Shock  

 

 Hospitalization History  [facility]-flu[facility]neumonia  2017

 

 Hospitalization History  [facility]- Anemia, cellulitis pannus, yeast 
infection. Discharged 2018

## 2018-12-22 VITALS — DIASTOLIC BLOOD PRESSURE: 70 MMHG | SYSTOLIC BLOOD PRESSURE: 166 MMHG

## 2018-12-22 VITALS — SYSTOLIC BLOOD PRESSURE: 140 MMHG | DIASTOLIC BLOOD PRESSURE: 81 MMHG

## 2018-12-22 VITALS — DIASTOLIC BLOOD PRESSURE: 69 MMHG | SYSTOLIC BLOOD PRESSURE: 131 MMHG

## 2018-12-22 VITALS — DIASTOLIC BLOOD PRESSURE: 65 MMHG | SYSTOLIC BLOOD PRESSURE: 144 MMHG

## 2018-12-22 VITALS — DIASTOLIC BLOOD PRESSURE: 65 MMHG | SYSTOLIC BLOOD PRESSURE: 121 MMHG

## 2018-12-22 VITALS — DIASTOLIC BLOOD PRESSURE: 64 MMHG | SYSTOLIC BLOOD PRESSURE: 131 MMHG

## 2018-12-22 VITALS — SYSTOLIC BLOOD PRESSURE: 142 MMHG | DIASTOLIC BLOOD PRESSURE: 68 MMHG

## 2018-12-22 VITALS — SYSTOLIC BLOOD PRESSURE: 134 MMHG | DIASTOLIC BLOOD PRESSURE: 63 MMHG

## 2018-12-22 LAB
ALBUMIN SERPL-MCNC: 2.9 GM/DL (ref 3.2–4.5)
ALP SERPL-CCNC: 143 U/L (ref 40–136)
ALT SERPL-CCNC: 21 U/L (ref 0–55)
BASOPHILS # BLD AUTO: 0 10^3/UL (ref 0–0.1)
BASOPHILS NFR BLD AUTO: 0 % (ref 0–10)
BILIRUB SERPL-MCNC: 0.8 MG/DL (ref 0.1–1)
BUN/CREAT SERPL: 26
CALCIUM SERPL-MCNC: 7.9 MG/DL (ref 8.5–10.1)
CHLORIDE SERPL-SCNC: 106 MMOL/L (ref 98–107)
CO2 SERPL-SCNC: 21 MMOL/L (ref 21–32)
CREAT SERPL-MCNC: 0.84 MG/DL (ref 0.6–1.3)
EOSINOPHIL # BLD AUTO: 0 10^3/UL (ref 0–0.3)
EOSINOPHIL NFR BLD AUTO: 0 % (ref 0–10)
ERYTHROCYTE [DISTWIDTH] IN BLOOD BY AUTOMATED COUNT: 15.4 % (ref 10–14.5)
GFR SERPLBLD BASED ON 1.73 SQ M-ARVRAT: > 60 ML/MIN
GLUCOSE SERPL-MCNC: 195 MG/DL (ref 70–105)
HCT VFR BLD CALC: 32 % (ref 35–52)
HGB BLD-MCNC: 9.9 G/DL (ref 11.5–16)
LYMPHOCYTES # BLD AUTO: 0.5 X 10^3 (ref 1–4)
LYMPHOCYTES NFR BLD AUTO: 14 % (ref 12–44)
MANUAL DIFFERENTIAL PERFORMED BLD QL: NO
MCH RBC QN AUTO: 29 PG (ref 25–34)
MCHC RBC AUTO-ENTMCNC: 31 G/DL (ref 32–36)
MCV RBC AUTO: 94 FL (ref 80–99)
MONOCYTES # BLD AUTO: 0 X 10^3 (ref 0–1)
MONOCYTES NFR BLD AUTO: 1 % (ref 0–12)
NEUTROPHILS # BLD AUTO: 3.1 X 10^3 (ref 1.8–7.8)
NEUTROPHILS NFR BLD AUTO: 86 % (ref 42–75)
PLATELET # BLD: 143 10^3/UL (ref 130–400)
PMV BLD AUTO: 11.3 FL (ref 7.4–10.4)
POTASSIUM SERPL-SCNC: 4.3 MMOL/L (ref 3.6–5)
PROT SERPL-MCNC: 5.5 GM/DL (ref 6.4–8.2)
RBC # BLD AUTO: 3.4 10^6/UL (ref 4.35–5.85)
SODIUM SERPL-SCNC: 139 MMOL/L (ref 135–145)
WBC # BLD AUTO: 3.7 10^3/UL (ref 4.3–11)

## 2018-12-22 RX ADMIN — METHYLPREDNISOLONE SODIUM SUCCINATE SCH MG: 125 INJECTION, POWDER, FOR SOLUTION INTRAMUSCULAR; INTRAVENOUS at 02:24

## 2018-12-22 RX ADMIN — INSULIN ASPART SCH UNIT: 100 INJECTION, SOLUTION INTRAVENOUS; SUBCUTANEOUS at 20:52

## 2018-12-22 RX ADMIN — IPRATROPIUM BROMIDE AND ALBUTEROL SULFATE SCH ML: .5; 3 SOLUTION RESPIRATORY (INHALATION) at 11:12

## 2018-12-22 RX ADMIN — IPRATROPIUM BROMIDE AND ALBUTEROL SULFATE SCH ML: .5; 3 SOLUTION RESPIRATORY (INHALATION) at 07:03

## 2018-12-22 RX ADMIN — Medication SCH ML: at 21:06

## 2018-12-22 RX ADMIN — ATORVASTATIN CALCIUM SCH MG: 10 TABLET, FILM COATED ORAL at 21:05

## 2018-12-22 RX ADMIN — METHYLPREDNISOLONE SODIUM SUCCINATE SCH MG: 125 INJECTION, POWDER, FOR SOLUTION INTRAMUSCULAR; INTRAVENOUS at 08:40

## 2018-12-22 RX ADMIN — INSULIN ASPART SCH UNIT: 100 INJECTION, SOLUTION INTRAVENOUS; SUBCUTANEOUS at 06:11

## 2018-12-22 RX ADMIN — Medication SCH ML: at 06:01

## 2018-12-22 RX ADMIN — INSULIN ASPART SCH UNIT: 100 INJECTION, SOLUTION INTRAVENOUS; SUBCUTANEOUS at 11:14

## 2018-12-22 RX ADMIN — CARVEDILOL SCH MG: 12.5 TABLET, FILM COATED ORAL at 21:05

## 2018-12-22 RX ADMIN — Medication SCH ML: at 11:01

## 2018-12-22 RX ADMIN — SODIUM CHLORIDE SCH MLS/HR: 900 INJECTION INTRAVENOUS at 21:06

## 2018-12-22 RX ADMIN — FUROSEMIDE SCH MG: 10 INJECTION, SOLUTION INTRAVENOUS at 02:24

## 2018-12-22 RX ADMIN — IPRATROPIUM BROMIDE AND ALBUTEROL SULFATE SCH ML: .5; 3 SOLUTION RESPIRATORY (INHALATION) at 19:12

## 2018-12-22 RX ADMIN — GLIPIZIDE SCH MG: 5 TABLET ORAL at 16:41

## 2018-12-22 RX ADMIN — LISINOPRIL SCH MG: 40 TABLET ORAL at 21:05

## 2018-12-22 RX ADMIN — FUROSEMIDE SCH MG: 10 INJECTION, SOLUTION INTRAVENOUS at 08:40

## 2018-12-22 RX ADMIN — SODIUM CHLORIDE SCH MLS/HR: 900 INJECTION INTRAVENOUS at 08:40

## 2018-12-22 RX ADMIN — ASPIRIN SCH MG: 81 TABLET ORAL at 21:05

## 2018-12-22 RX ADMIN — INSULIN ASPART SCH UNIT: 100 INJECTION, SOLUTION INTRAVENOUS; SUBCUTANEOUS at 16:07

## 2018-12-22 NOTE — HISTORY & PHYSICIAL ([facility])
HPI


History of Present Illness:


74 yo female who recently moved from nursing facility to assisted living and 

was moving and unpacking boxes, started to feel excessively tired and worn out 

and maybe short of breath. She also noted chills. She denies cough. She has 

chronic nasal congestion which is improved some with nasal strips. On arrival 

to ER found to be febrile and hypoxic.


Source:  patient


Date seen by provider:  Dec 22, 2018


Time Seen by Provider:  08:55


Attending Physician


Caridad Castellanos MD


[facility] [place],[facility]


Consult





Date of Admission


Dec 21, 2018 at 20:35





Home Medications


Home Medications


Reviewed patient Home Medication Reconciliation performed by pharmacy 

medication reconciliations technician and/or nursing.


Patients Allergies have been reviewed.





Allergies


Coded Allergies:  


     vancomycin (Verified  Allergy, Intermediate, RASH, 18)


     heparin (Verified  Allergy, Unknown, 18)


Uncoded Allergies:  


     TAPE (Allergy, Mild, RASH, 17)





PMH-Social-Family Hx


Patient Social History


Alcohol Use:  Denies Use


Recreational Drug Use:  No


Smoking Status:  Former Smoker (1 PPD--PT STAETS SHE QUIT SMOKING LESS THAN 10 

YEARS AGO, PER PT ON 18)


Former smoker/When Quit:  Oct 8, 1984


Type Used:  Cigarettes


2nd Hand Smoke Exposure:  No


Recent Foreign Travel:  No


Contact w/other who traveled:  No


Recent Hopitalizations:  No


Recent Infectious Disease Expo:  No


Physical Abuse Screen:  No


Sexual Abuse:  No





Immunizations Up To Date


Tetanus Booster (TDap):  Unknown


Date of Pneumonia Vaccine:  Oct 1, 2016


Date of Influenza Vaccine:  Sep 30, 2018





Past Medical History





PMHx:


Normocytic Anemia with Fe Def: follows with Dr Brewer


IDRAJ


CAD s/p stenting and multiple baloon angioplasty to in-stent stenosis


Asthma


Morbid Obesity


Right hip avascular necrosis, wheelchair bound


HTN





PSurgHx:


C section


Cholecystectomy


Coronary artery stent





Family Medical History


Significant Family History:  No Pertinent Family Hx


Family History:  


Cardiovascular disease


  19 MOTHER


Diabetes mellitus


  19 MOTHER


Paternal family history of emphysema


  19 FATHER





Review of Systems ([facility])


Constitutional:  chills; No fever


EENTM:  nose congestion; No throat pain


Respiratory:  No cough; short of breath


Cardiovascular:  No chest pain


Gastrointestinal:  No constipation, No diarrhea, No nausea, No vomiting


Genitourinary:  No decreased output, No dysuria


Musculoskeletal:  joint pain


Skin:  No rash


Psychiatric/Neurological:  No Symptoms Reported





Reviewed Test Results


Reviewed Test Results


Lab





Laboratory Tests








Test


 18


19:15 18


19:25 18


19:50 18


21:25 Range/Units


 


 


White Blood Count


 5.6 


 


 


 


 4.3-11.0


10^3/uL


 


Red Blood Count


 3.59 L


 


 


 


 4.35-5.85


10^6/uL


 


Hemoglobin 10.4 L    11.5-16.0  G/DL


 


Hematocrit 33 L    35-52  %


 


Mean Corpuscular Volume 93     80-99  FL


 


Mean Corpuscular Hemoglobin 29     25-34  PG


 


Mean Corpuscular Hemoglobin


Concent 31 L


 


 


 


 32-36  G/DL





 


Red Cell Distribution Width 15.3 H    10.0-14.5  %


 


Platelet Count


 160 


 


 


 


 130-400


10^3/uL


 


Mean Platelet Volume 11.5 H    7.4-10.4  FL


 


Neutrophils (%) (Auto) 86 H    42-75  %


 


Lymphocytes (%) (Auto) 9 L    12-44  %


 


Monocytes (%) (Auto) 4     0-12  %


 


Eosinophils (%) (Auto) 1     0-10  %


 


Basophils (%) (Auto) 0     0-10  %


 


Neutrophils # (Auto) 4.8     1.8-7.8  X 10^3


 


Lymphocytes # (Auto) 0.5 L    1.0-4.0  X 10^3


 


Monocytes # (Auto) 0.2     0.0-1.0  X 10^3


 


Eosinophils # (Auto)


 0.0 


 


 


 


 0.0-0.3


10^3/uL


 


Basophils # (Auto)


 0.0 


 


 


 


 0.0-0.1


10^3/uL


 


Prothrombin Time 15.2 H    12.2-14.7  SEC


 


INR Comment 1.2     0.8-1.4  


 


Activated Partial


Thromboplast Time 32 


 


 


 


 24-35  SEC





 


Sodium Level 136     135-145  MMOL/L


 


Potassium Level 4.3     3.6-5.0  MMOL/L


 


Chloride Level 104       MMOL/L


 


Carbon Dioxide Level 22     21-32  MMOL/L


 


Anion Gap 10     5-14  MMOL/L


 


Blood Urea Nitrogen 19 H    7-18  MG/DL


 


Creatinine


 0.79 


 


 


 


 0.60-1.30


MG/DL


 


Estimat Glomerular Filtration


Rate > 60 


 


 


 


  





 


BUN/Creatinine Ratio 24      


 


Glucose Level 126 H      MG/DL


 


Lactic Acid Level


 0.98 


 


 


 


 0.50-2.00


MMOL/L


 


Calcium Level 8.1 L    8.5-10.1  MG/DL


 


Corrected Calcium 8.8     8.5-10.1  MG/DL


 


Magnesium Level 1.4 L    1.8-2.4  MG/DL


 


Total Bilirubin 1.0     0.1-1.0  MG/DL


 


Aspartate Amino Transf


(AST/SGOT) 34 


 


 


 


 5-34  U/L





 


Alanine Aminotransferase


(ALT/SGPT) 21 


 


 


 


 0-55  U/L





 


Alkaline Phosphatase 152 H      U/L


 


Total Creatine Kinase 39       U/L


 


Creatine Kinase MB 0.8     <6.6  NG/ML


 


Myoglobin


 36.7 


 


 


 


 10.0-92.0


NG/ML


 


Troponin I < 0.30     <0.30  NG/ML


 


B-Type Natriuretic Peptide 291.5 H    <100.0  PG/ML


 


Total Protein 5.9 L    6.4-8.2  GM/DL


 


Albumin 3.1 L    3.2-4.5  GM/DL


 


TSH Cascade Testing


 1.34 


 


 


 


 0.35-4.94


UIU/ML


 


Blood Gas Puncture Site  LEFT RADIAL     


 


Blood Gas Patient Temperature  101.8     


 


Arterial Blood pH  7.38    7.37-7.43  


 


Arterial Blood Partial


Pressure CO2 


 43 


 


 


 35-45  MMHG





 


Arterial Blood Partial


Pressure O2 


 97 H


 


 


 79-93  MMHG





 


Arterial Blood HCO3  24    23-27  MMOL/L


 


Arterial Blood Total CO2


 


 25.2 


 


 


 21.0-31.0


MMOL/L


 


Arterial Blood Oxygen


Saturation 


 98 


 


 


   %





 


Arterial Blood Base Excess


 


 -0.2 


 


 


 -2.5-2.5


MMOL/L


 


Roque Test  POSITIVE     


 


Blood Gas Ventilator Setting  YES     


 


Blood Gas Inspired Oxygen  4     


 


Glucometer   121 H    MG/DL


 


Urine Color    YELLOW   


 


Urine Clarity    CLEAR   


 


Urine pH    6  5-9  


 


Urine Specific Gravity    1.010 L 1.016-1.022  


 


Urine Protein    3+ H NEGATIVE  


 


Urine Glucose (UA)    NEGATIVE  NEGATIVE  


 


Urine Ketones    NEGATIVE  NEGATIVE  


 


Urine Nitrite    NEGATIVE  NEGATIVE  


 


Urine Bilirubin    NEGATIVE  NEGATIVE  


 


Urine Urobilinogen    1  NORMAL  MG/DL


 


Urine Leukocyte Esterase    NEGATIVE  NEGATIVE  


 


Urine RBC (Auto)    NEGATIVE  NEGATIVE  


 


Urine RBC    NONE   /HPF


 


Urine WBC    NONE   /HPF


 


Urine Squamous Epithelial


Cells 


 


 


 RARE 


  /HPF





 


Urine Crystals    NONE   /LPF


 


Urine Bacteria    NONE   /HPF


 


Urine Casts    NONE   /LPF


 


Urine Mucus    NEGATIVE   /LPF


 


Urine Culture Indicated    NO   


 


Test


 18


05:02 18


05:24 


 


 Range/Units


 


 


Glucometer 201 H      MG/DL


 


White Blood Count


 


 3.7 L


 


 


 4.3-11.0


10^3/uL


 


Red Blood Count


 


 3.40 L


 


 


 4.35-5.85


10^6/uL


 


Hemoglobin  9.9 L   11.5-16.0  G/DL


 


Hematocrit  32 L   35-52  %


 


Mean Corpuscular Volume  94    80-99  FL


 


Mean Corpuscular Hemoglobin  29    25-34  PG


 


Mean Corpuscular Hemoglobin


Concent 


 31 L


 


 


 32-36  G/DL





 


Red Cell Distribution Width  15.4 H   10.0-14.5  %


 


Platelet Count


 


 143 


 


 


 130-400


10^3/uL


 


Mean Platelet Volume  11.3 H   7.4-10.4  FL


 


Neutrophils (%) (Auto)  86 H   42-75  %


 


Lymphocytes (%) (Auto)  14    12-44  %


 


Monocytes (%) (Auto)  1    0-12  %


 


Eosinophils (%) (Auto)  0    0-10  %


 


Basophils (%) (Auto)  0    0-10  %


 


Neutrophils # (Auto)  3.1    1.8-7.8  X 10^3


 


Lymphocytes # (Auto)  0.5 L   1.0-4.0  X 10^3


 


Monocytes # (Auto)  0.0    0.0-1.0  X 10^3


 


Eosinophils # (Auto)


 


 0.0 


 


 


 0.0-0.3


10^3/uL


 


Basophils # (Auto)


 


 0.0 


 


 


 0.0-0.1


10^3/uL


 


Sodium Level  139    135-145  MMOL/L


 


Potassium Level  4.3    3.6-5.0  MMOL/L


 


Chloride Level  106      MMOL/L


 


Carbon Dioxide Level  21    21-32  MMOL/L


 


Anion Gap  12    5-14  MMOL/L


 


Blood Urea Nitrogen  22 H   7-18  MG/DL


 


Creatinine


 


 0.84 


 


 


 0.60-1.30


MG/DL


 


Estimat Glomerular Filtration


Rate 


 > 60 


 


 


  





 


BUN/Creatinine Ratio  26     


 


Glucose Level  195 H     MG/DL


 


Calcium Level  7.9 L   8.5-10.1  MG/DL


 


Corrected Calcium  8.8    8.5-10.1  MG/DL


 


Total Bilirubin  0.8    0.1-1.0  MG/DL


 


Aspartate Amino Transf


(AST/SGOT) 


 30 


 


 


 5-34  U/L





 


Alanine Aminotransferase


(ALT/SGPT) 


 21 


 


 


 0-55  U/L





 


Alkaline Phosphatase  143 H     U/L


 


Total Protein  5.5 L   6.4-8.2  GM/DL


 


Albumin  2.9 L   3.2-4.5  GM/DL


 


Procalcitonin  0.05    <0.10  NG/ML








Radiology


CXR 18: IMPRESSION: Cardiac enlargement with central vascular congestion.





Physical Exam-([facility])


Physical Exam


Vital Signs





 VS - Last 72 Hours, by Label








 18





 19:29 19:29 19:53 19:55


 


Temp  101.8  101.8


 


Pulse  85  85


 


Resp  18  18


 


B/P (MAP)  184/85 (118)  184/99


 


Pulse Ox 97 89 96 96


 


O2 Delivery Nasal Cannula   


 


O2 Flow Rate 4.00  3.00 


 


    





 18





 22:07 22:15 23:07 23:50


 


Temp 98.6 98.0  


 


Pulse 84 90 92 92


 


Resp 18 22  


 


B/P (MAP) 160/75 (103) 134/58 (83)  


 


Pulse Ox 96 98  96


 


O2 Delivery Nasal Cannula Nasal Cannula  


 


O2 Flow Rate 2.00 2.00  


 


    





 18





 00:00 01:00 01:16 02:00


 


Temp 98.0   98.0


 


Pulse 94 90  89


 


Resp 20   20


 


B/P (MAP) 131/64 (86)   140/81 (100)


 


Pulse Ox 95   94


 


O2 Delivery Nasal Cannula  Nasal Cannula Nasal Cannula


 


O2 Flow Rate 2.00  3.00 3.00





    3.00


 


    





 18





 04:00 06:00 07:00 07:04


 


Temp 97.0 98.0  


 


Pulse 72 74 73 


 


Resp 20 18  


 


B/P (MAP) 144/65 (91) 142/68 (92)  


 


Pulse Ox 95 94  94


 


O2 Delivery Nasal Cannula Nasal Cannula  Nasal Cannula


 


O2 Flow Rate 3.00 3.00  4.00





 3.00 3.00  


 


    





 18   





 08:28   


 


Temp 97.5   


 


Pulse 75   


 


Resp 18   


 


B/P (MAP) 166/70 (102)   


 


Pulse Ox 96   


 


O2 Delivery Nasal Cannula   


 


O2 Flow Rate 3.00   





Capillary Refill : Less Than 3 Seconds


General Appearance:  no apparent distress


Respiratory:  lungs clear, normal breath sounds, no respiratory distress


Cardiovascular:  regular rate, rhythm, systolic murmur


Gastrointestinal:  normal bowel sounds, non tender, soft


Extremities:  pedal edema (trace pitting, chronic erythematous venous stasis 

and skin peeling changes)


Neurologic/Psychiatric:  alert, normal mood/affect


Skin:  normal color, warm/dry





Assessment/Plan


Assessment/Plan


Admission Status:  Inpatient Order (span 2 midnights)


Reason for Inpatient Admission:  


Pneumonia with multiple comorbidities at high risk for decompensation.





(1) Pneumonia


Status:  Acute


Assessment & Plan:  Suspect based on fever and hypoxia, no clear infiltrate on 

CXR and no evidence of sepsis. Continue ceftriaxone and doxycycline.


Qualifiers:  


   Qualified Codes:  J18.9 - Pneumonia, unspecified organism


(2) Hypoxia


Status:  Acute


Assessment & Plan:  Secondary to pneumonia vs pulmonary edema. Wean O2 as 

tolerated, see pneumonia dx.





(3) Elevated brain natriuretic peptide (BNP) level


Status:  Chronic


Assessment & Plan:  Unclear if any history of congestive heart failure, last 

echocardiogram in . BNP similar level as long ago as . IV lasix started 

in ER, will decrease to daily and wean oxygen as tolerated.





(4) Mild persistent asthma


Status:  Chronic


Assessment & Plan:  Started on IV steroids from ER, no wheezing this morning, 

will hold steroids for now and monitor closely. RT protocol.


Qualifiers:  


   Qualified Codes:  J45.30 - Mild persistent asthma, uncomplicated


(5) CAD (coronary artery disease), native coronary artery


Status:  Chronic


Assessment & Plan:  Resume home medications.


Qualifiers:  


   Qualified Codes:  I25.10 - Atherosclerotic heart disease of native coronary 

artery without angina pectoris


(6) Anemia


Status:  Chronic


Assessment & Plan:  Unclear etiology in spite of work-up, followed by 

Hematology and has required IV iron and blood transfusions in the past. Monitor 

hemoglobin.





(7) Hypomagnesemia


Status:  Acute


Assessment & Plan:  Replace and recheck.





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


Status:  Chronic


Assessment & Plan:  Diabetic diet, resume home medications.





(9) DVT prophylaxis


Status:  Acute


Assessment & Plan:  At high risk for DVT so will start enoxaparin. Monitor 

platelets and hemoglobin closely with history of severe anemia and 

thrombocytopenia.








Clinical Quality Measures


DVT/VTE Risk/Contraindication:


Risk Factor Score Per Nursin


RFS Level Per Nursing on Admit:  4+=Very High











CARIDAD CASTELLANOS MD Dec 22, 2018 09:54

## 2018-12-22 NOTE — DIAGNOSTIC IMAGING REPORT
Indication: Respiratory distress



Portable chest 3:15 AM



There is cardiomegaly. There is increased density in the lungs

which is probably due to technique. Some interstitial edema

cannot be excluded.



Impression: Cardiomegaly. Questionable pulmonary venous

hypertension.



Dictated by: 



  Dictated on workstation # [id number]

## 2018-12-22 NOTE — ED DYSPNEA
General


Chief Complaint:  Fever-Adult/Adol


Stated Complaint:  COPD EXACERBATION,HYPOXIA,POSS PNEUMONIA


Nursing Triage Note:  


THE PT ARRIVAL BY EMS.











SLIGHT SOB IS NOTED ON ARRIVAL.











LOC IS NORMAL FOR THE PT.


Source of Information:  Patient (SOMEWHAT DIFFICULT HISTORIAN), EMS, Nursing 

Home Records, Old Records





History of Present Illness


Date Seen by Provider:  Dec 21, 2018


Time Seen by Provider:  19:00


Initial Comments


PT ARRIVES VIA EMS FROM VIA [facility]--NO REPORT RECEIVED 

FROM NURSING HOME. 


PT HAS HAD DIFFICULTY BREATHING FOR THE LAST 5 DAYS--MOSTLY ON EXERTION, BUT PT 

IS WHEELCHAIR BOUND DUE TO MORBID OBESITY AND HX OF AVASCULAR NECROSIS OF HIP


HAS HAD SOME COUGH


PT STATES SHE HAS HISTORY OF COPD/ASTHMA BUT DOES NOT NEED HOME O2. 


O2 SAT ON ARRIVAL 89% ON ROOM AIR--UP TO 96% ON 3L/NC. 


PT STATES SHE HAS AN INHALER FOR USE PRN, BUT HAS NOT USED IT AT ALL FOR THIS 

ISSUE


PT WAS UNAWARE OF FEVER--TEMP  ON ARRIVAL HERE


NO INCREASE IN CHRONIC LEG SWELLING


NO CHEST PAIN 


HAS HAD MILD COUGH. 





PT WITH MULTIPLE VISITS FOR VARIOUS COMPLAINTS





PCP: [facility]





Allergies and Home Medications


Allergies


Coded Allergies:  


     vancomycin (Verified  Allergy, Intermediate, RASH, 9/27/18)


     heparin (Verified  Allergy, Unknown, 9/27/18)


Uncoded Allergies:  


     TAPE (Allergy, Mild, RASH, 1/31/17)





Home Medications


Albuterol Sulfate 1 Puff Puff, 2 PUFF IH Q4H PRN for SHORTNESS OF BREATH, (

Reported)


   1 PUFF = 90 MCG 


Aspirin 81 Mg Tablet.dr, 81 MG PO HS, (Reported)


Atorvastatin Calcium 10 Mg Tablet, 10 MG PO HS, (Reported)


   LAST FILLED #90 5-21-18 


Benazepril HCl 40 Mg Tab, 40 MG PO HS, (Reported)


   LAST FILLED #180 5-21-18 


Carvedilol 12.5 Mg Tablet, 12.5 MG PO BID, (Reported)


   LAST FILLED #180 5-21-18 


Citalopram Hydrobromide 20 Mg Tablet, 20 MG PO DAILY, (Reported)


Fluticasone/Salmeterol 1 Each Blst.w.dev, 1-2 PUFF IH DAILY PRN for SHORTNESS 

OF BREATH, (Reported)


   UNKNOWN LAST FILL DATE 


Glipizide 5 Mg Tablet, 5 MG PO BID, (Reported)


Insulin Detemir 100 Unit/1 Ml Insuln.pen, 20 UNITS SC DAILY, (Reported)


Lactobacillus Combination No.4 1 Each Capsule, 1 CAP PO DAILY, (Reported)


Naproxen 500 Mg Tablet, 500 MG PO BID PRN for PAIN-MILD, (Reported)


Nystatin 15 Gm Oint...g., TOP TID, (Reported)


Pantoprazole Sodium 40 Mg Tablet.dr, 40 MG PO DAILY, (Reported)


Potassium Chloride 10 Meq Tab.er.prt, 10 MEQ PO DAILY, (Reported)





Patient Home Medication List


Home Medication List Reviewed:  Yes





Review of Systems


Review of Systems


Constitutional:  fever


EENTM:  nose congestion


Respiratory:  cough


Cardiovascular:  No chest pain; edema, Hx of Intervention; No syncope


Gastrointestinal:  no symptoms reported


Genitourinary:  no symptoms reported


Musculoskeletal:  back pain (CHRONIC ), other (CHRONIC RIGHT HIP PAIN )


Skin:  other (CHRONIC INTERTRIGO)


Psychiatric/Neurological:  No Symptoms Reported


Endocrine:  No Symptoms Reported





Past Medical-Social-Family Hx


Patient Social History


Alcohol Use:  Denies Use


Number of Drinks Today:  AA


Recreational Drug Use:  No


Smoking Status:  Former Smoker (1 PPD--PT STAETS SHE QUIT SMOKING LESS THAN 10 

YEARS AGO, PER PT ON 12/21/18)


Type Used:  Cigarettes


2nd Hand Smoke Exposure:  No


Recent Foreign Travel:  No


Contact w/Someone Who Travel:  No


Recent Infectious Disease Expo:  No


Recent Hopitalizations:  No


Physical Abuse:  No


Sexual Abuse:  No


Mistreated:  No


Fear:  No





Immunizations Up To Date


Tetanus Booster (TDap):  Unknown


PED Vaccines UTD:  Yes


Date of Pneumonia Vaccine:  Oct 1, 2016


Date of Influenza Vaccine:  Sep 30, 2018





Seasonal Allergies


Seasonal Allergies:  Yes





Past Medical History


Surgeries:  Yes (CARDIAC CATH WITH STENTS X 2; LITHOTRIPSY; KIDNEY STONE REMOVAL

; URETERAL STENT; )


Cardiac, Coronary Stent, Gallbladder, Renal


Respiratory:  Yes


Asthma, COPD


Currently Using CPAP:  No


Currently Using BIPAP:  No


Cardiac:  Yes (CARDIAC CATH WITH STENT X 2)


Chronic Edema/Swelling, Coronary Artery Disease, High Cholesterol, Hypertension


Neurological:  No


Reproductive Disorders:  No


Female Reproductive Disorders:  Denies


GYN History:  Menopausal


Sexually Transmitted Disease:  No


HIV/AIDS:  No


Genitourinary:  Yes (UTI'S WITH SEPSIS; KIDNEY STONES WITH MULTIPLE 

INTERVENTIONS)


Bladder Infection, Kidney Stones, UTI-Chronic


Gastrointestinal:  Yes


Gastroesophageal Reflux


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 

REPAIR; OSTEOMYELITIS LEFT SHIN WITH LYMPHEDEMA; WHEELCHAIR BOUND)


Arthritis, Rheumatoid Arthritis, Foot Drop, Chronic Back Pain, Fractures


Endocrine:  Yes (DM TYPE II; MORBID OBESITY)


Diabetes, Insulin dep


HEENT:  No


Cancer:  No


Psychosocial:  Yes


Anxiety, Depression


Integumentary:  Yes (CHRONIC INTERTRIGO OF ABDOMINAL SKIN FOLDS; CELLULITIS)


Recent Skin Changes


Blood Disorders:  Yes (anemia, platelets low, transfusion every 2-3 months)


Adverse Reaction/Blood Tranf:  No





Family Medical History





Cardiovascular disease


  19 MOTHER


Diabetes mellitus


  19 MOTHER


Paternal family history of emphysema


  19 FATHER


No Pertinent Family Hx





Physical Exam


Vital Signs





Vital Signs - First Documented








 12/21/18





 19:29


 


Temp 101.8


 


Pulse 85


 


Resp 18


 


B/P (MAP) 184/85 (118)


 


Pulse Ox 97


 


O2 Delivery Nasal Cannula


 


O2 Flow Rate 4.00





Capillary Refill : Less Than 3 Seconds


Height, Weight, BMI


Height: 5'4.00"


Weight: 334lbs. 12.8oz. [id number]; 47.1 BMI


Method:Estimated


General Appearance:  Obese (MORBIDLY OBESE), Other (MILDLY DYSPNEIC BUT ABLE TO 

TALK IN FULL SENTENCES, PT MAKING DEMANDS AS SOON AS SHE ARRIVES--WANTING 

VARIOUS THINGS, WANTS ADJUSTMENTS IN BED AS SHE CANNOT CHANGE POSITIONS HERSELF-

-REFQUIRES FULL ASSIST; PT WANTS TO GET OUT OF BED, WANTS TO SIT ON SIDE OF BED

, ETC. PT MAKING STATEMENTS AND THREATS SUCH AS "I'LL DO IT IF I WANT TO AND 

YOU CAN'T STOP ME" AND "IF YOU DON'T DO SOMETHING RIGHT NOW, I'M GOING TO SCREAM

", ETC. TO VARIOUS STAFF MEMBERS)


Respiratory:  Accessory Muscle Use, Decreased Breath Sounds (IN LOWER HALF OF 

CHEST BILATERALLY. ), Other (MILDLY DYSPMEIC)


Cardiovascular:  Regular Rate, Rhythm (HR IN 90'S )


Gastrointestinal:  Soft, Other (UNABLE TO ASSESSS IF ORGANOMEGALY OR MASS  DUE 

TO BODY HABITUS)


Extremity:  Pedal Edema (2+ EDEMA BILATERALLY)


Neurologic/Psychiatric:  Alert, Oriented x3, No Motor/Sensory Deficits, CNs II-

XII Norm as Tested


Skin:  Normal Color, Warm/Dry, Other (INTERTRIGO ABDOMINAL SKIN FOLDS/ PANNUS)





Focused Exam


Lactate Level


12/21/18 19:15: Lactic Acid Level 0.98





Lactic Acid Level





Laboratory Tests








Test


 12/21/18


19:15


 


Lactic Acid Level


 0.98 MMOL/L


(0.50-2.00)











Progress/Results/Core Measures


Results/Orders


Lab Results





Laboratory Tests








Test


 12/21/18


19:15 12/21/18


19:25 12/21/18


19:50 Range/Units


 


 


White Blood Count


 5.6 


 


 


 4.3-11.0


10^3/uL


 


Red Blood Count


 3.59 L


 


 


 4.35-5.85


10^6/uL


 


Hemoglobin 10.4 L   11.5-16.0  G/DL


 


Hematocrit 33 L   35-52  %


 


Mean Corpuscular Volume 93    80-99  FL


 


Mean Corpuscular Hemoglobin 29    25-34  PG


 


Mean Corpuscular Hemoglobin


Concent 31 L


 


 


 32-36  G/DL





 


Red Cell Distribution Width 15.3 H   10.0-14.5  %


 


Platelet Count


 160 


 


 


 130-400


10^3/uL


 


Mean Platelet Volume 11.5 H   7.4-10.4  FL


 


Neutrophils (%) (Auto) 86 H   42-75  %


 


Lymphocytes (%) (Auto) 9 L   12-44  %


 


Monocytes (%) (Auto) 4    0-12  %


 


Eosinophils (%) (Auto) 1    0-10  %


 


Basophils (%) (Auto) 0    0-10  %


 


Neutrophils # (Auto) 4.8    1.8-7.8  X 10^3


 


Lymphocytes # (Auto) 0.5 L   1.0-4.0  X 10^3


 


Monocytes # (Auto) 0.2    0.0-1.0  X 10^3


 


Eosinophils # (Auto)


 0.0 


 


 


 0.0-0.3


10^3/uL


 


Basophils # (Auto)


 0.0 


 


 


 0.0-0.1


10^3/uL


 


Prothrombin Time 15.2 H   12.2-14.7  SEC


 


INR Comment 1.2    0.8-1.4  


 


Activated Partial


Thromboplast Time 32 


 


 


 24-35  SEC





 


Sodium Level 136    135-145  MMOL/L


 


Potassium Level 4.3    3.6-5.0  MMOL/L


 


Chloride Level 104      MMOL/L


 


Carbon Dioxide Level 22    21-32  MMOL/L


 


Anion Gap 10    5-14  MMOL/L


 


Blood Urea Nitrogen 19 H   7-18  MG/DL


 


Creatinine


 0.79 


 


 


 0.60-1.30


MG/DL


 


Estimat Glomerular Filtration


Rate > 60 


 


 


  





 


BUN/Creatinine Ratio 24     


 


Glucose Level 126 H     MG/DL


 


Lactic Acid Level


 0.98 


 


 


 0.50-2.00


MMOL/L


 


Calcium Level 8.1 L   8.5-10.1  MG/DL


 


Corrected Calcium 8.8    8.5-10.1  MG/DL


 


Magnesium Level 1.4 L   1.8-2.4  MG/DL


 


Total Bilirubin 1.0    0.1-1.0  MG/DL


 


Aspartate Amino Transf


(AST/SGOT) 34 


 


 


 5-34  U/L





 


Alanine Aminotransferase


(ALT/SGPT) 21 


 


 


 0-55  U/L





 


Alkaline Phosphatase 152 H     U/L


 


Total Creatine Kinase 39      U/L


 


Creatine Kinase MB 0.8    <6.6  NG/ML


 


Myoglobin


 36.7 


 


 


 10.0-92.0


NG/ML


 


Troponin I < 0.30    <0.30  NG/ML


 


B-Type Natriuretic Peptide 291.5 H   <100.0  PG/ML


 


Total Protein 5.9 L   6.4-8.2  GM/DL


 


Albumin 3.1 L   3.2-4.5  GM/DL


 


TSH Cascade Testing


 1.34 


 


 


 0.35-4.94


UIU/ML


 


Blood Gas Puncture Site  LEFT RADIAL    


 


Blood Gas Patient Temperature  101.8    


 


Arterial Blood pH  7.38   7.37-7.43  


 


Arterial Blood Partial


Pressure CO2 


 43 


 


 35-45  MMHG





 


Arterial Blood Partial


Pressure O2 


 97 H


 


 79-93  MMHG





 


Arterial Blood HCO3  24   23-27  MMOL/L


 


Arterial Blood Total CO2


 


 25.2 


 


 21.0-31.0


MMOL/L


 


Arterial Blood Oxygen


Saturation 


 98 


 


   %





 


Arterial Blood Base Excess


 


 -0.2 


 


 -2.5-2.5


MMOL/L


 


Roque Test  POSITIVE    


 


Blood Gas Ventilator Setting  YES    


 


Blood Gas Inspired Oxygen  4    


 


Glucometer   121 H   MG/DL








Micro Results





Microbiology


12/21/18 Influenza Types A,B Antigen (KIM) - Final, Complete


           





My Orders





Orders - ABISAI DANIELLE DO


Saline Lock/Iv-Start (12/21/18 19:00)


Ekg Tracing (12/21/18 19:00)


O2 (12/21/18 19:00)


Monitor-Rhythm Ecg Trace Only (12/21/18 19:00)


BNP (12/21/18 19:00)


Cbc With Automated Diff (12/21/18 19:00)


Comprehensive Metabolic Panel (12/21/18 19:00)


Creatine Kinase (12/21/18 19:00)


Creatine Kinase Mb (12/21/18 19:00)


Lactic Acid Analyzer (12/21/18 19:00)


Magnesium (12/21/18 19:00)


Protime With Inr (12/21/18 19:00)


Partial Thromboplastin Time (12/21/18 19:00)


Thyroid Analyzer (12/21/18 19:00)


Troponin I (12/21/18 19:00)


Myoglobin Serum (12/21/18 19:00)


Chest 1 View, Ap/Pa Only (12/21/18 19:00)


Blood Culture (12/21/18 19:00)


Influenza A And B Antigens (12/21/18 19:00)


Albuterol/Ipra Inhalation Soln (Duoneb I (12/21/18 19:15)


Rt Request For Service (12/21/18 19:09)


Svn Small Volume Nebulizer (12/21/18 19:09)


Arterial Blood Gas (12/21/18 19:09)


Arterial Blood Draw (12/21/18 )


Catheter(Urinary) Insert & Ass 03,15 (12/21/18 20:12)


Furosemide  Injection (Lasix  Injection) (12/21/18 20:12)


Acetaminophen  Tablet (Tylenol  Tablet) (12/21/18 20:30)


Methylprednisolone Sod Succ (Solu-Medrol (12/21/18 20:30)


Ceftriaxone  For Iv Use (Rocephin  For I (12/21/18 20:30)





Medications Given in ED





Current Medications








 Medications  Dose


 Ordered  Sig/Adelfo


 Route  Start Time


 Stop Time Status Last Admin


Dose Admin


 


 Acetaminophen  1,000 mg  ONCE  ONCE


 PO  12/21/18 20:30


 12/21/18 20:31 DC 12/21/18 20:59


1,000 MG


 


 Albuterol/


 Ipratropium  3 ml  ONCE  ONCE


 INH  12/21/18 19:15


 12/21/18 19:16 DC 12/21/18 19:26


3 ML


 


 Ceftriaxone


 Sodium 1000 mg/


 Sodium Chloride  50 ml @ 


 100 mls/hr  ONCE  ONCE


 IV  12/21/18 20:30


 12/21/18 20:59 DC 12/21/18 21:06


100 MLS/HR


 


 Methylprednisolone


 Sodium Succinate  125 mg  ONCE  ONCE


 IVP  12/21/18 20:30


 12/21/18 20:31 DC 12/21/18 21:04


125 MG








Vital Signs/I&O











 12/21/18 12/21/18 12/21/18 12/21/18





 19:29 19:29 19:53 19:55


 


Temp  101.8  101.8


 


Pulse  85  85


 


Resp  18  18


 


B/P (MAP)  184/85 (118)  184/99


 


Pulse Ox 97 89 96 96


 


O2 Delivery Nasal Cannula   


 


O2 Flow Rate 4.00  3.00 














Blood Pressure Mean:  92











FSBG Bedside Testing


Finger Stick Blood Glucose:  195





Progress


Progress Note :  


Progress Note


O2 SATS UP TO MID 90'S ON 3L/NC


INCREASED AERATION WITH NEB TREATMENT


PT STATES SHE FEELS LIKE SHE CAN BREATHE BETTER WITH O2 AND AFTER NEB 

TREATMENT. 


NO DETERIORATION IN PT'S CONDITION DURING ER STAY


Initial ECG Impression Date:  Dec 21, 2018


Initial ECG Impression Time:  20:06


Initial ECG Rate:  85


Initial ECG Rhythm:  Normal Sinus


Initial ECG Comparisson:  Unchanged





Diagnostic Imaging





Comments


CXR--CARDIAC ENLARGEMENT WITH CENTRAL VASCULAR CONGESTION, PER RADIOLOGIST 

REPORT


   Reviewed:  Reviewed by Me





Departure


Communication (Admissions)


2033--SPOKE WITH DR. PIKE, ACCEPTS PT FOR ADMIT





Impression





 Primary Impression:  


 COPD exacerbation


 Additional Impressions:  


 CHF (congestive heart failure)


 POSSIBLE PNEUMONIA


 Hypoxia


 Morbid obesity


 Hx of coronary artery disease


 NON-AMBULATORY


Disposition:  09 ADMITTED AS INPATIENT


Condition:  Improved





Admissions


Decision to Admit Reason:  Admit from ER (General)


Decision to Admit/Date:  Dec 21, 2018


Time/Decision to Admit Time:  20:35





Departure-Patient Inst.


Referrals:  


LEANDER FREIRE (Family)


Primary Care Physician








[facility] (PCP)


Primary Care Physician











ABISAI DANIELLE DO Dec 22, 2018 06:53

## 2018-12-23 VITALS — DIASTOLIC BLOOD PRESSURE: 72 MMHG | SYSTOLIC BLOOD PRESSURE: 131 MMHG

## 2018-12-23 VITALS — DIASTOLIC BLOOD PRESSURE: 63 MMHG | SYSTOLIC BLOOD PRESSURE: 150 MMHG

## 2018-12-23 VITALS — SYSTOLIC BLOOD PRESSURE: 134 MMHG | DIASTOLIC BLOOD PRESSURE: 68 MMHG

## 2018-12-23 VITALS — SYSTOLIC BLOOD PRESSURE: 123 MMHG | DIASTOLIC BLOOD PRESSURE: 71 MMHG

## 2018-12-23 VITALS — DIASTOLIC BLOOD PRESSURE: 69 MMHG | SYSTOLIC BLOOD PRESSURE: 164 MMHG

## 2018-12-23 VITALS — DIASTOLIC BLOOD PRESSURE: 61 MMHG | SYSTOLIC BLOOD PRESSURE: 135 MMHG

## 2018-12-23 LAB
BUN/CREAT SERPL: 38
CALCIUM SERPL-MCNC: 8.1 MG/DL (ref 8.5–10.1)
CHLORIDE SERPL-SCNC: 107 MMOL/L (ref 98–107)
CO2 SERPL-SCNC: 22 MMOL/L (ref 21–32)
CREAT SERPL-MCNC: 0.92 MG/DL (ref 0.6–1.3)
ERYTHROCYTE [DISTWIDTH] IN BLOOD BY AUTOMATED COUNT: 15.1 % (ref 10–14.5)
GFR SERPLBLD BASED ON 1.73 SQ M-ARVRAT: 60 ML/MIN
GLUCOSE SERPL-MCNC: 63 MG/DL (ref 70–105)
HCT VFR BLD CALC: 33 % (ref 35–52)
HGB BLD-MCNC: 10.1 G/DL (ref 11.5–16)
MAGNESIUM SERPL-MCNC: 1.6 MG/DL (ref 1.8–2.4)
MCH RBC QN AUTO: 29 PG (ref 25–34)
MCHC RBC AUTO-ENTMCNC: 31 G/DL (ref 32–36)
MCV RBC AUTO: 95 FL (ref 80–99)
PLATELET # BLD: 186 10^3/UL (ref 130–400)
PMV BLD AUTO: 10.5 FL (ref 7.4–10.4)
POTASSIUM SERPL-SCNC: 3.9 MMOL/L (ref 3.6–5)
RBC # BLD AUTO: 3.44 10^6/UL (ref 4.35–5.85)
SODIUM SERPL-SCNC: 141 MMOL/L (ref 135–145)
WBC # BLD AUTO: 6.6 10^3/UL (ref 4.3–11)

## 2018-12-23 RX ADMIN — INSULIN ASPART SCH UNIT: 100 INJECTION, SOLUTION INTRAVENOUS; SUBCUTANEOUS at 05:19

## 2018-12-23 RX ADMIN — INSULIN DETEMIR SCH UNIT: 100 INJECTION, SOLUTION SUBCUTANEOUS at 21:05

## 2018-12-23 RX ADMIN — GLIPIZIDE SCH MG: 5 TABLET ORAL at 06:03

## 2018-12-23 RX ADMIN — SODIUM CHLORIDE SCH MLS/HR: 900 INJECTION INTRAVENOUS at 21:06

## 2018-12-23 RX ADMIN — FLUTICASONE PROPIONATE AND SALMETEROL XINAFOATE SCH PUFF: 45; 21 AEROSOL, METERED RESPIRATORY (INHALATION) at 18:46

## 2018-12-23 RX ADMIN — SODIUM CHLORIDE SCH MLS/HR: 900 INJECTION INTRAVENOUS at 19:58

## 2018-12-23 RX ADMIN — ATORVASTATIN CALCIUM SCH MG: 10 TABLET, FILM COATED ORAL at 21:05

## 2018-12-23 RX ADMIN — HYDROCHLOROTHIAZIDE SCH MG: 25 TABLET ORAL at 08:45

## 2018-12-23 RX ADMIN — METRONIDAZOLE SCH MG: 500 TABLET ORAL at 21:05

## 2018-12-23 RX ADMIN — SODIUM CHLORIDE SCH MLS/HR: 900 INJECTION INTRAVENOUS at 08:44

## 2018-12-23 RX ADMIN — Medication SCH ML: at 08:44

## 2018-12-23 RX ADMIN — PANTOPRAZOLE SODIUM SCH MG: 40 TABLET, DELAYED RELEASE ORAL at 08:45

## 2018-12-23 RX ADMIN — Medication SCH ML: at 21:05

## 2018-12-23 RX ADMIN — FLUTICASONE PROPIONATE AND SALMETEROL XINAFOATE SCH PUFF: 45; 21 AEROSOL, METERED RESPIRATORY (INHALATION) at 19:37

## 2018-12-23 RX ADMIN — FLUTICASONE PROPIONATE AND SALMETEROL XINAFOATE SCH PUFF: 45; 21 AEROSOL, METERED RESPIRATORY (INHALATION) at 08:53

## 2018-12-23 RX ADMIN — CARVEDILOL SCH MG: 12.5 TABLET, FILM COATED ORAL at 08:45

## 2018-12-23 RX ADMIN — IPRATROPIUM BROMIDE AND ALBUTEROL SULFATE SCH ML: .5; 3 SOLUTION RESPIRATORY (INHALATION) at 08:51

## 2018-12-23 RX ADMIN — MAGNESIUM SULFATE IN DEXTROSE SCH MLS/HR: 10 INJECTION, SOLUTION INTRAVENOUS at 12:08

## 2018-12-23 RX ADMIN — LISINOPRIL SCH MG: 40 TABLET ORAL at 21:05

## 2018-12-23 RX ADMIN — CARVEDILOL SCH MG: 12.5 TABLET, FILM COATED ORAL at 21:05

## 2018-12-23 RX ADMIN — INSULIN ASPART SCH UNIT: 100 INJECTION, SOLUTION INTRAVENOUS; SUBCUTANEOUS at 16:06

## 2018-12-23 RX ADMIN — Medication SCH ML: at 05:36

## 2018-12-23 RX ADMIN — FLUTICASONE PROPIONATE AND SALMETEROL XINAFOATE SCH PUFF: 45; 21 AEROSOL, METERED RESPIRATORY (INHALATION) at 08:52

## 2018-12-23 RX ADMIN — MAGNESIUM SULFATE IN DEXTROSE SCH MLS/HR: 10 INJECTION, SOLUTION INTRAVENOUS at 12:05

## 2018-12-23 RX ADMIN — FUROSEMIDE SCH MG: 40 TABLET ORAL at 12:05

## 2018-12-23 RX ADMIN — INSULIN ASPART SCH UNIT: 100 INJECTION, SOLUTION INTRAVENOUS; SUBCUTANEOUS at 12:00

## 2018-12-23 RX ADMIN — IPRATROPIUM BROMIDE AND ALBUTEROL SULFATE SCH ML: .5; 3 SOLUTION RESPIRATORY (INHALATION) at 18:46

## 2018-12-23 RX ADMIN — IPRATROPIUM BROMIDE AND ALBUTEROL SULFATE SCH ML: .5; 3 SOLUTION RESPIRATORY (INHALATION) at 14:03

## 2018-12-23 RX ADMIN — INSULIN ASPART SCH UNIT: 100 INJECTION, SOLUTION INTRAVENOUS; SUBCUTANEOUS at 20:49

## 2018-12-23 RX ADMIN — ASPIRIN SCH MG: 81 TABLET ORAL at 21:05

## 2018-12-23 RX ADMIN — GLIPIZIDE SCH MG: 5 TABLET ORAL at 17:01

## 2018-12-23 NOTE — PROGRESS NOTE (SOAP)
Subjective


Subjective/Events-last exam


Afebrile, reports breathing is feeling good and she would like to get moving 

around or even go home. She has had frequent diarrhea and C diff testing was 

sent but is indeterminate, final results waiting on send out testing.


Review of Systems


Date Seen by Provider:  Dec 23, 2018


Time Seen by Provider:  11:50





Focused Exam


Lactate Level


18 19:15: Lactic Acid Level 0.98





Objective


Exam


Last Set of Vital Signs





Vital Signs








  Date Time  Temp Pulse Resp B/P (MAP) Pulse Ox O2 Delivery O2 Flow Rate FiO2


 


18 08:52     97 Nasal Cannula 2.00 


 


18 08:00 97.7 74 20 131/72 (91)    





Capillary Refill : Less Than 3 Seconds


I&O











Intake and Output 


 


 18





 00:00


 


Intake Total 1400 ml


 


Output Total 3750 ml


 


Balance -2350 ml


 


 


 


Intake Oral 1200 ml


 


IV Total 200 ml


 


Output Urine Total 3750 ml


 


# Bowel Movements 9


 


Daily Weight Change No








General:  Alert, No Acute Distress


Lungs:  Clear to Auscultation, Normal Air Movement


Heart:  Regular Rate


Abdomen:  Normal Bowel Sounds, Soft


Neuro:  Normal Speech


Psych/Mental Status:  Mental Status NL





Results/Procedures


Lab


Laboratory Tests


18 15:53: Glucometer 195H


18 20:44: Glucometer 191H


18 05:05: Glucometer 76


18 05:40: 


White Blood Count 6.6, Red Blood Count 3.44L, Hemoglobin 10.1L, Hematocrit 33L, 

Mean Corpuscular Volume 95, Mean Corpuscular Hemoglobin 29, Mean Corpuscular 

Hemoglobin Concent 31L, Red Cell Distribution Width 15.1H, Platelet Count 186, 

Mean Platelet Volume 10.5H, Sodium Level 141, Potassium Level 3.9, Chloride 

Level 107, Carbon Dioxide Level 22, Anion Gap 12, Blood Urea Nitrogen 35H, 

Creatinine 0.92, Estimat Glomerular Filtration Rate 60, BUN/Creatinine Ratio 38

, Glucose Level 63L, Calcium Level 8.1L, Magnesium Level 1.6L


18 11:26: Glucometer 63L





Microbiology


18 Blood Culture - Preliminary, Resulted


           No growth


18 C. difficile DNA Amplification, Resulted


           Pending


18 C. difficile GDH Antigen & Toxins - Final, Resulted


           


18 Influenza Types A,B Antigen (KIM) - Final, Complete


Radiology


CXR 18: IMPRESSION: Cardiac enlargement with central vascular congestion.





Assessment/Plan


Assessment/Plan





(1) Pneumonia


Status:  Acute


Assessment & Plan:  Suspect based on fever and hypoxia, no clear infiltrate on 

CXR and no evidence of sepsis. Continue ceftriaxone and doxycycline.


 improved respiratory status and no fever, continue antibiotics.


Qualifiers:  


   Qualified Codes:  J18.9 - Pneumonia, unspecified organism


(2) Hypoxia


Status:  Acute


Assessment & Plan:  Secondary to pneumonia vs pulmonary edema. Wean O2 as 

tolerated, see pneumonia dx.





(3) Elevated brain natriuretic peptide (BNP) level


Status:  Chronic


Assessment & Plan:  Unclear if any history of congestive heart failure, last 

echocardiogram in . BNP similar level as long ago as . IV lasix started 

in ER, will decrease to daily and wean oxygen as tolerated.


 furosemide 40 mg daily for now, will plan for 20 mg daily on d/c to avoid 

dehydration with current diarrhea.





(4) Mild persistent asthma


Status:  Chronic


Assessment & Plan:  Started on IV steroids from ER, no wheezing this morning, 

will hold steroids for now and monitor closely. RT protocol.


Qualifiers:  


   Qualified Codes:  J45.30 - Mild persistent asthma, uncomplicated


(5) CAD (coronary artery disease), native coronary artery


Status:  Chronic


Assessment & Plan:  Resume home medications.


Qualifiers:  


   Qualified Codes:  I25.10 - Atherosclerotic heart disease of native coronary 

artery without angina pectoris


(6) Anemia


Status:  Chronic


Assessment & Plan:  Unclear etiology in spite of work-up, followed by 

Hematology and has required IV iron and blood transfusions in the past. 

Hemoglobin stable.





(7) Hypomagnesemia


Status:  Acute


Assessment & Plan:  Replace and recheck.





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


Status:  Chronic


Assessment & Plan:  Diabetic diet, resume home medications.





(9) Diarrhea


Status:  Acute


Assessment & Plan:  C diff results indeterminate, confirmation testing pending. 

She states she thinks she will be able to manage at home while waiting for 

results as she lives in assisted living and has bedside commode. Will monitor 

frequency and amount this afternoon after lunch to determine disposition.





(10) DVT prophylaxis


Status:  Acute


Assessment & Plan:  At high risk for DVT so had planned to start enoxaparin, 

however she has allergy to heparin and has never received enoxaparin, so 

decided to hold pharmacologic ppx.








Clinical Quality Measures


DVT/VTE Risk/Contraindication:


Risk Factor Score Per Nursin


RFS Level Per Nursing on Admit:  4+=Very High











CARIDAD PIKE MD Dec 23, 2018 12:08

## 2018-12-24 VITALS — SYSTOLIC BLOOD PRESSURE: 129 MMHG | DIASTOLIC BLOOD PRESSURE: 54 MMHG

## 2018-12-24 VITALS — DIASTOLIC BLOOD PRESSURE: 69 MMHG | SYSTOLIC BLOOD PRESSURE: 163 MMHG

## 2018-12-24 VITALS — DIASTOLIC BLOOD PRESSURE: 69 MMHG | SYSTOLIC BLOOD PRESSURE: 125 MMHG

## 2018-12-24 VITALS — SYSTOLIC BLOOD PRESSURE: 126 MMHG | DIASTOLIC BLOOD PRESSURE: 65 MMHG

## 2018-12-24 VITALS — DIASTOLIC BLOOD PRESSURE: 68 MMHG | SYSTOLIC BLOOD PRESSURE: 134 MMHG

## 2018-12-24 VITALS — SYSTOLIC BLOOD PRESSURE: 140 MMHG | DIASTOLIC BLOOD PRESSURE: 64 MMHG

## 2018-12-24 LAB
BUN/CREAT SERPL: 38
CALCIUM SERPL-MCNC: 7.7 MG/DL (ref 8.5–10.1)
CHLORIDE SERPL-SCNC: 107 MMOL/L (ref 98–107)
CO2 SERPL-SCNC: 21 MMOL/L (ref 21–32)
CREAT SERPL-MCNC: 0.77 MG/DL (ref 0.6–1.3)
ERYTHROCYTE [DISTWIDTH] IN BLOOD BY AUTOMATED COUNT: 15.4 % (ref 10–14.5)
GFR SERPLBLD BASED ON 1.73 SQ M-ARVRAT: > 60 ML/MIN
GLUCOSE SERPL-MCNC: 77 MG/DL (ref 70–105)
HCT VFR BLD CALC: 32 % (ref 35–52)
HGB BLD-MCNC: 9.9 G/DL (ref 11.5–16)
MAGNESIUM SERPL-MCNC: 1.6 MG/DL (ref 1.8–2.4)
MCH RBC QN AUTO: 29 PG (ref 25–34)
MCHC RBC AUTO-ENTMCNC: 31 G/DL (ref 32–36)
MCV RBC AUTO: 95 FL (ref 80–99)
PLATELET # BLD: 159 10^3/UL (ref 130–400)
PMV BLD AUTO: 11.2 FL (ref 7.4–10.4)
POTASSIUM SERPL-SCNC: 3.6 MMOL/L (ref 3.6–5)
RBC # BLD AUTO: 3.39 10^6/UL (ref 4.35–5.85)
SODIUM SERPL-SCNC: 138 MMOL/L (ref 135–145)
WBC # BLD AUTO: 5.6 10^3/UL (ref 4.3–11)

## 2018-12-24 RX ADMIN — METRONIDAZOLE SCH MG: 500 TABLET ORAL at 08:37

## 2018-12-24 RX ADMIN — Medication SCH ML: at 13:05

## 2018-12-24 RX ADMIN — GLIPIZIDE SCH MG: 5 TABLET ORAL at 16:36

## 2018-12-24 RX ADMIN — ATORVASTATIN CALCIUM SCH MG: 10 TABLET, FILM COATED ORAL at 19:57

## 2018-12-24 RX ADMIN — INSULIN ASPART SCH UNIT: 100 INJECTION, SOLUTION INTRAVENOUS; SUBCUTANEOUS at 15:55

## 2018-12-24 RX ADMIN — METRONIDAZOLE SCH MG: 500 TABLET ORAL at 19:57

## 2018-12-24 RX ADMIN — SODIUM CHLORIDE SCH MLS/HR: 900 INJECTION INTRAVENOUS at 19:56

## 2018-12-24 RX ADMIN — PANTOPRAZOLE SODIUM SCH MG: 40 TABLET, DELAYED RELEASE ORAL at 08:37

## 2018-12-24 RX ADMIN — INSULIN DETEMIR SCH UNIT: 100 INJECTION, SOLUTION SUBCUTANEOUS at 21:56

## 2018-12-24 RX ADMIN — FLUTICASONE PROPIONATE AND SALMETEROL XINAFOATE SCH PUFF: 45; 21 AEROSOL, METERED RESPIRATORY (INHALATION) at 07:12

## 2018-12-24 RX ADMIN — Medication SCH ML: at 06:44

## 2018-12-24 RX ADMIN — IPRATROPIUM BROMIDE AND ALBUTEROL SULFATE SCH ML: .5; 3 SOLUTION RESPIRATORY (INHALATION) at 14:27

## 2018-12-24 RX ADMIN — FUROSEMIDE SCH MG: 40 TABLET ORAL at 08:37

## 2018-12-24 RX ADMIN — GLIPIZIDE SCH MG: 5 TABLET ORAL at 06:44

## 2018-12-24 RX ADMIN — CARVEDILOL SCH MG: 12.5 TABLET, FILM COATED ORAL at 19:57

## 2018-12-24 RX ADMIN — HYDROCHLOROTHIAZIDE SCH MG: 25 TABLET ORAL at 08:37

## 2018-12-24 RX ADMIN — INSULIN ASPART SCH UNIT: 100 INJECTION, SOLUTION INTRAVENOUS; SUBCUTANEOUS at 06:14

## 2018-12-24 RX ADMIN — FLUTICASONE PROPIONATE AND SALMETEROL XINAFOATE SCH PUFF: 45; 21 AEROSOL, METERED RESPIRATORY (INHALATION) at 18:26

## 2018-12-24 RX ADMIN — INSULIN ASPART SCH UNIT: 100 INJECTION, SOLUTION INTRAVENOUS; SUBCUTANEOUS at 21:49

## 2018-12-24 RX ADMIN — IPRATROPIUM BROMIDE AND ALBUTEROL SULFATE SCH ML: .5; 3 SOLUTION RESPIRATORY (INHALATION) at 18:26

## 2018-12-24 RX ADMIN — METRONIDAZOLE SCH MG: 500 TABLET ORAL at 13:04

## 2018-12-24 RX ADMIN — ACETAMINOPHEN PRN MG: 500 TABLET ORAL at 19:58

## 2018-12-24 RX ADMIN — Medication SCH ML: at 20:00

## 2018-12-24 RX ADMIN — FLUTICASONE PROPIONATE AND SALMETEROL XINAFOATE SCH PUFF: 45; 21 AEROSOL, METERED RESPIRATORY (INHALATION) at 07:19

## 2018-12-24 RX ADMIN — LISINOPRIL SCH MG: 40 TABLET ORAL at 19:57

## 2018-12-24 RX ADMIN — INSULIN ASPART SCH UNIT: 100 INJECTION, SOLUTION INTRAVENOUS; SUBCUTANEOUS at 11:29

## 2018-12-24 RX ADMIN — ASPIRIN SCH MG: 81 TABLET ORAL at 19:57

## 2018-12-24 RX ADMIN — CARVEDILOL SCH MG: 12.5 TABLET, FILM COATED ORAL at 08:37

## 2018-12-24 RX ADMIN — IPRATROPIUM BROMIDE AND ALBUTEROL SULFATE SCH ML: .5; 3 SOLUTION RESPIRATORY (INHALATION) at 07:12

## 2018-12-24 NOTE — PROGRESS NOTE (SOAP)
Subjective


Subjective/Events-last exam


Patient does not admit to any significant cough this morning.  She has pretty 

good appetite and was eating a sandwich on morning rounds.  She does report she 

still has diarrhea especially after eating.  She had found out this morning she 

was positive for C. difficile.


Review of Systems


Date Seen by Provider:  Dec 24, 2018


Time Seen by Provider:  07:20





Focused Exam


Lactate Level


18 19:15: Lactic Acid Level 0.98





Objective


Exam


Last Set of Vital Signs





Vital Signs








  Date Time  Temp Pulse Resp B/P (MAP) Pulse Ox O2 Delivery O2 Flow Rate FiO2


 


18 07:10     97 Nasal Cannula 3.00 


 


18 07:00  83      


 


18 04:02 98.7  20 125/69 (87)    





Capillary Refill : Less Than 3 Seconds


I&O











Intake and Output 


 


 18





 00:00


 


Intake Total 1900 ml


 


Output Total 2950 ml


 


Balance -1050 ml


 


 


 


Intake Oral 1600 ml


 


IV Total 300 ml


 


Output Urine Total 2950 ml


 


# Bowel Movements 23








General:  No Acute Distress


Neck:  Supple


Lungs:  Clear to Auscultation (With no defined rales in lung bases)


Heart:  Regular Rate, Other (Systolic murmur)


Abdomen:  Soft, Other (Bowel sounds slightly increased.)


Skin:  No Rashes


Psych/Mental Status:  Mental Status NL





Results/Procedures


Lab


Laboratory Tests


18 11:26: Glucometer 63L


18 15:52: Glucometer 86


18 20:31: Glucometer 81


18 03:33: Glucometer 67L


18 04:37: Glucometer 78


18 05:51: Glucometer 80


18 06:02: 


White Blood Count 5.6, Red Blood Count 3.39L, Hemoglobin 9.9L, Hematocrit 32L, 

Mean Corpuscular Volume 95, Mean Corpuscular Hemoglobin 29, Mean Corpuscular 

Hemoglobin Concent 31L, Red Cell Distribution Width 15.4H, Platelet Count 159, 

Mean Platelet Volume 11.2H, Sodium Level 138, Potassium Level 3.6, Chloride 

Level 107, Carbon Dioxide Level 21, Anion Gap 10, Blood Urea Nitrogen 29H, 

Creatinine 0.77, Estimat Glomerular Filtration Rate > 60, BUN/Creatinine Ratio 

38, Glucose Level 77, Calcium Level 7.7L, Magnesium Level 1.6L





Microbiology


18 Blood Culture - Preliminary, Resulted


           No growth


18 C. difficile DNA Amplification - Final, Complete


           


18 C. difficile GDH Antigen & Toxins - Final, Complete


           


18 Influenza Types A,B Antigen (KIM) - Final, Complete


Radiology


CXR 18: IMPRESSION: Cardiac enlargement with central vascular congestion.





Assessment/Plan


Assessment/Plan





(1) Pneumonia


Status:  Acute


Assessment & Plan:  Suspect based on fever and hypoxia, no clear infiltrate on 

CXR and no evidence of sepsis. Continue ceftriaxone and doxycycline.


 improved respiratory status and no fever, continue antibiotics.


 patient is clinically improving will continue Rocephin.  Doxycycline 

discontinued.


Qualifiers:  


   Qualified Codes:  J18.9 - Pneumonia, unspecified organism


(2) Hypoxia


Status:  Acute


Assessment & Plan:  Secondary to pneumonia vs pulmonary edema. Wean O2 as 

tolerated, see pneumonia dx.





(3) Elevated brain natriuretic peptide (BNP) level


Status:  Chronic


Assessment & Plan:  Unclear if any history of congestive heart failure, last 

echocardiogram in . BNP similar level as long ago as 2015. IV lasix started 

in ER, will decrease to daily and wean oxygen as tolerated.


 furosemide 40 mg daily for now, will plan for 20 mg daily on d/c to avoid 

dehydration with current diarrhea.





(4) Mild persistent asthma


Status:  Chronic


Assessment & Plan:  Started on IV steroids from ER, no wheezing this morning, 

will hold steroids for now and monitor closely. RT protocol.


Qualifiers:  


   Qualified Codes:  J45.30 - Mild persistent asthma, uncomplicated


(5) CAD (coronary artery disease), native coronary artery


Status:  Chronic


Assessment & Plan:  Resume home medications.


Qualifiers:  


   Qualified Codes:  I25.10 - Atherosclerotic heart disease of native coronary 

artery without angina pectoris


(6) Anemia


Status:  Chronic


Assessment & Plan:  Unclear etiology in spite of work-up, followed by 

Hematology and has required IV iron and blood transfusions in the past. 

Hemoglobin stable.


--stable at 9.9





(7) Hypomagnesemia


Status:  Acute


Assessment & Plan:  Replace and recheck.





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


Status:  Chronic


Assessment & Plan:  Diabetic diet, resume home medications.





(9) Diarrhea


Status:  Acute


Assessment & Plan:  C diff results indeterminate, confirmation testing pending. 

She states she thinks she will be able to manage at home while waiting for 

results as she lives in assisted living and has bedside commode. Will monitor 

frequency and amount this afternoon after lunch to determine disposition.


--patient is noted to be positive for C. difficile.


-She is currently on metronidazole 3 times daily. 





(10) DVT prophylaxis


Status:  Acute


Assessment & Plan:  At high risk for DVT so had planned to start enoxaparin, 

however she has allergy to heparin and has never received enoxaparin, so 

decided to hold pharmacologic ppx.








Clinical Quality Measures


DVT/VTE Risk/Contraindication:


Risk Factor Score Per Nursin


RFS Level Per Nursing on Admit:  4+=Very High











KAREN ORTIZ MD Dec 24, 2018 08:07

## 2018-12-25 VITALS — DIASTOLIC BLOOD PRESSURE: 72 MMHG | SYSTOLIC BLOOD PRESSURE: 167 MMHG

## 2018-12-25 VITALS — DIASTOLIC BLOOD PRESSURE: 45 MMHG | SYSTOLIC BLOOD PRESSURE: 87 MMHG

## 2018-12-25 VITALS — DIASTOLIC BLOOD PRESSURE: 67 MMHG | SYSTOLIC BLOOD PRESSURE: 153 MMHG

## 2018-12-25 RX ADMIN — GLIPIZIDE SCH MG: 5 TABLET ORAL at 06:30

## 2018-12-25 RX ADMIN — LISINOPRIL SCH MG: 40 TABLET ORAL at 21:10

## 2018-12-25 RX ADMIN — INSULIN ASPART SCH UNIT: 100 INJECTION, SOLUTION INTRAVENOUS; SUBCUTANEOUS at 21:57

## 2018-12-25 RX ADMIN — METRONIDAZOLE SCH MG: 500 TABLET ORAL at 08:56

## 2018-12-25 RX ADMIN — Medication SCH ML: at 08:34

## 2018-12-25 RX ADMIN — ACETAMINOPHEN PRN MG: 500 TABLET ORAL at 16:20

## 2018-12-25 RX ADMIN — INSULIN ASPART SCH UNIT: 100 INJECTION, SOLUTION INTRAVENOUS; SUBCUTANEOUS at 06:29

## 2018-12-25 RX ADMIN — INSULIN DETEMIR SCH UNIT: 100 INJECTION, SOLUTION SUBCUTANEOUS at 21:58

## 2018-12-25 RX ADMIN — IPRATROPIUM BROMIDE AND ALBUTEROL SULFATE SCH ML: .5; 3 SOLUTION RESPIRATORY (INHALATION) at 07:28

## 2018-12-25 RX ADMIN — FUROSEMIDE SCH MG: 40 TABLET ORAL at 08:57

## 2018-12-25 RX ADMIN — CARVEDILOL SCH MG: 12.5 TABLET, FILM COATED ORAL at 21:11

## 2018-12-25 RX ADMIN — GLIPIZIDE SCH MG: 5 TABLET ORAL at 08:57

## 2018-12-25 RX ADMIN — ASPIRIN SCH MG: 81 TABLET ORAL at 21:11

## 2018-12-25 RX ADMIN — INSULIN ASPART SCH UNIT: 100 INJECTION, SOLUTION INTRAVENOUS; SUBCUTANEOUS at 16:11

## 2018-12-25 RX ADMIN — METRONIDAZOLE SCH MG: 500 TABLET ORAL at 22:10

## 2018-12-25 RX ADMIN — PANTOPRAZOLE SODIUM SCH MG: 40 TABLET, DELAYED RELEASE ORAL at 08:57

## 2018-12-25 RX ADMIN — Medication SCH ML: at 13:32

## 2018-12-25 RX ADMIN — FLUTICASONE PROPIONATE AND SALMETEROL XINAFOATE SCH PUFF: 45; 21 AEROSOL, METERED RESPIRATORY (INHALATION) at 07:39

## 2018-12-25 RX ADMIN — METRONIDAZOLE SCH MG: 500 TABLET ORAL at 13:32

## 2018-12-25 RX ADMIN — ATORVASTATIN CALCIUM SCH MG: 10 TABLET, FILM COATED ORAL at 21:10

## 2018-12-25 RX ADMIN — Medication SCH ML: at 21:58

## 2018-12-25 RX ADMIN — INSULIN ASPART SCH UNIT: 100 INJECTION, SOLUTION INTRAVENOUS; SUBCUTANEOUS at 11:15

## 2018-12-25 RX ADMIN — IPRATROPIUM BROMIDE AND ALBUTEROL SULFATE SCH ML: .5; 3 SOLUTION RESPIRATORY (INHALATION) at 18:49

## 2018-12-25 RX ADMIN — FLUTICASONE PROPIONATE AND SALMETEROL XINAFOATE SCH PUFF: 45; 21 AEROSOL, METERED RESPIRATORY (INHALATION) at 18:49

## 2018-12-25 RX ADMIN — HYDROCHLOROTHIAZIDE SCH MG: 25 TABLET ORAL at 08:57

## 2018-12-25 RX ADMIN — CARVEDILOL SCH MG: 12.5 TABLET, FILM COATED ORAL at 08:57

## 2018-12-25 NOTE — DISCHARGE INST-SKILLED NURSING
Discharge Inst-Skilled NF


Patient Instructions


Patient Problems:  


Clinical pneumonia treated with doxycycline.  C difficile treated with


Flagyl





Consult/Follow Up/Orders


Follow Up Appt.:  


[facility] in 1 week


Skilled NF Admit to:  [facility]


Certification (SNF)


I certify that SNF services are required to be given on an inpatient basis 

because of the above named patient's need for skilled nursing care on a 

continuing basis for the conditions(s) for which he/she was receiving inpatient 

hospital services prior to his/her transfer to the SNF.


Skilled Nursing Facility Order:  Nursing Services, Occupational Ther-Evaluate & 

Treat, Physical Therapy-Evaluate & Treat


Discharge Diet:  ADA Diet





New & Resume Previous Orders


Karen Ortiz 


Dec 25, 2018 


07:43











KAREN ORTIZ MD Dec 25, 2018 07:45

## 2018-12-25 NOTE — DISCHARGE SUMMARY
Diagnosis/Chief Complaint


Date of Admission


Dec 21, 2018 at 20:35


Date of Discharge


2018


Admission Diagnosis


Admission Diagnosis


1.  COPD exacerbation


2.  Hypoxemia


3.  Clinical pneumonia





Discharge Diagnosis


1.  COPD exacerbation


2.  Clinical pneumonia


3.  Hypoxemia secondary to number 1


4.  C. difficile


5.  Diabetes adult onset--known


Problems/Diagnosis:  


(1) Pneumonia


Assessment & Plan:  Suspect based on fever and hypoxia, no clear infiltrate on 

CXR and no evidence of sepsis. Continue ceftriaxone and doxycycline.


 improved respiratory status and no fever, continue antibiotics.


 patient is clinically improving will continue Rocephin.  Doxycycline 

discontinued.


Qualifiers:  


   Qualified Codes:  J18.9 - Pneumonia, unspecified organism


Status:  Acute


(2) Hypoxia


Assessment & Plan:  Secondary to pneumonia vs pulmonary edema. Wean O2 as 

tolerated, see pneumonia dx.


Status:  Acute


(3) Elevated brain natriuretic peptide (BNP) level


Assessment & Plan:  Unclear if any history of congestive heart failure, last 

echocardiogram in . BNP similar level as long ago as . IV lasix started 

in ER, will decrease to daily and wean oxygen as tolerated.


 furosemide 40 mg daily for now, will plan for 20 mg daily on d/c to avoid 

dehydration with current diarrhea.


Status:  Chronic


(4) Mild persistent asthma


Assessment & Plan:  Started on IV steroids from ER, no wheezing this morning, 

will hold steroids for now and monitor closely. RT protocol.


Qualifiers:  


   Qualified Codes:  J45.30 - Mild persistent asthma, uncomplicated


Status:  Chronic


(5) CAD (coronary artery disease), native coronary artery


Assessment & Plan:  Resume home medications.


Qualifiers:  


   Qualified Codes:  I25.10 - Atherosclerotic heart disease of native coronary 

artery without angina pectoris


Status:  Chronic


(6) Anemia


Assessment & Plan:  Unclear etiology in spite of work-up, followed by 

Hematology and has required IV iron and blood transfusions in the past. 

Hemoglobin stable.


--stable at 9.9


Status:  Chronic


(7) Hypomagnesemia


Assessment & Plan:  Replace and recheck.


Status:  Acute


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


Assessment & Plan:  Diabetic diet, resume home medications.


Status:  Chronic


(9) Diarrhea


Assessment & Plan:  C diff results indeterminate, confirmation testing pending. 

She states she thinks she will be able to manage at home while waiting for 

results as she lives in assisted living and has bedside commode. Will monitor 

frequency and amount this afternoon after lunch to determine disposition.


--patient is noted to be positive for C. difficile.


-She is currently on metronidazole 3 times daily.


Status:  Acute


(10) DVT prophylaxis


Assessment & Plan:  At high risk for DVT so had planned to start enoxaparin, 

however she has allergy to heparin and has never received enoxaparin, so 

decided to hold pharmacologic ppx.


Status:  Acute





Chief Complaint/HPI


Chief Complaint/HPI


74 yo female who recently moved from nursing facility to assisted living and 

was moving and unpacking boxes, started to feel excessively tired and worn out 

and maybe short of breath. She also noted chills. She denies cough. She has 

chronic nasal congestion which is improved some with nasal strips. On arrival 

to ER found to be febrile and hypoxic.





Discharge Summary-Simple/Stand


Consultations





Discharge Physical Examination


Allergies:  


Coded Allergies:  


     heparin (Verified  Allergy, Severe, SWELLING, 18)


 


 NO ENOXAPARIN EITHER


     vancomycin (Verified  Allergy, Severe, RASH/SWELLING, 18)


 


 RASH, FACIAL SWELLING AND HAND SWELLING


Uncoded Allergies:  


     TAPE (Allergy, Mild, RASH, 17)


Vitals & I&Os





Vital Sign - Last 12Hours








  Date Time  Temp Pulse Resp B/P (MAP) Pulse Ox O2 Delivery O2 Flow Rate FiO2


 


18 07:39      Nasal Cannula 2.00 


 


18 04:00 98.6 73 22 167/72 (103) 93   














Intake and Output 


 


 18





 00:00


 


Intake Total 1000 ml


 


Output Total 1450 ml


 


Balance -450 ml








General Appearance:  No Acute Distress


Respiratory:  Clear to Auscultation (Overall with decreased breath sounds)


Cardiovascular:  Regular Rate (With occasional ectopy)


Abdominal:  Soft (Without tenderness)


Skin:  No Rashes


Neuro:  Normal Speech


Psych/Mental Status:  Mental Status NL, Mood NL





Hospital Course


See final discharge diagnosis.  Patient upon admission on  received 

ceftriaxone and doxycycline.  She was also given oxygen by nasal cannula.  She 

received Lasix 40 mg.  Patient responded well to treatment and fever improved.  

She was however noted to have diarrhea shortly after meals.  C. difficile was 

positive and she was on Flagyl.  She was in good spirits and had no dyspnea.  

Patient was ready for dismissal during the morning of 2018.  She 

will be going to live at [facility] and reports she feels very 

comfortable there with the staff and help.


Radiology Reviewed


CXR 18: IMPRESSION: Cardiac enlargement with central vascular congestion.





Discharge


Instructions to patient/family


Please see electronic discharge instructions given to patient.


Discharge Medications


Reviewed and agree with Discharge Medication list on patient's Discharge 

Instruction sheet





Clinical Quality Measures


DVT/VTE Risk/Contraindication:


Risk Factor Score Per Nursin


RFS Level Per Nursing on Admit:  4+=Very High











KAREN ORTIZ MD Dec 25, 2018 07:51

## 2018-12-26 VITALS — DIASTOLIC BLOOD PRESSURE: 71 MMHG | SYSTOLIC BLOOD PRESSURE: 143 MMHG

## 2018-12-26 VITALS — SYSTOLIC BLOOD PRESSURE: 114 MMHG | DIASTOLIC BLOOD PRESSURE: 56 MMHG

## 2018-12-26 RX ADMIN — GLIPIZIDE SCH MG: 5 TABLET ORAL at 06:45

## 2018-12-26 RX ADMIN — FLUTICASONE PROPIONATE AND SALMETEROL XINAFOATE SCH PUFF: 45; 21 AEROSOL, METERED RESPIRATORY (INHALATION) at 07:24

## 2018-12-26 RX ADMIN — INSULIN ASPART SCH UNIT: 100 INJECTION, SOLUTION INTRAVENOUS; SUBCUTANEOUS at 06:45

## 2018-12-26 RX ADMIN — PANTOPRAZOLE SODIUM SCH MG: 40 TABLET, DELAYED RELEASE ORAL at 09:59

## 2018-12-26 RX ADMIN — FUROSEMIDE SCH MG: 40 TABLET ORAL at 09:59

## 2018-12-26 RX ADMIN — IPRATROPIUM BROMIDE AND ALBUTEROL SULFATE SCH ML: .5; 3 SOLUTION RESPIRATORY (INHALATION) at 07:24

## 2018-12-26 RX ADMIN — METRONIDAZOLE SCH MG: 500 TABLET ORAL at 09:59

## 2018-12-26 RX ADMIN — HYDROCHLOROTHIAZIDE SCH MG: 25 TABLET ORAL at 09:59

## 2018-12-26 RX ADMIN — Medication SCH ML: at 14:03

## 2018-12-26 RX ADMIN — INSULIN ASPART SCH UNIT: 100 INJECTION, SOLUTION INTRAVENOUS; SUBCUTANEOUS at 11:54

## 2018-12-26 RX ADMIN — METRONIDAZOLE SCH MG: 500 TABLET ORAL at 13:42

## 2018-12-26 RX ADMIN — ACETAMINOPHEN PRN MG: 500 TABLET ORAL at 06:45

## 2018-12-26 RX ADMIN — CARVEDILOL SCH MG: 12.5 TABLET, FILM COATED ORAL at 09:59

## 2018-12-26 NOTE — PROGRESS NOTE (SOAP)
Subjective


Subjective/Events-last exam


Diarrhea has slowed down.


Review of Systems


Date Seen by Provider:  Dec 26, 2018


Time Seen by Provider:  09:30





Objective


Exam


Last Set of Vital Signs





Vital Signs








  Date Time  Temp Pulse Resp B/P (MAP) Pulse Ox O2 Delivery O2 Flow Rate FiO2


 


18 09:20 97.6       


 


18 08:00      Nasal Cannula 2.00 


 


18 08:00  76 20 143/71 (95) 93   





Capillary Refill : Less Than 3 Seconds


I&O











Intake and Output 


 


 18





 00:00


 


Intake Total 2700 ml


 


Output Total 1880 ml


 


Balance 820 ml


 


 


 


Intake Oral 2700 ml


 


Output Urine Total 1480 ml


 


Post Void Residual 400 ml


 


# Voids 6


 


# Bowel Movements 12








General:  Alert, Oriented X3, Cooperative


Psych/Mental Status:  Mood NL





Results/Procedures


Lab


Laboratory Tests


18 16:14: Glucometer 113H


18 21:53: Glucometer 92


18 06:17: Glucometer 115H


18 11:51: Glucometer 103





Microbiology


18 Blood Culture - Preliminary, Resulted


           No growth


18 C. difficile DNA Amplification - Final, Complete


           


18 C. difficile GDH Antigen & Toxins - Final, Complete


           


18 Influenza Types A,B Antigen (KIM) - Final, Complete


Radiology


CXR 18: IMPRESSION: Cardiac enlargement with central vascular congestion.





Assessment/Plan


Assessment/Plan


Assessment & Plan


Awaiting [facility] to accept patient to SNF (previously was [facility])


Ready for DC when patient is accepted.





(1) Pneumonia


Status:  Acute


Assessment & Plan:  Suspect based on fever and hypoxia, no clear infiltrate on 

CXR and no evidence of sepsis. Continue ceftriaxone and doxycycline.


 improved respiratory status and no fever, continue antibiotics.


 patient is clinically improving will continue Rocephin.  Doxycycline 

discontinued.


Qualifiers:  


   Qualified Codes:  J18.9 - Pneumonia, unspecified organism


(2) Hypoxia


Status:  Acute


Assessment & Plan:  Secondary to pneumonia vs pulmonary edema. Wean O2 as 

tolerated, see pneumonia dx.





(3) Elevated brain natriuretic peptide (BNP) level


Status:  Chronic


Assessment & Plan:  Unclear if any history of congestive heart failure, last 

echocardiogram in . BNP similar level as long ago as . IV lasix started 

in ER, will decrease to daily and wean oxygen as tolerated.


 furosemide 40 mg daily for now, will plan for 20 mg daily on d/c to avoid 

dehydration with current diarrhea.





(4) Mild persistent asthma


Status:  Chronic


Assessment & Plan:  Started on IV steroids from ER, no wheezing this morning, 

will hold steroids for now and monitor closely. RT protocol.


Qualifiers:  


   Qualified Codes:  J45.30 - Mild persistent asthma, uncomplicated


(5) CAD (coronary artery disease), native coronary artery


Status:  Chronic


Assessment & Plan:  Resume home medications.


Qualifiers:  


   Qualified Codes:  I25.10 - Atherosclerotic heart disease of native coronary 

artery without angina pectoris


(6) Anemia


Status:  Chronic


Assessment & Plan:  Unclear etiology in spite of work-up, followed by 

Hematology and has required IV iron and blood transfusions in the past. 

Hemoglobin stable.


--stable at 9.9





(7) Hypomagnesemia


Status:  Acute


Assessment & Plan:  Replace and recheck.





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


Status:  Chronic


Assessment & Plan:  Diabetic diet, resume home medications.





(9) Diarrhea


Status:  Acute


Assessment & Plan:  C diff results indeterminate, confirmation testing pending. 

She states she thinks she will be able to manage at home while waiting for 

results as she lives in assisted living and has bedside commode. Will monitor 

frequency and amount this afternoon after lunch to determine disposition.


--patient is noted to be positive for C. difficile.


-She is currently on metronidazole 3 times daily. 





(10) DVT prophylaxis


Status:  Acute


Assessment & Plan:  At high risk for DVT so had planned to start enoxaparin, 

however she has allergy to heparin and has never received enoxaparin, so 

decided to hold pharmacologic ppx.





(11) C. difficile diarrhea


Status:  Acute


Assessment & Plan:  - being treated with Flagyl








Clinical Quality Measures


DVT/VTE Risk/Contraindication:


Risk Factor Score Per Nursin


RFS Level Per Nursing on Admit:  4+=Very High











INOCENTE GRANADOS DO Dec 26, 2018 13:30

## 2019-02-28 ENCOUNTER — HOSPITAL ENCOUNTER (OUTPATIENT)
Dept: HOSPITAL 75 - ONC | Age: 74
LOS: 5 days | Discharge: HOME | End: 2019-03-05
Attending: INTERNAL MEDICINE
Payer: MEDICARE

## 2019-02-28 DIAGNOSIS — E11.9: ICD-10-CM

## 2019-02-28 DIAGNOSIS — Z79.899: ICD-10-CM

## 2019-02-28 DIAGNOSIS — D69.3: Primary | ICD-10-CM

## 2019-02-28 DIAGNOSIS — E66.01: ICD-10-CM

## 2019-02-28 DIAGNOSIS — M87.9: ICD-10-CM

## 2019-02-28 DIAGNOSIS — I25.10: ICD-10-CM

## 2019-02-28 DIAGNOSIS — D50.9: ICD-10-CM

## 2019-02-28 LAB
ALBUMIN SERPL-MCNC: 3.2 GM/DL (ref 3.2–4.5)
ALP SERPL-CCNC: 196 U/L (ref 40–136)
ALT SERPL-CCNC: 53 U/L (ref 0–55)
BASOPHILS # BLD AUTO: 0 10^3/UL (ref 0–0.1)
BASOPHILS NFR BLD AUTO: 0 % (ref 0–10)
BILIRUB SERPL-MCNC: 0.4 MG/DL (ref 0.1–1)
BUN/CREAT SERPL: 25
CALCIUM SERPL-MCNC: 8.4 MG/DL (ref 8.5–10.1)
CHLORIDE SERPL-SCNC: 106 MMOL/L (ref 98–107)
CO2 SERPL-SCNC: 22 MMOL/L (ref 21–32)
CREAT SERPL-MCNC: 1.02 MG/DL (ref 0.6–1.3)
EOSINOPHIL # BLD AUTO: 0.2 10^3/UL (ref 0–0.3)
EOSINOPHIL NFR BLD AUTO: 3 % (ref 0–10)
ERYTHROCYTE [DISTWIDTH] IN BLOOD BY AUTOMATED COUNT: 15.3 % (ref 10–14.5)
GFR SERPLBLD BASED ON 1.73 SQ M-ARVRAT: 53 ML/MIN
GLUCOSE SERPL-MCNC: 127 MG/DL (ref 70–105)
HCT VFR BLD CALC: 32 % (ref 35–52)
HGB BLD-MCNC: 10.1 G/DL (ref 11.5–16)
LYMPHOCYTES # BLD AUTO: 1.6 X 10^3 (ref 1–4)
LYMPHOCYTES NFR BLD AUTO: 25 % (ref 12–44)
MANUAL DIFFERENTIAL PERFORMED BLD QL: NO
MCH RBC QN AUTO: 29 PG (ref 25–34)
MCHC RBC AUTO-ENTMCNC: 32 G/DL (ref 32–36)
MCV RBC AUTO: 91 FL (ref 80–99)
MONOCYTES # BLD AUTO: 0.5 X 10^3 (ref 0–1)
MONOCYTES NFR BLD AUTO: 7 % (ref 0–12)
NEUTROPHILS # BLD AUTO: 4.3 X 10^3 (ref 1.8–7.8)
NEUTROPHILS NFR BLD AUTO: 65 % (ref 42–75)
PLATELET # BLD: 198 10^3/UL (ref 130–400)
PMV BLD AUTO: 10.7 FL (ref 7.4–10.4)
POTASSIUM SERPL-SCNC: 5 MMOL/L (ref 3.6–5)
PROT SERPL-MCNC: 6.4 GM/DL (ref 6.4–8.2)
SODIUM SERPL-SCNC: 137 MMOL/L (ref 135–145)
WBC # BLD AUTO: 6.6 10^3/UL (ref 4.3–11)

## 2019-02-28 PROCEDURE — 99213 OFFICE O/P EST LOW 20 MIN: CPT

## 2019-02-28 PROCEDURE — 85025 COMPLETE CBC W/AUTO DIFF WBC: CPT

## 2019-02-28 PROCEDURE — 36415 COLL VENOUS BLD VENIPUNCTURE: CPT

## 2019-02-28 PROCEDURE — 83615 LACTATE (LD) (LDH) ENZYME: CPT

## 2019-02-28 PROCEDURE — 84443 ASSAY THYROID STIM HORMONE: CPT

## 2019-02-28 PROCEDURE — 82728 ASSAY OF FERRITIN: CPT

## 2019-02-28 PROCEDURE — 80053 COMPREHEN METABOLIC PANEL: CPT

## 2019-07-15 ENCOUNTER — HOSPITAL ENCOUNTER (OUTPATIENT)
Dept: HOSPITAL 75 - ONC | Age: 74
LOS: 1 days | Discharge: HOME | End: 2019-07-16
Attending: INTERNAL MEDICINE
Payer: MEDICARE

## 2019-07-15 DIAGNOSIS — E11.9: ICD-10-CM

## 2019-07-15 DIAGNOSIS — M87.9: ICD-10-CM

## 2019-07-15 DIAGNOSIS — D50.9: ICD-10-CM

## 2019-07-15 DIAGNOSIS — I25.10: ICD-10-CM

## 2019-07-15 DIAGNOSIS — D69.3: Primary | ICD-10-CM

## 2019-07-15 DIAGNOSIS — Z79.899: ICD-10-CM

## 2019-07-15 DIAGNOSIS — E66.01: ICD-10-CM

## 2019-07-15 LAB
ALBUMIN SERPL-MCNC: 3.1 GM/DL (ref 3.2–4.5)
ALP SERPL-CCNC: 196 U/L (ref 40–136)
ALT SERPL-CCNC: 20 U/L (ref 0–55)
BASOPHILS # BLD AUTO: 0 10^3/UL (ref 0–0.1)
BASOPHILS NFR BLD AUTO: 0 % (ref 0–10)
BILIRUB SERPL-MCNC: 0.5 MG/DL (ref 0.1–1)
BUN/CREAT SERPL: 22
CALCIUM SERPL-MCNC: 9 MG/DL (ref 8.5–10.1)
CHLORIDE SERPL-SCNC: 107 MMOL/L (ref 98–107)
CO2 SERPL-SCNC: 24 MMOL/L (ref 21–32)
CREAT SERPL-MCNC: 0.96 MG/DL (ref 0.6–1.3)
EOSINOPHIL # BLD AUTO: 0.2 10^3/UL (ref 0–0.3)
EOSINOPHIL NFR BLD AUTO: 3 % (ref 0–10)
ERYTHROCYTE [DISTWIDTH] IN BLOOD BY AUTOMATED COUNT: 15.1 % (ref 10–14.5)
GFR SERPLBLD BASED ON 1.73 SQ M-ARVRAT: 57 ML/MIN
GLUCOSE SERPL-MCNC: 165 MG/DL (ref 70–105)
HCT VFR BLD CALC: 35 % (ref 35–52)
HGB BLD-MCNC: 10.7 G/DL (ref 11.5–16)
LYMPHOCYTES # BLD AUTO: 1 X 10^3 (ref 1–4)
LYMPHOCYTES NFR BLD AUTO: 23 % (ref 12–44)
MANUAL DIFFERENTIAL PERFORMED BLD QL: NO
MCH RBC QN AUTO: 29 PG (ref 25–34)
MCHC RBC AUTO-ENTMCNC: 30 G/DL (ref 32–36)
MCV RBC AUTO: 94 FL (ref 80–99)
MONOCYTES # BLD AUTO: 0.3 X 10^3 (ref 0–1)
MONOCYTES NFR BLD AUTO: 7 % (ref 0–12)
NEUTROPHILS # BLD AUTO: 2.9 X 10^3 (ref 1.8–7.8)
NEUTROPHILS NFR BLD AUTO: 67 % (ref 42–75)
PLATELET # BLD: 143 10^3/UL (ref 130–400)
PMV BLD AUTO: 11.6 FL (ref 7.4–10.4)
POTASSIUM SERPL-SCNC: 5 MMOL/L (ref 3.6–5)
PROT SERPL-MCNC: 6.2 GM/DL (ref 6.4–8.2)
SODIUM SERPL-SCNC: 138 MMOL/L (ref 135–145)
WBC # BLD AUTO: 4.4 10^3/UL (ref 4.3–11)

## 2019-07-15 PROCEDURE — 99213 OFFICE O/P EST LOW 20 MIN: CPT

## 2019-07-15 PROCEDURE — 85025 COMPLETE CBC W/AUTO DIFF WBC: CPT

## 2019-07-15 PROCEDURE — 83615 LACTATE (LD) (LDH) ENZYME: CPT

## 2019-07-15 PROCEDURE — 96365 THER/PROPH/DIAG IV INF INIT: CPT

## 2019-07-15 PROCEDURE — 36415 COLL VENOUS BLD VENIPUNCTURE: CPT

## 2019-07-15 PROCEDURE — 82728 ASSAY OF FERRITIN: CPT

## 2019-07-15 PROCEDURE — 80053 COMPREHEN METABOLIC PANEL: CPT

## 2019-11-07 ENCOUNTER — HOSPITAL ENCOUNTER (OUTPATIENT)
Dept: HOSPITAL 75 - ONC | Age: 74
LOS: 90 days | Discharge: HOME | End: 2020-02-05
Attending: INTERNAL MEDICINE
Payer: MEDICARE

## 2019-11-07 DIAGNOSIS — E11.9: ICD-10-CM

## 2019-11-07 DIAGNOSIS — D64.9: Primary | ICD-10-CM

## 2019-11-07 DIAGNOSIS — I25.10: ICD-10-CM

## 2019-11-07 DIAGNOSIS — I10: ICD-10-CM

## 2019-11-07 PROCEDURE — 36415 COLL VENOUS BLD VENIPUNCTURE: CPT

## 2019-11-07 PROCEDURE — 99213 OFFICE O/P EST LOW 20 MIN: CPT

## 2019-11-07 PROCEDURE — 82728 ASSAY OF FERRITIN: CPT

## 2019-11-07 PROCEDURE — 83615 LACTATE (LD) (LDH) ENZYME: CPT

## 2021-06-04 NOTE — XMS REPORT
[facility]

 Created on: 11/15/2018



Madison Young

External Reference #: [id number]

: 1945

Sex: Female



Demographics







 Address  [address] [place]  [address]

 

 Preferred Language  Unknown

 

 Marital Status  Unknown

 

 Religion Affiliation  Unknown

 

 Race  Unknown

 

 Ethnic Group  Unknown





Author







 Author  SHAZIA MANUEL

 

 [facility]

 

 Address  [address] [place]  [address]



 

 Phone  [phone]







Care Team Providers







 Care Team Member Name  Role  Phone

 

 SHAZIA MANUEL  Unavailable  [phone]







PROBLEMS







 Type  Condition  ICD9-CM Code  AQN53-AD Code  Onset Dates  Condition Status  
SNOMED Code

 

 Problem  Morbid (severe) obesity due to excess calories     E66.01     Active  
765963368

 

 Problem  Non-adherence to medical treatment     Z91.19     Active  070991519

 

 Problem  Recurrent major depressive disorder, in partial remission     F33.41 
    Active  46751082

 

 Problem  Type 2 diabetes mellitus with hyperglycemia     E11.65     Active  
92880161

 

 Problem  Chronic kidney disease (CKD) stage G3a/A3, moderately decreased 
glomerular filtration rate (GFR) between 45-59 mL/min/1.73 square meter and 
albuminuria creatinine ratio greater than 300 mg/g     N18.3     Active  
[id number]

 

 Problem  Long term current use of insulin     Z79.4     Active  633880670

 

 Problem  Peripheral edema     R60.9     Active  446992231

 

 Problem  Iron deficiency anemia     D50.9     Active  81478911

 

 Problem  Other iron deficiency anemia     D50.8     Active  52864487

 

 Problem  Body mass index (BMI) of 50-59.9 in adult     Z68.43     Active  
965752758

 

 Problem  Immune thrombocytopenic purpura     D69.3     Active  837020290

 

 Problem  Coronary artery disease     I25.10     Active  41617929

 

 Problem  Microalbuminuric diabetic nephropathy     E11.21     Active  311373586

 

 Problem  Type 2 diabetes mellitus with diabetic polyneuropathy     E11.42     
Active  22514560

 

 Problem  Hyperlipidemia, unspecified hyperlipidemia     E78.5     Active  
43856739

 

 Problem  Asthma     J45.909     Active  377740922

 

 Problem  GERD (gastroesophageal reflux disease)     K21.9     Active  890856741

 

 Problem  Hypertension     I10     Active  37211749

 

 Problem  Avascular necrosis of bone of right hip     M87.051     Active  
375688396

 

 Problem  Anxiety disorder, unspecified     F41.9     Active  593662972







ALLERGIES

No Information



ENCOUNTERS







 Encounter  Location  Date  Diagnosis

 

 [place] [address] [address]  14 2018  Avascular necrosis of bone of right hip M87.051

 

 [place] [address] [place] [address]-
8381    Hypertension I10 and Avascular necrosis of bone of right 
hip M87.051

 

 [place] [address] [place] [address]-
8446    Type 2 diabetes mellitus with diabetic polyneuropathy 
E11.42 ; Hypertension I10 ; Hyperlipidemia, unspecified hyperlipidemia E78.5 ; 
Coronary artery disease I25.10 ; GERD (gastroesophageal reflux disease) K21.9 
and Avascular necrosis of bone of right hip M87.051

 

 Via [facility]  [address] [place], [place] 
[id number]  30 Oct, 2018  Type 2 diabetes mellitus with hyperglycemia E11.65 and 
Long term current use of insulin Z79.4

 

 [place] [address] [place] [address]-
[phone]  25 Oct, 2018   

 

 [place] [address] [place] [address]-
7317  25 Oct, 2018   

 

 [facility]  [address] [place] 
[id number]  24 Oct, 2018  Type 2 diabetes mellitus with diabetic polyneuropathy 
E11.42

 

 [place] [address] [address]  23 Oct, 2018  Type 2 diabetes mellitus with diabetic polyneuropathy E11.42

 

 [place] [address] [place] [id number] [address]
45[address]  22 Oct, 2018   

 

 [place] [address] [place] [address]-
9307  09 Oct, 2018   

 

 [address] [employer]  [address] [place], [place] 
[id number]  02 Oct, 2018  Avascular necrosis of bone of right hip M87.051 ; Type 
2 diabetes mellitus with diabetic polyneuropathy E11.42 ; Other iron deficiency 
anemia D50.8 ; Morbid (severe) obesity due to excess calories E66.01 ; 
Peripheral edema R60.9 ; Hyperkalemia E87.5 and Chronic kidney disease (CKD) 
stage G3a/A3, moderately decreased glomerular filtration rate (GFR) between 45-
59 mL/min/1.73 square meter and albuminuria creatinine ratio greater than 300 mg
/g N18.3

 

 [place] [address] [place] [id number] [place] [phone]-
9703  14 Sep, 2018  Coronary artery disease I25.10 ; Congestive heart failure, 
unspecified HF chronicity, unspecified heart failure type I50.9 ; Peripheral 
edema R60.9 ; Avascular necrosis of bone of right hip M87.051 ; Morbid (severe) 
obesity due to excess calories E66.01 and Body mass index (BMI) of 50-59.9 in 
adult Z68.43

 

 [place] [address] [place] [id number] [place] [address]-
[phone]  10 Sep, 2018   

 

 [place] [address] [address]-
1716  06 Sep, 2018  Type 2 diabetes mellitus with diabetic polyneuropathy 
E11.42 ; Coronary artery disease I25.10 ; Hypertension I10 ; Long-term insulin 
use Z79.4 ; Body mass index (BMI) of 45.0-49.9 in adult Z68.42 ; Peripheral 
edema R60.9 and Avascular necrosis of bone of right hip M87.051

 

 [place] [address] [place] [id number] [place] [phone]-
[phone]  17 Aug, 2018  Peripheral edema R60.9

 

 [place] [address] [place] [id number] [place] [address]-
[phone]  13 Aug, 2018  Hypertension I10 and Peripheral edema R60.9

 

 [place] [address] [phone]-
[phone]  09 Aug, 2018   

 

 [place] [address] [place] [id number] [place] [phone]-
[phone]  09 Aug, 2018  Hypertension I10 and Peripheral edema R60.9

 

 [place] [address] [address]-
8173  06 Aug, 2018  Hypertension I10 and Peripheral edema R60.9

 

 [facility]  [address] [place] [phone]-
[phone]  02 Aug, 2018   

 

 [facility]  [id number] [place] [address]-
5486  02 Aug, 2018  Hypertension I10 and Peripheral edema R60.9

 

 [facility]  [id number] [place] [address]-
[phone]  01 Aug, 2018   

 

 [facility]  [id number] [place] [address]-
[phone]     

 

 [facility]  [id number] [place] [address] 88630-
[phone]     

 

 [facility]  [id number] [place] [address] 57964-
[phone]     

 

 [facility]  [id number] [place] [address]35-
6201    Diabetes mellitus due to underlying condition with foot 
ulcer E08.621 ; Non-pressure chronic ulcer of other part of left foot limited 
to breakdown of skin L97.521 and BMI 45.0-49.9, adult Z68.42

 

 [place] [address] [place] [address]-
2153    Acute idiopathic gout involving toe of left foot M10.072

 

 [place] [address] [place] [address] 6[address]85-
3741     

 

 [facility]  [id number] [place] [address]-
[phone]     

 

 [facility]  [address] [place] [id number] [place] [address] 95637-
[phone]     

 

 [facility]  [id number] [place] [address]-
1650    Type 2 diabetes mellitus with diabetic polyneuropathy 
E11.42 ; Hypertension I10 ; Hyperlipidemia, unspecified hyperlipidemia E78.5 ; 
Body mass index (BMI) of 45.0-49.9 in adult Z68.42 ; Long-term insulin use 
Z79.4 ; Non-adherence to medical treatment Z91.19 ; Coronary artery disease 
I25.10 ; GERD (gastroesophageal reflux disease) K21.9 ; Asthma J45.909 and 
Recurrent major depressive disorder, in partial remission F33.41

 

 [place] [address] [place] [id number] [place] [address]-
7899  22 May, 2018  Recurrent major depressive disorder, in partial remission 
F33.41 and Hypertension I10

 

 [place] [address] [address]-
6798  21 May, 2018  Immune thrombocytopenic purpura D69.3 and Iron deficiency 
anemia D50.9

 

 [place] [address] [place] [id number] [place] [address]-
2281  21 May, 2018  Type 2 diabetes mellitus with diabetic polyneuropathy 
E11.42 ; Long-term insulin use Z79.4 ; Chronic kidney disease (CKD) stage G3a/A3
, moderately decreased glomerular filtration rate (GFR) between 45-59 mL/min/
1.73 square meter and albuminuria creatinine ratio greater than 300 mg/g N18.3 
; Hypertension I10 ; Hyperlipidemia, unspecified hyperlipidemia E78.5 ; 
Coronary artery disease I25.10 ; GERD (gastroesophageal reflux disease) K21.9 ; 
Immune thrombocytopenic purpura D69.3 ; Asthma J45.909 ; Morbid (severe) 
obesity due to excess calories E66.01 and Recurrent major depressive disorder, 
in partial remission F33.41

 

 [place] [address] [place] [address]-
9376  11 May, 2018  Chronic kidney disease (CKD) stage G3a/A3, moderately 
decreased glomerular filtration rate (GFR) between 45-59 mL/min/1.73 square 
meter and albuminuria creatinine ratio greater than 300 mg/g N18.3

 

 [place] [address] [place] [address]-
9452  02 May, 2018  Chronic kidney disease (CKD) stage G3a/A3, moderately 
decreased glomerular filtration rate (GFR) between 45-59 mL/min/1.73 square 
meter and albuminuria creatinine ratio greater than 300 mg/g N18.3

 

 [place] [address] [place] [address] 61793-
5070     

 

 [place] [address] [place] [id number] [place] [address] 03295-
9226  28 Mar, 2018   

 

 [place] [address] [place] [id number] [place] [address] 9[address]-
0823  26 Mar, 2018  Immune thrombocytopenic purpura D69.3 ; Type 2 diabetes 
mellitus with diabetic polyneuropathy E11.42 ; Avascular necrosis of bone of 
right hip M87.051 ; Long-term insulin use Z79.4 ; GERD (gastroesophageal reflux 
disease) K21.9 ; Hyperlipidemia, unspecified hyperlipidemia E78.5 ; 
Hypertension I10 ; Recurrent major depressive disorder, in partial remission 
F33.41 ; Microalbuminuric diabetic nephropathy E11.21 ; Body mass index (BMI) 
of 45.0-49.9 in adult Z68.42 ; BMI 45.0-49.9, adult Z68.42 and Chronic kidney 
disease (CKD) stage G3a/A3, moderately decreased glomerular filtration rate (GFR
) between 45-59 mL/min/1.73 square meter and albuminuria creatinine ratio 
greater than 300 mg/g N18.3

 

 [place] [address] [place] [id number] [place] [address] 56397-
2997  23 Mar, 2018   

 

 [place] [address] [place] [id number] [place] [address] 26857-
8328  23 Mar, 2018   

 

 [place] [address] [place] [id number] [place] [address] 87234-
3157  19 Mar, 2018   

 

 [place] [address] [place] [id number] [place] [address] 39298-
0117  14 Mar, 2018   

 

 [place] [address] [place] [id number] [place] [address] 6[address]-
8740  13 Mar, 2018  Type 2 diabetes mellitus with diabetic polyneuropathy 
E11.42 ; Asthma J45.909 and Hypertension I10

 

 [facility]  [address] [place], [place] 
[id number]  13 Mar, 2018  Skin ulcer, limited to breakdown of skin L98.491 ; 
Immune thrombocytopenic purpura D69.3 ; Avascular necrosis of bone of right hip 
M87.051 and Type 2 diabetes mellitus with diabetic polyneuropathy E11.42

 

 [facility]  [id number] [place] [address] 47809-
9881  06 Mar, 2018  Asthma J45.909 and Type 2 diabetes mellitus with diabetic 
polyneuropathy E11.42

 

 [place] [address] [place] [id number] [place] [address] 80044-
2656  01 Mar, 2018   

 

 [address] [facility]  [address]   [place] 
[id number]    Other iron deficiency anemia D50.8 ; Weakness R53.1 ; 
Type 2 diabetes mellitus with diabetic polyneuropathy E11.42 ; Cellulitis of 
trunk, unspecified site of trunk L03.319 ; Coronary artery disease I25.10 ; 
Avascular necrosis of bone of right hip M87.051 and Morbid (severe) obesity due 
to excess calories E66.01

 

 [facility]  [id number] [place] [address] 
[id number]     

 

 [place] [address] [place] [id number] [place] [address] 10790-
[phone]     

 

 [facility]  [facility] [address] [place] [id number] [place] [phone]  15 Feb, 2018  Yeast infection of the skin B37.2

 

 [place] [address] [place] [id number] [place] [address] 99466-
4942  15 Feb, 2018   

 

 [facility]  [id number] [place] [address] 56077-
[phone]  15 Feb, 2018   

 

 [facility]  [address] [phone]-
[phone]     

 

 [facility] IN CARE  [id number] [place] [phone]
[phone]     

 

 [place] [address] [address]-
1719    Type 2 diabetes mellitus with diabetic polyneuropathy 
E11.42 ; Avascular necrosis of bone of right hip M87.051 ; Hyperlipidemia, 
unspecified hyperlipidemia E78.5 ; Hypertension I10 ; Anxiety disorder, 
unspecified F41.9 ; Immune thrombocytopenic purpura D69.3 ; Coronary artery 
disease I25.10 ; Orthopnea R06.01 ; Acute bronchitis, unspecified organism 
J20.9 ; Wound of left lower extremity, initial encounter S81.802A ; Body aches 
R52 and Debilitated R53.81

 

 [place] [id number] [place] [address] 16675-
8550     

 

 [place] [id number] [place] [address] 25399-
0497    Type 2 diabetes mellitus with diabetic polyneuropathy 
E11.42 ; Encounter for immunization Z23 ; Hyperlipidemia, unspecified 
hyperlipidemia E78.5 ; Hypertension I10 ; Anxiety disorder, unspecified F41.9 ; 
Asthma J45.909 ; Body mass index (BMI) of 45.0-49.9 in adult Z68.42 and Morbid (
severe) obesity due to excess calories E66.01

 

 [place] [id number] [place] [address] 09904-
8093    Type 2 diabetes mellitus with diabetic polyneuropathy 
E11.42 ; Hyperlipidemia, unspecified hyperlipidemia E78.5 ; Hypertension I10 
and GERD (gastroesophageal reflux disease) K21.9

 

 [place] [id number] [place] [address] 40251-
7608  22 Mar, 2017  Type 2 diabetes mellitus with diabetic polyneuropathy E11.42

 

 [place] [address] [place] [id number] [place] [address] 27959-
9898    Type 2 diabetes mellitus with diabetic polyneuropathy 
E11.42 ; Coronary artery disease I25.10 ; History of MI (myocardial infarction) 
I25.2 ; Immune thrombocytopenic purpura D69.3 ; GERD (gastroesophageal reflux 
disease) K21.9 ; Hyperlipidemia, unspecified hyperlipidemia E78.5 ; 
Hypertension I10 ; History of kidney stones Z87.442 and Anxiety disorder, 
unspecified F41.9

 

 [place] [address] [place] [id number] [place] [address] 80166-
5637  14 2017   

 

 [place] [address] [place] [id number] [place] [address] 77274-
8510  29 Sep, 2016  Coronary artery disease I25.10 ; History of MI (myocardial 
infarction) I25.2 ; History of kidney stones Z87.442 ; Type 2 diabetes mellitus 
with diabetic polyneuropathy E11.42 ; Avascular necrosis of bone of right hip 
M87.051 ; Hyperlipidemia, unspecified hyperlipidemia E78.5 ; Hypertension I10 ; 
Asthma J45.909 and Encounter for immunization Z23

 

 [place] [address] [address]-
5589  20 Sep, 2016   

 

 [place] [address] [address]-
5492    Coronary artery disease I25.10 ; Type 2 diabetes mellitus 
with diabetic polyneuropathy E11.42 ; History of MI (myocardial infarction) 
I25.2 ; Immune thrombocytopenic purpura D69.3 ; Hyperlipidemia, unspecified 
hyperlipidemia E78.5 and Hypertension I10

 

 [place] [address] [place] [id number] [place] [address] 87820-
1179    Coronary artery disease I25.10 ; Lymphedema I89.0 ; History 
of MI (myocardial infarction) I25.2 ; History of kidney stones Z87.442 ; Immune 
thrombocytopenic purpura D69.3 ; Type 2 diabetes mellitus with diabetic 
polyneuropathy E11.42 ; Avascular necrosis of bone of right hip M87.051 ; GERD (
gastroesophageal reflux disease) K21.9 ; Hyperlipidemia, unspecified 
hyperlipidemia E78.5 ; Hypertension I10 and Asthma J45.909

 

 [place] [address] [place] [id number] [place] [address] 48958-
[phone]     

 

 [place] [address] [address]-
[phone]     

 

 [place] [address] [phone]
[phone]  02 Dec, 2015   

 

 [place] [address] [place] [id number] [address]-
9662  02 Dec, 2015  Hyperlipidemia, unspecified hyperlipidemia E78.5

 

 [place] [address] [place] [id number] [address]-
0619     

 

 [place] [address] [place] [id number] [address]-
0798     

 

 [place] [address] [place] [id number] [address]-
1646    Allergic rhinitis J30.9

 

 [place] [address] [place] [id number] [address]-
5180    Type 2 diabetes mellitus with diabetic polyneuropathy 
E11.42 ; Routine adult health maintenance Z00.00 ; Coronary artery disease 
I25.10 ; History of MI (myocardial infarction) I25.2 ; History of kidney stones 
Z87.442 ; Immune thrombocytopenic purpura D69.3 ; Avascular necrosis of bone of 
right hip M87.051 and GERD (gastroesophageal reflux disease) K21.9







IMMUNIZATIONS

No Known Immunizations



SOCIAL HISTORY

Never Assessed



REASON FOR VISIT

medication change



PLAN OF CARE





VITAL SIGNS





MEDICATIONS







 Medication  Instructions  Dosage  Frequency  Start Date  End Date  Duration  
Status

 

 Naproxen Sodium 500 mg  Orally 2 times a day  1 tablet with food or milk as 
needed  12h  02 Oct, 2018     30 days  Active







RESULTS

No Results



PROCEDURES

No Known procedures



INSTRUCTIONS





MEDICATIONS ADMINISTERED

No Known Medications



MEDICAL (GENERAL) HISTORY







 Type  Description  Date

 

 Medical History  Type II Diabetes   

 

 Medical History  CAD   

 

 Medical History  Acute MI x1   

 

 Medical History  Heart Cath x3   

 

 Medical History  Kidney Stones   

 

 Medical History  Lymphedema   

 

 Medical History  Immune thrombocytopenic purpura   

 

 Surgical History  Heart Stents x2   

 

 Surgical History  Cholecystectomy   

 

 Surgical History     

 

 Surgical History  [place] [facility]   

 

 Hospitalization History  [facility]  

 

 Hospitalization History  Sepsis/Toxic Shock  2015

 

 Hospitalization History  [facility]-flu[facility]neumonia  2017

 

 Hospitalization History  [facility]- Anemia, cellulitis pannus, yeast 
infection. Discharged 2018 Yes

## 2022-07-07 NOTE — XMS REPORT
[facility]

 Created on: 2017



OlafMadison smith

External Reference #: [id number]

: 1945

Sex: Female



Demographics







 Address  [address] [place]  [address]

 

 Preferred Language  Unknown

 

 Marital Status  Unknown

 

 Temple Affiliation  Unknown

 

 Race  Unknown

 

 Ethnic Group  Unknown





Author







 Author  TANI  LEANDER

 

 Organization  [facility]

 

 Address  [address] [place]  [address]



 

 Phone  [phone]







Care Team Providers







 Care Team Member Name  Role  Phone

 

 TANI  LEANDER  Unavailable  [phone]







PROBLEMS







 Type  Condition  ICD9-CM Code  MMW23-KA Code  Onset Dates  Condition Status  
SNOMED Code

 

 Problem  GERD (gastroesophageal reflux disease)     K21.9     Active  193620660

 

 Problem  History of kidney stones     Z87.442     Active  435181101

 

 Problem  Avascular necrosis of bone of right hip     M87.051     Active  
604980245

 

 Problem  Type 2 diabetes mellitus with diabetic polyneuropathy     E11.42     
Active  36049270

 

 Problem  Immune thrombocytopenic purpura     D69.3     Active  738293121

 

 Problem  Lymphedema     I89.0     Active  898826719

 

 Problem  Coronary artery disease     I25.10     Active  35283625

 

 Problem  Anxiety disorder, unspecified     F41.9     Active  955993159

 

 Problem  Major depressive disorder, single episode, unspecified     F32.9     
Active  30350112

 

 Problem  Hyperlipidemia, unspecified hyperlipidemia     E78.5     Active  
73065429

 

 Problem  History of MI (myocardial infarction)     I25.2     Active  310249719

 

 Problem  Hypertension     I10     Active  49058364

 

 Problem  Asthma     J45.909     Active  990582042







ALLERGIES

No Information



SOCIAL HISTORY

Never Assessed



PLAN OF CARE





VITAL SIGNS





MEDICATIONS

Unknown Medications



RESULTS

No Results



PROCEDURES

No Known procedures



IMMUNIZATIONS

No Known Immunizations



MEDICAL (GENERAL) HISTORY







 Type  Description  Date

 

 Medical History  Type II Diabetes   

 

 Medical History  CAD   

 

 Medical History  Acute MI x1   

 

 Medical History  Heart Cath x3   

 

 Medical History  Kidney Stones   

 

 Medical History  Lymphedema   

 

 Medical History  Immune thrombocytopenic purpura   

 

 Surgical History  Heart Stents x2   

 

 Surgical History  Cholecystectomy   

 

 Surgical History     

 

 Surgical History  [place] [facility]   

 

 Hospitalization History  IPT  2014

 

 Hospitalization History  Sepsis/Toxic Shock  2015

 

 Hospitalization History  VC-flu/pneumonia  2017
[facility]

 Created on: 2015



Madison Young

External Reference #: [id number]

: 1945

Sex: Female



Demographics







 Address  [address] [place]  [address]

 

 Home Phone  [phone]

 

 Preferred Language  Unknown

 

 Marital Status  Unknown

 

 Alevism Affiliation  Unknown

 

 Race  White

 

 Ethnic Group  Not  or 





Author







 Author  RUPAL GARCIA

 

 [facility]  [employer]

 

 Address  Unknown

 

 Phone  Unavailable







Care Team Providers







 Care Team Member Name  Role  Phone

 

 RUPAL GARCIA    Unavailable



                                                                



Allergies

          No Known Allergies                                                   
                                     



Problems

          





 Problem Type  Condition  Code  Onset Dates  Condition Status

 

 Problem  GERD (gastroesophageal reflux disease)  K21.9     Active

 

 Problem  History of MI (myocardial infarction)  I25.2     Active

 

 Problem  History of kidney stones  Z87.442     Active

 

 Problem  Coronary artery disease  I25.10     Active

 

 Problem  Type 2 diabetes mellitus with diabetic polyneuropathy  E11.42     
Active

 

 Problem  Avascular necrosis of bone of right hip  M87.051     Active

 

 Problem  Lymphedema  I89.0     Active

 

 Problem  Immune thrombocytopenic purpura  D69.3     Active



                                                                               
                                                                               



Medications

          





 Medication  Code System  Code  Instructions  Start Date  End Date  Status  
Dosage

 

 Advair Diskus  NDC  [address]-58  100-50 MCG/DOSE Inhalation Twice a day     
      1 puff



                                                                              



Results

          No Known Results                                                     
               



Summary Purpose

          eClinicalWorks Submission
[facility] Clinical Summary

 Created on: 2015



Madison Young

External Reference #: [id number]

: 1945

Sex: Female



Demographics







 Address  [address] [place] [address]

 

 Home Phone  [phone]

 

 Preferred Language  Unknown

 

 Marital Status  W

 

 Bahai Affiliation  Unknown

 

 Race  White

 

 Ethnic Group  Not  or 





Author







 Author  User, MELISSA

 

 Organization  [facility]

 

 Address  Unknown

 

 Phone  Unavailable







Allergies, Adverse Reactions, Alerts







 Allergy Name  Reaction Description  Start Date  Severity  Status  Provider

 

 ZINC OXIDE       Critical  Active  Kavitha Godoy







Conditions or Problems







 Problem Name  Problem Code  Onset Date  Status  Entry Date  Provider  Comment  
Standard Description  Annotate

 

 DIABETES MELLITUS, TYPE II, UNCONTROLLED, W/O COMPLICATIONS  250.02     
Correction    Kavitha Godoy     Diabetes mellitus without 
mention of complication, type II or unspecified type, uncontrolled   

 

 DIABETES MELLITUS, TYPE I, UNCONTROLLED, WITH COMPLICATIONS  250.93     
Correction    Kavitha Godoy     Diabetes mellitus with 
unspecified complication, type I [juvenile type], uncontrolled   

 

 DIABETES MELLITUS, TYPE II, UNCONTROLLED, WITH COMPLICATIONS  250.92    Active    Kavitha Godoy     Diabetes mellitus with 
unspecified complication, type II or unspecified type, uncontrolled   

 

 OBESITY  278.00     Resolved    Kavitha Godoy     Obesity, 
unspecified   

 

 ELEVATED BLOOD PRESSURE WITHOUT DIAGNOSIS OF HYPERTENSION  796.2    
Correction    Kavitha Godoy     Elevated blood pressure 
reading without diagnosis of hypertension   

 

 ARTHRITIS, RHEUMATOID  714.0     Resolved    Kavitha Godoy   
  Rheumatoid arthritis   

 

 LIVER FUNCTION TESTS, ABNORMAL, HX OF  V12.2     Resolved    Kavitha Godoy     Personal history of endocrine, metabolic, and immunity 
disorders   

 

 CERUMEN IMPACTION, BILATERAL  380.4    Resolved    Kavitha Godoy     Impacted cerumen   

 

 CERUMEN IMPACTION, BILATERAL  380.4    Resolved    Kavitha Godoy     Impacted cerumen   

 

 DECREASED HEARING  389.10    Resolved    Kavitha Godoy     Sensorineural hearing loss, unspecified  due to cerumen presumed

 

 History of ANEMIA  285.9     Correction    Kavitha Godoy     
Anemia, unspecified   

 

 POLYARTHRALGIA  719.49     Active    Kavitha Godoy     Pain 
in joint involving multiple sites   

 

 NEPHROPATHY, DIABETIC  250.40    Resolved    Kavitha Godoy     Diabetes mellitus with renal manifestations, type II or unspecified 
type, not stated as uncontrolled   

 

 HYPERCHOLESTEROLEMIA  272.0    Active    Kavitha Godoy     Pure hypercholesterolemia   

 

 HYPERTRIGLYCERIDEMIA  272.1    Active    Kavitha Godoy     Pure hyperglyceridemia   

 

 HYPERTENSION, BENIGN ESSENTIAL, UNCONTROLLED  401.1    Active    Kavitha Godoy     Benign essential hypertension   

 

 PHARYNGITIS, ACUTE  462    Resolved    Kavitha Godoy     Acute pharyngitis   

 

 DYSPHAGIA  787.2    Resolved    Kavitha Godoy     
Dysphagia   

 

 INSOMNIA, TRANSIENT  307.41    Resolved    Kavitha Godoy     Transient disorder of initiating or maintaining sleep   

 

 CELLULITIS  682.9    Resolved    Kavitha Godoy     
Cellulitis and abscess of unspecified sites  foot

 

 CELLULITIS  682.9  2004/10/28  Resolved  2004/10/28  Kavitha Godoy     
Cellulitis and abscess of unspecified sites   

 

 DERMATOPHYTOSIS, FOOT  110.4    Resolved    Kavitha Godoy     Dermatophytosis of foot   

 

 DERMATITIS  692.9    Resolved    Kavitha Godoy     
Contact dermatitis and other eczema, unspecified cause   

 

 KNEE PAIN  719.46    Resolved    Kavitha Godoy     
Pain in joint involving lower leg  right

 

 SHOULDER PAIN  719.41    Resolved    Kavitha Godoy 
    Pain in joint involving shoulder region  right

 

 SKIN LESION  709.9    Resolved    Kavitha Godoy     
Unspecified disorder of skin and subcutaneous tissue   

 

 FEVER  780.6    Resolved    Kavitha Godoy     Fever 
and other physiologic disturbances of temperature regulation   

 

 COUGH  786.2    Resolved    Kavitha Godoy     Cough
   

 

 SCREENING MAMMOGRAM NEC  V76.12    Resolved    Kavitha Godoy     Other screening mammogram   

 

 SCREENING FOR OSTEOPOROSIS  V82.81    Resolved    Kavitha Godoy     Screening for osteoporosis   

 

 MICROALBUMINURIA  791.0    Active    Kavitha Godoy 
    Proteinuria   

 

 HX, PERSONAL, PAST NONCOMPLIANCE  V15.81    Resolved    
Kavitha Godoy     Personal history of noncompliance with medical 
treatment, presenting hazards to health   

 

 BACK PAIN  724.5    Resolved    Kavitha Godoy     
Backache, unspecified   

 

 NEOPLASM, SKIN, UNCERTAIN BEHAVIOR  238.2    Resolved    
Kavitha Godoy     Neoplasm of uncertain behavior of skin  right upper lip

 

 SCIATICA/HERNIATED DISC  722.10  2006/10/11  Resolved    Kavitha Godoy     Displacement of lumbar intervertebral disc without 
myelopathy   

 

 IMPETIGO  684    Resolved    Kavitha Godoy     
Impetigo  right nare

 

 LYMPHEDEMA NEC  457.1    Active    Kavitha Godoy   
  Other lymphedema   

 

 CELLULITIS  682.9    Resolved    Kavitha Godoy     
Cellulitis and abscess of unspecified sites   

 

 OSTEOMYELITIS NOS, ANKLE/FOOT  730.27  2007/10/09  Resolved    Kavitha Godoy     Unspecified osteomyelitis involving ankle and foot   

 

 URTICARIA, ACUTE  708.9  2007/10/29  Resolved    Kavitha Godoy     Unspecified urticaria   

 

 RENAL CALCULUS  592.0  2009/07/10  Active  2009/07/10  Kavitha Godoy   
  Calculus of kidney   

 

 DEHYDRATION  276.51  2009/07/10  Resolved    Kavitha Godoy   
  Dehydration   

 

 CHOLELITHIASIS, ASYMPTOMATIC  574.20    Resolved    Kavitha Godoy     Calculus of gallbladder without mention of cholecystitis, 
without mention of obstruction   

 

 PNEUMONIA  486    Resolved    Kavitha Godoy     
Pneumonia, organism unspecified   

 

 RESTLESS LEG SYNDROME  333.94    Resolved    Kavitha Godoy     Restless legs syndrome (RLS)   

 

 FREQUENCY, URINARY  788.41    Active    Kavitha Godoy     Urinary frequency   

 

 FEVER PRESENTING CONDITIONS CLASSIFIED ELSEWHERE  780.61    Resolved
    Kavitha Godoy     Fever presenting with conditions 
classified elsewhere   

 

 PVD  443.9    Resolved    Kavitha Godoy     
Peripheral vascular disease, unspecified   

 

 EDEMA LEG  782.3    Resolved    Kavitha Godoy     
Edema   

 

 LEG PAIN, LEFT  729.5    Resolved    Kavitha Godoy 
    Pain in limb   

 

 URINARY FREQUENCY  788.41    Resolved    Kavitha Godoy     Urinary frequency   

 

 VACCINE AGAINST STREPTOCOCCUS PNEUMONIAE  V03.82    Resolved    Kavitha oGdoy     Need for prophylactic vaccination against 
Streptococcus pneumoniae [pneumococcus]   

 

 SPINAL STENOSIS, LUMBAR  724.02    Resolved    Kavitha Godoy     Spinal stenosis of lumbar region without neurogenic 
claudication   

 

 KNEE PAIN  719.46    Resolved    Kavitha Godoy     
Pain in joint involving lower leg   

 

 CORONARY ATHEROSCLEROSIS, NATIVE VESSEL  414.01    Active    Kavitha Godoy     Coronary atherosclerosis of native coronary artery
   

 

 ANEMIA, IRON DEFICIENCY NEC  280.8    Active    Kavitha Godoy     Other specified iron deficiency anemias   

 

 NAUSEA AND VOMITING  787.01    Resolved    Kavitha Godoy     Nausea with vomiting   

 

 UTI  599.0    Resolved    Kavitha Godoy     Urinary 
tract infection, site not specified   

 

 GROSS HEMATURIA  599.71    Resolved    Kavitha Godoy     Gross hematuria   

 

 CELLULITIS  682.9    Resolved    Kavitha Godoy     
Cellulitis and abscess of unspecified sites   

 

 IMMUNE THROMBOCYTOPENIC PURPURA  287.31  2014/10/15  Active  2014/10/16  Kavitha Godoy     Immune thrombocytopenic purpura   

 

 PVD  443.9    Active    Kavitha Godoy     
Peripheral vascular disease, unspecified   







Medication List







 Medication  Instructions  Start Date  Stop Date  Generic Name  NDC  Status  
Provider  Patient Instruction

 

 ADVAIR DISKUS 100-50 MCG/DOSE MISC  1 puff daily prn        FLUTICASONE-
SALMETEROL  97306267626  Active  Kavitha Godoy   

 

 ALEVE 220 MG TAB  1 PO BID     2015/08/10  NAPROXEN SODIUM  25258893488  No 
Longer Active  Kavitha Godoy   

 

 NORVASC 10 MG TAB  1 PO QD     2015/08/10  AMLODIPINE BESYLATE  13637982204  
No Longer Active  Kavitha Godoy   

 

 LANTUS SOLOSTAR 100 UNIT/ML SOLN  50 units SQ at night    2015/08/10
  INSULIN GLARGINE  43101124332  No Longer Active  Kavitha Godoy   

 

 ONETOUCH ULTRA BLUE STRP  TEST BS QID DX: 250.92  2015/06/15     GLUCOSE BLOOD
  39911784205  Active  Bernadine Gomez   

 

 POTASSIUM CITRATE ER 15 MEQ (1620 MG) CR-TABS  1 PO BID        POTASSIUM 
CITRATE  93919697837  Active  Kavitha Godoy   

 

 HYDROCODONE-ACETAMINOPHEN 5-325 MG TABS  1 PO BID prn    
HYDROCODONE-ACETAMINOPHEN  69860766506  No Longer Active  Kavitha Godoy 
  

 

 PEN NEEDLES 5/16" 31G X 8 MM MISC  as directed    
INSULIN PEN NEEDLE  63268368625  No Longer Active  Kavitha Godoy   

 

 GLUCOMETER STRIPS  CHECK BS QID    GLUCOMETER STRIPS     
No Longer Active  Kavitha Godoy   

 

 GLUCOMETER MACHINE  AS DIRECTED    GLUCOMETER MACHINE   
  No Longer Active  Kavitha Godoy   

 

 CLEOCIN 150 MG CAPS  1 po daily    CLINDAMYCIN HCL  
51176444645  No Longer Active  Kavitha Godoy   

 

 FERREX 150 150 MG CAPS  1 PO daily as directed  2014/10/06    
POLYSACCHARIDE IRON COMPLEX  03418656777  No Longer Active  Kavitha Kristin Godoy   

 

 PREDNISONE 20 MG TABS  TAKE 5 TABS PO DAILY  2014/10/13    
PREDNISONE  73293179887  No Longer Active  Kavitha Kristin Godoy   

 

 BRILINTA 90 MG TABS  1 PO BID       TICAGRELOR  09447162821  Active  
Kavitha Kristin Godoy   

 

 ASPIRIN 81 MG TAB  1 PO daily       ASPIRIN  18071187397  Active  
Kavitha Kristin Godoy   

 

 COREG 12.5 MG TABS  1 PO BID        CARVEDILOL  85280452157  Active  Kavitha 
Kristin Godoy   

 

 PLAVIX 75 MG TABS  1 PO QD    CLOPIDOGREL BISULFATE  
53282235993  No Longer Active  Kavitha Kristin Godoy   

 

 AUGMENTIN 500-125 MG TAB  1 PO BID    AMOXICILLIN-POT 
CLAVULANATE  34229438371  No Longer Active  Kavitha Kristin Godoy   

 

 ALBUTEROL SULFATE 0.083 % NEBU SOLN  1 treatment TID prn       
ALBUTEROL SULFATE  41273515710  Active  Kavitha Kristin Godoy   

 

 ROBITUSSIN A-C  MG/5ML SYRUP  1 teaspoon PO Q 4-6 hr prn    ROBITUSSIN A-C  MG/5ML SYRUP     No Longer Active  Kavithapatricia Godoy   

 

 PEN NEEDLES 5/16" 31G X 8 MM MISC  as directed       INSULIN PEN 
NEEDLE  86581421852  Active  Bernadine Gomez   

 

 CEFDINIR 300 MG CAPS  1 PO BID    2014/04/15  CEFDINIR  18977952378  
No Longer Active  Kavitha Kristin Godoy   

 

 ZOFRAN ODT 8 MG TBDP  1 PO Q6hrs prn nausea    2014/04/15  
ONDANSETRON  08675732811  No Longer Active  Kavitha Kristin Godoy   

 

 ASPIRIN 81 MG TAB  1 PO daily       ASPIRIN  80315609776  No Longer 
Active  Kavitha Kristin Godoy   

 

 BENAZEPRIL HCL 40 MG TABS  1 PO daily        BENAZEPRIL HCL  27484441054  
Active  Bernadine Gomez   

 

 ALBUTEROL SULFATE 0.083 % NEBU SOLN  1 nebulizer treatment q4 hours prn    ALBUTEROL SULFATE  70905177169  No Longer Active  Kavitha Godoy   

 

 ASCENSIA CONTOUR TEST  STRP  Test BS  3 -4 times a day DX: Diabetes    GLUCOSE BLOOD  66493772462  No Longer Active  Kavitha Godoy   

 

 BENADRYL 25 MG CAP  1 po every 6 hours if it does not cause drowsiness.  2007/
10/29    DIPHENHYDRAMINE HCL  16438010719  No Longer Active  Kavitha Godoy   

 

 PROTONIX 40 MG TBEC  1 po qd       PANTOPRAZOLE SODIUM  86514091320  
Active  Bernadine Gomez   

 

 FISH OIL 1000 MG CAPS  1 PO daily       OMEGA-3 FATTY ACIDS  
24839717895  Active  Kavitha Godoy   

 

 ZETIA 10 MG TABS  1 PO daily for cholesterol    
EZETIMIBE  59567584946  No Longer Active  Kavitha Godoy   

 

 LIPITOR 10 MG TAB  1 PO daily       ATORVASTATIN CALCIUM  
65799909875  Active  Bernadine Gomez   

 

 ZOFRAN ODT 8 MG TBDP  I PO Q6hrs prn    ONDANSETRON  
50628882505  No Longer Active  Kavitha Godoy   

 

 DOCUSATE SODIUM 50 MG/15ML SYRP  3 drops each ear.  Let stand for 15 min. then 
rinse out .  Repeat daily.  2008/09/15    DOCUSATE SODIUM  
96974634182  No Longer Active  Kavitha Godoy   

 

 ASCENSIA CONTOUR MONITOR  KIT  DX: Diabetes    BLOOD 
GLUCOSE MONITORING SUPPL  28481996738  No Longer Active  Kavitha Godoy   

 

 CLEOCIN 150 MG CAPS  1 PO daily    CLINDAMYCIN HCL  
60251267740  No Longer Active  Kavitha Godoy   

 

 BD INSULIN SYRINGE ULTRAFINE 29G X 1/2" 1 ML MISC  as directed for insulin 
injections       INSULIN SYRINGE-NEEDLE U-100  71730865829  Active  
Bernadine Gomez   

 

 BD ULTRA-FINE LANCETS MISC  ad directed to check sugar qid       
LANCETS  56402920446  Active  Bernadine Gomez   

 

 HUMALOG  UNIT/ML SOLN  5 units before meals       INSULIN 
LISPRO (HUMAN)     Active  Kavithapatricia Godoy   

 

 LEVAQUIN 500 MG TAB  1 PO QD  2012/03/15    LEVOFLOXACIN  
73278693378  No Longer Active  Kavitha Godoy   

 

 AUGMENTIN 875-125 MG TAB  1 PO BID    AMOXICILLIN-POT 
CLAVULANATE  72027387883  No Longer Active  Kavithapatricia Godoy   

 

 AUGMENTIN 875-125 MG TAB  1 PO BID x 7 days    
AMOXICILLIN-POT CLAVULANATE  18524134240  No Longer Active  Bernadine Gomez   

 

 LOVENOX 40 MG/0.4ML SOLN  Apply to affected area daily  
  ENOXAPARIN SODIUM  19494790463  No Longer Active  Kavitha Godoy   

 

 FISH OIL 1000 MG CAPS  1 PO daily    OMEGA-3 FATTY ACIDS
  23348614326  No Longer Active  Kavitha Godoy   

 

 ASPIRIN 81 MG TABS  1 PO daily       ASPIRIN  78222477197  No Longer 
Active  Kavitha Godoy   

 

 DOXYCYCLINE HYCLATE 100 MG CAP  1 po BID    DOXYCYCLINE 
HYCLATE  38339299662  No Longer Active  Kavitha Godoy   

 

 PYRIDIUM 200 MG TAB  1 PO TID prn urinary urgency    
PHENAZOPYRIDINE HCL  74722354908  No Longer Active  Bernadine Gomez   

 

 BACTRIM -160 MG TAB  1 PO BID    TRIMETHOPRIM-
SULFAMETHOXAZOLE  79318865573  No Longer Active  Kavitha Godoy   

 

 DOXYCYCLINE HYCLATE 100 MG CAP  1 po BID    DOXYCYCLINE 
HYCLATE  79218669064  No Longer Active  Kavitha Godoy   

 

 BACTRIM -160 MG TAB  1 PO BID    TRIMETHOPRIM-
SULFAMETHOXAZOLE  16408191139  No Longer Active  Kavitha Godoy   

 

 VENTOLIN  (90 BASE) MCG/ACT AERS  2 puffs q6hrs prn       
ALBUTEROL SULFATE  99208408265  Active  Kavitha Kristin Godoy   

 

 CLEOCIN 150 MG CAPS  1 PO daily    CLINDAMYCIN HCL  
99314392469  No Longer Active  Kavitha Kristin Godoy   

 

 VITAMIN D 1000 UNIT TABS  2 PO Daliy    CHOLECALCIFEROL  
70453162907  No Longer Active  Kavitha Kristin Godoy   

 

 SEPTRA -160 MG TABS  1 PO BID    SULFAMETHOXAZOLE-
TRIMETHOPRIM  43544655673  No Longer Active  Kavitha Kristin Godoy   

 

 DOXYCYCLINE HYCLATE 100 MG CAP  1 po BID    DOXYCYCLINE 
HYCLATE  56988359824  No Longer Active  Kavitha Kristin Godoy   

 

 MACROBID 100 MG CAP  1 PO BID  2009/07/10    NITROFURANTOIN MONOHYD 
MACRO  41950218320  No Longer Active  Kavitha Kristin Godoy   

 

 DARVOCET-N 100 100-650 MG TAB  1 PO Q6hrs prn pain    
PROPOXYPHENE N-APAP  81718014157  No Longer Active  Kavitha Kristin Godoy   

 

 PYRIDIUM 200 MG TAB  1 PO TID prn urinary urgency    2009/07/10  
PHENAZOPYRIDINE HCL  02068143866  No Longer Active  Kavitha Kristin Godoy   

 

 MACROBID 100 MG CAP  1 PO BID    NITROFURANTOIN MONOHYD 
MACRO  78666584149  No Longer Active  Kavitha Kristin Godoy   

 

 CLEOCIN 150 MG CAPS  1 PO daily to prevent cellulitis    
CLINDAMYCIN HCL  99407507743  No Longer Active  Kavithapatricia Godoy   

 

 ACCU-CHEK COMPACT STRIPS STRP  Use one strip to check glucose three times 
daily    GLUCOSE BLOOD  53686646720  No Longer Active  
Kavitha Kristin Godoy   

 

 LEVAQUIN 500 MG TAB  1 PO QD  2007/10/23    LEVOFLOXACIN  
92357519788  No Longer Active  Kavitha Kristin Godoy   

 

 GLUCOPHAGE 1000 MG TABS  1 PO BID       METFORMIN HCL  71867549632  
Active  Kavithapatricia Godoy   

 

 BACTROBAN 2 % CREAM  apply to affected area on nose TID for 7 days    MUPIROCIN CALCIUM  06744187118  No Longer Active  Kavithapatricia Godoy   

 

 BENAZEPRIL HCL 20 MG TABS  1 PO daily    BENAZEPRIL HCL  
26190132564  No Longer Active  Kavithapatricia Godoy   

 

 LOTREL 10-40 MG CAPS  1 PO daily       AMLODIPINE BESY-BENAZEPRIL 
HCL  83160867125  No Longer Active  Bernadine Gomez   

 

 FLEXERIL 10 MG TABS  1 by mouth 3 times a day as needed    CYCLOBENZAPRINE HCL  40555703225  No Longer Active  Kavitha Godoy   

 

 LORTAB 5 5-500 MG TAB  1 by mouth every 6 hours as needed for pain    ACETAMINOPHEN-HYDROCODONE  25241205493  No Longer Active  Kavithapatricia Godoy   

 

 HYDROCHLOROTHIAZIDE 12.5 MG CAPS  1 PO QD       HYDROCHLOROTHIAZIDE  
65777558104  Active  Bernadine Gomez   

 

 OMACOR 1 GM CAPS  4 PO daily    OMEGA-3-ACID ETHYL 
ESTERS  30635643916  No Longer Active  Kavitha Godoy   

 

 AUGMENTIN 875-125 MG TABS  1 PO BID    AMOXICILLIN-POT 
CLAVULANATE  45504882436  No Longer Active  Kavithapatricia Godoy   

 

 TYLENOL 325 MG TAB  as directed    ACETAMINOPHEN  
83030503169  No Longer Active  Kavithapatricia Godoy   

 

 DOCUSATE SODIUM 50 MG/15ML SYRP  3 drops each ear.  Let stand for 15 min. then 
rinse out with ball suction.  Repeat daily.    DOCUSATE 
SODIUM  68085979039  No Longer Active  Kavitha Godoy   

 

 BACTROBAN 2 % CREA  apply TID    MUPIROCIN CALCIUM  
75776779248  No Longer Active  Kavithapatricia Godoy   

 

 HYDROCODONE-ACETAMINOPHEN 5-500 MG TABS  1 q4-6hrs prn  
  HYDROCODONE-ACETAMINOPHEN  42710143968  No Longer Active  Kavithapatricia Godoy   

 

 LOTRISONE 1-0.05 % CREA  apply BID to left leg    
CLOTRIMAZOLE-BETAMETHASONE  07973509308  No Longer Active  Kavithapatricia Godoy
   

 

 GLIPIZIDE 5 MG TAB  1 PO 0630 and 1 1/2 pills 1700       GLIPIZIDE  
44948757061  Active  Joycemichelle Lerma   

 

 NORVASC 5 MG TABS  1 po qd    AMLODIPINE BESYLATE  
89552905890  No Longer Active  Kavithapatricia Godoy   

 

 ISAURA-E  1 po qd JOINT PAIN    ISAURA-E     No Longer Active  
Kavithapatricia Godoy   

 

 COLACE 60 MG/15ML SYRP  Use daily for cerumen removal.  
  DOCUSATE SODIUM  59137208776  No Longer Active  Kavithapatricia Godoy   

 

 MULTIVITAMINS TABS  1 po daily    MULTIPLE VITAMIN  
07226528751  No Longer Active  Kavithapatricia Godoy   

 

 VITAMIN C 1000 MG TABS  1 po daily    ASCORBIC ACID  
49458061346  No Longer Active  Kavithapatricia Godoy   

 

 VITAMIN E 400 IU CAPS  2 po daily    VITAMIN E  
60422182920  No Longer Active  Kavithapatricia Godoy   

 

 AMBIEN 10 MG TAB  1 prn    ZOLPIDEM TARTRATE  
02659223515  No Longer Active  Kavithapatricia Godoy   

 

 ECONAZOLE NITRATE 1 % CREA  as directed    ECONAZOLE 
NITRATE  81627093273  No Longer Active  Kavithapatricia Godoy   

 

 KEFLEX 500 MG CAPS  1 qid x 7days    CEPHALEXIN  
21778711463  No Longer Active  Kavithapatricia Godoy   

 

 ECONAZOLE NITRATE 1 % CREA  Apply BID x 7 days  2004/10/18  2004/10/25  
ECONAZOLE NITRATE  43970602294  No Longer Active  Kavitha Kristin Godoy   

 

 KEFLEX 500 MG CAP  1 Po QID for 14 days  2004/10/15  2004/10/22  CEPHALEXIN  
88436542421  No Longer Active  Kavitha Kristin Godoy   

 

 ALEVE 220 MG TABS  1 PO BID       NAPROXEN SODIUM  [id number]  No 
Longer Active  Kavitha Kristin Godoy   

 

 MOTRIN 600 MG TABS  1 po q 6 hrs.       IBUPROFEN  [id number]  No 
Longer Active  Kavitha Kristin Godoy   

 

 NASAL SPRAY SALINE 0.65 % SOLN  puffs 2 puffs BID    
SALINE  [id number]  No Longer Active  Kavitha Kristin Godoy   

 

 AMOXIL 875 MG TABS  1 PO BID    AMOXICILLIN  [id number]
  No Longer Active  Kavitha Kristin Godoy   

 

 LOTENSIN 20 MG TABS  1 PO QD       BENAZEPRIL HCL  [id number]  No 
Longer Active  Kavitha Kristin Godoy   

 

 AMARYL 4 MG TABS  1 tab po bid    GLIMEPIRIDE  
[id number]  No Longer Active  Kavitha Kristin Godoy   

 

 GLUCOPHAGE  MG TB24  1 po daily    METFORMIN HCL  
[id number]  No Longer Active  Kavitha Kristin Godoy   







Immunizations







 Vaccine  Administration Date  Value  Standard Description

 

 Influenza vaccine given    Done  influenza virus vaccine, 
unspecified formulation

 

 pneumococcal immunization administered    1st dose  pneumococcal 
polysaccharide vaccine, 23 valent

 

 Influenza vaccine given    Done 10.24.  influenza virus vaccine, 
unspecified formulation







Vital Signs







 Date  Name  Value  Unit  Range  Description

 

   blood pressure, diastolic - 8462-4  82  mm[Hg]     BP lara

 

   blood pressure, systolic - 8480-6  156  mm[Hg]     BP sys

 

   pulse rate E&M - 8867-4  88  /min     Heart rate

 

   respiratory rate E&M - 9279-1  14  /min     Resp rate

 

   temperature E&M  98.6  [degF]     Body temperature

 

   weight E&M - 3141-9  295  [lb_av]     Weight Measured

 

   pulse rate E&M - 8867-4  68  /min     Heart rate

 

   respiratory rate E&M - 9279-1  14  /min     Resp rate

 

   weight E&M - 3141-9  310  [lb_av]     Weight Measured

 

   blood pressure, diastolic - 8462-4  80  mm[Hg]     BP lara

 

   blood pressure, systolic - 8480-6  145  mm[Hg]     BP sys

 

   pulse rate E&M - 8867-4  80  /min     Heart rate

 

   respiratory rate E&M - 9279-1  14  /min     Resp rate

 

   temperature E&M  98.6  [degF]     Body temperature

 

   weight E&M - 3141-9  310  [lb_av]     Weight Measured

 

   blood pressure, diastolic - 8462-4  82  mm[Hg]     BP lara

 

   blood pressure, systolic - 8480-6  156  mm[Hg]     BP sys

 

   pulse rate E&M - 8867-4  76  /min     Heart rate

 

   respiratory rate E&M - 9279-1  14  /min     Resp rate

 

 2014/10/30  blood pressure, diastolic - 8462-4  74  mm[Hg]     BP lara

 

 2014/10/30  blood pressure, systolic - 8480-6  142  mm[Hg]     BP sys

 

 2014/10/30  pulse rate E&M - 8867-4  85  /min     Heart rate

 

 2014/10/30  respiratory rate E&M - 9279-1  14  /min     Resp rate

 

 2014/10/15  blood pressure, diastolic - 8462-4  80  mm[Hg]     BP lara

 

 2014/10/15  blood pressure, systolic - 8480-6  130  mm[Hg]     BP sys

 

 2014/10/15  pulse rate E&M - 8867-4  78  /min     Heart rate

 

 2014/10/15  respiratory rate E&M - 9279-1  14  /min     Resp rate

 

 2014/10/06  blood pressure, diastolic - 8462-4  88  mm[Hg]     BP lara

 

 2014/10/06  blood pressure, systolic - 8480-6  162  mm[Hg]     BP sys

 

 2014/10/06  pulse rate E&M - 8867-4  82  /min     Heart rate

 

 2014/10/06  respiratory rate E&M - 9279-1  16  /min     Resp rate

 

   blood pressure, diastolic - 8462-4  70  mm[Hg]     BP lara

 

   blood pressure, systolic - 8480-6  120  mm[Hg]     BP sys

 

   pulse rate E&M - 8867-4  88  /min     Heart rate

 

   respiratory rate E&M - 9279-1  14  /min     Resp rate

 

   temperature E&M  98.6  [degF]     Body temperature

 

   weight E&M - 3141-9  302  [lb_av]     Weight Measured

 

   blood pressure, diastolic - 8462-4  68  mm[Hg]     BP lara

 

   blood pressure, systolic - 8480-6  140  mm[Hg]     BP sys

 

   pulse rate E&M - 8867-4  78  /min     Heart rate

 

   respiratory rate E&M - 9279-1  14  /min     Resp rate

 

   temperature E&M  98.6  [degF]     Body temperature

 

   weight E&M - 3141-9  302  [lb_av]     Weight Measured

 

   blood pressure, diastolic - 8462-4  70  mm[Hg]     BP lara

 

   blood pressure, systolic - 8480-6  180  mm[Hg]     BP sys

 

   pulse rate E&M - 8867-4  88  /min     Heart rate

 

   respiratory rate E&M - 9279-1  14  /min     Resp rate

 

   temperature E&M  98.6  [degF]     Body temperature

 

   weight E&M - 3141-9  302  [lb_av]     Weight Measured







Diagnostic Results







 Date  Name  Value  Unit  Range  Description

 

 Clinical Lists Update: CBC - Hematology 

 

 2014/10/23  red blood cell distribution width  15.5  %      

 

   hematocrit, blood  40  %      

 

 2014/10/28  red blood cell distribution width  15.1  %      

 

 2014/10/29  red blood cell distribution width  15.0  %      

 

   red blood cell distribution width  15.2  %      

 

   red blood cell distribution width  15.2  %      

 

   red blood cell distribution width  16.2  %      

 

 2014/10/23  mean corpuscular volume, RBC  88  fL      

 

 2014/10/27  mean corpuscular volume, RBC  88  fL      

 

 2014/10/28  mean corpuscular volume, RBC  88  fL      

 

 2014/10/29  mean corpuscular volume, RBC  88  fL      

 

   mean corpuscular volume, RBC  88  fL      

 

   mean corpuscular volume, RBC  88  fL      

 

   mean corpuscular volume, RBC  89  fL      

 

 2014/10/23  leukocyte count, blood  8.7  10*3/mm3      

 

 2014/10/27  leukocyte count, blood  7.5  10*3/mm3      

 

 2014/10/28  leukocyte count, blood  7.5  10*3/mm3      

 

 2014/10/29  leukocyte count, blood  8.3  10*3/mm3      

 

   leukocyte count, blood  8.3  10*3/mm3      

 

   leukocyte count, blood  8.3  10*3/mm3      

 

   leukocyte count, blood  8.0  10*3/mm3      

 

 2014/10/23  erythrocyte (RBC) count  4.36  10*6/mm3      

 

 2014/10/27  erythrocyte (RBC) count  4.31  10*6/mm3      

 

 2014/10/28  erythrocyte (RBC) count  4.31  10*6/mm3      

 

 2014/10/29  erythrocyte (RBC) count  4.37  10*6/mm3      

 

   erythrocyte (RBC) count  4.51  10*6/mm3      

 

   erythrocyte (RBC) count  4.51  10*6/mm3      

 

   erythrocyte (RBC) count  4.52  10*6/mm3      

 

 2014/10/23  platelet count  29  10*3/mm3      

 

 2014/10/27  platelet count  59  10*3/mm3      

 

 2014/10/28  platelet count  59  10*3/mm3      

 

 2014/10/29  platelet count  61  10*3/mm3      

 

   platelet count  99  10*3/mm3      

 

   platelet count  99  10*3/mm3      

 

   platelet count  137  10*3/mm3      

 

 2014/10/23  hemoglobin, blood  12.3  g/dL      

 

 2014/10/27  hemoglobin, blood  12.1  g/dL      

 

 2014/10/28  hemoglobin, blood  12.1  g/dL      

 

 2014/10/29  hemoglobin, blood  12.1  g/dL      

 

   hemoglobin, blood  12.6  g/dL      

 

   hemoglobin, blood  12.6  g/dL      

 

   hemoglobin, blood  12.7  g/dL      

 

 2014/10/23  hematocrit, blood  38  %      

 

 2014/10/27  hematocrit, blood  38  %      

 

 2014/10/28  hematocrit, blood  38  %      

 

 2014/10/29  hematocrit, blood  38  %      

 

   hematocrit, blood  40  %      

 

   hematocrit, blood  40  %      

 

 2014/10/27  red blood cell distribution width  15.1  %      

 

 Clinical Lists Update: CBC    - Hematology 

 

   red blood cell distribution width  16.0  %      

 

   mean corpuscular volume, RBC  90  fL      

 

   leukocyte count, blood  7.5  10*3/mm3      

 

   hematocrit, blood  36  %      

 

   platelet count  163  10*3/mm3      

 

   hemoglobin, blood  11.5  g/dL      

 

   erythrocyte (RBC) count  4.01  10*6/mm3      

 

 Clinical Lists Update: CBC,CMP,CHOL,TRIG,HGA1C,FERRITIN - Chemistry 

 

   aspartate aminotransferase (SGOT), serum  29  U/L      

 

   creatinine, serum  0.8  mg/dL      

 

   glucose, plasma fasting  128  mg/dL      

 

   ferritin, serum  248.9  ng/mL      

 

   bilirubin, serum, total  0.9  mg/dL      

 

   hemoglobin A1C, blood, as % of total hemoglobin  5.9  %      

 

   anion gap, serum  10         

 

   albumin, serum  3.8  g/dL      

 

   triglyceride, serum, fasting  175  mg/dL      

 

   alkaline phosphatase, serum  130  U/L      

 

   protein, total, serum  6.3  g/dL      

 

   urea nitrogen, blood  13  mg/dL      

 

   Estimated Glomerular Filtration Rate (calc)  74  mL/min/1.73m2    
  

 

   calcium, serum  9.0  mg/dL      

 

   chloride, serum  101  mmol/L      

 

   sodium, serum  134  mmol/L      

 

   cholesterol, serum  118  mg/dL      

 

   alanine aminotransferase (SGPT), serum  26  U/L      

 

   carbon dioxide, venous blood  28.0  mmol/L      

 

   potassium, serum  4.6  mmol/L      

 

 Clinical Lists Update: CBC,CMP,CHOL,TRIG,HGA1C,FERRITIN - Hematology 

 

   hematocrit, blood  36.3  %      

 

   hemoglobin, blood  11.1  g/dL      

 

   red blood cell distribution width  19.5  %      

 

   platelet count  199  10*3/mm3      

 

   mean corpuscular volume, RBC  93  fL      

 

   erythrocyte (RBC) count  3.92  10*6/mm3      

 

   leukocyte count, blood  6.7  10*3/mm3      

 

 Clinical Lists Update: CBC,CMP,Chol,Trig,Ferritin,HgA1c - Chemistry 

 

 2014/10/02  albumin, serum  3.8  g/dL      

 

 2014/10/02  alkaline phosphatase, serum  121  U/L      

 

 2014/10/02  urea nitrogen, blood  15  mg/dL      

 

 2014/10/02  calcium, serum  8.6  mg/dL      

 

 2014/10/02  chloride, serum  102  mmol/L      

 

 2014/10/02  cholesterol, serum  121  mg/dL      

 

 2014/10/02  creatinine, serum  1.0  mg/dL      

 

 2014/10/02  ferritin, serum  14.9  ng/mL      

 

 2014/10/02  hemoglobin A1C, blood, as % of total hemoglobin  7.4  %      

 

 2014/10/02  potassium, serum  4.6  mmol/L      

 

 2014/10/02  protein, total, serum  6.4  g/dL      

 

 2014/10/02  aspartate aminotransferase (SGOT), serum  26  U/L      

 

 2014/10/02  alanine aminotransferase (SGPT), serum  25  U/L      

 

 2014/10/02  bilirubin, serum, total  0.7  mg/dL      

 

 2014/10/02  triglyceride, serum, fasting  109  mg/dL      

 

 2014/10/02  sodium, serum  135  mmol/L      

 

 2014/10/02  anion gap, serum  13         

 

 2014/10/02  glucose, plasma fasting  108  mg/dL      

 

 2014/10/02  Estimated Glomerular Filtration Rate (calc)  57  mL/min/1.73m2    
  

 

 2014/10/02  carbon dioxide, venous blood  25.0  mmol/L      

 

 Clinical Lists Update: CBC,CMP,Chol,Trig,Ferritin,HgA1c - Hematology 

 

 2014/10/02  leukocyte count, blood  6.0  10*3/mm3      

 

 2014/10/02  platelet count  65  10*3/mm3      

 

 2014/10/02  mean corpuscular volume, RBC  91  fL      

 

 2014/10/02  hemoglobin, blood  12.0  g/dL      

 

 2014/10/02  red blood cell distribution width  17.7  %      

 

 2014/10/02  hematocrit, blood  40  %      

 

 2014/10/02  erythrocyte (RBC) count  4.37  10*6/mm3      

 

 Clinical Lists Update: CBC,CMP,FLP  IN PT LABS - Chemistry 

 

   Estimated Glomerular Filtration Rate (calc)  >60  mL/min/1.73m2   
   

 

   glucose, plasma fasting  128  mg/dL      

 

   very low density lipoproteins  27  mg/dL      

 

   sodium, serum  139  mmol/L      

 

   triglyceride, serum, fasting  137  mg/dL      

 

   bilirubin, serum, total  0.7  mg/dL      

 

   alanine aminotransferase (SGPT), serum  18  U/L      

 

   aspartate aminotransferase (SGOT), serum  25  U/L      

 

   protein, total, serum  6.9  g/dL      

 

   potassium, serum  4.5  mmol/L      

 

   LDL cholesterol, serum  57  mg/dL      

 

   HDL cholesterol, serum  40  mg/dL      

 

   creatinine, serum  0.77  mg/dL      

 

   carbon dioxide, venous blood  21  mmol/L      

 

   cholesterol, serum  117  mg/dL      

 

   chloride, serum  104  mmol/L      

 

   calcium, serum  9.3  mg/dL      

 

   urea nitrogen, blood  13  mg/dL      

 

   alkaline phosphatase, serum  81  U/L      

 

   albumin, serum  3.7  g/dL      

 

 Clinical Lists Update: CBC,CMP,FLP  IN PT LABS - Hematology 

 

   mean corpuscular volume, RBC  90  fL      

 

   leukocyte count, blood  8.8  10*3/mm3      

 

   erythrocyte (RBC) count  4.32  10*6/mm3      

 

   red blood cell distribution width  15.4  %      

 

   hematocrit, blood  39  %      

 

   hemoglobin, blood  12.5  g/dL      

 

   platelet count  198  10*3/mm3      

 

 Clinical Lists Update: CBC,CMP,FLP,TSH - Chemistry 

 

 2015/04/15  protein, total, serum  6.7  g/dL      

 

 2015/04/15  potassium, serum  4.8  mmol/L      

 

 2015/04/15  LDL cholesterol, serum  22  mg/dL      

 

 2015/04/15  thyroid stimulating hormone, serum  1.52  u[iU]/mL      

 

 2015/04/15  hemoglobin A1C, blood, as % of total hemoglobin  6.3  %      

 

 2015/04/15  HDL cholesterol, serum  42  mg/dL      

 

 2015/04/15  creatinine, serum  0.78  mg/dL      

 

 2015/04/15  carbon dioxide, venous blood  22  mmol/L      

 

 2015/04/15  cholesterol, serum  114  mg/dL      

 

 2015/04/15  chloride, serum  106  mmol/L      

 

 2015/04/15  calcium, serum  9.5  mg/dL      

 

 2015/04/15  urea nitrogen, blood  22  mg/dL      

 

 2015/04/15  alkaline phosphatase, serum  105  U/L      

 

 2015/04/15  albumin, serum  3.8  g/dL      

 

 2015/04/15  aspartate aminotransferase (SGOT), serum  26  U/L      

 

 2015/04/15  alanine aminotransferase (SGPT), serum  21  U/L      

 

 2015/04/15  bilirubin, serum, total  0.8  mg/dL      

 

 2015/04/15  triglyceride, serum, fasting  134  mg/dL      

 

 2015/04/15  sodium, serum  139  mmol/L      

 

 2015/04/15  very low density lipoproteins  27  mg/dL      

 

 2015/04/15  glucose, plasma fasting  127  mg/dL      

 

 2015/04/15  Estimated Glomerular Filtration Rate (calc)  >60  mL/min/1.73m2   
   

 

 Clinical Lists Update: CBC,CMP,FLP,TSH - Hematology 

 

 2015/04/15  mean corpuscular volume, RBC  89  fL      

 

 2015/04/15  red blood cell distribution width  17.1  %      

 

 2015/04/15  hematocrit, blood  34  %      

 

 2015/04/15  hemoglobin, blood  10.5  g/dL      

 

 2015/04/15  platelet count  199  10*3/mm3      

 

 2015/04/15  erythrocyte (RBC) count  3.77  10*6/mm3      

 

 2015/04/15  leukocyte count, blood  5.1  10*3/mm3      

 

 Clinical Lists Update: CBC,FERRITIN - Chemistry 

 

   ferritin, serum  11  ng/mL      

 

 Clinical Lists Update: CBC,FERRITIN - Hematology 

 

   platelet count  203  10*3/mm3      

 

   hematocrit, blood  30  %      

 

   erythrocyte (RBC) count  3.32  10*6/mm3      

 

   red blood cell distribution width  14.4  %      

 

   hemoglobin, blood  9.3  g/dL      

 

   leukocyte count, blood  6.1  10*3/mm3      

 

   mean corpuscular volume, RBC  91  fL      

 

 Clinical Lists Update: ER LABS - Chemistry 

 

   Estimated Glomerular Filtration Rate (calc)  >60  mL/min/1.73m2   
   

 

   glucose, plasma fasting  152  mg/dL      

 

   sodium, serum  134  mmol/L      

 

   bilirubin, serum, total  1.1  mg/dL      

 

   alanine aminotransferase (SGPT), serum  20  U/L      

 

   aspartate aminotransferase (SGOT), serum  32  U/L      

 

   protein, total, serum  7.1  g/dL      

 

   potassium, serum  5.0  mmol/L      

 

   creatinine, serum  0.80  mg/dL      

 

   carbon dioxide, venous blood  22  mmol/L      

 

   chloride, serum  102  mmol/L      

 

   calcium, serum  9.5  mg/dL      

 

   urea nitrogen, blood  15  mg/dL      

 

   alkaline phosphatase, serum  110  U/L      

 

   albumin, serum  3.8  g/dL      

 

 Clinical Lists Update: ER LABS - Hematology 

 

   platelet count  197  10*3/mm3      

 

   erythrocyte (RBC) count  3.87  10*6/mm3      

 

   leukocyte count, blood  12.6  10*3/mm3      

 

   mean corpuscular volume, RBC  89  fL      

 

   red blood cell distribution width  16.8  %      

 

   hematocrit, blood  35  %      

 

   hemoglobin, blood  11.0  g/dL      

 

 Clinical Lists Update: ER LABS - Urinalysis 

 

   urobilinogen, urine, semiquantitative (dipstick)  normal         

 

   specific gravity, urine  1.10         

 

   pH, urine, semiquantitative  8         

 

   nitrite, urine, semiquantitative  neg         

 

   ketones, urine, by test strip  neg         

 

   bilirubin, urine  neg         

 

   glucose, urine, semiquantitative  neg         

 

   protein, urine, semiquantitative (dipstick)  1+         

 

   appearance, urine  Clear Yellow         

 

   WBC urine on microscopy  0-2  {Cells}/[HPF]      

 

   RBC urine by microscopy  0-2         

 

   bacteria, urine microscopy  neg         

 

   hyaline casts, urine  none  /[LPF]      

 

   epithelial cells, urine  2-5  /[LPF]      

 

   mucus on urinalysis  neg         

 

   blood in urine (hemoglobin) by dipstick  1+         

 

 Office Visit: Dr Godoy's Check Up: Established Patient Visit - Chemistry 

 

   ferritin, serum  21  ng/mL      

 

   hemoglobin A1C, blood, as % of total hemoglobin  8.1  %      

 

 Office Visit: Dr Godoy's Check Up: Established Patient Visit - Hematology 

 

 2014/10/30  red blood cell distribution width  15.0  %      

 

 2014/10/15  mean corpuscular volume, RBC  86  fL      

 

 2014/10/16  mean corpuscular volume, RBC  37  fL      

 

 2014/10/30  mean corpuscular volume, RBC  88  fL      

 

 2014/10/15  leukocyte count, blood  7.3  10*3/mm3      

 

 2014/10/16  leukocyte count, blood  9.1  10*3/mm3      

 

 2014/10/30  leukocyte count, blood  8.3  10*3/mm3      

 

 2014/10/30  hematocrit, blood  38  %      

 

 2014/10/16  erythrocyte (RBC) count  4.27  10*6/mm3      

 

 2014/10/30  erythrocyte (RBC) count  4.37  10*6/mm3      

 

 2014/10/15  platelet count  39  10*3/mm3      

 

 2014/10/16  platelet count  22  10*3/mm3      

 

 2014/10/30  platelet count  61  10*3/mm3      

 

 2014/10/15  hemoglobin, blood  12.4  g/dL      

 

 2014/10/16  hemoglobin, blood  11.8  g/dL      

 

 2014/10/30  hemoglobin, blood  12.1  g/dL      

 

 2014/10/15  hematocrit, blood  37  %      

 

 2014/10/16  hematocrit, blood  37  %      

 

 2014/10/16  red blood cell distribution width  15.0  %      

 

 2014/10/15  red blood cell distribution width  14.9  %      

 

 2014/10/15  erythrocyte (RBC) count  4.36  10*6/mm3      

 

 Phone Note: Low platelet count - Hematology 

 

 2014/10/08  red blood cell distribution width  17.0  %      

 

 2014/10/08  mean corpuscular volume, RBC  90  fL      

 

 2014/10/08  leukocyte count, blood  5.9  10*3/mm3      

 

 2014/10/08  hematocrit, blood  40  %      

 

 2014/10/08  platelet count  12  10*3/mm3      

 

 2014/10/08  hemoglobin, blood  12.7  g/dL      

 

 2014/10/08  erythrocyte (RBC) count  4.45  10*6/mm3      







Encounters







 Code  Encounter  Date  Provider  Facility

 

 CPT-97296  Ofc Vst, Est Level IV   12:42:41 CDT  Kavithapatricia Godoy, DO, FACP

 

 CPT-85865  Ofc Vst, Est Level IV   17:01:58 CDT  Kavitha Godoy, DO, FACP

 

 CPT-73245  Ofc Vst, Est Level IV   17:23:42 CST  Kavitha Godoy, DO, FACP

 

 CPT-44705  Ofc Vst, Est Level IV   20:14:18 CST  Kavitha Godoy, DO, FACP

 

 CPT-97229  Ofc Vst, Est Level III  2014/10/30 17:10:34 CDT  Kavitha Godoy, DO, FACP

 

 CPT-29343  Ofc Vst, Est Level IV  2014/10/16 22:19:26 CDT  Kavithapatricia Godoy, DO, FACP

 

 CPT-76318  Ofc Vst, Est Level IV  2014/10/07 16:08:07 CDT  Kavithapatricia Godoy, DO, FACP

 

 CPT-81383  Ofc Vst, Est Level II   15:22:36 CDT  Kavitha Godoy, DO, FACP

 

 CPT-55306  Ofc Vst, Est Level IV   11:13:04 CDT  Kavithapatricia Godoy, DO, FACP

 

 CPT-44294  Ofc Vst, Est Level IV   13:20:51 CDT  Kavitha Kristin WELSH Jayne, DO, FACP

 

 CPT-89519  Ofc Vst, Est Level IV   14:23:34 CDT  Kavitha Kristin WELSH Jayne, DO, FACP

 

 CPT-64471  Ofc Vst, Est Level III   19:42:36 CDT  Kavitha Kristin Godoy  [facility]

 

 CPT-59377  Ofc Vst, Est Level IV   14:28:10 CST  Kavitha Kristin WELSH Jayne, DO, FACP

 

 CPT-46828  Ofc Vst, Est Level IV   15:20:04 CST  Kavitha Kristin WELSH Jayne, DO, FACP

 

 CPT-76166  Ofc Vst, Est Level III  2013/10/15 14:46:42 CDT  Kavitha Kristin WELSH Jayne, DO, FACP

 

 CPT-58506  Ofc Vst, Est Level IV   13:31:14 CDT  Kavitha Kristin WELSH Jayne, DO, FACP

 

 CPT-48288  Ofc Vst, Est Level IV  2013/04/10 16:26:21 CDT  Kavitha Kristin WELSH Jayne, DO, FACP

 

 CPT-02053  Ofc Vst, Est Level III   16:02:31 CST  Kavitha Kristin WELSH Jayne, DO, FACP

 

 CPT-51879  Ofc Vst, Est Level IV   13:01:46 CST  Kavitha Kristin WELSH Jayne, DO, FACP

 

 CPT-76017  Ofc Vst, Est Level IV   10:43:37 CDT  Kavitha Kristin Plumemr S Jayne, DO, FACP

 

 CPT-64134  Ofc Vst, Est Level III   15:41:44 CDT  Kavithapatricia Foster 
Godoy  [facility]

 

 CPT-13216  Ofc Vst, Est Level IV   16:31:03 CDT  Kavitha Kristin WELSH Godoy, DO, FACP

 

 CPT-73465  Ofc Vst, Est Level III   15:45:49 CDT  Kavitha WELSH Godoy, DO, FACP

 

 CPT-14377  Ofc Vst, Est Level IV  2012/03/15 11:04:49 CDT  Kavitha WELSH Godoy, DO, FACP

 

 CPT-32917  Ofc Vst, Est Level IV   16:16:55 CST  Kavitha WELSH Godoy, DO, FACP

 

 CPT-87436  Ofc Vst, Est Level IV   20:00:20 CST  [facility]a [facility]

 

 CPT-84011  Ofc Vst, Est Level IV   15:00:56 CST  Kavitha WELSH Jayne, DO, FACP

 

 CPT-27656  Ofc Vst, Est Level IV   11:37:43 CST  Kavitha WELSH Jayne, DO, FACP

 

 CPT-39875  Ofc Vst, Est Level V   11:04:21 CST  Bernadine WELSH Godoy, DO, FACP

 

 CPT-42130  Ofc Vst, Est Level IV   15:56:58 CDT  Kavitha WELSH Jayne, DO, FACP

 

 CPT-64973  Ofc Vst, Est Level III   15:56:48 CDT  Kavithapatricia WELSH Jayne, DO, FACP

 

 CPT-33559  Ofc Vst, Est Level IV   11:13:32 CDT  Kavitha WELSH Jayne, DO, FACP

 

 CPT-46233  Ofc Vst, Est Level III   11:06:29 CDT  Kavitha WELSH Jayne, DO, FACP

 

 CPT-56438  Ofc Vst, Est Level IV   10:36:27 CDT  Kavithapatricia WELSH Jayne, DO, FACP

 

 CPT-18503  Ofc Vst, Est Level IV   16:28:28 CST  Kavitha WELSH Godoy, DO, FACP

 

 CPT-84280  Ofc Vst, Est Level IV   16:15:53 CST  Kavitha Kristin WELSH Godoy, DO, FACP

 

 CPT-58634  Ofc Vst, Est Level IV   10:24:23 CDT  Kavitha Kristin WELSH Godoy, DO, FACP

 

 CPT-50775  Ofc Vst, Est Level IV   10:41:52 CDT  Kavithapatricia WELSH Godoy, DO, FACP

 

 CPT-75825  Ofc Vst, Est Level V   14:13:59 CDT  Kavithapatricia [facility]

 

 CPT-28556  Ofc Vst, Est Level IV   11:50:10 CDT  Kavitha Kristin WELSH Jayne, DO, FACP

 

 CPT-10975  Ofc Vst, Est Level V   16:45:39 CST  Kavitha WELSH Godoy, DO, FACP

 

 CPT-92204  Ofc Vst, Est Level V   16:35:01 CDT  Kavithapatricia WELSH Godoy, DO, FACP

 

 CPT-62760  Ofc Vst, Est Level IV  2009/07/10 10:56:36 CDT  Kavithapatricia WELSH Jayne, DO, FACP

 

 CPT-76591  Ofc Vst, Est Level IV   09:27:02 CDT  Kavitha Kristin WELSH Godoy, DO, FACP

 

 CPT-70086  Ofc Vst, Est Level IV   16:04:39 CST  Kavitha Kristin WELSH Godoy, DO, FACP

 

 CPT-16059  Ofc Vst, Est Level IV  2008/09/15 16:41:31 CDT  Kavitha Kristin WELSH Godoy, DO, FACP

 

 CPT-58118  Ofc Vst, Est Level IV   16:15:36 CDT  Kavitha Kristin WELSH Jayne, DO, FACP

 

 CPT-85614  Ofc Vst, Est Level IV   16:43:23 CST  Kavihta Kristin Nowaki S Jayne, DO, FACP

 

 CPT-51706  Ofc Vst, Est Level IV   17:09:39 CST  Kavitha Kristin Rainner  Kavitha S Jayne, DO, FACP

 

 CPT-58815  Ofc Vst, Est Level III  2007/10/29 16:52:49 CDT  Kavitha Kristin WELSH Jayne, DO, FACP

 

 CPT-77873  Ofc Vst, Est Level III  2007/10/23 16:46:46 CDT  Kavitha Kristin 
Jayne Godoy, DO, FACP

 

 CPT-25937  Ofc Vst, Est Level IV  2007/10/16 16:17:41 CDT  Kavitha Kristin 
Godoy  Kavitha S Jayne, DO, FACP

 

 CPT-13669  Ofc Vst, Est Level III  2007/10/09 17:15:29 CDT  Kavitha Kristin WELSH Jayne, DO, FACP

 

 CPT-64991  Ofc Vst, Est Level IV   15:48:06 CDT  Kavitha Kristin 
Jayne Plummer S Jayne, DO, FACP

 

 CPT-29635  Ofc Vst, Est Level IV   11:59:27 CDT  Kavitha Kristin 
Jayne Plummer S Jayne, DO, FACP

 

 CPT-46101  Ofc Vst, Est Level IV   16:31:37 CDT  Kavithapatricia Godoy  [facility] [place]

 

 CPT-88455  Ofc Vst, Est Level IV   16:25:22 CST  Kavitha Godoy  [facility] [place]

 

 CPT-64686  Ofc Vst, Est Level III   18:24:09 CST  Kavitha Godoy  [facility] [place]

 

 CPT-32662  Ofc Vst, Est Level III  2006/10/11 16:54:12 CDT  Kavitha Godoy  [facility] [place]

 

 CPT-59767  Ofc Vst, Est Level IV   16:39:33 CDT  Kavitha Kristin Godyo  [facility] [place]

 

 CPT-58662  Ofc Vst, Est Level IV   15:11:16 CDT  Kavitha Godoy  [facility] [place]

 

 CPT-41734  Ofc Vst, Est Level IV   17:29:13 CST  Kavitha Godoy  [facility] [place]

 

 CPT-67453  Ofc Vst, Est Level III   12:45:04 CST  Kavitha Godoy  [facility] [place]

 

 CPT-56873  Ofc Vst, Est Level III   16:51:19 CST  Kavitha Godoy  [facility]

 

 CPT-61247  Ofc Vst, Est Level IV   16:59:21 CDT  Kavitha Kristin Godoy  [facility]

 

 CPT-13340  Ofc Vst, Est Level IV   09:39:02 CDT  Kavitha Godoy  [facility]

 

 CPT-64959  Ofc Vst, Est Level IV   18:04:53 CDT  Kavithapatricia Godoy  [facility]

 

 CPT-91383  Ofc Vst, Est Level IV   17:17:47 CST  Kavitha Godoy  [facility]

 

 CPT-36283  Ofc Vst, Est Level IV   17:23:25 CST  Kavitha Godoy  [facility]

 

 CPT-05489  Ofc Vst, Est Level IV   18:44:33 CST  Kavitha Godoy  [facility]

 

 CPT-94007  Ofc Vst, Est Level III  2004/10/28 18:20:20 CDT  Kavithapatricia Godoy  [facility]

 

 CPT-93686  Ofc Vst, Est Level III  2004/10/18 17:54:31 CDT  Kavitha Godoy  [facility]

 

 CPT-27481  Ofc Vst, Est Level IV   19:13:31 CDT  Kavitha Godoy  [facility]

 

 CPT-12440  Ofc Vst, Est Level IV   18:01:08 CDT  Kavithapatricia Godoy  [facility]

 

 CPT-41504  Ofc Vst, Est Level III   13:36:36 CDT  Kavitha Godoy  [facility]

 

 CPT-15959  Ofc Vst, Est Level III   16:47:12 CDT  Kavitha Godoy  [facility] [place]

 

 CPT-32102  Ofc Vst, Est Level IV   17:34:04 CDT  Kavitha Godoy  [facility] [place]

 

 CPT-39442  Ofc Vst, Est Level III   15:45:39 CST  Kavitha Godoy  [facility] [place]

 

 CPT-37324  Ofc Vst, Est Level III   16:07:05 CST  Kavitha Godoy  [facility] [place]

 

 CPT-15024  Ofc Vst, Est Level III   16:19:57 CST  Kavitha Godoy  [facility] [place]

 

 CPT-20787  Ofc Vst, Est Level III   16:46:48 CST  Kavitha Godoy  [facility] [place]

 

 CPT-69743  Ofc Vst, Est Level II   17:30:36 CST  Kavitha Godoy  [facility] [place]

 

 CPT-56991  Ofc Vst, Est Level III   17:20:59 CST  Kavitha Godoy  [facility] [place]

 

 CPT-02709  Ofc Vst, Est Level III   17:49:24 CST  Kavitha Godoy  [facility] [place]

 

 CPT-16047  Ofc Vst, New Level III   17:10:24 CST  Kavitha Godoy  [facility] [place]

 

 CPT-99323  Ofc Vst, New Level IV   17:21:37 CST  Kavitha Godoy  [facility] [place]







Procedures







 Code  Procedure Name  Date  Entry Date  Standard Description

 

 CPT-  Medicare Annual Wellness Visit   15:52:28 CDT  
   

 

 CPT-  E-Prescribing Medication Sent   19:42:36 CDT   
  

 

 CPT-  E-Prescribing Not sent due to no medication given   14:28:
10 CST     

 

 CPT-  E-Prescribing Not sent due to no medication given   15:20:
04 CST     

 

 CPT-  E-Prescribing Not sent due to no medication given  2013/10/15 14:46:
42 CDT  2013/10/15   

 

 CPT-  E-Prescribing Not sent due to no medication given   13:31:
14 CDT     

 

 CPT-  E-Prescribing Medication Sent   16:36:19 CDT  2013/07/10 
  

 

 CPT-  Medicare Annual Wellness Visit Initial   16:36:19 CDT  
2013/07/10   

 

 CPT-  E-Prescribing Not sent due to no medication given  2013/04/10 16:26:
21 CDT     

 

 CPT-  E-Prescribing Medication Sent   16:02:31 CST   
  

 

 CPT-  E-Prescribing Not sent due to no medication given   13:01:
46 CST     

 

 CPT-  E-Prescribing Not sent due to no medication given   10:43:
37 CDT     

 

 CPT-34831  Injection, Pneumovax   14:03:27 CDT     

 

 CPT-43594  Ear Wax Removal  2008/10/17 11:05:06 CDT  2008/10/17   

 

 CPT-06602  Ear Wax Removal   10:22:58 CDT     

 

 CPT-08244  Cryopathy Skin   09:39:02 CDT     

 

 CPT-06972  Ear Wax Removal   17:30:36 CST     

 

 CPT-63682  Ear Wax Removal   17:20:59 CST  
[facility] Clinical Summary

 Created on: 2015



Madison Young

External Reference #: [id number]

: 1945

Sex: Female



Demographics







 Address  [address] [place] [address]

 

 Home Phone  [phone]

 

 Preferred Language  Unknown

 

 Marital Status  W

 

 Tenriism Affiliation  Unknown

 

 Race  White

 

 Ethnic Group  Not  or 





Author







 Author  User, MELISSA

 

 Organization  [facility]

 

 Address  Unknown

 

 Phone  Unavailable







Allergies, Adverse Reactions, Alerts







 Allergy Name  Reaction Description  Start Date  Severity  Status  Provider

 

 ZINC OXIDE       Critical  Active  Kavitha Godoy







Conditions or Problems







 Problem Name  Problem Code  Onset Date  Status  Entry Date  Provider  Comment  
Standard Description  Annotate

 

 DIABETES MELLITUS, TYPE II, UNCONTROLLED, W/O COMPLICATIONS  250.02     
Correction    Kavitha Godoy     Diabetes mellitus without 
mention of complication, type II or unspecified type, uncontrolled   

 

 DIABETES MELLITUS, TYPE I, UNCONTROLLED, WITH COMPLICATIONS  250.93     
Correction    Kavitha Godoy     Diabetes mellitus with 
unspecified complication, type I [juvenile type], uncontrolled   

 

 DIABETES MELLITUS, TYPE II, UNCONTROLLED, WITH COMPLICATIONS  250.92    Active    Kavitha Godoy     Diabetes mellitus with 
unspecified complication, type II or unspecified type, uncontrolled   

 

 OBESITY  278.00     Resolved    Kavitha Godoy     Obesity, 
unspecified   

 

 ELEVATED BLOOD PRESSURE WITHOUT DIAGNOSIS OF HYPERTENSION  796.2    
Correction    Kavitha Godoy     Elevated blood pressure 
reading without diagnosis of hypertension   

 

 ARTHRITIS, RHEUMATOID  714.0     Resolved    Kavitha Godoy   
  Rheumatoid arthritis   

 

 LIVER FUNCTION TESTS, ABNORMAL, HX OF  V12.2     Resolved    Kavitha Godoy     Personal history of endocrine, metabolic, and immunity 
disorders   

 

 CERUMEN IMPACTION, BILATERAL  380.4    Resolved    Kavitha Godoy     Impacted cerumen   

 

 CERUMEN IMPACTION, BILATERAL  380.4    Resolved    Kavitha Godoy     Impacted cerumen   

 

 DECREASED HEARING  389.10    Resolved    Kavitha Godoy     Sensorineural hearing loss, unspecified  due to cerumen presumed

 

 History of ANEMIA  285.9     Correction    Kavitha Godoy     
Anemia, unspecified   

 

 POLYARTHRALGIA  719.49     Active    Kavitha Godoy     Pain 
in joint involving multiple sites   

 

 NEPHROPATHY, DIABETIC  250.40    Resolved    Kavitha Godoy     Diabetes mellitus with renal manifestations, type II or unspecified 
type, not stated as uncontrolled   

 

 HYPERCHOLESTEROLEMIA  272.0    Active    Kavitha Godoy     Pure hypercholesterolemia   

 

 HYPERTRIGLYCERIDEMIA  272.1    Active    Kavitha Godoy     Pure hyperglyceridemia   

 

 HYPERTENSION, BENIGN ESSENTIAL, UNCONTROLLED  401.1    Active    Kavitha Godoy     Benign essential hypertension   

 

 PHARYNGITIS, ACUTE  462    Resolved    Kavitha Godoy     Acute pharyngitis   

 

 DYSPHAGIA  787.2    Resolved    Kavitha Godoy     
Dysphagia   

 

 INSOMNIA, TRANSIENT  307.41    Resolved    Kavitha Godoy     Transient disorder of initiating or maintaining sleep   

 

 CELLULITIS  682.9    Resolved    Kavitha Godoy     
Cellulitis and abscess of unspecified sites  foot

 

 CELLULITIS  682.9  2004/10/28  Resolved  2004/10/28  Kavitha Godoy     
Cellulitis and abscess of unspecified sites   

 

 DERMATOPHYTOSIS, FOOT  110.4    Resolved    Kavitha Godoy     Dermatophytosis of foot   

 

 DERMATITIS  692.9    Resolved    Kavitha Godoy     
Contact dermatitis and other eczema, unspecified cause   

 

 KNEE PAIN  719.46    Resolved    Kavitha Godoy     
Pain in joint involving lower leg  right

 

 SHOULDER PAIN  719.41    Resolved    Kavitha Godoy 
    Pain in joint involving shoulder region  right

 

 SKIN LESION  709.9    Resolved    Kavitha Godoy     
Unspecified disorder of skin and subcutaneous tissue   

 

 FEVER  780.6    Resolved    Kavitha Godoy     Fever 
and other physiologic disturbances of temperature regulation   

 

 COUGH  786.2    Resolved    Kavitha Godoy     Cough
   

 

 SCREENING MAMMOGRAM NEC  V76.12    Resolved    Kvaitha Godoy     Other screening mammogram   

 

 SCREENING FOR OSTEOPOROSIS  V82.81    Resolved    Kavitha Godoy     Screening for osteoporosis   

 

 MICROALBUMINURIA  791.0    Active    Kavitha Godoy 
    Proteinuria   

 

 HX, PERSONAL, PAST NONCOMPLIANCE  V15.81    Resolved    
Kavitha Godoy     Personal history of noncompliance with medical 
treatment, presenting hazards to health   

 

 BACK PAIN  724.5    Resolved    Kavitha Godoy     
Backache, unspecified   

 

 NEOPLASM, SKIN, UNCERTAIN BEHAVIOR  238.2    Resolved    
Kavitha Godoy     Neoplasm of uncertain behavior of skin  right upper lip

 

 SCIATICA/HERNIATED DISC  722.10  2006/10/11  Resolved    Kavitha Godoy     Displacement of lumbar intervertebral disc without 
myelopathy   

 

 IMPETIGO  684    Resolved    Kavitha Godoy     
Impetigo  right nare

 

 LYMPHEDEMA NEC  457.1    Active    Kavitha Godoy   
  Other lymphedema   

 

 CELLULITIS  682.9    Resolved    Kavitha Godoy     
Cellulitis and abscess of unspecified sites   

 

 OSTEOMYELITIS NOS, ANKLE/FOOT  730.27  2007/10/09  Resolved    Kavitha Godoy     Unspecified osteomyelitis involving ankle and foot   

 

 URTICARIA, ACUTE  708.9  2007/10/29  Resolved    Kavitha Godoy     Unspecified urticaria   

 

 RENAL CALCULUS  592.0  2009/07/10  Active  2009/07/10  Kavitha Godoy   
  Calculus of kidney   

 

 DEHYDRATION  276.51  2009/07/10  Resolved    Kavitha Godoy   
  Dehydration   

 

 CHOLELITHIASIS, ASYMPTOMATIC  574.20    Resolved    Kavitha Godoy     Calculus of gallbladder without mention of cholecystitis, 
without mention of obstruction   

 

 PNEUMONIA  486    Resolved    Kavitha Godoy     
Pneumonia, organism unspecified   

 

 RESTLESS LEG SYNDROME  333.94    Resolved    Kavitha Godoy     Restless legs syndrome (RLS)   

 

 FREQUENCY, URINARY  788.41    Active    Kavitha Godoy     Urinary frequency   

 

 FEVER PRESENTING CONDITIONS CLASSIFIED ELSEWHERE  780.61    Resolved
    Kavitha Godoy     Fever presenting with conditions 
classified elsewhere   

 

 PVD  443.9    Resolved    Kavitha Godoy     
Peripheral vascular disease, unspecified   

 

 EDEMA LEG  782.3    Resolved    Kavitha Godoy     
Edema   

 

 LEG PAIN, LEFT  729.5    Resolved    Kavitha Godoy 
    Pain in limb   

 

 URINARY FREQUENCY  788.41    Resolved    Kavitha Godoy     Urinary frequency   

 

 VACCINE AGAINST STREPTOCOCCUS PNEUMONIAE  V03.82    Resolved    Kavitha Godoy     Need for prophylactic vaccination against 
Streptococcus pneumoniae [pneumococcus]   

 

 SPINAL STENOSIS, LUMBAR  724.02    Resolved    Kavitha Godoy     Spinal stenosis of lumbar region without neurogenic 
claudication   

 

 KNEE PAIN  719.46    Resolved    Kavitha Godoy     
Pain in joint involving lower leg   

 

 CORONARY ATHEROSCLEROSIS, NATIVE VESSEL  414.01    Active    Kavitha Godoy     Coronary atherosclerosis of native coronary artery
   

 

 ANEMIA, IRON DEFICIENCY NEC  280.8    Active    Kavitha Godoy     Other specified iron deficiency anemias   

 

 NAUSEA AND VOMITING  787.01    Resolved    Kavitha Godoy     Nausea with vomiting   

 

 UTI  599.0    Resolved    Kavitha Godoy     Urinary 
tract infection, site not specified   

 

 GROSS HEMATURIA  599.71    Resolved    Kavitha Godoy     Gross hematuria   

 

 CELLULITIS  682.9    Resolved    Kavitha Godoy     
Cellulitis and abscess of unspecified sites   

 

 IMMUNE THROMBOCYTOPENIC PURPURA  287.31  2014/10/15  Active  2014/10/16  Kavitha Godoy     Immune thrombocytopenic purpura   

 

 PVD  443.9    Active    Kavitha Godoy     
Peripheral vascular disease, unspecified   







Medication List







 Medication  Instructions  Start Date  Stop Date  Generic Name  NDC  Status  
Provider  Patient Instruction

 

 ADVAIR DISKUS 100-50 MCG/DOSE MISC  1 puff daily prn        FLUTICASONE-
SALMETEROL  27121530293  Active  Kavitha Godoy   

 

 ALEVE 220 MG TAB  1 PO BID     2015/08/10  NAPROXEN SODIUM  22123589092  No 
Longer Active  Kavitha Godoy   

 

 NORVASC 10 MG TAB  1 PO QD     2015/08/10  AMLODIPINE BESYLATE  92860141051  
No Longer Active  Kavitha Godoy   

 

 LANTUS SOLOSTAR 100 UNIT/ML SOLN  50 units SQ at night    2015/08/10
  INSULIN GLARGINE  79206750456  No Longer Active  Kavitha Godoy   

 

 ONETOUCH ULTRA BLUE STRP  TEST BS QID DX: 250.92  2015/06/15     GLUCOSE BLOOD
  [id number]  Active  Bernadine Gomez   

 

 POTASSIUM CITRATE ER 15 MEQ (1620 MG) CR-TABS  1 PO BID        POTASSIUM 
CITRATE  02226129434  Active  Kavitha Godoy   

 

 HYDROCODONE-ACETAMINOPHEN 5-325 MG TABS  1 PO BID prn    
HYDROCODONE-ACETAMINOPHEN  61153434207  No Longer Active  Kavitha Godoy 
  

 

 PEN NEEDLES 5/16" 31G X 8 MM MISC  as directed    
INSULIN PEN NEEDLE  22807435313  No Longer Active  Kavitha Godoy   

 

 GLUCOMETER STRIPS  CHECK BS QID    GLUCOMETER STRIPS     
No Longer Active  Kavitha Godoy   

 

 GLUCOMETER MACHINE  AS DIRECTED    GLUCOMETER MACHINE   
  No Longer Active  Kavitha Godoy   

 

 CLEOCIN 150 MG CAPS  1 po daily    CLINDAMYCIN HCL  
22909402791  No Longer Active  Kavitha Godoy   

 

 FERREX 150 150 MG CAPS  1 PO daily as directed  2014/10/06    
POLYSACCHARIDE IRON COMPLEX  37426872102  No Longer Active  Kavitha Kristin Godoy   

 

 PREDNISONE 20 MG TABS  TAKE 5 TABS PO DAILY  2014/10/13    
PREDNISONE  41557423329  No Longer Active  Kavitha Kristin Godoy   

 

 BRILINTA 90 MG TABS  1 PO BID       TICAGRELOR  08015805125  Active  
Kavitha Kristin Godoy   

 

 ASPIRIN 81 MG TAB  1 PO daily       ASPIRIN  85871348004  Active  
Kavitha Kristin Godoy   

 

 COREG 12.5 MG TABS  1 PO BID        CARVEDILOL  43810439933  Active  Kavitha 
Kristin Godoy   

 

 PLAVIX 75 MG TABS  1 PO QD    CLOPIDOGREL BISULFATE  
34803228395  No Longer Active  Kavitha Kristin Godoy   

 

 AUGMENTIN 500-125 MG TAB  1 PO BID    AMOXICILLIN-POT 
CLAVULANATE  38217677150  No Longer Active  Kavitha Kristin Godoy   

 

 ALBUTEROL SULFATE 0.083 % NEBU SOLN  1 treatment TID prn       
ALBUTEROL SULFATE  78099896289  Active  Kavitha Kristin Godoy   

 

 ROBITUSSIN A-C  MG/5ML SYRUP  1 teaspoon PO Q 4-6 hr prn    ROBITUSSIN A-C  MG/5ML SYRUP     No Longer Active  Kavithapatricia Godoy   

 

 PEN NEEDLES 5/16" 31G X 8 MM MISC  as directed       INSULIN PEN 
NEEDLE  35203102415  Active  Bernadine Gomez   

 

 CEFDINIR 300 MG CAPS  1 PO BID    2014/04/15  CEFDINIR  31029540698  
No Longer Active  Kavitha Kristin Godoy   

 

 ZOFRAN ODT 8 MG TBDP  1 PO Q6hrs prn nausea    2014/04/15  
ONDANSETRON  83442953459  No Longer Active  Kavitha Kristin Godoy   

 

 ASPIRIN 81 MG TAB  1 PO daily       ASPIRIN  29515063605  No Longer 
Active  Kavitha Kristin Godoy   

 

 BENAZEPRIL HCL 40 MG TABS  1 PO daily        BENAZEPRIL HCL  51459514607  
Active  Bernadine Gomez   

 

 ALBUTEROL SULFATE 0.083 % NEBU SOLN  1 nebulizer treatment q4 hours prn    ALBUTEROL SULFATE  58089803368  No Longer Active  Kavitha Godoy   

 

 ASCENSIA CONTOUR TEST  STRP  Test BS  3 -4 times a day DX: Diabetes    GLUCOSE BLOOD  11279567341  No Longer Active  Kavitha Godoy   

 

 BENADRYL 25 MG CAP  1 po every 6 hours if it does not cause drowsiness.  2007/
10/29    DIPHENHYDRAMINE HCL  86427863269  No Longer Active  Kavitha Godoy   

 

 PROTONIX 40 MG TBEC  1 po qd       PANTOPRAZOLE SODIUM  61331755264  
Active  Bernadine Gomez   

 

 FISH OIL 1000 MG CAPS  1 PO daily       OMEGA-3 FATTY ACIDS  
13268063266  Active  Kavitha Godoy   

 

 ZETIA 10 MG TABS  1 PO daily for cholesterol    
EZETIMIBE  57950085964  No Longer Active  Kavitha Godoy   

 

 LIPITOR 10 MG TAB  1 PO daily       ATORVASTATIN CALCIUM  
24140817566  Active  Bernadine Gomez   

 

 ZOFRAN ODT 8 MG TBDP  I PO Q6hrs prn    ONDANSETRON  
81367341636  No Longer Active  Kavitha Godoy   

 

 DOCUSATE SODIUM 50 MG/15ML SYRP  3 drops each ear.  Let stand for 15 min. then 
rinse out .  Repeat daily.  2008/09/15    DOCUSATE SODIUM  
26642732940  No Longer Active  Kavitha oGdoy   

 

 ASCENSIA CONTOUR MONITOR  KIT  DX: Diabetes    BLOOD 
GLUCOSE MONITORING SUPPL  82796689590  No Longer Active  Kavitha Godoy   

 

 CLEOCIN 150 MG CAPS  1 PO daily    CLINDAMYCIN HCL  
86866341683  No Longer Active  Kavitha Godoy   

 

 BD INSULIN SYRINGE ULTRAFINE 29G X 1/2" 1 ML MISC  as directed for insulin 
injections       INSULIN SYRINGE-NEEDLE U-100  13658273133  Active  
Bernadine Gomez   

 

 BD ULTRA-FINE LANCETS MISC  ad directed to check sugar qid       
LANCETS  42272736599  Active  Bernadine Gomez   

 

 HUMALOG  UNIT/ML SOLN  5 units before meals       INSULIN 
LISPRO (HUMAN)     Active  Kavithapatricia Godoy   

 

 LEVAQUIN 500 MG TAB  1 PO QD  2012/03/15    LEVOFLOXACIN  
54161616422  No Longer Active  Kavitha Godoy   

 

 AUGMENTIN 875-125 MG TAB  1 PO BID    AMOXICILLIN-POT 
CLAVULANATE  78601767156  No Longer Active  Kavithapatricia Godoy   

 

 AUGMENTIN 875-125 MG TAB  1 PO BID x 7 days    
AMOXICILLIN-POT CLAVULANATE  27172221449  No Longer Active  Bernadine Gomez   

 

 LOVENOX 40 MG/0.4ML SOLN  Apply to affected area daily  
  ENOXAPARIN SODIUM  28529574546  No Longer Active  Kavitha Godoy   

 

 FISH OIL 1000 MG CAPS  1 PO daily    OMEGA-3 FATTY ACIDS
  52639395338  No Longer Active  Kavitha Godoy   

 

 ASPIRIN 81 MG TABS  1 PO daily       ASPIRIN  47515564837  No Longer 
Active  Kavitha Godoy   

 

 DOXYCYCLINE HYCLATE 100 MG CAP  1 po BID    DOXYCYCLINE 
HYCLATE  22126116113  No Longer Active  Kavitha Godoy   

 

 PYRIDIUM 200 MG TAB  1 PO TID prn urinary urgency    
PHENAZOPYRIDINE HCL  59231846029  No Longer Active  Bernadine Gomez   

 

 BACTRIM -160 MG TAB  1 PO BID    TRIMETHOPRIM-
SULFAMETHOXAZOLE  52805039813  No Longer Active  Kavitha Godoy   

 

 DOXYCYCLINE HYCLATE 100 MG CAP  1 po BID    DOXYCYCLINE 
HYCLATE  77993210988  No Longer Active  Kavitha Godoy   

 

 BACTRIM -160 MG TAB  1 PO BID    TRIMETHOPRIM-
SULFAMETHOXAZOLE  03583864242  No Longer Active  Kavitha Godoy   

 

 VENTOLIN  (90 BASE) MCG/ACT AERS  2 puffs q6hrs prn       
ALBUTEROL SULFATE  82891732663  Active  Kavitha Kristin Godoy   

 

 CLEOCIN 150 MG CAPS  1 PO daily    CLINDAMYCIN HCL  
85806628902  No Longer Active  Kavitha Kristin Godoy   

 

 VITAMIN D 1000 UNIT TABS  2 PO Daliy    CHOLECALCIFEROL  
70394555826  No Longer Active  Kavitha Kristin Godoy   

 

 SEPTRA -160 MG TABS  1 PO BID    SULFAMETHOXAZOLE-
TRIMETHOPRIM  95275836975  No Longer Active  Kavitha Kristin Godoy   

 

 DOXYCYCLINE HYCLATE 100 MG CAP  1 po BID    DOXYCYCLINE 
HYCLATE  46029279062  No Longer Active  Kavitha Kristin Godoy   

 

 MACROBID 100 MG CAP  1 PO BID  2009/07/10    NITROFURANTOIN MONOHYD 
MACRO  90347207913  No Longer Active  Kavitha Kristin Godoy   

 

 DARVOCET-N 100 100-650 MG TAB  1 PO Q6hrs prn pain    
PROPOXYPHENE N-APAP  09702305641  No Longer Active  Kavitha Kristin Godoy   

 

 PYRIDIUM 200 MG TAB  1 PO TID prn urinary urgency    2009/07/10  
PHENAZOPYRIDINE HCL  52857947883  No Longer Active  Kavitha Kristin Godoy   

 

 MACROBID 100 MG CAP  1 PO BID    NITROFURANTOIN MONOHYD 
MACRO  99188454565  No Longer Active  Kavitha Kristin Godoy   

 

 CLEOCIN 150 MG CAPS  1 PO daily to prevent cellulitis    
CLINDAMYCIN HCL  11519197877  No Longer Active  Kavithapatricia Godoy   

 

 ACCU-CHEK COMPACT STRIPS STRP  Use one strip to check glucose three times 
daily    GLUCOSE BLOOD  79993807625  No Longer Active  
Kavitha Kristin Godoy   

 

 LEVAQUIN 500 MG TAB  1 PO QD  2007/10/23    LEVOFLOXACIN  
38570791319  No Longer Active  Kavitha Kristin Godoy   

 

 GLUCOPHAGE 1000 MG TABS  1 PO BID       METFORMIN HCL  51504870727  
Active  Kavithapatricia Godoy   

 

 BACTROBAN 2 % CREAM  apply to affected area on nose TID for 7 days    MUPIROCIN CALCIUM  28955170253  No Longer Active  Kavithapatricia Godoy   

 

 BENAZEPRIL HCL 20 MG TABS  1 PO daily    BENAZEPRIL HCL  
80138739913  No Longer Active  Kavithapatricia Godoy   

 

 LOTREL 10-40 MG CAPS  1 PO daily       AMLODIPINE BESY-BENAZEPRIL 
HCL  11573151458  No Longer Active  Bernadine Gomez   

 

 FLEXERIL 10 MG TABS  1 by mouth 3 times a day as needed    CYCLOBENZAPRINE HCL  71353893177  No Longer Active  Kavitha Godoy   

 

 LORTAB 5 5-500 MG TAB  1 by mouth every 6 hours as needed for pain    ACETAMINOPHEN-HYDROCODONE  85800189197  No Longer Active  Kavithapatricia Godoy   

 

 HYDROCHLOROTHIAZIDE 12.5 MG CAPS  1 PO QD       HYDROCHLOROTHIAZIDE  
98450903559  Active  Bernadine Gomez   

 

 OMACOR 1 GM CAPS  4 PO daily    OMEGA-3-ACID ETHYL 
ESTERS  15213892708  No Longer Active  Kavitha Godoy   

 

 AUGMENTIN 875-125 MG TABS  1 PO BID    AMOXICILLIN-POT 
CLAVULANATE  68113392184  No Longer Active  Kavithapatricia Godoy   

 

 TYLENOL 325 MG TAB  as directed    ACETAMINOPHEN  
28827049710  No Longer Active  Kavithapatricia Godoy   

 

 DOCUSATE SODIUM 50 MG/15ML SYRP  3 drops each ear.  Let stand for 15 min. then 
rinse out with ball suction.  Repeat daily.    DOCUSATE 
SODIUM  34740368297  No Longer Active  Kavitha Godoy   

 

 BACTROBAN 2 % CREA  apply TID    MUPIROCIN CALCIUM  
50477771653  No Longer Active  Kavithapatricia Godoy   

 

 HYDROCODONE-ACETAMINOPHEN 5-500 MG TABS  1 q4-6hrs prn  
  HYDROCODONE-ACETAMINOPHEN  51681949982  No Longer Active  Kavithapatricia Godoy   

 

 LOTRISONE 1-0.05 % CREA  apply BID to left leg    
CLOTRIMAZOLE-BETAMETHASONE  87754500417  No Longer Active  Kavithapatricia Godoy
   

 

 GLIPIZIDE 5 MG TAB  1 PO 0630 and 1 1/2 pills 1700       GLIPIZIDE  
41702771782  Active  Joycemichelle Lerma   

 

 NORVASC 5 MG TABS  1 po qd    AMLODIPINE BESYLATE  
98333374824  No Longer Active  Kavithapatricia Godoy   

 

 ISAURA-E  1 po qd JOINT PAIN    ISAURA-E     No Longer Active  
Kavithapatricia Godoy   

 

 COLACE 60 MG/15ML SYRP  Use daily for cerumen removal.  
  DOCUSATE SODIUM  42328831383  No Longer Active  Kavithapatricia Godoy   

 

 MULTIVITAMINS TABS  1 po daily    MULTIPLE VITAMIN  
20742811979  No Longer Active  Kavithapatricia Godoy   

 

 VITAMIN C 1000 MG TABS  1 po daily    ASCORBIC ACID  
94429081357  No Longer Active  Kavithapatricia Godoy   

 

 VITAMIN E 400 IU CAPS  2 po daily    VITAMIN E  
87240735360  No Longer Active  Kavithapatricia Godoy   

 

 AMBIEN 10 MG TAB  1 prn    ZOLPIDEM TARTRATE  
62913685109  No Longer Active  Kavithapatricia Godoy   

 

 ECONAZOLE NITRATE 1 % CREA  as directed    ECONAZOLE 
NITRATE  [id number]  No Longer Active  Kavithapatricia Godoy   

 

 KEFLEX 500 MG CAPS  1 qid x 7days    CEPHALEXIN  
[id number]  No Longer Active  Kavithapatricia Godoy   

 

 ECONAZOLE NITRATE 1 % CREA  Apply BID x 7 days  2004/10/18  2004/10/25  
ECONAZOLE NITRATE  [id number]  No Longer Active  Kavihta Kristin Godoy   

 

 KEFLEX 500 MG CAP  1 Po QID for 14 days  2004/10/15  2004/10/22  CEPHALEXIN  
[id number]  No Longer Active  Kavitha Kristin Godoy   

 

 ALEVE 220 MG TABS  1 PO BID       NAPROXEN SODIUM  [id number]  No 
Longer Active  Kavitha Kristin Godoy   

 

 MOTRIN 600 MG TABS  1 po q 6 hrs.       IBUPROFEN  [id number]  No 
Longer Active  Kavitha Kristin Godoy   

 

 NASAL SPRAY SALINE 0.65 % SOLN  puffs 2 puffs BID    
SALINE  [id number]  No Longer Active  Kavitha Kristin Godoy   

 

 AMOXIL 875 MG TABS  1 PO BID    AMOXICILLIN  [id number]
  No Longer Active  Kavitha Kristin Godoy   

 

 LOTENSIN 20 MG TABS  1 PO QD       BENAZEPRIL HCL  [id number]  No 
Longer Active  Kavitha Kristin Godoy   

 

 AMARYL 4 MG TABS  1 tab po bid    GLIMEPIRIDE  
[id number]  No Longer Active  Kavitha Kristin Godoy   

 

 GLUCOPHAGE  MG TB24  1 po daily    METFORMIN HCL  
[id number]  No Longer Active  Kavitha Kristin Godoy   







Immunizations







 Vaccine  Administration Date  Value  Standard Description

 

 Influenza vaccine given    Done  influenza virus vaccine, 
unspecified formulation

 

 pneumococcal immunization administered    1st dose  pneumococcal 
polysaccharide vaccine, 23 valent

 

 Influenza vaccine given    Done 10.24.  influenza virus vaccine, 
unspecified formulation







Vital Signs







 Date  Name  Value  Unit  Range  Description

 

   blood pressure, diastolic - 8462-4  82  mm[Hg]     BP lara

 

   blood pressure, systolic - 8480-6  156  mm[Hg]     BP sys

 

   pulse rate E&M - 8867-4  88  /min     Heart rate

 

   respiratory rate E&M - 9279-1  14  /min     Resp rate

 

   temperature E&M  98.6  [degF]     Body temperature

 

   weight E&M - 3141-9  295  [lb_av]     Weight Measured

 

   pulse rate E&M - 8867-4  68  /min     Heart rate

 

   respiratory rate E&M - 9279-1  14  /min     Resp rate

 

   weight E&M - 3141-9  310  [lb_av]     Weight Measured

 

   blood pressure, diastolic - 8462-4  80  mm[Hg]     BP lara

 

   blood pressure, systolic - 8480-6  145  mm[Hg]     BP sys

 

   pulse rate E&M - 8867-4  80  /min     Heart rate

 

   respiratory rate E&M - 9279-1  14  /min     Resp rate

 

   temperature E&M  98.6  [degF]     Body temperature

 

   weight E&M - 3141-9  310  [lb_av]     Weight Measured

 

   blood pressure, diastolic - 8462-4  82  mm[Hg]     BP lara

 

   blood pressure, systolic - 8480-6  156  mm[Hg]     BP sys

 

   pulse rate E&M - 8867-4  76  /min     Heart rate

 

   respiratory rate E&M - 9279-1  14  /min     Resp rate

 

 2014/10/30  blood pressure, diastolic - 8462-4  74  mm[Hg]     BP lara

 

 2014/10/30  blood pressure, systolic - 8480-6  142  mm[Hg]     BP sys

 

 2014/10/30  pulse rate E&M - 8867-4  85  /min     Heart rate

 

 2014/10/30  respiratory rate E&M - 9279-1  14  /min     Resp rate

 

 2014/10/15  blood pressure, diastolic - 8462-4  80  mm[Hg]     BP lara

 

 2014/10/15  blood pressure, systolic - 8480-6  130  mm[Hg]     BP sys

 

 2014/10/15  pulse rate E&M - 8867-4  78  /min     Heart rate

 

 2014/10/15  respiratory rate E&M - 9279-1  14  /min     Resp rate

 

 2014/10/06  blood pressure, diastolic - 8462-4  88  mm[Hg]     BP lara

 

 2014/10/06  blood pressure, systolic - 8480-6  162  mm[Hg]     BP sys

 

 2014/10/06  pulse rate E&M - 8867-4  82  /min     Heart rate

 

 2014/10/06  respiratory rate E&M - 9279-1  16  /min     Resp rate

 

   blood pressure, diastolic - 8462-4  70  mm[Hg]     BP lara

 

   blood pressure, systolic - 8480-6  120  mm[Hg]     BP sys

 

   pulse rate E&M - 8867-4  88  /min     Heart rate

 

   respiratory rate E&M - 9279-1  14  /min     Resp rate

 

   temperature E&M  98.6  [degF]     Body temperature

 

   weight E&M - 3141-9  302  [lb_av]     Weight Measured

 

   blood pressure, diastolic - 8462-4  68  mm[Hg]     BP lara

 

   blood pressure, systolic - 8480-6  140  mm[Hg]     BP sys

 

   pulse rate E&M - 8867-4  78  /min     Heart rate

 

   respiratory rate E&M - 9279-1  14  /min     Resp rate

 

   temperature E&M  98.6  [degF]     Body temperature

 

   weight E&M - 3141-9  302  [lb_av]     Weight Measured

 

   blood pressure, diastolic - 8462-4  70  mm[Hg]     BP lara

 

   blood pressure, systolic - 8480-6  180  mm[Hg]     BP sys

 

   pulse rate E&M - 8867-4  88  /min     Heart rate

 

   respiratory rate E&M - 9279-1  14  /min     Resp rate

 

   temperature E&M  98.6  [degF]     Body temperature

 

   weight E&M - 3141-9  302  [lb_av]     Weight Measured







Diagnostic Results







 Date  Name  Value  Unit  Range  Description

 

 Clinical Lists Update: CBC - Hematology 

 

 2014/10/23  red blood cell distribution width  15.5  %      

 

   hematocrit, blood  40  %      

 

 2014/10/28  red blood cell distribution width  15.1  %      

 

 2014/10/29  red blood cell distribution width  15.0  %      

 

   red blood cell distribution width  15.2  %      

 

   red blood cell distribution width  15.2  %      

 

   red blood cell distribution width  16.2  %      

 

 2014/10/23  mean corpuscular volume, RBC  88  fL      

 

 2014/10/27  mean corpuscular volume, RBC  88  fL      

 

 2014/10/28  mean corpuscular volume, RBC  88  fL      

 

 2014/10/29  mean corpuscular volume, RBC  88  fL      

 

   mean corpuscular volume, RBC  88  fL      

 

   mean corpuscular volume, RBC  88  fL      

 

   mean corpuscular volume, RBC  89  fL      

 

 2014/10/23  leukocyte count, blood  8.7  10*3/mm3      

 

 2014/10/27  leukocyte count, blood  7.5  10*3/mm3      

 

 2014/10/28  leukocyte count, blood  7.5  10*3/mm3      

 

 2014/10/29  leukocyte count, blood  8.3  10*3/mm3      

 

   leukocyte count, blood  8.3  10*3/mm3      

 

   leukocyte count, blood  8.3  10*3/mm3      

 

   leukocyte count, blood  8.0  10*3/mm3      

 

 2014/10/23  erythrocyte (RBC) count  4.36  10*6/mm3      

 

 2014/10/27  erythrocyte (RBC) count  4.31  10*6/mm3      

 

 2014/10/28  erythrocyte (RBC) count  4.31  10*6/mm3      

 

 2014/10/29  erythrocyte (RBC) count  4.37  10*6/mm3      

 

   erythrocyte (RBC) count  4.51  10*6/mm3      

 

   erythrocyte (RBC) count  4.51  10*6/mm3      

 

   erythrocyte (RBC) count  4.52  10*6/mm3      

 

 2014/10/23  platelet count  29  10*3/mm3      

 

 2014/10/27  platelet count  59  10*3/mm3      

 

 2014/10/28  platelet count  59  10*3/mm3      

 

 2014/10/29  platelet count  61  10*3/mm3      

 

   platelet count  99  10*3/mm3      

 

   platelet count  99  10*3/mm3      

 

   platelet count  137  10*3/mm3      

 

 2014/10/23  hemoglobin, blood  12.3  g/dL      

 

 2014/10/27  hemoglobin, blood  12.1  g/dL      

 

 2014/10/28  hemoglobin, blood  12.1  g/dL      

 

 2014/10/29  hemoglobin, blood  12.1  g/dL      

 

   hemoglobin, blood  12.6  g/dL      

 

   hemoglobin, blood  12.6  g/dL      

 

   hemoglobin, blood  12.7  g/dL      

 

 2014/10/23  hematocrit, blood  38  %      

 

 2014/10/27  hematocrit, blood  38  %      

 

 2014/10/28  hematocrit, blood  38  %      

 

 2014/10/29  hematocrit, blood  38  %      

 

   hematocrit, blood  40  %      

 

   hematocrit, blood  40  %      

 

 2014/10/27  red blood cell distribution width  15.1  %      

 

 Clinical Lists Update: CBC    - Hematology 

 

   red blood cell distribution width  16.0  %      

 

   mean corpuscular volume, RBC  90  fL      

 

   leukocyte count, blood  7.5  10*3/mm3      

 

   hematocrit, blood  36  %      

 

   platelet count  163  10*3/mm3      

 

   hemoglobin, blood  11.5  g/dL      

 

   erythrocyte (RBC) count  4.01  10*6/mm3      

 

 Clinical Lists Update: CBC,CMP,CHOL,TRIG,HGA1C,FERRITIN - Chemistry 

 

   Estimated Glomerular Filtration Rate (calc)  74  mL/min/1.73m2    
  

 

   glucose, plasma fasting  128  mg/dL      

 

   albumin, serum  3.8  g/dL      

 

   alkaline phosphatase, serum  130  U/L      

 

   urea nitrogen, blood  13  mg/dL      

 

   calcium, serum  9.0  mg/dL      

 

   chloride, serum  101  mmol/L      

 

   cholesterol, serum  118  mg/dL      

 

   anion gap, serum  10         

 

   sodium, serum  134  mmol/L      

 

   triglyceride, serum, fasting  175  mg/dL      

 

   bilirubin, serum, total  0.9  mg/dL      

 

   alanine aminotransferase (SGPT), serum  26  U/L      

 

   aspartate aminotransferase (SGOT), serum  29  U/L      

 

   protein, total, serum  6.3  g/dL      

 

   potassium, serum  4.6  mmol/L      

 

   hemoglobin A1C, blood, as % of total hemoglobin  5.9  %      

 

   ferritin, serum  248.9  ng/mL      

 

   creatinine, serum  0.8  mg/dL      

 

   carbon dioxide, venous blood  28.0  mmol/L      

 

 Clinical Lists Update: CBC,CMP,CHOL,TRIG,HGA1C,FERRITIN - Hematology 

 

   hemoglobin, blood  11.1  g/dL      

 

   platelet count  199  10*3/mm3      

 

   erythrocyte (RBC) count  3.92  10*6/mm3      

 

   leukocyte count, blood  6.7  10*3/mm3      

 

   mean corpuscular volume, RBC  93  fL      

 

   red blood cell distribution width  19.5  %      

 

   hematocrit, blood  36.3  %      

 

 Clinical Lists Update: CBC,CMP,Chol,Trig,Ferritin,HgA1c - Chemistry 

 

 2014/10/02  Estimated Glomerular Filtration Rate (calc)  57  mL/min/1.73m2    
  

 

 2014/10/02  glucose, plasma fasting  108  mg/dL      

 

 2014/10/02  anion gap, serum  13         

 

 2014/10/02  sodium, serum  135  mmol/L      

 

 2014/10/02  triglyceride, serum, fasting  109  mg/dL      

 

 2014/10/02  bilirubin, serum, total  0.7  mg/dL      

 

 2014/10/02  alanine aminotransferase (SGPT), serum  25  U/L      

 

 2014/10/02  aspartate aminotransferase (SGOT), serum  26  U/L      

 

 2014/10/02  protein, total, serum  6.4  g/dL      

 

 2014/10/02  potassium, serum  4.6  mmol/L      

 

 2014/10/02  hemoglobin A1C, blood, as % of total hemoglobin  7.4  %      

 

 2014/10/02  ferritin, serum  14.9  ng/mL      

 

 2014/10/02  creatinine, serum  1.0  mg/dL      

 

 2014/10/02  carbon dioxide, venous blood  25.0  mmol/L      

 

 2014/10/02  cholesterol, serum  121  mg/dL      

 

 2014/10/02  chloride, serum  102  mmol/L      

 

 2014/10/02  calcium, serum  8.6  mg/dL      

 

 2014/10/02  urea nitrogen, blood  15  mg/dL      

 

 2014/10/02  alkaline phosphatase, serum  121  U/L      

 

 2014/10/02  albumin, serum  3.8  g/dL      

 

 Clinical Lists Update: CBC,CMP,Chol,Trig,Ferritin,HgA1c - Hematology 

 

 2014/10/02  leukocyte count, blood  6.0  10*3/mm3      

 

 2014/10/02  mean corpuscular volume, RBC  91  fL      

 

 2014/10/02  platelet count  65  10*3/mm3      

 

 2014/10/02  hemoglobin, blood  12.0  g/dL      

 

 2014/10/02  hematocrit, blood  40  %      

 

 2014/10/02  erythrocyte (RBC) count  4.37  10*6/mm3      

 

 2014/10/02  red blood cell distribution width  17.7  %      

 

 Clinical Lists Update: CBC,CMP,FLP  IN PT LABS - Chemistry 

 

   very low density lipoproteins  27  mg/dL      

 

   Estimated Glomerular Filtration Rate (calc)  >60  mL/min/1.73m2   
   

 

   triglyceride, serum, fasting  137  mg/dL      

 

   bilirubin, serum, total  0.7  mg/dL      

 

   alanine aminotransferase (SGPT), serum  18  U/L      

 

   aspartate aminotransferase (SGOT), serum  25  U/L      

 

   protein, total, serum  6.9  g/dL      

 

   potassium, serum  4.5  mmol/L      

 

   LDL cholesterol, serum  57  mg/dL      

 

   HDL cholesterol, serum  40  mg/dL      

 

   creatinine, serum  0.77  mg/dL      

 

   carbon dioxide, venous blood  21  mmol/L      

 

   cholesterol, serum  117  mg/dL      

 

   chloride, serum  104  mmol/L      

 

   calcium, serum  9.3  mg/dL      

 

   urea nitrogen, blood  13  mg/dL      

 

   alkaline phosphatase, serum  81  U/L      

 

   albumin, serum  3.7  g/dL      

 

   glucose, plasma fasting  128  mg/dL      

 

   sodium, serum  139  mmol/L      

 

 Clinical Lists Update: CBC,CMP,FLP  IN PT LABS - Hematology 

 

   mean corpuscular volume, RBC  90  fL      

 

   leukocyte count, blood  8.8  10*3/mm3      

 

   erythrocyte (RBC) count  4.32  10*6/mm3      

 

   platelet count  198  10*3/mm3      

 

   hemoglobin, blood  12.5  g/dL      

 

   hematocrit, blood  39  %      

 

   red blood cell distribution width  15.4  %      

 

 Clinical Lists Update: CBC,CMP,FLP,TSH - Chemistry 

 

 2015/04/15  creatinine, serum  0.78  mg/dL      

 

 2015/04/15  potassium, serum  4.8  mmol/L      

 

 2015/04/15  HDL cholesterol, serum  42  mg/dL      

 

 2015/04/15  hemoglobin A1C, blood, as % of total hemoglobin  6.3  %      

 

 2015/04/15  thyroid stimulating hormone, serum  1.52  u[iU]/mL      

 

 2015/04/15  LDL cholesterol, serum  22  mg/dL      

 

 2015/04/15  calcium, serum  9.5  mg/dL      

 

 2015/04/15  albumin, serum  3.8  g/dL      

 

 2015/04/15  chloride, serum  106  mmol/L      

 

 2015/04/15  urea nitrogen, blood  22  mg/dL      

 

 2015/04/15  cholesterol, serum  114  mg/dL      

 

 2015/04/15  alkaline phosphatase, serum  105  U/L      

 

 2015/04/15  Estimated Glomerular Filtration Rate (calc)  >60  mL/min/1.73m2   
   

 

 2015/04/15  glucose, plasma fasting  127  mg/dL      

 

 2015/04/15  very low density lipoproteins  27  mg/dL      

 

 2015/04/15  sodium, serum  139  mmol/L      

 

 2015/04/15  triglyceride, serum, fasting  134  mg/dL      

 

 2015/04/15  bilirubin, serum, total  0.8  mg/dL      

 

 2015/04/15  alanine aminotransferase (SGPT), serum  21  U/L      

 

 2015/04/15  aspartate aminotransferase (SGOT), serum  26  U/L      

 

 2015/04/15  protein, total, serum  6.7  g/dL      

 

 2015/04/15  carbon dioxide, venous blood  22  mmol/L      

 

 Clinical Lists Update: CBC,CMP,FLP,TSH - Hematology 

 

 2015/04/15  hematocrit, blood  34  %      

 

 2015/04/15  hemoglobin, blood  10.5  g/dL      

 

 2015/04/15  platelet count  199  10*3/mm3      

 

 2015/04/15  erythrocyte (RBC) count  3.77  10*6/mm3      

 

 2015/04/15  leukocyte count, blood  5.1  10*3/mm3      

 

 2015/04/15  red blood cell distribution width  17.1  %      

 

 2015/04/15  mean corpuscular volume, RBC  89  fL      

 

 Clinical Lists Update: CBC,FERRITIN - Chemistry 

 

   ferritin, serum  11  ng/mL      

 

 Clinical Lists Update: CBC,FERRITIN - Hematology 

 

   hematocrit, blood  30  %      

 

   hemoglobin, blood  9.3  g/dL      

 

   platelet count  203  10*3/mm3      

 

   erythrocyte (RBC) count  3.32  10*6/mm3      

 

   leukocyte count, blood  6.1  10*3/mm3      

 

   mean corpuscular volume, RBC  91  fL      

 

   red blood cell distribution width  14.4  %      

 

 Clinical Lists Update: ER LABS - Chemistry 

 

   Estimated Glomerular Filtration Rate (calc)  >60  mL/min/1.73m2   
   

 

   protein, total, serum  7.1  g/dL      

 

   sodium, serum  134  mmol/L      

 

   bilirubin, serum, total  1.1  mg/dL      

 

   alanine aminotransferase (SGPT), serum  20  U/L      

 

   aspartate aminotransferase (SGOT), serum  32  U/L      

 

   albumin, serum  3.8  g/dL      

 

   alkaline phosphatase, serum  110  U/L      

 

   urea nitrogen, blood  15  mg/dL      

 

   calcium, serum  9.5  mg/dL      

 

   chloride, serum  102  mmol/L      

 

   carbon dioxide, venous blood  22  mmol/L      

 

   creatinine, serum  0.80  mg/dL      

 

   potassium, serum  5.0  mmol/L      

 

   glucose, plasma fasting  152  mg/dL      

 

 Clinical Lists Update: ER LABS - Hematology 

 

   hematocrit, blood  35  %      

 

   hemoglobin, blood  11.0  g/dL      

 

   platelet count  197  10*3/mm3      

 

   erythrocyte (RBC) count  3.87  10*6/mm3      

 

   red blood cell distribution width  16.8  %      

 

   mean corpuscular volume, RBC  89  fL      

 

   leukocyte count, blood  12.6  10*3/mm3      

 

 Clinical Lists Update: ER LABS - Urinalysis 

 

   glucose, urine, semiquantitative  neg         

 

   protein, urine, semiquantitative (dipstick)  1+         

 

   ketones, urine, by test strip  neg         

 

   nitrite, urine, semiquantitative  neg         

 

   pH, urine, semiquantitative  8         

 

   specific gravity, urine  1.10         

 

   urobilinogen, urine, semiquantitative (dipstick)  normal         

 

   appearance, urine  Clear Yellow         

 

   WBC urine on microscopy  0-2  {Cells}/[HPF]      

 

   RBC urine by microscopy  0-2         

 

   bacteria, urine microscopy  neg         

 

   hyaline casts, urine  none  /[LPF]      

 

   epithelial cells, urine  2-5  /[LPF]      

 

   mucus on urinalysis  neg         

 

   blood in urine (hemoglobin) by dipstick  1+         

 

   bilirubin, urine  neg         

 

 Clinical Lists Update: UA - Urinalysis 

 

   blood in urine (hemoglobin) by dipstick  large         

 

   protein, urine, semiquantitative (dipstick)  30         

 

   epithelial cells, urine  5-10  /[LPF]      

 

   hyaline casts, urine  none  /[LPF]      

 

   bacteria, urine microscopy  1+         

 

   RBC urine by microscopy  30-40         

 

   WBC urine on microscopy  10-20  {Cells}/[HPF]      

 

   appearance, urine  Cloudy Red         

 

   urobilinogen, urine, semiquantitative (dipstick)  neg         

 

   specific gravity, urine  1.020         

 

   pH, urine, semiquantitative  6.0         

 

   nitrite, urine, semiquantitative  neg         

 

   ketones, urine, by test strip  neg         

 

   bilirubin, urine  neg         

 

   glucose, urine, semiquantitative  neg         

 

   mucus on urinalysis  none         

 

 Office Visit: Dr Godoy's Check Up: Established Patient Visit - Chemistry 

 

   ferritin, serum  21  ng/mL      

 

   hemoglobin A1C, blood, as % of total hemoglobin  8.1  %      

 

 Office Visit: Dr Godoy's Check Up: Established Patient Visit - Hematology 

 

 2014/10/30  red blood cell distribution width  15.0  %      

 

 2014/10/15  mean corpuscular volume, RBC  86  fL      

 

 2014/10/16  mean corpuscular volume, RBC  37  fL      

 

 2014/10/30  mean corpuscular volume, RBC  88  fL      

 

 2014/10/15  leukocyte count, blood  7.3  10*3/mm3      

 

 2014/10/16  leukocyte count, blood  9.1  10*3/mm3      

 

 2014/10/30  leukocyte count, blood  8.3  10*3/mm3      

 

 2014/10/30  hematocrit, blood  38  %      

 

 2014/10/16  erythrocyte (RBC) count  4.27  10*6/mm3      

 

 2014/10/30  erythrocyte (RBC) count  4.37  10*6/mm3      

 

 2014/10/15  platelet count  39  10*3/mm3      

 

 2014/10/16  platelet count  22  10*3/mm3      

 

 2014/10/30  platelet count  61  10*3/mm3      

 

 2014/10/15  hemoglobin, blood  12.4  g/dL      

 

 2014/10/16  hemoglobin, blood  11.8  g/dL      

 

 2014/10/30  hemoglobin, blood  12.1  g/dL      

 

 2014/10/15  hematocrit, blood  37  %      

 

 2014/10/16  hematocrit, blood  37  %      

 

 2014/10/16  red blood cell distribution width  15.0  %      

 

 2014/10/15  red blood cell distribution width  14.9  %      

 

 2014/10/15  erythrocyte (RBC) count  4.36  10*6/mm3      

 

 Phone Note: Low platelet count - Hematology 

 

 2014/10/08  red blood cell distribution width  17.0  %      

 

 2014/10/08  mean corpuscular volume, RBC  90  fL      

 

 2014/10/08  leukocyte count, blood  5.9  10*3/mm3      

 

 2014/10/08  hematocrit, blood  40  %      

 

 2014/10/08  platelet count  12  10*3/mm3      

 

 2014/10/08  hemoglobin, blood  12.7  g/dL      

 

 2014/10/08  erythrocyte (RBC) count  4.45  10*6/mm3      







Encounters







 Code  Encounter  Date  Provider  Facility

 

 CPT-65423  Ofc Vst, Est Level IV   12:42:41 CDT  Kavitha Godoy DO, FACP

 

 CPT-16607  Ofc Vst, Est Level IV   17:01:58 CDT  Kavitha Godoy DO, FACP

 

 CPT-68091  Ofc Vst, Est Level IV   17:23:42 CST  Kavitha Godoy DO, FACP

 

 CPT-15736  Ofc Vst, Est Level IV   20:14:18 CST  Kavitha Nowaki S Godoy, DO, FACP

 

 CPT-68236  Ofc Vst, Est Level III  2014/10/30 17:10:34 CDT  Kavitha Kristin WELSH Godoy, DO, FACP

 

 CPT-05690  Ofc Vst, Est Level IV  2014/10/16 22:19:26 CDT  Kavitha Kristin WELSH Godoy, DO, FACP

 

 CPT-31466  Ofc Vst, Est Level IV  2014/10/07 16:08:07 CDT  Kavitha Kristin WELSH Godoy, DO, FACP

 

 CPT-83353  Ofc Vst, Est Level II   15:22:36 CDT  Kavitha Kristin WELSH Godoy, DO, FACP

 

 CPT-92283  Ofc Vst, Est Level IV   11:13:04 CDT  Kavithapatricia WELSH Godoy, DO, FACP

 

 CPT-68341  Ofc Vst, Est Level IV   13:20:51 CDT  Kavitha Kristin WELSH Godoy, DO, FACP

 

 CPT-38941  Ofc Vst, Est Level IV   14:23:34 CDT  Kavithapatricia WELSH Godoy, DO, FACP

 

 CPT-64354  Ofc Vst, Est Level III   19:42:36 CDT  Kavithapatricia [facility]

 

 CPT-67774  Ofc Vst, Est Level IV   14:28:10 CST  Kavitha Kristin WELSH Godoy, DO, FACP

 

 CPT-83412  Ofc Vst, Est Level IV   15:20:04 CST  Kavitha WELSH Godoy, DO, FACP

 

 CPT-12967  Ofc Vst, Est Level III  2013/10/15 14:46:42 CDT  Kavitha Kristin WELSH Godoy, DO, FACP

 

 CPT-82236  Ofc Vst, Est Level IV   13:31:14 CDT  Kavitha Kristin WELSH Godoy, DO, FACP

 

 CPT-23840  Ofc Vst, Est Level IV  2013/04/10 16:26:21 CDT  Kavithapatricia WELSH Jayne, DO, FACP

 

 CPT-92935  Ofc Vst, Est Level III   16:02:31 CST  Kavitha WELSH Jayne, DO, FACP

 

 CPT-59019  Ofc Vst, Est Level IV   13:01:46 CST  Kavitha WELSH Jayne, DO, FACP

 

 CPT-15865  Ofc Vst, Est Level IV   10:43:37 CDT  Kavithapatricia WELSH Jayne, DO, FACP

 

 CPT-87812  Ofc Vst, Est Level III   15:41:44 CDT  Kavitha Godoy  [facility]

 

 CPT-11174  Ofc Vst, Est Level IV   16:31:03 CDT  Kavitha Kristin WELSH Jayne, DO, FACP

 

 CPT-85359  Ofc Vst, Est Level III   15:45:49 CDT  Kavithapatricia WELSH Jayne, DO, FACP

 

 CPT-59749  Ofc Vst, Est Level IV  2012/03/15 11:04:49 CDT  Kavitha WELSH Jayne, DO, FACP

 

 CPT-35187  Ofc Vst, Est Level IV   16:16:55 CST  Kavitha WELSH Jayne, DO, FACP

 

 CPT-32462  Ofc Vst, Est Level IV   20:00:20 CST  Kavitha Foster 
Godoy  [facility]

 

 CPT-50163  Ofc Vst, Est Level IV   15:00:56 CST  Kavitha WELSH Jayne, DO, FACP

 

 CPT-53627  Ofc Vst, Est Level IV   11:37:43 CST  Kavitha WELSH Jayne, DO, FACP

 

 CPT-21151  Ofc Vst, Est Level V   11:04:21 CST  Bernadine Houstonsuze WELSH Godoy, DO, FACP

 

 CPT-03884  Ofc Vst, Est Level IV   15:56:58 CDT  Kavitha WELSH Jayne, DO, FACP

 

 CPT-37854  Ofc Vst, Est Level III   15:56:48 CDT  Kavitha Kristin WELSH Jayne, DO, FACP

 

 CPT-38527  Ofc Vst, Est Level IV   11:13:32 CDT  Kavitha Kristin WELSH Jayne, DO, FACP

 

 CPT-82885  Ofc Vst, Est Level III   11:06:29 CDT  Kavitha Kristin WELSH Jayne, DO, FACP

 

 CPT-92179  Ofc Vst, Est Level IV   10:36:27 CDT  Kavitha WELSH Jayne, DO, FACP

 

 CPT-04087  Ofc Vst, Est Level IV   16:28:28 CST  Kavitha WELSH Jayne, DO, FACP

 

 CPT-57771  Ofc Vst, Est Level IV   16:15:53 CST  Kavitha WELSH Jayne, DO, FACP

 

 CPT-90778  Ofc Vst, Est Level IV   10:24:23 CDT  Kavitha WELSH Jayne, DO, FACP

 

 CPT-86120  Ofc Vst, Est Level IV   10:41:52 CDT  Kavithapatricai WELSH Jayne, DO, FACP

 

 CPT-38824  Ofc Vst, Est Level V   14:13:59 CDT  Kavithapatricia Godoy
  [facility]

 

 CPT-45149  Ofc Vst, Est Level IV   11:50:10 CDT  Kavithapatricia WELSH Jayne, DO, FACP

 

 CPT-62896  Ofc Vst, Est Level V   16:45:39 CST  Kavitha WELSH Jayne, DO, FACP

 

 CPT-19163  Ofc Vst, Est Level V   16:35:01 CDT  Kavitha Kristin WELSH Godoy, DO, FACP

 

 CPT-10971  Ofc Vst, Est Level IV  2009/07/10 10:56:36 CDT  Kavitha Kristin WELSH Godoy, DO, FACP

 

 CPT-62935  Ofc Vst, Est Level IV   09:27:02 CDT  Kavitha Kristin WELSH Godoy, DO, FACP

 

 CPT-81529  Ofc Vst, Est Level IV   16:04:39 CST  Kavitha Kristin WELSH Godoy, DO, FACP

 

 CPT-08089  Ofc Vst, Est Level IV  2008/09/15 16:41:31 CDT  Kavitha Kristin WELSH Godoy, DO, FACP

 

 CPT-06808  Ofc Vst, Est Level IV   16:15:36 CDT  Kavitha Kristin WELSH Godoy, DO, FACP

 

 CPT-87163  Ofc Vst, Est Level IV   16:43:23 CST  Kavitha Kristin WELSH Godoy, DO, FACP

 

 CPT-41801  Ofc Vst, Est Level IV   17:09:39 CST  Kavitha Kristin WELSH Godoy, DO, FACP

 

 CPT-65110  Ofc Vst, Est Level III  2007/10/29 16:52:49 CDT  Kavitha Kristin WELSH Godoy, DO, FACP

 

 CPT-39775  Ofc Vst, Est Level III  2007/10/23 16:46:46 CDT  Kavitha Kristin WELSH Godoy, DO, FACP

 

 CPT-46748  Ofc Vst, Est Level IV  2007/10/16 16:17:41 CDT  Kavitha Kristin WELSH Godoy, DO, FACP

 

 CPT-44539  Ofc Vst, Est Level III  2007/10/09 17:15:29 CDT  Kavitha Kristin WELSH Jayne, DO, FACP

 

 CPT-36100  Ofc Vst, Est Level IV   15:48:06 CDT  Kavithapatricia Godoy, DO, FACP

 

 CPT-17391  Ofc Vst, Est Level IV   11:59:27 CDT  Kavitha Kristin Godoy, DO, FACP

 

 CPT-21108  Ofc Vst, Est Level IV   16:31:37 CDT  Kavitha Godoy  [facility]

 

 CPT-99478  Ofc Vst, Est Level IV   16:25:22 CST  Kavitha Godoy  [facility]

 

 CPT-89187  Ofc Vst, Est Level III   18:24:09 CST  Kavitha Godoy  [facility]

 

 CPT-81414  Ofc Vst, Est Level III  2006/10/11 16:54:12 CDT  Kavitha Godoy  [facility]

 

 CPT-25674  Ofc Vst, Est Level IV   16:39:33 CDT  Kavitha Kristin Godoy  [facility]

 

 CPT-10613  Ofc Vst, Est Level IV   15:11:16 CDT  Kavitha Kristin Godoy  [facility]

 

 CPT-31992  Ofc Vst, Est Level IV   17:29:13 CST  Kavitha Godoy  [facility]

 

 CPT-57912  Ofc Vst, Est Level III   12:45:04 CST  Kavitha Godoy  [facility]

 

 CPT-95685  Ofc Vst, Est Level III   16:51:19 CST  Kavitha Godoy  [facility]

 

 CPT-95957  Ofc Vst, Est Level IV   16:59:21 CDT  Kavitha Godoy  [facility]

 

 CPT-78920  Ofc Vst, Est Level IV   09:39:02 CDT  Kavitha Kristin Godoy  [facility]

 

 CPT-50257  Ofc Vst, Est Level IV   18:04:53 CDT  Kavitha Godoy  [facility]

 

 CPT-52769  Ofc Vst, Est Level IV   17:17:47 CST  Kavitha Godoy  [facility]

 

 CPT-70164  Ofc Vst, Est Level IV   17:23:25 CST  Kavitha Godoy  [facility]

 

 CPT-10130  Ofc Vst, Est Level IV   18:44:33 CST  Kavitha Godoy  [facility]

 

 CPT-65280  Ofc Vst, Est Level III  2004/10/28 18:20:20 CDT  Kavitha Kristin Godoy  [facility]

 

 CPT-61480  Ofc Vst, Est Level III  2004/10/18 17:54:31 CDT  Kavitha Kristin Godoy  [facility]

 

 CPT-54625  Ofc Vst, Est Level IV   19:13:31 CDT  Kavitha Kristin Godoy  [facility]

 

 CPT-14080  Ofc Vst, Est Level IV   18:01:08 CDT  Kavitha Kristin Godoy  [facility]

 

 CPT-50924  Ofc Vst, Est Level III   13:36:36 CDT  Kavithapatricia Godoy  [facility]

 

 CPT-92889  Ofc Vst, Est Level III   16:47:12 CDT  Kavitha Godoy  [facility]

 

 CPT-62054  Ofc Vst, Est Level IV   17:34:04 CDT  Kavitha Godoy  [facility]

 

 CPT-95268  Ofc Vst, Est Level III   15:45:39 CST  Kavitha Godoy  [facility]

 

 CPT-10551  Ofc Vst, Est Level III   16:07:05 CST  Kavitha Godoy  [facility]

 

 CPT-24378  Ofc Vst, Est Level III   16:19:57 CST  Kavitha Godoy  [facility]

 

 CPT-87016  Ofc Vst, Est Level III   16:46:48 CST  Kavitha Godoy  [facility]

 

 CPT-07318  Ofc Vst, Est Level II   17:30:36 CST  Kavitha Godoy  [facility]

 

 CPT-29161  Ofc Vst, Est Level III   17:20:59 [facility]  Kavitha Godoy  [facility]

 

 CPT-34202  Ofc Vst, Est Level III   17:49:24 [facility]  Kavitha Godoy  [facility]

 

 CPT-08297  Ofc Vst, New Level III   17:10:24 [facility]  Kavitha Godoy  [facility]

 

 CPT-24092  Ofc Vst, New Level IV   17:21:37 [facility]  Kavitha Godoy  [facility]







Procedures







 Code  Procedure Name  Date  Entry Date  Standard Description

 

 CPT-  Medicare Annual Wellness Visit   15:52:28 CDT  
   

 

 CPT-  E-Prescribing Medication Sent   19:42:36 CDT   
  

 

 CPT-  E-Prescribing Not sent due to no medication given   14:28:
10 CST     

 

 CPT-  E-Prescribing Not sent due to no medication given   15:20:
04 CST     

 

 CPT-  E-Prescribing Not sent due to no medication given  2013/10/15 14:46:
42 CDT  2013/10/15   

 

 CPT-  E-Prescribing Not sent due to no medication given   13:31:
14 CDT     

 

 CPT-  E-Prescribing Medication Sent   16:36:19 CDT  2013/07/10 
  

 

 CPT-  Medicare Annual Wellness Visit Initial   16:36:19 CDT  
2013/07/10   

 

 CPT-  E-Prescribing Not sent due to no medication given  2013/04/10 16:26:
21 CDT     

 

 CPT-  E-Prescribing Medication Sent   16:02:31 CST   
  

 

 CPT-  E-Prescribing Not sent due to no medication given   13:01:
46 CST     

 

 CPT-  E-Prescribing Not sent due to no medication given   10:43:
37 CDT     

 

 CPT-86948  Injection, Pneumovax   14:03:27 CDT     

 

 CPT-46556  Ear Wax Removal  2008/10/17 11:05:06 CDT  2008/10/17   

 

 CPT-85787  Ear Wax Removal   10:22:58 CDT     

 

 CPT-77429  Cryopathy Skin   09:39:02 CDT     

 

 CPT-83963  Ear Wax Removal   17:30:36 CST     

 

 CPT-61565  Ear Wax Removal   17:20:59 CST  
[facility] Clinical Summary

 Created on: 06/15/2015



Madison Young

External Reference #: [id number]

: 1945

Sex: Female



Demographics







 Address  [address] [place] [address]

 

 Home Phone  [phone]

 

 Preferred Language  Unknown

 

 Marital Status  W

 

 S[employer] Affiliation  Unknown

 

 Race  White

 

 Ethnic Group  Not  or 





Author







 Author  User, MELISSA

 

 Organization  [facility]

 

 Address  Unknown

 

 Phone  Unavailable







Allergies, Adverse Reactions, Alerts







 Allergy Name  Reaction Description  Start Date  Severity  Status  Provider

 

 ZINC OXIDE       Critical  Active  Kavitha Godoy







Conditions or Problems







 Problem Name  Problem Code  Onset Date  Status  Entry Date  Provider  Comment  
Standard Description  Annotate

 

 DIABETES MELLITUS, TYPE II, UNCONTROLLED, W/O COMPLICATIONS  250.02     
Correction    Kavitha Godoy     Diabetes mellitus without 
mention of complication, type II or unspecified type, uncontrolled   

 

 DIABETES MELLITUS, TYPE I, UNCONTROLLED, WITH COMPLICATIONS  250.93     
Correction    Kavitha Godoy     Diabetes mellitus with 
unspecified complication, type I [juvenile type], uncontrolled   

 

 DIABETES MELLITUS, TYPE II, UNCONTROLLED, WITH COMPLICATIONS  250.92    Active    Kavitha Godoy     Diabetes mellitus with 
unspecified complication, type II or unspecified type, uncontrolled   

 

 OBESITY  278.00     Resolved    Kavitha Godoy     Obesity, 
unspecified   

 

 ELEVATED BLOOD PRESSURE WITHOUT DIAGNOSIS OF HYPERTENSION  796.2    
Correction    Kavitha Godoy     Elevated blood pressure 
reading without diagnosis of hypertension   

 

 ARTHRITIS, RHEUMATOID  714.0     Resolved    Kavitha Godoy   
  Rheumatoid arthritis   

 

 LIVER FUNCTION TESTS, ABNORMAL, HX OF  V12.2     Resolved    Kavitha Godoy     Personal history of endocrine, metabolic, and immunity 
disorders   

 

 CERUMEN IMPACTION, BILATERAL  380.4    Resolved    Kavitha Godoy     Impacted cerumen   

 

 CERUMEN IMPACTION, BILATERAL  380.4    Resolved    Kavitha Godoy     Impacted cerumen   

 

 DECREASED HEARING  389.10    Resolved    Kvaitha Godoy     Sensorineural hearing loss, unspecified  due to cerumen presumed

 

 History of ANEMIA  285.9     Correction    Kavitha Godoy     
Anemia, unspecified   

 

 POLYARTHRALGIA  719.49     Active    Kavitha Godoy     Pain 
in joint involving multiple sites   

 

 NEPHROPATHY, DIABETIC  250.40    Resolved    Kavitha Godoy     Diabetes mellitus with renal manifestations, type II or unspecified 
type, not stated as uncontrolled   

 

 HYPERCHOLESTEROLEMIA  272.0    Active    Kavitha Godoy     Pure hypercholesterolemia   

 

 HYPERTRIGLYCERIDEMIA  272.1    Active    Kavitha Godoy     Pure hyperglyceridemia   

 

 HYPERTENSION, BENIGN ESSENTIAL, UNCONTROLLED  401.1    Active    Kavitha Godoy     Benign essential hypertension   

 

 PHARYNGITIS, ACUTE  462    Resolved    Kavitha Godoy     Acute pharyngitis   

 

 DYSPHAGIA  787.2    Resolved    Kavitha Godoy     
Dysphagia   

 

 INSOMNIA, TRANSIENT  307.41    Resolved    Kavitha Godoy     Transient disorder of initiating or maintaining sleep   

 

 CELLULITIS  682.9    Resolved    Kavitha Godoy     
Cellulitis and abscess of unspecified sites  foot

 

 CELLULITIS  682.9  2004/10/28  Resolved  2004/10/28  Kavitha Godoy     
Cellulitis and abscess of unspecified sites   

 

 DERMATOPHYTOSIS, FOOT  110.4    Resolved    Kavitha Godoy     Dermatophytosis of foot   

 

 DERMATITIS  692.9    Resolved    Kavitha Godoy     
Contact dermatitis and other eczema, unspecified cause   

 

 KNEE PAIN  719.46    Resolved    Kavitha Godoy     
Pain in joint involving lower leg  right

 

 SHOULDER PAIN  719.41    Resolved    Kavitha Godoy 
    Pain in joint involving shoulder region  right

 

 SKIN LESION  709.9    Resolved    Kavitha Godoy     
Unspecified disorder of skin and subcutaneous tissue   

 

 FEVER  780.6    Resolved    Kavitha Godoy     Fever 
and other physiologic disturbances of temperature regulation   

 

 COUGH  786.2    Resolved    Kavitha Godoy     Cough
   

 

 SCREENING MAMMOGRAM NEC  V76.12    Resolved    Kavitha Godoy     Other screening mammogram   

 

 SCREENING FOR OSTEOPOROSIS  V82.81    Resolved    Kavitha Godoy     Screening for osteoporosis   

 

 MICROALBUMINURIA  791.0    Active    Kavitha Godoy 
    Proteinuria   

 

 HX, PERSONAL, PAST NONCOMPLIANCE  V15.81    Resolved    
Kavitha Godoy     Personal history of noncompliance with medical 
treatment, presenting hazards to health   

 

 BACK PAIN  724.5    Resolved    Kavitha Godoy     
Backache, unspecified   

 

 NEOPLASM, SKIN, UNCERTAIN BEHAVIOR  238.2    Resolved    
Kavitha Godoy     Neoplasm of uncertain behavior of skin  right upper lip

 

 SCIATICA/HERNIATED DISC  722.10  2006/10/11  Resolved    Kavitha Godoy     Displacement of lumbar intervertebral disc without 
myelopathy   

 

 IMPETIGO  684    Resolved    Kavitha Godoy     
Impetigo  right nare

 

 LYMPHEDEMA NEC  457.1    Active    Kavitha Godoy   
  Other lymphedema   

 

 CELLULITIS  682.9    Resolved    Kavitha Godoy     
Cellulitis and abscess of unspecified sites   

 

 OSTEOMYELITIS NOS, ANKLE/FOOT  730.27  2007/10/09  Resolved    Kavitha Godoy     Unspecified osteomyelitis involving ankle and foot   

 

 URTICARIA, ACUTE  708.9  2007/10/29  Resolved    Kavitha Godoy     Unspecified urticaria   

 

 RENAL CALCULUS  592.0  2009/07/10  Active  2009/07/10  Kavitha Godoy   
  Calculus of kidney   

 

 DEHYDRATION  276.51  2009/07/10  Resolved    Kavitha Godoy   
  Dehydration   

 

 CHOLELITHIASIS, ASYMPTOMATIC  574.20    Resolved    Kavitha Godoy     Calculus of gallbladder without mention of cholecystitis, 
without mention of obstruction   

 

 PNEUMONIA  486    Resolved    Kavitha Godoy     
Pneumonia, organism unspecified   

 

 RESTLESS LEG SYNDROME  333.94    Resolved    Kavitha Godoy     Restless legs syndrome (RLS)   

 

 FREQUENCY, URINARY  788.41    Resolved    aKvitha Godoy     Urinary frequency   

 

 FEVER PRESENTING CONDITIONS CLASSIFIED ELSEWHERE  780.61    Resolved
    Kavitha Godoy     Fever presenting with conditions 
classified elsewhere   

 

 PVD  443.9    Resolved    Kavitha Godoy     
Peripheral vascular disease, unspecified   

 

 EDEMA LEG  782.3    Resolved    Kavitha Godoy     
Edema   

 

 LEG PAIN, LEFT  729.5    Resolved    Kavitha Godoy 
    Pain in limb   

 

 URINARY FREQUENCY  788.41    Resolved    Kavitha Godoy     Urinary frequency   

 

 VACCINE AGAINST STREPTOCOCCUS PNEUMONIAE  V03.82    Resolved    Kavitha Godoy     Need for prophylactic vaccination against 
Streptococcus pneumoniae [pneumococcus]   

 

 SPINAL STENOSIS, LUMBAR  724.02    Resolved    Kavitha Godoy     Spinal stenosis of lumbar region without neurogenic 
claudication   

 

 KNEE PAIN  719.46    Resolved    Kavitha Godoy     
Pain in joint involving lower leg   

 

 CORONARY ATHEROSCLEROSIS, NATIVE VESSEL  414.01    Active    Kavitha Godoy     Coronary atherosclerosis of native coronary artery
   

 

 ANEMIA, IRON DEFICIENCY NEC  280.8    Active    Kavitha Godoy     Other specified iron deficiency anemias   

 

 NAUSEA AND VOMITING  787.01    Resolved    Kavitha Godoy     Nausea with vomiting   

 

 UTI  599.0    Resolved    Kavitha Godoy     Urinary 
tract infection, site not specified   

 

 GROSS HEMATURIA  599.71    Resolved    Kavitha Godoy     Gross hematuria   

 

 CELLULITIS  682.9    Resolved    Kavitha Godoy     
Cellulitis and abscess of unspecified sites   

 

 IMMUNE THROMBOCYTOPENIC PURPURA  287.31  2014/10/15  Active  2014/10/16  Kavitha Godoy     Immune thrombocytopenic purpura   

 

 PVD  443.9    Active    Kavitha Godoy     
Peripheral vascular disease, unspecified   







Medication List







 Medication  Instructions  Start Date  Stop Date  Generic Name  NDC  Status  
Provider  Patient Instruction

 

 ONETOUCH ULTRA BLUE STRP  TEST BS QID DX: 250.92  2015/06/15     GLUCOSE BLOOD
  [id number]  Active  Bernadine Gomez   

 

 POTASSIUM CITRATE ER 15 MEQ (1620 MG) CR-TABS  1 PO BID        POTASSIUM 
CITRATE  05077938874  Active  Kavitha Godoy   

 

 HYDROCODONE-ACETAMINOPHEN 5-325 MG TABS  1 PO BID prn    
HYDROCODONE-ACETAMINOPHEN  13438403375  No Longer Active  Kavitha Godoy 
  

 

 PEN NEEDLES 5/16" 31G X 8 MM MISC  as directed    
INSULIN PEN NEEDLE  75827485642  No Longer Active  Kavitha Godoy   

 

 GLUCOMETER STRIPS  CHECK BS QID    GLUCOMETER STRIPS     
No Longer Active  Kavitha Godoy   

 

 GLUCOMETER MACHINE  AS DIRECTED    GLUCOMETER MACHINE   
  No Longer Active  Kavitha Godoy   

 

 CLEOCIN 150 MG CAPS  1 po daily    CLINDAMYCIN HCL  
36090792766  No Longer Active  Kavitha Godoy   

 

 FERREX 150 150 MG CAPS  1 PO daily as directed  2014/10/06    
POLYSACCHARIDE IRON COMPLEX  09249342516  No Longer Active  Kavitha Godoy   

 

 PREDNISONE 20 MG TABS  TAKE 5 TABS PO DAILY  2014/10/13    
PREDNISONE  83513741851  No Longer Active  Kavitha Godoy   

 

 BRILINTA 90 MG TABS  1 PO BID       TICAGRELOR  41193450856  Active  
Kavitha Godoy   

 

 ASPIRIN 81 MG TAB  1 PO daily       ASPIRIN  17911872434  Active  
Kavitha Godoy   

 

 COREG 12.5 MG TABS  1 PO BID        CARVEDILOL  38565027534  Active  Kavitha Godoy   

 

 PLAVIX 75 MG TABS  1 PO QD    CLOPIDOGREL BISULFATE  
22722075821  No Longer Active  Kavithapatricia Godoy   

 

 AUGMENTIN 500-125 MG TAB  1 PO BID    AMOXICILLIN-POT 
CLAVULANATE  75449301074  No Longer Active  Kavitha Kristin Godoy   

 

 ALBUTEROL SULFATE 0.083 % NEBU SOLN  1 treatment TID prn       
ALBUTEROL SULFATE  40094025460  Active  Kavitha Kristin Godoy   

 

 ROBITUSSIN A-C  MG/5ML SYRUP  1 teaspoon PO Q 4-6 hr prn    ROBITUSSIN A-C  MG/5ML SYRUP     No Longer Active  Kavithapatricia Godoy   

 

 PEN NEEDLES " 31G X 8 MM MISC  as directed       INSULIN PEN 
NEEDLE  05109480310  Active  Bernadine Gomez   

 

 LANTUS SOLOSTAR 100 UNIT/ML SOLN  50 units SQ at night       INSULIN 
GLARGINE  75537868119  Active  Bernadine Gomez   

 

 CEFDINIR 300 MG CAPS  1 PO BID    2014/04/15  CEFDINIR  18349264173  
No Longer Active  Kavithapatricia Godoy   

 

 ZOFRAN ODT 8 MG TBDP  1 PO Q6hrs prn nausea    2014/04/15  
ONDANSETRON  31878320543  No Longer Active  Kavitha Godoy   

 

 ASPIRIN 81 MG TAB  1 PO daily       ASPIRIN  44157623767  No Longer 
Active  Kavithapatricia Godoy   

 

 BENAZEPRIL HCL 40 MG TABS  1 PO daily        BENAZEPRIL HCL  71925964220  
Active  Bernadine Gomez   

 

 NORVASC 10 MG TAB  1 PO QD        AMLODIPINE BESYLATE  73439340427  Active  
Kavithapatricia Godoy   

 

 ALBUTEROL SULFATE 0.083 % NEBU SOLN  1 nebulizer treatment q4 hours prn    ALBUTEROL SULFATE  34007222809  No Longer Active  Kavitha Godoy   

 

 ASCENSIA CONTOUR TEST  STRP  Test BS  3 -4 times a day DX: Diabetes    GLUCOSE BLOOD  51688164999  No Longer Active  Kavithapatricia Rainner   

 

 BENADRYL 25 MG CAP  1 po every 6 hours if it does not cause drowsiness.  2007/
10/29    DIPHENHYDRAMINE HCL  40108947838  No Longer Active  Kavitha Godoy   

 

 PROTONIX 40 MG TBEC  1 po qd       PANTOPRAZOLE SODIUM  35794097303  
Active  Bernadine Gomez   

 

 FISH OIL 1000 MG CAPS  1 PO daily       OMEGA-3 FATTY ACIDS  
85279059671  Active  Kavitha Godoy   

 

 ZETIA 10 MG TABS  1 PO daily for cholesterol    
EZETIMIBE  22696500263  No Longer Active  Kavitha Godoy   

 

 LIPITOR 10 MG TAB  1 PO daily       ATORVASTATIN CALCIUM  
52083013473  Active  Bernadine Gomez   

 

 ZOFRAN ODT 8 MG TBDP  I PO Q6hrs prn    ONDANSETRON  
86593027108  No Longer Active  Kavitha Godoy   

 

 DOCUSATE SODIUM 50 MG/15ML SYRP  3 drops each ear.  Let stand for 15 min. then 
rinse out .  Repeat daily.  2008/09/15    DOCUSATE SODIUM  
45444764661  No Longer Active  Kavitha Godoy   

 

 ASCENSIA CONTOUR MONITOR  KIT  DX: Diabetes    BLOOD 
GLUCOSE MONITORING SUPPL  21706092866  No Longer Active  Kavitha Godoy   

 

 CLEOCIN 150 MG CAPS  1 PO daily    CLINDAMYCIN HCL  
74893348329  No Longer Active  Kavitha Godoy   

 

 BD INSULIN SYRINGE ULTRAFINE 29G X 1/2" 1 ML MISC  as directed for insulin 
injections       INSULIN SYRINGE-NEEDLE U-100  32634941904  Active  
Bernadine Gomez   

 

 BD ULTRA-FINE LANCETS MISC  ad directed to check sugar qid       
LANCETS  40943638682  Active  Bernadine Gomez   

 

 HUMALOG  UNIT/ML SOLN  5 units before meals       INSULIN 
LISPRO (HUMAN)     Active  Kavitha Godoy   

 

 LEVAQUIN 500 MG TAB  1 PO QD  2012/03/15    LEVOFLOXACIN  
63942484578  No Longer Active  Kavitha Godoy   

 

 AUGMENTIN 875-125 MG TAB  1 PO BID    AMOXICILLIN-POT 
CLAVULANATE  17009199857  No Longer Active  Kavitha Kristin Godoy   

 

 AUGMENTIN 875-125 MG TAB  1 PO BID x 7 days    
AMOXICILLIN-POT CLAVULANATE  08754954938  No Longer Active  Bernadine Gomez   

 

 LOVENOX 40 MG/0.4ML SOLN  Apply to affected area daily  
  ENOXAPARIN SODIUM  39294842278  No Longer Active  Kavitha Kristin Godoy   

 

 FISH OIL 1000 MG CAPS  1 PO daily    OMEGA-3 FATTY ACIDS
  49419883686  No Longer Active  Kavitha Kristin Godoy   

 

 ASPIRIN 81 MG TABS  1 PO daily       ASPIRIN  84853758618  No Longer 
Active  Kavithapatricia Godoy   

 

 DOXYCYCLINE HYCLATE 100 MG CAP  1 po BID    DOXYCYCLINE 
HYCLATE  65393228099  No Longer Active  Kavitha Kristin Godoy   

 

 PYRIDIUM 200 MG TAB  1 PO TID prn urinary urgency    
PHENAZOPYRIDINE HCL  85127603152  No Longer Active  Bernadine Gomez   

 

 BACTRIM -160 MG TAB  1 PO BID    TRIMETHOPRIM-
SULFAMETHOXAZOLE  33323482907  No Longer Active  Kavitha Godoy   

 

 DOXYCYCLINE HYCLATE 100 MG CAP  1 po BID    DOXYCYCLINE 
HYCLATE  02685447079  No Longer Active  Kavitha Kristin Godoy   

 

 BACTRIM -160 MG TAB  1 PO BID    TRIMETHOPRIM-
SULFAMETHOXAZOLE  15421247064  No Longer Active  Kavitha Kristin Godoy   

 

 ALEVE 220 MG TAB  1 PO BID        NAPROXEN SODIUM  70325539095  Active  Kavitha 
Kristin Godoy   

 

 VENTOLIN  (90 BASE) MCG/ACT AERS  2 puffs q6hrs prn       
ALBUTEROL SULFATE  49446173553  Active  Kavitha Kristin Godoy   

 

 CLEOCIN 150 MG CAPS  1 PO daily    CLINDAMYCIN HCL  
31787345651  No Longer Active  Kavithapatricia Godoy   

 

 VITAMIN D 1000 UNIT TABS  2 PO Daliy    CHOLECALCIFEROL  
35856142507  No Longer Active  Kavitha Kristin Godoy   

 

 SEPTRA -160 MG TABS  1 PO BID    SULFAMETHOXAZOLE-
TRIMETHOPRIM  59112045321  No Longer Active  Kavithapatricia Godoy   

 

 DOXYCYCLINE HYCLATE 100 MG CAP  1 po BID    DOXYCYCLINE 
HYCLATE  18675420135  No Longer Active  Kavitha Kristin Godoy   

 

 ADVAIR DISKUS 100-50 MCG/DOSE MISC  1 puff daily        FLUTICASONE-SALMETEROL
  85496522729  Active  Kavitha Kristin Godoy   

 

 MACROBID 100 MG CAP  1 PO BID  2009/07/10    NITROFURANTOIN MONOHYD 
MACRO  26121570380  No Longer Active  Kavithapatricia Godoy   

 

 DARVOCET-N 100 100-650 MG TAB  1 PO Q6hrs prn pain    
PROPOXYPHENE N-APAP  49061423817  No Longer Active  Kavithapatricia Godoy   

 

 PYRIDIUM 200 MG TAB  1 PO TID prn urinary urgency    2009/07/10  
PHENAZOPYRIDINE HCL  12254321391  No Longer Active  Kavithapatricia Godoy   

 

 MACROBID 100 MG CAP  1 PO BID    NITROFURANTOIN MONOHYD 
MACRO  66559489344  No Longer Active  Kavitha Godoy   

 

 CLEOCIN 150 MG CAPS  1 PO daily to prevent cellulitis    
CLINDAMYCIN HCL  22560396883  No Longer Active  Kavithapatricia Godoy   

 

 ACCU-CHEK COMPACT STRIPS STRP  Use one strip to check glucose three times 
daily    GLUCOSE BLOOD  19737174708  No Longer Active  
Kavithapatricia Godoy   

 

 LEVAQUIN 500 MG TAB  1 PO QD  2007/10/23    LEVOFLOXACIN  
61364590370  No Longer Active  Kavithapatricia Godoy   

 

 GLUCOPHAGE 1000 MG TABS  1 PO BID       METFORMIN HCL  91917595297  
Active  Bernadine Gomez   

 

 BACTROBAN 2 % CREAM  apply to affected area on nose TID for 7 days    MUPIROCIN CALCIUM  72995937828  No Longer Active  Kavitha Godoy   

 

 BENAZEPRIL HCL 20 MG TABS  1 PO daily    BENAZEPRIL HCL  
37903778926  No Longer Active  Kavitha Godoy   

 

 LOTREL 10-40 MG CAPS  1 PO daily       AMLODIPINE BESY-BENAZEPRIL 
HCL  40085472094  No Longer Active  eBrnadine Gomez   

 

 FLEXERIL 10 MG TABS  1 by mouth 3 times a day as needed    CYCLOBENZAPRINE HCL  63635449814  No Longer Active  Kavitha Godoy   

 

 LORTAB 5 5-500 MG TAB  1 by mouth every 6 hours as needed for pain    ACETAMINOPHEN-HYDROCODONE  88956490970  No Longer Active  Kavitha Godoy   

 

 HYDROCHLOROTHIAZIDE 12.5 MG CAPS  1 PO QD       HYDROCHLOROTHIAZIDE  
84937954674  Active  Bernadine Gomez   

 

 OMACOR 1 GM CAPS  4 PO daily    OMEGA-3-ACID ETHYL 
ESTERS  19944510421  No Longer Active  Kavitha Godoy   

 

 AUGMENTIN 875-125 MG TABS  1 PO BID    AMOXICILLIN-POT 
CLAVULANATE  81916100833  No Longer Active  Kavitha oGdoy   

 

 TYLENOL 325 MG TAB  as directed    ACETAMINOPHEN  
00988470397  No Longer Active  Kavitha Godoy   

 

 DOCUSATE SODIUM 50 MG/15ML SYRP  3 drops each ear.  Let stand for 15 min. then 
rinse out with ball suction.  Repeat daily.    DOCUSATE 
SODIUM  65727453969  No Longer Active  Kavitha Godoy   

 

 BACTROBAN 2 % CREA  apply TID    MUPIROCIN CALCIUM  
99656551884  No Longer Active  Kavitha Godoy   

 

 HYDROCODONE-ACETAMINOPHEN 5-500 MG TABS  1 q4-6hrs prn  
  HYDROCODONE-ACETAMINOPHEN  51366472647  No Longer Active  Kavithapatricia Godoy   

 

 LOTRISONE 1-0.05 % CREA  apply BID to left leg    
CLOTRIMAZOLE-BETAMETHASONE  03321123574  No Longer Active  Kavithapatricia Godoy
   

 

 GLIPIZIDE 5 MG TAB  1 PO 0630 and 1 1/2 pills 1700       GLIPIZIDE  
16879311512  Active  Bernadine Gomez   

 

 NORVASC 5 MG TABS  1 po qd    AMLODIPINE BESYLATE  
03605896849  No Longer Active  Kavithapatricia Godoy   

 

 ISAURA-E  1 po qd JOINT PAIN    ISAURA-E     No Longer Active  
Kavithapatricia Godoy   

 

 COLACE 60 MG/15ML SYRP  Use daily for cerumen removal.  
  DOCUSATE SODIUM  47629178710  No Longer Active  Kavithapatricia Godoy   

 

 MULTIVITAMINS TABS  1 po daily    MULTIPLE VITAMIN  
20383185590  No Longer Active  Kavithapatricia Godoy   

 

 VITAMIN C 1000 MG TABS  1 po daily    ASCORBIC ACID  
00250264671  No Longer Active  Kavithapatricia Godoy   

 

 VITAMIN E 400 IU CAPS  2 po daily    VITAMIN E  
69201717906  No Longer Active  Kavithapatricia Godoy   

 

 AMBIEN 10 MG TAB  1 prn    ZOLPIDEM TARTRATE  
14847647864  No Longer Active  Kavithapatricia Godoy   

 

 ECONAZOLE NITRATE 1 % CREA  as directed    ECONAZOLE 
NITRATE  [id number]  No Longer Active  Kavitha Kristin Godoy   

 

 KEFLEX 500 MG CAPS  1 qid x 7days    CEPHALEXIN  
[id number]  No Longer Active  Kavithapatricia Godoy   

 

 ECONAZOLE NITRATE 1 % CREA  Apply BID x 7 days  2004/10/18  2004/10/25  
ECONAZOLE NITRATE  [id number]  No Longer Active  Kavitha Kristin Godoy   

 

 KEFLEX 500 MG CAP  1 Po QID for 14 days  2004/10/15  2004/10/22  CEPHALEXIN  
[id number]  No Longer Active  Kavitha Godoy   

 

 ALEVE 220 MG TABS  1 PO BID       NAPROXEN SODIUM  66022474343  No 
Longer Active  Kavitha Kristin Jayne   

 

 MOTRIN 600 MG TABS  1 po q 6 hrs.       IBUPROFEN  [id number]  No 
Longer Active  Kavitha Godoy   

 

 NASAL SPRAY SALINE 0.65 % SOLN  puffs 2 puffs BID    
SALINE  [id number]  No Longer Active  Kavitha Kristin Godoy   

 

 AMOXIL 875 MG TABS  1 PO BID    AMOXICILLIN  [id number]
  No Longer Active  Kavitha Kristin Godoy   

 

 LOTENSIN 20 MG TABS  1 PO QD       BENAZEPRIL HCL  [id number]  No 
Longer Active  Kavitha Kristin Godoy   

 

 AMARYL 4 MG TABS  1 tab po bid    GLIMEPIRIDE  
[id number]  No Longer Active  Kavithapatricia Godoy   

 

 GLUCOPHAGE  MG TB24  1 po daily    METFORMIN HCL  
[id number]  No Longer Active  Kavitha Godoy   







Immunizations







 Vaccine  Administration Date  Value  Standard Description

 

 Influenza vaccine given    Done  influenza virus vaccine, 
unspecified formulation

 

 pneumococcal immunization administered    1st dose  pneumococcal 
polysaccharide vaccine, 23 valent

 

 Influenza vaccine given    Done 10.24.11  influenza virus vaccine, 
unspecified formulation







Vital Signs







 Date  Name  Value  Unit  Range  Description

 

   pulse rate E&M - 8867-4  68  /min     Heart rate

 

   respiratory rate E&M - 9279-1  14  /min     Resp rate

 

   weight E&M - 3141-9  310  [lb_av]     Weight Measured

 

   blood pressure, diastolic - 8462-4  80  mm[Hg]     BP lara

 

   blood pressure, systolic - 8480-6  145  mm[Hg]     BP sys

 

   pulse rate E&M - 8867-4  80  /min     Heart rate

 

   respiratory rate E&M - 9279-1  14  /min     Resp rate

 

   temperature E&M  98.6  [degF]     Body temperature

 

   weight E&M - 3141-9  310  [lb_av]     Weight Measured

 

   blood pressure, diastolic - 8462-4  82  mm[Hg]     BP lara

 

   blood pressure, systolic - 8480-6  156  mm[Hg]     BP sys

 

   pulse rate E&M - 8867-4  76  /min     Heart rate

 

   respiratory rate E&M - 9279-1  14  /min     Resp rate

 

 2014/10/30  blood pressure, diastolic - 8462-4  74  mm[Hg]     BP lara

 

 2014/10/30  blood pressure, systolic - 8480-6  142  mm[Hg]     BP sys

 

 2014/10/30  pulse rate E&M - 8867-4  85  /min     Heart rate

 

 2014/10/30  respiratory rate E&M - 9279-1  14  /min     Resp rate

 

 2014/10/15  blood pressure, diastolic - 8462-4  80  mm[Hg]     BP lara

 

 2014/10/15  blood pressure, systolic - 8480-6  130  mm[Hg]     BP sys

 

 2014/10/15  pulse rate E&M - 8867-4  78  /min     Heart rate

 

 2014/10/15  respiratory rate E&M - 9279-1  14  /min     Resp rate

 

 2014/10/06  blood pressure, diastolic - 8462-4  88  mm[Hg]     BP lara

 

 2014/10/06  blood pressure, systolic - 8480-6  162  mm[Hg]     BP sys

 

 2014/10/06  pulse rate E&M - 8867-4  82  /min     Heart rate

 

 2014/10/06  respiratory rate E&M - 9279-1  16  /min     Resp rate

 

   blood pressure, diastolic - 8462-4  70  mm[Hg]     BP lara

 

   blood pressure, systolic - 8480-6  120  mm[Hg]     BP sys

 

   pulse rate E&M - 8867-4  88  /min     Heart rate

 

   respiratory rate E&M - 9279-1  14  /min     Resp rate

 

   temperature E&M  98.6  [degF]     Body temperature

 

   weight E&M - 3141-9  302  [lb_av]     Weight Measured

 

   blood pressure, diastolic - 8462-4  68  mm[Hg]     BP lara

 

   blood pressure, systolic - 8480-6  140  mm[Hg]     BP sys

 

   pulse rate E&M - 8867-4  78  /min     Heart rate

 

   respiratory rate E&M - 9279-1  14  /min     Resp rate

 

   temperature E&M  98.6  [degF]     Body temperature

 

   weight E&M - 3141-9  302  [lb_av]     Weight Measured

 

   blood pressure, diastolic - 8462-4  70  mm[Hg]     BP lara

 

   blood pressure, systolic - 8480-6  180  mm[Hg]     BP sys

 

   pulse rate E&M - 8867-4  88  /min     Heart rate

 

   respiratory rate E&M - 9279-1  14  /min     Resp rate

 

   temperature E&M  98.6  [degF]     Body temperature

 

   weight E&M - 3141-9  302  [lb_av]     Weight Measured

 

   blood pressure, diastolic - 8462-4  82  mm[Hg]     BP lara

 

   blood pressure, systolic - 8480-6  140  mm[Hg]     BP sys

 

   pulse rate E&M - 8867-4  84  /min     Heart rate

 

   respiratory rate E&M - 9279-1  16  /min     Resp rate

 

   temperature E&M  98.6  [degF]     Body temperature

 

   weight E&M - 3141-9  305  [lb_av]     Weight Measured







Diagnostic Results







 Date  Name  Value  Unit  Range  Description

 

 Clinical Lists Update: CBC - Hematology 

 

   red blood cell distribution width  16.2  %      

 

 2014/10/23  hematocrit, blood  38  %      

 

   leukocyte count, blood  8.0  10*3/mm3      

 

   erythrocyte (RBC) count  4.52  10*6/mm3      

 

   platelet count  137  10*3/mm3      

 

   hemoglobin, blood  12.7  g/dL      

 

   hematocrit, blood  40  %      

 

   red blood cell distribution width  15.2  %      

 

   mean corpuscular volume, RBC  88  fL      

 

   leukocyte count, blood  8.3  10*3/mm3      

 

   erythrocyte (RBC) count  4.51  10*6/mm3      

 

   platelet count  99  10*3/mm3      

 

   hemoglobin, blood  12.6  g/dL      

 

   hematocrit, blood  40  %      

 

   red blood cell distribution width  15.2  %      

 

   mean corpuscular volume, RBC  88  fL      

 

   leukocyte count, blood  8.3  10*3/mm3      

 

   erythrocyte (RBC) count  4.51  10*6/mm3      

 

   platelet count  99  10*3/mm3      

 

   hemoglobin, blood  12.6  g/dL      

 

   hematocrit, blood  40  %      

 

 2014/10/29  red blood cell distribution width  15.0  %      

 

 2014/10/29  mean corpuscular volume, RBC  88  fL      

 

 2014/10/29  leukocyte count, blood  8.3  10*3/mm3      

 

 2014/10/29  erythrocyte (RBC) count  4.37  10*6/mm3      

 

 2014/10/29  platelet count  61  10*3/mm3      

 

 2014/10/29  hemoglobin, blood  12.1  g/dL      

 

 2014/10/29  hematocrit, blood  38  %      

 

 2014/10/28  red blood cell distribution width  15.1  %      

 

 2014/10/28  mean corpuscular volume, RBC  88  fL      

 

 2014/10/28  leukocyte count, blood  7.5  10*3/mm3      

 

 2014/10/28  erythrocyte (RBC) count  4.31  10*6/mm3      

 

 2014/10/28  platelet count  59  10*3/mm3      

 

 2014/10/28  hemoglobin, blood  12.1  g/dL      

 

 2014/10/28  hematocrit, blood  38  %      

 

 2014/10/27  red blood cell distribution width  15.1  %      

 

 2014/10/27  mean corpuscular volume, RBC  88  fL      

 

 2014/10/27  leukocyte count, blood  7.5  10*3/mm3      

 

 2014/10/27  erythrocyte (RBC) count  4.31  10*6/mm3      

 

 2014/10/27  platelet count  59  10*3/mm3      

 

 2014/10/27  hemoglobin, blood  12.1  g/dL      

 

 2014/10/27  hematocrit, blood  38  %      

 

 2014/10/23  red blood cell distribution width  15.5  %      

 

 2014/10/23  mean corpuscular volume, RBC  88  fL      

 

 2014/10/23  leukocyte count, blood  8.7  10*3/mm3      

 

 2014/10/23  erythrocyte (RBC) count  4.36  10*6/mm3      

 

 2014/10/23  platelet count  29  10*3/mm3      

 

 2014/10/23  hemoglobin, blood  12.3  g/dL      

 

   mean corpuscular volume, RBC  89  fL      

 

 Clinical Lists Update: CBC    - Hematology 

 

   red blood cell distribution width  16.0  %      

 

   mean corpuscular volume, RBC  90  fL      

 

   leukocyte count, blood  7.5  10*3/mm3      

 

   hematocrit, blood  36  %      

 

   platelet count  163  10*3/mm3      

 

   hemoglobin, blood  11.5  g/dL      

 

   erythrocyte (RBC) count  4.01  10*6/mm3      

 

 Clinical Lists Update: CBC,CMP,Chol,Trig,Ferritin,HgA1c - Chemistry 

 

 2014/10/02  albumin, serum  3.8  g/dL      

 

 2014/10/02  Estimated Glomerular Filtration Rate (calc)  57  mL/min/1.73m2    
  

 

 2014/10/02  urea nitrogen, blood  15  mg/dL      

 

 2014/10/02  calcium, serum  8.6  mg/dL      

 

 2014/10/02  chloride, serum  102  mmol/L      

 

 2014/10/02  cholesterol, serum  121  mg/dL      

 

 2014/10/02  carbon dioxide, venous blood  25.0  mmol/L      

 

 2014/10/02  creatinine, serum  1.0  mg/dL      

 

 2014/10/02  ferritin, serum  14.9  ng/mL      

 

 2014/10/02  hemoglobin A1C, blood, as % of total hemoglobin  7.4  %      

 

 2014/10/02  potassium, serum  4.6  mmol/L      

 

 2014/10/02  protein, total, serum  6.4  g/dL      

 

 2014/10/02  aspartate aminotransferase (SGOT), serum  26  U/L      

 

 2014/10/02  alanine aminotransferase (SGPT), serum  25  U/L      

 

 2014/10/02  bilirubin, serum, total  0.7  mg/dL      

 

 2014/10/02  triglyceride, serum, fasting  109  mg/dL      

 

 2014/10/02  sodium, serum  135  mmol/L      

 

 2014/10/02  anion gap, serum  13         

 

 2014/10/02  glucose, plasma fasting  108  mg/dL      

 

 2014/10/02  alkaline phosphatase, serum  121  U/L      

 

 Clinical Lists Update: CBC,CMP,Chol,Trig,Ferritin,HgA1c - Hematology 

 

 2014/10/02  hemoglobin, blood  12.0  g/dL      

 

 2014/10/02  hematocrit, blood  40  %      

 

 2014/10/02  platelet count  65  10*3/mm3      

 

 2014/10/02  erythrocyte (RBC) count  4.37  10*6/mm3      

 

 2014/10/02  leukocyte count, blood  6.0  10*3/mm3      

 

 2014/10/02  mean corpuscular volume, RBC  91  fL      

 

 2014/10/02  red blood cell distribution width  17.7  %      

 

 Clinical Lists Update: CBC,CMP,FLP  IN PT LABS - Chemistry 

 

   creatinine, serum  0.77  mg/dL      

 

   albumin, serum  3.7  g/dL      

 

   LDL cholesterol, serum  57  mg/dL      

 

   potassium, serum  4.5  mmol/L      

 

   protein, total, serum  6.9  g/dL      

 

   aspartate aminotransferase (SGOT), serum  25  U/L      

 

   alanine aminotransferase (SGPT), serum  18  U/L      

 

   bilirubin, serum, total  0.7  mg/dL      

 

   triglyceride, serum, fasting  137  mg/dL      

 

   sodium, serum  139  mmol/L      

 

   very low density lipoproteins  27  mg/dL      

 

   glucose, plasma fasting  128  mg/dL      

 

   Estimated Glomerular Filtration Rate (calc)  >60  mL/min/1.73m2   
   

 

   HDL cholesterol, serum  40  mg/dL      

 

   alkaline phosphatase, serum  81  U/L      

 

   urea nitrogen, blood  13  mg/dL      

 

   calcium, serum  9.3  mg/dL      

 

   chloride, serum  104  mmol/L      

 

   cholesterol, serum  117  mg/dL      

 

   carbon dioxide, venous blood  21  mmol/L      

 

 Clinical Lists Update: CBC,CMP,FLP  IN PT LABS - Hematology 

 

   hematocrit, blood  39  %      

 

   hemoglobin, blood  12.5  g/dL      

 

   platelet count  198  10*3/mm3      

 

   erythrocyte (RBC) count  4.32  10*6/mm3      

 

   leukocyte count, blood  8.8  10*3/mm3      

 

   mean corpuscular volume, RBC  90  fL      

 

   red blood cell distribution width  15.4  %      

 

 Clinical Lists Update: CBC,CMP,FLP,TSH - Chemistry 

 

 2015/04/15  Estimated Glomerular Filtration Rate (calc)  >60  mL/min/1.73m2   
   

 

 2015/04/15  glucose, plasma fasting  127  mg/dL      

 

 2015/04/15  very low density lipoproteins  27  mg/dL      

 

 2015/04/15  sodium, serum  139  mmol/L      

 

 2015/04/15  triglyceride, serum, fasting  134  mg/dL      

 

 2015/04/15  bilirubin, serum, total  0.8  mg/dL      

 

 2015/04/15  alanine aminotransferase (SGPT), serum  21  U/L      

 

 2015/04/15  aspartate aminotransferase (SGOT), serum  26  U/L      

 

 2015/04/15  protein, total, serum  6.7  g/dL      

 

 2015/04/15  potassium, serum  4.8  mmol/L      

 

 2015/04/15  LDL cholesterol, serum  22  mg/dL      

 

 2015/04/15  thyroid stimulating hormone, serum  1.52  u[iU]/mL      

 

 2015/04/15  hemoglobin A1C, blood, as % of total hemoglobin  6.3  %      

 

 2015/04/15  HDL cholesterol, serum  42  mg/dL      

 

 2015/04/15  creatinine, serum  0.78  mg/dL      

 

 2015/04/15  carbon dioxide, venous blood  22  mmol/L      

 

 2015/04/15  cholesterol, serum  114  mg/dL      

 

 2015/04/15  chloride, serum  106  mmol/L      

 

 2015/04/15  calcium, serum  9.5  mg/dL      

 

 2015/04/15  urea nitrogen, blood  22  mg/dL      

 

 2015/04/15  alkaline phosphatase, serum  105  U/L      

 

 2015/04/15  albumin, serum  3.8  g/dL      

 

 Clinical Lists Update: CBC,CMP,FLP,TSH - Hematology 

 

 2015/04/15  hemoglobin, blood  10.5  g/dL      

 

 2015/04/15  hematocrit, blood  34  %      

 

 2015/04/15  red blood cell distribution width  17.1  %      

 

 2015/04/15  mean corpuscular volume, RBC  89  fL      

 

 2015/04/15  leukocyte count, blood  5.1  10*3/mm3      

 

 2015/04/15  erythrocyte (RBC) count  3.77  10*6/mm3      

 

 2015/04/15  platelet count  199  10*3/mm3      

 

 Clinical Lists Update: CBC,CMP,Ferritin - Chemistry 

 

   ferritin, serum  33.8  ng/mL      

 

   potassium, serum  4.7  mmol/L      

 

   Estimated Glomerular Filtration Rate (calc)  81  mL/min/1.73m2    
  

 

   glucose, plasma fasting  160  mg/dL      

 

   anion gap, serum  13         

 

   sodium, serum  135  mmol/L      

 

   bilirubin, serum, total  0.9  mg/dL      

 

   alanine aminotransferase (SGPT), serum  24  U/L      

 

   aspartate aminotransferase (SGOT), serum  21  U/L      

 

   creatinine, serum  0.8  mg/dL      

 

   carbon dioxide, venous blood  26.0  mmol/L      

 

   protein, total, serum  6.2  g/dL      

 

   calcium, serum  8.9  mg/dL      

 

   urea nitrogen, blood  14  mg/dL      

 

   alkaline phosphatase, serum  115  U/L      

 

   albumin, serum  3.8  g/dL      

 

   chloride, serum  101  mmol/L      

 

 Clinical Lists Update: CBC,CMP,Ferritin - Hematology 

 

   hemoglobin, blood  11.2  g/dL      

 

   hematocrit, blood  34  %      

 

   platelet count  179  10*3/mm3      

 

   erythrocyte (RBC) count  3.98  10*6/mm3      

 

   leukocyte count, blood  6.7  10*3/mm3      

 

   mean corpuscular volume, RBC  87  fL      

 

   red blood cell distribution width  22.1  %      

 

 Clinical Lists Update: CBC,FERRITIN - Chemistry 

 

   ferritin, serum  11  ng/mL      

 

 Clinical Lists Update: CBC,FERRITIN - Hematology 

 

   red blood cell distribution width  14.4  %      

 

   mean corpuscular volume, RBC  91  fL      

 

   leukocyte count, blood  6.1  10*3/mm3      

 

   erythrocyte (RBC) count  3.32  10*6/mm3      

 

   platelet count  203  10*3/mm3      

 

   hemoglobin, blood  9.3  g/dL      

 

   hematocrit, blood  30  %      

 

 Clinical Lists Update: ER LABS - Chemistry 

 

   Estimated Glomerular Filtration Rate (calc)  >60  mL/min/1.73m2   
   

 

   glucose, plasma fasting  152  mg/dL      

 

   sodium, serum  134  mmol/L      

 

   bilirubin, serum, total  1.1  mg/dL      

 

   alanine aminotransferase (SGPT), serum  20  U/L      

 

   aspartate aminotransferase (SGOT), serum  32  U/L      

 

   protein, total, serum  7.1  g/dL      

 

   creatinine, serum  0.80  mg/dL      

 

   potassium, serum  5.0  mmol/L      

 

   albumin, serum  3.8  g/dL      

 

   alkaline phosphatase, serum  110  U/L      

 

   urea nitrogen, blood  15  mg/dL      

 

   calcium, serum  9.5  mg/dL      

 

   chloride, serum  102  mmol/L      

 

   carbon dioxide, venous blood  22  mmol/L      

 

 Clinical Lists Update: ER LABS - Hematology 

 

   red blood cell distribution width  16.8  %      

 

   mean corpuscular volume, RBC  89  fL      

 

   leukocyte count, blood  12.6  10*3/mm3      

 

   erythrocyte (RBC) count  3.87  10*6/mm3      

 

   platelet count  197  10*3/mm3      

 

   hemoglobin, blood  11.0  g/dL      

 

   hematocrit, blood  35  %      

 

 Clinical Lists Update: ER LABS - Urinalysis 

 

   hyaline casts, urine  none  /[LPF]      

 

   bacteria, urine microscopy  neg         

 

   RBC urine by microscopy  0-2         

 

   WBC urine on microscopy  0-2  {Cells}/[HPF]      

 

   appearance, urine  Clear Yellow         

 

   urobilinogen, urine, semiquantitative (dipstick)  normal         

 

   specific gravity, urine  1.10         

 

   pH, urine, semiquantitative  8         

 

   nitrite, urine, semiquantitative  neg         

 

   ketones, urine, by test strip  neg         

 

   bilirubin, urine  neg         

 

   glucose, urine, semiquantitative  neg         

 

   protein, urine, semiquantitative (dipstick)  1+         

 

   blood in urine (hemoglobin) by dipstick  1+         

 

   mucus on urinalysis  neg         

 

   epithelial cells, urine  2-5  /[LPF]      

 

 Office Visit: Dr Godoy's Check Up: Established Patient Visit - Chemistry 

 

   ferritin, serum  21  ng/mL      

 

   hemoglobin A1C, blood, as % of total hemoglobin  8.1  %      

 

 Office Visit: Dr Godoy's Check Up: Established Patient Visit - Hematology 

 

 2014/10/30  leukocyte count, blood  8.3  10*3/mm3      

 

 2014/10/30  erythrocyte (RBC) count  4.37  10*6/mm3      

 

 2014/10/30  platelet count  61  10*3/mm3      

 

 2014/10/30  hemoglobin, blood  12.1  g/dL      

 

 2014/10/30  hematocrit, blood  38  %      

 

 2014/10/16  red blood cell distribution width  15.0  %      

 

 2014/10/16  mean corpuscular volume, RBC  37  fL      

 

 2014/10/15  hematocrit, blood  37  %      

 

 2014/10/16  erythrocyte (RBC) count  4.27  10*6/mm3      

 

 2014/10/16  platelet count  22  10*3/mm3      

 

 2014/10/16  hemoglobin, blood  11.8  g/dL      

 

 2014/10/16  hematocrit, blood  37  %      

 

 2014/10/15  red blood cell distribution width  14.9  %      

 

 2014/10/15  mean corpuscular volume, RBC  86  fL      

 

 2014/10/15  leukocyte count, blood  7.3  10*3/mm3      

 

 2014/10/15  erythrocyte (RBC) count  4.36  10*6/mm3      

 

 2014/10/15  platelet count  39  10*3/mm3      

 

 2014/10/15  hemoglobin, blood  12.4  g/dL      

 

 2014/10/30  mean corpuscular volume, RBC  88  fL      

 

 2014/10/30  red blood cell distribution width  15.0  %      

 

 2014/10/16  leukocyte count, blood  9.1  10*3/mm3      

 

 Phone Note: Low platelet count - Hematology 

 

 2014/10/08  red blood cell distribution width  17.0  %      

 

 2014/10/08  mean corpuscular volume, RBC  90  fL      

 

 2014/10/08  leukocyte count, blood  5.9  10*3/mm3      

 

 2014/10/08  hematocrit, blood  40  %      

 

 2014/10/08  platelet count  12  10*3/mm3      

 

 2014/10/08  hemoglobin, blood  12.7  g/dL      

 

 2014/10/08  erythrocyte (RBC) count  4.45  10*6/mm3      







Encounters







 Code  Encounter  Date  Provider  Facility

 

 CPT-70317  Ofc Vst, Est Level IV   17:01:58 CDT  Kavitha Kristinagustina Godoy, DO, FACP

 

 CPT-57330  Ofc Vst, Est Level IV   17:23:42 CST  Kavitha Godoy, DO, FACP

 

 CPT-46020  Ofc Vst, Est Level IV   20:14:18 CST  Kavitha Godoy, DO, FACP

 

 CPT-14863  Ofc Vst, Est Level III  2014/10/30 17:10:34 CDT  Kavitha Godoy, DO, FACP

 

 CPT-37328  Ofc Vst, Est Level IV  2014/10/16 22:19:26 CDT  Kavithapatricia Godoy, DO, FACP

 

 CPT-51544  Ofc Vst, Est Level IV  2014/10/07 16:08:07 CDT  Kavithapatricia Godoy, DO, FACP

 

 CPT-14267  Ofc Vst, Est Level II   15:22:36 CDT  Kavitha Godoy, DO, FACP

 

 CPT-44105  Ofc Vst, Est Level IV   11:13:04 CDT  Kavitha Godoy, DO, FACP

 

 CPT-77632  Ofc Vst, Est Level IV   13:20:51 CDT  Kavitha Gdooy, DO, FACP

 

 CPT-30452  Ofc Vst, Est Level IV   14:23:34 CDT  Kavitha Godoy, DO, FACP

 

 CPT-84918  Ofc Vst, Est Level III   19:42:36 CDT  [facility]itha [facility]

 

 CPT-64836  Ofc Vst, Est Level IV   14:28:10 CST  Kavitha WELSH Godoy, DO, FACP

 

 CPT-26215  Ofc Vst, Est Level IV   15:20:04 CST  Kavitha WELSH Godoy, DO, FACP

 

 CPT-94703  Ofc Vst, Est Level III  2013/10/15 14:46:42 CDT  Kavitha WELSH Godoy, DO, FACP

 

 CPT-96678  Ofc Vst, Est Level IV   13:31:14 CDT  Kavithapatricia WELSH Godoy, DO, FACP

 

 CPT-67750  Ofc Vst, Est Level IV  2013/04/10 16:26:21 CDT  Kavithapatricia WELSH Godoy, DO, FACP

 

 CPT-56889  Ofc Vst, Est Level III   16:02:31 CST  Kavitha WELSH Godoy, DO, FACP

 

 CPT-40554  Ofc Vst, Est Level IV   13:01:46 CST  Kavitha WELSH Godoy, DO, FACP

 

 CPT-21024  Ofc Vst, Est Level IV   10:43:37 CDT  Kavithapatricia WELSH Godoy, DO, FACP

 

 CPT-75964  Ofc Vst, Est Level III   15:41:44 CDT  Kavitha [facility]

 

 CPT-69471  Ofc Vst, Est Level IV   16:31:03 CDT  Kavithapatricia WELSH Jayne, DO, FACP

 

 CPT-61467  Ofc Vst, Est Level III   15:45:49 CDT  Kavitha Kristin WELSH Jayne, DO, FACP

 

 CPT-84908  Ofc Vst, Est Level IV  2012/03/15 11:04:49 CDT  Kavitha WELSH Godoy, DO, FACP

 

 CPT-80427  Ofc Vst, Est Level IV   16:16:55 CST  Kavitha WELSH Jayne, DO, FACP

 

 CPT-49244  Ofc Vst, Est Level IV   20:00:20 CST  Kavitha Godoy  [facility]

 

 CPT-25616  Ofc Vst, Est Level IV   15:00:56 CST  Kavitha WELSH Godoy, DO, FACP

 

 CPT-43649  Ofc Vst, Est Level IV   11:37:43 CST  Kavitha WELSH Godoy, DO, FACP

 

 CPT-29631  Ofc Vst, Est Level V   11:04:21 CST  Bernadine WELSH Godoy, DO, FACP

 

 CPT-54366  Ofc Vst, Est Level IV   15:56:58 CDT  Kavitha Kristin WELSH Jayne, DO, FACP

 

 CPT-66088  Ofc Vst, Est Level III   15:56:48 CDT  Kavithapatricia WELSH Jayne, DO, FACP

 

 CPT-99371  Ofc Vst, Est Level IV   11:13:32 CDT  Kavithapatricia WELSH Jayne, DO, FACP

 

 CPT-32684  Ofc Vst, Est Level III   11:06:29 CDT  Kavithapatricia WELSH Jayne, DO, FACP

 

 CPT-17188  Ofc Vst, Est Level IV   10:36:27 CDT  Kavitha WELSH Jayne, DO, FACP

 

 CPT-00168  Ofc Vst, Est Level IV   16:28:28 CST  Kavitha Kristin WELSH Jayne, DO, FACP

 

 CPT-26392  Ofc Vst, Est Level IV   16:15:53 CST  Kavitha Kristin WELSH Jayne, DO, FACP

 

 CPT-38350  Ofc Vst, Est Level IV   10:24:23 CDT  Kavitha Kristin Rainner  Kavitha WELSH Jayne, DO, FACP

 

 CPT-32725  Ofc Vst, Est Level IV   10:41:52 CDT  Kavitha Kristin WELSH Jayne, DO, FACP

 

 CPT-68205  Ofc Vst, Est Level V   14:13:59 CDT  Kavithapatricia [facility] OFF[facility]

 

 CPT-08918  Ofc Vst, Est Level IV   11:50:10 CDT  Kavitha Kristin WELSH Godoy, DO, FACP

 

 CPT-83661  Ofc Vst, Est Level V   16:45:39 CST  Kavithapatricia WELSH Jayne, DO, FACP

 

 CPT-17145  Ofc Vst, Est Level V   16:35:01 CDT  Kavitha Kristin WELSH Jayne, DO, FACP

 

 CPT-50174  Ofc Vst, Est Level IV  2009/07/10 10:56:36 CDT  Kavithapatricia WELSH Jayne, DO, FACP

 

 CPT-84258  Ofc Vst, Est Level IV   09:27:02 CDT  Kavitha Kristin WELSH Jayne, DO, FACP

 

 CPT-77941  Ofc Vst, Est Level IV   16:04:39 CST  Kavitha WELSH Jayne, DO, FACP

 

 CPT-35857  Ofc Vst, Est Level IV  2008/09/15 16:41:31 CDT  Kavithapatricia WELSH Jayne, DO, FACP

 

 CPT-87400  Ofc Vst, Est Level IV   16:15:36 CDT  Kavithapatricia WELSH Jayne, DO, FACP

 

 CPT-82339  Ofc Vst, Est Level IV   16:43:23 CST  Kavitha Kristin WELSH Jayne, DO, FACP

 

 CPT-02643  Ofc Vst, Est Level IV   17:09:39 CST  Kavitha WELSH Jayne, DO, FACP

 

 CPT-38558  Ofc Vst, Est Level III  2007/10/29 16:52:49 CDT  Kavitha Kristin WELSH Jayne, DO, FACP

 

 CPT-43478  Ofc Vst, Est Level III  2007/10/23 16:46:46 CDT  Kavitha Kristin 
Jayne Nowaki ANITHA RainGodoy, DO, FACP

 

 CPT-84466  Ofc Vst, Est Level IV  2007/10/16 16:17:41 CDT  Kavitha Kristin 
Jayne  Kavitha S Godoy, DO, FACP

 

 CPT-12588  Ofc Vst, Est Level III  2007/10/09 17:15:29 CDT  Kavitha Kristin 
Jayne  Kavitha S Godoy, DO, FACP

 

 CPT-80249  Ofc Vst, Est Level IV   15:48:06 CDT  Kavitha Kristin 
Jayne Nowaki S Godoy, DO, FACP

 

 CPT-49485  Ofc Vst, Est Level IV   11:59:27 CDT  Kavitha Kristin 
Jayne Nowaki ANITHA Godoy, DO, FACP

 

 CPT-59588  Ofc Vst, Est Level IV   16:31:37 CDT  Kavitha Kristin Godoy  [facility] [place]

 

 CPT-52126  Ofc Vst, Est Level IV   16:25:22 CST  Kavitha Godoy  [facility] [place]

 

 CPT-20957  Ofc Vst, Est Level III   18:24:09 CST  Kavitha Godoy  [facility] [place]

 

 CPT-56370  Ofc Vst, Est Level III  2006/10/11 16:54:12 CDT  Kavitha Godoy  [facility] [place]

 

 CPT-05323  Ofc Vst, Est Level IV   16:39:33 CDT  Kavitha Kristin Godoy  [facility] [place]

 

 CPT-75341  Ofc Vst, Est Level IV   15:11:16 CDT  Kavitha Kristin Godoy  [facility] [place]

 

 CPT-07300  Ofc Vst, Est Level IV   17:29:13 CST  Kavitha Godoy  [facility] [place]

 

 CPT-99704  Ofc Vst, Est Level III   12:45:04 CST  Kavitha Godoy  [facility] [place]

 

 CPT-67336  Ofc Vst, Est Level III   16:51:19 CST  Kavitha Godoy  [facility] [place]

 

 CPT-94601  Ofc Vst, Est Level IV   16:59:21 CDT  Kavitha Kristin Godoy  [facility] [place]

 

 CPT-37724  Ofc Vst, Est Level IV   09:39:02 CDT  Kavithapatricia Godoy  [facility] [place]

 

 CPT-97787  Ofc Vst, Est Level IV   18:04:53 CDT  Kavitha Kristin Godoy  [facility] [place]

 

 CPT-11162  Ofc Vst, Est Level IV   17:17:47 CST  Kavitha Godoy  [facility] [place]

 

 CPT-45223  Ofc Vst, Est Level IV   17:23:25 CST  Kavitha Godoy  [facility] [place]

 

 CPT-97794  Ofc Vst, Est Level IV   18:44:33 CST  Kavitha Godoy  [facility] [place]

 

 CPT-89959  Ofc Vst, Est Level III  2004/10/28 18:20:20 CDT  Kavitha Kristin Godoy  [facility] [place]

 

 CPT-87596  Ofc Vst, Est Level III  2004/10/18 17:54:31 CDT  Kavithapatricia Godoy  [facility] [place]

 

 CPT-49365  Ofc Vst, Est Level IV   19:13:31 CDT  Kavithapatricia Godoy  [facility] [facility]

 

 CPT-25000  Ofc Vst, Est Level IV   18:01:08 CDT  Kavithapatricia Godoy  [facility]

 

 CPT-28542  Ofc Vst, Est Level III   13:36:36 CDT  Kavithapatricia Godoy  [facility]

 

 CPT-48430  Ofc Vst, Est Level III   16:47:12 CDT  Kavithapatricia Godoy  [facility]

 

 CPT-86372  Ofc Vst, Est Level IV   17:34:04 CDT  Kavithapatricia Godoy  [facility]

 

 CPT-91474  Ofc Vst, Est Level III   15:45:39 CST  Kavitha Godoy  [facility]

 

 CPT-56877  Ofc Vst, Est Level III   16:07:05 CST  Kavitha Kristinagustina Godoy  [facility]

 

 CPT-03620  Ofc Vst, Est Level III   16:19:57 CST  Kavitha Godoy  [facility]

 

 CPT-08518  Ofc Vst, Est Level III   16:46:48 CST  Kavitha Godoy  [facility]

 

 CPT-36404  Ofc Vst, Est Level II   17:30:36 CST  Kavitha Godoy  [facility]

 

 CPT-26580  Ofc Vst, Est Level III   17:20:59 CST  Kavitha Godoy  [facility]

 

 CPT-01204  Ofc Vst, Est Level III   17:49:24 CST  Kavitha Godoy  [facility]

 

 CPT-76602  Ofc Vst, New Level III   17:10:24 CST  Kavitha Godoy  [facility]

 

 CPT-83709  Ofc Vst, New Level IV   17:21:37 CST  Kavitha Godoy  [facility]







Procedures







 Code  Procedure Name  Date  Entry Date  Standard Description

 

 CPT-  Medicare Annual Wellness Visit   15:52:28 CDT  
   

 

 CPT-  E-Prescribing Medication Sent   19:42:36 CDT   
  

 

 CPT-  E-Prescribing Not sent due to no medication given   14:28:
10 CST     

 

 CPT-  E-Prescribing Not sent due to no medication given   15:20:
04 CST     

 

 CPT-  E-Prescribing Not sent due to no medication given  2013/10/15 14:46:
42 CDT  2013/10/15   

 

 CPT-  E-Prescribing Not sent due to no medication given   13:31:
14 CDT     

 

 CPT-  E-Prescribing Medication Sent   16:36:19 CDT  2013/07/10 
  

 

 CPT-  Medicare Annual Wellness Visit Initial   16:36:19 CDT  
2013/07/10   

 

 CPT-  E-Prescribing Not sent due to no medication given  2013/04/10 16:26:
21 CDT     

 

 CPT-  E-Prescribing Medication Sent   16:02:31 CST   
  

 

 CPT-  E-Prescribing Not sent due to no medication given   13:01:
46 CST     

 

 CPT-  E-Prescribing Not sent due to no medication given   10:43:
37 CDT     

 

 CPT-14411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            Ot            272.1         
                         

 

 2015            DOTSON DO, KOKO            Ot            280.8         
                         

 

 2015            DOTSON DO, KOKO            Ot            287.31        
                          

 

 2015            DOTSON DO, KKOO            Ot            401.1         
                         

 

 2015            DOTSON DO, KOKO            Ot            414.01        
                          

 

 2015            DOTSON DO, KOKO            Ot            457.1         
                         

 

 2015            DOTSON DO, KOKO            Ot            592.0         
                         

 

 2015            DOTSON DO, KOKO            Ot            791.0         
                         

 

 2015            ROSALBA PHILLIPS N            Ot            287.5          
                        

 

 2015            ROSALBA PHILLIPS N            Ot            287.5          
                        

 

 2015            ALANROSALBA VILLALOBOS N            Ot            287.5          
                        

 

 2015            KEN BEDOLLA MD            Ot            V45.82      
      PERCUTANEOUS TRANSLUM CORON ANGIOPLASTY                      

 

 2015            KEN BEDOLLA MD            Ot            V57.89      
      REHABILITATION PROC NEC                     

 

 2015            FOX NAVARRO ARNP            Ot            287.5    
                              

 

 2015            FOX NAVARRO ARNP            Ot            V58.69   
                               

 

 2015            FOX NAVARRO ARNP            Ot            287.5    
                              

 

 2015            FOX NAVARRO ARNP            Ot            V58.69   
                               

 

 2015            ROSALBA PHILLIPS N            Ot            287.5          
                        

 

 2015            ROSALBA PHILLIPS N            Ot            287.5          
                        

 

 2015            ROSALBA PHILLIPS N            Ot            287.5          
  THROMBOCYTOPENIA NOS                     

 

 2015            ALEENA JOHNSON DO            Ot            038.3   
                               

 

 2015            ALEENA JOHNSON DO            Ot            250.00  
                                

 

 2015            ALEENA JOHNSON DO            Ot            272.4   
                               

 

 2015            ALEENA JOHNSON DO            Ot            276.1   
                               

 

 2015            JOHNSON DOALEENA            Ot            276.2   
                               

 

 2015            JOHNSON DO, ALEENA MCDANIEL            Ot            276.52  
                                

 

 2015            JOHNSON DOALEENA            Ot            278.01  
                                

 

 2015            JOHNSON DOALEENA            Ot            348.31  
                                

 

 2015            JOHNSON DOALEENA            Ot            401.9   
                               

 

 2015            JOHNSON DOALEENA            Ot            410.71  
                                

 

 2015            JOHNSON DOALEENA            Ot            414.01  
                                

 

 2015            JOHNSON DOALEENA            Ot            414.8   
                               

 

 2015            JOHNSON DOALEENA            Ot            457.1   
                               

 

 2015            JOHNSON DOALEENA            Ot            459.81  
                                

 

 2015            JOHNSON DOALEENA            Ot            493.90  
                                

 

 2015            JOHNSON DOALEENA            Ot            530.81  
                                

 

 2015            JOHNSON DOALEENA            Ot            584.9   
                               

 

 2015            JOHNSON DOALEENA            Ot            599.0   
                               

 

 2015            JOHNSON DOALEENA            Ot            722.52  
                                

 

 2015            JOHNSON DOALEENA            Ot            730.16  
                                

 

 2015            JOHNSON DOALEENA            Ot            733.42  
                                

 

 2015            JOHNSON DOALEENA            Ot            785.52  
                                

 

 2015            JOHNSON DOALEENA            Ot            787.91  
                                

 

 2015            JOHNSON DOALEENA            Ot            799.02  
                                

 

 2015            JOHNSON DOALEENA            Ot            995.92  
                                

 

 2015            JOHNSON DOALEENA            Ot            V12.3   
                               

 

 2015            JOHNSON DOALEENA            Ot            V13.01  
                                

 

 2015            JOHNSON DOALEENA            Ot            V15.82  
                                

 

 2015            JOHNSON DOALEENA            Ot            V45.82  
                                

 

 2015            JOHNSON DOALEENA            Ot            V85.42  
                                

 

 2015            JOHNSON DOALEENA            Ot            038.3   
         ANAEROBIC SEPTICEMIA                     

 

 2015            JOHNSON DOALEENA            Ot            250.00  
          DIAB RORO WO COMPL, TYPE II OR UNSPEC TY                     

 

 2015            JOHNSONALEENA NATHAN DO            Ot            272.4   
         HYPERLIPIDEMIA NEC/NOS                     

 

 2015            ALEENA JOHNSON DO, Ot            276.1   
         HYPOSMOLALITY                     

 

 2015            ALEENA JOHNSON DO, Ot            276.2   
         ACIDOSIS                     

 

 2015            ALEENA JOHNSON DO, Ot            276.52  
          HYPOVOLEMIA                     

 

 2015            ALEENA JOHNSON DO, Ot            278.01  
          MORBID OBESITY                     

 

 2015            ALEENA JOHNSON DO, Ot            285.9   
         ANEMIA NOS                     

 

 2015            ALEENA JOHNSON DO, Ot            348.31  
          METABOLIC ENCEPHALOPATHY                     

 

 2015            ALEENA JOHNSON DO, Ot            401.9   
         HYPERTENSION NOS                     

 

 2015            ALEENA JOHNSON DO, Ot            410.71  
          AC MYOCARDIAL INFARCT,SUBENDO INFARCT,IN                     

 

 2015            ALEENA JOHNSON DO, Ot            414.01  
          CORONARY ATHEROSCLEROSIS OF NATIVE CORON                     

 

 2015            ALEENA JOHNSON DO, Ot            414.8   
         CHR ISCHEMIC HRT DIS NEC                     

 

 2015            ALEENA JOHNSON DO, Ot            457.1   
         OTHER LYMPHEDEMA                     

 

 2015            ALEENA JOHNSON DO, Ot            459.81  
          VENOUS INSUFFICIENCY NOS                     

 

 2015            ALEENA JOHNSON DO, Ot            493.90  
          ASTHMA, UNSPECIFIED                     

 

 2015            ALEENA JOHNSON DO, Ot            530.81  
          ESOPHAGEAL REFLUX                     

 

 2015            ALEENA JOHNSON DO, Ot            584.9   
         ACUTE RENAL FAILURE, UNSPECIFIED                     

 

 2015            ALEENA JOHNSON DO, Ot            599.0   
         URIN TRACT INFECTION NOS                     

 

 2015            ALEENA JOHNSON DO, Ot            722.52  
          LUMB/LUMBOSAC DISC DEGEN                     

 

 2015            ALEENA JOHNSON DO, Ot            730.16  
          CHR OSTEOMYELIT-L/LEG                     

 

 2015            ALEENA JOHNSON DO, Ot            733.42  
          ASEPTIC NECROSIS FEMUR                     

 

 2015            ALEENA JOHNSON DO, Ot            785.52  
          SEPTIC SHOCK                     

 

 2015            ALEENA JOHNSON DO, Ot            787.91  
          DIARRHEA                     

 

 2015            ALEENA JOHNSON DO, Ot            799.02  
          HYPOXEMIA                     

 

 2015            ALEENA JOHNSON DO, Ot            995.92  
          SEVERE SEPSIS                     

 

 2015            ALEENA JOHNSON DO, Ot            996.72  
          OTH COMPLICATIONS DUE TO OTH CARD DEVICE                     

 

 2015            ALEENA JOHNSON DO, Ot            V12.3   
                               

 

 2015            ALEENA JOHNSON DO            Ot            V13.01  
                                

 

 2015            ALEENA JOHNSON DO            Ot            V15.82  
          HISTORY OF TOBACCO USE                     

 

 2015            ALEENA JOHNSON DO            Ot            V45.82  
          PERCUTANEOUS TRANSLUM CORON ANGIOPLASTY                      

 

 2015            ALEENA JOHNSON DO            Ot            V85.42  
          BODY MASS INDEX 45.0-49.9, ADULT                     

 

 2015            ROSALBA PHILLIPS N            Ot            287.5          
                        

 

 2015            ROSALBA PHILLIPS N            Ot            D69.6          
                        

 

 2015            ROSALBA PHILLIPS N            Ot            287.5          
  THROMBOCYTOPENIA NOS                     

 

 10/14/2015            FOX NAVARRO ARNP            Ot            280.9    
                              

 

 10/14/2015            FOX NAVARRO S ARNP            Ot            287.31   
                               

 

 10/14/2015            FOX NAVARRO S ARNP            Ot            412      
                            

 

 10/14/2015            FOX NAVARRO S ARNP            Ot            414.00   
                               

 

 10/14/2015            FOX NAVARRO S ARNP            Ot            V45.82   
                               

 

 10/14/2015            FOX NAVARRO S ARNP            Ot            V58.69   
                               

 

 10/30/2015            NAVARROFOX S ARNP            Ot            280.9    
                              

 

 10/30/2015            NAVARROJACKIAH S ARNP            Ot            287.31   
                               

 

 10/30/2015            FOX NAVARRO S ARNP            Ot            412      
                            

 

 10/30/2015            FOX NAVARRO S ARNP            Ot            414.00   
                               

 

 10/30/2015            FOX NAVARRO S ARNP            Ot            V45.82   
                               

 

 10/30/2015            FOX NAVARRO S ARNP            Ot            V58.69   
                               

 

 12/10/2015            ALAN ROSALBA N            Ot            287.5          
                        

 

 12/10/2015            ALANROSALAB N            Ot            D69.6          
                        

 

 2015            ALAN IVETTLUCY N            Ot            287.5          
                        

 

 2016            DOTSON DO, KOKO            Ot            D50.8         
                         

 

 2016            DOTSON DO, KOKO            Ot            E11.8         
                         

 

 2016            DOTSON DO, KOKO            Ot            E78.0         
                         

 

 2016            DOTSON DO, KOKO            Ot            E78.1         
                         

 

 2016            DOTSON DO, KOKO            Ot            Z00.00        
                          

 

 2016            ALAN IVETTLUCY N            Ot            D50.9          
                        

 

 2016            ALANROSALBA            Ot            D69.3          
                        

 

 2016            ALAN, ROSALBA N            Ot            E11.9          
                        

 

 2016            ALAN, ROSALBA N            Ot            E66.01         
                         

 

 2016            ALAN, ROSALBA MORRISON            Ot            I25.10         
                         

 

 2016            ALAN, ROSALBA MORRISON            Ot            M87.9          
                        

 

 2016            ALAN, ROSALBA N            Ot            Z68.42         
                         

 

 2016            ALAN, ROSALBA N            Ot            Z79.899        
                          

 

 2016            ALAN, ROSALBA N            Ot            D50.9          
                        

 

 2016            ALAN, ROSALBA MORRISON            Ot            D69.3          
                        

 

 2016            ALAN, ROSALBA N            Ot            E11.9          
                        

 

 2016            ALAN, ROSALBA MORRISON            Ot            E66.01         
                         

 

 2016            ALAN, ROSALBA MORRISON            Ot            I25.10         
                         

 

 2016            ALAN, ROSALBA MORRISON            Ot            M87.9          
                        

 

 2016            ALAN, ROSALBA MORRISON            Ot            Z68.42         
                         

 

 2016            ALAN, ROSALBA MORRISON            Ot            Z79.899        
                          

 

 2016            ALAN, ROSALBA MORRISON            Ot            D50.9          
                        

 

 2016            ALAN, ROSALBA N            Ot            D69.3          
                        

 

 2016            ALAN, ROSALBA N            Ot            E11.9          
                        

 

 2016            ALAN, ROSALBA MORRISON            Ot            E66.01         
                         

 

 2016            ALAN, ROSALBA MORRISON            Ot            I25.10         
                         

 

 2016            ALAN, ROSALBA MORRISON            Ot            M87.9          
                        

 

 2016            ALAN, ROSALBA N            Ot            Z68.42         
                         

 

 2016            ALAN, ROSALBA MORRISON            Ot            Z79.899        
                          

 

 2016            ALANROSALBA            Ot            D50.9          
  IRON DEFICIENCY ANEMIA, UNSPECIFIED                     

 

 2016            ALANROSALBA            Ot            D69.3          
  IMMUNE THROMBOCYTOPENIC PURPURA                     

 

 2016            ALAN, ROSALBA N            Ot            E11.9          
  TYPE 2 DIABETES MELLITUS WITHOUT COMPLIC                     

 

 2016            ALANROSALBA VILLALOBOS            Ot            E66.01         
   MORBID (SEVERE) OBESITY DUE TO EXCESS CA                     

 

 2016            ROSALBA PHILLIPS N            Ot            I25.10         
   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                      

 

 2016            ALANROSALBA VILLALOBOS            Ot            M87.9          
  OSTEONECROSIS, UNSPECIFIED                     

 

 2016            ALANROSALBA VILLALOBOS            Ot            Z68.42         
   BODY MASS INDEX (BMI) 45.0-49.9, ADULT                     

 

 2016            ROSALBA PHILLIPS            Ot            Z79.899        
    OTHER LONG TERM (CURRENT) DRUG THERAPY                     

 

 2016            ROSALBA PHILLIPS            Ot            D50.9          
                        

 

 2016            ROSALBA PHILLIPS            Ot            D69.3          
                        

 

 2016            ROSALBA PHILLIPS            Ot            E11.9          
                        

 

 2016            ROSALBA PHILLIPS            Ot            E66.01         
                         

 

 2016            ROSALBA PHILLIPS            Ot            I25.10         
                         

 

 2016            ROSALBA PHILLIPS            Ot            M87.9          
                        

 

 2016            ROSALBA PHILLIPS            Ot            Z68.42         
                         

 

 2016            ROSALBA PHILLIPS            Ot            Z79.899        
                          

 

 2016                         Ot            574.20                       
           

 

 2016                         Ot            V72.83                       
           

 

 2016                         Ot            V76.51                       
           

 

 2016                         Ot            443.9                        
          

 

 2016                         Ot            457.0                        
          

 

 2016                         Ot            682.6                        
          

 

 2016                         Ot            V58.69                       
           

 

 2016                         Ot            V58.89                       
           

 

 2016                         Ot            V76.12                       
           

 

 2016            TAWIL MD, SONU WEINBERG            Ot            592.1        
                          

 

 2016            TAWIL MD, SONU A            Ot            592.0        
                          

 

 2016            TAWIL MD, SONU WEINBERG            Ot            V72.84       
                           

 

 2016            LORENA PA, ALAINA SANDOVAL            Ot            
272.4                                  

 

 2016            LORENA PA, ALAINA SANDOVAL            Ot            
397.0                                  

 

 2016            LORENA PA, ALAINA K            Ot            
401.9                                  

 

 2016            LORENA PA, ALAINA K            Ot            
412                                  

 

 2016            LORENA PA, ALAINA K            Ot            
414.00                                  

 

 2016            LORENA PA, ALAINA K            Ot            
416.8                                  

 

 2016            LORENA PA, ALAINA K            Ot            
424.0                                  

 

 2016            LORENA PA, ALAINA SANDOVAL            Ot            
278.01                                  

 

 2016            LORENA PA, ALAINA SANDOVAL            Ot            
401.9                                  

 

 2016            LORENA PAALAINA            Ot            
412                                  

 

 2016            ALAINA OMALLEY            Ot            
414.00                                  

 

 2016            ALAINA OMALLEY            Ot            
V85.43                                  

 

 2016            DOTSON DO, KOKO            Ot            V76.12        
                          

 

 2016            VIOLETA ST MD            Ot            592.0        
                          

 

 2016                         Ot            280.9                        
          

 

 2016            FOX NAVARRO ARNP            Ot            287.5    
                              

 

 2016            DOTSON DO, KOKO            Ot            280.8         
                         

 

 2016            DOTSON DO, KOKO            Ot            287.31        
                          

 

 2016            DOTSON DO, KOKO            Ot            250.00        
                          

 

 2016            DOTSON DO, KOKO            Ot            272.1         
                         

 

 2016            DOTSON DO, KOKO            Ot            280.8         
                         

 

 2016            DOTSON DO, KOKO            Ot            287.31        
                          

 

 2016            DOTSON DO, KOKO            Ot            401.1         
                         

 

 2016            DOTSON DO, KOKO            Ot            414.01        
                          

 

 2016            DOTSON DO, KOKO            Ot            457.1         
                         

 

 2016            DOTSON DO, KOKO            Ot            592.0         
                         

 

 2016            DOTSON DO, KOKO            Ot            791.0         
                         

 

 2016            FOX NAVARRO ARNP            Ot            287.5    
                              

 

 2016            FOX NAVARRO ARNP            Ot            V58.69   
                               

 

 2016            FOX NAVARRO ARNP            Ot            280.9    
                              

 

 2016            FOX NAVARRO ARNP            Ot            287.31   
                               

 

 2016            FOX NAVARRO ARNP            Ot            412      
                            

 

 2016            FOX NAVARRO ARNP            Ot            414.00   
                               

 

 2016            FOX NAVARRO ARNP            Ot            V45.82   
                               

 

 2016            FOX NAVARRO ARNP            Ot            V58.69   
                               

 

 2016            DOTSON DO, KOKO            Ot            D50.8         
                         

 

 2016            DOTSON DO, KOKO            Ot            E11.8         
                         

 

 2016            DOTSON DO, KOKO            Ot            E78.0         
                         

 

 2016            DOTSON DO, KOKO            Ot            E78.1         
                         

 

 2016            DOTSON DO, KOKO            Ot            Z00.00        
                          

 

 2016            ROSALBA PHILLIPS            Ot            D50.9          
                        

 

 2016            ALAN, BOBAN N            Ot            D69.3          
                        

 

 2016            ALAN, BOBAN N            Ot            E11.9          
                        

 

 2016            ALAN, BOBAN N            Ot            E66.01         
                         

 

 2016            ALAN, BOBAN N            Ot            I25.10         
                         

 

 2016            ALAN, BOBAN N            Ot            M87.9          
                        

 

 2016            ALAN, BOBAN N            Ot            Z68.42         
                         

 

 2016            ALAN, BOBAN N            Ot            Z79.899        
                          

 

 2016            MACIEL HANSON, VIOLETA MARQUEZ            Ot            N20.2        
                          

 

 2016            MELANIE HILAH S ARNP            Ot            D50.9    
                              

 

 2016            NAVARRO HILAH S ARNP            Ot            D69.3    
                              

 

 2016            NAVARRO HILAH S ARNP            Ot            E11.9    
                              

 

 2016            NAVARRO HILAH S ARNP            Ot            E66.01   
                               

 

 2016            MELANIE HILAH S ARNP            Ot            I25.10   
                               

 

 2016            NAVARRO HILAH S ARNP            Ot            M87.9    
                              

 

 2016            NAVARRO HILAH S ARNP            Ot            Z68.42   
                               

 

 2016            NAVARRO HILAH S ARNP            Ot            Z79.899  
                                

 

 2016            ALAN, BOBAN N            Ot            D50.9          
                        

 

 2016            ALAN, BOBAN N            Ot            D69.3          
                        

 

 2016            ALAN, BOBAN N            Ot            E11.9          
                        

 

 2016            ALAN, BOBAN N            Ot            E66.01         
                         

 

 2016            ALAN, BOBAN N            Ot            I25.10         
                         

 

 2016            ALAN, BOBAN N            Ot            M87.9          
                        

 

 2016            ALAN, BOBAN N            Ot            Z68.42         
                         

 

 2016            ALAN, BOBAN N            Ot            Z79.899        
                          

 

 2016            MACIEL HANSON, VIOLETA MARQUEZ            Ot            N20.2        
                          

 

 2016            JACKI NAVARROAH S ARNP            Ot            D50.9    
                              

 

 2016            NAVARRO HILAH S ARNP            Ot            D69.3    
                              

 

 2016            NAVARRO HILAH S ARNP            Ot            E11.9    
                              

 

 2016            JACKI NAVARROAH S ARNP            Ot            E66.01   
                               

 

 2016            NAVARRO HILAH S ARNP            Ot            I25.10   
                               

 

 2016            FOX NAVARRO ARNP            Ot            M87.9    
                              

 

 2016            FOX NAVARRO ARNP            Ot            Z68.42   
                               

 

 2016            FOX NAVARRO ARNP            Ot            Z79.899  
                                

 

 2016            MACIEL HANSON, VIOLETA MARQUEZ            Ot            N20.2        
    CALCULUS OF KIDNEY WITH CALCULUS OF URET                     

 

 2016            FOX NAVARRO ARNP            Ot            D50.9    
        IRON DEFICIENCY ANEMIA, UNSPECIFIED                     

 

 2016            FOX NAVARRO ARNP            Ot            D69.3    
        IMMUNE THROMBOCYTOPENIC PURPURA                     

 

 2016            FOX NAVARRO ARNP            Ot            E11.9    
        TYPE 2 DIABETES MELLITUS WITHOUT COMPLIC                     

 

 2016            FOX NAVARRO ARNP            Ot            E66.01   
         MORBID (SEVERE) OBESITY DUE TO EXCESS CA                     

 

 2016            FOX NAVARRO ARNP            Ot            I25.10   
         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                      

 

 2016            FOX NAVARROP            Ot            M87.9    
        OSTEONECROSIS, UNSPECIFIED                     

 

 2016            FOX NAVARRO ARNP            Ot            Z68.42   
         BODY MASS INDEX (BMI) 45.0-49.9, ADULT                     

 

 2016            FOX NAVARRO ARNP            Ot            Z79.899  
          OTHER LONG TERM (CURRENT) DRUG THERAPY                     

 

 2016            ROSALBA PHILLIPS N            Ot            D50.9          
  IRON DEFICIENCY ANEMIA, UNSPECIFIED                     

 

 2016            ROSALBA PHILLIPS N            Ot            D69.3          
  IMMUNE THROMBOCYTOPENIC PURPURA                     

 

 2016            ALAN ROSALBA N            Ot            E11.9          
  TYPE 2 DIABETES MELLITUS WITHOUT COMPLIC                     

 

 2016            ALAN ROSALBA N            Ot            E66.01         
   MORBID (SEVERE) OBESITY DUE TO EXCESS CA                     

 

 2016            ROSALBA PHILLIPS N            Ot            I25.10         
   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                      

 

 2016            ALAN IVETTLUCY N            Ot            M87.9          
  OSTEONECROSIS, UNSPECIFIED                     

 

 2016            ALANROSALBA VILLALOBOS N            Ot            Z68.42         
   BODY MASS INDEX (BMI) 45.0-49.9, ADULT                     

 

 2016            ALAN, ROSALBA N            Ot            Z79.899        
    OTHER LONG TERM (CURRENT) DRUG THERAPY                     

 

 2016            VISHNU HANSON, ATUL DAVIDSON            Ot            M25.561 
           PAIN IN RIGHT KNEE                     

 

 2016            VISHNU HANSON, ATUL DAVIDSON            Ot            M25.561 
           PAIN IN RIGHT KNEE                     

 

 2016            ALANROSALBA VILLALOBOS N            Ot            D50.9          
  IRON DEFICIENCY ANEMIA, UNSPECIFIED                     

 

 2016            ALANROSALBA VILLALOBOS N            Ot            D69.3          
  IMMUNE THROMBOCYTOPENIC PURPURA                     

 

 2016            ALANROSALBA N            Ot            E11.9          
  TYPE 2 DIABETES MELLITUS WITHOUT COMPLIC                     

 

 2016            ALANROSALBA N            Ot            E66.01         
   MORBID (SEVERE) OBESITY DUE TO EXCESS CA                     

 

 2016            ALANROSALBA N            Ot            I25.10         
   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                      

 

 2016            ALAN, ROSALBA N            Ot            M87.9          
  OSTEONECROSIS, UNSPECIFIED                     

 

 2016            ALANROSALBA VILLALOBOS N            Ot            Z68.42         
   BODY MASS INDEX (BMI) 45.0-49.9, ADULT                     

 

 2016            ALANROSALBA VILLALOBOS N            Ot            Z79.899        
    OTHER LONG TERM (CURRENT) DRUG THERAPY                     

 

 2016            ALANROSALBA N            Ot            D50.9          
  IRON DEFICIENCY ANEMIA, UNSPECIFIED                     

 

 2016            ALANROSALBA N            Ot            D69.3          
  IMMUNE THROMBOCYTOPENIC PURPURA                     

 

 2016            ALAN BOBLUCY N            Ot            E11.9          
  TYPE 2 DIABETES MELLITUS WITHOUT COMPLIC                     

 

 2016            ALANROSALBA N            Ot            E66.01         
   MORBID (SEVERE) OBESITY DUE TO EXCESS CA                     

 

 2016            ALANROSALBA VILLALOBOS N            Ot            I25.10         
   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                      

 

 2016            ALANROSALBA VILLALOBOS N            Ot            M87.9          
  OSTEONECROSIS, UNSPECIFIED                     

 

 2016            ALANROSALBA N            Ot            Z68.42         
   BODY MASS INDEX (BMI) 45.0-49.9, ADULT                     

 

 2016            ALANIVETTAN N            Ot            Z79.899        
    OTHER LONG TERM (CURRENT) DRUG THERAPY                     

 

 2016            ALANIVETTAN N            Ot            D50.9          
  IRON DEFICIENCY ANEMIA, UNSPECIFIED                     

 

 2016            ALAN BOBAN N            Ot            D69.3          
  IMMUNE THROMBOCYTOPENIC PURPURA                     

 

 2016            ALANIVETTAN N            Ot            E11.9          
  TYPE 2 DIABETES MELLITUS WITHOUT COMPLIC                     

 

 2016            ALANROSALBA N            Ot            E66.01         
   MORBID (SEVERE) OBESITY DUE TO EXCESS CA                     

 

 2016            ROSALBA PHILLIPS            Ot            I25.10         
   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                      

 

 2016            ROSALBA PHILLIPS            Ot            M87.9          
  OSTEONECROSIS, UNSPECIFIED                     

 

 2016            ROSALBA PHILLIPS            Ot            Z68.42         
   BODY MASS INDEX (BMI) 45.0-49.9, ADULT                     

 

 2016            ROSALBA PHILLIPS            Ot            Z79.899        
    OTHER LONG TERM (CURRENT) DRUG THERAPY                     

 

 07/15/2016            FOX NAVARRO ARNP            Ot            D50.9    
        IRON DEFICIENCY ANEMIA, UNSPECIFIED                     

 

 07/15/2016            FOX NAVARRO S ARNP            Ot            D69.3    
        IMMUNE THROMBOCYTOPENIC PURPURA                     

 

 07/15/2016            FOX NAVARRO S ARNP            Ot            E11.9    
        TYPE 2 DIABETES MELLITUS WITHOUT COMPLIC                     

 

 07/15/2016            FOX NAVARRO S ARNP            Ot            E66.01   
         MORBID (SEVERE) OBESITY DUE TO EXCESS CA                     

 

 07/15/2016            FOX NAVARRO S ARNP            Ot            I25.10   
         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                      

 

 07/15/2016            FOX NAVARRO S ARNP            Ot            M87.9    
        OSTEONECROSIS, UNSPECIFIED                     

 

 07/15/2016            FOX NAVARRO S ARNP            Ot            Z68.42   
         BODY MASS INDEX (BMI) 45.0-49.9, ADULT                     

 

 07/15/2016            FOX NAVARRO S ARNP            Ot            Z79.899  
          OTHER LONG TERM (CURRENT) DRUG THERAPY                     

 

 2016            FOX NAVARRO S ARNP            Ot            D50.9    
        IRON DEFICIENCY ANEMIA, UNSPECIFIED                     

 

 2016            FOX NAVARRO S ARNP            Ot            D69.3    
        IMMUNE THROMBOCYTOPENIC PURPURA                     

 

 2016            FOX NAVARRO S ARNP            Ot            E11.9    
        TYPE 2 DIABETES MELLITUS WITHOUT COMPLIC                     

 

 2016            FOX NAVARRO S ARNP            Ot            E66.01   
         MORBID (SEVERE) OBESITY DUE TO EXCESS CA                     

 

 2016            FOX NAVARRO S ARNP            Ot            I25.10   
         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                      

 

 2016            FOX NAVARRO S ARNP            Ot            M87.9    
        OSTEONECROSIS, UNSPECIFIED                     

 

 2016            FOX NAVARRO S ARNP            Ot            Z68.42   
         BODY MASS INDEX (BMI) 45.0-49.9, ADULT                     

 

 2016            FOX NAVARRO DEDRA            Ot            Z79.899  
          OTHER LONG TERM (CURRENT) DRUG THERAPY                     

 

 2016            ALANROSALBA N            Ot            D50.9          
  IRON DEFICIENCY ANEMIA, UNSPECIFIED                     

 

 2016            ALANROSALBA N            Ot            D69.3          
  IMMUNE THROMBOCYTOPENIC PURPURA                     

 

 2016            ALANROSALBA N            Ot            E11.9          
  TYPE 2 DIABETES MELLITUS WITHOUT COMPLIC                     

 

 2016            ALANROSALBA N            Ot            E66.01         
   MORBID (SEVERE) OBESITY DUE TO EXCESS CA                     

 

 2016            ALAN BOBLUCY N            Ot            I25.10         
   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                      

 

 2016            ALANROSALBA VILLALOOBS N            Ot            M87.9          
  OSTEONECROSIS, UNSPECIFIED                     

 

 2016            ALANROSALBA N            Ot            Z68.42         
   BODY MASS INDEX (BMI) 45.0-49.9, ADULT                     

 

 2016            ALANROSALBA N            Ot            Z79.899        
    OTHER LONG TERM (CURRENT) DRUG THERAPY                     

 

 2016            ALANROSALBA N            Ot            D50.9          
  IRON DEFICIENCY ANEMIA, UNSPECIFIED                     

 

 2016            ALANROSALBA N            Ot            D69.3          
  IMMUNE THROMBOCYTOPENIC PURPURA                     

 

 2016            ALANROSALBA N            Ot            E11.9          
  TYPE 2 DIABETES MELLITUS WITHOUT COMPLIC                     

 

 2016            ALANROSALBA N            Ot            E66.01         
   MORBID (SEVERE) OBESITY DUE TO EXCESS CA                     

 

 2016            ALAN BOBLUCY N            Ot            I25.10         
   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                      

 

 2016            ALANROSALBA N            Ot            M87.9          
  OSTEONECROSIS, UNSPECIFIED                     

 

 2016            ALANROSALBA N            Ot            Z68.42         
   BODY MASS INDEX (BMI) 45.0-49.9, ADULT                     

 

 2016            ALANROSALBA N            Ot            Z79.899        
    OTHER LONG TERM (CURRENT) DRUG THERAPY                     

 

 2016            SIXTO BULL            Ot            E11.9      
      TYPE 2 DIABETES MELLITUS WITHOUT COMPLIC                     

 

 2016            SIXTO BULL            Ot            I10        
    ESSENTIAL (PRIMARY) HYPERTENSION                     

 

 2016            SIXTO BULL APRN            Ot            I25.10     
       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                      

 

 2016            SIXTO BULL            Ot            S93.402A   
         SPRAIN OF UNSPECIFIED LIGAMENT OF LEFT A                     

 

 2016            SIXTO BULL            Ot            S93.601A   
         UNSPECIFIED SPRAIN OF RIGHT FOOT, INITIA                     

 

 2016            SIXTO BULL            Ot            S99.921A   
         UNSPECIFIED INJURY OF RIGHT FOOT, INITIA                     

 

 2016            SIXTO BULL            Ot            W18.30XA   
         FALL ON SAME LEVEL, UNSPECIFIED, INITIAL                     

 

 2016            SIXTO BULL            Ot            Y92.481    
        PARKING LOT AS THE PLACE OF OCCURRENCE O                     

 

 2016            SIXTO BULL            Ot            Y93.89     
       ACTIVITY, OTHER SPECIFIED                     

 

 2016            SIXTO BULL            Ot            Y99.8      
      OTHER EXTERNAL CAUSE STATUS                     

 

 2016            SIXTO BULL            Ot            Z79.4      
      LONG TERM (CURRENT) USE OF INSULIN                     

 

 2016            SIXTO BULL            Ot            Z79.82     
       LONG TERM (CURRENT) USE OF ASPIRIN                     

 

 2016            SIXTO BULL            Ot            Z79.899    
        OTHER LONG TERM (CURRENT) DRUG THERAPY                     

 

 2016            SIXTO BULL            Ot            E11.9      
      TYPE 2 DIABETES MELLITUS WITHOUT COMPLIC                     

 

 2016            SIXTO BULL            Ot            I10        
    ESSENTIAL (PRIMARY) HYPERTENSION                     

 

 2016            SIXTO BULL            Ot            I25.10     
       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                      

 

 2016            SIXTO BULL            Ot            S93.402A   
         SPRAIN OF UNSPECIFIED LIGAMENT OF LEFT A                     

 

 2016            SIXTO BULL            Ot            S93.601A   
         UNSPECIFIED SPRAIN OF RIGHT FOOT, INITIA                     

 

 2016            SIXTO BULL            Ot            S99.921A   
         UNSPECIFIED INJURY OF RIGHT FOOT, INITIA                     

 

 2016            SIXTO BULL            Ot            W18.30XA   
         FALL ON SAME LEVEL, UNSPECIFIED, INITIAL                     

 

 2016            SIXTO BULL            Ot            Y92.481    
        PARKING LOT AS THE PLACE OF OCCURRENCE O                     

 

 2016            SIXTO BULL            Ot            Y93.89     
       ACTIVITY, OTHER SPECIFIED                     

 

 2016            SIXTO BULL            Ot            Y99.8      
      OTHER EXTERNAL CAUSE STATUS                     

 

 2016            SIXTO BULL            Ot            Z79.4      
      LONG TERM (CURRENT) USE OF INSULIN                     

 

 2016            SIXTO BULL APRTAMIKO            Ot            Z79.82     
       LONG TERM (CURRENT) USE OF ASPIRIN                     

 

 2016            SIXTO BULL APRTAMIKO            Ot            Z79.899    
        OTHER LONG TERM (CURRENT) DRUG THERAPY                     

 

 2016            ABISAI DANIELLE DO K            Ot            E11.9          
  TYPE 2 DIABETES MELLITUS WITHOUT COMPLIC                     

 

 2016            ABISAI DANIELLE DO K            Ot            E66.01         
   MORBID (SEVERE) OBESITY DUE TO EXCESS CA                     

 

 2016            SHASHI JENNIFER SANTILLANA K            Ot            I10            
ESSENTIAL (PRIMARY) HYPERTENSION                     

 

 2016            SHASHI JENNIFER SANTILLANA K            Ot            I51.7          
  CARDIOMEGALY                     

 

 2016            SHASHI JENNIFER SANTILLANA K            Ot            R07.89         
   OTHER CHEST PAIN                     

 

 2016            SHASHI JENNIFER SANTILLANA K            Ot            Z79.4          
  LONG TERM (CURRENT) USE OF INSULIN                     

 

 2016            SHASHI JENNIFER SANTILLANA K            Ot            Z79.82         
   LONG TERM (CURRENT) USE OF ASPIRIN                     

 

 2016            SHASHI JENNIFER SANTILLANA K            Ot            Z79.899        
    OTHER LONG TERM (CURRENT) DRUG THERAPY                     

 

 2016            JENNIFER DANIELLE DOA K            Ot            Z95.5          
  PRESENCE OF CORONARY ANGIOPLASTY IMPLANT                     

 

 2016                         Ot            443.9            PERIPH 
VASCULAR DIS NOS                     

 

 2016                         Ot            457.0            POSTMASTECT 
LYMPHEDEMA                     

 

 2016                         Ot            682.6            CELLULITIS 
OF LEG                     

 

 2016                         Ot            V58.69            OTH MED,LT,
CURRENT USE                     

 

 2016                         Ot            V58.89            OTHER 
SPECIFIED AFTERCARE                     

 

 2016                         Ot            V76.12            OT SCREEN 
MAMMO-MALIGN NEOPLASM OF LUCINA                     

 

 2016            TAWIL MD, ELIAS A            Ot            592.1        
    CALCULUS OF URETER                     

 

 2016            TAWIL MD, ELIAS A            Ot            592.0        
    CALCULUS OF KIDNEY                     

 

 2016            TAWIL MD, ELIAS A            Ot            V72.84       
     EXAM PRE-OPERATIVE NOS                     

 

 2016            ALAINA OMALLEY            Ot            
272.4            HYPERLIPIDEMIA NEC/NOS                     

 

 2016            ALAINA OMALLEY            Ot            
397.0            TRICUSPID VALVE DISEASE                     

 

 2016            ALAINA OMALLEY            Ot            
401.9            HYPERTENSION NOS                     

 

 2016            ALAINA OMALLEY            Ot            
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                     

 

 2016            ALAINA OMALLEY            Ot            
414.00            CORON ATHEROSCLER NOS TYPE VESSEL, NATIV                     

 

 2016            ALAINA OMALLEY            Ot            
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                     

 

 2016            ALAINA OMALLEY            Ot            
424.0            MITRAL VALVE DISORDER                     

 

 2016            ALAINA OMALLEY            Ot            
278.01            MORBID OBESITY                     

 

 2016            ALAINA OMALLEY            Ot            
401.9            HYPERTENSION NOS                     

 

 2016            ALAINA OMALLEY            Ot            
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                     

 

 2016            ALAINA OMALLEY            Ot            
414.00            CORON ATHEROSCLER NOS TYPE VESSEL, NATIV                     

 

 2016            ALAINA OMALLEY            Ot            
V85.43            BODY MASS INDEX 50.0-59.9, ADULT                     

 

 2016            KOKO DOTSON DO            Ot            V76.12        
    OTH SCREEN MAMMO-MALIGN NEOPLASM OF LUCINA                     

 

 2016            VIOLETA ST MD            Ot            592.0        
    CALCULUS OF KIDNEY                     

 

 2016                         Ot            280.9            IRON DEFIC 
ANEMIA NOS                     

 

 2016            FOX NAVARRO            Ot            287.5    
        THROMBOCYTOPENIA NOS                     

 

 2016            KOKO DOTSON DO            Ot            280.8         
   IRON DEFIC ANEMIA NEC                     

 

 2016            KOKO DOTSON DO            Ot            287.31        
    IMMUNE THROMBOCYTOPENIC PURPURA                     

 

 2016            KOKO DOTSON DO            Ot            250.00        
    DIAB RORO WO COMPL, TYPE II OR UNSPEC TY                     

 

 2016            KOKO DOTSON DO            Ot            272.1         
   PURE HYPERGLYCERIDEMIA                     

 

 2016            KOKO DOTSON DO            Ot            280.8         
   IRON DEFIC ANEMIA NEC                     

 

 2016            KOKO DOTSON DO            Ot            287.31        
    IMMUNE THROMBOCYTOPENIC PURPURA                     

 

 2016            KOKO DOTSON DO            Ot            401.1         
   BENIGN HYPERTENSION                     

 

 2016            KOKO DOTSON DO            Ot            414.01        
    CORONARY ATHEROSCLEROSIS OF NATIVE CORON                     

 

 2016            KOKO DOTSON DO            Ot            457.1         
   OTHER LYMPHEDEMA                     

 

 2016            KOKO DOTSON DO            Ot            592.0         
   CALCULUS OF KIDNEY                     

 

 2016            DOTSON DO, KOKO            Ot            791.0         
   PROTEINURIA                     

 

 2016            FOX NAVARROP            Ot            287.5    
        THROMBOCYTOPENIA NOS                     

 

 2016            FOX NAVARRO            Ot            V58.69   
         OTH MED,LT,CURRENT USE                     

 

 2016            FOX NAVARROP            Ot            280.9    
        IRON DEFIC ANEMIA NOS                     

 

 2016            FOX NAVARROP            Ot            287.31   
         IMMUNE THROMBOCYTOPENIC PURPURA                     

 

 2016            FOX NAVARROP            Ot            412      
      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                     

 

 2016            FOX NAVARROP            Ot            414.00   
         CORON ATHEROSCLER NOS TYPE VESSEL, NATIV                     

 

 2016            FOX ANVARROP            Ot            V45.82   
         PERCUTANEOUS TRANSLUM CORON ANGIOPLASTY                      

 

 2016            FOX NAVARRO            Ot            V58.69   
         OTH MED,LT,CURRENT USE                     

 

 2016            KAREN DOTSON DOI            Ot            D50.8         
   OTHER IRON DEFICIENCY ANEMIAS                     

 

 2016            KAREN DOTSON DOI            Ot            E11.8         
   TYPE 2 DIABETES MELLITUS WITH UNSPECIFIE                     

 

 2016            MAURI SANTILLAN KOKO            Ot            E78.0         
   PURE HYPERCHOLESTEROLEMIA                     

 

 2016            KAREN DOTSON DOI            Ot            E78.1         
   PURE HYPERGLYCERIDEMIA                     

 

 2016            KAREN DOTSON DOI            Ot            Z00.00        
    ENCNTR FOR GENERAL ADULT MEDICAL EXAM W/                     

 

 2016            MACIEL HANSON, VIOLETA MARQUEZ            Ot            N20.2        
    CALCULUS OF KIDNEY WITH CALCULUS OF URET                     

 

 2016            FOX NAVARRO            Ot            D50.9    
        IRON DEFICIENCY ANEMIA, UNSPECIFIED                     

 

 2016            FOX NAVARRO            Ot            D69.3    
        IMMUNE THROMBOCYTOPENIC PURPURA                     

 

 2016            FOX NAVARRO            Ot            E11.9    
        TYPE 2 DIABETES MELLITUS WITHOUT COMPLIC                     

 

 2016            FOX NAVARRO            Ot            E66.01   
         MORBID (SEVERE) OBESITY DUE TO EXCESS CA                     

 

 2016            FOX NAVARRO            Ot            I25.10   
         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                      

 

 2016            FOX NAVARRO            Ot            M87.9    
        OSTEONECROSIS, UNSPECIFIED                     

 

 2016            FOX NAVARRO            Ot            Z68.42   
         BODY MASS INDEX (BMI) 45.0-49.9, ADULT                     

 

 2016            FOX NAVARRO ARNP            Ot            Z79.899  
          OTHER LONG TERM (CURRENT) DRUG THERAPY                     

 

 2016            FOX NAVARRO DEDRA            Ot            D50.9    
        IRON DEFICIENCY ANEMIA, UNSPECIFIED                     

 

 2016            FOX NAVARRO ARNP            Ot            D69.3    
        IMMUNE THROMBOCYTOPENIC PURPURA                     

 

 2016            NAVARRO FOX WELSH ARNP            Ot            E11.9    
        TYPE 2 DIABETES MELLITUS WITHOUT COMPLIC                     

 

 2016            NAVARRO FOX WELSH DEDRA            Ot            E66.01   
         MORBID (SEVERE) OBESITY DUE TO EXCESS CA                     

 

 2016            NAVARROFOX WELSH ARNP            Ot            I25.10   
         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                      

 

 2016            NAVARRO FOX WELSH ARNP            Ot            M87.9    
        OSTEONECROSIS, UNSPECIFIED                     

 

 2016            FOX NAVARRO ARNP            Ot            Z68.42   
         BODY MASS INDEX (BMI) 45.0-49.9, ADULT                     

 

 2016            MELANIE FOX AMAYAP            Ot            Z79.899  
          OTHER LONG TERM (CURRENT) DRUG THERAPY                     

 

 2016            ALAN ROSALBA MORRISON            Ot            D50.9          
  IRON DEFICIENCY ANEMIA, UNSPECIFIED                     

 

 2016            ALAN ROSALBA MORRISON            Ot            D69.3          
  IMMUNE THROMBOCYTOPENIC PURPURA                     

 

 2016            ALANROSALBA            Ot            E11.9          
  TYPE 2 DIABETES MELLITUS WITHOUT COMPLIC                     

 

 2016            ALANROSALBA            Ot            E66.01         
   MORBID (SEVERE) OBESITY DUE TO EXCESS CA                     

 

 2016            ROSALBA PHILLIPS            Ot            I25.10         
   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                      

 

 2016            ALANROSALBA            Ot            M87.9          
  OSTEONECROSIS, UNSPECIFIED                     

 

 2016            ALAN, ROSALBA MORRISON            Ot            Z68.42         
   BODY MASS INDEX (BMI) 45.0-49.9, ADULT                     

 

 2016            ROSALBA PHILLIPS            Ot            Z79.899        
    OTHER LONG TERM (CURRENT) DRUG THERAPY                     

 

 2016            JENNIFER DANIELLE DOA K            Ot            E11.9          
  TYPE 2 DIABETES MELLITUS WITHOUT COMPLIC                     

 

 2016            SHASHI SANTILLAN ABISAI K            Ot            E66.01         
   MORBID (SEVERE) OBESITY DUE TO EXCESS CA                     

 

 2016            SHASHI SANTILLAN ABISAI K            Ot            I10            
ESSENTIAL (PRIMARY) HYPERTENSION                     

 

 2016            SHASHI DO, ABISAI K            Ot            I51.7          
  CARDIOMEGALY                     

 

 2016            SHASHI DO, ABISAI K            Ot            R07.89         
   OTHER CHEST PAIN                     

 

 2016            SHASHI DO, ABISAI K            Ot            Z79.4          
  LONG TERM (CURRENT) USE OF INSULIN                     

 

 2016            SHASHI DO, ABISAI K            Ot            Z79.82         
   LONG TERM (CURRENT) USE OF ASPIRIN                     

 

 2016            SHASHI DO, ABISAI K            Ot            Z79.899        
    OTHER LONG TERM (CURRENT) DRUG THERAPY                     

 

 2016            SHASHI DO, ABISAI K            Ot            Z95.5          
  PRESENCE OF CORONARY ANGIOPLASTY IMPLANT                     

 

 2016            SHASHI DO, ABISAI K            Ot            E11.9          
  TYPE 2 DIABETES MELLITUS WITHOUT COMPLIC                     

 

 2016            SHASHI DO, ABISAI K            Ot            E66.01         
   MORBID (SEVERE) OBESITY DUE TO EXCESS CA                     

 

 2016            SHASHI DO, ABISAI K            Ot            I10            
ESSENTIAL (PRIMARY) HYPERTENSION                     

 

 2016            SHASHI DO, ABISAI K            Ot            I51.7          
  CARDIOMEGALY                     

 

 2016            SHASHI DO, ABISAI K            Ot            R07.89         
   OTHER CHEST PAIN                     

 

 2016            SHASHI DO, ABISAI K            Ot            Z79.4          
  LONG TERM (CURRENT) USE OF INSULIN                     

 

 2016            SHASHI DO, ABISAI K            Ot            Z79.82         
   LONG TERM (CURRENT) USE OF ASPIRIN                     

 

 2016            SHASHI DO, ABISAI K            Ot            Z79.899        
    OTHER LONG TERM (CURRENT) DRUG THERAPY                     

 

 2016            SHASHI DO, ABISAI K            Ot            Z95.5          
  PRESENCE OF CORONARY ANGIOPLASTY IMPLANT                     

 

 10/01/2016            SIXTO BULL            Ot            E11.9      
      TYPE 2 DIABETES MELLITUS WITHOUT COMPLIC                     

 

 10/01/2016            SIXTO BULL APRTAMIKO            Ot            I10        
    ESSENTIAL (PRIMARY) HYPERTENSION                     

 

 10/01/2016            SIXTO BULL APRN            Ot            I25.10     
       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                      

 

 10/01/2016            SIXTO BULL            Ot            S93.402A   
         SPRAIN OF UNSPECIFIED LIGAMENT OF LEFT A                     

 

 10/01/2016            SIXTO BULL            Ot            S93.601A   
         UNSPECIFIED SPRAIN OF RIGHT FOOT, INITIA                     

 

 10/01/2016            SIXTO BULL            Ot            S99.921A   
         UNSPECIFIED INJURY OF RIGHT FOOT, INITIA                     

 

 10/01/2016            SIXTO BULL APRN            Ot            W18.30XA   
         FALL ON SAME LEVEL, UNSPECIFIED, INITIAL                     

 

 10/01/2016            SIXTO BULL            Ot            Y92.481    
        PARKING LOT AS THE PLACE OF OCCURRENCE O                     

 

 10/01/2016            SIXTO BULL APRN            Ot            Y93.89     
       ACTIVITY, OTHER SPECIFIED                     

 

 10/01/2016            SIXTO BULL APRN            Ot            Y99.8      
      OTHER EXTERNAL CAUSE STATUS                     

 

 10/01/2016            SIXTO BULL APRTAMIKO            Ot            Z79.4      
      LONG TERM (CURRENT) USE OF INSULIN                     

 

 10/01/2016            SIXTO BULL APRN            Ot            Z79.82     
       LONG TERM (CURRENT) USE OF ASPIRIN                     

 

 10/01/2016            SIXTO BULL APRN            Ot            Z79.899    
        OTHER LONG TERM (CURRENT) DRUG THERAPY                     

 

 10/01/2016            SHASHI DO ABISAI K            Ot            E11.9          
  TYPE 2 DIABETES MELLITUS WITHOUT COMPLIC                     

 

 10/01/2016            SHASHI DO, ABISAI K            Ot            E66.01         
   MORBID (SEVERE) OBESITY DUE TO EXCESS CA                     

 

 10/01/2016            SHASHI DO ABISAI K            Ot            I10            
ESSENTIAL (PRIMARY) HYPERTENSION                     

 

 10/01/2016            SHASHI DO ABISAI K            Ot            I51.7          
  CARDIOMEGALY                     

 

 10/01/2016            SHASHI DO ABISAI K            Ot            R07.89         
   OTHER CHEST PAIN                     

 

 10/01/2016            SHASHI DO ABISAI K            Ot            Z79.4          
  LONG TERM (CURRENT) USE OF INSULIN                     

 

 10/01/2016            SHASHI DO ABISAI K            Ot            Z79.82         
   LONG TERM (CURRENT) USE OF ASPIRIN                     

 

 10/01/2016            SHASHI DO ABISAI K            Ot            Z79.899        
    OTHER LONG TERM (CURRENT) DRUG THERAPY                     

 

 10/01/2016            SHASHI DO ABISAI K            Ot            Z95.5          
  PRESENCE OF CORONARY ANGIOPLASTY IMPLANT                     

 

 10/24/2016            ROSALBA PHILLIPS            Ot            D50.9          
  IRON DEFICIENCY ANEMIA, UNSPECIFIED                     

 

 10/24/2016            ROSALBA PHILLIPS            Ot            D69.3          
  IMMUNE THROMBOCYTOPENIC PURPURA                     

 

 10/24/2016            ROSALBA PHILLIPS            Ot            E11.9          
  TYPE 2 DIABETES MELLITUS WITHOUT COMPLIC                     

 

 10/24/2016            ROSALBA PHILLIPS            Ot            E66.01         
   MORBID (SEVERE) OBESITY DUE TO EXCESS CA                     

 

 10/24/2016            ROSALBA PHILLIPS            Ot            I25.10         
   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                      

 

 10/24/2016            ROSALBA PHILLIPS            Ot            M87.9          
  OSTEONECROSIS, UNSPECIFIED                     

 

 10/24/2016            ALANROSALBA N            Ot            Z68.42         
   BODY MASS INDEX (BMI) 45.0-49.9, ADULT                     

 

 10/24/2016            ALANROSALBA N            Ot            Z79.899        
    OTHER LONG TERM (CURRENT) DRUG THERAPY                     

 

 10/27/2016            ALANIVETTAN N            Ot            D50.9          
  IRON DEFICIENCY ANEMIA, UNSPECIFIED                     

 

 10/27/2016            ALAN BOBLUCY N            Ot            D69.3          
  IMMUNE THROMBOCYTOPENIC PURPURA                     

 

 10/27/2016            ALAN BOBAN N            Ot            E11.9          
  TYPE 2 DIABETES MELLITUS WITHOUT COMPLIC                     

 

 10/27/2016            ALAN BOBAN N            Ot            E66.01         
   MORBID (SEVERE) OBESITY DUE TO EXCESS CA                     

 

 10/27/2016            ALAN BOBLUCY N            Ot            I25.10         
   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                      

 

 10/27/2016            ALANROSALBA N            Ot            M87.9          
  OSTEONECROSIS, UNSPECIFIED                     

 

 10/27/2016            ALANROSALBA N            Ot            Z68.42         
   BODY MASS INDEX (BMI) 45.0-49.9, ADULT                     

 

 10/27/2016            ALANROSALBA N            Ot            Z79.899        
    OTHER LONG TERM (CURRENT) DRUG THERAPY                     

 

 10/30/2016            ALANROSALBA N            Ot            D50.9          
  IRON DEFICIENCY ANEMIA, UNSPECIFIED                     

 

 10/30/2016            ALAN BOBLUCY N            Ot            D69.3          
  IMMUNE THROMBOCYTOPENIC PURPURA                     

 

 10/30/2016            ALAN BOBLUCY N            Ot            E11.9          
  TYPE 2 DIABETES MELLITUS WITHOUT COMPLIC                     

 

 10/30/2016            ALANROSALBA N            Ot            E66.01         
   MORBID (SEVERE) OBESITY DUE TO EXCESS CA                     

 

 10/30/2016            ALAN BOBLUCY N            Ot            I25.10         
   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                      

 

 10/30/2016            ALANROSALBA N            Ot            M87.9          
  OSTEONECROSIS, UNSPECIFIED                     

 

 10/30/2016            ALANROSALBA N            Ot            Z68.42         
   BODY MASS INDEX (BMI) 45.0-49.9, ADULT                     

 

 10/30/2016            ALAN BOBAN N            Ot            Z79.899        
    OTHER LONG TERM (CURRENT) DRUG THERAPY                     

 

 2016            ALAN, BOBAN N            Ot            D50.9          
  IRON DEFICIENCY ANEMIA, UNSPECIFIED                     

 

 2016            ALAN, BOBAN N            Ot            D69.3          
  IMMUNE THROMBOCYTOPENIC PURPURA                     

 

 2016            ROSALBA PHILLIPS N            Ot            E11.9          
  TYPE 2 DIABETES MELLITUS WITHOUT COMPLIC                     

 

 2016            ROSALBA PHILLIPS N            Ot            E66.01         
   MORBID (SEVERE) OBESITY DUE TO EXCESS CA                     

 

 2016            ROSALBA PHILLIPS N            Ot            I25.10         
   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                      

 

 2016            ROSALBA PHILLIPS N            Ot            M87.9          
  OSTEONECROSIS, UNSPECIFIED                     

 

 2016            ROSALBA PHILLIPS N            Ot            Z68.42         
   BODY MASS INDEX (BMI) 45.0-49.9, ADULT                     

 

 2016            ROSALBA PHILLIPS N            Ot            Z79.899        
    OTHER LONG TERM (CURRENT) DRUG THERAPY                     

 

 01/10/2017            ROSALBA PHILLIPS N            Ot            D50.9          
  IRON DEFICIENCY ANEMIA, UNSPECIFIED                     

 

 01/10/2017            ROSALBA PHILLIPS N            Ot            D69.3          
  IMMUNE THROMBOCYTOPENIC PURPURA                     

 

 01/10/2017            ROSALBA PHILLIPS N            Ot            E11.9          
  TYPE 2 DIABETES MELLITUS WITHOUT COMPLIC                     

 

 01/10/2017            ROSALBA PHILLIPS N            Ot            E66.01         
   MORBID (SEVERE) OBESITY DUE TO EXCESS CA                     

 

 01/10/2017            ROSALBA PHILLIPS N            Ot            I25.10         
   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                      

 

 01/10/2017            ROSALBA PHILLIPS N            Ot            M87.9          
  OSTEONECROSIS, UNSPECIFIED                     

 

 01/10/2017            ROSALBA PHILLIPS N            Ot            Z68.42         
   BODY MASS INDEX (BMI) 45.0-49.9, ADULT                     

 

 01/10/2017            ROSALBA PHILLIPS N            Ot            Z79.899        
    OTHER LONG TERM (CURRENT) DRUG THERAPY                     

 

 2017            CHEPE GRAHAM MD            Ot            E11.9      
      TYPE 2 DIABETES MELLITUS WITHOUT COMPLIC                     

 

 2017            CHEPE GRAHAM MD            Ot            H61.21     
       IMPACTED CERUMEN, RIGHT EAR                     

 

 2017            CHEPE GRAHAM MD            Ot            I10        
    ESSENTIAL (PRIMARY) HYPERTENSION                     

 

 2017            CHEPE GRAHAM MD, Ot            J06.9      
      ACUTE UPPER RESPIRATORY INFECTION, UNSPE                     

 

 2017            CHEPE GRAHAM MD            Ot            R05        
    COUGH                     

 

 2017            CHEPE GRAHAM MD            Ot            Z79.4      
      LONG TERM (CURRENT) USE OF INSULIN                     

 

 2017            CHEPE GRAHAM MD, Ot            Z79.82     
       LONG TERM (CURRENT) USE OF ASPIRIN                     

 

 2017            CHEPE GRAHAM MD            Ot            Z79.899    
        OTHER LONG TERM (CURRENT) DRUG THERAPY                     

 

 2017            CHEPE GRAHAM MD            Ot            Z95.5      
      PRESENCE OF CORONARY ANGIOPLASTY IMPLANT                     

 

 2017            CHEPE GRAHAM MD            Ot            E11.9      
      TYPE 2 DIABETES MELLITUS WITHOUT COMPLIC                     

 

 2017            CHEPE GRAHAM MD            Ot            H61.21     
       IMPACTED CERUMEN, RIGHT EAR                     

 

 2017            CHEPE GRAHAM MD            Ot            I10        
    ESSENTIAL (PRIMARY) HYPERTENSION                     

 

 2017            CHEEP GRAHAM MD            Ot            J06.9      
      ACUTE UPPER RESPIRATORY INFECTION, UNSPE                     

 

 2017            CHEPE GRAHAM MD            Ot            R05        
    COUGH                     

 

 2017            CHEPE GRAHAM MD            Ot            Z79.4      
      LONG TERM (CURRENT) USE OF INSULIN                     

 

 2017            CHEPE GRAHAM MD            Ot            Z79.82     
       LONG TERM (CURRENT) USE OF ASPIRIN                     

 

 2017            CHEPE GRAHAM MD            Ot            Z79.899    
        OTHER LONG TERM (CURRENT) DRUG THERAPY                     

 

 2017            CHEPE GRAHAM MD            Ot            Z95.5      
      PRESENCE OF CORONARY ANGIOPLASTY IMPLANT                     

 

 2017                         Ot            457.0            POSTMASTECT 
LYMPHEDEMA                     

 

 2017                         Ot            682.6            CELLULITIS 
OF LEG                     

 

 2017                         Ot            V58.69            OTH MED,LT,
CURRENT USE                     

 

 2017                         Ot            V58.89            OTHER 
SPECIFIED AFTERCARE                     

 

 2017                         Ot            280.9            IRON DEFIC 
ANEMIA NOS                     

 

 2017                         Ot            457.0            POSTMASTECT 
LYMPHEDEMA                     

 

 2017                         Ot            682.6            CELLULITIS 
OF LEG                     

 

 2017                         Ot            V58.69            OTH MED,LT,
CURRENT USE                     

 

 2017                         Ot            V58.89            OTHER 
SPECIFIED AFTERCARE                     

 

 2017                         Ot            280.9            IRON DEFIC 
ANEMIA NOS                     

 

 2017            CHEPE GRAHAM MD            Ot            E11.9      
      TYPE 2 DIABETES MELLITUS WITHOUT COMPLIC                     

 

 2017            CHEPE GRAHAM MD            Ot            H61.21     
       IMPACTED CERUMEN, RIGHT EAR                     

 

 2017            CHEPE GRAHAM MD            Ot            I10        
    ESSENTIAL (PRIMARY) HYPERTENSION                     

 

 2017            CHEPE GRAHAM MD            Ot            J06.9      
      ACUTE UPPER RESPIRATORY INFECTION, UNSPE                     

 

 2017            ODGERS MD, CHEPE K            Ot            R05        
    COUGH                     

 

 2017            CHEPE GRAHAM MD, Ot            Z79.4      
      LONG TERM (CURRENT) USE OF INSULIN                     

 

 2017            CHEPE GRAHAM MD, Ot            Z79.82     
       LONG TERM (CURRENT) USE OF ASPIRIN                     

 

 2017            CHEPE GRAHAM MD, Ot            Z79.899    
        OTHER LONG TERM (CURRENT) DRUG THERAPY                     

 

 2017            CHEPE GRHAAM MD, Ot            Z95.5      
      PRESENCE OF CORONARY ANGIOPLASTY IMPLANT                     

 

 2017            CHEPE GRAHAM MD, Ot            E11.9      
      TYPE 2 DIABETES MELLITUS WITHOUT COMPLIC                     

 

 2017            CHEPE GRAHAM MD, Ot            E66.01     
       MORBID (SEVERE) OBESITY DUE TO EXCESS CA                     

 

 2017            CHEPE GRAHAM MD, Ot            E78.00     
       PURE HYPERCHOLESTEROLEMIA, UNSPECIFIED                     

 

 2017            CHEPE GRAHAM MD, Ot            G47.33     
       OBSTRUCTIVE SLEEP APNEA (ADULT) (PEDIATR                     

 

 2017            CHEPE GRAHAM MD, Ot            I10        
    ESSENTIAL (PRIMARY) HYPERTENSION                     

 

 2017            CHEPE GRAHAM MD, Ot            I25.10     
       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                      

 

 2017            CHEPE GRAHAM MD, Ot            J18.9      
      PNEUMONIA, UNSPECIFIED ORGANISM                     

 

 2017            CHEPE GRAHAM MD, Ot            J45.909    
        UNSPECIFIED ASTHMA, UNCOMPLICATED                     

 

 2017            CHEPE GRAHAM MD, Ot            K21.9      
      GASTRO-ESOPHAGEAL REFLUX DISEASE WITHOUT                     

 

 2017            CHEPE GRAHAM MD, Ot            K75.81     
       NONALCOHOLIC STEATOHEPATITIS (LEONE)                     

 

 2017            CHEPE GRAHAM MD, Ot            M06.9      
      RHEUMATOID ARTHRITIS, UNSPECIFIED                     

 

 2017            CHEPE GRAHAM MD, Ot            M19.91     
       PRIMARY OSTEOARTHRITIS, UNSPECIFIED SITE                     

 

 2017            CHEPE GRAHAM MD            Ot            N20.0      
      CALCULUS OF KIDNEY                     

 

 2017            CHEPE GRAHAM MD, Ot            R05        
    COUGH                     

 

 2017            CHEPE GRAHAM MD, Ot            R06.02     
       SHORTNESS OF BREATH                     

 

 2017            CHEPE GRAHAM MD            Ot            R50.9      
      FEVER, UNSPECIFIED                     

 

 2017            CHEPE GRAHAM MD, Ot            Z68.42     
       BODY MASS INDEX (BMI) 45.0-49.9, ADULT                     

 

 2017            CHEPE GRAHAM MD, Ot            Z79.4      
      LONG TERM (CURRENT) USE OF INSULIN                     

 

 2017            CHEPE GRAHAM MD            Ot            Z87.891    
        PERSONAL HISTORY OF NICOTINE DEPENDENCE                     

 

 2017            CHEPE GRAHAM MD            Ot            Z95.5      
      PRESENCE OF CORONARY ANGIOPLASTY IMPLANT                     

 

 2017            ALAN ROSALBA MORRISON            Ot            D50.9          
  IRON DEFICIENCY ANEMIA, UNSPECIFIED                     

 

 2017            ALAN ROSALBA MORRISON            Ot            D69.3          
  IMMUNE THROMBOCYTOPENIC PURPURA                     

 

 2017            ALANROSALBA            Ot            E11.9          
  TYPE 2 DIABETES MELLITUS WITHOUT COMPLIC                     

 

 2017            ALANROSALBA            Ot            E66.01         
   MORBID (SEVERE) OBESITY DUE TO EXCESS CA                     

 

 2017            ALANROSALBA            Ot            I25.10         
   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                      

 

 2017            ALAN ROSALBA MORRISON            Ot            M87.9          
  OSTEONECROSIS, UNSPECIFIED                     

 

 2017            ALAN ROSALBA MORRISON            Ot            Z68.42         
   BODY MASS INDEX (BMI) 45.0-49.9, ADULT                     

 

 2017            ALAN ROSALBA MORRISON            Ot            Z79.899        
    OTHER LONG TERM (CURRENT) DRUG THERAPY                     

 

 2017            ALANROSALBA            Ot            D50.9          
  IRON DEFICIENCY ANEMIA, UNSPECIFIED                     

 

 2017            ALANROSALBA N            Ot            D69.3          
  IMMUNE THROMBOCYTOPENIC PURPURA                     

 

 2017            ALANROSALBA N            Ot            E11.9          
  TYPE 2 DIABETES MELLITUS WITHOUT COMPLIC                     

 

 2017            ALANROSALBA N            Ot            E66.01         
   MORBID (SEVERE) OBESITY DUE TO EXCESS CA                     

 

 2017            ALANROSALBA            Ot            I25.10         
   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                      

 

 2017            ALAN ROSALBA MORRISON            Ot            M87.9          
  OSTEONECROSIS, UNSPECIFIED                     

 

 2017            ALAN ROSALBA MORRISON            Ot            Z68.42         
   BODY MASS INDEX (BMI) 45.0-49.9, ADULT                     

 

 2017            ALANROSALBA N            Ot            Z79.899        
    OTHER LONG TERM (CURRENT) DRUG THERAPY                     

 

 2017            ALANROSALBA N            Ot            D50.9          
  IRON DEFICIENCY ANEMIA, UNSPECIFIED                     

 

 2017            ALANROSALBA N            Ot            D69.3          
  IMMUNE THROMBOCYTOPENIC PURPURA                     

 

 2017            ROSALBA PHILLIPS N            Ot            E11.9          
  TYPE 2 DIABETES MELLITUS WITHOUT COMPLIC                     

 

 2017            ALANROSALBA VILLALOBOS N            Ot            E66.01         
   MORBID (SEVERE) OBESITY DUE TO EXCESS CA                     

 

 2017            ROSALBA PHILLIPS N            Ot            I25.10         
   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                      

 

 2017            ROSALBA PHILLIPS N            Ot            M87.9          
  OSTEONECROSIS, UNSPECIFIED                     

 

 2017            ALANROSALBA VILLALOBOS N            Ot            Z68.42         
   BODY MASS INDEX (BMI) 45.0-49.9, ADULT                     

 

 2017            ROSALBA PHILLIPS N            Ot            Z79.899        
    OTHER LONG TERM (CURRENT) DRUG THERAPY                     

 

 04/15/2017            ALAN ROSALBA N            Ot            D50.9          
  IRON DEFICIENCY ANEMIA, UNSPECIFIED                     

 

 04/15/2017            ALANROSALBA N            Ot            D69.3          
  IMMUNE THROMBOCYTOPENIC PURPURA                     

 

 04/15/2017            ALANROSALBA VILLALOBOS N            Ot            E11.9          
  TYPE 2 DIABETES MELLITUS WITHOUT COMPLIC                     

 

 04/15/2017            ALAN IVETTLUCY N            Ot            E66.01         
   MORBID (SEVERE) OBESITY DUE TO EXCESS CA                     

 

 04/15/2017            ALANROSALBA N            Ot            I25.10         
   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                      

 

 04/15/2017            ROSALBA PHILLIPS N            Ot            M87.9          
  OSTEONECROSIS, UNSPECIFIED                     

 

 04/15/2017            ALANROSALBA VILLALOBOS N            Ot            Z68.42         
   BODY MASS INDEX (BMI) 45.0-49.9, ADULT                     

 

 04/15/2017            ROSALBA PHILLIPS N            Ot            Z79.899        
    OTHER LONG TERM (CURRENT) DRUG THERAPY                     

 

 2017            ALAN IVETTLUCY N            Ot            D50.9          
  IRON DEFICIENCY ANEMIA, UNSPECIFIED                     

 

 2017            ALANROASLBA N            Ot            D69.3          
  IMMUNE THROMBOCYTOPENIC PURPURA                     

 

 2017            ALANROSALBA N            Ot            E11.9          
  TYPE 2 DIABETES MELLITUS WITHOUT COMPLIC                     

 

 2017            ALAN IVETTLUCY N            Ot            E66.01         
   MORBID (SEVERE) OBESITY DUE TO EXCESS CA                     

 

 2017            ALANROSALBA N            Ot            I25.10         
   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                      

 

 2017            ROSALBA PHILLIPS N            Ot            M87.9          
  OSTEONECROSIS, UNSPECIFIED                     

 

 2017            ALANROSALBA VILLALOBOS N            Ot            Z68.42         
   BODY MASS INDEX (BMI) 45.0-49.9, ADULT                     

 

 2017            ALANROSALBA VILLALOBOS N            Ot            Z79.899        
    OTHER LONG TERM (CURRENT) DRUG THERAPY                     

 

 2017            ALANROSALBA N            Ot            D50.9          
  IRON DEFICIENCY ANEMIA, UNSPECIFIED                     

 

 2017            ALANROSALBA N            Ot            D69.3          
  IMMUNE THROMBOCYTOPENIC PURPURA                     

 

 2017            ALANROSALBA N            Ot            E11.9          
  TYPE 2 DIABETES MELLITUS WITHOUT COMPLIC                     

 

 2017            ALAN IVETTLUCY N            Ot            E66.01         
   MORBID (SEVERE) OBESITY DUE TO EXCESS CA                     

 

 2017            ALAN BOBAN N            Ot            I25.10         
   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                      

 

 2017            ALAN, IVETTLUCY N            Ot            M87.9          
  OSTEONECROSIS, UNSPECIFIED                     

 

 2017            ALANROSALBA VILLALOBOS N            Ot            Z68.42         
   BODY MASS INDEX (BMI) 45.0-49.9, ADULT                     

 

 2017            ALANROSALBA VILLALOBOS N            Ot            Z79.899        
    OTHER LONG TERM (CURRENT) DRUG THERAPY                     

 

 2017            ALAN, IVETTLUCY N            Ot            D50.9          
  IRON DEFICIENCY ANEMIA, UNSPECIFIED                     

 

 2017            ALANROSALBA N            Ot            D69.3          
  IMMUNE THROMBOCYTOPENIC PURPURA                     

 

 2017            ALAN IVETTLUCY N            Ot            E11.9          
  TYPE 2 DIABETES MELLITUS WITHOUT COMPLIC                     

 

 2017            ALAN IVETTLUCY N            Ot            E66.01         
   MORBID (SEVERE) OBESITY DUE TO EXCESS CA                     

 

 2017            ROSALBA PHILLIPS N            Ot            I25.10         
   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                      

 

 2017            ALAN, IVETTLUCY N            Ot            M87.9          
  OSTEONECROSIS, UNSPECIFIED                     

 

 2017            ALANROSALBA N            Ot            Z68.42         
   BODY MASS INDEX (BMI) 45.0-49.9, ADULT                     

 

 2017            ALANROSALBA N            Ot            Z79.899        
    OTHER LONG TERM (CURRENT) DRUG THERAPY                     

 

 2017            ALAN IVETTAN N            Ot            D50.9          
  IRON DEFICIENCY ANEMIA, UNSPECIFIED                     

 

 2017            ALAN BOBAN N            Ot            D69.3          
  IMMUNE THROMBOCYTOPENIC PURPURA                     

 

 2017            ALAN, IVETTAN N            Ot            E11.9          
  TYPE 2 DIABETES MELLITUS WITHOUT COMPLIC                     

 

 2017            ALANROSALBA N            Ot            E66.01         
   MORBID (SEVERE) OBESITY DUE TO EXCESS CA                     

 

 2017            ALAN, BOBAN N            Ot            I25.10         
   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                      

 

 2017            ALAN IVETTLUCY N            Ot            M87.9          
  OSTEONECROSIS, UNSPECIFIED                     

 

 2017            ALAN, BOBAN N            Ot            Z68.42         
   BODY MASS INDEX (BMI) 45.0-49.9, ADULT                     

 

 2017            ALAN, IVETTAN N            Ot            Z79.899        
    OTHER LONG TERM (CURRENT) DRUG THERAPY                     

 

 2017            AALN BOBAN N            Ot            D50.9          
  IRON DEFICIENCY ANEMIA, UNSPECIFIED                     

 

 2017            ALAN, BOBAN N            Ot            D69.3          
  IMMUNE THROMBOCYTOPENIC PURPURA                     

 

 2017            ALAN, BOBAN N            Ot            E11.9          
  TYPE 2 DIABETES MELLITUS WITHOUT COMPLIC                     

 

 2017            ALAN, BOBAN N            Ot            E66.01         
   MORBID (SEVERE) OBESITY DUE TO EXCESS CA                     

 

 2017            ALAN, BOBAN N            Ot            I25.10         
   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                      

 

 2017            LAANROSALBA N            Ot            M87.9          
  OSTEONECROSIS, UNSPECIFIED                     

 

 2017            ALAN, BOBAN N            Ot            Z68.42         
   BODY MASS INDEX (BMI) 45.0-49.9, ADULT                     

 

 2017            ALAN, BOBAN N            Ot            Z79.899        
    OTHER LONG TERM (CURRENT) DRUG THERAPY                     

 

 2017            ALAN, BOBAN N            Ot            D50.9          
  IRON DEFICIENCY ANEMIA, UNSPECIFIED                     

 

 2017            ALAN, BOBAN N            Ot            D69.3          
  IMMUNE THROMBOCYTOPENIC PURPURA                     

 

 2017            ALAN, BOBAN N            Ot            E11.9          
  TYPE 2 DIABETES MELLITUS WITHOUT COMPLIC                     

 

 2017            ALAN, BOBAN N            Ot            E66.01         
   MORBID (SEVERE) OBESITY DUE TO EXCESS CA                     

 

 2017            ALAN, BOBAN N            Ot            I25.10         
   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                      

 

 2017            ALAN BOBAN N            Ot            M87.9          
  OSTEONECROSIS, UNSPECIFIED                     

 

 2017            ALAN, BOBAN N            Ot            Z68.42         
   BODY MASS INDEX (BMI) 45.0-49.9, ADULT                     

 

 2017            ALAN, IVETTAN N            Ot            Z79.899        
    OTHER LONG TERM (CURRENT) DRUG THERAPY                     

 

 2017            AALN, BOBAN N            Ot            D50.9          
  IRON DEFICIENCY ANEMIA, UNSPECIFIED                     

 

 2017            ALAN IVETTLUCY N            Ot            D69.3          
  IMMUNE THROMBOCYTOPENIC PURPURA                     

 

 2017            ALAN ROSALBA N            Ot            E11.9          
  TYPE 2 DIABETES MELLITUS WITHOUT COMPLIC                     

 

 2017            ALAN IVETTLUCY N            Ot            E66.01         
   MORBID (SEVERE) OBESITY DUE TO EXCESS CA                     

 

 2017            ALAN ROSALBA N            Ot            I25.10         
   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                      

 

 2017            ALAN IVETTULCY N            Ot            M87.9          
  OSTEONECROSIS, UNSPECIFIED                     

 

 2017            ALAN IVETTLUCY N            Ot            Z68.42         
   BODY MASS INDEX (BMI) 45.0-49.9, ADULT                     

 

 2017            ALAN ROSALBA N            Ot            Z79.899        
    OTHER LONG TERM (CURRENT) DRUG THERAPY                     

 

 2017            ALAN ROSALBA N            Ot            D50.9          
  IRON DEFICIENCY ANEMIA, UNSPECIFIED                     

 

 2017            ALAN ROSALBA N            Ot            D69.3          
  IMMUNE THROMBOCYTOPENIC PURPURA                     

 

 2017            ALANROSALBA N            Ot            E11.9          
  TYPE 2 DIABETES MELLITUS WITHOUT COMPLIC                     

 

 2017            ALANROSALBA N            Ot            E66.01         
   MORBID (SEVERE) OBESITY DUE TO EXCESS CA                     

 

 2017            ALANROSALBA N            Ot            I25.10         
   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                      

 

 2017            ALAN IVETTLUCY N            Ot            M87.9          
  OSTEONECROSIS, UNSPECIFIED                     

 

 2017            ALAN, IVETTLUCY N            Ot            Z68.42         
   BODY MASS INDEX (BMI) 45.0-49.9, ADULT                     

 

 2017            ALAN ROSALBA N            Ot            Z79.899        
    OTHER LONG TERM (CURRENT) DRUG THERAPY                     

 

 10/24/2017            ALAN ROSALBA N            Ot            D50.9          
  IRON DEFICIENCY ANEMIA, UNSPECIFIED                     

 

 10/24/2017            ALAN ROSALBA N            Ot            D69.3          
  IMMUNE THROMBOCYTOPENIC PURPURA                     

 

 10/24/2017            ALANROSALBA N            Ot            E11.9          
  TYPE 2 DIABETES MELLITUS WITHOUT COMPLIC                     

 

 10/24/2017            ALANROSALBA N            Ot            E66.01         
   MORBID (SEVERE) OBESITY DUE TO EXCESS CA                     

 

 10/24/2017            ALANROSALBA N            Ot            I25.10         
   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                      

 

 10/24/2017            ALANROSALBA VILLALOBOS N            Ot            M87.9          
  OSTEONECROSIS, UNSPECIFIED                     

 

 10/24/2017            ALANROSALBA N            Ot            Z68.42         
   BODY MASS INDEX (BMI) 45.0-49.9, ADULT                     

 

 10/24/2017            ALANROSALBA VILLALOBOS N            Ot            Z79.899        
    OTHER LONG TERM (CURRENT) DRUG THERAPY                     

 

 2017            ALANIVETTAN N            Ot            D50.9          
  IRON DEFICIENCY ANEMIA, UNSPECIFIED                     

 

 2017            ALAN, BOBAN N            Ot            D69.3          
  IMMUNE THROMBOCYTOPENIC PURPURA                     

 

 2017            ALAN, BOBAN N            Ot            E11.9          
  TYPE 2 DIABETES MELLITUS WITHOUT COMPLIC                     

 

 2017            ALAN, BOBAN N            Ot            E66.01         
   MORBID (SEVERE) OBESITY DUE TO EXCESS CA                     

 

 2017            ALAN BOBAN N            Ot            I25.10         
   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                      

 

 2017            ALANROSALBA VILLALOBOS N            Ot            M87.9          
  OSTEONECROSIS, UNSPECIFIED                     

 

 2017            ALANROSALBA N            Ot            Z68.42         
   BODY MASS INDEX (BMI) 45.0-49.9, ADULT                     

 

 2017            ALANROSALBA N            Ot            Z79.899        
    OTHER LONG TERM (CURRENT) DRUG THERAPY                     

 

 2017            ALANIVETTAN N            Ot            D50.9          
  IRON DEFICIENCY ANEMIA, UNSPECIFIED                     

 

 2017            ALAN BOBAN N            Ot            D69.3          
  IMMUNE THROMBOCYTOPENIC PURPURA                     

 

 2017            ALAN BOBAN N            Ot            E11.9          
  TYPE 2 DIABETES MELLITUS WITHOUT COMPLIC                     

 

 2017            ALANROSALBA N            Ot            E66.01         
   MORBID (SEVERE) OBESITY DUE TO EXCESS CA                     

 

 2017            ALAN BOBAN N            Ot            I25.10         
   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                      

 

 2017            ALANIVETTAN N            Ot            M87.9          
  OSTEONECROSIS, UNSPECIFIED                     

 

 2017            ALANROSALBA N            Ot            Z68.42         
   BODY MASS INDEX (BMI) 45.0-49.9, ADULT                     

 

 2017            ALAN BOBAN N            Ot            Z79.899        
    OTHER LONG TERM (CURRENT) DRUG THERAPY                     

 

 2018            ALAN, BOBAN N            Ot            D50.9          
  IRON DEFICIENCY ANEMIA, UNSPECIFIED                     

 

 2018            ALAN, BOBAN N            Ot            D69.3          
  IMMUNE THROMBOCYTOPENIC PURPURA                     

 

 2018            ROSALBA PHILLIPS            Ot            E11.9          
  TYPE 2 DIABETES MELLITUS WITHOUT COMPLIC                     

 

 2018            ROSALBA PHILLIPS            Ot            E66.01         
   MORBID (SEVERE) OBESITY DUE TO EXCESS CA                     

 

 2018            ROSALBA PHILLIPS            Ot            I25.10         
   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                      

 

 2018            ROSALBA PHILLIPS            Ot            M87.9          
  OSTEONECROSIS, UNSPECIFIED                     

 

 2018            ROSALBA PHILLIPS            Ot            Z68.42         
   BODY MASS INDEX (BMI) 45.0-49.9, ADULT                     

 

 2018            ROSALBA PHILLIPS            Ot            Z79.899        
    OTHER LONG TERM (CURRENT) DRUG THERAPY                     



                                                                               
                                                                               
                                                                               
                                                                               
                                                                               
                                                                               
                                                                               
                                                                               
                                                                               
                                                                               
                                                                               
                                                                               
                                                                               
                                                                               
                                                                               
                                                                               
                                                                               
                                                                               
                                                                               
                                                                               
                                                                               
                                                                               
                  



Procedures

      





 Code            Description            Performed By            Performed On   
     

 

             00.40                                                             
        2013        

 

             00.46                                                             
        2013        

 

             00.66                                                             
        2013        

 

             36.07                                                             
        2013        

 

             37.22                                                             
        2013        

 

             88.53                                                             
        2013        

 

             88.56                                                             
        2013        

 

             38.93                                                             
        2015        

 

             00.66                                                             
        2015        

 

             37.22                                                             
        2015        

 

             88.56                                                             
        2015        



                                      



Results

      





 Test            Result            Range        









 Complete blood count (CBC) with automated white blood cell (WBC) differential 
- 17 16:55         









 Blood leukocytes automated count (number/volume)            5.7 10*3/uL       
     4.3-11.0        

 

 Blood erythrocytes automated count (number/volume)            3.80 10*6/uL    
        4.35-5.85        

 

 Venous blood hemoglobin measurement (mass/volume)            11.0 g/dL        
    11.5-16.0        

 

 Blood hematocrit (volume fraction)            35 %            35-52        

 

 Automated erythrocyte mean corpuscular volume            91 [foz_us]          
  80-99        

 

 Automated erythrocyte mean corpuscular hemoglobin (mass per erythrocyte)      
      29 pg            25-34        

 

 Automated erythrocyte mean corpuscular hemoglobin concentration measurement (
mass/volume)            32 g/dL            32-36        

 

 Automated erythrocyte distribution width ratio            18.6 %            
10.0-14.5        

 

 Automated blood platelet count (count/volume)            154 10*3/uL          
  130-400        

 

 Automated blood platelet mean volume measurement            11.0 [foz_us]     
       7.4-10.4        

 

 Automated blood neutrophils/100 leukocytes            71 %            42-75   
     

 

 Automated blood lymphocytes/100 leukocytes            19 %            12-44   
     

 

 Blood monocytes/100 leukocytes            8 %            0-12        

 

 Automated blood eosinophils/100 leukocytes            2 %            0-10     
   

 

 Automated blood basophils/100 leukocytes            0 %            0-10        

 

 Blood neutrophils automated count (number/volume)            4.1 10*3         
   1.8-7.8        

 

 Blood lymphocytes automated count (number/volume)            1.1 10*3         
   1.0-4.0        

 

 Blood monocytes automated count (number/volume)            0.4 10*3            
0.0-1.0        

 

 Automated eosinophil count            0.1 10*3/uL            0.0-0.3        

 

 Automated blood basophil count (count/volume)            0.0 10*3/uL          
  0.0-0.1        









 Influenza virus A and B antigen detection - 17 16:55         









 FLU RESULT            NEGATIVE FOR INFLUENZA A AND B ANTIGENS BY [facility]        









 Complete blood count (CBC) with automated white blood cell (WBC) differential 
- 17 13:35         









 Blood leukocytes automated count (number/volume)            10.7 10*3/uL      
      4.3-11.0        

 

 Blood erythrocytes automated count (number/volume)            3.87 10*6/uL    
        4.35-5.85        

 

 Venous blood hemoglobin measurement (mass/volume)            11.1 g/dL        
    11.5-16.0        

 

 Blood hematocrit (volume fraction)            35 %            35-52        

 

 Automated erythrocyte mean corpuscular volume            90 [foz_us]          
  80-99        

 

 Automated erythrocyte mean corpuscular hemoglobin (mass per erythrocyte)      
      29 pg            25-34        

 

 Automated erythrocyte mean corpuscular hemoglobin concentration measurement (
mass/volume)            32 g/dL            32-36        

 

 Automated erythrocyte distribution width ratio            18.4 %            
10.0-14.5        

 

 Automated blood platelet count (count/volume)            185 10*3/uL          
  130-400        

 

 Automated blood platelet mean volume measurement            11.3 [foz_us]     
       7.4-10.4        

 

 Automated blood neutrophils/100 leukocytes            82 %            42-75   
     

 

 Automated blood lymphocytes/100 leukocytes            10 %            12-44   
     

 

 Blood monocytes/100 leukocytes            7 %            0-12        

 

 Automated blood eosinophils/100 leukocytes            0 %            0-10     
   

 

 Automated blood basophils/100 leukocytes            0 %            0-10        

 

 Blood neutrophils automated count (number/volume)            8.8 10*3         
   1.8-7.8        

 

 Blood lymphocytes automated count (number/volume)            1.1 10*3         
   1.0-4.0        

 

 Blood monocytes automated count (number/volume)            0.8 10*3            
0.0-1.0        

 

 Automated eosinophil count            0.0 10*3/uL            0.0-0.3        

 

 Automated blood basophil count (count/volume)            0.0 10*3/uL          
  0.0-0.1        









 Bacterial blood culture - 17 13:35         









 Bacterial blood culture            NG             NRG        









 Capillary blood glucose measurement by glucometer (mass/volume) - 17 13:
48         









 Capillary blood glucose measurement by glucometer (mass/volume)            198 
mg/dL                    









 Blood lactic acid measurement (moles/volume) - 17 14:19         









 Blood lactic acid measurement (moles/volume)            1.8 mmol/L            
0.5-2.0        









 PT panel in platelet poor plasma by coagulation assay - 17 14:19         









 Prothrombin time (PT) in platelet poor plasma by coagulation assay            
15.0 s            12.2-14.7        

 

 INR in platelet poor plasma or blood by coagulation assay            1.2      
       0.8-1.4        









 Comprehensive metabolic panel - 17 14:19         









 Serum or plasma sodium measurement (moles/volume)            132 mmol/L       
     135-145        

 

 Serum or plasma potassium measurement (moles/volume)            4.8 mmol/L    
        3.6-5.0        

 

 Serum or plasma chloride measurement (moles/volume)            100 mmol/L     
               

 

 Carbon dioxide            22 mmol/L            21-32        

 

 Serum or plasma anion gap determination (moles/volume)            10 mmol/L   
         5-14        

 

 Serum or plasma urea nitrogen measurement (mass/volume)            11 mg/dL   
         7-18        

 

 Serum or plasma creatinine measurement (mass/volume)            0.76 mg/dL    
        0.60-1.30        

 

 Serum or plasma urea nitrogen/creatinine mass ratio            14             
NRG        

 

 Serum or plasma creatinine measurement with calculation of estimated 
glomerular filtration rate            >             NRG        

 

 Serum or plasma glucose measurement (mass/volume)            180 mg/dL        
            

 

 Serum or plasma calcium measurement (mass/volume)            8.3 mg/dL        
    8.5-10.1        

 

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  GERD (gastroesophageal reflux disease)  K21.9     Active



                                                                               
                                                                               
                                                                               
                                                             



Medications

          





 Medication  Code System  Code  Instructions  Start Date  End Date  Status  
Dosage

 

 ProAir HFA  NDC  [address]-76  108 (90 Base) MCG/ACT Inhalation every 4 hrs  
2015        2 puffs as needed

 

 Brilinta  NDC  [address]-96  90 MG Orally Twice a day           1 tablet

 

 Pantoprazole Sodium  NDC  [address]-58  40 MG Orally Once a day           1 
tablet

 

 Levemir FlexTouch  NDC  [address]-10  100 UNIT/ML Subcutaneous Once a day, 
PRN  2015        50 units

 

 Atorvastatin Calcium  NDC  [address]-69  10 MG Orally Once a day           
TAKE ONE TABLET BY MOUTH ONCE DAILY IN THE EVENING

 

 Carvedilol  NDC  [address]-27  12.5 MG Orally 2 times a day           1

 

 Benazepril HCl  NDC  [address]-45  40 MG Orally Once a day           1 tablet

 

 Hydrochlorothiazide  NDC  [address]-36  12.5 MG Orally Once a day           1 
tablet

 

 Aspir-81  NDC  [address]-03  81 MG Orally Once a day           1 tablet

 

 Humalog KwikPen  NDC  [address]-47  100 UNIT/ML Subcutaneous 3 times a day   
        15 units

 

 Urocit-K 15  NDC  [address]-71  15 MEQ (1620 MG) Orally 2 times a day        
   1 tablet with meals

 

 Metformin HCl  NDC  [address]-79  1000 MG Orally Twice a day           1 
tablet with meals

 

 Advair Diskus  NDC  [address]-41  100-50 MCG/DOSE Inhalation Twice a day     
      1 puff

 

 Albuterol Sulfate  NDC  [address]-45  (2.5 MG/3ML) 0.083% Inhalation Three 
times a day           3 ml

 

 GlipiZIDE  NDC  [address]-51  5 MG Orally 2 times a day           2 1/2 
tablets



                                                                               
                                                                               
                                                                      



Procedures

          





 Procedure  Coding System  Code  Date

 

 Office Visit, Est Pt., Level 3  CPT-4  46245  2016

 

 [facility] VISIT ESTABLISHED PATIENT  CPT-4    2016



                                                                               
                             



Vital Signs

          





 Date/Time:  2016

 

 Temperature  97.0 F

 

 Weight  281.7 lbs

 

 Height  67 in

 

 BMI  44.12 Index

 

 Blood Pressure Diastolic  82 mmHg

 

 Blood Pressure Systolic  158 mmHg

 

 Cardiac Monitoring Heart Rate  80 bpm



                                                                              



Results

          No Known Results                                                     
               



Summary Purpose

          eClinicalWorks Submission
[facility]

 Created on: 2015



Madison Young

External Reference #: [id number]

: 1945

Sex: Female



Demographics







 Address  [address] [place]  [address]

 

 Home Phone  [phone]

 

 Preferred Language  Unknown

 

 Marital Status  Unknown

 

 Synagogue Affiliation  Unknown

 

 Race  White

 

 Ethnic Group  Not  or 





Author







 Author  RUPAL GARCIA

 

 [facility]  [employer]

 

 Address  Unknown

 

 Phone  Unavailable







Care Team Providers







 Care Team Member Name  Role  Phone

 

 RUPAL GARCIA  CP  Unavailable



                                                                



Allergies

          No Known Allergies                                                   
                                     



Problems

          





 Problem Type  Condition  Code  Onset Dates  Condition Status

 

 Problem  GERD (gastroesophageal reflux disease)  K21.9     Active

 

 Problem  History of MI (myocardial infarction)  I25.2     Active

 

 Problem  History of kidney stones  Z87.442     Active

 

 Problem  Coronary artery disease  I25.10     Active

 

 Problem  Type 2 diabetes mellitus with diabetic polyneuropathy  E11.42     
Active

 

 Problem  Avascular necrosis of bone of right hip  M87.051     Active

 

 Problem  Lymphedema  I89.0     Active

 

 Problem  Immune thrombocytopenic purpura  D69.3     Active



                                                                               
                                                                               



Medications

          





 Medication  Code System  Code  Instructions  Start Date  End Date  Status  
Dosage

 

 Carvedilol  NDC  [address]-52  12.5 MG Orally 2 times a day           1



                                                                              



Results

          No Known Results                                                     
               



Summary Purpose

          [employer]ClinicalWorks Submission
[facility]

 Created on: 2016



Madison Young

External Reference #: [id number]

: 1945

Sex: Female



Demographics







 Address  [address] [place]  [address]

 

 Home Phone  [phone]

 

 Preferred Language  Unknown

 

 Marital Status  Unknown

 

 Sikh Affiliation  Unknown

 

 Race  White

 

 Ethnic Group  Not  or 





Author







 Author  LEANDER FREIRE

 

 Organization  [employer]

 

 Address  Unknown

 

 Phone  Unavailable







Care Team Providers







 Care Team Member Name  Role  Phone

 

 LEANDER FREIRE  CP  Unavailable



                                                                



Allergies, Adverse Reactions, Alerts

          





 Substance  Reaction  Event Type

 

 Heparin Sodium (Porcine) PF  Info Not Available  Drug Allergy

 

 Adhesive  Info Not Available  Non Drug Allergy



                                                                               
                   



Problems

          





 Problem Type  Condition  Code  Onset Dates  Condition Status

 

 Problem  Avascular necrosis of bone of right hip  M87.051     Active

 

 Problem  Immune thrombocytopenic purpura  D69.3     Active

 

 Problem  Type 2 diabetes mellitus with diabetic polyneuropathy  E11.42     
Active

 

 Problem  Asthma  J45.909     Active

 

 Problem  Hyperlipidemia, unspecified hyperlipidemia  E78.5     Active

 

 Problem  Hypertension  I10     Active

 

 Problem  History of kidney stones  Z87.442     Active

 

 Problem  Lymphedema  I89.0     Active

 

 Problem  Coronary artery disease  I25.10     Active

 

 Problem  History of MI (myocardial infarction)  I25.2     Active

 

 Assessment  Hypertension  I10     Active

 

 Assessment  Hyperlipidemia, unspecified hyperlipidemia  E78.5     Active

 

 Assessment  Encounter for immunization  Z23     Active

 

 Assessment  Asthma  J45.909     Active

 

 Assessment  History of kidney stones  Z87.442     Active

 

 Assessment  History of MI (myocardial infarction)  I25.2     Active

 

 Assessment  Avascular necrosis of bone of right hip  M87.051     Active

 

 Assessment  Coronary artery disease  I25.10     Active

 

 Assessment  Type 2 diabetes mellitus with diabetic polyneuropathy  E11.42     
Active

 

 Problem  GERD (gastroesophageal reflux disease)  K21.9     Active



                                                                               
                                                                               
                                                                               
                                         



Medications

          





 Medication  Code System  Code  Instructions  Start Date  End Date  Status  
Dosage

 

 Fish Oil  NDC  [address]-26  1000 MG Orally Twice a day           1 capsule

 

 Hydrochlorothiazide  NDC  [address]-66  12.5 MG Orally Once a day           1 
tablet

 

 Aspir-81  NDC  [address]-78  81 MG Orally Once a day           1 tablet

 

 Advair Diskus  NDC  [address]-09  100-50 MCG/DOSE Inhalation Twice a day     
      1 puff

 

 GlipiZIDE  NDC  [address]-26  5 mg Orally 2 times a day           2 1/2 
tablets

 

 Urocit-K 15  NDC  [address]-66  15 MEQ (1620 MG) Orally 2 times a day        
   1 tablet with meals

 

 Benazepril HCl  NDC  [address]-40  40 mg Orally Once a day           1 tablet

 

 Albuterol Sulfate  NDC  [address]-17  (2.5 MG/3ML) 0.083% Inhalation Three 
times a day           3 ml

 

 Carvedilol  NDC  [address]-54  12.5 MG Orally 2 times a day           1

 

 ProAir HFA  NDC  [address]-43  108 (90 Base) MCG/ACT Inhalation every 4 hrs  
2015        2 puffs as needed

 

 Atorvastatin Calcium  NDC  [address]-91  10 mg Orally Once a day           
TAKE ONE TABLET BY MOUTH ONCE DAILY IN THE EVENING

 

 Citalopram Hydrobromide  NDC  [address]-14  20 MG Orally Once a day           
1 tablet

 

 Brilinta  NDC  [address]-27  90 MG Orally Twice a day           1 tablet

 

 Metformin HCl  NDC  [address]-12  1000 MG Orally Twice a day           1 
tablet with meals

 

 Humalog KwikPen  NDC  [address]-14  100 UNIT/ML Subcutaneous sliding scale   
        6-10 units prn

 

 Levemir FlexTouch  NDC  [address]-10  100 UNIT/ML Subcutaneous Once a day, 
PRN  2015        50 units

 

 Pantoprazole Sodium  NDC  [address]-24  40 MG Orally Once a day           1 
tablet



                                                                               
                                                                               
                                                                               
           



Procedures

          





 Procedure  Coding System  Code  Date

 

 Office Visit, Est Pt., Level 3  CPT-4  06339  2016

 

 FLUARIX QUAD P-FREE 3 AND UP .50   CPT-4  05324  2016

 

 [facility] VISIT ESTABLISHED PATIENT  CPT-4    2016

 

 SINGLE IMMUNIZATION ADMIN  CPT-4  42888  2016



                                                                               
                             



Results

          No Known Results                                                     
                                   



Immunizations

          





 Vaccine  Administration Date

 

 FLUARIX QUAD P-FREE 3 AND UP .50 2016



                                                                    



Summary Purpose

          eClinicalWorks Submission
[facility]

 Created on: 2015



Madison Young

External Reference #: [id number]

: 1945

Sex: Female



Demographics







 Address  [address] [place]  [address]

 

 Home Phone  [phone]

 

 Preferred Language  Unknown

 

 Marital Status  Unknown

 

 Uatsdin Affiliation  Unknown

 

 Race  White

 

 Ethnic Group  Not  or 





Author







 Author  RUPAL GARCIA

 

 [facility]  [employer]

 

 Address  Unknown

 

 Phone  Unavailable







Care Team Providers







 Care Team Member Name  Role  Phone

 

 RUPAL GARCIA  CP  Unavailable



                                                                



Allergies

          No Known Allergies                                                   
                                     



Problems

          





 Problem Type  Condition  Code  Onset Dates  Condition Status

 

 Problem  GERD (gastroesophageal reflux disease)  K21.9     Active

 

 Assessment  Allergic rhinitis  J30.9     Active

 

 Problem  History of MI (myocardial infarction)  I25.2     Active

 

 Problem  History of kidney stones  Z87.442     Active

 

 Problem  Coronary artery disease  I25.10     Active

 

 Problem  Type 2 diabetes mellitus with diabetic polyneuropathy  E11.42     
Active

 

 Problem  Avascular necrosis of bone of right hip  M87.051     Active

 

 Problem  Lymphedema  I89.0     Active

 

 Problem  Immune thrombocytopenic purpura  D69.3     Active



                                                                               
                                                                               
          



Medications

          





 Medication  Code System  Code  Instructions  Start Date  End Date  Status  
Dosage

 

 ProAir HFA  NDC  [address]-72  108 (90 Base) MCG/ACT Inhalation every 4 hrs  
2015        2 puffs as needed



                                                                              



Results

          No Known Results                                                     
               



Summary Purpose

          eClinicalWorks Submission
[facility]

 Created on: 2017



OlafMadison smith

External Reference #: [id number]

: 1945

Sex: Female



Demographics







 Address  [address] [place]  [address]

 

 Preferred Language  Unknown

 

 Marital Status  Unknown

 

 Caodaism Affiliation  Unknown

 

 Race  Unknown

 

 Ethnic Group  Unknown





Author







 Author  LEANDER FREIRE

 

 Organization  [facility]

 

 Address  [address] [place]  [address]



 

 Phone  [phone]







Care Team Providers







 Care Team Member Name  Role  Phone

 

 TANI  LEANDER  Unavailable  [phone]







PROBLEMS







 Type  Condition  ICD9-CM Code  AFP06-DR Code  Onset Dates  Condition Status  
SNOMED Code

 

 Problem  Type 2 diabetes mellitus with diabetic polyneuropathy     E11.42     
Active  74797101

 

 Problem  Lymphedema     I89.0     Active  338809149

 

 Problem  Immune thrombocytopenic purpura     D69.3     Active  606174188

 

 Assessment  Coronary artery disease     I25.10    Active  13404586

 

 Problem  GERD (gastroesophageal reflux disease)     K21.9     Active  214427712

 

 Problem  Avascular necrosis of bone of right hip     M87.051     Active  
763510375

 

 Problem  Hypertension     I10     Active  78501282

 

 Problem  Asthma     J45.909     Active  062977177

 

 Problem  History of MI (myocardial infarction)     I25.2     Active  131724782

 

 Problem  History of kidney stones     Z87.442     Active  265151438

 

 Problem  Hyperlipidemia, unspecified hyperlipidemia     E78.5     Active  
22217369

 

 Problem  Coronary artery disease     I25.10     Active  78225187







ALLERGIES







 Substance  Reaction  Event Type  Date  Status

 

 Heparin Sodium (Porcine) PF  Unknown  Drug Allergy    Active

 

 Adhesive  Unknown  Non Drug Allergy    Active







SOCIAL HISTORY

No smoking Hx information available



PLAN OF CARE





VITAL SIGNS







 Height  67 in  2016

 

 Weight  296.2 lbs  2016

 

 Heart Rate  84 bpm  2016

 

 Respiratory Rate  20   2016

 

 BMI  46.39 kg/m2  2016

 

 Blood pressure systolic  142 mmHg  2016

 

 Blood pressure diastolic  70 mmHg  2016







MEDICATIONS







 Medication  Instructions  Dosage  Frequency  Start Date  End Date  Duration  
Status

 

 Levemir FlexTouch 100 UNIT/ML  Subcutaneous Once a day, PRN  50 units             Active

 

 Hydrochlorothiazide 12.5 MG  Orally Once a day  1 tablet  24h           Active

 

 Urocit-K 15 15 MEQ (1620 MG)  Orally 2 times a day  1 tablet with meals  12h  
         Active

 

 Humalog KwikPen 100 UNIT/ML  Subcutaneous sliding scale  6-10 units prn       
       Active

 

 Benazepril HCl 40 mg  Orally Once a day  1 tablet  24h           Active

 

 Atorvastatin Calcium 10 mg  Orally Once a day  TAKE ONE TABLET BY MOUTH ONCE 
DAILY IN THE EVENING  24h           Active

 

 Brilinta 90 MG  Orally Twice a day  1 tablet  12h           Active

 

 Advair Diskus 100-50 MCG/DOSE  Inhalation Twice a day  1 puff  12h           
Active

 

 ProAir  (90 Base) MCG/ACT  Inhalation every 4 hrs  2 puffs as needed  
4h          Active

 

 Albuterol Sulfate (2.5 MG/3ML) 0.083%  Inhalation Three times a day  3 ml  8h 
          Active

 

 Aspir-81 81 MG  Orally Once a day  1 tablet  24h           Active

 

 Carvedilol 12.5 MG  Orally 2 times a day  1  12h           Active

 

 Pantoprazole Sodium 40 MG  Orally Once a day  1 tablet  24h           Active

 

 Metformin HCl 1000 MG  Orally Twice a day  1 tablet with meals  12h           
Active

 

 GlipiZIDE 5 mg  Orally 2 times a day  2 1/2 tablets  12h           Active







RESULTS







 Name  Result  Date  Reference Range

 

 A1C (IN HOUSE)     2016   

 

 A1C IN HOUSE  7.3     4.3 - 5.6 %

 

 Previous A1c  6.3      

 

 Lot   [id number]      

 

 Exp date        







PROCEDURES







 Procedure  Date Ordered  Related Diagnosis  Body Site

 

 GLYCATED HEMOGLOBIN TEST  2016      

 

 [facility] VISIT ESTABLISHED PATIENT  2016      

 

 Office Visit, Est Pt., Level 3  2016      







IMMUNIZATIONS

No Known Immunizations
[facility] Clinical Summary

 Created on: 2015



Madison Young

External Reference #: [id number]

: 1945

Sex: Female



Demographics







 Address  [address] [place] [address]

 

 Home Phone  [phone]

 

 Preferred Language  Unknown

 

 Marital Status  W

 

 Mosque Affiliation  Unknown

 

 Race  White

 

 Ethnic Group  Not  or 





Author







 Author  User, MELISSA

 

 Organization  [facility]

 

 Address  Unknown

 

 Phone  Unavailable







Allergies, Adverse Reactions, Alerts







 Allergy Name  Reaction Description  Start Date  Severity  Status  Provider

 

 ZINC OXIDE       Critical  Active  Kavitha Godoy







Conditions or Problems







 Problem Name  Problem Code  Onset Date  Status  Entry Date  Provider  Comment  
Standard Description  Annotate

 

 DIABETES MELLITUS, TYPE II, UNCONTROLLED, W/O COMPLICATIONS  250.02     
Correction    Kavitha Godoy     Diabetes mellitus without 
mention of complication, type II or unspecified type, uncontrolled   

 

 DIABETES MELLITUS, TYPE I, UNCONTROLLED, WITH COMPLICATIONS  250.93     
Correction    Kavitha Godoy     Diabetes mellitus with 
unspecified complication, type I [juvenile type], uncontrolled   

 

 DIABETES MELLITUS, TYPE II, UNCONTROLLED, WITH COMPLICATIONS  250.92    Active    Kavitha Godoy     Diabetes mellitus with 
unspecified complication, type II or unspecified type, uncontrolled   

 

 OBESITY  278.00     Resolved    Kavitha Godoy     Obesity, 
unspecified   

 

 ELEVATED BLOOD PRESSURE WITHOUT DIAGNOSIS OF HYPERTENSION  796.2    
Correction    Kavitha Godoy     Elevated blood pressure 
reading without diagnosis of hypertension   

 

 ARTHRITIS, RHEUMATOID  714.0     Resolved    Kavitha Godoy   
  Rheumatoid arthritis   

 

 LIVER FUNCTION TESTS, ABNORMAL, HX OF  V12.2     Resolved    Kavitha Godoy     Personal history of endocrine, metabolic, and immunity 
disorders   

 

 CERUMEN IMPACTION, BILATERAL  380.4    Resolved    Kavitha Godoy     Impacted cerumen   

 

 CERUMEN IMPACTION, BILATERAL  380.4    Resolved    Kavitha Godoy     Impacted cerumen   

 

 DECREASED HEARING  389.10    Resolved    Kavitha Godoy     Sensorineural hearing loss, unspecified  due to cerumen presumed

 

 History of ANEMIA  285.9     Correction    Kavitha Godoy     
Anemia, unspecified   

 

 POLYARTHRALGIA  719.49     Active    Kavitha Godoy     Pain 
in joint involving multiple sites   

 

 NEPHROPATHY, DIABETIC  250.40    Resolved    Kavitha Godoy     Diabetes mellitus with renal manifestations, type II or unspecified 
type, not stated as uncontrolled   

 

 HYPERCHOLESTEROLEMIA  272.0    Active    Kavitha Godoy     Pure hypercholesterolemia   

 

 HYPERTRIGLYCERIDEMIA  272.1    Active    Kavitha Godoy     Pure hyperglyceridemia   

 

 HYPERTENSION, BENIGN ESSENTIAL, UNCONTROLLED  401.1    Active    Kavitha Godoy     Benign essential hypertension   

 

 PHARYNGITIS, ACUTE  462    Resolved    Kavitha Godoy     Acute pharyngitis   

 

 DYSPHAGIA  787.2    Resolved    Kavitha Godoy     
Dysphagia   

 

 INSOMNIA, TRANSIENT  307.41    Resolved    Kavitha Godoy     Transient disorder of initiating or maintaining sleep   

 

 CELLULITIS  682.9    Resolved    Kavitha Godoy     
Cellulitis and abscess of unspecified sites  foot

 

 CELLULITIS  682.9  2004/10/28  Resolved  2004/10/28  Kavitha Godoy     
Cellulitis and abscess of unspecified sites   

 

 DERMATOPHYTOSIS, FOOT  110.4    Resolved    Kavitha Godoy     Dermatophytosis of foot   

 

 DERMATITIS  692.9    Resolved    Kavitha Godoy     
Contact dermatitis and other eczema, unspecified cause   

 

 KNEE PAIN  719.46    Resolved    Kavitha Godoy     
Pain in joint involving lower leg  right

 

 SHOULDER PAIN  719.41    Resolved    Kavitha Godoy 
    Pain in joint involving shoulder region  right

 

 SKIN LESION  709.9    Resolved    Kavitha Godoy     
Unspecified disorder of skin and subcutaneous tissue   

 

 FEVER  780.6    Resolved    Kavitha Godoy     Fever 
and other physiologic disturbances of temperature regulation   

 

 COUGH  786.2    Resolved    Kavitha Godoy     Cough
   

 

 SCREENING MAMMOGRAM NEC  V76.12    Resolved    Kavitha Godoy     Other screening mammogram   

 

 SCREENING FOR OSTEOPOROSIS  V82.81    Resolved    Kavitha Godoy     Screening for osteoporosis   

 

 MICROALBUMINURIA  791.0    Active    Kavitha Godoy 
    Proteinuria   

 

 HX, PERSONAL, PAST NONCOMPLIANCE  V15.81    Resolved    
Kavitha Godoy     Personal history of noncompliance with medical 
treatment, presenting hazards to health   

 

 BACK PAIN  724.5    Resolved    Kavitha Godoy     
Backache, unspecified   

 

 NEOPLASM, SKIN, UNCERTAIN BEHAVIOR  238.2    Resolved    
Kavitha Godoy     Neoplasm of uncertain behavior of skin  right upper lip

 

 SCIATICA/HERNIATED DISC  722.10  2006/10/11  Resolved    Kavitha Godoy     Displacement of lumbar intervertebral disc without 
myelopathy   

 

 IMPETIGO  684    Resolved    Kavitha Godoy     
Impetigo  right nare

 

 LYMPHEDEMA NEC  457.1    Active    Kavitha Godoy   
  Other lymphedema   

 

 CELLULITIS  682.9    Resolved    Kavitha Godoy     
Cellulitis and abscess of unspecified sites   

 

 OSTEOMYELITIS NOS, ANKLE/FOOT  730.27  2007/10/09  Resolved    Kavitha Godoy     Unspecified osteomyelitis involving ankle and foot   

 

 URTICARIA, ACUTE  708.9  2007/10/29  Resolved    Kavitha Godoy     Unspecified urticaria   

 

 RENAL CALCULUS  592.0  2009/07/10  Active  2009/07/10  Kavitha Godoy   
  Calculus of kidney   

 

 DEHYDRATION  276.51  2009/07/10  Resolved    Kavitha Godoy   
  Dehydration   

 

 CHOLELITHIASIS, ASYMPTOMATIC  574.20    Resolved    Kavitha Godoy     Calculus of gallbladder without mention of cholecystitis, 
without mention of obstruction   

 

 PNEUMONIA  486    Resolved    Kavitha Godoy     
Pneumonia, organism unspecified   

 

 RESTLESS LEG SYNDROME  333.94    Resolved    Kavitha Godoy     Restless legs syndrome (RLS)   

 

 FREQUENCY, URINARY  788.41    Resolved    Kavitha Godoy     Urinary frequency   

 

 FEVER PRESENTING CONDITIONS CLASSIFIED ELSEWHERE  780.61    Resolved
    Kavitha Godoy     Fever presenting with conditions 
classified elsewhere   

 

 PVD  443.9    Resolved    Kavitha Godoy     
Peripheral vascular disease, unspecified   

 

 EDEMA LEG  782.3    Resolved    Kavitha Godoy     
Edema   

 

 LEG PAIN, LEFT  729.5    Resolved    Kavitha Godoy 
    Pain in limb   

 

 URINARY FREQUENCY  788.41    Resolved    Kavitha Godoy     Urinary frequency   

 

 VACCINE AGAINST STREPTOCOCCUS PNEUMONIAE  V03.82    Resolved    Kavitha Godoy     Need for prophylactic vaccination against 
Streptococcus pneumoniae [pneumococcus]   

 

 SPINAL STENOSIS, LUMBAR  724.02    Resolved    Kavitha Godoy     Spinal stenosis of lumbar region without neurogenic 
claudication   

 

 KNEE PAIN  719.46    Resolved    Kavitha Godoy     
Pain in joint involving lower leg   

 

 CORONARY ATHEROSCLEROSIS, NATIVE VESSEL  414.01    Active    Kavitha Godoy     Coronary atherosclerosis of native coronary artery
   

 

 ANEMIA, IRON DEFICIENCY NEC  280.8    Active    Kavitha Godoy     Other specified iron deficiency anemias   

 

 NAUSEA AND VOMITING  787.01    Resolved    Kavitha Godoy     Nausea with vomiting   

 

 UTI  599.0    Resolved    Kavitha Godoy     Urinary 
tract infection, site not specified   

 

 GROSS HEMATURIA  599.71    Resolved    Kavitha Godoy     Gross hematuria   

 

 CELLULITIS  682.9    Resolved    Kavitha Godoy     
Cellulitis and abscess of unspecified sites   

 

 IMMUNE THROMBOCYTOPENIC PURPURA  287.31  2014/10/15  Active  2014/10/16  Kavitha Godoy     Immune thrombocytopenic purpura   

 

 PVD  443.9    Active    Kavitha Godoy     
Peripheral vascular disease, unspecified   







Medication List







 Medication  Instructions  Start Date  Stop Date  Generic Name  NDC  Status  
Provider  Patient Instruction

 

 ONETOUCH ULTRA BLUE STRP  TEST BS QID DX: 250.92  2015/06/15     GLUCOSE BLOOD
  [id number]  Active  Bernadine Gomez   

 

 POTASSIUM CITRATE ER 15 MEQ (1620 MG) CR-TABS  1 PO BID        POTASSIUM 
CITRATE  24243221797  Active  Kavitha Godoy   

 

 HYDROCODONE-ACETAMINOPHEN 5-325 MG TABS  1 PO BID prn    
HYDROCODONE-ACETAMINOPHEN  48703268196  No Longer Active  Kavitha Godoy 
  

 

 PEN NEEDLES 5/16" 31G X 8 MM MISC  as directed    
INSULIN PEN NEEDLE  76924609852  No Longer Active  Kavitha Godoy   

 

 GLUCOMETER STRIPS  CHECK BS QID    GLUCOMETER STRIPS     
No Longer Active  Kavitha Godoy   

 

 GLUCOMETER MACHINE  AS DIRECTED    GLUCOMETER MACHINE   
  No Longer Active  Kavitha Godoy   

 

 CLEOCIN 150 MG CAPS  1 po daily    CLINDAMYCIN HCL  
17729413319  No Longer Active  Kavitha Godoy   

 

 FERREX 150 150 MG CAPS  1 PO daily as directed  2014/10/06    
POLYSACCHARIDE IRON COMPLEX  38074536934  No Longer Active  Kavitha Godoy   

 

 PREDNISONE 20 MG TABS  TAKE 5 TABS PO DAILY  2014/10/13    
PREDNISONE  35274471194  No Longer Active  Kavitha Godoy   

 

 BRILINTA 90 MG TABS  1 PO BID       TICAGRELOR  10591553864  Active  
Kavitha Godoy   

 

 ASPIRIN 81 MG TAB  1 PO daily       ASPIRIN  99288875306  Active  
Kavitha Godoy   

 

 COREG 12.5 MG TABS  1 PO BID        CARVEDILOL  81629003473  Active  Kavitha Godoy   

 

 PLAVIX 75 MG TABS  1 PO QD    CLOPIDOGREL BISULFATE  
78219492998  No Longer Active  Kavithapatricia Godoy   

 

 AUGMENTIN 500-125 MG TAB  1 PO BID    AMOXICILLIN-POT 
CLAVULANATE  22159568760  No Longer Active  Kavitha Kristin Godoy   

 

 ALBUTEROL SULFATE 0.083 % NEBU SOLN  1 treatment TID prn       
ALBUTEROL SULFATE  99032092312  Active  Kavitha Kristin Godoy   

 

 ROBITUSSIN A-C  MG/5ML SYRUP  1 teaspoon PO Q 4-6 hr prn    ROBITUSSIN A-C  MG/5ML SYRUP     No Longer Active  Kavithapatricia Godoy   

 

 PEN NEEDLES " 31G X 8 MM MISC  as directed       INSULIN PEN 
NEEDLE  49380994339  Active  Bernadine Gomez   

 

 LANTUS SOLOSTAR 100 UNIT/ML SOLN  50 units SQ at night       INSULIN 
GLARGINE  17860559322  Active  Bernadine Gomez   

 

 CEFDINIR 300 MG CAPS  1 PO BID    2014/04/15  CEFDINIR  11533565965  
No Longer Active  Kavithapatricia Godoy   

 

 ZOFRAN ODT 8 MG TBDP  1 PO Q6hrs prn nausea    2014/04/15  
ONDANSETRON  15274647928  No Longer Active  Kavitha Godoy   

 

 ASPIRIN 81 MG TAB  1 PO daily       ASPIRIN  39534013473  No Longer 
Active  Kavithapatricia Godoy   

 

 BENAZEPRIL HCL 40 MG TABS  1 PO daily        BENAZEPRIL HCL  88982708879  
Active  Bernadine Gomez   

 

 NORVASC 10 MG TAB  1 PO QD        AMLODIPINE BESYLATE  79070703903  Active  
Kavithapatricia Godoy   

 

 ALBUTEROL SULFATE 0.083 % NEBU SOLN  1 nebulizer treatment q4 hours prn    ALBUTEROL SULFATE  28762683065  No Longer Active  Kavitha Godoy   

 

 ASCENSIA CONTOUR TEST  STRP  Test BS  3 -4 times a day DX: Diabetes    GLUCOSE BLOOD  21376458093  No Longer Active  Kavithapatricia Foster 
Godoy   

 

 BENADRYL 25 MG CAP  1 po every 6 hours if it does not cause drowsiness.  2007/
10/29    DIPHENHYDRAMINE HCL  97163156474  No Longer Active  Kavitha Godoy   

 

 PROTONIX 40 MG TBEC  1 po qd       PANTOPRAZOLE SODIUM  39814867741  
Active  Bernadine Gomez   

 

 FISH OIL 1000 MG CAPS  1 PO daily       OMEGA-3 FATTY ACIDS  
91603663381  Active  Kavitha Godoy   

 

 ZETIA 10 MG TABS  1 PO daily for cholesterol    
EZETIMIBE  21377023913  No Longer Active  Kavitha Godoy   

 

 LIPITOR 10 MG TAB  1 PO daily       ATORVASTATIN CALCIUM  
14268975065  Active  Bernadine Gomez   

 

 ZOFRAN ODT 8 MG TBDP  I PO Q6hrs prn    ONDANSETRON  
25551520935  No Longer Active  Kavitha Godoy   

 

 DOCUSATE SODIUM 50 MG/15ML SYRP  3 drops each ear.  Let stand for 15 min. then 
rinse out .  Repeat daily.  2008/09/15    DOCUSATE SODIUM  
77573605508  No Longer Active  Kavitha Godoy   

 

 ASCENSIA CONTOUR MONITOR  KIT  DX: Diabetes    BLOOD 
GLUCOSE MONITORING SUPPL  57632132286  No Longer Active  Kavitha Godoy   

 

 CLEOCIN 150 MG CAPS  1 PO daily    CLINDAMYCIN HCL  
68071287559  No Longer Active  Kavitha Godoy   

 

 BD INSULIN SYRINGE ULTRAFINE 29G X 1/2" 1 ML MISC  as directed for insulin 
injections       INSULIN SYRINGE-NEEDLE U-100  66674820917  Active  
Bernadine Gomez   

 

 BD ULTRA-FINE LANCETS MISC  ad directed to check sugar qid       
LANCETS  32812346055  Active  Bernadine Gomez   

 

 HUMALOG  UNIT/ML SOLN  5 units before meals       INSULIN 
LISPRO (HUMAN)     Active  Kavitha Godoy   

 

 LEVAQUIN 500 MG TAB  1 PO QD  2012/03/15    LEVOFLOXACIN  
38637879918  No Longer Active  Kavitha Godoy   

 

 AUGMENTIN 875-125 MG TAB  1 PO BID    AMOXICILLIN-POT 
CLAVULANATE  74693189661  No Longer Active  Kavitha Kristin Godoy   

 

 AUGMENTIN 875-125 MG TAB  1 PO BID x 7 days    
AMOXICILLIN-POT CLAVULANATE  04862198821  No Longer Active  Bernadine Gomez   

 

 LOVENOX 40 MG/0.4ML SOLN  Apply to affected area daily  
  ENOXAPARIN SODIUM  77186944971  No Longer Active  Kavitha Kristin Godoy   

 

 FISH OIL 1000 MG CAPS  1 PO daily    OMEGA-3 FATTY ACIDS
  39321607311  No Longer Active  Kavitha Kristin Godoy   

 

 ASPIRIN 81 MG TABS  1 PO daily       ASPIRIN  94913584263  No Longer 
Active  Kavithapatricia Godoy   

 

 DOXYCYCLINE HYCLATE 100 MG CAP  1 po BID    DOXYCYCLINE 
HYCLATE  19541136548  No Longer Active  Kavitha Kristin Godoy   

 

 PYRIDIUM 200 MG TAB  1 PO TID prn urinary urgency    
PHENAZOPYRIDINE HCL  18463987968  No Longer Active  Bernadine Gomez   

 

 BACTRIM -160 MG TAB  1 PO BID    TRIMETHOPRIM-
SULFAMETHOXAZOLE  17940453219  No Longer Active  Kavitha Godoy   

 

 DOXYCYCLINE HYCLATE 100 MG CAP  1 po BID    DOXYCYCLINE 
HYCLATE  15726775287  No Longer Active  Kavitha Kristin Godoy   

 

 BACTRIM -160 MG TAB  1 PO BID    TRIMETHOPRIM-
SULFAMETHOXAZOLE  34883763759  No Longer Active  Kavitha Kristin Godoy   

 

 ALEVE 220 MG TAB  1 PO BID        NAPROXEN SODIUM  45875644739  Active  Kavitha 
Kristin Godoy   

 

 VENTOLIN  (90 BASE) MCG/ACT AERS  2 puffs q6hrs prn       
ALBUTEROL SULFATE  18050955049  Active  Kavitha Kristin Godoy   

 

 CLEOCIN 150 MG CAPS  1 PO daily    CLINDAMYCIN HCL  
58764801392  No Longer Active  Kavitha Kristin Godoy   

 

 VITAMIN D 1000 UNIT TABS  2 PO Daliy    CHOLECALCIFEROL  
68870230129  No Longer Active  Kavitha Kristin Godoy   

 

 SEPTRA -160 MG TABS  1 PO BID    SULFAMETHOXAZOLE-
TRIMETHOPRIM  62936833392  No Longer Active  Kavitha Kristin Godoy   

 

 DOXYCYCLINE HYCLATE 100 MG CAP  1 po BID    DOXYCYCLINE 
HYCLATE  43698433031  No Longer Active  Kavitha Kristin Godoy   

 

 ADVAIR DISKUS 100-50 MCG/DOSE MISC  1 puff daily        FLUTICASONE-SALMETEROL
  54373237319  Active  Kavitha Kristin Godoy   

 

 MACROBID 100 MG CAP  1 PO BID  2009/07/10    NITROFURANTOIN MONOHYD 
MACRO  61223270635  No Longer Active  Kavitha Kristin Godoy   

 

 DARVOCET-N 100 100-650 MG TAB  1 PO Q6hrs prn pain    
PROPOXYPHENE N-APAP  70399335884  No Longer Active  Kavitha Kristin Godoy   

 

 PYRIDIUM 200 MG TAB  1 PO TID prn urinary urgency    2009/07/10  
PHENAZOPYRIDINE HCL  03303555440  No Longer Active  Kavitha Kristin Godoy   

 

 MACROBID 100 MG CAP  1 PO BID    NITROFURANTOIN MONOHYD 
MACRO  40839478877  No Longer Active  Kavithapatricia Godoy   

 

 CLEOCIN 150 MG CAPS  1 PO daily to prevent cellulitis    
CLINDAMYCIN HCL  00467340874  No Longer Active  Kavithapatricia Godoy   

 

 ACCU-CHEK COMPACT STRIPS STRP  Use one strip to check glucose three times 
daily    GLUCOSE BLOOD  92267529863  No Longer Active  
Kavitha Kristin Godoy   

 

 LEVAQUIN 500 MG TAB  1 PO QD  2007/10/23    LEVOFLOXACIN  
98344801297  No Longer Active  Kavitha Kristin Godoy   

 

 GLUCOPHAGE 1000 MG TABS  1 PO BID       METFORMIN HCL  60753470613  
Active  Kavitha Godoy   

 

 BACTROBAN 2 % CREAM  apply to affected area on nose TID for 7 days    MUPIROCIN CALCIUM  96483084184  No Longer Active  Kavitha Godoy   

 

 BENAZEPRIL HCL 20 MG TABS  1 PO daily    BENAZEPRIL HCL  
45610199194  No Longer Active  Kavitha Godoy   

 

 LOTREL 10-40 MG CAPS  1 PO daily       AMLODIPINE BESY-BENAZEPRIL 
HCL  14912509118  No Longer Active  Bernadine Gomez   

 

 FLEXERIL 10 MG TABS  1 by mouth 3 times a day as needed    CYCLOBENZAPRINE HCL  87740729386  No Longer Active  Kavitha Godoy   

 

 LORTAB 5 5-500 MG TAB  1 by mouth every 6 hours as needed for pain    ACETAMINOPHEN-HYDROCODONE  67639491590  No Longer Active  Kavitha Godoy   

 

 HYDROCHLOROTHIAZIDE 12.5 MG CAPS  1 PO QD       HYDROCHLOROTHIAZIDE  
95641286377  Active  Bernadine Gomez   

 

 OMACOR 1 GM CAPS  4 PO daily    OMEGA-3-ACID ETHYL 
ESTERS  14172147101  No Longer Active  Kavitha Godoy   

 

 AUGMENTIN 875-125 MG TABS  1 PO BID    AMOXICILLIN-POT 
CLAVULANATE  48956405741  No Longer Active  Kavitha Godoy   

 

 TYLENOL 325 MG TAB  as directed    ACETAMINOPHEN  
82870016017  No Longer Active  Kavitha Godoy   

 

 DOCUSATE SODIUM 50 MG/15ML SYRP  3 drops each ear.  Let stand for 15 min. then 
rinse out with ball suction.  Repeat daily.    DOCUSATE 
SODIUM  50026782898  No Longer Active  Kavitha Godoy   

 

 BACTROBAN 2 % CREA  apply TID    MUPIROCIN CALCIUM  
07722617355  No Longer Active  Kavitha Godoy   

 

 HYDROCODONE-ACETAMINOPHEN 5-500 MG TABS  1 q4-6hrs prn  
  HYDROCODONE-ACETAMINOPHEN  65019073715  No Longer Active  Kavithapatricia Godoy   

 

 LOTRISONE 1-0.05 % CREA  apply BID to left leg    
CLOTRIMAZOLE-BETAMETHASONE  77908884442  No Longer Active  Kavithapatricia Godoy
   

 

 GLIPIZIDE 5 MG TAB  1 PO 0630 and 1 1/2 pills 1700       GLIPIZIDE  
52562429320  Active  Bernadine Gomez   

 

 NORVASC 5 MG TABS  1 po qd    AMLODIPINE BESYLATE  
75605956113  No Longer Active  Kavithapatricia Godoy   

 

 ISAURA-E  1 po qd JOINT PAIN    ISAURA-E     No Longer Active  
Kavithapatricia Godoy   

 

 COLACE 60 MG/15ML SYRP  Use daily for cerumen removal.  
  DOCUSATE SODIUM  15680774683  No Longer Active  Kavithapatricia Godoy   

 

 MULTIVITAMINS TABS  1 po daily    MULTIPLE VITAMIN  
66015638653  No Longer Active  Kavithapatricia Godoy   

 

 VITAMIN C 1000 MG TABS  1 po daily    ASCORBIC ACID  
25693142427  No Longer Active  Kavithapatricia Godoy   

 

 VITAMIN E 400 IU CAPS  2 po daily    VITAMIN E  
41436977789  No Longer Active  Kavithapatricia Godoy   

 

 AMBIEN 10 MG TAB  1 prn    ZOLPIDEM TARTRATE  
34191185324  No Longer Active  Kavithapatricia Godoy   

 

 ECONAZOLE NITRATE 1 % CREA  as directed    ECONAZOLE 
NITRATE  76800413924  No Longer Active  Kavithapatricia Godoy   

 

 KEFLEX 500 MG CAPS  1 qid x 7days    CEPHALEXIN  
29512005198  No Longer Active  Kavithapatricia Godoy   

 

 ECONAZOLE NITRATE 1 % CREA  Apply BID x 7 days  2004/10/18  2004/10/25  
ECONAZOLE NITRATE  51015110167  No Longer Active  Kavitha Kristin Godoy   

 

 KEFLEX 500 MG CAP  1 Po QID for 14 days  2004/10/15  2004/10/22  CEPHALEXIN  
01631927675  No Longer Active  Kavitha Kristin Godoy   

 

 ALEVE 220 MG TABS  1 PO BID       NAPROXEN SODIUM  16359452941  No 
Longer Active  Kavitha Kristin Jayne   

 

 MOTRIN 600 MG TABS  1 po q 6 hrs.       IBUPROFEN  52815434392  No 
Longer Active  Kavithapatricia Godoy   

 

 NASAL SPRAY SALINE 0.65 % SOLN  puffs 2 puffs BID    
SALINE  36574434812  No Longer Active  Kavitha Kristin Godoy   

 

 AMOXIL 875 MG TABS  1 PO BID    AMOXICILLIN  14960232402
  No Longer Active  Kavitha Kristin Godoy   

 

 LOTENSIN 20 MG TABS  1 PO QD       BENAZEPRIL HCL  14356115510  No 
Longer Active  Kavitha Kristin Godoy   

 

 AMARYL 4 MG TABS  1 tab po bid    GLIMEPIRIDE  
85833211159  No Longer Active  Kavitha Kristin Godoy   

 

 GLUCOPHAGE  MG TB24  1 po daily    METFORMIN HCL  
62087593934  No Longer Active  Kavitha Kristin Godoy   







Immunizations







 Vaccine  Administration Date  Value  Standard Description

 

 Influenza vaccine given    Done  influenza virus vaccine, 
unspecified formulation

 

 pneumococcal immunization administered    1st dose  pneumococcal 
polysaccharide vaccine, 23 valent

 

 Influenza vaccine given    Done 10.24.11  influenza virus vaccine, 
unspecified formulation







Vital Signs







 Date  Name  Value  Unit  Range  Description

 

   blood pressure, diastolic - 8462-4  82  mm[Hg]     BP lara

 

   blood pressure, systolic - 8480-6  156  mm[Hg]     BP sys

 

   pulse rate E&M - 8867-4  88  /min     Heart rate

 

   respiratory rate E&M - 9279-1  14  /min     Resp rate

 

   temperature E&M  98.6  [degF]     Body temperature

 

   weight E&M - 3141-9  295  [lb_av]     Weight Measured

 

   pulse rate E&M - 8867-4  68  /min     Heart rate

 

   respiratory rate E&M - 9279-1  14  /min     Resp rate

 

   weight E&M - 3141-9  310  [lb_av]     Weight Measured

 

   blood pressure, diastolic - 8462-4  80  mm[Hg]     BP lara

 

   blood pressure, systolic - 8480-6  145  mm[Hg]     BP sys

 

   pulse rate E&M - 8867-4  80  /min     Heart rate

 

   respiratory rate E&M - 9279-1  14  /min     Resp rate

 

   temperature E&M  98.6  [degF]     Body temperature

 

   weight E&M - 3141-9  310  [lb_av]     Weight Measured

 

   blood pressure, diastolic - 8462-4  82  mm[Hg]     BP lara

 

   blood pressure, systolic - 8480-6  156  mm[Hg]     BP sys

 

   pulse rate E&M - 8867-4  76  /min     Heart rate

 

   respiratory rate E&M - 9279-1  14  /min     Resp rate

 

 2014/10/30  blood pressure, diastolic - 8462-4  74  mm[Hg]     BP lara

 

 2014/10/30  blood pressure, systolic - 8480-6  142  mm[Hg]     BP sys

 

 2014/10/30  pulse rate E&M - 8867-4  85  /min     Heart rate

 

 2014/10/30  respiratory rate E&M - 9279-1  14  /min     Resp rate

 

 2014/10/15  blood pressure, diastolic - 8462-4  80  mm[Hg]     BP lara

 

 2014/10/15  blood pressure, systolic - 8480-6  130  mm[Hg]     BP sys

 

 2014/10/15  pulse rate E&M - 8867-4  78  /min     Heart rate

 

 2014/10/15  respiratory rate E&M - 9279-1  14  /min     Resp rate

 

 2014/10/06  blood pressure, diastolic - 8462-4  88  mm[Hg]     BP lara

 

 2014/10/06  blood pressure, systolic - 8480-6  162  mm[Hg]     BP sys

 

 2014/10/06  pulse rate E&M - 8867-4  82  /min     Heart rate

 

 2014/10/06  respiratory rate E&M - 9279-1  16  /min     Resp rate

 

   blood pressure, diastolic - 8462-4  70  mm[Hg]     BP lara

 

   blood pressure, systolic - 8480-6  120  mm[Hg]     BP sys

 

   pulse rate E&M - 8867-4  88  /min     Heart rate

 

   respiratory rate E&M - 9279-1  14  /min     Resp rate

 

   temperature E&M  98.6  [degF]     Body temperature

 

   weight E&M - 3141-9  302  [lb_av]     Weight Measured

 

   blood pressure, diastolic - 8462-4  68  mm[Hg]     BP lara

 

   blood pressure, systolic - 8480-6  140  mm[Hg]     BP sys

 

   pulse rate E&M - 8867-4  78  /min     Heart rate

 

   respiratory rate E&M - 9279-1  14  /min     Resp rate

 

   temperature E&M  98.6  [degF]     Body temperature

 

   weight E&M - 3141-9  302  [lb_av]     Weight Measured

 

   blood pressure, diastolic - 8462-4  70  mm[Hg]     BP lara

 

   blood pressure, systolic - 8480-6  180  mm[Hg]     BP sys

 

   pulse rate E&M - 8867-4  88  /min     Heart rate

 

   respiratory rate E&M - 9279-1  14  /min     Resp rate

 

   temperature E&M  98.6  [degF]     Body temperature

 

   weight E&M - 3141-9  302  [lb_av]     Weight Measured







Diagnostic Results







 Date  Name  Value  Unit  Range  Description

 

 Clinical Lists Update: CBC - Hematology 

 

 2014/10/23  red blood cell distribution width  15.5  %      

 

   hematocrit, blood  40  %      

 

 2014/10/28  red blood cell distribution width  15.1  %      

 

 2014/10/29  red blood cell distribution width  15.0  %      

 

   red blood cell distribution width  15.2  %      

 

   red blood cell distribution width  15.2  %      

 

   red blood cell distribution width  16.2  %      

 

 2014/10/23  mean corpuscular volume, RBC  88  fL      

 

 2014/10/27  mean corpuscular volume, RBC  88  fL      

 

 2014/10/28  mean corpuscular volume, RBC  88  fL      

 

 2014/10/29  mean corpuscular volume, RBC  88  fL      

 

   mean corpuscular volume, RBC  88  fL      

 

   mean corpuscular volume, RBC  88  fL      

 

   mean corpuscular volume, RBC  89  fL      

 

 2014/10/23  leukocyte count, blood  8.7  10*3/mm3      

 

 2014/10/27  leukocyte count, blood  7.5  10*3/mm3      

 

 2014/10/28  leukocyte count, blood  7.5  10*3/mm3      

 

 2014/10/29  leukocyte count, blood  8.3  10*3/mm3      

 

   leukocyte count, blood  8.3  10*3/mm3      

 

   leukocyte count, blood  8.3  10*3/mm3      

 

   leukocyte count, blood  8.0  10*3/mm3      

 

 2014/10/23  erythrocyte (RBC) count  4.36  10*6/mm3      

 

 2014/10/27  erythrocyte (RBC) count  4.31  10*6/mm3      

 

 2014/10/28  erythrocyte (RBC) count  4.31  10*6/mm3      

 

 2014/10/29  erythrocyte (RBC) count  4.37  10*6/mm3      

 

   erythrocyte (RBC) count  4.51  10*6/mm3      

 

   erythrocyte (RBC) count  4.51  10*6/mm3      

 

   erythrocyte (RBC) count  4.52  10*6/mm3      

 

 2014/10/23  platelet count  29  10*3/mm3      

 

 2014/10/27  platelet count  59  10*3/mm3      

 

 2014/10/28  platelet count  59  10*3/mm3      

 

 2014/10/29  platelet count  61  10*3/mm3      

 

   platelet count  99  10*3/mm3      

 

   platelet count  99  10*3/mm3      

 

   platelet count  137  10*3/mm3      

 

 2014/10/23  hemoglobin, blood  12.3  g/dL      

 

 2014/10/27  hemoglobin, blood  12.1  g/dL      

 

 2014/10/28  hemoglobin, blood  12.1  g/dL      

 

 2014/10/29  hemoglobin, blood  12.1  g/dL      

 

   hemoglobin, blood  12.6  g/dL      

 

   hemoglobin, blood  12.6  g/dL      

 

   hemoglobin, blood  12.7  g/dL      

 

 2014/10/23  hematocrit, blood  38  %      

 

 2014/10/27  hematocrit, blood  38  %      

 

 2014/10/28  hematocrit, blood  38  %      

 

 2014/10/29  hematocrit, blood  38  %      

 

   hematocrit, blood  40  %      

 

   hematocrit, blood  40  %      

 

 2014/10/27  red blood cell distribution width  15.1  %      

 

 Clinical Lists Update: CBC    - Hematology 

 

   red blood cell distribution width  16.0  %      

 

   mean corpuscular volume, RBC  90  fL      

 

   leukocyte count, blood  7.5  10*3/mm3      

 

   hematocrit, blood  36  %      

 

   platelet count  163  10*3/mm3      

 

   hemoglobin, blood  11.5  g/dL      

 

   erythrocyte (RBC) count  4.01  10*6/mm3      

 

 Clinical Lists Update: CBC,CMP,CHOL,TRIG,HGA1C,FERRITIN - Chemistry 

 

   chloride, serum  101  mmol/L      

 

   glucose, plasma fasting  128  mg/dL      

 

   cholesterol, serum  118  mg/dL      

 

   carbon dioxide, venous blood  28.0  mmol/L      

 

   creatinine, serum  0.8  mg/dL      

 

   ferritin, serum  248.9  ng/mL      

 

   hemoglobin A1C, blood, as % of total hemoglobin  5.9  %      

 

   potassium, serum  4.6  mmol/L      

 

   protein, total, serum  6.3  g/dL      

 

   aspartate aminotransferase (SGOT), serum  29  U/L      

 

   alanine aminotransferase (SGPT), serum  26  U/L      

 

   bilirubin, serum, total  0.9  mg/dL      

 

   triglyceride, serum, fasting  175  mg/dL      

 

   sodium, serum  134  mmol/L      

 

   anion gap, serum  10         

 

   Estimated Glomerular Filtration Rate (calc)  74  mL/min/1.73m2    
  

 

   albumin, serum  3.8  g/dL      

 

   alkaline phosphatase, serum  130  U/L      

 

   urea nitrogen, blood  13  mg/dL      

 

   calcium, serum  9.0  mg/dL      

 

 Clinical Lists Update: CBC,CMP,CHOL,TRIG,HGA1C,FERRITIN - Hematology 

 

   erythrocyte (RBC) count  3.92  10*6/mm3      

 

   leukocyte count, blood  6.7  10*3/mm3      

 

   platelet count  199  10*3/mm3      

 

   mean corpuscular volume, RBC  93  fL      

 

   hematocrit, blood  36.3  %      

 

   red blood cell distribution width  19.5  %      

 

   hemoglobin, blood  11.1  g/dL      

 

 Clinical Lists Update: CBC,CMP,Chol,Trig,Ferritin,HgA1c - Chemistry 

 

 2014/10/02  potassium, serum  4.6  mmol/L      

 

 2014/10/02  triglyceride, serum, fasting  109  mg/dL      

 

 2014/10/02  creatinine, serum  1.0  mg/dL      

 

 2014/10/02  bilirubin, serum, total  0.7  mg/dL      

 

 2014/10/02  hemoglobin A1C, blood, as % of total hemoglobin  7.4  %      

 

 2014/10/02  sodium, serum  135  mmol/L      

 

 2014/10/02  carbon dioxide, venous blood  25.0  mmol/L      

 

 2014/10/02  anion gap, serum  13         

 

 2014/10/02  chloride, serum  102  mmol/L      

 

 2014/10/02  glucose, plasma fasting  108  mg/dL      

 

 2014/10/02  protein, total, serum  6.4  g/dL      

 

 2014/10/02  Estimated Glomerular Filtration Rate (calc)  57  mL/min/1.73m2    
  

 

 2014/10/02  alanine aminotransferase (SGPT), serum  25  U/L      

 

 2014/10/02  calcium, serum  8.6  mg/dL      

 

 2014/10/02  urea nitrogen, blood  15  mg/dL      

 

 2014/10/02  cholesterol, serum  121  mg/dL      

 

 2014/10/02  alkaline phosphatase, serum  121  U/L      

 

 2014/10/02  aspartate aminotransferase (SGOT), serum  26  U/L      

 

 2014/10/02  albumin, serum  3.8  g/dL      

 

 2014/10/02  ferritin, serum  14.9  ng/mL      

 

 Clinical Lists Update: CBC,CMP,Chol,Trig,Ferritin,HgA1c - Hematology 

 

 2014/10/02  red blood cell distribution width  17.7  %      

 

 2014/10/02  hematocrit, blood  40  %      

 

 2014/10/02  mean corpuscular volume, RBC  91  fL      

 

 2014/10/02  hemoglobin, blood  12.0  g/dL      

 

 2014/10/02  leukocyte count, blood  6.0  10*3/mm3      

 

 2014/10/02  platelet count  65  10*3/mm3      

 

 2014/10/02  erythrocyte (RBC) count  4.37  10*6/mm3      

 

 Clinical Lists Update: CBC,CMP,FLP  IN PT LABS - Chemistry 

 

   calcium, serum  9.3  mg/dL      

 

   chloride, serum  104  mmol/L      

 

   alkaline phosphatase, serum  81  U/L      

 

   albumin, serum  3.7  g/dL      

 

   urea nitrogen, blood  13  mg/dL      

 

   cholesterol, serum  117  mg/dL      

 

   carbon dioxide, venous blood  21  mmol/L      

 

   creatinine, serum  0.77  mg/dL      

 

   HDL cholesterol, serum  40  mg/dL      

 

   LDL cholesterol, serum  57  mg/dL      

 

   potassium, serum  4.5  mmol/L      

 

   Estimated Glomerular Filtration Rate (calc)  >60  mL/min/1.73m2   
   

 

   glucose, plasma fasting  128  mg/dL      

 

   very low density lipoproteins  27  mg/dL      

 

   sodium, serum  139  mmol/L      

 

   triglyceride, serum, fasting  137  mg/dL      

 

   bilirubin, serum, total  0.7  mg/dL      

 

   alanine aminotransferase (SGPT), serum  18  U/L      

 

   aspartate aminotransferase (SGOT), serum  25  U/L      

 

   protein, total, serum  6.9  g/dL      

 

 Clinical Lists Update: CBC,CMP,FLP  IN PT LABS - Hematology 

 

   hematocrit, blood  39  %      

 

   hemoglobin, blood  12.5  g/dL      

 

   platelet count  198  10*3/mm3      

 

   erythrocyte (RBC) count  4.32  10*6/mm3      

 

   leukocyte count, blood  8.8  10*3/mm3      

 

   mean corpuscular volume, RBC  90  fL      

 

   red blood cell distribution width  15.4  %      

 

 Clinical Lists Update: CBC,CMP,FLP,TSH - Chemistry 

 

 2015/04/15  chloride, serum  106  mmol/L      

 

 2015/04/15  calcium, serum  9.5  mg/dL      

 

 2015/04/15  urea nitrogen, blood  22  mg/dL      

 

 2015/04/15  alkaline phosphatase, serum  105  U/L      

 

 2015/04/15  albumin, serum  3.8  g/dL      

 

 2015/04/15  Estimated Glomerular Filtration Rate (calc)  >60  mL/min/1.73m2   
   

 

 2015/04/15  glucose, plasma fasting  127  mg/dL      

 

 2015/04/15  very low density lipoproteins  27  mg/dL      

 

 2015/04/15  sodium, serum  139  mmol/L      

 

 2015/04/15  triglyceride, serum, fasting  134  mg/dL      

 

 2015/04/15  bilirubin, serum, total  0.8  mg/dL      

 

 2015/04/15  alanine aminotransferase (SGPT), serum  21  U/L      

 

 2015/04/15  aspartate aminotransferase (SGOT), serum  26  U/L      

 

 2015/04/15  protein, total, serum  6.7  g/dL      

 

 2015/04/15  potassium, serum  4.8  mmol/L      

 

 2015/04/15  LDL cholesterol, serum  22  mg/dL      

 

 2015/04/15  thyroid stimulating hormone, serum  1.52  u[iU]/mL      

 

 2015/04/15  hemoglobin A1C, blood, as % of total hemoglobin  6.3  %      

 

 2015/04/15  HDL cholesterol, serum  42  mg/dL      

 

 2015/04/15  creatinine, serum  0.78  mg/dL      

 

 2015/04/15  carbon dioxide, venous blood  22  mmol/L      

 

 2015/04/15  cholesterol, serum  114  mg/dL      

 

 Clinical Lists Update: CBC,CMP,FLP,TSH - Hematology 

 

 2015/04/15  red blood cell distribution width  17.1  %      

 

 2015/04/15  mean corpuscular volume, RBC  89  fL      

 

 2015/04/15  leukocyte count, blood  5.1  10*3/mm3      

 

 2015/04/15  hematocrit, blood  34  %      

 

 2015/04/15  platelet count  199  10*3/mm3      

 

 2015/04/15  hemoglobin, blood  10.5  g/dL      

 

 2015/04/15  erythrocyte (RBC) count  3.77  10*6/mm3      

 

 Clinical Lists Update: CBC,CMP,Ferritin - Chemistry 

 

   Estimated Glomerular Filtration Rate (calc)  81  mL/min/1.73m2    
  

 

   glucose, plasma fasting  160  mg/dL      

 

   anion gap, serum  13         

 

   sodium, serum  135  mmol/L      

 

   bilirubin, serum, total  0.9  mg/dL      

 

   alanine aminotransferase (SGPT), serum  24  U/L      

 

   aspartate aminotransferase (SGOT), serum  21  U/L      

 

   protein, total, serum  6.2  g/dL      

 

   potassium, serum  4.7  mmol/L      

 

   ferritin, serum  33.8  ng/mL      

 

   creatinine, serum  0.8  mg/dL      

 

   carbon dioxide, venous blood  26.0  mmol/L      

 

   chloride, serum  101  mmol/L      

 

   calcium, serum  8.9  mg/dL      

 

   urea nitrogen, blood  14  mg/dL      

 

   alkaline phosphatase, serum  115  U/L      

 

   albumin, serum  3.8  g/dL      

 

 Clinical Lists Update: CBC,CMP,Ferritin - Hematology 

 

   red blood cell distribution width  22.1  %      

 

   hemoglobin, blood  11.2  g/dL      

 

   hematocrit, blood  34  %      

 

   mean corpuscular volume, RBC  87  fL      

 

   erythrocyte (RBC) count  3.98  10*6/mm3      

 

   leukocyte count, blood  6.7  10*3/mm3      

 

   platelet count  179  10*3/mm3      

 

 Clinical Lists Update: CBC,FERRITIN - Chemistry 

 

   ferritin, serum  11  ng/mL      

 

 Clinical Lists Update: CBC,FERRITIN - Hematology 

 

   hematocrit, blood  30  %      

 

   red blood cell distribution width  14.4  %      

 

   mean corpuscular volume, RBC  91  fL      

 

   hemoglobin, blood  9.3  g/dL      

 

   leukocyte count, blood  6.1  10*3/mm3      

 

   erythrocyte (RBC) count  3.32  10*6/mm3      

 

   platelet count  203  10*3/mm3      

 

 Clinical Lists Update: ER LABS - Chemistry 

 

   albumin, serum  3.8  g/dL      

 

   alkaline phosphatase, serum  110  U/L      

 

   urea nitrogen, blood  15  mg/dL      

 

   calcium, serum  9.5  mg/dL      

 

   chloride, serum  102  mmol/L      

 

   carbon dioxide, venous blood  22  mmol/L      

 

   creatinine, serum  0.80  mg/dL      

 

   potassium, serum  5.0  mmol/L      

 

   protein, total, serum  7.1  g/dL      

 

   aspartate aminotransferase (SGOT), serum  32  U/L      

 

   alanine aminotransferase (SGPT), serum  20  U/L      

 

   bilirubin, serum, total  1.1  mg/dL      

 

   sodium, serum  134  mmol/L      

 

   Estimated Glomerular Filtration Rate (calc)  >60  mL/min/1.73m2   
   

 

   glucose, plasma fasting  152  mg/dL      

 

 Clinical Lists Update: ER LABS - Hematology 

 

   leukocyte count, blood  12.6  10*3/mm3      

 

   hematocrit, blood  35  %      

 

   platelet count  197  10*3/mm3      

 

   erythrocyte (RBC) count  3.87  10*6/mm3      

 

   red blood cell distribution width  16.8  %      

 

   hemoglobin, blood  11.0  g/dL      

 

   mean corpuscular volume, RBC  89  fL      

 

 Clinical Lists Update: ER LABS - Urinalysis 

 

   appearance, urine  Clear Yellow         

 

   urobilinogen, urine, semiquantitative (dipstick)  normal         

 

   specific gravity, urine  1.10         

 

   pH, urine, semiquantitative  8         

 

   nitrite, urine, semiquantitative  neg         

 

   ketones, urine, by test strip  neg         

 

   bilirubin, urine  neg         

 

   glucose, urine, semiquantitative  neg         

 

   WBC urine on microscopy  0-2  {Cells}/[HPF]      

 

   RBC urine by microscopy  0-2         

 

   hyaline casts, urine  none  /[LPF]      

 

   epithelial cells, urine  2-5  /[LPF]      

 

   mucus on urinalysis  neg         

 

   blood in urine (hemoglobin) by dipstick  1+         

 

   protein, urine, semiquantitative (dipstick)  1+         

 

   bacteria, urine microscopy  neg         

 

 Office Visit: Dr Godoy's Check Up: Established Patient Visit - Chemistry 

 

   ferritin, serum  21  ng/mL      

 

   hemoglobin A1C, blood, as % of total hemoglobin  8.1  %      

 

 Office Visit: Dr Godoy's Check Up: Established Patient Visit - Hematology 

 

 2014/10/30  red blood cell distribution width  15.0  %      

 

 2014/10/15  mean corpuscular volume, RBC  86  fL      

 

 2014/10/16  mean corpuscular volume, RBC  37  fL      

 

 2014/10/30  mean corpuscular volume, RBC  88  fL      

 

 2014/10/15  leukocyte count, blood  7.3  10*3/mm3      

 

 2014/10/16  leukocyte count, blood  9.1  10*3/mm3      

 

 2014/10/30  leukocyte count, blood  8.3  10*3/mm3      

 

 2014/10/30  hematocrit, blood  38  %      

 

 2014/10/16  erythrocyte (RBC) count  4.27  10*6/mm3      

 

 2014/10/30  erythrocyte (RBC) count  4.37  10*6/mm3      

 

 2014/10/15  platelet count  39  10*3/mm3      

 

 2014/10/16  platelet count  22  10*3/mm3      

 

 2014/10/30  platelet count  61  10*3/mm3      

 

 2014/10/15  hemoglobin, blood  12.4  g/dL      

 

 2014/10/16  hemoglobin, blood  11.8  g/dL      

 

 2014/10/30  hemoglobin, blood  12.1  g/dL      

 

 2014/10/15  hematocrit, blood  37  %      

 

 2014/10/16  hematocrit, blood  37  %      

 

 2014/10/16  red blood cell distribution width  15.0  %      

 

 2014/10/15  red blood cell distribution width  14.9  %      

 

 2014/10/15  erythrocyte (RBC) count  4.36  10*6/mm3      

 

 Phone Note: Low platelet count - Hematology 

 

 2014/10/08  red blood cell distribution width  17.0  %      

 

 2014/10/08  mean corpuscular volume, RBC  90  fL      

 

 2014/10/08  leukocyte count, blood  5.9  10*3/mm3      

 

 2014/10/08  hematocrit, blood  40  %      

 

 2014/10/08  platelet count  12  10*3/mm3      

 

 2014/10/08  hemoglobin, blood  12.7  g/dL      

 

 2014/10/08  erythrocyte (RBC) count  4.45  10*6/mm3      







Encounters







 Code  Encounter  Date  Provider  Facility

 

 CPT-31114  Ofc Vst, Est Level IV   12:42:41 CDT  Kavitha Kristin 
[facility]  Kavitha S Godoy, DO, FACP

 

 CPT-36883  Ofc Vst, Est Level IV   17:01:58 CDT  Kavitha Kristin WELSH Jayne, DO, FACP

 

 CPT-35837  Ofc Vst, Est Level IV   17:23:42 CST  Kavitha WELSH Godoy, DO, FACP

 

 CPT-32239  Ofc Vst, Est Level IV   20:14:18 CST  Kavitha Kristin WELSH God[facility], DO, FACP

 

 CPT-23416  Ofc Vst, Est Level III  2014/10/30 17:10:34 CDT  Kavitha Kristin WELSH Jayne, DO, FACP

 

 CPT-96827  Ofc Vst, Est Level IV  2014/10/16 22:19:26 CDT  Kavitha Kristin WELSH Jayne, DO, FACP

 

 CPT-87809  Ofc Vst, Est Level IV  2014/10/07 16:08:07 CDT  Kavitha Kristin WELSH Jayne, DO, FACP

 

 CPT-79106  Ofc Vst, Est Level II   15:22:36 CDT  Kavithapatricia WELSH Jayne, DO, FACP

 

 CPT-30186  Ofc Vst, Est Level IV   11:13:04 CDT  Kavithapatricia WELSH Jayne, DO, FACP

 

 CPT-90327  Ofc Vst, Est Level IV   13:20:51 CDT  Kavitha Kristin WELSH Jayne, DO, FACP

 

 CPT-11766  Ofc Vst, Est Level IV   14:23:34 CDT  Kavithapatricia WELSH Jayne, DO, FACP

 

 CPT-25519  Ofc Vst, Est Level III   19:42:36 CDT  Kavithapatricia Godoy  [facility]

 

 CPT-71017  Ofc Vst, Est Level IV   14:28:10 CST  Kavitha Kristin WELSH Jayne, DO, FACP

 

 CPT-28718  Ofc Vst, Est Level IV   15:20:04 CST  Kavitha WELSH Godoy, DO, FACP

 

 CPT-67372  Ofc Vst, Est Level III  2013/10/15 14:46:42 CDT  Kavitha Kristin WELSH Godoy, DO, FACP

 

 CPT-57832  Ofc Vst, Est Level IV   13:31:14 CDT  Kavitha Kristin WELSH Godoy, DO, FACP

 

 CPT-53626  Ofc Vst, Est Level IV  2013/04/10 16:26:21 CDT  Kavitha Kristin WELSH Godoy, DO, FACP

 

 CPT-82889  Ofc Vst, Est Level III   16:02:31 CST  Kavitha WELSH Jayne, DO, FACP

 

 CPT-83785  Ofc Vst, Est Level IV   13:01:46 CST  Kavitha WELSH Jayne, DO, FACP

 

 CPT-98801  Ofc Vst, Est Level IV   10:43:37 CDT  Kavithapatricia WELSH Jayne, DO, FACP

 

 CPT-99362  Ofc Vst, Est Level III   15:41:44 CDT  Kavithapatricia Godoy  [place] [facility]

 

 CPT-70097  Ofc Vst, Est Level IV   16:31:03 CDT  Kavithapatricia WELSH Jayne, DO, FACP

 

 CPT-99783  Ofc Vst, Est Level III   15:45:49 CDT  Kavithapatricia WELSH Godoy, DO, FACP

 

 CPT-50806  Ofc Vst, Est Level IV  2012/03/15 11:04:49 CDT  Kavitha Kristin WELSH Godoy, DO, FACP

 

 CPT-57306  Ofc Vst, Est Level IV   16:16:55 CST  Kavitha WELSH Godoy, DO, FACP

 

 CPT-94976  Ofc Vst, Est Level IV   20:00:20 CST  Kavitha Foster 
Godoy  [facility]

 

 CPT-11803  Ofc Vst, Est Level IV   15:00:56 CST  Kavitha WELSH Godoy, DO, FACP

 

 CPT-34868  Ofc Vst, Est Level IV   11:37:43 CST  Kavitha WELSH Godoy, DO, FACP

 

 CPT-67905  Ofc Vst, Est Level V   11:04:21 CST  Bernadine WELSH Godoy, DO, FACP

 

 CPT-26982  Ofc Vst, Est Level IV   15:56:58 CDT  Kavithapatricia WELSH Godoy, DO, FACP

 

 CPT-12630  Ofc Vst, Est Level III   15:56:48 CDT  Kavitha WELSH Godoy, DO, FACP

 

 CPT-55378  Ofc Vst, Est Level IV   11:13:32 CDT  Kavithapatricia WELSH Jayne, DO, FACP

 

 CPT-75160  Ofc Vst, Est Level III   11:06:29 CDT  Kavithapatricia WELSH Godoy, DO, FACP

 

 CPT-72976  Ofc Vst, Est Level IV   10:36:27 CDT  Kavitha WELSH Godoy, DO, FACP

 

 CPT-25118  Ofc Vst, Est Level IV   16:28:28 CST  Kavitha WELSH Godoy, DO, FACP

 

 CPT-73241  Ofc Vst, Est Level IV   16:15:53 CST  Kavitha WELSH Godoy, DO, FACP

 

 CPT-28451  Ofc Vst, Est Level IV   10:24:23 CDT  Kavitha WELSH Godoy, DO, FACP

 

 CPT-69882  Ofc Vst, Est Level IV   10:41:52 CDT  Kavitha Kristin WELSH Jayne, DO, FACP

 

 CPT-77084  Ofc Vst, Est Level V   14:13:59 CDT  [facility]

 

 CPT-70991  Ofc Vst, Est Level IV   11:50:10 CDT  Kavitha Kristin WELSH Godoy, DO, FACP

 

 CPT-28546  Ofc Vst, Est Level V   16:45:39 CST  Kavitha Kristin WELSH Godoy, DO, FACP

 

 CPT-90744  Ofc Vst, Est Level V   16:35:01 CDT  Kavitha Kristin WELSH Godoy, DO, FACP

 

 CPT-70267  Ofc Vst, Est Level IV  2009/07/10 10:56:36 CDT  Kavitha Kristni WELSH Godoy, DO, FACP

 

 CPT-32414  Ofc Vst, Est Level IV   09:27:02 CDT  Kavitha Kristin WELSH Jayne, DO, FACP

 

 CPT-76247  Ofc Vst, Est Level IV   16:04:39 CST  Kavitha Kristin WELSH Godoy, DO, FACP

 

 CPT-38403  Ofc Vst, Est Level IV  2008/09/15 16:41:31 CDT  Kavitha Kristin WELSH Godoy, DO, FACP

 

 CPT-88952  Ofc Vst, Est Level IV   16:15:36 CDT  Kavitha Kristin WELSH Jayne, DO, FACP

 

 CPT-34995  Ofc Vst, Est Level IV   16:43:23 CST  Kavitha Kristin WELSH Godoy, DO, FACP

 

 CPT-20012  Ofc Vst, Est Level IV   17:09:39 CST  Kavitha Kristin Nowaki S Godoy, DO, FACP

 

 CPT-40129  Ofc Vst, Est Level III  2007/10/29 16:52:49 CDT  Kavitha Kristin Nowaki S Godoy, DO, FACP

 

 CPT-91098  Ofc Vst, Est Level III  2007/10/23 16:46:46 CDT  Kavitha Kristin 
Jayne Godoy, DO, FACP

 

 CPT-52793  Ofc Vst, Est Level IV  2007/10/16 16:17:41 CDT  Kavitha Kristin 
Jayne Godoy, DO, FACP

 

 CPT-60629  Ofc Vst, Est Level III  2007/10/09 17:15:29 CDT  Kavitha Kristin 
Jayne Godoy, DO, FACP

 

 CPT-00031  Ofc Vst, Est Level IV   15:48:06 CDT  Kavitha Kristin 
Jayne  Kavitha S Jayne, DO, FACP

 

 CPT-64951  Ofc Vst, Est Level IV   11:59:27 CDT  Kavitha Kristin 
Jayne Godoy, DO, FACP

 

 CPT-38778  Ofc Vst, Est Level IV   16:31:37 CDT  Kavithapatricia Godoy  [facility] [place]

 

 CPT-97156  Ofc Vst, Est Level IV   16:25:22 CST  Kavitha Godoy  [facility] [place]

 

 CPT-39549  Ofc Vst, Est Level III   18:24:09 CST  Kavitha Godoy  [facility] [place]

 

 CPT-95003  Ofc Vst, Est Level III  2006/10/11 16:54:12 CDT  Kavitha Godoy  [facility] [place]

 

 CPT-71243  Ofc Vst, Est Level IV   16:39:33 CDT  Kavithapatricia Godoy  [facility] [place]

 

 CPT-72762  Ofc Vst, Est Level IV   15:11:16 CDT  Kavitha Kristin Godoy  [facility] [place]

 

 CPT-55683  Ofc Vst, Est Level IV   17:29:13 CST  Kavitha Godoy  [facility] [place]

 

 CPT-77026  Ofc Vst, Est Level III   12:45:04 CST  Kavitha Godoy  [facility] [place]

 

 CPT-10932  Ofc Vst, Est Level III   16:51:19 CST  Kavitha Godoy  [facility] [place]

 

 CPT-42047  Ofc Vst, Est Level IV   16:59:21 CDT  Kavitha Kristin Godoy  [facility] [place]

 

 CPT-03385  Ofc Vst, Est Level IV   09:39:02 CDT  Kavitha Kristin Godoy  [facility] [place]

 

 CPT-01739  Ofc Vst, Est Level IV   18:04:53 CDT  Kavitha Kristin Godoy  [facility] [place]

 

 CPT-32367  Ofc Vst, Est Level IV   17:17:47 CST  Kavitha Godoy  [facility] [place]

 

 CPT-93843  Ofc Vst, Est Level IV   17:23:25 CST  Kavitha Godoy  [facility] [place]

 

 CPT-90517  Ofc Vst, Est Level IV   18:44:33 CST  Kavitha Godoy  [facility] [place]

 

 CPT-05278  Ofc Vst, Est Level III  2004/10/28 18:20:20 CDT  Kavitha Kristin Godoy  [facility] [place]

 

 CPT-16917  Ofc Vst, Est Level III  2004/10/18 17:54:31 CDT  Kavitha Kristin Godoy  [facility] [place]

 

 CPT-19768  Ofc Vst, Est Level IV   19:13:31 CDT  Kavithapatricia Godoy  [facility] [place]

 

 CPT-39215  Ofc Vst, Est Level IV   18:01:08 CDT  Kavithapatricia Godoy  [facility] [place]

 

 CPT-25545  Ofc Vst, Est Level III   13:36:36 CDT  Kavithapatricia Godoy  [facility] [place]

 

 CPT-69435  Ofc Vst, Est Level III   16:47:12 CDT  Kavitha Kristin Godoy  [facility] [place]

 

 CPT-09220  Ofc Vst, Est Level IV   17:34:04 CDT  Kavitha Kristin Godoy  [facility] [place]

 

 CPT-70215  Ofc Vst, Est Level III   15:45:39 CST  Kavitha Godoy  [facility] [place]

 

 CPT-15758  Ofc Vst, Est Level III   16:07:05 CST  Kavitha Godoy  [facility] [place]

 

 CPT-56611  Ofc Vst, Est Level III   16:19:57 CST  Kavitha Godoy  [facility] [place]

 

 CPT-20190  Ofc Vst, Est Level III   16:46:48 CST  Kavitha Godoy  [facility] [place]

 

 CPT-95927  Ofc Vst, Est Level II   17:30:36 CST  Kavitha Godoy  [facility] [place]

 

 CPT-47063  Ofc Vst, Est Level III   17:20:59 CST  Kavitha Godoy  [facility] [place]

 

 CPT-84859  Ofc Vst, Est Level III   17:49:24 CST  Kavitha Godoy  [facility] [place]

 

 CPT-24771  Ofc Vst, New Level III   17:10:24 CST  Kavitha Godoy  [facility] [place]

 

 CPT-22565  Ofc Vst, New Level IV   17:21:37 CST  Kavitha Godoy  [facility] [place]







Procedures







 Code  Procedure Name  Date  Entry Date  Standard Description

 

 CPT-  Medicare Annual Wellness Visit   15:52:28 CDT  
   

 

 CPT-  E-Prescribing Medication Sent   19:42:36 CDT   
  

 

 CPT-  E-Prescribing Not sent due to no medication given   14:28:
10 CST     

 

 CPT-  E-Prescribing Not sent due to no medication given   15:20:
04 CST     

 

 CPT-  E-Prescribing Not sent due to no medication given  2013/10/15 14:46:
42 CDT  2013/10/15   

 

 CPT-  E-Prescribing Not sent due to no medication given   13:31:
14 CDT     

 

 CPT-  E-Prescribing Medication Sent   16:36:19 CDT  2013/07/10 
  

 

 CPT-  Medicare Annual Wellness Visit Initial   16:36:19 CDT  
2013/07/10   

 

 CPT-  E-Prescribing Not sent due to no medication given  2013/04/10 16:26:
21 CDT     

 

 CPT-  E-Prescribing Medication Sent   16:02:31 CST   
  

 

 CPT-  E-Prescribing Not sent due to no medication given   13:01:
46 CST     

 

 CPT-  E-Prescribing Not sent due to no medication given   10:43:
37 CDT     

 

 CPT-38187  Injection, Pneumovax   14:03:27 CDT     

 

 CPT-30531  Ear Wax Removal  2008/10/17 11:05:06 CDT  2008/10/17   

 

 CPT-97125  Ear Wax Removal   10:22:58 CDT     

 

 CPT-35476  Cryopathy Skin   09:39:02 CDT     

 

 CPT-34438  Ear Wax Removal   17:30:36 CST     

 

 CPT-20295  Ear Wax Removal   17:20:59 CST  
[facility]

 Created on: 2015



Madison Yougn

External Reference #: [id number]

: 1945

Sex: Female



Demographics







 Address  [address] [place]  [address]

 

 Home Phone  [phone]

 

 Preferred Language  Unknown

 

 Marital Status  Unknown

 

 Orthodox Affiliation  Unknown

 

 Race  White

 

 Ethnic Group  Not  or 





Author







 Author  RUPAL GARCIA

 

 [facility]  [employer]

 

 Address  Unknown

 

 Phone  Unavailable







Care Team Providers







 Care Team Member Name  Role  Phone

 

 RUPAL GARCIA  CP  Unavailable



                                                                



Allergies

          No Known Allergies                                                   
                                     



Problems

          





 Problem Type  Condition  Code  Onset Dates  Condition Status

 

 Assessment  Hyperlipidemia, unspecified hyperlipidemia  E78.5     Active

 

 Problem  Avascular necrosis of bone of right hip  M87.051     Active

 

 Problem  GERD (gastroesophageal reflux disease)  K21.9     Active

 

 Problem  Coronary artery disease  I25.10     Active

 

 Problem  History of MI (myocardial infarction)  I25.2     Active

 

 Problem  Hyperlipidemia, unspecified hyperlipidemia  E78.5     Active

 

 Problem  Immune thrombocytopenic purpura  D69.3     Active

 

 Problem  Type 2 diabetes mellitus with diabetic polyneuropathy  E11.42     
Active

 

 Problem  History of kidney stones  Z87.442     Active

 

 Problem  Lymphedema  I89.0     Active



                                                                               
                                                                               
                    



Medications

          





 Medication  Code System  Code  Instructions  Start Date  End Date  Status  
Dosage

 

 Atorvastatin Calcium  NDC  [address]-64  10 MG Orally Once a day  Dec 02, 
2015        1 tablet



                                                                              



Results

          No Known Results                                                     
               



Summary Purpose

          eClinicalWorks Submission
[facility] Clinical Summary

 Created on: 08/10/2015



Madison Young

External Reference #: [id number]

: 1945

Sex: Female



Demographics







 Address  [address] [place] [address]

 

 Home Phone  [phone]

 

 Preferred Language  Unknown

 

 Marital Status  W

 

 Hoah[employer]ism Affiliation  Unknown

 

 Race  White

 

 Ethnic Group  Not  or 





Author







 Author  User, MELISSA

 

 Organization  [facility]

 

 Address  Unknown

 

 Phone  Unavailable







Allergies, Adverse Reactions, Alerts







 Allergy Name  Reaction Description  Start Date  Severity  Status  Provider

 

 ZINC OXIDE       Critical  Active  Kavitha Godoy







Conditions or Problems







 Problem Name  Problem Code  Onset Date  Status  Entry Date  Provider  Comment  
Standard Description  Annotate

 

 DIABETES MELLITUS, TYPE II, UNCONTROLLED, W/O COMPLICATIONS  250.02     
Correction    Kavitha Godoy     Diabetes mellitus without 
mention of complication, type II or unspecified type, uncontrolled   

 

 DIABETES MELLITUS, TYPE I, UNCONTROLLED, WITH COMPLICATIONS  250.93     
Correction    Kavitha Godoy     Diabetes mellitus with 
unspecified complication, type I [juvenile type], uncontrolled   

 

 DIABETES MELLITUS, TYPE II, UNCONTROLLED, WITH COMPLICATIONS  250.92    Active    Kavitha Godoy     Diabetes mellitus with 
unspecified complication, type II or unspecified type, uncontrolled   

 

 OBESITY  278.00     Resolved    Kavitha Godoy     Obesity, 
unspecified   

 

 ELEVATED BLOOD PRESSURE WITHOUT DIAGNOSIS OF HYPERTENSION  796.2    
Correction    Kavitha Godoy     Elevated blood pressure 
reading without diagnosis of hypertension   

 

 ARTHRITIS, RHEUMATOID  714.0     Resolved    Kavitha Godoy   
  Rheumatoid arthritis   

 

 LIVER FUNCTION TESTS, ABNORMAL, HX OF  V12.2     Resolved    Kavitha Godoy     Personal history of endocrine, metabolic, and immunity 
disorders   

 

 CERUMEN IMPACTION, BILATERAL  380.4    Resolved    Kavitha Godoy     Impacted cerumen   

 

 CERUMEN IMPACTION, BILATERAL  380.4    Resolved    Kavitha Godoy     Impacted cerumen   

 

 DECREASED HEARING  389.10    Resolved    Kavitha Godoy     Sensorineural hearing loss, unspecified  due to cerumen presumed

 

 History of ANEMIA  285.9     Correction    Kavitha Godoy     
Anemia, unspecified   

 

 POLYARTHRALGIA  719.49     Active    Kavitha Godoy     Pain 
in joint involving multiple sites   

 

 NEPHROPATHY, DIABETIC  250.40    Resolved    Kavitha Godoy     Diabetes mellitus with renal manifestations, type II or unspecified 
type, not stated as uncontrolled   

 

 HYPERCHOLESTEROLEMIA  272.0    Active    Kavitha Godoy     Pure hypercholesterolemia   

 

 HYPERTRIGLYCERIDEMIA  272.1    Active    Kavitha Gdooy     Pure hyperglyceridemia   

 

 HYPERTENSION, BENIGN ESSENTIAL, UNCONTROLLED  401.1    Active    Kavitha Godoy     Benign essential hypertension   

 

 PHARYNGITIS, ACUTE  462    Resolved    Kavitha Godoy     Acute pharyngitis   

 

 DYSPHAGIA  787.2    Resolved    Kavitha Godoy     
Dysphagia   

 

 INSOMNIA, TRANSIENT  307.41    Resolved    Kavitha Godoy     Transient disorder of initiating or maintaining sleep   

 

 CELLULITIS  682.9    Resolved    Kavitha Godoy     
Cellulitis and abscess of unspecified sites  foot

 

 CELLULITIS  682.9  2004/10/28  Resolved  2004/10/28  Kavitha Godoy     
Cellulitis and abscess of unspecified sites   

 

 DERMATOPHYTOSIS, FOOT  110.4    Resolved    Kavitha Godoy     Dermatophytosis of foot   

 

 DERMATITIS  692.9    Resolved    Kavitha Godoy     
Contact dermatitis and other eczema, unspecified cause   

 

 KNEE PAIN  719.46    Resolved    Kavitha Godoy     
Pain in joint involving lower leg  right

 

 SHOULDER PAIN  719.41    Resolved    Kavitha Godoy 
    Pain in joint involving shoulder region  right

 

 SKIN LESION  709.9    Resolved    Kavitha Godoy     
Unspecified disorder of skin and subcutaneous tissue   

 

 FEVER  780.6    Resolved    Kavitha Godoy     Fever 
and other physiologic disturbances of temperature regulation   

 

 COUGH  786.2    Resolved    Kavitha Godoy     Cough
   

 

 SCREENING MAMMOGRAM NEC  V76.12    Resolved    Kavitha Godoy     Other screening mammogram   

 

 SCREENING FOR OSTEOPOROSIS  V82.81    Resolved    Kavitha Godoy     Screening for osteoporosis   

 

 MICROALBUMINURIA  791.0    Active    Kavitha Godoy 
    Proteinuria   

 

 HX, PERSONAL, PAST NONCOMPLIANCE  V15.81    Resolved    
Kavitha Godoy     Personal history of noncompliance with medical 
treatment, presenting hazards to health   

 

 BACK PAIN  724.5    Resolved    Kavitha Godoy     
Backache, unspecified   

 

 NEOPLASM, SKIN, UNCERTAIN BEHAVIOR  238.2    Resolved    
Kavitha Godoy     Neoplasm of uncertain behavior of skin  right upper lip

 

 SCIATICA/HERNIATED DISC  722.10  2006/10/11  Resolved    Kavitha Godoy     Displacement of lumbar intervertebral disc without 
myelopathy   

 

 IMPETIGO  684    Resolved    Kavitha Godoy     
Impetigo  right nare

 

 LYMPHEDEMA NEC  457.1    Active    Kavitha Godoy   
  Other lymphedema   

 

 CELLULITIS  682.9    Resolved    Kavitha Godoy     
Cellulitis and abscess of unspecified sites   

 

 OSTEOMYELITIS NOS, ANKLE/FOOT  730.27  2007/10/09  Resolved    Kavitha Godoy     Unspecified osteomyelitis involving ankle and foot   

 

 URTICARIA, ACUTE  708.9  2007/10/29  Resolved    Kavitha Godoy     Unspecified urticaria   

 

 RENAL CALCULUS  592.0  2009/07/10  Active  2009/07/10  Kavitha Godoy   
  Calculus of kidney   

 

 DEHYDRATION  276.51  2009/07/10  Resolved    Kavitha Godoy   
  Dehydration   

 

 CHOLELITHIASIS, ASYMPTOMATIC  574.20    Resolved    Kavitha Godoy     Calculus of gallbladder without mention of cholecystitis, 
without mention of obstruction   

 

 PNEUMONIA  486    Resolved    Kavitha Godoy     
Pneumonia, organism unspecified   

 

 RESTLESS LEG SYNDROME  333.94    Resolved    Kavitha Godoy     Restless legs syndrome (RLS)   

 

 FREQUENCY, URINARY  788.41    Resolved    Kavitha Godoy     Urinary frequency   

 

 FEVER PRESENTING CONDITIONS CLASSIFIED ELSEWHERE  780.61    Resolved
    Kavitha Godoy     Fever presenting with conditions 
classified elsewhere   

 

 PVD  443.9    Resolved    Kavitha Godoy     
Peripheral vascular disease, unspecified   

 

 EDEMA LEG  782.3    Resolved    Kavitha Godoy     
Edema   

 

 LEG PAIN, LEFT  729.5    Resolved    Kavitha Godoy 
    Pain in limb   

 

 URINARY FREQUENCY  788.41    Resolved    Kavitha Godoy     Urinary frequency   

 

 VACCINE AGAINST STREPTOCOCCUS PNEUMONIAE  V03.82    Resolved    Kavitha Godoy     Need for prophylactic vaccination against 
Streptococcus pneumoniae [pneumococcus]   

 

 SPINAL STENOSIS, LUMBAR  724.02    Resolved    Kavitha Godoy     Spinal stenosis of lumbar region without neurogenic 
claudication   

 

 KNEE PAIN  719.46    Resolved    Kavitha Godoy     
Pain in joint involving lower leg   

 

 CORONARY ATHEROSCLEROSIS, NATIVE VESSEL  414.01    Active    Kavitha Godoy     Coronary atherosclerosis of native coronary artery
   

 

 ANEMIA, IRON DEFICIENCY NEC  280.8    Active    Kavitha Godoy     Other specified iron deficiency anemias   

 

 NAUSEA AND VOMITING  787.01    Resolved    Kavitha Godoy     Nausea with vomiting   

 

 UTI  599.0    Resolved    Kavitha Godoy     Urinary 
tract infection, site not specified   

 

 GROSS HEMATURIA  599.71    Resolved    Kavitha Godoy     Gross hematuria   

 

 CELLULITIS  682.9    Resolved    Kavitha Godoy     
Cellulitis and abscess of unspecified sites   

 

 IMMUNE THROMBOCYTOPENIC PURPURA  287.31  2014/10/15  Active  2014/10/16  Kavitha Godoy     Immune thrombocytopenic purpura   

 

 PVD  443.9    Active    Kavitha Godoy     
Peripheral vascular disease, unspecified   







Medication List







 Medication  Instructions  Start Date  Stop Date  Generic Name  NDC  Status  
Provider  Patient Instruction

 

 ADVAIR DISKUS 100-50 MCG/DOSE MISC  1 puff daily prn        FLUTICASONE-
SALMETEROL  76592840676  Active  Kavitha Godoy   

 

 ALEVE 220 MG TAB  1 PO BID     2015/08/10  NAPROXEN SODIUM  31636313493  No 
Longer Active  Kavitha Godoy   

 

 NORVASC 10 MG TAB  1 PO QD     2015/08/10  AMLODIPINE BESYLATE  06759195619  
No Longer Active  Kavitha Godoy   

 

 LANTUS SOLOSTAR 100 UNIT/ML SOLN  50 units SQ at night    2015/08/10
  INSULIN GLARGINE  23166486294  No Longer Active  Kavitha Godoy   

 

 ONETOUCH ULTRA BLUE STRP  TEST BS QID DX: 250.92  2015/06/15     GLUCOSE BLOOD
  36294202296  Active  Bernadine Gomez   

 

 POTASSIUM CITRATE ER 15 MEQ (1620 MG) CR-TABS  1 PO BID        POTASSIUM 
CITRATE  96707605761  Active  Kavitha Godoy   

 

 HYDROCODONE-ACETAMINOPHEN 5-325 MG TABS  1 PO BID prn    
HYDROCODONE-ACETAMINOPHEN  26566367078  No Longer Active  Kavitha Godoy 
  

 

 PEN NEEDLES 5/16" 31G X 8 MM MISC  as directed    
INSULIN PEN NEEDLE  60617642229  No Longer Active  Kavitha Godoy   

 

 GLUCOMETER STRIPS  CHECK BS QID    GLUCOMETER STRIPS     
No Longer Active  Kavitha Godoy   

 

 GLUCOMETER MACHINE  AS DIRECTED    GLUCOMETER MACHINE   
  No Longer Active  Kavitha Godoy   

 

 CLEOCIN 150 MG CAPS  1 po daily    CLINDAMYCIN HCL  
43457943886  No Longer Active  Kavitha Godoy   

 

 FERREX 150 150 MG CAPS  1 PO daily as directed  2014/10/06    
POLYSACCHARIDE IRON COMPLEX  63463544715  No Longer Active  Kavitha Kristin Godoy   

 

 PREDNISONE 20 MG TABS  TAKE 5 TABS PO DAILY  2014/10/13    
PREDNISONE  90519896987  No Longer Active  Kavitha Kristin Godoy   

 

 BRILINTA 90 MG TABS  1 PO BID       TICAGRELOR  71754502201  Active  
Kavitha Kristin Godoy   

 

 ASPIRIN 81 MG TAB  1 PO daily       ASPIRIN  80357841554  Active  
Kavitha Kristin Godoy   

 

 COREG 12.5 MG TABS  1 PO BID        CARVEDILOL  28154407580  Active  Kavitha 
Kristin Godoy   

 

 PLAVIX 75 MG TABS  1 PO QD    CLOPIDOGREL BISULFATE  
99849698634  No Longer Active  Kavitha Kristin Godoy   

 

 AUGMENTIN 500-125 MG TAB  1 PO BID    AMOXICILLIN-POT 
CLAVULANATE  96826116170  No Longer Active  Kavitha Kristin Godoy   

 

 ALBUTEROL SULFATE 0.083 % NEBU SOLN  1 treatment TID prn       
ALBUTEROL SULFATE  63547687150  Active  Kavitha Kristin Godoy   

 

 ROBITUSSIN A-C  MG/5ML SYRUP  1 teaspoon PO Q 4-6 hr prn    ROBITUSSIN A-C  MG/5ML SYRUP     No Longer Active  Kavithapatricia Godoy   

 

 PEN NEEDLES 5/16" 31G X 8 MM MISC  as directed       INSULIN PEN 
NEEDLE  54296213859  Active  Bernadine Gomez   

 

 CEFDINIR 300 MG CAPS  1 PO BID    2014/04/15  CEFDINIR  80331010380  
No Longer Active  Kavitha Kristin Godoy   

 

 ZOFRAN ODT 8 MG TBDP  1 PO Q6hrs prn nausea    2014/04/15  
ONDANSETRON  86244923039  No Longer Active  Kavitha Kristin Godoy   

 

 ASPIRIN 81 MG TAB  1 PO daily       ASPIRIN  73742636844  No Longer 
Active  Kavitha Kristin Godoy   

 

 BENAZEPRIL HCL 40 MG TABS  1 PO daily        BENAZEPRIL HCL  64387927723  
Active  Bernadine Gomez   

 

 ALBUTEROL SULFATE 0.083 % NEBU SOLN  1 nebulizer treatment q4 hours prn    ALBUTEROL SULFATE  71672387232  No Longer Active  Kavitha Godoy   

 

 ASCENSIA CONTOUR TEST  STRP  Test BS  3 -4 times a day DX: Diabetes    GLUCOSE BLOOD  14462576749  No Longer Active  Kavitha Godoy   

 

 BENADRYL 25 MG CAP  1 po every 6 hours if it does not cause drowsiness.  2007/
10/29    DIPHENHYDRAMINE HCL  75071133229  No Longer Active  Kavitha Godoy   

 

 PROTONIX 40 MG TBEC  1 po qd       PANTOPRAZOLE SODIUM  40913165708  
Active  Bernadine Gomez   

 

 FISH OIL 1000 MG CAPS  1 PO daily       OMEGA-3 FATTY ACIDS  
74774245022  Active  Kavitha Godoy   

 

 ZETIA 10 MG TABS  1 PO daily for cholesterol    
EZETIMIBE  96765373224  No Longer Active  Kavitha Godoy   

 

 LIPITOR 10 MG TAB  1 PO daily       ATORVASTATIN CALCIUM  
53123732880  Active  Bernadine Gomez   

 

 ZOFRAN ODT 8 MG TBDP  I PO Q6hrs prn    ONDANSETRON  
05660699379  No Longer Active  Kavitha Godoy   

 

 DOCUSATE SODIUM 50 MG/15ML SYRP  3 drops each ear.  Let stand for 15 min. then 
rinse out .  Repeat daily.  2008/09/15    DOCUSATE SODIUM  
51797386090  No Longer Active  Kavitha Godoy   

 

 ASCENSIA CONTOUR MONITOR  KIT  DX: Diabetes    BLOOD 
GLUCOSE MONITORING SUPPL  92036077694  No Longer Active  Kavitha Godoy   

 

 CLEOCIN 150 MG CAPS  1 PO daily    CLINDAMYCIN HCL  
05187941463  No Longer Active  Kavitha Godoy   

 

 BD INSULIN SYRINGE ULTRAFINE 29G X 1/2" 1 ML MISC  as directed for insulin 
injections       INSULIN SYRINGE-NEEDLE U-100  55590727492  Active  
Bernadine Gomez   

 

 BD ULTRA-FINE LANCETS MISC  ad directed to check sugar qid       
LANCETS  97389588863  Active  Bernadine Gomez   

 

 HUMALOG  UNIT/ML SOLN  5 units before meals       INSULIN 
LISPRO (HUMAN)     Active  Kavithapatricia Godoy   

 

 LEVAQUIN 500 MG TAB  1 PO QD  2012/03/15    LEVOFLOXACIN  
08615237132  No Longer Active  Kavitha Godoy   

 

 AUGMENTIN 875-125 MG TAB  1 PO BID    AMOXICILLIN-POT 
CLAVULANATE  93770720537  No Longer Active  Kavithapatricia Godoy   

 

 AUGMENTIN 875-125 MG TAB  1 PO BID x 7 days    
AMOXICILLIN-POT CLAVULANATE  49244992282  No Longer Active  Bernadine Gomez   

 

 LOVENOX 40 MG/0.4ML SOLN  Apply to affected area daily  
  ENOXAPARIN SODIUM  95958487432  No Longer Active  Kavitha Godoy   

 

 FISH OIL 1000 MG CAPS  1 PO daily    OMEGA-3 FATTY ACIDS
  47011009348  No Longer Active  Kavitha Godoy   

 

 ASPIRIN 81 MG TABS  1 PO daily       ASPIRIN  41087859666  No Longer 
Active  Kavitha Godoy   

 

 DOXYCYCLINE HYCLATE 100 MG CAP  1 po BID    DOXYCYCLINE 
HYCLATE  02678602997  No Longer Active  Kavitha Godoy   

 

 PYRIDIUM 200 MG TAB  1 PO TID prn urinary urgency    
PHENAZOPYRIDINE HCL  08707090561  No Longer Active  Bernadine Gomez   

 

 BACTRIM -160 MG TAB  1 PO BID    TRIMETHOPRIM-
SULFAMETHOXAZOLE  21670801086  No Longer Active  Kavitha Godoy   

 

 DOXYCYCLINE HYCLATE 100 MG CAP  1 po BID    DOXYCYCLINE 
HYCLATE  59123560680  No Longer Active  Kavitha Godoy   

 

 BACTRIM -160 MG TAB  1 PO BID    TRIMETHOPRIM-
SULFAMETHOXAZOLE  56263098631  No Longer Active  Kavitha Godoy   

 

 VENTOLIN  (90 BASE) MCG/ACT AERS  2 puffs q6hrs prn       
ALBUTEROL SULFATE  39430507096  Active  Kavitha Kristin Godoy   

 

 CLEOCIN 150 MG CAPS  1 PO daily    CLINDAMYCIN HCL  
15054757769  No Longer Active  Kavitha Kristin Godoy   

 

 VITAMIN D 1000 UNIT TABS  2 PO Daliy    CHOLECALCIFEROL  
47169280043  No Longer Active  Kavitha Kristin Godoy   

 

 SEPTRA -160 MG TABS  1 PO BID    SULFAMETHOXAZOLE-
TRIMETHOPRIM  56439765643  No Longer Active  Kavitha Kristin Godoy   

 

 DOXYCYCLINE HYCLATE 100 MG CAP  1 po BID    DOXYCYCLINE 
HYCLATE  34484597377  No Longer Active  Kavitha Kristin Godoy   

 

 MACROBID 100 MG CAP  1 PO BID  2009/07/10    NITROFURANTOIN MONOHYD 
MACRO  69989064231  No Longer Active  Kavitha Kristin Godoy   

 

 DARVOCET-N 100 100-650 MG TAB  1 PO Q6hrs prn pain    
PROPOXYPHENE N-APAP  35581227563  No Longer Active  Kavitha Kristin Godoy   

 

 PYRIDIUM 200 MG TAB  1 PO TID prn urinary urgency    2009/07/10  
PHENAZOPYRIDINE HCL  57376690796  No Longer Active  Kavitha Kristin Godoy   

 

 MACROBID 100 MG CAP  1 PO BID    NITROFURANTOIN MONOHYD 
MACRO  08263194266  No Longer Active  Kavitha Kristin Godoy   

 

 CLEOCIN 150 MG CAPS  1 PO daily to prevent cellulitis    
CLINDAMYCIN HCL  24077056090  No Longer Active  Kavithapatricia Godoy   

 

 ACCU-CHEK COMPACT STRIPS STRP  Use one strip to check glucose three times 
daily    GLUCOSE BLOOD  42063765006  No Longer Active  
Kavitha Kristin Godoy   

 

 LEVAQUIN 500 MG TAB  1 PO QD  2007/10/23    LEVOFLOXACIN  
57620284683  No Longer Active  Kavitha Kristin Godoy   

 

 GLUCOPHAGE 1000 MG TABS  1 PO BID       METFORMIN HCL  20622132803  
Active  Kavithapatricia Godoy   

 

 BACTROBAN 2 % CREAM  apply to affected area on nose TID for 7 days    MUPIROCIN CALCIUM  15418747682  No Longer Active  Kavithapatricia Godoy   

 

 BENAZEPRIL HCL 20 MG TABS  1 PO daily    BENAZEPRIL HCL  
30495086801  No Longer Active  Kavithaparticia Godoy   

 

 LOTREL 10-40 MG CAPS  1 PO daily       AMLODIPINE BESY-BENAZEPRIL 
HCL  06388634968  No Longer Active  Bernadine Gomez   

 

 FLEXERIL 10 MG TABS  1 by mouth 3 times a day as needed    CYCLOBENZAPRINE HCL  82490320886  No Longer Active  Kavitha Godoy   

 

 LORTAB 5 5-500 MG TAB  1 by mouth every 6 hours as needed for pain    ACETAMINOPHEN-HYDROCODONE  75927068845  No Longer Active  Kavithapatricia Godoy   

 

 HYDROCHLOROTHIAZIDE 12.5 MG CAPS  1 PO QD       HYDROCHLOROTHIAZIDE  
89299516582  Active  Bernadine Gomez   

 

 OMACOR 1 GM CAPS  4 PO daily    OMEGA-3-ACID ETHYL 
ESTERS  40951005015  No Longer Active  Kavitha Godoy   

 

 AUGMENTIN 875-125 MG TABS  1 PO BID    AMOXICILLIN-POT 
CLAVULANATE  15995480114  No Longer Active  Kavithapatricia Godoy   

 

 TYLENOL 325 MG TAB  as directed    ACETAMINOPHEN  
07002660722  No Longer Active  Kavithapatricia Godoy   

 

 DOCUSATE SODIUM 50 MG/15ML SYRP  3 drops each ear.  Let stand for 15 min. then 
rinse out with ball suction.  Repeat daily.    DOCUSATE 
SODIUM  40595738948  No Longer Active  Kavitha Godoy   

 

 BACTROBAN 2 % CREA  apply TID    MUPIROCIN CALCIUM  
38524848004  No Longer Active  Kavithapatricia Godoy   

 

 HYDROCODONE-ACETAMINOPHEN 5-500 MG TABS  1 q4-6hrs prn  
  HYDROCODONE-ACETAMINOPHEN  68892966052  No Longer Active  Kavithapatricia Godoy   

 

 LOTRISONE 1-0.05 % CREA  apply BID to left leg    
CLOTRIMAZOLE-BETAMETHASONE  94652921130  No Longer Active  Kavithapatricia Godoy
   

 

 GLIPIZIDE 5 MG TAB  1 PO 0630 and 1 1/2 pills 1700       GLIPIZIDE  
71682976551  Active  Joycemichelle Lerma   

 

 NORVASC 5 MG TABS  1 po qd    AMLODIPINE BESYLATE  
90921086015  No Longer Active  Kavithapatricia Godoy   

 

 ISAURA-E  1 po qd JOINT PAIN    ISAURA-E     No Longer Active  
Kavithapatricia Godoy   

 

 COLACE 60 MG/15ML SYRP  Use daily for cerumen removal.  
  DOCUSATE SODIUM  06377863331  No Longer Active  Kavithapatricia Godoy   

 

 MULTIVITAMINS TABS  1 po daily    MULTIPLE VITAMIN  
28166836831  No Longer Active  Kavithapatricia Godoy   

 

 VITAMIN C 1000 MG TABS  1 po daily    ASCORBIC ACID  
01053345414  No Longer Active  Kavithapatricia Godoy   

 

 VITAMIN E 400 IU CAPS  2 po daily    VITAMIN E  
47531377812  No Longer Active  Kavithapatricia Godoy   

 

 AMBIEN 10 MG TAB  1 prn    ZOLPIDEM TARTRATE  
76823747345  No Longer Active  Kavithapatricia Godoy   

 

 ECONAZOLE NITRATE 1 % CREA  as directed    ECONAZOLE 
NITRATE  33708166266  No Longer Active  Kavithapatricia Godoy   

 

 KEFLEX 500 MG CAPS  1 qid x 7days    CEPHALEXIN  
51879957691  No Longer Active  Kavithapatricia Godoy   

 

 ECONAZOLE NITRATE 1 % CREA  Apply BID x 7 days  2004/10/18  2004/10/25  
ECONAZOLE NITRATE  40462815809  No Longer Active  Kavitha Kristin Godoy   

 

 KEFLEX 500 MG CAP  1 Po QID for 14 days  2004/10/15  2004/10/22  CEPHALEXIN  
07767919967  No Longer Active  Kavitha Kristin Godoy   

 

 ALEVE 220 MG TABS  1 PO BID       NAPROXEN SODIUM  56105571203  No 
Longer Active  Kavitha Kristin Godoy   

 

 MOTRIN 600 MG TABS  1 po q 6 hrs.       IBUPROFEN  25578826761  No 
Longer Active  Kavitha Kristin Godoy   

 

 NASAL SPRAY SALINE 0.65 % SOLN  puffs 2 puffs BID    
SALINE  32954078126  No Longer Active  Kavitha Kristin Godoy   

 

 AMOXIL 875 MG TABS  1 PO BID    AMOXICILLIN  52998894846
  No Longer Active  Kavitha Kristin Godoy   

 

 LOTENSIN 20 MG TABS  1 PO QD       BENAZEPRIL HCL  02642284318  No 
Longer Active  Kavitha Kristin Godoy   

 

 AMARYL 4 MG TABS  1 tab po bid    GLIMEPIRIDE  
93548520341  No Longer Active  Kavitha Kristin Godoy   

 

 GLUCOPHAGE  MG TB24  1 po daily    METFORMIN HCL  
16579399448  No Longer Active  Kavitha Kristin Godoy   







Immunizations







 Vaccine  Administration Date  Value  Standard Description

 

 Influenza vaccine given    Done  influenza virus vaccine, 
unspecified formulation

 

 pneumococcal immunization administered    1st dose  pneumococcal 
polysaccharide vaccine, 23 valent

 

 Influenza vaccine given    Done 10.24.  influenza virus vaccine, 
unspecified formulation







Vital Signs







 Date  Name  Value  Unit  Range  Description

 

   blood pressure, diastolic - 8462-4  82  mm[Hg]     BP lara

 

   blood pressure, systolic - 8480-6  156  mm[Hg]     BP sys

 

   pulse rate E&M - 8867-4  88  /min     Heart rate

 

   respiratory rate E&M - 9279-1  14  /min     Resp rate

 

   temperature E&M  98.6  [degF]     Body temperature

 

   weight E&M - 3141-9  295  [lb_av]     Weight Measured

 

   pulse rate E&M - 8867-4  68  /min     Heart rate

 

   respiratory rate E&M - 9279-1  14  /min     Resp rate

 

   weight E&M - 3141-9  310  [lb_av]     Weight Measured

 

   blood pressure, diastolic - 8462-4  80  mm[Hg]     BP lara

 

   blood pressure, systolic - 8480-6  145  mm[Hg]     BP sys

 

   pulse rate E&M - 8867-4  80  /min     Heart rate

 

   respiratory rate E&M - 9279-1  14  /min     Resp rate

 

   temperature E&M  98.6  [degF]     Body temperature

 

   weight E&M - 3141-9  310  [lb_av]     Weight Measured

 

   blood pressure, diastolic - 8462-4  82  mm[Hg]     BP lara

 

   blood pressure, systolic - 8480-6  156  mm[Hg]     BP sys

 

   pulse rate E&M - 8867-4  76  /min     Heart rate

 

   respiratory rate E&M - 9279-1  14  /min     Resp rate

 

 2014/10/30  blood pressure, diastolic - 8462-4  74  mm[Hg]     BP lara

 

 2014/10/30  blood pressure, systolic - 8480-6  142  mm[Hg]     BP sys

 

 2014/10/30  pulse rate E&M - 8867-4  85  /min     Heart rate

 

 2014/10/30  respiratory rate E&M - 9279-1  14  /min     Resp rate

 

 2014/10/15  blood pressure, diastolic - 8462-4  80  mm[Hg]     BP lara

 

 2014/10/15  blood pressure, systolic - 8480-6  130  mm[Hg]     BP sys

 

 2014/10/15  pulse rate E&M - 8867-4  78  /min     Heart rate

 

 2014/10/15  respiratory rate E&M - 9279-1  14  /min     Resp rate

 

 2014/10/06  blood pressure, diastolic - 8462-4  88  mm[Hg]     BP lara

 

 2014/10/06  blood pressure, systolic - 8480-6  162  mm[Hg]     BP sys

 

 2014/10/06  pulse rate E&M - 8867-4  82  /min     Heart rate

 

 2014/10/06  respiratory rate E&M - 9279-1  16  /min     Resp rate

 

   blood pressure, diastolic - 8462-4  70  mm[Hg]     BP lara

 

   blood pressure, systolic - 8480-6  120  mm[Hg]     BP sys

 

   pulse rate E&M - 8867-4  88  /min     Heart rate

 

   respiratory rate E&M - 9279-1  14  /min     Resp rate

 

   temperature E&M  98.6  [degF]     Body temperature

 

   weight E&M - 3141-9  302  [lb_av]     Weight Measured

 

   blood pressure, diastolic - 8462-4  68  mm[Hg]     BP lara

 

   blood pressure, systolic - 8480-6  140  mm[Hg]     BP sys

 

   pulse rate E&M - 8867-4  78  /min     Heart rate

 

   respiratory rate E&M - 9279-1  14  /min     Resp rate

 

   temperature E&M  98.6  [degF]     Body temperature

 

   weight E&M - 3141-9  302  [lb_av]     Weight Measured

 

   blood pressure, diastolic - 8462-4  70  mm[Hg]     BP lara

 

   blood pressure, systolic - 8480-6  180  mm[Hg]     BP sys

 

   pulse rate E&M - 8867-4  88  /min     Heart rate

 

   respiratory rate E&M - 9279-1  14  /min     Resp rate

 

   temperature E&M  98.6  [degF]     Body temperature

 

   weight E&M - 3141-9  302  [lb_av]     Weight Measured







Diagnostic Results







 Date  Name  Value  Unit  Range  Description

 

 Clinical Lists Update: CBC - Hematology 

 

   red blood cell distribution width  16.2  %      

 

 2014/10/23  hematocrit, blood  38  %      

 

   leukocyte count, blood  8.0  10*3/mm3      

 

   erythrocyte (RBC) count  4.52  10*6/mm3      

 

   platelet count  137  10*3/mm3      

 

   hemoglobin, blood  12.7  g/dL      

 

   hematocrit, blood  40  %      

 

   red blood cell distribution width  15.2  %      

 

   mean corpuscular volume, RBC  88  fL      

 

   leukocyte count, blood  8.3  10*3/mm3      

 

   erythrocyte (RBC) count  4.51  10*6/mm3      

 

   platelet count  99  10*3/mm3      

 

   hemoglobin, blood  12.6  g/dL      

 

   hematocrit, blood  40  %      

 

   red blood cell distribution width  15.2  %      

 

   mean corpuscular volume, RBC  88  fL      

 

   leukocyte count, blood  8.3  10*3/mm3      

 

   erythrocyte (RBC) count  4.51  10*6/mm3      

 

   platelet count  99  10*3/mm3      

 

   hemoglobin, blood  12.6  g/dL      

 

   hematocrit, blood  40  %      

 

 2014/10/29  red blood cell distribution width  15.0  %      

 

 2014/10/29  mean corpuscular volume, RBC  88  fL      

 

 2014/10/29  leukocyte count, blood  8.3  10*3/mm3      

 

 2014/10/29  erythrocyte (RBC) count  4.37  10*6/mm3      

 

 2014/10/29  platelet count  61  10*3/mm3      

 

 2014/10/29  hemoglobin, blood  12.1  g/dL      

 

 2014/10/29  hematocrit, blood  38  %      

 

 2014/10/28  red blood cell distribution width  15.1  %      

 

 2014/10/28  mean corpuscular volume, RBC  88  fL      

 

 2014/10/28  leukocyte count, blood  7.5  10*3/mm3      

 

 2014/10/28  erythrocyte (RBC) count  4.31  10*6/mm3      

 

 2014/10/28  platelet count  59  10*3/mm3      

 

 2014/10/28  hemoglobin, blood  12.1  g/dL      

 

 2014/10/28  hematocrit, blood  38  %      

 

 2014/10/27  red blood cell distribution width  15.1  %      

 

 2014/10/27  mean corpuscular volume, RBC  88  fL      

 

 2014/10/27  leukocyte count, blood  7.5  10*3/mm3      

 

 2014/10/27  erythrocyte (RBC) count  4.31  10*6/mm3      

 

 2014/10/27  platelet count  59  10*3/mm3      

 

 2014/10/27  hemoglobin, blood  12.1  g/dL      

 

 2014/10/27  hematocrit, blood  38  %      

 

 2014/10/23  red blood cell distribution width  15.5  %      

 

 2014/10/23  mean corpuscular volume, RBC  88  fL      

 

 2014/10/23  leukocyte count, blood  8.7  10*3/mm3      

 

 2014/10/23  erythrocyte (RBC) count  4.36  10*6/mm3      

 

 2014/10/23  platelet count  29  10*3/mm3      

 

 2014/10/23  hemoglobin, blood  12.3  g/dL      

 

   mean corpuscular volume, RBC  89  fL      

 

 Clinical Lists Update: CBC    - Hematology 

 

   red blood cell distribution width  16.0  %      

 

   mean corpuscular volume, RBC  90  fL      

 

   leukocyte count, blood  7.5  10*3/mm3      

 

   hematocrit, blood  36  %      

 

   platelet count  163  10*3/mm3      

 

   hemoglobin, blood  11.5  g/dL      

 

   erythrocyte (RBC) count  4.01  10*6/mm3      

 

 Clinical Lists Update: CBC,CMP,CHOL,TRIG,HGA1C,FERRITIN - Chemistry 

 

   alanine aminotransferase (SGPT), serum  26  U/L      

 

   alkaline phosphatase, serum  130  U/L      

 

   urea nitrogen, blood  13  mg/dL      

 

   calcium, serum  9.0  mg/dL      

 

   chloride, serum  101  mmol/L      

 

   cholesterol, serum  118  mg/dL      

 

   carbon dioxide, venous blood  28.0  mmol/L      

 

   Estimated Glomerular Filtration Rate (calc)  74  mL/min/1.73m2    
  

 

   glucose, plasma fasting  128  mg/dL      

 

   anion gap, serum  10         

 

   sodium, serum  134  mmol/L      

 

   triglyceride, serum, fasting  175  mg/dL      

 

   bilirubin, serum, total  0.9  mg/dL      

 

   albumin, serum  3.8  g/dL      

 

   aspartate aminotransferase (SGOT), serum  29  U/L      

 

   protein, total, serum  6.3  g/dL      

 

   potassium, serum  4.6  mmol/L      

 

   hemoglobin A1C, blood, as % of total hemoglobin  5.9  %      

 

   ferritin, serum  248.9  ng/mL      

 

   creatinine, serum  0.8  mg/dL      

 

 Clinical Lists Update: CBC,CMP,CHOL,TRIG,HGA1C,FERRITIN - Hematology 

 

   hematocrit, blood  36.3  %      

 

   hemoglobin, blood  11.1  g/dL      

 

   platelet count  199  10*3/mm3      

 

   erythrocyte (RBC) count  3.92  10*6/mm3      

 

   leukocyte count, blood  6.7  10*3/mm3      

 

   mean corpuscular volume, RBC  93  fL      

 

   red blood cell distribution width  19.5  %      

 

 Clinical Lists Update: CBC,CMP,Chol,Trig,Ferritin,HgA1c - Chemistry 

 

 2014/10/02  glucose, plasma fasting  108  mg/dL      

 

 2014/10/02  anion gap, serum  13         

 

 2014/10/02  sodium, serum  135  mmol/L      

 

 2014/10/02  triglyceride, serum, fasting  109  mg/dL      

 

 2014/10/02  bilirubin, serum, total  0.7  mg/dL      

 

 2014/10/02  alanine aminotransferase (SGPT), serum  25  U/L      

 

 2014/10/02  aspartate aminotransferase (SGOT), serum  26  U/L      

 

 2014/10/02  protein, total, serum  6.4  g/dL      

 

 2014/10/02  potassium, serum  4.6  mmol/L      

 

 2014/10/02  hemoglobin A1C, blood, as % of total hemoglobin  7.4  %      

 

 2014/10/02  ferritin, serum  14.9  ng/mL      

 

 2014/10/02  creatinine, serum  1.0  mg/dL      

 

 2014/10/02  carbon dioxide, venous blood  25.0  mmol/L      

 

 2014/10/02  cholesterol, serum  121  mg/dL      

 

 2014/10/02  chloride, serum  102  mmol/L      

 

 2014/10/02  calcium, serum  8.6  mg/dL      

 

 2014/10/02  urea nitrogen, blood  15  mg/dL      

 

 2014/10/02  alkaline phosphatase, serum  121  U/L      

 

 2014/10/02  albumin, serum  3.8  g/dL      

 

 2014/10/02  Estimated Glomerular Filtration Rate (calc)  57  mL/min/1.73m2    
  

 

 Clinical Lists Update: CBC,CMP,Chol,Trig,Ferritin,HgA1c - Hematology 

 

 2014/10/02  hemoglobin, blood  12.0  g/dL      

 

 2014/10/02  platelet count  65  10*3/mm3      

 

 2014/10/02  erythrocyte (RBC) count  4.37  10*6/mm3      

 

 2014/10/02  leukocyte count, blood  6.0  10*3/mm3      

 

 2014/10/02  mean corpuscular volume, RBC  91  fL      

 

 2014/10/02  red blood cell distribution width  17.7  %      

 

 2014/10/02  hematocrit, blood  40  %      

 

 Clinical Lists Update: CBC,CMP,FLP  IN [facility] LABS - Chemistry 

 

   protein, total, serum  6.9  g/dL      

 

   potassium, serum  4.5  mmol/L      

 

   LDL cholesterol, serum  57  mg/dL      

 

   HDL cholesterol, serum  40  mg/dL      

 

   creatinine, serum  0.77  mg/dL      

 

   carbon dioxide, venous blood  21  mmol/L      

 

   cholesterol, serum  117  mg/dL      

 

   chloride, serum  104  mmol/L      

 

   calcium, serum  9.3  mg/dL      

 

   urea nitrogen, blood  13  mg/dL      

 

   alkaline phosphatase, serum  81  U/L      

 

   albumin, serum  3.7  g/dL      

 

   aspartate aminotransferase (SGOT), serum  25  U/L      

 

   alanine aminotransferase (SGPT), serum  18  U/L      

 

   bilirubin, serum, total  0.7  mg/dL      

 

   triglyceride, serum, fasting  137  mg/dL      

 

   sodium, serum  139  mmol/L      

 

   very low density lipoproteins  27  mg/dL      

 

   Estimated Glomerular Filtration Rate (calc)  >60  mL/min/1.73m2   
   

 

   glucose, plasma fasting  128  mg/dL      

 

 Clinical Lists Update: CBC,CMP,FLP  IN PT LABS - Hematology 

 

   red blood cell distribution width  15.4  %      

 

   mean corpuscular volume, RBC  90  fL      

 

   leukocyte count, blood  8.8  10*3/mm3      

 

   erythrocyte (RBC) count  4.32  10*6/mm3      

 

   platelet count  198  10*3/mm3      

 

   hemoglobin, blood  12.5  g/dL      

 

   hematocrit, blood  39  %      

 

 Clinical Lists Update: CBC,CMP,FLP,TSH - Chemistry 

 

 2015/04/15  urea nitrogen, blood  22  mg/dL      

 

 2015/04/15  alkaline phosphatase, serum  105  U/L      

 

 2015/04/15  albumin, serum  3.8  g/dL      

 

 2015/04/15  Estimated Glomerular Filtration Rate (calc)  >60  mL/min/1.73m2   
   

 

 2015/04/15  glucose, plasma fasting  127  mg/dL      

 

 2015/04/15  very low density lipoproteins  27  mg/dL      

 

 2015/04/15  sodium, serum  139  mmol/L      

 

 2015/04/15  triglyceride, serum, fasting  134  mg/dL      

 

 2015/04/15  bilirubin, serum, total  0.8  mg/dL      

 

 2015/04/15  alanine aminotransferase (SGPT), serum  21  U/L      

 

 2015/04/15  aspartate aminotransferase (SGOT), serum  26  U/L      

 

 2015/04/15  protein, total, serum  6.7  g/dL      

 

 2015/04/15  potassium, serum  4.8  mmol/L      

 

 2015/04/15  LDL cholesterol, serum  22  mg/dL      

 

 2015/04/15  thyroid stimulating hormone, serum  1.52  u[iU]/mL      

 

 2015/04/15  hemoglobin A1C, blood, as % of total hemoglobin  6.3  %      

 

 2015/04/15  HDL cholesterol, serum  42  mg/dL      

 

 2015/04/15  creatinine, serum  0.78  mg/dL      

 

 2015/04/15  carbon dioxide, venous blood  22  mmol/L      

 

 2015/04/15  cholesterol, serum  114  mg/dL      

 

 2015/04/15  chloride, serum  106  mmol/L      

 

 2015/04/15  calcium, serum  9.5  mg/dL      

 

 Clinical Lists Update: CBC,CMP,FLP,TSH - Hematology 

 

 2015/04/15  hemoglobin, blood  10.5  g/dL      

 

 2015/04/15  hematocrit, blood  34  %      

 

 2015/04/15  red blood cell distribution width  17.1  %      

 

 2015/04/15  mean corpuscular volume, RBC  89  fL      

 

 2015/04/15  leukocyte count, blood  5.1  10*3/mm3      

 

 2015/04/15  erythrocyte (RBC) count  3.77  10*6/mm3      

 

 2015/04/15  platelet count  199  10*3/mm3      

 

 Clinical Lists Update: CBC,CMP,Ferritin - Chemistry 

 

   protein, total, serum  6.2  g/dL      

 

   aspartate aminotransferase (SGOT), serum  21  U/L      

 

   alanine aminotransferase (SGPT), serum  24  U/L      

 

   bilirubin, serum, total  0.9  mg/dL      

 

   sodium, serum  135  mmol/L      

 

   anion gap, serum  13         

 

   glucose, plasma fasting  160  mg/dL      

 

   Estimated Glomerular Filtration Rate (calc)  81  mL/min/1.73m2    
  

 

   potassium, serum  4.7  mmol/L      

 

   albumin, serum  3.8  g/dL      

 

   alkaline phosphatase, serum  115  U/L      

 

   urea nitrogen, blood  14  mg/dL      

 

   calcium, serum  8.9  mg/dL      

 

   chloride, serum  101  mmol/L      

 

   carbon dioxide, venous blood  26.0  mmol/L      

 

   ferritin, serum  33.8  ng/mL      

 

   creatinine, serum  0.8  mg/dL      

 

 Clinical Lists Update: CBC,CMP,Ferritin - Hematology 

 

   erythrocyte (RBC) count  3.98  10*6/mm3      

 

   platelet count  179  10*3/mm3      

 

   hemoglobin, blood  11.2  g/dL      

 

   hematocrit, blood  34  %      

 

   mean corpuscular volume, RBC  87  fL      

 

   leukocyte count, blood  6.7  10*3/mm3      

 

   red blood cell distribution width  22.1  %      

 

 Clinical Lists Update: CBC,FERRITIN - Chemistry 

 

   ferritin, serum  11  ng/mL      

 

 Clinical Lists Update: CBC,FERRITIN - Hematology 

 

   mean corpuscular volume, RBC  91  fL      

 

   leukocyte count, blood  6.1  10*3/mm3      

 

   platelet count  203  10*3/mm3      

 

   hemoglobin, blood  9.3  g/dL      

 

   hematocrit, blood  30  %      

 

   red blood cell distribution width  14.4  %      

 

   erythrocyte (RBC) count  3.32  10*6/mm3      

 

 Clinical Lists Update: ER LABS - Chemistry 

 

   Estimated Glomerular Filtration Rate (calc)  >60  mL/min/1.73m2   
   

 

   glucose, plasma fasting  152  mg/dL      

 

   sodium, serum  134  mmol/L      

 

   bilirubin, serum, total  1.1  mg/dL      

 

   alanine aminotransferase (SGPT), serum  20  U/L      

 

   aspartate aminotransferase (SGOT), serum  32  U/L      

 

   protein, total, serum  7.1  g/dL      

 

   potassium, serum  5.0  mmol/L      

 

   creatinine, serum  0.80  mg/dL      

 

   carbon dioxide, venous blood  22  mmol/L      

 

   chloride, serum  102  mmol/L      

 

   calcium, serum  9.5  mg/dL      

 

   urea nitrogen, blood  15  mg/dL      

 

   alkaline phosphatase, serum  110  U/L      

 

   albumin, serum  3.8  g/dL      

 

 Clinical Lists Update: ER LABS - Hematology 

 

   platelet count  197  10*3/mm3      

 

   erythrocyte (RBC) count  3.87  10*6/mm3      

 

   leukocyte count, blood  12.6  10*3/mm3      

 

   mean corpuscular volume, RBC  89  fL      

 

   red blood cell distribution width  16.8  %      

 

   hemoglobin, blood  11.0  g/dL      

 

   hematocrit, blood  35  %      

 

 Clinical Lists Update: ER LABS - Urinalysis 

 

   bacteria, urine microscopy  neg         

 

   hyaline casts, urine  none  /[LPF]      

 

   epithelial cells, urine  2-5  /[LPF]      

 

   mucus on urinalysis  neg         

 

   blood in urine (hemoglobin) by dipstick  1+         

 

   RBC urine by microscopy  0-2         

 

   protein, urine, semiquantitative (dipstick)  1+         

 

   WBC urine on microscopy  0-2  {Cells}/[HPF]      

 

   appearance, urine  Clear Yellow         

 

   urobilinogen, urine, semiquantitative (dipstick)  normal         

 

   specific gravity, urine  1.10         

 

   pH, urine, semiquantitative  8         

 

   nitrite, urine, semiquantitative  neg         

 

   ketones, urine, by test strip  neg         

 

   bilirubin, urine  neg         

 

   glucose, urine, semiquantitative  neg         

 

 Office Visit: Dr Godoy's Check Up: Established Patient Visit - Chemistry 

 

   ferritin, serum  21  ng/mL      

 

   hemoglobin A1C, blood, as % of total hemoglobin  8.1  %      

 

 Office Visit: Dr Godoy's Check Up: Established Patient Visit - Hematology 

 

 2014/10/15  hemoglobin, blood  12.4  g/dL      

 

 2014/10/15  hematocrit, blood  37  %      

 

 2014/10/15  platelet count  39  10*3/mm3      

 

 2014/10/15  erythrocyte (RBC) count  4.36  10*6/mm3      

 

 2014/10/15  leukocyte count, blood  7.3  10*3/mm3      

 

 2014/10/15  mean corpuscular volume, RBC  86  fL      

 

 2014/10/15  red blood cell distribution width  14.9  %      

 

 2014/10/30  red blood cell distribution width  15.0  %      

 

 2014/10/30  mean corpuscular volume, RBC  88  fL      

 

 2014/10/30  leukocyte count, blood  8.3  10*3/mm3      

 

 2014/10/30  erythrocyte (RBC) count  4.37  10*6/mm3      

 

 2014/10/30  platelet count  61  10*3/mm3      

 

 2014/10/30  hemoglobin, blood  12.1  g/dL      

 

 2014/10/30  hematocrit, blood  38  %      

 

 2014/10/16  red blood cell distribution width  15.0  %      

 

 2014/10/16  mean corpuscular volume, RBC  37  fL      

 

 2014/10/16  leukocyte count, blood  9.1  10*3/mm3      

 

 2014/10/16  erythrocyte (RBC) count  4.27  10*6/mm3      

 

 2014/10/16  platelet count  22  10*3/mm3      

 

 2014/10/16  hemoglobin, blood  11.8  g/dL      

 

 2014/10/16  hematocrit, blood  37  %      

 

 Phone Note: Low platelet count - Hematology 

 

 2014/10/08  red blood cell distribution width  17.0  %      

 

 2014/10/08  mean corpuscular volume, RBC  90  fL      

 

 2014/10/08  hematocrit, blood  40  %      

 

 2014/10/08  hemoglobin, blood  12.7  g/dL      

 

 2014/10/08  platelet count  12  10*3/mm3      

 

 2014/10/08  erythrocyte (RBC) count  4.45  10*6/mm3      

 

 2014/10/08  leukocyte count, blood  5.9  10*3/mm3      







Encounters







 Code  Encounter  Date  Provider  Facility

 

 CPT-49406  Ofc Vst, Est Level IV   12:42:41 CDT  Kavithapatricia WELSH Godoy, DO, FACP

 

 CPT-21635  Ofc Vst, Est Level IV   17:01:58 CDT  Kavitha WELSH Godoy, DO, FACP

 

 CPT-88592  Ofc Vst, Est Level IV   17:23:42 CST  Kavithapatricia WELSH Godoy, DO, FACP

 

 CPT-25250  Ofc Vst, Est Level IV   20:14:18 CST  Kavitha WELSH Godoy, DO, FACP

 

 CPT-96378  Ofc Vst, Est Level III  2014/10/30 17:10:34 CDT  Kavitha WELSH Godoy, DO, FACP

 

 CPT-84473  Ofc Vst, Est Level IV  2014/10/16 22:19:26 CDT  Kavithapatricia WELSH Godoy, DO, FACP

 

 CPT-15897  Ofc Vst, Est Level IV  2014/10/07 16:08:07 CDT  Kavithapatricia WELSH Godoy, DO, FACP

 

 CPT-75541  Ofc Vst, Est Level II   15:22:36 CDT  Kavitha WELSH Godoy, DO, FACP

 

 CPT-42698  Ofc Vst, Est Level IV   11:13:04 CDT  Kavithapatricia WELSH Godoy, DO, FACP

 

 CPT-67498  Ofc Vst, Est Level IV   13:20:51 CDT  Kavitha Kristin WELSH Godoy, DO, FACP

 

 CPT-16303  Ofc Vst, Est Level IV   14:23:34 CDT  Kavitha WELSH Godoy, DO, FACP

 

 CPT-59530  Ofc Vst, Est Level III   19:42:36 CDT  Kavithapatricia Godoy  [facility]

 

 CPT-30499  Ofc Vst, Est Level IV   14:28:10 CST  Kavitha WELSH Godoy, DO, FACP

 

 CPT-99769  Ofc Vst, Est Level IV   15:20:04 CST  Kavitha WELSH Godoy, DO, FACP

 

 CPT-64847  Ofc Vst, Est Level III  2013/10/15 14:46:42 CDT  Kavithapatricia WELSH Godoy, DO, FACP

 

 CPT-93236  Ofc Vst, Est Level IV   13:31:14 CDT  Kavithapatricia WELSH Godoy, DO, FACP

 

 CPT-08885  Ofc Vst, Est Level IV  2013/04/10 16:26:21 CDT  Kavitha WELSH Godoy, DO, FACP

 

 CPT-50250  Ofc Vst, Est Level III   16:02:31 CST  Kavitha WELSH Godoy, DO, FACP

 

 CPT-91475  Ofc Vst, Est Level IV   13:01:46 CST  Kavitha WELSH Godoy, DO, FACP

 

 CPT-34409  Ofc Vst, Est Level IV   10:43:37 CDT  Kavitha WELSH Godoy, DO, FACP

 

 CPT-84998  Ofc Vst, Est Level III   15:41:44 CDT  Kavitha Godoy  [facility]

 

 CPT-11833  Ofc Vst, Est Level IV   16:31:03 CDT  Kavithapatricia WELSH Godoy, DO, FACP

 

 CPT-60384  Ofc Vst, Est Level III   15:45:49 CDT  Kavitha WELSH Godoy, DO, FACP

 

 CPT-50925  Ofc Vst, Est Level IV  2012/03/15 11:04:49 CDT  Kavitha WELSH Godoy, DO, FACP

 

 CPT-38346  Ofc Vst, Est Level IV   16:16:55 CST  Kavitha WELSH Godoy, DO, FACP

 

 CPT-67104  Ofc Vst, Est Level IV   20:00:20 CST  Kavitha Godoy  [facility]

 

 CPT-31998  Ofc Vst, Est Level IV   15:00:56 CST  Kavitha WELSH Jayne, DO, FACP

 

 CPT-98201  Ofc Vst, Est Level IV   11:37:43 CST  Kavitha WELSH Jayne, DO, FACP

 

 CPT-92215  Ofc Vst, Est Level V   11:04:21 CST  Bernadine WELSH Godoy, DO, FACP

 

 CPT-40652  Ofc Vst, Est Level IV   15:56:58 CDT  Kavitha WELSH Jayne, DO, FACP

 

 CPT-26622  Ofc Vst, Est Level III   15:56:48 CDT  Kavithapatricia WELSH Jayne, DO, FACP

 

 CPT-41237  Ofc Vst, Est Level IV   11:13:32 CDT  Kavithapatricia WELSH Jayne, DO, FACP

 

 CPT-42984  Ofc Vst, Est Level III   11:06:29 CDT  Kavitha WELSH Jayne, DO, FACP

 

 CPT-86194  Ofc Vst, Est Level IV   10:36:27 CDT  Kavitha Kristin 
Godoy  Kavitha WELSH Jayne, DO, FACP

 

 CPT-43907  Ofc Vst, Est Level IV   16:28:28 CST  Kavitha Rainner  Kavitha WELSH Jayne, DO, FACP

 

 CPT-11250  Ofc Vst, Est Level IV   16:15:53 CST  Kavitha Foster 
Godoy  Kavitha WELSH Jayne, DO, FACP

 

 CPT-74841  Ofc Vst, Est Level IV   10:24:23 CDT  Kavitha Foster 
Jayne  Kavitah WELSH Jayne, DO, FACP

 

 CPT-23526  Ofc Vst, Est Level IV   10:41:52 CDT  Kavitha Kristin WELSH Jayne, DO, FACP

 

 CPT-92630  Ofc Vst, Est Level V   14:13:59 CDT  Kavitha Kristin [facility]

 

 CPT-78969  Ofc Vst, Est Level IV   11:50:10 CDT  Kavitha Kristin WELSH Jayne, DO, FACP

 

 CPT-91799  Ofc Vst, Est Level V   16:45:39 CST  Kavitha Kristin WELSH Jayne, DO, FACP

 

 CPT-47288  Ofc Vst, Est Level V   16:35:01 CDT  Kavitha Kristin WELSH Jayne, DO, FACP

 

 CPT-25910  Ofc Vst, Est Level IV  2009/07/10 10:56:36 CDT  Kavitha Kristin WELSH Jayne, DO, FACP

 

 CPT-74851  Ofc Vst, Est Level IV   09:27:02 CDT  Kavitha Kristin WELSH Jayne, DO, FACP

 

 CPT-47648  Ofc Vst, Est Level IV   16:04:39 CST  Kavitha WELSH Jayne, DO, FACP

 

 CPT-37530  Ofc Vst, Est Level IV  2008/09/15 16:41:31 CDT  Kavithapatricia WELSH Jayne, DO, FACP

 

 CPT-75674  Ofc Vst, Est Level IV   16:15:36 CDT  Kavitha Kristin WELSH Jayne, DO, FACP

 

 CPT-61289  Ofc Vst, Est Level IV   16:43:23 CST  Kavitha Kristin WELSH Jayne, DO, FACP

 

 CPT-56620  Ofc Vst, Est Level IV   17:09:39 CST  Kavitha WELSH Jayne, DO, FACP

 

 CPT-96321  Ofc Vst, Est Level III  2007/10/29 16:52:49 CDT  Kavitha Kristin WELSH Jayne, DO, FACP

 

 CPT-83031  Ofc Vst, Est Level III  2007/10/23 16:46:46 CDT  Kavitha Kristin 
Jayne  Kavitha S Godoy, DO, FACP

 

 CPT-16377  Ofc Vst, Est Level IV  2007/10/16 16:17:41 CDT  Kavitha Kristin 
Godoy  Kavitha S Godoy, DO, FACP

 

 CPT-22918  Ofc Vst, Est Level III  2007/10/09 17:15:29 CDT  Kavitha Kristin Rainner  Kavitha S Godoy, DO, FACP

 

 CPT-05084  Ofc Vst, Est Level IV   15:48:06 CDT  Kavitha Kristin 
Jayne  Kavitha S Jayne, DO, FACP

 

 CPT-57102  Ofc Vst, Est Level IV   11:59:27 CDT  Kavitha Kristin 
Jayne  Kavitha S Jayne, DO, FACP

 

 CPT-11314  Ofc Vst, Est Level IV   16:31:37 CDT  Kavitha Kristin Godoy  [facility]

 

 CPT-58577  Ofc Vst, Est Level IV   16:25:22 CST  Kavitha Godoy  [facility]

 

 CPT-28181  Ofc Vst, Est Level III   18:24:09 CST  Kavitha Godoy  [facility]

 

 CPT-62417  Ofc Vst, Est Level III  2006/10/11 16:54:12 CDT  Kavitha Godoy  [facility]

 

 CPT-41473  Ofc Vst, Est Level IV   16:39:33 CDT  Kavitha Kristin Godoy  [facility]

 

 CPT-38049  Ofc Vst, Est Level IV   15:11:16 CDT  Kavitha Kristin Godoy  [facility]

 

 CPT-83075  Ofc Vst, Est Level IV   17:29:13 CST  Kavitha Godoy  [facility]

 

 CPT-61892  Ofc Vst, Est Level III   12:45:04 CST  Kavitha Godoy  [facility]

 

 CPT-56496  Ofc Vst, Est Level III   16:51:19 CST  Kavitha Godoy  [facility]

 

 CPT-29565  Ofc Vst, Est Level IV   16:59:21 CDT  Kavitha Godoy  [facility]

 

 CPT-46362  Ofc Vst, Est Level IV   09:39:02 CDT  Kavitha Kristin Godoy  [facility]

 

 CPT-74934  Ofc Vst, Est Level IV   18:04:53 CDT  Kavitha Kristin Godoy  [facility]

 

 CPT-09211  Ofc Vst, Est Level IV   17:17:47 CST  Kavitha Godoy  [facility]

 

 CPT-80314  Ofc Vst, Est Level IV   17:23:25 CST  Kavitha Godoy  [facility]

 

 CPT-71796  Ofc Vst, Est Level IV   18:44:33 CST  Kavitha Godoy  [facility]

 

 CPT-55584  Ofc Vst, Est Level III  2004/10/28 18:20:20 CDT  Kavitha Kristin Godoy  [facility]

 

 CPT-81196  Ofc Vst, Est Level III  2004/10/18 17:54:31 CDT  Kavitha Kristin Godoy  [facility]

 

 CPT-82051  Ofc Vst, Est Level IV   19:13:31 CDT  Kavithapatricia Godoy  [facility]

 

 CPT-00424  Ofc Vst, Est Level IV   18:01:08 CDT  Kavithapatricia Godoy  [facility]

 

 CPT-80273  Ofc Vst, Est Level III   13:36:36 CDT  Kavithapatricia Godoy  [facility]

 

 CPT-23211  Ofc Vst, Est Level III   16:47:12 CDT  Kavitha Godoy  [facility]

 

 CPT-73840  Ofc Vst, Est Level IV   17:34:04 CDT  Kavithapatricia Godoy  [facility]

 

 CPT-76550  Ofc Vst, Est Level III   15:45:39 CST  Kavitha Godoy  [facility]

 

 CPT-38971  Ofc Vst, Est Level III   16:07:05 CST  Kavitha Godoy  [facility]

 

 CPT-52824  Ofc Vst, Est Level III   16:19:57 CST  Kavitha Godoy  [facility]

 

 CPT-49811  Ofc Vst, Est Level III   16:46:48 CST  Kavitha Godoy  [facility]

 

 CPT-34110  Ofc Vst, Est Level II   17:30:36 CST  Kavitha Godoy  [facility]

 

 CPT-09028  Ofc Vst, Est Level III   17:20:59 CST  Kavitha Godoy  [facility]

 

 CPT-66443  Ofc Vst, Est Level III   17:49:24 CST  Kavitha Godoy  [facility]

 

 CPT-50944  Ofc Vst, New Level III   17:10:24 CST  Kavitha Godoy  [facility]

 

 CPT-06694  Ofc Vst, New Level IV   17:21:37 CST  Kavitha Godoy  [facility]







Procedures







 Code  Procedure Name  Date  Entry Date  Standard Description

 

 CPT-  Medicare Annual Wellness Visit   15:52:28 CDT  
   

 

 CPT-  E-Prescribing Medication Sent   19:42:36 CDT   
  

 

 CPT-  E-Prescribing Not sent due to no medication given   14:28:
10 CST     

 

 CPT-  E-Prescribing Not sent due to no medication given   15:20:
04 CST     

 

 CPT-  E-Prescribing Not sent due to no medication given  2013/10/15 14:46:
42 CDT  2013/10/15   

 

 CPT-  E-Prescribing Not sent due to no medication given   13:31:
14 CDT     

 

 CPT-  E-Prescribing Medication Sent   16:36:19 CDT  2013/07/10 
  

 

 CPT-  Medicare Annual Wellness Visit Initial   16:36:19 CDT  
2013/07/10   

 

 CPT-  E-Prescribing Not sent due to no medication given  2013/04/10 16:26:
21 CDT     

 

 CPT-  E-Prescribing Medication Sent   16:02:31 CST   
  

 

 CPT-  E-Prescribing Not sent due to no medication given   13:01:
46 CST     

 

 CPT-  E-Prescribing Not sent due to no medication given   10:43:
37 CDT     

 

 CPT-44272  Injection, Pneumovax   14:03:27 CDT     

 

 CPT-21273  Ear Wax Removal  2008/10/17 11:05:06 CDT  2008/10/17   

 

 CPT-24766  Ear Wax Removal   10:22:58 CDT     

 

 CPT-35029  Cryopathy Skin   09:39:02 CDT     

 

 CPT-65372  Ear Wax Removal   17:30:36 CST     

 

 CPT-71482  Ear Wax Removal   17:20:59 CST  
[facility]

 Created on: 10/03/2017



Olafsarah  Madison

External Reference #: [id number]

: 1945

Sex: Female



Demographics







 Address  [address] [place]  [address]

 

 Preferred Language  Unknown

 

 Marital Status  Unknown

 

 Judaism Affiliation  Unknown

 

 Race  Unknown

 

 Ethnic Group  Unknown





Author







 Author  LEANDER FREIRE

 

 Organization  [facility]

 

 Address  [address] [place]  [address]



 

 Phone  [phone]







Care Team Providers







 Care Team Member Name  Role  Phone

 

 LEANDER FREIRE  Unavailable  [phone]







PROBLEMS







 Type  Condition  ICD9-CM Code  WQD22-DY Code  Onset Dates  Condition Status  
SNOMED Code

 

 Problem  GERD (gastroesophageal reflux disease)     K21.9     Active  555456913

 

 Problem  History of kidney stones     Z87.442     Active  646030957

 

 Problem  Avascular necrosis of bone of right hip     M87.051     Active  
416005588

 

 Problem  Type 2 diabetes mellitus with diabetic polyneuropathy     E11.42     
Active  56426608

 

 Problem  Immune thrombocytopenic purpura     D69.3     Active  258377992

 

 Problem  Lymphedema     I89.0     Active  669208016

 

 Problem  Coronary artery disease     I25.10     Active  14361039

 

 Problem  Anxiety disorder, unspecified     F41.9     Active  841867067

 

 Problem  Major depressive disorder, single episode, unspecified     F32.9     
Active  36145491

 

 Problem  Hyperlipidemia, unspecified hyperlipidemia     E78.5     Active  
57280371

 

 Problem  History of MI (myocardial infarction)     I25.2     Active  980165997

 

 Problem  Hypertension     I10     Active  75644196

 

 Problem  Asthma     J45.909     Active  214527411







ALLERGIES







 Substance  Reaction  Event Type  Date  Status

 

 Heparin Sodium (Porcine) PF  Unknown  Drug Allergy    Active

 

 Adhesive  Unknown  Non Drug Allergy    Active







SOCIAL HISTORY

Never Assessed



PLAN OF CARE







 Activity  Details









  









 Follow Up  3 Months Reason:[facility]







VITAL SIGNS







 Height  67 in  2017

 

 Weight  294 lbs  2017

 

 Temperature  98.7 degrees Fahrenheit  2017

 

 Heart Rate  90 bpm  2017

 

 Respiratory Rate  20   2017

 

 BMI  46.04 kg/m2  2017

 

 Blood pressure systolic  140 mmHg  2017

 

 Blood pressure diastolic  74 mmHg  2017







MEDICATIONS







 Medication  Instructions  Dosage  Frequency  Start Date  End Date  Duration  
Status

 

 Benazepril HCl 40 mg  Orally Once a day  1 tablet  24h        90 days  Active

 

 GlipiZIDE 5 mg  Orally Once a day  1 tablet  24h  28 2017     90 days  
Active

 

 Metformin HCl 1000 MG  Orally Twice a day  1 tablet with meals  12h        90 
days  Active

 

 Atorvastatin Calcium 10 mg  Orally Once a day  TAKE ONE TABLET BY MOUTH ONCE 
DAILY IN THE EVENING  24h        90 days  Active

 

 Citalopram Hydrobromide 20 mg  Orally Once a day  1 tablet  24h        90 days
  Active

 

 Aspir-81 81 MG  Orally Once a day  1 tablet  24h        90 days  Active

 

 Carvedilol 12.5 MG  Orally 2 times a day  TAKE ONE TABLET BY MOUTH TWICE DAILY
  12h        90 days  Active

 

 Levemir FlexTouch 100 UNIT/ML  Subcutaneous Once a day, PRN  50 units          12 months  Active

 

 Hydrochlorothiazide 12.5 MG  Orally Once a day  1 tablet  24h        90 days  
Active

 

 Fish Oil 500 MG  Orally Twice a day  1 capsule  12h           Active

 

 Albuterol Sulfate (2.5 MG/3ML) 0.083%  Inhalation Three times a day  3 ml  8h 
          Active

 

 Urocit-K 15 15 MEQ (1620 MG)  Orally Twice a day  TAKE ONE TABLET BY MOUTH 
TWICE DAILY WITH MEALS  12h        90 days  Active

 

 Humalog KwikPen 100 UNIT/ML  Subcutaneous sliding scale  6-10 units prn       
    12 months  Active

 

 Advair Diskus 100-50 MCG/DOSE  Inhalation Twice a day  1 puff  12h           
Active

 

 Brilinta 60 mg  Orally Twice a day  1 tablet  12h        90 days  Active

 

 ProAir  (90 Base) MCG/ACT  Inhalation every 4 hrs  2 puffs as needed  
4h          Active

 

 Pantoprazole Sodium 40 mg  Orally Once a day  TAKE ONE TABLET BY MOUTH ONCE 
DAILY  24h        90 days  Active







RESULTS







 Name  Result  Date  Reference Range

 

 A1C (IN HOUSE)     2017   

 

 A1C IN HOUSE  6.7     4.3 - 5.6 %

 

 Previous A1c  7.3      

 

 Lot   [id number]      

 

 Exp date        







PROCEDURES







 Procedure  Date Ordered  Result  Body Site

 

 GLYCATED HEMOGLOBIN TEST  2017      

 

 [facility] VISIT ESTABLISHED PATIENT  2017      







IMMUNIZATIONS

No Known Immunizations



MEDICAL (GENERAL) HISTORY







 Type  Description  Date

 

 Medical History  Type II Diabetes   

 

 Medical History  CAD   

 

 Medical History  Acute MI x1   

 

 Medical History  Heart Cath x3   

 

 Medical History  Kidney Stones   

 

 Medical History  Lymphedema   

 

 Medical History  Immune thrombocytopenic purpura   

 

 Surgical History  Heart Stents x2   

 

 Surgical History  Cholecystectomy   

 

 Surgical History     

 

 Surgical History  [place] Teeth   

 

 Hospitalization History  IPT  

 

 Hospitalization History  Sepsis/Toxic Shock  

 

 Hospitalization History  VC-flu/pneumonia  2017
[facility]

 Created on: 10/08/2017



Madison Young

External Reference #: [id number]

: 1945

Sex: Female



Demographics







 Address  [address] [place]  [address]

 

 Preferred Language  Unknown

 

 Marital Status  Unknown

 

 Pentecostalism Affiliation  Unknown

 

 Race  Unknown

 

 Ethnic Group  Unknown





Author







 Author  TANIISMAELELE

 

 Organization  [facility]

 

 Address  [address] [place]  [address]



 

 Phone  [phone]







Care Team Providers







 Care Team Member Name  Role  Phone

 

 FREIREISMAEL HILLIARDELE  Unavailable  [phone]







PROBLEMS







 Type  Condition  ICD9-CM Code  BZG67-QK Code  Onset Dates  Condition Status  
SNOMED Code

 

 Problem  GERD (gastroesophageal reflux disease)     K21.9     Active  601823257

 

 Problem  History of kidney stones     Z87.442     Active  431956900

 

 Problem  Avascular necrosis of bone of right hip     M87.051     Active  
914292678

 

 Problem  Type 2 diabetes mellitus with diabetic polyneuropathy     E11.42     
Active  93946163

 

 Problem  Immune thrombocytopenic purpura     D69.3     Active  616386951

 

 Problem  Lymphedema     I89.0     Active  964238356

 

 Problem  Coronary artery disease     I25.10     Active  71827014

 

 Problem  Anxiety disorder, unspecified     F41.9     Active  766703814

 

 Problem  Major depressive disorder, single episode, unspecified     F32.9     
Active  68955280

 

 Problem  Hyperlipidemia, unspecified hyperlipidemia     E78.5     Active  
35159910

 

 Problem  History of MI (myocardial infarction)     I25.2     Active  610611219

 

 Problem  Hypertension     I10     Active  52221105

 

 Problem  Asthma     J45.909     Active  220102032







ALLERGIES

No Information



SOCIAL HISTORY

Never Assessed



PLAN OF CARE





VITAL SIGNS





MEDICATIONS







 Medication  Instructions  Dosage  Frequency  Start Date  End Date  Duration  
Status

 

 GlipiZIDE 5 mg  Orally 2 times a day  1 tablet  12h           Active

 

 Levemir FlexTouch 100 UNIT/ML  Subcutaneous 2 times a day  25 units  12h          Active







RESULTS

No Results



PROCEDURES

No Known procedures



IMMUNIZATIONS

No Known Immunizations



MEDICAL (GENERAL) HISTORY







 Type  Description  Date

 

 Medical History  Type II Diabetes   

 

 Medical History  CAD   

 

 Medical History  Acute MI x1   

 

 Medical History  Heart Cath x3   

 

 Medical History  Kidney Stones   

 

 Medical History  Lymphedema   

 

 Medical History  Immune thrombocytopenic purpura   

 

 Surgical History  Heart Stents x2   

 

 Surgical History  Cholecystectomy   

 

 Surgical History     

 

 Surgical History  [place] Teeth   

 

 Hospitalization History  IPT  

 

 Hospitalization History  Sepsis/Toxic Shock  

 

 Hospitalization History  VC-flu/pneumonia  2017
[facility]

 Created on: 2015



Madison Young

External Reference #: [id number]

: 1945

Sex: Female



Demographics







 Address  [address] [place]  [address]

 

 Home Phone  [phone]

 

 Preferred Language  Unknown

 

 Marital Status  Unknown

 

 Jewish Affiliation  Unknown

 

 Race  White

 

 Ethnic Group  Not  or 





Author







 Author  RUPAL GARCIA

 

 [facility]  eClinicalWorks

 

 Address  Unknown

 

 Phone  Unavailable







Care Team Providers







 Care Team Member Name  Role  Phone

 

 RUPAL GARCIA    Unavailable



                                                                



Allergies, Adverse Reactions, Alerts

          





 Substance  Reaction  Event Type

 

 Heparin Sodium (Porcine) PF  Info Not Available  Drug Allergy

 

 Adhesive  Info Not Available  Non Drug Allergy



                                                                               
                   



Problems

          





 Problem Type  Condition  Code  Onset Dates  Condition Status

 

 Assessment  Routine adult health maintenance  Z00.00     Active

 

 Problem  GERD (gastroesophageal reflux disease)  K21.9     Active

 

 Assessment  Type 2 diabetes mellitus with diabetic polyneuropathy  E11.42     
Active

 

 Problem  History of MI (myocardial infarction)  I25.2     Active

 

 Problem  History of kidney stones  Z87.442     Active

 

 Problem  Coronary artery disease  I25.10     Active

 

 Problem  Type 2 diabetes mellitus with diabetic polyneuropathy  E11.42     
Active

 

 Problem  Avascular necrosis of bone of right hip  M87.051     Active

 

 Problem  Lymphedema  I89.0     Active

 

 Problem  Immune thrombocytopenic purpura  D69.3     Active

 

 Assessment  Immune thrombocytopenic purpura  D69.3     Active

 

 Assessment  History of kidney stones  Z87.442     Active

 

 Assessment  GERD (gastroesophageal reflux disease)  K21.9     Active

 

 Assessment  History of MI (myocardial infarction)  I25.2     Active

 

 Assessment  Avascular necrosis of bone of right hip  M87.051     Active

 

 Assessment  Coronary artery disease  I25.10     Active



                                                                               
                                                                               
                                                                                



Medications

          





 Medication  Code System  Code  Instructions  Start Date  End Date  Status  
Dosage

 

 Levemir FlexTouch  NDC  [address]-10  100 UNIT/ML Subcutaneous Once a day  
2015        50 units

 

 Atorvastatin Calcium  NDC  [address]-38  10 MG Orally Once a day           1 
tablet

 

 Pantoprazole Sodium  NDC  [address]-58  40 MG Orally Once a day           1 
tablet

 

 Albuterol Sulfate  NDC  [address]-55  (2.5 MG/3ML) 0.083% Inhalation Three 
times a day           3 ml

 

 Hydrochlorothiazide  NDC  [address]-38  12.5 MG Orally Once a day           1 
tablet

 

 Brilinta  NDC  [address]-67  90 MG Orally Twice a day           1 tablet

 

 Benazepril HCl  NDC  [address]-76  40 MG Orally Once a day           1 tablet

 

 Carvedilol  NDC  [address]-56  12.5 MG Orally 2 times a day           1

 

 Humalog KwikPen  NDC  [address]-80  100 UNIT/ML Subcutaneous 3 times a day   
        15 units

 

 GlipiZIDE  NDC  [address]-86  5 MG Orally Once a day           1 tablet

 

 Urocit-K 15  NDC  [address]-93  15 MEQ (1620 MG) Orally Three times a day    
       1 tablet with meals

 

 Metformin HCl  NDC  [address]-09  1000 MG Orally Twice a day           1 
tablet with meals

 

 Advair Diskus  NDC  [address]-38  100-50 MCG/DOSE Inhalation Twice a day     
      1 puff



                                                                               
                                                                               
                                                  



Procedures

          





 Procedure  Coding System  Code  Date

 

 [facility] VISIT NEW PATIENT  CPT-4    2015

 

 Office Visit, New Pt., Level 4  CPT-4  92163  2015

 

 GLYCATED HEMOGLOBIN TEST  CPT-4  04985  2015



                                                                               
                                       



Vital Signs

          





 Date/Time:  2015

 

 Temperature  98.5 F

 

 Weight  287 lbs

 

 Height  67 in

 

 BMI  44.95 Index

 

 Blood Pressure Diastolic  74 mmHg

 

 Blood Pressure Systolic  148 mmHg

 

 Cardiac Monitoring Heart Rate  82 bpm



                                                                    



Results

          





 Name  Result  Date  Reference Range  Unit  Abnormality Flag

 

 A1C (IN HOUSE)               



                                                                    



Summary Purpose

          eClinicalWorks Submission
[facility]

 Created on: 2016



Olafsarah  Madison

External Reference #: [id number]

: 1945

Sex: Female



Demographics







 Address  [address] [place]  [address]

 

 Home Phone  [phone]

 

 Preferred Language  Unknown

 

 Marital Status  Unknown

 

 Voodoo Affiliation  Unknown

 

 Race  White

 

 Ethnic Group  Not  or 





Author







 Author  RUPAL GARCIA

 

 [facility]  eClinicalWorks

 

 Address  Unknown

 

 Phone  Unavailable







Care Team Providers







 Care Team Member Name  Role  Phone

 

 RUPAL GARCIA  CP  Unavailable



                                                                



Allergies

          No Known Allergies                                                   
                                     



Problems

          





 Problem Type  Condition  Code  Onset Dates  Condition Status

 

 Problem  Avascular necrosis of bone of right hip  M87.051     Active

 

 Problem  GERD (gastroesophageal reflux disease)  K21.9     Active

 

 Problem  Coronary artery disease  I25.10     Active

 

 Problem  History of MI (myocardial infarction)  I25.2     Active

 

 Problem  Hyperlipidemia, unspecified hyperlipidemia  E78.5     Active

 

 Problem  Immune thrombocytopenic purpura  D69.3     Active

 

 Problem  Type 2 diabetes mellitus with diabetic polyneuropathy  E11.42     
Active

 

 Problem  History of kidney stones  Z87.442     Active

 

 Problem  Lymphedema  I89.0     Active



                                                                               
                                                                               
          



Medications

          No Known Medications                                                 
                             



Results

          No Known Results                                                     
               



Summary Purpose

          eClinicalWorks Submission
[facility]

 Created on: 2016



Olafsarah  Madison

External Reference #: [id number]

: 1945

Sex: Female



Demographics







 Address  [address] [place]  [address]

 

 Home Phone  [phone]

 

 Preferred Language  Unknown

 

 Marital Status  Unknown

 

 Hindu Affiliation  Unknown

 

 Race  White

 

 Ethnic Group  Not  or 





Author







 Author  RUPAL GARCIA

 

 [facility]  eClinicalWorks

 

 Address  Unknown

 

 Phone  Unavailable







Care Team Providers







 Care Team Member Name  Role  Phone

 

 RUPAL GARCIA  CP  Unavailable



                                                                



Allergies

          No Known Allergies                                                   
                                     



Problems

          





 Problem Type  Condition  Code  Onset Dates  Condition Status

 

 Problem  Avascular necrosis of bone of right hip  M87.051     Active

 

 Problem  GERD (gastroesophageal reflux disease)  K21.9     Active

 

 Problem  Coronary artery disease  I25.10     Active

 

 Problem  History of MI (myocardial infarction)  I25.2     Active

 

 Problem  Hyperlipidemia, unspecified hyperlipidemia  E78.5     Active

 

 Problem  Immune thrombocytopenic purpura  D69.3     Active

 

 Problem  Type 2 diabetes mellitus with diabetic polyneuropathy  E11.42     
Active

 

 Problem  History of kidney stones  Z87.442     Active

 

 Problem  Lymphedema  I89.0     Active



                                                                               
                                                                               
          



Medications

          No Known Medications                                                 
                             



Results

          No Known Results                                                     
               



Summary Purpose

          eClinicalWorks Submission
[facility] Clinical Summary

 Created on: 2015



Madison Young

External Reference #: [id number]

: 1945

Sex: Female



Demographics







 Address  [address] [place] [address]

 

 Home Phone  [phone]

 

 Preferred Language  Unknown

 

 Marital Status  W

 

 Pentecostalism Affiliation  Unknown

 

 Race  White

 

 Ethnic Group  Not  or 





Author







 Author  User, MELISSA

 

 Organization  [facility]

 

 Address  Unknown

 

 Phone  Unavailable







Allergies, Adverse Reactions, Alerts







 Allergy Name  Reaction Description  Start Date  Severity  Status  Provider

 

 ZINC OXIDE       Critical  Active  Kavitha Godoy







Conditions or Problems







 Problem Name  Problem Code  Onset Date  Status  Entry Date  Provider  Comment  
Standard Description  Annotate

 

 DIABETES MELLITUS, TYPE II, UNCONTROLLED, W/O COMPLICATIONS  250.02     
Correction    Kavitha Godoy     Diabetes mellitus without 
mention of complication, type II or unspecified type, uncontrolled   

 

 DIABETES MELLITUS, TYPE I, UNCONTROLLED, WITH COMPLICATIONS  250.93     
Correction    Kavitha Godoy     Diabetes mellitus with 
unspecified complication, type I [juvenile type], uncontrolled   

 

 DIABETES MELLITUS, TYPE II, UNCONTROLLED, WITH COMPLICATIONS  250.92    Active    Kavitha Godoy     Diabetes mellitus with 
unspecified complication, type II or unspecified type, uncontrolled   

 

 OBESITY  278.00     Resolved    Kavitha Godoy     Obesity, 
unspecified   

 

 ELEVATED BLOOD PRESSURE WITHOUT DIAGNOSIS OF HYPERTENSION  796.2    
Correction    Kavitha Godoy     Elevated blood pressure 
reading without diagnosis of hypertension   

 

 ARTHRITIS, RHEUMATOID  714.0     Resolved    Kavitha Godoy   
  Rheumatoid arthritis   

 

 LIVER FUNCTION TESTS, ABNORMAL, HX OF  V12.2     Resolved    Kavitha Godoy     Personal history of endocrine, metabolic, and immunity 
disorders   

 

 CERUMEN IMPACTION, BILATERAL  380.4    Resolved    Kavitha Godoy     Impacted cerumen   

 

 CERUMEN IMPACTION, BILATERAL  380.4    Resolved    Kavitha Godoy     Impacted cerumen   

 

 DECREASED HEARING  389.10    Resolved    Kavitha Godoy     Sensorineural hearing loss, unspecified  due to cerumen presumed

 

 History of ANEMIA  285.9     Correction    Kavitha Godoy     
Anemia, unspecified   

 

 POLYARTHRALGIA  719.49     Active    Kavitha oGdoy     Pain 
in joint involving multiple sites   

 

 NEPHROPATHY, DIABETIC  250.40    Resolved    Kavitha Godoy     Diabetes mellitus with renal manifestations, type II or unspecified 
type, not stated as uncontrolled   

 

 HYPERCHOLESTEROLEMIA  272.0    Active    Kavitha Godoy     Pure hypercholesterolemia   

 

 HYPERTRIGLYCERIDEMIA  272.1    Active    Kavitha Godoy     Pure hyperglyceridemia   

 

 HYPERTENSION, BENIGN ESSENTIAL, UNCONTROLLED  401.1    Active    Kavitha Godoy     Benign essential hypertension   

 

 PHARYNGITIS, ACUTE  462    Resolved    Kavitha Godoy     Acute pharyngitis   

 

 DYSPHAGIA  787.2    Resolved    Kavitha Godoy     
Dysphagia   

 

 INSOMNIA, TRANSIENT  307.41    Resolved    Kavitha Godoy     Transient disorder of initiating or maintaining sleep   

 

 CELLULITIS  682.9    Resolved    Kavitha Godoy     
Cellulitis and abscess of unspecified sites  foot

 

 CELLULITIS  682.9  2004/10/28  Resolved  2004/10/28  Kavitha Godoy     
Cellulitis and abscess of unspecified sites   

 

 DERMATOPHYTOSIS, FOOT  110.4    Resolved    Kavitha Godoy     Dermatophytosis of foot   

 

 DERMATITIS  692.9    Resolved    Kavitha Godoy     
Contact dermatitis and other eczema, unspecified cause   

 

 KNEE PAIN  719.46    Resolved    Kavitha Godoy     
Pain in joint involving lower leg  right

 

 SHOULDER PAIN  719.41    Resolved    Kavitha Godoy 
    Pain in joint involving shoulder region  right

 

 SKIN LESION  709.9    Resolved    Kavitha Godoy     
Unspecified disorder of skin and subcutaneous tissue   

 

 FEVER  780.6    Resolved    Kavitha Godoy     Fever 
and other physiologic disturbances of temperature regulation   

 

 COUGH  786.2    Resolved    Kavitha Godoy     Cough
   

 

 SCREENING MAMMOGRAM NEC  V76.12    Resolved    Kavitha Godoy     Other screening mammogram   

 

 SCREENING FOR OSTEOPOROSIS  V82.81    Resolved    Kavitha Godoy     Screening for osteoporosis   

 

 MICROALBUMINURIA  791.0    Active    Kavitha Godyo 
    Proteinuria   

 

 HX, PERSONAL, PAST NONCOMPLIANCE  V15.81    Resolved    
Kavitha Godoy     Personal history of noncompliance with medical 
treatment, presenting hazards to health   

 

 BACK PAIN  724.5    Resolved    Kavitha Godoy     
Backache, unspecified   

 

 NEOPLASM, SKIN, UNCERTAIN BEHAVIOR  238.2    Resolved    
Kavitha Godoy     Neoplasm of uncertain behavior of skin  right upper lip

 

 SCIATICA/HERNIATED DISC  722.10  2006/10/11  Resolved    Kavitha Godoy     Displacement of lumbar intervertebral disc without 
myelopathy   

 

 IMPETIGO  684    Resolved    Kavitha Godoy     
Impetigo  right nare

 

 LYMPHEDEMA NEC  457.1    Active    Kavitha Godoy   
  Other lymphedema   

 

 CELLULITIS  682.9    Resolved    Kavitha Godoy     
Cellulitis and abscess of unspecified sites   

 

 OSTEOMYELITIS NOS, ANKLE/FOOT  730.27  2007/10/09  Resolved    Kavitha Godoy     Unspecified osteomyelitis involving ankle and foot   

 

 URTICARIA, ACUTE  708.9  2007/10/29  Resolved    Kavitha Godoy     Unspecified urticaria   

 

 RENAL CALCULUS  592.0  2009/07/10  Active  2009/07/10  Kavitha Godoy   
  Calculus of kidney   

 

 DEHYDRATION  276.51  2009/07/10  Resolved    Kavitha Godoy   
  Dehydration   

 

 CHOLELITHIASIS, ASYMPTOMATIC  574.20    Resolved    Kavitha Godoy     Calculus of gallbladder without mention of cholecystitis, 
without mention of obstruction   

 

 PNEUMONIA  486    Resolved    Kavitha Godoy     
Pneumonia, organism unspecified   

 

 RESTLESS LEG SYNDROME  333.94    Resolved    Kavitha Godoy     Restless legs syndrome (RLS)   

 

 FREQUENCY, URINARY  788.41    Resolved    Kavitha Godoy     Urinary frequency   

 

 FEVER PRESENTING CONDITIONS CLASSIFIED ELSEWHERE  780.61    Resolved
    Kavitha Godoy     Fever presenting with conditions 
classified elsewhere   

 

 PVD  443.9    Resolved    Kavitha Godoy     
Peripheral vascular disease, unspecified   

 

 EDEMA LEG  782.3    Resolved    Kavitha Godoy     
Edema   

 

 LEG PAIN, LEFT  729.5    Resolved    Kavitha Godoy 
    Pain in limb   

 

 URINARY FREQUENCY  788.41    Resolved    Kavitha Godoy     Urinary frequency   

 

 VACCINE AGAINST STREPTOCOCCUS PNEUMONIAE  V03.82    Resolved    Kavitha Godoy     Need for prophylactic vaccination against 
Streptococcus pneumoniae [pneumococcus]   

 

 SPINAL STENOSIS, LUMBAR  724.02    Resolved    Kavitha Godoy     Spinal stenosis of lumbar region without neurogenic 
claudication   

 

 KNEE PAIN  719.46    Resolved    Kavitha Godoy     
Pain in joint involving lower leg   

 

 CORONARY ATHEROSCLEROSIS, NATIVE VESSEL  414.01    Active    Kavitha Godoy     Coronary atherosclerosis of native coronary artery
   

 

 ANEMIA, IRON DEFICIENCY NEC  280.8    Active    Kavitha Godoy     Other specified iron deficiency anemias   

 

 NAUSEA AND VOMITING  787.01    Resolved    Kavitha Godoy     Nausea with vomiting   

 

 UTI  599.0    Resolved    Kavitha Godoy     Urinary 
tract infection, site not specified   

 

 GROSS HEMATURIA  599.71    Resolved    Kavitha Godoy     Gross hematuria   

 

 CELLULITIS  682.9    Resolved    Kavitha Godoy     
Cellulitis and abscess of unspecified sites   

 

 IMMUNE THROMBOCYTOPENIC PURPURA  287.31  2014/10/15  Active  2014/10/16  Kavitha Godoy     Immune thrombocytopenic purpura   

 

 PVD  443.9    Active    Kavitha Godoy     
Peripheral vascular disease, unspecified   







Medication List







 Medication  Instructions  Start Date  Stop Date  Generic Name  NDC  Status  
Provider  Patient Instruction

 

 ONETOUCH ULTRA BLUE STRP  TEST BS QID DX: 250.92  2015/06/15     GLUCOSE BLOOD
  04094009562  Active  Bernadine Gomez   

 

 POTASSIUM CITRATE ER 15 MEQ (1620 MG) CR-TABS  1 PO BID        POTASSIUM 
CITRATE  09405321234  Active  Kavitha Godoy   

 

 HYDROCODONE-ACETAMINOPHEN 5-325 MG TABS  1 PO BID prn    
HYDROCODONE-ACETAMINOPHEN  05002525816  No Longer Active  Kavitha Godoy 
  

 

 PEN NEEDLES 5/16" 31G X 8 MM MISC  as directed    
INSULIN PEN NEEDLE  25053962418  No Longer Active  Kavitha Godoy   

 

 GLUCOMETER STRIPS  CHECK BS QID    GLUCOMETER STRIPS     
No Longer Active  Kavitha Godoy   

 

 GLUCOMETER MACHINE  AS DIRECTED    GLUCOMETER MACHINE   
  No Longer Active  Kavitha Godoy   

 

 CLEOCIN 150 MG CAPS  1 po daily    CLINDAMYCIN HCL  
02121183172  No Longer Active  Kavitha Godoy   

 

 FERREX 150 150 MG CAPS  1 PO daily as directed  2014/10/06    
POLYSACCHARIDE IRON COMPLEX  17564709993  No Longer Active  Kavitha Godoy   

 

 PREDNISONE 20 MG TABS  TAKE 5 TABS PO DAILY  2014/10/13    
PREDNISONE  53601778570  No Longer Active  Kavitha Godoy   

 

 BRILINTA 90 MG TABS  1 PO BID       TICAGRELOR  69171538458  Active  
Kavitha Godoy   

 

 ASPIRIN 81 MG TAB  1 PO daily       ASPIRIN  94508907572  Active  
Kavitha Godoy   

 

 COREG 12.5 MG TABS  1 PO BID        CARVEDILOL  85485436632  Active  Kavitha Godoy   

 

 PLAVIX 75 MG TABS  1 PO QD    CLOPIDOGREL BISULFATE  
56985373654  No Longer Active  Kavithapatricia Godoy   

 

 AUGMENTIN 500-125 MG TAB  1 PO BID    AMOXICILLIN-POT 
CLAVULANATE  99234942148  No Longer Active  Kavitha Kristin Godoy   

 

 ALBUTEROL SULFATE 0.083 % NEBU SOLN  1 treatment TID prn       
ALBUTEROL SULFATE  10317763253  Active  Kavitha Kristin Godoy   

 

 ROBITUSSIN A-C  MG/5ML SYRUP  1 teaspoon PO Q 4-6 hr prn    ROBITUSSIN A-C  MG/5ML SYRUP     No Longer Active  Kavithapatricia Godoy   

 

 PEN NEEDLES " 31G X 8 MM MISC  as directed       INSULIN PEN 
NEEDLE  03133274927  Active  Bernadine Gomez   

 

 LANTUS SOLOSTAR 100 UNIT/ML SOLN  50 units SQ at night       INSULIN 
GLARGINE  91393842061  Active  Bernadine Gomez   

 

 CEFDINIR 300 MG CAPS  1 PO BID    2014/04/15  CEFDINIR  00007258505  
No Longer Active  Kavithapatricia Godoy   

 

 ZOFRAN ODT 8 MG TBDP  1 PO Q6hrs prn nausea    2014/04/15  
ONDANSETRON  72605621334  No Longer Active  Kavitha Godoy   

 

 ASPIRIN 81 MG TAB  1 PO daily       ASPIRIN  56353268295  No Longer 
Active  Kavithapatricia Godoy   

 

 BENAZEPRIL HCL 40 MG TABS  1 PO daily        BENAZEPRIL HCL  80560860794  
Active  Bernadine Gomez   

 

 NORVASC 10 MG TAB  1 PO QD        AMLODIPINE BESYLATE  04695578093  Active  
Kavithapatricia Godoy   

 

 ALBUTEROL SULFATE 0.083 % NEBU SOLN  1 nebulizer treatment q4 hours prn    ALBUTEROL SULFATE  61892063147  No Longer Active  Kavitha Godoy   

 

 ASCENSIA CONTOUR TEST  STRP  Test BS  3 -4 times a day DX: Diabetes    GLUCOSE BLOOD  92780463638  No Longer Active  Kavithapatricia Rainner   

 

 BENADRYL 25 MG CAP  1 po every 6 hours if it does not cause drowsiness.  2007/
10/29    DIPHENHYDRAMINE HCL  11452554790  No Longer Active  Kavitha Godoy   

 

 PROTONIX 40 MG TBEC  1 po qd       PANTOPRAZOLE SODIUM  79860372031  
Active  Bernadine Gomez   

 

 FISH OIL 1000 MG CAPS  1 PO daily       OMEGA-3 FATTY ACIDS  
18386711809  Active  Kavitha Godoy   

 

 ZETIA 10 MG TABS  1 PO daily for cholesterol    
EZETIMIBE  25352942897  No Longer Active  Kavitha Godoy   

 

 LIPITOR 10 MG TAB  1 PO daily       ATORVASTATIN CALCIUM  
71482124863  Active  Bernadine Gomez   

 

 ZOFRAN ODT 8 MG TBDP  I PO Q6hrs prn    ONDANSETRON  
83169034204  No Longer Active  Kavitha Godoy   

 

 DOCUSATE SODIUM 50 MG/15ML SYRP  3 drops each ear.  Let stand for 15 min. then 
rinse out .  Repeat daily.  2008/09/15    DOCUSATE SODIUM  
29694710808  No Longer Active  Kavitha Godoy   

 

 ASCENSIA CONTOUR MONITOR  KIT  DX: Diabetes    BLOOD 
GLUCOSE MONITORING SUPPL  37094069584  No Longer Active  Kavitha Godoy   

 

 CLEOCIN 150 MG CAPS  1 PO daily    CLINDAMYCIN HCL  
27762277069  No Longer Active  Kavitha Godoy   

 

 BD INSULIN SYRINGE ULTRAFINE 29G X 1/2" 1 ML MISC  as directed for insulin 
injections       INSULIN SYRINGE-NEEDLE U-100  17319019934  Active  
Bernadine Gomez   

 

 BD ULTRA-FINE LANCETS MISC  ad directed to check sugar qid       
LANCETS  25851774640  Active  Bernadine Gomez   

 

 HUMALOG  UNIT/ML SOLN  5 units before meals       INSULIN 
LISPRO (HUMAN)     Active  Kavitha Godoy   

 

 LEVAQUIN 500 MG TAB  1 PO QD  2012/03/15    LEVOFLOXACIN  
74026483625  No Longer Active  Kavitha Godoy   

 

 AUGMENTIN 875-125 MG TAB  1 PO BID    AMOXICILLIN-POT 
CLAVULANATE  34944639965  No Longer Active  Kavitha Kristin Godoy   

 

 AUGMENTIN 875-125 MG TAB  1 PO BID x 7 days    
AMOXICILLIN-POT CLAVULANATE  31495331480  No Longer Active  Bernadine Gomez   

 

 LOVENOX 40 MG/0.4ML SOLN  Apply to affected area daily  
  ENOXAPARIN SODIUM  68853803436  No Longer Active  Kavitha Kristin Godoy   

 

 FISH OIL 1000 MG CAPS  1 PO daily    OMEGA-3 FATTY ACIDS
  68450789460  No Longer Active  Kavitha Kristin Godoy   

 

 ASPIRIN 81 MG TABS  1 PO daily       ASPIRIN  13563407938  No Longer 
Active  Kavithapatricia Godoy   

 

 DOXYCYCLINE HYCLATE 100 MG CAP  1 po BID    DOXYCYCLINE 
HYCLATE  96170969025  No Longer Active  Kavitha Kristin Godoy   

 

 PYRIDIUM 200 MG TAB  1 PO TID prn urinary urgency    
PHENAZOPYRIDINE HCL  14449490509  No Longer Active  Bernadine Gomez   

 

 BACTRIM -160 MG TAB  1 PO BID    TRIMETHOPRIM-
SULFAMETHOXAZOLE  86925298169  No Longer Active  Kavitha Godoy   

 

 DOXYCYCLINE HYCLATE 100 MG CAP  1 po BID    DOXYCYCLINE 
HYCLATE  48052227596  No Longer Active  Kavitha Kristin Godoy   

 

 BACTRIM -160 MG TAB  1 PO BID    TRIMETHOPRIM-
SULFAMETHOXAZOLE  87427909574  No Longer Active  Kavitha Kristin Godoy   

 

 ALEVE 220 MG TAB  1 PO BID        NAPROXEN SODIUM  32616991914  Active  Kavitha 
Kristin Godoy   

 

 VENTOLIN  (90 BASE) MCG/ACT AERS  2 puffs q6hrs prn       
ALBUTEROL SULFATE  95667669714  Active  Kavitha Kristin Godoy   

 

 CLEOCIN 150 MG CAPS  1 PO daily    CLINDAMYCIN HCL  
77951696492  No Longer Active  Kavithapatricia Godoy   

 

 VITAMIN D 1000 UNIT TABS  2 PO Daliy    CHOLECALCIFEROL  
91007953433  No Longer Active  Kavitha Kristin Godoy   

 

 SEPTRA -160 MG TABS  1 PO BID    SULFAMETHOXAZOLE-
TRIMETHOPRIM  16968104422  No Longer Active  Kavithapatricia Godoy   

 

 DOXYCYCLINE HYCLATE 100 MG CAP  1 po BID    DOXYCYCLINE 
HYCLATE  52547814089  No Longer Active  Kavitha Kristin Godoy   

 

 ADVAIR DISKUS 100-50 MCG/DOSE MISC  1 puff daily        FLUTICASONE-SALMETEROL
  84640061989  Active  Kavitha Kristin Godoy   

 

 MACROBID 100 MG CAP  1 PO BID  2009/07/10    NITROFURANTOIN MONOHYD 
MACRO  51927799323  No Longer Active  Kavithapatricia Godoy   

 

 DARVOCET-N 100 100-650 MG TAB  1 PO Q6hrs prn pain    
PROPOXYPHENE N-APAP  76399099078  No Longer Active  Kavithapatricia Godoy   

 

 PYRIDIUM 200 MG TAB  1 PO TID prn urinary urgency    2009/07/10  
PHENAZOPYRIDINE HCL  71778723800  No Longer Active  Kavithapatricia Godoy   

 

 MACROBID 100 MG CAP  1 PO BID    NITROFURANTOIN MONOHYD 
MACRO  25042001966  No Longer Active  Kavitha Godoy   

 

 CLEOCIN 150 MG CAPS  1 PO daily to prevent cellulitis    
CLINDAMYCIN HCL  20110425374  No Longer Active  Kavithapatricia Godoy   

 

 ACCU-CHEK COMPACT STRIPS STRP  Use one strip to check glucose three times 
daily    GLUCOSE BLOOD  09864075136  No Longer Active  
Kavithapatricia Godoy   

 

 LEVAQUIN 500 MG TAB  1 PO QD  2007/10/23    LEVOFLOXACIN  
69554912079  No Longer Active  Kavithapatricia Godoy   

 

 GLUCOPHAGE 1000 MG TABS  1 PO BID       METFORMIN HCL  11912117900  
Active  Bernadine Gomez   

 

 BACTROBAN 2 % CREAM  apply to affected area on nose TID for 7 days    MUPIROCIN CALCIUM  82598163912  No Longer Active  Kavitha Godoy   

 

 BENAZEPRIL HCL 20 MG TABS  1 PO daily    BENAZEPRIL HCL  
74769170145  No Longer Active  Kavitha Godoy   

 

 LOTREL 10-40 MG CAPS  1 PO daily       AMLODIPINE BESY-BENAZEPRIL 
HCL  91455443287  No Longer Active  Bernadine Gomez   

 

 FLEXERIL 10 MG TABS  1 by mouth 3 times a day as needed    CYCLOBENZAPRINE HCL  20993731872  No Longer Active  Kavitha Godoy   

 

 LORTAB 5 5-500 MG TAB  1 by mouth every 6 hours as needed for pain    ACETAMINOPHEN-HYDROCODONE  61530532988  No Longer Active  Kavitha Godoy   

 

 HYDROCHLOROTHIAZIDE 12.5 MG CAPS  1 PO QD       HYDROCHLOROTHIAZIDE  
85818249466  Active  Bernadine Gomez   

 

 OMACOR 1 GM CAPS  4 PO daily    OMEGA-3-ACID ETHYL 
ESTERS  17394716093  No Longer Active  Kavitha Godoy   

 

 AUGMENTIN 875-125 MG TABS  1 PO BID    AMOXICILLIN-POT 
CLAVULANATE  90929539789  No Longer Active  Kavitha Godoy   

 

 TYLENOL 325 MG TAB  as directed    ACETAMINOPHEN  
63836774287  No Longer Active  Kavitha Godoy   

 

 DOCUSATE SODIUM 50 MG/15ML SYRP  3 drops each ear.  Let stand for 15 min. then 
rinse out with ball suction.  Repeat daily.    DOCUSATE 
SODIUM  32933040787  No Longer Active  Kavitha Godoy   

 

 BACTROBAN 2 % CREA  apply TID    MUPIROCIN CALCIUM  
66443541186  No Longer Active  Kavitha Godoy   

 

 HYDROCODONE-ACETAMINOPHEN 5-500 MG TABS  1 q4-6hrs prn  
  HYDROCODONE-ACETAMINOPHEN  19403623547  No Longer Active  Kavithapatricia Godoy   

 

 LOTRISONE 1-0.05 % CREA  apply BID to left leg    
CLOTRIMAZOLE-BETAMETHASONE  90510537325  No Longer Active  Kavithapatricia Godoy
   

 

 GLIPIZIDE 5 MG TAB  1 PO 0630 and 1 1/2 pills 1700       GLIPIZIDE  
47317571208  Active  Bernadine Gomez   

 

 NORVASC 5 MG TABS  1 po qd    AMLODIPINE BESYLATE  
79201419646  No Longer Active  Kavithapatricia Godoy   

 

 ISAURA-E  1 po qd JOINT PAIN    ISAURA-E     No Longer Active  
Kavithapatricia Godoy   

 

 COLACE 60 MG/15ML SYRP  Use daily for cerumen removal.  
  DOCUSATE SODIUM  00534873634  No Longer Active  Kavithapatricia Godoy   

 

 MULTIVITAMINS TABS  1 po daily    MULTIPLE VITAMIN  
26178397834  No Longer Active  Kavithapatricia Godoy   

 

 VITAMIN C 1000 MG TABS  1 po daily    ASCORBIC ACID  
19456486063  No Longer Active  Kavithapatricia Godoy   

 

 VITAMIN E 400 IU CAPS  2 po daily    VITAMIN E  
46675457251  No Longer Active  Kavithapatricia Godoy   

 

 AMBIEN 10 MG TAB  1 prn    ZOLPIDEM TARTRATE  
24231370742  No Longer Active  Kavithapatricia Godoy   

 

 ECONAZOLE NITRATE 1 % CREA  as directed    ECONAZOLE 
NITRATE  68158887966  No Longer Active  Kavitha Kristin Godoy   

 

 KEFLEX 500 MG CAPS  1 qid x 7days    CEPHALEXIN  
91998900630  No Longer Active  Kavithapatricia Godoy   

 

 ECONAZOLE NITRATE 1 % CREA  Apply BID x 7 days  2004/10/18  2004/10/25  
ECONAZOLE NITRATE  61581484296  No Longer Active  Kavitha Kristin Godoy   

 

 KEFLEX 500 MG CAP  1 Po QID for 14 days  2004/10/15  2004/10/22  CEPHALEXIN  
36686322061  No Longer Active  Kavitha Godoy   

 

 ALEVE 220 MG TABS  1 PO BID       NAPROXEN SODIUM  63362579206  No 
Longer Active  Kavitha Kristin Jayne   

 

 MOTRIN 600 MG TABS  1 po q 6 hrs.       IBUPROFEN  66984439407  No 
Longer Active  Kavitha Godoy   

 

 NASAL SPRAY SALINE 0.65 % SOLN  puffs 2 puffs BID    
SALINE  26611302090  No Longer Active  Kavitha Kristin Godoy   

 

 AMOXIL 875 MG TABS  1 PO BID    AMOXICILLIN  65448618577
  No Longer Active  Kavitha Kristin Godoy   

 

 LOTENSIN 20 MG TABS  1 PO QD       BENAZEPRIL HCL  34789154859  No 
Longer Active  Kavitha Kristin Godoy   

 

 AMARYL 4 MG TABS  1 tab po bid    GLIMEPIRIDE  
98233454727  No Longer Active  Kavithapatricia Godoy   

 

 GLUCOPHAGE  MG TB24  1 po daily    METFORMIN HCL  
00733027254  No Longer Active  Kavitha Godoy   







Immunizations







 Vaccine  Administration Date  Value  Standard Description

 

 Influenza vaccine given    Done  influenza virus vaccine, 
unspecified formulation

 

 pneumococcal immunization administered    1st dose  pneumococcal 
polysaccharide vaccine, 23 valent

 

 Influenza vaccine given    Done 10.24.11  influenza virus vaccine, 
unspecified formulation







Vital Signs







 Date  Name  Value  Unit  Range  Description

 

   pulse rate E&M - 8867-4  68  /min     Heart rate

 

   respiratory rate E&M - 9279-1  14  /min     Resp rate

 

   weight E&M - 3141-9  310  [lb_av]     Weight Measured

 

   blood pressure, diastolic - 8462-4  80  mm[Hg]     BP lara

 

   blood pressure, systolic - 8480-6  145  mm[Hg]     BP sys

 

   pulse rate E&M - 8867-4  80  /min     Heart rate

 

   respiratory rate E&M - 9279-1  14  /min     Resp rate

 

   temperature E&M  98.6  [degF]     Body temperature

 

   weight E&M - 3141-9  310  [lb_av]     Weight Measured

 

   blood pressure, diastolic - 8462-4  82  mm[Hg]     BP lara

 

   blood pressure, systolic - 8480-6  156  mm[Hg]     BP sys

 

   pulse rate E&M - 8867-4  76  /min     Heart rate

 

   respiratory rate E&M - 9279-1  14  /min     Resp rate

 

 2014/10/30  blood pressure, diastolic - 8462-4  74  mm[Hg]     BP laar

 

 2014/10/30  blood pressure, systolic - 8480-6  142  mm[Hg]     BP sys

 

 2014/10/30  pulse rate E&M - 8867-4  85  /min     Heart rate

 

 2014/10/30  respiratory rate E&M - 9279-1  14  /min     Resp rate

 

 2014/10/15  blood pressure, diastolic - 8462-4  80  mm[Hg]     BP lara

 

 2014/10/15  blood pressure, systolic - 8480-6  130  mm[Hg]     BP sys

 

 2014/10/15  pulse rate E&M - 8867-4  78  /min     Heart rate

 

 2014/10/15  respiratory rate E&M - 9279-1  14  /min     Resp rate

 

 2014/10/06  blood pressure, diastolic - 8462-4  88  mm[Hg]     BP lara

 

 2014/10/06  blood pressure, systolic - 8480-6  162  mm[Hg]     BP sys

 

 2014/10/06  pulse rate E&M - 8867-4  82  /min     Heart rate

 

 2014/10/06  respiratory rate E&M - 9279-1  16  /min     Resp rate

 

   blood pressure, diastolic - 8462-4  70  mm[Hg]     BP lara

 

   blood pressure, systolic - 8480-6  120  mm[Hg]     BP sys

 

   pulse rate E&M - 8867-4  88  /min     Heart rate

 

   respiratory rate E&M - 9279-1  14  /min     Resp rate

 

   temperature E&M  98.6  [degF]     Body temperature

 

   weight E&M - 3141-9  302  [lb_av]     Weight Measured

 

   blood pressure, diastolic - 8462-4  68  mm[Hg]     BP lara

 

   blood pressure, systolic - 8480-6  140  mm[Hg]     BP sys

 

   pulse rate E&M - 8867-4  78  /min     Heart rate

 

   respiratory rate E&M - 9279-1  14  /min     Resp rate

 

   temperature E&M  98.6  [degF]     Body temperature

 

   weight E&M - 3141-9  302  [lb_av]     Weight Measured

 

   blood pressure, diastolic - 8462-4  70  mm[Hg]     BP lara

 

   blood pressure, systolic - 8480-6  180  mm[Hg]     BP sys

 

   pulse rate E&M - 8867-4  88  /min     Heart rate

 

   respiratory rate E&M - 9279-1  14  /min     Resp rate

 

   temperature E&M  98.6  [degF]     Body temperature

 

   weight E&M - 3141-9  302  [lb_av]     Weight Measured

 

   blood pressure, diastolic - 8462-4  82  mm[Hg]     BP lara

 

   blood pressure, systolic - 8480-6  140  mm[Hg]     BP sys

 

   pulse rate E&M - 8867-4  84  /min     Heart rate

 

   respiratory rate E&M - 9279-1  16  /min     Resp rate

 

   temperature E&M  98.6  [degF]     Body temperature

 

   weight E&M - 3141-9  305  [lb_av]     Weight Measured







Diagnostic Results







 Date  Name  Value  Unit  Range  Description

 

 Clinical Lists Update: CBC - Hematology 

 

 2014/10/23  red blood cell distribution width  15.5  %      

 

   hematocrit, blood  40  %      

 

 2014/10/28  red blood cell distribution width  15.1  %      

 

 2014/10/29  red blood cell distribution width  15.0  %      

 

   red blood cell distribution width  15.2  %      

 

   red blood cell distribution width  15.2  %      

 

   red blood cell distribution width  16.2  %      

 

 2014/10/23  mean corpuscular volume, RBC  88  fL      

 

 2014/10/27  mean corpuscular volume, RBC  88  fL      

 

 2014/10/28  mean corpuscular volume, RBC  88  fL      

 

 2014/10/29  mean corpuscular volume, RBC  88  fL      

 

   mean corpuscular volume, RBC  88  fL      

 

   mean corpuscular volume, RBC  88  fL      

 

   mean corpuscular volume, RBC  89  fL      

 

 2014/10/23  leukocyte count, blood  8.7  10*3/mm3      

 

 2014/10/27  leukocyte count, blood  7.5  10*3/mm3      

 

 2014/10/28  leukocyte count, blood  7.5  10*3/mm3      

 

 2014/10/29  leukocyte count, blood  8.3  10*3/mm3      

 

   leukocyte count, blood  8.3  10*3/mm3      

 

   leukocyte count, blood  8.3  10*3/mm3      

 

   leukocyte count, blood  8.0  10*3/mm3      

 

 2014/10/23  erythrocyte (RBC) count  4.36  10*6/mm3      

 

 2014/10/27  erythrocyte (RBC) count  4.31  10*6/mm3      

 

 2014/10/28  erythrocyte (RBC) count  4.31  10*6/mm3      

 

 2014/10/29  erythrocyte (RBC) count  4.37  10*6/mm3      

 

   erythrocyte (RBC) count  4.51  10*6/mm3      

 

   erythrocyte (RBC) count  4.51  10*6/mm3      

 

   erythrocyte (RBC) count  4.52  10*6/mm3      

 

 2014/10/23  platelet count  29  10*3/mm3      

 

 2014/10/27  platelet count  59  10*3/mm3      

 

 2014/10/28  platelet count  59  10*3/mm3      

 

 2014/10/29  platelet count  61  10*3/mm3      

 

   platelet count  99  10*3/mm3      

 

   platelet count  99  10*3/mm3      

 

   platelet count  137  10*3/mm3      

 

 2014/10/23  hemoglobin, blood  12.3  g/dL      

 

 2014/10/27  hemoglobin, blood  12.1  g/dL      

 

 2014/10/28  hemoglobin, blood  12.1  g/dL      

 

 2014/10/29  hemoglobin, blood  12.1  g/dL      

 

   hemoglobin, blood  12.6  g/dL      

 

   hemoglobin, blood  12.6  g/dL      

 

   hemoglobin, blood  12.7  g/dL      

 

 2014/10/23  hematocrit, blood  38  %      

 

 2014/10/27  hematocrit, blood  38  %      

 

 2014/10/28  hematocrit, blood  38  %      

 

 2014/10/29  hematocrit, blood  38  %      

 

   hematocrit, blood  40  %      

 

   hematocrit, blood  40  %      

 

 2014/10/27  red blood cell distribution width  15.1  %      

 

 Clinical Lists Update: CBC    - Hematology 

 

   red blood cell distribution width  16.0  %      

 

   mean corpuscular volume, RBC  90  fL      

 

   leukocyte count, blood  7.5  10*3/mm3      

 

   hematocrit, blood  36  %      

 

   platelet count  163  10*3/mm3      

 

   hemoglobin, blood  11.5  g/dL      

 

   erythrocyte (RBC) count  4.01  10*6/mm3      

 

 Clinical Lists Update: CBC,CMP,Chol,Trig,Ferritin,HgA1c - Chemistry 

 

 2014/10/02  alanine aminotransferase (SGPT), serum  25  U/L      

 

 2014/10/02  albumin, serum  3.8  g/dL      

 

 2014/10/02  glucose, plasma fasting  108  mg/dL      

 

 2014/10/02  potassium, serum  4.6  mmol/L      

 

 2014/10/02  protein, total, serum  6.4  g/dL      

 

 2014/10/02  aspartate aminotransferase (SGOT), serum  26  U/L      

 

 2014/10/02  Estimated Glomerular Filtration Rate (calc)  57  mL/min/1.73m2    
  

 

 2014/10/02  hemoglobin A1C, blood, as % of total hemoglobin  7.4  %      

 

 2014/10/02  alkaline phosphatase, serum  121  U/L      

 

 2014/10/02  bilirubin, serum, total  0.7  mg/dL      

 

 2014/10/02  urea nitrogen, blood  15  mg/dL      

 

 2014/10/02  calcium, serum  8.6  mg/dL      

 

 2014/10/02  triglyceride, serum, fasting  109  mg/dL      

 

 2014/10/02  chloride, serum  102  mmol/L      

 

 2014/10/02  cholesterol, serum  121  mg/dL      

 

 2014/10/02  sodium, serum  135  mmol/L      

 

 2014/10/02  carbon dioxide, venous blood  25.0  mmol/L      

 

 2014/10/02  anion gap, serum  13         

 

 2014/10/02  creatinine, serum  1.0  mg/dL      

 

 2014/10/02  ferritin, serum  14.9  ng/mL      

 

 Clinical Lists Update: CBC,CMP,Chol,Trig,Ferritin,HgA1c - Hematology 

 

 2014/10/02  erythrocyte (RBC) count  4.37  10*6/mm3      

 

 2014/10/02  hemoglobin, blood  12.0  g/dL      

 

 2014/10/02  platelet count  65  10*3/mm3      

 

 2014/10/02  mean corpuscular volume, RBC  91  fL      

 

 2014/10/02  red blood cell distribution width  17.7  %      

 

 2014/10/02  hematocrit, blood  40  %      

 

 2014/10/02  leukocyte count, blood  6.0  10*3/mm3      

 

 Clinical Lists Update: CBC,CMP,FLP  IN [facility] LABS - Chemistry 

 

   albumin, serum  3.7  g/dL      

 

   alkaline phosphatase, serum  81  U/L      

 

   urea nitrogen, blood  13  mg/dL      

 

   calcium, serum  9.3  mg/dL      

 

   chloride, serum  104  mmol/L      

 

   cholesterol, serum  117  mg/dL      

 

   carbon dioxide, venous blood  21  mmol/L      

 

   creatinine, serum  0.77  mg/dL      

 

   HDL cholesterol, serum  40  mg/dL      

 

   LDL cholesterol, serum  57  mg/dL      

 

   potassium, serum  4.5  mmol/L      

 

   aspartate aminotransferase (SGOT), serum  25  U/L      

 

   alanine aminotransferase (SGPT), serum  18  U/L      

 

   bilirubin, serum, total  0.7  mg/dL      

 

   triglyceride, serum, fasting  137  mg/dL      

 

   sodium, serum  139  mmol/L      

 

   very low density lipoproteins  27  mg/dL      

 

   glucose, plasma fasting  128  mg/dL      

 

   Estimated Glomerular Filtration Rate (calc)  >60  mL/min/1.73m2   
   

 

   protein, total, serum  6.9  g/dL      

 

 Clinical Lists Update: CBC,CMP,FLP  IN [facility] LABS - Hematology 

 

   mean corpuscular volume, RBC  90  fL      

 

   leukocyte count, blood  8.8  10*3/mm3      

 

   erythrocyte (RBC) count  4.32  10*6/mm3      

 

   platelet count  198  10*3/mm3      

 

   hemoglobin, blood  12.5  g/dL      

 

   hematocrit, blood  39  %      

 

   red blood cell distribution width  15.4  %      

 

 Clinical Lists Update: CBC,CMP,FLP,TSH - Chemistry 

 

 2015/04/15  aspartate aminotransferase (SGOT), serum  26  U/L      

 

 2015/04/15  alkaline phosphatase, serum  105  U/L      

 

 2015/04/15  urea nitrogen, blood  22  mg/dL      

 

 2015/04/15  calcium, serum  9.5  mg/dL      

 

 2015/04/15  chloride, serum  106  mmol/L      

 

 2015/04/15  cholesterol, serum  114  mg/dL      

 

 2015/04/15  carbon dioxide, venous blood  22  mmol/L      

 

 2015/04/15  Estimated Glomerular Filtration Rate (calc)  >60  mL/min/1.73m2   
   

 

 2015/04/15  glucose, plasma fasting  127  mg/dL      

 

 2015/04/15  very low density lipoproteins  27  mg/dL      

 

 2015/04/15  sodium, serum  139  mmol/L      

 

 2015/04/15  triglyceride, serum, fasting  134  mg/dL      

 

 2015/04/15  bilirubin, serum, total  0.8  mg/dL      

 

 2015/04/15  alanine aminotransferase (SGPT), serum  21  U/L      

 

 2015/04/15  albumin, serum  3.8  g/dL      

 

 2015/04/15  protein, total, serum  6.7  g/dL      

 

 2015/04/15  potassium, serum  4.8  mmol/L      

 

 2015/04/15  LDL cholesterol, serum  22  mg/dL      

 

 2015/04/15  thyroid stimulating hormone, serum  1.52  u[iU]/mL      

 

 2015/04/15  hemoglobin A1C, blood, as % of total hemoglobin  6.3  %      

 

 2015/04/15  HDL cholesterol, serum  42  mg/dL      

 

 2015/04/15  creatinine, serum  0.78  mg/dL      

 

 Clinical Lists Update: CBC,CMP,FLP,TSH - Hematology 

 

 2015/04/15  hematocrit, blood  34  %      

 

 2015/04/15  hemoglobin, blood  10.5  g/dL      

 

 2015/04/15  platelet count  199  10*3/mm3      

 

 2015/04/15  erythrocyte (RBC) count  3.77  10*6/mm3      

 

 2015/04/15  leukocyte count, blood  5.1  10*3/mm3      

 

 2015/04/15  mean corpuscular volume, RBC  89  fL      

 

 2015/04/15  red blood cell distribution width  17.1  %      

 

 Clinical Lists Update: CBC,CMP,Ferritin - Chemistry 

 

   Estimated Glomerular Filtration Rate (calc)  81  mL/min/1.73m2    
  

 

   glucose, plasma fasting  160  mg/dL      

 

   anion gap, serum  13         

 

   sodium, serum  135  mmol/L      

 

   bilirubin, serum, total  0.9  mg/dL      

 

   alanine aminotransferase (SGPT), serum  24  U/L      

 

   aspartate aminotransferase (SGOT), serum  21  U/L      

 

   protein, total, serum  6.2  g/dL      

 

   potassium, serum  4.7  mmol/L      

 

   ferritin, serum  33.8  ng/mL      

 

   creatinine, serum  0.8  mg/dL      

 

   carbon dioxide, venous blood  26.0  mmol/L      

 

   chloride, serum  101  mmol/L      

 

   calcium, serum  8.9  mg/dL      

 

   urea nitrogen, blood  14  mg/dL      

 

   alkaline phosphatase, serum  115  U/L      

 

   albumin, serum  3.8  g/dL      

 

 Clinical Lists Update: CBC,CMP,Ferritin - Hematology 

 

   red blood cell distribution width  22.1  %      

 

   hemoglobin, blood  11.2  g/dL      

 

   hematocrit, blood  34  %      

 

   mean corpuscular volume, RBC  87  fL      

 

   erythrocyte (RBC) count  3.98  10*6/mm3      

 

   leukocyte count, blood  6.7  10*3/mm3      

 

   platelet count  179  10*3/mm3      

 

 Clinical Lists Update: CBC,FERRITIN - Chemistry 

 

   ferritin, serum  11  ng/mL      

 

 Clinical Lists Update: CBC,FERRITIN - Hematology 

 

   hematocrit, blood  30  %      

 

   red blood cell distribution width  14.4  %      

 

   mean corpuscular volume, RBC  91  fL      

 

   hemoglobin, blood  9.3  g/dL      

 

   leukocyte count, blood  6.1  10*3/mm3      

 

   erythrocyte (RBC) count  3.32  10*6/mm3      

 

   platelet count  203  10*3/mm3      

 

 Clinical Lists Update: ER LABS - Chemistry 

 

   albumin, serum  3.8  g/dL      

 

   alkaline phosphatase, serum  110  U/L      

 

   urea nitrogen, blood  15  mg/dL      

 

   calcium, serum  9.5  mg/dL      

 

   chloride, serum  102  mmol/L      

 

   carbon dioxide, venous blood  22  mmol/L      

 

   creatinine, serum  0.80  mg/dL      

 

   potassium, serum  5.0  mmol/L      

 

   protein, total, serum  7.1  g/dL      

 

   aspartate aminotransferase (SGOT), serum  32  U/L      

 

   alanine aminotransferase (SGPT), serum  20  U/L      

 

   bilirubin, serum, total  1.1  mg/dL      

 

   sodium, serum  134  mmol/L      

 

   Estimated Glomerular Filtration Rate (calc)  >60  mL/min/1.73m2   
   

 

   glucose, plasma fasting  152  mg/dL      

 

 Clinical Lists Update: ER LABS - Hematology 

 

   leukocyte count, blood  12.6  10*3/mm3      

 

   hematocrit, blood  35  %      

 

   platelet count  197  10*3/mm3      

 

   erythrocyte (RBC) count  3.87  10*6/mm3      

 

   red blood cell distribution width  16.8  %      

 

   hemoglobin, blood  11.0  g/dL      

 

   mean corpuscular volume, RBC  89  fL      

 

 Clinical Lists Update: ER LABS - Urinalysis 

 

   appearance, urine  Clear Yellow         

 

   urobilinogen, urine, semiquantitative (dipstick)  normal         

 

   specific gravity, urine  1.10         

 

   pH, urine, semiquantitative  8         

 

   nitrite, urine, semiquantitative  neg         

 

   ketones, urine, by test strip  neg         

 

   bilirubin, urine  neg         

 

   glucose, urine, semiquantitative  neg         

 

   WBC urine on microscopy  0-2  {Cells}/[HPF]      

 

   RBC urine by microscopy  0-2         

 

   hyaline casts, urine  none  /[LPF]      

 

   epithelial cells, urine  2-5  /[LPF]      

 

   mucus on urinalysis  neg         

 

   blood in urine (hemoglobin) by dipstick  1+         

 

   protein, urine, semiquantitative (dipstick)  1+         

 

   bacteria, urine microscopy  neg         

 

 Office Visit: Dr Godoy's Check Up: Established Patient Visit - Chemistry 

 

   ferritin, serum  21  ng/mL      

 

   hemoglobin A1C, blood, as % of total hemoglobin  8.1  %      

 

 Office Visit: Dr Godoy's Check Up: Established Patient Visit - Hematology 

 

 2014/10/30  red blood cell distribution width  15.0  %      

 

 2014/10/15  mean corpuscular volume, RBC  86  fL      

 

 2014/10/16  mean corpuscular volume, RBC  37  fL      

 

 2014/10/30  mean corpuscular volume, RBC  88  fL      

 

 2014/10/15  leukocyte count, blood  7.3  10*3/mm3      

 

 2014/10/16  leukocyte count, blood  9.1  10*3/mm3      

 

 2014/10/30  leukocyte count, blood  8.3  10*3/mm3      

 

 2014/10/30  hematocrit, blood  38  %      

 

 2014/10/16  erythrocyte (RBC) count  4.27  10*6/mm3      

 

 2014/10/30  erythrocyte (RBC) count  4.37  10*6/mm3      

 

 2014/10/15  platelet count  39  10*3/mm3      

 

 2014/10/16  platelet count  22  10*3/mm3      

 

 2014/10/30  platelet count  61  10*3/mm3      

 

 2014/10/15  hemoglobin, blood  12.4  g/dL      

 

 2014/10/16  hemoglobin, blood  11.8  g/dL      

 

 2014/10/30  hemoglobin, blood  12.1  g/dL      

 

 2014/10/15  hematocrit, blood  37  %      

 

 2014/10/16  hematocrit, blood  37  %      

 

 2014/10/16  red blood cell distribution width  15.0  %      

 

 2014/10/15  red blood cell distribution width  14.9  %      

 

 2014/10/15  erythrocyte (RBC) count  4.36  10*6/mm3      

 

 Phone Note: Low platelet count - Hematology 

 

 2014/10/08  red blood cell distribution width  17.0  %      

 

 2014/10/08  mean corpuscular volume, RBC  90  fL      

 

 2014/10/08  leukocyte count, blood  5.9  10*3/mm3      

 

 2014/10/08  hematocrit, blood  40  %      

 

 2014/10/08  platelet count  12  10*3/mm3      

 

 2014/10/08  hemoglobin, blood  12.7  g/dL      

 

 2014/10/08  erythrocyte (RBC) count  4.45  10*6/mm3      







Encounters







 Code  Encounter  Date  Provider  Facility

 

 CPT-37417  Ofc Vst, Est Level IV   17:01:58 CDT  Kavitha Kristinagustina Godoy, DO, FACP

 

 CPT-40320  Ofc Vst, Est Level IV   17:23:42 CST  Kavitha Godoy, DO, FACP

 

 CPT-43680  Ofc Vst, Est Level IV   20:14:18 CST  Kavitha Godoy, DO, FACP

 

 CPT-81230  Ofc Vst, Est Level III  2014/10/30 17:10:34 CDT  Kavitha Godoy, DO, FACP

 

 CPT-04954  Ofc Vst, Est Level IV  2014/10/16 22:19:26 CDT  Kavtihapatricia Godoy, DO, FACP

 

 CPT-13211  Ofc Vst, Est Level IV  2014/10/07 16:08:07 CDT  Kavithapatricia Godoy, DO, FACP

 

 CPT-26973  Ofc Vst, Est Level II   15:22:36 CDT  Kavitha Godoy, DO, FACP

 

 CPT-61948  Ofc Vst, Est Level IV   11:13:04 CDT  Kavitha Godoy, DO, FACP

 

 CPT-55773  Ofc Vst, Est Level IV   13:20:51 CDT  Kavitha Godoy, DO, FACP

 

 CPT-46639  Ofc Vst, Est Level IV   14:23:34 CDT  Kavihta Godoy, DO, FACP

 

 CPT-16080  Ofc Vst, Est Level III   19:42:36 CDT  [facility]a [facility]

 

 CPT-72558  Ofc Vst, Est Level IV   14:28:10 CST  Kavitha WELSH Godoy, DO, FACP

 

 CPT-83222  Ofc Vst, Est Level IV   15:20:04 CST  Kavitha WELSH Godoy, DO, FACP

 

 CPT-51930  Ofc Vst, Est Level III  2013/10/15 14:46:42 CDT  Kavitha WELSH Godoy, DO, FACP

 

 CPT-07594  Ofc Vst, Est Level IV   13:31:14 CDT  Kavithapatricia WELSH Godoy, DO, FACP

 

 CPT-81096  Ofc Vst, Est Level IV  2013/04/10 16:26:21 CDT  Kavithapatricia WELSH Godoy, DO, FACP

 

 CPT-72262  Ofc Vst, Est Level III   16:02:31 CST  Kavitha WELSH Godoy, DO, FACP

 

 CPT-63159  Ofc Vst, Est Level IV   13:01:46 CST  Kavitha WELSH Godoy, DO, FACP

 

 CPT-24791  Ofc Vst, Est Level IV   10:43:37 CDT  Kavithapatricia WELSH Godoy, DO, FACP

 

 CPT-68866  Ofc Vst, Est Level III   15:41:44 CDT  Kavitha [facility]

 

 CPT-60463  Ofc Vst, Est Level IV   16:31:03 CDT  Kavithapatricia WELSH Jayne, DO, FACP

 

 CPT-11154  Ofc Vst, Est Level III   15:45:49 CDT  Kavitha Kristin WELSH Jayne, DO, FACP

 

 CPT-95850  Ofc Vst, Est Level IV  2012/03/15 11:04:49 CDT  Kavitha WELSH Godoy, DO, FACP

 

 CPT-79808  Ofc Vst, Est Level IV   16:16:55 CST  Kavitha WELSH Jayne, DO, FACP

 

 CPT-04241  Ofc Vst, Est Level IV   20:00:20 CST  Kavitha Godoy  [facility]

 

 CPT-37504  Ofc Vst, Est Level IV   15:00:56 CST  Kavitha WELSH Godoy, DO, FACP

 

 CPT-74716  Ofc Vst, Est Level IV   11:37:43 CST  Kavitha WELSH Godoy, DO, FACP

 

 CPT-52370  Ofc Vst, Est Level V   11:04:21 CST  Bernadine WELSH Godoy, DO, FACP

 

 CPT-35628  Ofc Vst, Est Level IV   15:56:58 CDT  Kavitha Kristin WELSH Jayne, DO, FACP

 

 CPT-70315  Ofc Vst, Est Level III   15:56:48 CDT  Kavithapatricia WELSH Jayne, DO, FACP

 

 CPT-89966  Ofc Vst, Est Level IV   11:13:32 CDT  Kaivthapatricia WELSH Jayne, DO, FACP

 

 CPT-32126  Ofc Vst, Est Level III   11:06:29 CDT  Kavithapatricia WELSH Jayne, DO, FACP

 

 CPT-35723  Ofc Vst, Est Level IV   10:36:27 CDT  Kavitha WELSH Jayne, DO, FACP

 

 CPT-78095  Ofc Vst, Est Level IV   16:28:28 CST  Kavitha Kristin WELSH Jayne, DO, FACP

 

 CPT-30406  Ofc Vst, Est Level IV   16:15:53 CST  Kavitha Kristin WELSH Jayne, DO, FACP

 

 CPT-73058  Ofc Vst, Est Level IV   10:24:23 CDT  Kavitha Kristin Rainner  Kavitha WELSH Jayne, DO, FACP

 

 CPT-37813  Ofc Vst, Est Level IV   10:41:52 CDT  Kavitha Kristin WELSH Jayne, DO, FACP

 

 CPT-69072  Ofc Vst, Est Level V   14:13:59 CDT  Kavithapatricia [facility]

 

 CPT-35116  Ofc Vst, Est Level IV   11:50:10 CDT  Kavitha Kristin WELSH Godoy, DO, FACP

 

 CPT-14963  Ofc Vst, Est Level V   16:45:39 CST  Kavithapatricia WELSH Jayne, DO, FACP

 

 CPT-52497  Ofc Vst, Est Level V   16:35:01 CDT  Kavitha Kristin WELSH Jayne, DO, FACP

 

 CPT-14934  Ofc Vst, Est Level IV  2009/07/10 10:56:36 CDT  Kavithapatricia WELSH Jayne, DO, FACP

 

 CPT-23688  Ofc Vst, Est Level IV   09:27:02 CDT  Kavitha Kristin WELSH Jayne, DO, FACP

 

 CPT-47156  Ofc Vst, Est Level IV   16:04:39 CST  Kavitha WELSH Jayne, DO, FACP

 

 CPT-40536  Ofc Vst, Est Level IV  2008/09/15 16:41:31 CDT  Kavithapatricia WELSH Jayne, DO, FACP

 

 CPT-91335  Ofc Vst, Est Level IV   16:15:36 CDT  Kavithapatricia WELSH Jayne, DO, FACP

 

 CPT-50533  Ofc Vst, Est Level IV   16:43:23 CST  Kavitha Kristin WELSH Jayne, DO, FACP

 

 CPT-90923  Ofc Vst, Est Level IV   17:09:39 CST  Kavitha WELSH Jayne, DO, FACP

 

 CPT-65635  Ofc Vst, Est Level III  2007/10/29 16:52:49 CDT  Kavitha Kristin WELSH Jayne, DO, FACP

 

 CPT-59458  Ofc Vst, Est Level III  2007/10/23 16:46:46 CDT  Kavitha Kristin 
Jayne Nowaki ANITHA RainGodoy, DO, FACP

 

 CPT-35617  Ofc Vst, Est Level IV  2007/10/16 16:17:41 CDT  Kavitha Kristin 
Jayne  Kavitha S Godoy, DO, FACP

 

 CPT-10726  Ofc Vst, Est Level III  2007/10/09 17:15:29 CDT  Kavitha Kristin 
Jayne  Kavitha S Godoy, DO, FACP

 

 CPT-40483  Ofc Vst, Est Level IV   15:48:06 CDT  Kavitha Kristin 
Jayne Nowaki S Godoy, DO, FACP

 

 CPT-79539  Ofc Vst, Est Level IV   11:59:27 CDT  Kavitha Kristin 
Jayne Nowaki ANITHA Godoy, DO, FACP

 

 CPT-66136  Ofc Vst, Est Level IV   16:31:37 CDT  Kavitha Kristin Godoy  [facility]

 

 CPT-18899  Ofc Vst, Est Level IV   16:25:22 CST  Kavitha Godoy  [facility] [place]

 

 CPT-68525  Ofc Vst, Est Level III   18:24:09 CST  Kavitha Godoy  [facility] [place]

 

 CPT-34797  Ofc Vst, Est Level III  2006/10/11 16:54:12 CDT  Kavitha Godoy  [facility] [place]

 

 CPT-06033  Ofc Vst, Est Level IV   16:39:33 CDT  Kavitha Kristin Godoy  [facility] [place]

 

 CPT-35255  Ofc Vst, Est Level IV   15:11:16 CDT  Kavitha Kristin Godoy  [facility] [place]

 

 CPT-57607  Ofc Vst, Est Level IV   17:29:13 CST  Kavitha Godoy  [facility] [place]

 

 CPT-15875  Ofc Vst, Est Level III   12:45:04 CST  Kavitha Godoy  [facility] [place]

 

 CPT-83091  Ofc Vst, Est Level III   16:51:19 CST  Kavitha Godoy  [facility]

 

 CPT-23283  Ofc Vst, Est Level IV   16:59:21 CDT  Kavitha Kristin Godoy  [facility]

 

 CPT-96319  Ofc Vst, Est Level IV   09:39:02 CDT  Kavithapatricia Godoy  [facility]

 

 CPT-09082  Ofc Vst, Est Level IV   18:04:53 CDT  Kavitha Kristin Godoy  [facility]

 

 CPT-12464  Ofc Vst, Est Level IV   17:17:47 CST  Kavitha Godoy  [facility]

 

 CPT-57460  Ofc Vst, Est Level IV   17:23:25 CST  Kavitha Godoy  [facility]

 

 CPT-67148  Ofc Vst, Est Level IV   18:44:33 CST  Kavitha Godoy  [facility]

 

 CPT-80717  Ofc Vst, Est Level III  2004/10/28 18:20:20 CDT  Kavitha Kristin Godoy  [facility]

 

 CPT-02621  Ofc Vst, Est Level III  2004/10/18 17:54:31 CDT  Kavithapatricia Godoy  [facility]

 

 CPT-23431  Ofc Vst, Est Level IV   19:13:31 CDT  Kavithapatricia Godoy  [facility]

 

 CPT-04562  Ofc Vst, Est Level IV   18:01:08 CDT  Kavithapatricia Godoy  [facility] [place]

 

 CPT-83462  Ofc Vst, Est Level III   13:36:36 CDT  Kavithapatricia Godoy  [facility]

 

 CPT-84629  Ofc Vst, Est Level III   16:47:12 CDT  Kavithapatricia Godoy  [facility]

 

 CPT-79062  Ofc Vst, Est Level IV   17:34:04 CDT  Kavithapatricia Godoy  [facility]

 

 CPT-17597  Ofc Vst, Est Level III   15:45:39 CST  Kavitha Godoy  [facility]

 

 CPT-75409  Ofc Vst, Est Level III   16:07:05 CST  Kavitha Kristinagustina Godoy  [facility] [place]

 

 CPT-79075  Ofc Vst, Est Level III   16:19:57 CST  Kavitha Godoy  [facility] [place]

 

 CPT-74827  Ofc Vst, Est Level III   16:46:48 CST  Kavitha Godoy  [facility] [place]

 

 CPT-95352  Ofc Vst, Est Level II   17:30:36 CST  Kavitha Godoy  [facility] [place]

 

 CPT-18700  Ofc Vst, Est Level III   17:20:59 CST  Kavitha oGdoy  [facility] [place]

 

 CPT-29489  Ofc Vst, Est Level III   17:49:24 CST  Kavitha Godoy  [facility] [place]

 

 CPT-84301  Ofc Vst, New Level III   17:10:24 CST  Kavitha Godoy  [facility] [place]

 

 CPT-73211  Ofc Vst, New Level IV   17:21:37 CST  Kavitha Godoy  [facility] [place]







Procedures







 Code  Procedure Name  Date  Entry Date  Standard Description

 

 CPT-  Medicare Annual Wellness Visit   15:52:28 CDT  
   

 

 CPT-  E-Prescribing Medication Sent   19:42:36 CDT   
  

 

 CPT-  E-Prescribing Not sent due to no medication given   14:28:
10 CST     

 

 CPT-  E-Prescribing Not sent due to no medication given   15:20:
04 CST     

 

 CPT-  E-Prescribing Not sent due to no medication given  2013/10/15 14:46:
42 CDT  2013/10/15   

 

 CPT-  E-Prescribing Not sent due to no medication given   13:31:
14 CDT     

 

 CPT-  E-Prescribing Medication Sent   16:36:19 CDT  2013/07/10 
  

 

 CPT-  Medicare Annual Wellness Visit Initial   16:36:19 CDT  
2013/07/10   

 

 CPT-  E-Prescribing Not sent due to no medication given  2013/04/10 16:26:
21 CDT     

 

 CPT-  E-Prescribing Medication Sent   16:02:31 CST   
  

 

 CPT-  E-Prescribing Not sent due to no medication given   13:01:
46 CST     

 

 CPT-  E-Prescribing Not sent due to no medication given   10:43:
37 CDT     

 

 CPT-16113  Injection, Pneumovax   14:03:27 CDT     

 

 CPT-47823  Ear Wax Removal  2008/10/17 11:05:06 CDT  2008/10/17   

 

 CPT-45377  Ear Wax Removal   10:22:58 CDT     

 

 CPT-92447  Cryopathy Skin   09:39:02 CDT     

 

 CPT-70466  Ear Wax Removal   17:30:36 CST     

 

 CPT-22515  Ear Wax Removal   17:20:59 CST  
[facility] Clinical Summary

 Created on: 2015



Madison Young

External Reference #: [id number]

: 1945

Sex: Female



Demographics







 Address  [address] [place] [address]

 

 Home Phone  [phone]

 

 Preferred Language  Unknown

 

 Marital Status  W

 

 [employer] Affiliation  Unknown

 

 Race  White

 

 Ethnic Group  Not  or 





Author







 Author  User, MELISSA

 

 Organization  [facility]

 

 Address  Unknown

 

 Phone  Unavailable







Allergies, Adverse Reactions, Alerts







 Allergy Name  Reaction Description  Start Date  Severity  Status  Provider

 

 ZINC OXIDE       Critical  Active  Kavitha Godoy







Conditions or Problems







 Problem Name  Problem Code  Onset Date  Status  Entry Date  Provider  Comment  
Standard Description  Annotate

 

 DIABETES MELLITUS, TYPE II, UNCONTROLLED, W/O COMPLICATIONS  250.02     
Correction    Kavitha Godoy     Diabetes mellitus without 
mention of complication, type II or unspecified type, uncontrolled   

 

 DIABETES MELLITUS, TYPE I, UNCONTROLLED, WITH COMPLICATIONS  250.93     
Correction    Kavitha Godoy     Diabetes mellitus with 
unspecified complication, type I [juvenile type], uncontrolled   

 

 DIABETES MELLITUS, TYPE II, UNCONTROLLED, WITH COMPLICATIONS  250.92    Active    Kavitha Godoy     Diabetes mellitus with 
unspecified complication, type II or unspecified type, uncontrolled   

 

 OBESITY  278.00     Resolved    Kavitha Godoy     Obesity, 
unspecified   

 

 ELEVATED BLOOD PRESSURE WITHOUT DIAGNOSIS OF HYPERTENSION  796.2    
Correction    Kavitha Godoy     Elevated blood pressure 
reading without diagnosis of hypertension   

 

 ARTHRITIS, RHEUMATOID  714.0     Resolved    Kavitha Godoy   
  Rheumatoid arthritis   

 

 LIVER FUNCTION TESTS, ABNORMAL, HX OF  V12.2     Resolved    Kavitha Godoy     Personal history of endocrine, metabolic, and immunity 
disorders   

 

 CERUMEN IMPACTION, BILATERAL  380.4    Resolved    Kavitha Godoy     Impacted cerumen   

 

 CERUMEN IMPACTION, BILATERAL  380.4    Resolved    Kavitha Godoy     Impacted cerumen   

 

 DECREASED HEARING  389.10    Resolved    Kavitha Godoy     Sensorineural hearing loss, unspecified  due to cerumen presumed

 

 History of ANEMIA  285.9     Correction    Kavitha Godoy     
Anemia, unspecified   

 

 POLYARTHRALGIA  719.49     Active    Kavitha Godoy     Pain 
in joint involving multiple sites   

 

 NEPHROPATHY, DIABETIC  250.40    Resolved    Kavitha Godoy     Diabetes mellitus with renal manifestations, type II or unspecified 
type, not stated as uncontrolled   

 

 HYPERCHOLESTEROLEMIA  272.0    Active    Kavitha Godoy     Pure hypercholesterolemia   

 

 HYPERTRIGLYCERIDEMIA  272.1    Active    Kavitha Godoy     Pure hyperglyceridemia   

 

 HYPERTENSION, BENIGN ESSENTIAL, UNCONTROLLED  401.1    Active    Kavitha Godoy     Benign essential hypertension   

 

 PHARYNGITIS, ACUTE  462    Resolved    Kavitha Godoy     Acute pharyngitis   

 

 DYSPHAGIA  787.2    Resolved    Kavitha Godoy     
Dysphagia   

 

 INSOMNIA, TRANSIENT  307.41    Resolved    Kavitha Godoy     Transient disorder of initiating or maintaining sleep   

 

 CELLULITIS  682.9    Resolved    Kavitha Godoy     
Cellulitis and abscess of unspecified sites  foot

 

 CELLULITIS  682.9  2004/10/28  Resolved  2004/10/28  Kavitha Godoy     
Cellulitis and abscess of unspecified sites   

 

 DERMATOPHYTOSIS, FOOT  110.4    Resolved    Kavitha Godoy     Dermatophytosis of foot   

 

 DERMATITIS  692.9    Resolved    Kavitha Godoy     
Contact dermatitis and other eczema, unspecified cause   

 

 KNEE PAIN  719.46    Resolved    Kavitha Godoy     
Pain in joint involving lower leg  right

 

 SHOULDER PAIN  719.41    Resolved    Kavitha Godoy 
    Pain in joint involving shoulder region  right

 

 SKIN LESION  709.9    Resolved    Kavitha Godoy     
Unspecified disorder of skin and subcutaneous tissue   

 

 FEVER  780.6    Resolved    Kavitha Godoy     Fever 
and other physiologic disturbances of temperature regulation   

 

 COUGH  786.2    Resolved    Kavitha Godoy     Cough
   

 

 SCREENING MAMMOGRAM NEC  V76.12    Resolved    Kavitha Godoy     Other screening mammogram   

 

 SCREENING FOR OSTEOPOROSIS  V82.81    Resolved    Kavitha Godoy     Screening for osteoporosis   

 

 MICROALBUMINURIA  791.0    Active    Kavitha Godoy 
    Proteinuria   

 

 HX, PERSONAL, PAST NONCOMPLIANCE  V15.81    Resolved    
Kavitha Godoy     Personal history of noncompliance with medical 
treatment, presenting hazards to health   

 

 BACK PAIN  724.5    Resolved    Kavitha Godoy     
Backache, unspecified   

 

 NEOPLASM, SKIN, UNCERTAIN BEHAVIOR  238.2    Resolved    
Kavitha Godoy     Neoplasm of uncertain behavior of skin  right upper lip

 

 SCIATICA/HERNIATED DISC  722.10  2006/10/11  Resolved    Kavitha Godoy     Displacement of lumbar intervertebral disc without 
myelopathy   

 

 IMPETIGO  684    Resolved    Kavitha Godoy     
Impetigo  right nare

 

 LYMPHEDEMA NEC  457.1    Active    Kavitha Godoy   
  Other lymphedema   

 

 CELLULITIS  682.9    Resolved    Kavitha Godoy     
Cellulitis and abscess of unspecified sites   

 

 OSTEOMYELITIS NOS, ANKLE/FOOT  730.27  2007/10/09  Resolved    Kavitha Godoy     Unspecified osteomyelitis involving ankle and foot   

 

 URTICARIA, ACUTE  708.9  2007/10/29  Resolved    Kavitha Godoy     Unspecified urticaria   

 

 RENAL CALCULUS  592.0  2009/07/10  Active  2009/07/10  Kavitha Godoy   
  Calculus of kidney   

 

 DEHYDRATION  276.51  2009/07/10  Resolved    Kavitha Godoy   
  Dehydration   

 

 CHOLELITHIASIS, ASYMPTOMATIC  574.20    Resolved    Kavitha Godoy     Calculus of gallbladder without mention of cholecystitis, 
without mention of obstruction   

 

 PNEUMONIA  486    Resolved    Kavitha Godoy     
Pneumonia, organism unspecified   

 

 RESTLESS LEG SYNDROME  333.94    Resolved    Kavitha Godoy     Restless legs syndrome (RLS)   

 

 FREQUENCY, URINARY  788.41    Resolved    Kavitha Godoy     Urinary frequency   

 

 FEVER PRESENTING CONDITIONS CLASSIFIED ELSEWHERE  780.61    Resolved
    Kavitha Godoy     Fever presenting with conditions 
classified elsewhere   

 

 PVD  443.9    Resolved    Kavitha Godoy     
Peripheral vascular disease, unspecified   

 

 EDEMA LEG  782.3    Resolved    Kavitha Godoy     
Edema   

 

 LEG PAIN, LEFT  729.5    Resolved    Kavitha Godoy 
    Pain in limb   

 

 URINARY FREQUENCY  788.41    Resolved    Kavitha Godoy     Urinary frequency   

 

 VACCINE AGAINST STREPTOCOCCUS PNEUMONIAE  V03.82    Resolved    Kavitha Godoy     Need for prophylactic vaccination against 
Streptococcus pneumoniae [pneumococcus]   

 

 SPINAL STENOSIS, LUMBAR  724.02    Resolved    Kavitha Godoy     Spinal stenosis of lumbar region without neurogenic 
claudication   

 

 KNEE PAIN  719.46    Resolved    Kavitha Godoy     
Pain in joint involving lower leg   

 

 CORONARY ATHEROSCLEROSIS, NATIVE VESSEL  414.01    Active    Kavitha Godoy     Coronary atherosclerosis of native coronary artery
   

 

 ANEMIA, IRON DEFICIENCY NEC  280.8    Active    Kavitha Godoy     Other specified iron deficiency anemias   

 

 NAUSEA AND VOMITING  787.01    Resolved    Kavitha Godoy     Nausea with vomiting   

 

 UTI  599.0    Resolved    Kavitha Godoy     Urinary 
tract infection, site not specified   

 

 GROSS HEMATURIA  599.71    Resolved    Kavitha Godoy     Gross hematuria   

 

 CELLULITIS  682.9    Resolved    Kavitha Godoy     
Cellulitis and abscess of unspecified sites   

 

 IMMUNE THROMBOCYTOPENIC PURPURA  287.31  2014/10/15  Active  2014/10/16  Kavitha Godoy     Immune thrombocytopenic purpura   

 

 PVD  443.9    Active    Kavitha Godoy     
Peripheral vascular disease, unspecified   







Medication List







 Medication  Instructions  Start Date  Stop Date  Generic Name  NDC  Status  
Provider  Patient Instruction

 

 ONETOUCH ULTRA BLUE STRP  TEST BS QID DX: 250.92  2015/06/15     GLUCOSE BLOOD
  [id number]  Active  Bernadine Gomez   

 

 POTASSIUM CITRATE ER 15 MEQ (1620 MG) CR-TABS  1 PO BID        POTASSIUM 
CITRATE  [id number]  Active  Kavitha Godoy   

 

 HYDROCODONE-ACETAMINOPHEN 5-325 MG TABS  1 PO BID prn    
HYDROCODONE-ACETAMINOPHEN  [id number]  No Longer Active  Kavitha Godoy 
  

 

 PEN NEEDLES 5/16" 31G X 8 MM MISC  as directed    
INSULIN PEN NEEDLE  [id number]  No Longer Active  Kavitha Godoy   

 

 GLUCOMETER STRIPS  CHECK BS QID    GLUCOMETER STRIPS     
No Longer Active  Kavitha Godoy   

 

 GLUCOMETER MACHINE  AS DIRECTED    GLUCOMETER MACHINE   
  No Longer Active  Kavitha Godoy   

 

 CLEOCIN 150 MG CAPS  1 po daily    CLINDAMYCIN HCL  
73755096425  No Longer Active  Kavitha Godoy   

 

 FERREX 150 150 MG CAPS  1 PO daily as directed  2014/10/06    
POLYSACCHARIDE IRON COMPLEX  [id number]  No Longer Active  Kavitha Godoy   

 

 PREDNISONE 20 MG TABS  TAKE 5 TABS PO DAILY  2014/10/13    
PREDNISONE  [id number]  No Longer Active  Kavitha Godoy   

 

 BRILINTA 90 MG TABS  1 PO BID       TICAGRELOR  [id number]  Active  
Kavitha Godoy   

 

 ASPIRIN 81 MG TAB  1 PO daily       ASPIRIN  23442361401  Active  
Kavitha Godoy   

 

 COREG 12.5 MG TABS  1 PO BID        CARVEDILOL  [id number]  Active  Kavitha Godoy   

 

 PLAVIX 75 MG TABS  1 PO QD    CLOPIDOGREL BISULFATE  
04289540465  No Longer Active  Kavithapatricia Godoy   

 

 AUGMENTIN 500-125 MG TAB  1 PO BID    AMOXICILLIN-POT 
CLAVULANATE  66015883283  No Longer Active  Kavitha Kristin Godoy   

 

 ALBUTEROL SULFATE 0.083 % NEBU SOLN  1 treatment TID prn       
ALBUTEROL SULFATE  72536992078  Active  Kavitha Kristin Godoy   

 

 ROBITUSSIN A-C  MG/5ML SYRUP  1 teaspoon PO Q 4-6 hr prn    ROBITUSSIN A-C  MG/5ML SYRUP     No Longer Active  Kavithapatricia Godoy   

 

 PEN NEEDLES " 31G X 8 MM MISC  as directed       INSULIN PEN 
NEEDLE  [id number]  Active  Bernadine Gomez   

 

 LANTUS SOLOSTAR 100 UNIT/ML SOLN  50 units SQ at night       INSULIN 
GLARGINE  76186853776  Active  Bernadine Gomez   

 

 CEFDINIR 300 MG CAPS  1 PO BID    2014/04/15  CEFDINIR  [id number]  
No Longer Active  Kavithapatricia Godoy   

 

 ZOFRAN ODT 8 MG TBDP  1 PO Q6hrs prn nausea    2014/04/15  
ONDANSETRON  84307415064  No Longer Active  Kavitha Godoy   

 

 ASPIRIN 81 MG TAB  1 PO daily       ASPIRIN  93616350134  No Longer 
Active  Kavithapatricia Godoy   

 

 BENAZEPRIL HCL 40 MG TABS  1 PO daily        BENAZEPRIL HCL  96013706625  
Active  Bernadine Gomez   

 

 NORVASC 10 MG TAB  1 PO QD        AMLODIPINE BESYLATE  16029712259  Active  
Kavithapatricia Godoy   

 

 ALBUTEROL SULFATE 0.083 % NEBU SOLN  1 nebulizer treatment q4 hours prn    ALBUTEROL SULFATE  26013683705  No Longer Active  Kavitha Godoy   

 

 ASCENSIA CONTOUR TEST  STRP  Test BS  3 -4 times a day DX: Diabetes    GLUCOSE BLOOD  [id number]  No Longer Active  Kavithapatricia Rainner   

 

 BENADRYL 25 MG CAP  1 po every 6 hours if it does not cause drowsiness.  2007/
10/29    DIPHENHYDRAMINE HCL  [id number]  No Longer Active  Kavitha Godoy   

 

 PROTONIX 40 MG TBEC  1 po qd       PANTOPRAZOLE SODIUM  83762607110  
Active  Bernadine Gomez   

 

 FISH OIL 1000 MG CAPS  1 PO daily       OMEGA-3 FATTY ACIDS  
29584562132  Active  Kavitha Godoy   

 

 ZETIA 10 MG TABS  1 PO daily for cholesterol    
EZETIMIBE  27351233391  No Longer Active  Kavitha Godoy   

 

 LIPITOR 10 MG TAB  1 PO daily       ATORVASTATIN CALCIUM  
77909514946  Active  Bernadine Gomez   

 

 ZOFRAN ODT 8 MG TBDP  I PO Q6hrs prn    ONDANSETRON  
68450902247  No Longer Active  Kavitha Godoy   

 

 DOCUSATE SODIUM 50 MG/15ML SYRP  3 drops each ear.  Let stand for 15 min. then 
rinse out .  Repeat daily.  2008/09/15    DOCUSATE SODIUM  
51940993535  No Longer Active  Kavitha Godoy   

 

 ASCENSIA CONTOUR MONITOR  KIT  DX: Diabetes    BLOOD 
GLUCOSE MONITORING SUPPL  32600140150  No Longer Active  Kavitha Godoy   

 

 CLEOCIN 150 MG CAPS  1 PO daily    CLINDAMYCIN HCL  
72918069384  No Longer Active  Kavitha Godoy   

 

 BD INSULIN SYRINGE ULTRAFINE 29G X 1/2" 1 ML MISC  as directed for insulin 
injections       INSULIN SYRINGE-NEEDLE U-100  [id number]  Active  
Bernadine Gomez   

 

 BD ULTRA-FINE LANCETS MISC  ad directed to check sugar qid       
LANCETS  [id number]  Active  Bernadine Gomez   

 

 HUMALOG  UNIT/ML SOLN  5 units before meals       INSULIN 
LISPRO (HUMAN)     Active  Kavitha Godoy   

 

 LEVAQUIN 500 MG TAB  1 PO QD  2012/03/15    LEVOFLOXACIN  
98895686646  No Longer Active  Kavitha Godoy   

 

 AUGMENTIN 875-125 MG TAB  1 PO BID    AMOXICILLIN-POT 
CLAVULANATE  07024095693  No Longer Active  Kavitha Kristin Godoy   

 

 AUGMENTIN 875-125 MG TAB  1 PO BID x 7 days    
AMOXICILLIN-POT CLAVULANATE  89561777781  No Longer Active  Bernadine Gomez   

 

 LOVENOX 40 MG/0.4ML SOLN  Apply to affected area daily  
  ENOXAPARIN SODIUM  00412680868  No Longer Active  Kavitha Kristin Godoy   

 

 FISH OIL 1000 MG CAPS  1 PO daily    OMEGA-3 FATTY ACIDS
  [id number]  No Longer Active  Kavitha Kristin Godoy   

 

 ASPIRIN 81 MG TABS  1 PO daily       ASPIRIN  85307737894  No Longer 
Active  Kavithapatricia Godoy   

 

 DOXYCYCLINE HYCLATE 100 MG CAP  1 po BID    DOXYCYCLINE 
HYCLATE  76229386633  No Longer Active  Kavitha Kristin Godoy   

 

 PYRIDIUM 200 MG TAB  1 PO TID prn urinary urgency    
PHENAZOPYRIDINE HCL  48981800475  No Longer Active  Bernadine Gomez   

 

 BACTRIM -160 MG TAB  1 PO BID    TRIMETHOPRIM-
SULFAMETHOXAZOLE  73386173283  No Longer Active  Kavitha Godoy   

 

 DOXYCYCLINE HYCLATE 100 MG CAP  1 po BID    DOXYCYCLINE 
HYCLATE  85336021613  No Longer Active  Kavitha Kristin Godoy   

 

 BACTRIM -160 MG TAB  1 PO BID    TRIMETHOPRIM-
SULFAMETHOXAZOLE  [id number]  No Longer Active  Kavitha Kristin Godoy   

 

 ALEVE 220 MG TAB  1 PO BID        NAPROXEN SODIUM  65344513053  Active  Kavitha 
Kristin Godoy   

 

 VENTOLIN  (90 BASE) MCG/ACT AERS  2 puffs q6hrs prn       
ALBUTEROL SULFATE  94557509546  Active  Kavitha Kristin Godoy   

 

 CLEOCIN 150 MG CAPS  1 PO daily    CLINDAMYCIN HCL  
58553151923  No Longer Active  Kavithapatricia Godoy   

 

 VITAMIN D 1000 UNIT TABS  2 PO Daliy    CHOLECALCIFEROL  
57694599243  No Longer Active  Kavitha Kristin Godoy   

 

 SEPTRA -160 MG TABS  1 PO BID    SULFAMETHOXAZOLE-
TRIMETHOPRIM  [id number]  No Longer Active  Kavithapatricia Godoy   

 

 DOXYCYCLINE HYCLATE 100 MG CAP  1 po BID    DOXYCYCLINE 
HYCLATE  98381886610  No Longer Active  Kavitha Kristin Godoy   

 

 ADVAIR DISKUS 100-50 MCG/DOSE MISC  1 puff daily        FLUTICASONE-SALMETEROL
  [id number]  Active  Kavitha Kristin Godoy   

 

 MACROBID 100 MG CAP  1 PO BID  2009/07/10    NITROFURANTOIN MONOHYD 
MACRO  31697511506  No Longer Active  Kavithapatricia Godoy   

 

 DARVOCET-N 100 100-650 MG TAB  1 PO Q6hrs prn pain    
PROPOXYPHENE N-APAP  65149725294  No Longer Active  Kavithapatricia Godoy   

 

 PYRIDIUM 200 MG TAB  1 PO TID prn urinary urgency    2009/07/10  
PHENAZOPYRIDINE HCL  72899144621  No Longer Active  Kavithapatricia Godoy   

 

 MACROBID 100 MG CAP  1 PO BID    NITROFURANTOIN MONOHYD 
MACRO  [id number]  No Longer Active  Kavitha Godoy   

 

 CLEOCIN 150 MG CAPS  1 PO daily to prevent cellulitis    
CLINDAMYCIN HCL  49847904618  No Longer Active  Kavithapatricia Godoy   

 

 ACCU-CHEK COMPACT STRIPS STRP  Use one strip to check glucose three times 
daily    GLUCOSE BLOOD  14787649539  No Longer Active  
Kavithapatricia Godoy   

 

 LEVAQUIN 500 MG TAB  1 PO QD  2007/10/23    LEVOFLOXACIN  
90808278735  No Longer Active  Kavithapatricia Godoy   

 

 GLUCOPHAGE 1000 MG TABS  1 PO BID       METFORMIN HCL  17475454280  
Active  Bernadine Gomez   

 

 BACTROBAN 2 % CREAM  apply to affected area on nose TID for 7 days    MUPIROCIN CALCIUM  28989746396  No Longer Active  Kavitha Godoy   

 

 BENAZEPRIL HCL 20 MG TABS  1 PO daily    BENAZEPRIL HCL  
30550867218  No Longer Active  Kavitha Godoy   

 

 LOTREL 10-40 MG CAPS  1 PO daily       AMLODIPINE BESY-BENAZEPRIL 
HCL  45343485462  No Longer Active  Bernadine Gomez   

 

 FLEXERIL 10 MG TABS  1 by mouth 3 times a day as needed    CYCLOBENZAPRINE HCL  29542909253  No Longer Active  Kavitha Godoy   

 

 LORTAB 5 5-500 MG TAB  1 by mouth every 6 hours as needed for pain    ACETAMINOPHEN-HYDROCODONE  41286886926  No Longer Active  Kavitha Godoy   

 

 HYDROCHLOROTHIAZIDE 12.5 MG CAPS  1 PO QD       HYDROCHLOROTHIAZIDE  
20437027444  Active  Bernadine Gomez   

 

 OMACOR 1 GM CAPS  4 PO daily    OMEGA-3-ACID ETHYL 
ESTERS  70261788341  No Longer Active  Kavitha Godoy   

 

 AUGMENTIN 875-125 MG TABS  1 PO BID    AMOXICILLIN-POT 
CLAVULANATE  34520895214  No Longer Active  Kavitha Godoy   

 

 TYLENOL 325 MG TAB  as directed    ACETAMINOPHEN  
76726264784  No Longer Active  Kavitha Godoy   

 

 DOCUSATE SODIUM 50 MG/15ML SYRP  3 drops each ear.  Let stand for 15 min. then 
rinse out with ball suction.  Repeat daily.    DOCUSATE 
SODIUM  10003664401  No Longer Active  Kavitha Godoy   

 

 BACTROBAN 2 % CREA  apply TID    MUPIROCIN CALCIUM  
55988890814  No Longer Active  Kavitha Godoy   

 

 HYDROCODONE-ACETAMINOPHEN 5-500 MG TABS  1 q4-6hrs prn  
  HYDROCODONE-ACETAMINOPHEN  24194147231  No Longer Active  Kavithapatricia Godoy   

 

 LOTRISONE 1-0.05 % CREA  apply BID to left leg    
CLOTRIMAZOLE-BETAMETHASONE  69240097980  No Longer Active  Kavithapatricia Godoy
   

 

 GLIPIZIDE 5 MG TAB  1 PO 0630 and 1 1/2 pills 1700       GLIPIZIDE  
52817184016  Active  Bernadine Gomez   

 

 NORVASC 5 MG TABS  1 po qd    AMLODIPINE BESYLATE  
45181985807  No Longer Active  Kavithapatricia Godoy   

 

 ISAURA-E  1 po qd JOINT PAIN    ISAURA-E     No Longer Active  
Kavithapatricia Godoy   

 

 COLACE 60 MG/15ML SYRP  Use daily for cerumen removal.  
  DOCUSATE SODIUM  16052056253  No Longer Active  Kavithapatricia Godoy   

 

 MULTIVITAMINS TABS  1 po daily    MULTIPLE VITAMIN  
56329646461  No Longer Active  Kavithapatricia Godoy   

 

 VITAMIN C 1000 MG TABS  1 po daily    ASCORBIC ACID  
71551321145  No Longer Active  Kavithapatricia Godoy   

 

 VITAMIN E 400 IU CAPS  2 po daily    VITAMIN E  
20000385736  No Longer Active  Kavithapatricia Godoy   

 

 AMBIEN 10 MG TAB  1 prn    ZOLPIDEM TARTRATE  
33352403014  No Longer Active  Kavithapatricia Godoy   

 

 ECONAZOLE NITRATE 1 % CREA  as directed    ECONAZOLE 
NITRATE  55974893555  No Longer Active  Kavitha Kristin Godoy   

 

 KEFLEX 500 MG CAPS  1 qid x 7days    CEPHALEXIN  
52958522426  No Longer Active  Kavithapatricia Godoy   

 

 ECONAZOLE NITRATE 1 % CREA  Apply BID x 7 days  2004/10/18  2004/10/25  
ECONAZOLE NITRATE  57232262072  No Longer Active  Kavitha Kristin Godoy   

 

 KEFLEX 500 MG CAP  1 Po QID for 14 days  2004/10/15  2004/10/22  CEPHALEXIN  
31573653776  No Longer Active  Kavitha Godoy   

 

 ALEVE 220 MG TABS  1 PO BID       NAPROXEN SODIUM  63227314896  No 
Longer Active  Kavitha Kristin Jayne   

 

 MOTRIN 600 MG TABS  1 po q 6 hrs.       IBUPROFEN  00867044630  No 
Longer Active  Kavitha Godoy   

 

 NASAL SPRAY SALINE 0.65 % SOLN  puffs 2 puffs BID    
SALINE  20760653078  No Longer Active  Kavitha Kristin Godoy   

 

 AMOXIL 875 MG TABS  1 PO BID    AMOXICILLIN  73946332367
  No Longer Active  Kavitha Kristin Godoy   

 

 LOTENSIN 20 MG TABS  1 PO QD       BENAZEPRIL HCL  10616164477  No 
Longer Active  Kavitha Kristin Godoy   

 

 AMARYL 4 MG TABS  1 tab po bid    GLIMEPIRIDE  
09777754953  No Longer Active  Kavithapatricia Godoy   

 

 GLUCOPHAGE  MG TB24  1 po daily    METFORMIN HCL  
88709970435  No Longer Active  Kavitha Godoy   







Immunizations







 Vaccine  Administration Date  Value  Standard Description

 

 Influenza vaccine given    Done  influenza virus vaccine, 
unspecified formulation

 

 pneumococcal immunization administered    1st dose  pneumococcal 
polysaccharide vaccine, 23 valent

 

 Influenza vaccine given    Done 10.24.11  influenza virus vaccine, 
unspecified formulation







Vital Signs







 Date  Name  Value  Unit  Range  Description

 

   pulse rate E&M - 8867-4  68  /min     Heart rate

 

   respiratory rate E&M - 9279-1  14  /min     Resp rate

 

   weight E&M - 3141-9  310  [lb_av]     Weight Measured

 

   blood pressure, diastolic - 8462-4  80  mm[Hg]     BP lara

 

   blood pressure, systolic - 8480-6  145  mm[Hg]     BP sys

 

   pulse rate E&M - 8867-4  80  /min     Heart rate

 

   respiratory rate E&M - 9279-1  14  /min     Resp rate

 

   temperature E&M  98.6  [degF]     Body temperature

 

   weight E&M - 3141-9  310  [lb_av]     Weight Measured

 

   blood pressure, diastolic - 8462-4  82  mm[Hg]     BP lara

 

   blood pressure, systolic - 8480-6  156  mm[Hg]     BP sys

 

   pulse rate E&M - 8867-4  76  /min     Heart rate

 

   respiratory rate E&M - 9279-1  14  /min     Resp rate

 

 2014/10/30  blood pressure, diastolic - 8462-4  74  mm[Hg]     BP lara

 

 2014/10/30  blood pressure, systolic - 8480-6  142  mm[Hg]     BP sys

 

 2014/10/30  pulse rate E&M - 8867-4  85  /min     Heart rate

 

 2014/10/30  respiratory rate E&M - 9279-1  14  /min     Resp rate

 

 2014/10/15  blood pressure, diastolic - 8462-4  80  mm[Hg]     BP lara

 

 2014/10/15  blood pressure, systolic - 8480-6  130  mm[Hg]     BP sys

 

 2014/10/15  pulse rate E&M - 8867-4  78  /min     Heart rate

 

 2014/10/15  respiratory rate E&M - 9279-1  14  /min     Resp rate

 

 2014/10/06  blood pressure, diastolic - 8462-4  88  mm[Hg]     BP lara

 

 2014/10/06  blood pressure, systolic - 8480-6  162  mm[Hg]     BP sys

 

 2014/10/06  pulse rate E&M - 8867-4  82  /min     Heart rate

 

 2014/10/06  respiratory rate E&M - 9279-1  16  /min     Resp rate

 

   blood pressure, diastolic - 8462-4  70  mm[Hg]     BP lara

 

   blood pressure, systolic - 8480-6  120  mm[Hg]     BP sys

 

   pulse rate E&M - 8867-4  88  /min     Heart rate

 

   respiratory rate E&M - 9279-1  14  /min     Resp rate

 

   temperature E&M  98.6  [degF]     Body temperature

 

   weight E&M - 3141-9  302  [lb_av]     Weight Measured

 

   blood pressure, diastolic - 8462-4  68  mm[Hg]     BP lara

 

   blood pressure, systolic - 8480-6  140  mm[Hg]     BP sys

 

   pulse rate E&M - 8867-4  78  /min     Heart rate

 

   respiratory rate E&M - 9279-1  14  /min     Resp rate

 

   temperature E&M  98.6  [degF]     Body temperature

 

   weight E&M - 3141-9  302  [lb_av]     Weight Measured

 

   blood pressure, diastolic - 8462-4  70  mm[Hg]     BP lara

 

   blood pressure, systolic - 8480-6  180  mm[Hg]     BP sys

 

   pulse rate E&M - 8867-4  88  /min     Heart rate

 

   respiratory rate E&M - 9279-1  14  /min     Resp rate

 

   temperature E&M  98.6  [degF]     Body temperature

 

   weight E&M - 3141-9  302  [lb_av]     Weight Measured







Diagnostic Results







 Date  Name  Value  Unit  Range  Description

 

 Clinical Lists Update: CBC - Hematology 

 

 2014/10/23  red blood cell distribution width  15.5  %      

 

   hematocrit, blood  40  %      

 

 2014/10/28  red blood cell distribution width  15.1  %      

 

 2014/10/29  red blood cell distribution width  15.0  %      

 

   red blood cell distribution width  15.2  %      

 

   red blood cell distribution width  15.2  %      

 

   red blood cell distribution width  16.2  %      

 

 2014/10/23  mean corpuscular volume, RBC  88  fL      

 

 2014/10/27  mean corpuscular volume, RBC  88  fL      

 

 2014/10/28  mean corpuscular volume, RBC  88  fL      

 

 2014/10/29  mean corpuscular volume, RBC  88  fL      

 

   mean corpuscular volume, RBC  88  fL      

 

   mean corpuscular volume, RBC  88  fL      

 

   mean corpuscular volume, RBC  89  fL      

 

 2014/10/23  leukocyte count, blood  8.7  10*3/mm3      

 

 2014/10/27  leukocyte count, blood  7.5  10*3/mm3      

 

 2014/10/28  leukocyte count, blood  7.5  10*3/mm3      

 

 2014/10/29  leukocyte count, blood  8.3  10*3/mm3      

 

   leukocyte count, blood  8.3  10*3/mm3      

 

   leukocyte count, blood  8.3  10*3/mm3      

 

   leukocyte count, blood  8.0  10*3/mm3      

 

 2014/10/23  erythrocyte (RBC) count  4.36  10*6/mm3      

 

 2014/10/27  erythrocyte (RBC) count  4.31  10*6/mm3      

 

 2014/10/28  erythrocyte (RBC) count  4.31  10*6/mm3      

 

 2014/10/29  erythrocyte (RBC) count  4.37  10*6/mm3      

 

   erythrocyte (RBC) count  4.51  10*6/mm3      

 

   erythrocyte (RBC) count  4.51  10*6/mm3      

 

   erythrocyte (RBC) count  4.52  10*6/mm3      

 

 2014/10/23  platelet count  29  10*3/mm3      

 

 2014/10/27  platelet count  59  10*3/mm3      

 

 2014/10/28  platelet count  59  10*3/mm3      

 

 2014/10/29  platelet count  61  10*3/mm3      

 

   platelet count  99  10*3/mm3      

 

   platelet count  99  10*3/mm3      

 

   platelet count  137  10*3/mm3      

 

 2014/10/23  hemoglobin, blood  12.3  g/dL      

 

 2014/10/27  hemoglobin, blood  12.1  g/dL      

 

 2014/10/28  hemoglobin, blood  12.1  g/dL      

 

 2014/10/29  hemoglobin, blood  12.1  g/dL      

 

   hemoglobin, blood  12.6  g/dL      

 

   hemoglobin, blood  12.6  g/dL      

 

   hemoglobin, blood  12.7  g/dL      

 

 2014/10/23  hematocrit, blood  38  %      

 

 2014/10/27  hematocrit, blood  38  %      

 

 2014/10/28  hematocrit, blood  38  %      

 

 2014/10/29  hematocrit, blood  38  %      

 

   hematocrit, blood  40  %      

 

   hematocrit, blood  40  %      

 

 2014/10/27  red blood cell distribution width  15.1  %      

 

 Clinical Lists Update: CBC    - Hematology 

 

   red blood cell distribution width  16.0  %      

 

   mean corpuscular volume, RBC  90  fL      

 

   leukocyte count, blood  7.5  10*3/mm3      

 

   hematocrit, blood  36  %      

 

   platelet count  163  10*3/mm3      

 

   hemoglobin, blood  11.5  g/dL      

 

   erythrocyte (RBC) count  4.01  10*6/mm3      

 

 Clinical Lists Update: CBC,CMP,CHOL,TRIG,HGA1C,FERRITIN - Chemistry 

 

   chloride, serum  101  mmol/L      

 

   glucose, plasma fasting  128  mg/dL      

 

   cholesterol, serum  118  mg/dL      

 

   carbon dioxide, venous blood  28.0  mmol/L      

 

   creatinine, serum  0.8  mg/dL      

 

   ferritin, serum  248.9  ng/mL      

 

   hemoglobin A1C, blood, as % of total hemoglobin  5.9  %      

 

   potassium, serum  4.6  mmol/L      

 

   protein, total, serum  6.3  g/dL      

 

   aspartate aminotransferase (SGOT), serum  29  U/L      

 

   alanine aminotransferase (SGPT), serum  26  U/L      

 

   bilirubin, serum, total  0.9  mg/dL      

 

   triglyceride, serum, fasting  175  mg/dL      

 

   sodium, serum  134  mmol/L      

 

   anion gap, serum  10         

 

   Estimated Glomerular Filtration Rate (calc)  74  mL/min/1.73m2    
  

 

   albumin, serum  3.8  g/dL      

 

   alkaline phosphatase, serum  130  U/L      

 

   urea nitrogen, blood  13  mg/dL      

 

   calcium, serum  9.0  mg/dL      

 

 Clinical Lists Update: CBC,CMP,CHOL,TRIG,HGA1C,FERRITIN - Hematology 

 

   erythrocyte (RBC) count  3.92  10*6/mm3      

 

   leukocyte count, blood  6.7  10*3/mm3      

 

   platelet count  199  10*3/mm3      

 

   mean corpuscular volume, RBC  93  fL      

 

   hematocrit, blood  36.3  %      

 

   red blood cell distribution width  19.5  %      

 

   hemoglobin, blood  11.1  g/dL      

 

 Clinical Lists Update: CBC,CMP,Chol,Trig,Ferritin,HgA1c - Chemistry 

 

 2014/10/02  potassium, serum  4.6  mmol/L      

 

 2014/10/02  triglyceride, serum, fasting  109  mg/dL      

 

 2014/10/02  creatinine, serum  1.0  mg/dL      

 

 2014/10/02  bilirubin, serum, total  0.7  mg/dL      

 

 2014/10/02  hemoglobin A1C, blood, as % of total hemoglobin  7.4  %      

 

 2014/10/02  sodium, serum  135  mmol/L      

 

 2014/10/02  carbon dioxide, venous blood  25.0  mmol/L      

 

 2014/10/02  anion gap, serum  13         

 

 2014/10/02  chloride, serum  102  mmol/L      

 

 2014/10/02  glucose, plasma fasting  108  mg/dL      

 

 2014/10/02  protein, total, serum  6.4  g/dL      

 

 2014/10/02  Estimated Glomerular Filtration Rate (calc)  57  mL/min/1.73m2    
  

 

 2014/10/02  alanine aminotransferase (SGPT), serum  25  U/L      

 

 2014/10/02  calcium, serum  8.6  mg/dL      

 

 2014/10/02  urea nitrogen, blood  15  mg/dL      

 

 2014/10/02  cholesterol, serum  121  mg/dL      

 

 2014/10/02  alkaline phosphatase, serum  121  U/L      

 

 2014/10/02  aspartate aminotransferase (SGOT), serum  26  U/L      

 

 2014/10/02  albumin, serum  3.8  g/dL      

 

 2014/10/02  ferritin, serum  14.9  ng/mL      

 

 Clinical Lists Update: CBC,CMP,Chol,Trig,Ferritin,HgA1c - Hematology 

 

 2014/10/02  red blood cell distribution width  17.7  %      

 

 2014/10/02  hematocrit, blood  40  %      

 

 2014/10/02  mean corpuscular volume, RBC  91  fL      

 

 2014/10/02  hemoglobin, blood  12.0  g/dL      

 

 2014/10/02  leukocyte count, blood  6.0  10*3/mm3      

 

 2014/10/02  platelet count  65  10*3/mm3      

 

 2014/10/02  erythrocyte (RBC) count  4.37  10*6/mm3      

 

 Clinical Lists Update: CBC,CMP,FLP  [facility] - Chemistry 

 

   calcium, serum  9.3  mg/dL      

 

   chloride, serum  104  mmol/L      

 

   alkaline phosphatase, serum  81  U/L      

 

   albumin, serum  3.7  g/dL      

 

   urea nitrogen, blood  13  mg/dL      

 

   cholesterol, serum  117  mg/dL      

 

   carbon dioxide, venous blood  21  mmol/L      

 

   creatinine, serum  0.77  mg/dL      

 

   HDL cholesterol, serum  40  mg/dL      

 

   LDL cholesterol, serum  57  mg/dL      

 

   potassium, serum  4.5  mmol/L      

 

   Estimated Glomerular Filtration Rate (calc)  >60  mL/min/1.73m2   
   

 

   glucose, plasma fasting  128  mg/dL      

 

   very low density lipoproteins  27  mg/dL      

 

   sodium, serum  139  mmol/L      

 

   triglyceride, serum, fasting  137  mg/dL      

 

   bilirubin, serum, total  0.7  mg/dL      

 

   alanine aminotransferase (SGPT), serum  18  U/L      

 

   aspartate aminotransferase (SGOT), serum  25  U/L      

 

   protein, total, serum  6.9  g/dL      

 

 Clinical Lists Update: CBC,CMP,FLP  IN [facility] LABS - Hematology 

 

   hematocrit, blood  39  %      

 

   hemoglobin, blood  12.5  g/dL      

 

   platelet count  198  10*3/mm3      

 

   erythrocyte (RBC) count  4.32  10*6/mm3      

 

   leukocyte count, blood  8.8  10*3/mm3      

 

   mean corpuscular volume, RBC  90  fL      

 

   red blood cell distribution width  15.4  %      

 

 Clinical Lists Update: CBC,CMP,FLP,TSH - Chemistry 

 

 2015/04/15  chloride, serum  106  mmol/L      

 

 2015/04/15  calcium, serum  9.5  mg/dL      

 

 2015/04/15  urea nitrogen, blood  22  mg/dL      

 

 2015/04/15  alkaline phosphatase, serum  105  U/L      

 

 2015/04/15  albumin, serum  3.8  g/dL      

 

 2015/04/15  Estimated Glomerular Filtration Rate (calc)  >60  mL/min/1.73m2   
   

 

 2015/04/15  glucose, plasma fasting  127  mg/dL      

 

 2015/04/15  very low density lipoproteins  27  mg/dL      

 

 2015/04/15  sodium, serum  139  mmol/L      

 

 2015/04/15  triglyceride, serum, fasting  134  mg/dL      

 

 2015/04/15  bilirubin, serum, total  0.8  mg/dL      

 

 2015/04/15  alanine aminotransferase (SGPT), serum  21  U/L      

 

 2015/04/15  aspartate aminotransferase (SGOT), serum  26  U/L      

 

 2015/04/15  protein, total, serum  6.7  g/dL      

 

 2015/04/15  potassium, serum  4.8  mmol/L      

 

 2015/04/15  LDL cholesterol, serum  22  mg/dL      

 

 2015/04/15  thyroid stimulating hormone, serum  1.52  u[iU]/mL      

 

 2015/04/15  hemoglobin A1C, blood, as % of total hemoglobin  6.3  %      

 

 2015/04/15  HDL cholesterol, serum  42  mg/dL      

 

 2015/04/15  creatinine, serum  0.78  mg/dL      

 

 2015/04/15  carbon dioxide, venous blood  22  mmol/L      

 

 2015/04/15  cholesterol, serum  114  mg/dL      

 

 Clinical Lists Update: CBC,CMP,FLP,TSH - Hematology 

 

 2015/04/15  red blood cell distribution width  17.1  %      

 

 2015/04/15  mean corpuscular volume, RBC  89  fL      

 

 2015/04/15  leukocyte count, blood  5.1  10*3/mm3      

 

 2015/04/15  hematocrit, blood  34  %      

 

 2015/04/15  platelet count  199  10*3/mm3      

 

 2015/04/15  hemoglobin, blood  10.5  g/dL      

 

 2015/04/15  erythrocyte (RBC) count  3.77  10*6/mm3      

 

 Clinical Lists Update: CBC,CMP,Ferritin - Chemistry 

 

   Estimated Glomerular Filtration Rate (calc)  81  mL/min/1.73m2    
  

 

   glucose, plasma fasting  160  mg/dL      

 

   anion gap, serum  13         

 

   sodium, serum  135  mmol/L      

 

   bilirubin, serum, total  0.9  mg/dL      

 

   alanine aminotransferase (SGPT), serum  24  U/L      

 

   aspartate aminotransferase (SGOT), serum  21  U/L      

 

   protein, total, serum  6.2  g/dL      

 

   potassium, serum  4.7  mmol/L      

 

   ferritin, serum  33.8  ng/mL      

 

   creatinine, serum  0.8  mg/dL      

 

   carbon dioxide, venous blood  26.0  mmol/L      

 

   chloride, serum  101  mmol/L      

 

   calcium, serum  8.9  mg/dL      

 

   urea nitrogen, blood  14  mg/dL      

 

   alkaline phosphatase, serum  115  U/L      

 

   albumin, serum  3.8  g/dL      

 

 Clinical Lists Update: CBC,CMP,Ferritin - Hematology 

 

   red blood cell distribution width  22.1  %      

 

   hemoglobin, blood  11.2  g/dL      

 

   hematocrit, blood  34  %      

 

   mean corpuscular volume, RBC  87  fL      

 

   erythrocyte (RBC) count  3.98  10*6/mm3      

 

   leukocyte count, blood  6.7  10*3/mm3      

 

   platelet count  179  10*3/mm3      

 

 Clinical Lists Update: CBC,FERRITIN - Chemistry 

 

   ferritin, serum  11  ng/mL      

 

 Clinical Lists Update: CBC,FERRITIN - Hematology 

 

   hematocrit, blood  30  %      

 

   red blood cell distribution width  14.4  %      

 

   mean corpuscular volume, RBC  91  fL      

 

   hemoglobin, blood  9.3  g/dL      

 

   leukocyte count, blood  6.1  10*3/mm3      

 

   erythrocyte (RBC) count  3.32  10*6/mm3      

 

   platelet count  203  10*3/mm3      

 

 Clinical Lists Update: ER LABS - Chemistry 

 

   albumin, serum  3.8  g/dL      

 

   alkaline phosphatase, serum  110  U/L      

 

   urea nitrogen, blood  15  mg/dL      

 

   calcium, serum  9.5  mg/dL      

 

   chloride, serum  102  mmol/L      

 

   carbon dioxide, venous blood  22  mmol/L      

 

   creatinine, serum  0.80  mg/dL      

 

   potassium, serum  5.0  mmol/L      

 

   protein, total, serum  7.1  g/dL      

 

   aspartate aminotransferase (SGOT), serum  32  U/L      

 

   alanine aminotransferase (SGPT), serum  20  U/L      

 

   bilirubin, serum, total  1.1  mg/dL      

 

   sodium, serum  134  mmol/L      

 

   Estimated Glomerular Filtration Rate (calc)  >60  mL/min/1.73m2   
   

 

   glucose, plasma fasting  152  mg/dL      

 

 Clinical Lists Update: ER LABS - Hematology 

 

   leukocyte count, blood  12.6  10*3/mm3      

 

   hematocrit, blood  35  %      

 

   platelet count  197  10*3/mm3      

 

   erythrocyte (RBC) count  3.87  10*6/mm3      

 

   red blood cell distribution width  16.8  %      

 

   hemoglobin, blood  11.0  g/dL      

 

   mean corpuscular volume, RBC  89  fL      

 

 Clinical Lists Update: ER LABS - Urinalysis 

 

   appearance, urine  Clear Yellow         

 

   urobilinogen, urine, semiquantitative (dipstick)  normal         

 

   specific gravity, urine  1.10         

 

   pH, urine, semiquantitative  8         

 

   nitrite, urine, semiquantitative  neg         

 

   ketones, urine, by test strip  neg         

 

   bilirubin, urine  neg         

 

   glucose, urine, semiquantitative  neg         

 

   WBC urine on microscopy  0-2  {Cells}/[HPF]      

 

   RBC urine by microscopy  0-2         

 

   hyaline casts, urine  none  /[LPF]      

 

   epithelial cells, urine  2-5  /[LPF]      

 

   mucus on urinalysis  neg         

 

   blood in urine (hemoglobin) by dipstick  1+         

 

   protein, urine, semiquantitative (dipstick)  1+         

 

   bacteria, urine microscopy  neg         

 

 Office Visit: Dr Godoy's Check Up: Established Patient Visit - Chemistry 

 

   ferritin, serum  21  ng/mL      

 

   hemoglobin A1C, blood, as % of total hemoglobin  8.1  %      

 

 Office Visit: Dr Godoy's Check Up: Established Patient Visit - Hematology 

 

 2014/10/30  red blood cell distribution width  15.0  %      

 

 2014/10/15  mean corpuscular volume, RBC  86  fL      

 

 2014/10/16  mean corpuscular volume, RBC  37  fL      

 

 2014/10/30  mean corpuscular volume, RBC  88  fL      

 

 2014/10/15  leukocyte count, blood  7.3  10*3/mm3      

 

 2014/10/16  leukocyte count, blood  9.1  10*3/mm3      

 

 2014/10/30  leukocyte count, blood  8.3  10*3/mm3      

 

 2014/10/30  hematocrit, blood  38  %      

 

 2014/10/16  erythrocyte (RBC) count  4.27  10*6/mm3      

 

 2014/10/30  erythrocyte (RBC) count  4.37  10*6/mm3      

 

 2014/10/15  platelet count  39  10*3/mm3      

 

 2014/10/16  platelet count  22  10*3/mm3      

 

 2014/10/30  platelet count  61  10*3/mm3      

 

 2014/10/15  hemoglobin, blood  12.4  g/dL      

 

 2014/10/16  hemoglobin, blood  11.8  g/dL      

 

 2014/10/30  hemoglobin, blood  12.1  g/dL      

 

 2014/10/15  hematocrit, blood  37  %      

 

 2014/10/16  hematocrit, blood  37  %      

 

 2014/10/16  red blood cell distribution width  15.0  %      

 

 2014/10/15  red blood cell distribution width  14.9  %      

 

 2014/10/15  erythrocyte (RBC) count  4.36  10*6/mm3      

 

 Phone Note: Low platelet count - Hematology 

 

 2014/10/08  red blood cell distribution width  17.0  %      

 

 2014/10/08  mean corpuscular volume, RBC  90  fL      

 

 2014/10/08  leukocyte count, blood  5.9  10*3/mm3      

 

 2014/10/08  hematocrit, blood  40  %      

 

 2014/10/08  platelet count  12  10*3/mm3      

 

 2014/10/08  hemoglobin, blood  12.7  g/dL      

 

 2014/10/08  erythrocyte (RBC) count  4.45  10*6/mm3      







Encounters







 Code  Encounter  Date  Provider  Facility

 

 CPT-45618  Ofc Vst, Est Level IV   17:01:58 CDT  Kavitha Godoy DO, FACP

 

 CPT-79166  Ofc Vst, Est Level IV   17:23:42 CST  Kavitha Godoy DO, FACP

 

 CPT-62263  Ofc Vst, Est Level IV   20:14:18 CST  Kavitha Godoy DO, FACP

 

 CPT-03073  Ofc Vst, Est Level III  2014/10/30 17:10:34 CDT  Kavitha Kristin 
Godoy  Kavitha S Godoy, DO, FACP

 

 CPT-02967  Ofc Vst, Est Level IV  2014/10/16 22:19:26 CDT  Kavitha Kristin WELSH Godoy, DO, FACP

 

 CPT-07643  Ofc Vst, Est Level IV  2014/10/07 16:08:07 CDT  Kavitha Kristin WELSH Godoy, DO, FACP

 

 CPT-23206  Ofc Vst, Est Level II   15:22:36 CDT  Kavitha Kristin Rainner, DO, FACP

 

 CPT-15729  Ofc Vst, Est Level IV   11:13:04 CDT  Kavitha Kristin WELSH Godoy, DO, FACP

 

 CPT-66749  Ofc Vst, Est Level IV   13:20:51 CDT  Kavitha Kristin WELSH Godoy, DO, FACP

 

 CPT-25300  Ofc Vst, Est Level IV   14:23:34 CDT  Kavitha Kristin Godoy, DO, FACP

 

 CPT-73745  Ofc Vst, Est Level III   19:42:36 CDT  [facility]

 

 CPT-69848  Ofc Vst, Est Level IV   14:28:10 CST  Kavitha WELSH Godoy, DO, FACP

 

 CPT-95539  Ofc Vst, Est Level IV   15:20:04 CST  Kavithapatricia WELSH Godoy, DO, FACP

 

 CPT-61001  Ofc Vst, Est Level III  2013/10/15 14:46:42 CDT  Kavitha WELSH Godoy, DO, FACP

 

 CPT-60970  Ofc Vst, Est Level IV   13:31:14 CDT  Kavitha Kristin WELSH Godoy, DO, FACP

 

 CPT-08657  Ofc Vst, Est Level IV  2013/04/10 16:26:21 CDT  Kavitha Kristin WELSH Jayne, DO, FACP

 

 CPT-90514  Ofc Vst, Est Level III   16:02:31 CST  Kavitha WELSH Godoy, DO, FACP

 

 CPT-70622  Ofc Vst, Est Level IV   13:01:46 CST  Kavitha WELSH Jayne, DO, FACP

 

 CPT-37631  Ofc Vst, Est Level IV   10:43:37 CDT  Kavitha WELSH Jayne, DO, FACP

 

 CPT-21980  Ofc Vst, Est Level III   15:41:44 CDT  Kavithapatricia Godoy  [facility]

 

 CPT-20064  Ofc Vst, Est Level IV   16:31:03 CDT  Kavitha WELSH Jayne, DO, FACP

 

 CPT-99294  Ofc Vst, Est Level III   15:45:49 CDT  Kavitha WELSH Jayne, DO, FACP

 

 CPT-28749  Ofc Vst, Est Level IV  2012/03/15 11:04:49 CDT  Kavithapatricia WELSH Jayne, DO, FACP

 

 CPT-52192  Ofc Vst, Est Level IV   16:16:55 CST  Kavitha WELSH Jayne, DO, FACP

 

 CPT-24925  Ofc Vst, Est Level IV   20:00:20 CST  Kavitha Godoy  [facility]

 

 CPT-38758  Ofc Vst, Est Level IV   15:00:56 CST  Kavitha WELSH Jayne, DO, FACP

 

 CPT-47060  Ofc Vst, Est Level IV   11:37:43 CST  Kavitha WELSH Jayne, DO, FACP

 

 CPT-81973  Ofc Vst, Est Level V   11:04:21 CST  Bernadine Gomez  Kavitha 
S Godoy, DO, FACP

 

 CPT-71852  Ofc Vst, Est Level IV   15:56:58 CDT  Kavitha WELSH Jayne, DO, FACP

 

 CPT-43142  Ofc Vst, Est Level III   15:56:48 CDT  Kavithapatricia WELSH Godoy, DO, FACP

 

 CPT-88549  Ofc Vst, Est Level IV   11:13:32 CDT  Kavitha Kristin WELSH Godoy, DO, FACP

 

 CPT-62204  Ofc Vst, Est Level III   11:06:29 CDT  Kavitha Kristin WELSH Godoy, DO, FACP

 

 CPT-87702  Ofc Vst, Est Level IV   10:36:27 CDT  Kavitha Kristin WELSH Godoy, DO, FACP

 

 CPT-23792  Ofc Vst, Est Level IV   16:28:28 CST  Kavitha WELSH Godoy, DO, FACP

 

 CPT-60648  Ofc Vst, Est Level IV   16:15:53 CST  Kavithapatricia WELSH Godoy, DO, FACP

 

 CPT-47367  Ofc Vst, Est Level IV   10:24:23 CDT  Kavithapatricia WELSH Godoy, DO, FACP

 

 CPT-74225  Ofc Vst, Est Level IV   10:41:52 CDT  Kavithapatricia WELSH Godoy, DO, FACP

 

 CPT-25143  Ofc Vst, Est Level V   14:13:59 CDT  Kavithapatricia Godoy
  [facility]

 

 CPT-89600  Ofc Vst, Est Level IV   11:50:10 CDT  Kavithapatricia WELSH Godoy, DO, FACP

 

 CPT-98455  Ofc Vst, Est Level V   16:45:39 CST  Kavitha WELSH Godoy, DO, FACP

 

 CPT-41975  Ofc Vst, Est Level V   16:35:01 CDT  Kavithapatricia WELSH Godoy, DO, FACP

 

 CPT-49810  Ofc Vst, Est Level IV  2009/07/10 10:56:36 CDT  Kavithapatricia WELSH Godoy, DO, FACP

 

 CPT-90714  Ofc Vst, Est Level IV   09:27:02 CDT  Kavitha Kristin WELSH Godoy, DO, FACP

 

 CPT-89530  Ofc Vst, Est Level IV   16:04:39 CST  Kavithapatricia WELSH Godoy, DO, FACP

 

 CPT-56987  Ofc Vst, Est Level IV  2008/09/15 16:41:31 CDT  Kavitha Kristin WELSH Godoy, DO, FACP

 

 CPT-04177  Ofc Vst, Est Level IV   16:15:36 CDT  Kavitha Kristin WELSH Godoy, DO, FACP

 

 CPT-83157  Ofc Vst, Est Level IV   16:43:23 CST  Kavitha WELSH Jayne, DO, FACP

 

 CPT-92022  Ofc Vst, Est Level IV   17:09:39 CST  Kavithapatricia WELSH Godoy, DO, FACP

 

 CPT-23947  Ofc Vst, Est Level III  2007/10/29 16:52:49 CDT  Kavithapatricia WELSH Godoy, DO, FACP

 

 CPT-63977  Ofc Vst, Est Level III  2007/10/23 16:46:46 CDT  Kavitha WELSH Jayne, DO, FACP

 

 CPT-27956  Ofc Vst, Est Level IV  2007/10/16 16:17:41 CDT  Kavitha Kristin WELSH Godoy, DO, FACP

 

 CPT-47914  Ofc Vst, Est Level III  2007/10/09 17:15:29 CDT  Kavitha WELSH Godoy, DO, FACP

 

 CPT-06160  Ofc Vst, Est Level IV   15:48:06 CDT  Kavithapatricia WELSH Godoy, DO, FACP

 

 CPT-08041  Ofc Vst, Est Level IV   11:59:27 CDT  Kavitha Kristin WELSH Jayne, DO, FACP

 

 CPT-81111  Ofc Vst, Est Level IV   16:31:37 CDT  Kavitha Godoy  [facility]

 

 CPT-67457  Ofc Vst, Est Level IV   16:25:22 CST  Kavitha Godoy  [facility]

 

 CPT-54854  Ofc Vst, Est Level III   18:24:09 CST  Kavitha Godoy  [facility]

 

 CPT-87750  Ofc Vst, Est Level III  2006/10/11 16:54:12 CDT  Kavitha Kristin Godoy  [facility]

 

 CPT-25608  Ofc Vst, Est Level IV   16:39:33 CDT  Kavitha Kristin Godoy  [facility]

 

 CPT-41721  Ofc Vst, Est Level IV   15:11:16 CDT  Kavitha Godoy  [facility]

 

 CPT-31792  Ofc Vst, Est Level IV   17:29:13 CST  Kavitha Godoy  [facility]

 

 CPT-93051  Ofc Vst, Est Level III   12:45:04 CST  Kavitha Godoy  [facility]

 

 CPT-22014  Ofc Vst, Est Level III   16:51:19 CST  Kavitha Godoy  [facility]

 

 CPT-06954  Ofc Vst, Est Level IV   16:59:21 CDT  Kavitha Godoy  [facility]

 

 CPT-22917  Ofc Vst, Est Level IV   09:39:02 CDT  Kavitha Godoy  [facility]

 

 CPT-93883  Ofc Vst, Est Level IV   18:04:53 CDT  Kavitha Godoy  [facility]

 

 CPT-22075  Ofc Vst, Est Level IV   17:17:47 CST  Kavitha Godoy  [facility]

 

 CPT-17043  Ofc Vst, Est Level IV   17:23:25 CST  Kavitha Godoy  [facility]

 

 CPT-07292  Ofc Vst, Est Level IV   18:44:33 CST  Kavitha Godoy  [facility]

 

 CPT-06559  Ofc Vst, Est Level III  2004/10/28 18:20:20 CDT  Kavitha Kristin Godoy  [facility]

 

 CPT-55967  Ofc Vst, Est Level III  2004/10/18 17:54:31 CDT  Kavitha Kristin Godoy  [facility]

 

 CPT-10093  Ofc Vst, Est Level IV   19:13:31 CDT  Kavitha Kristin Godoy  [facility]

 

 CPT-14259  Ofc Vst, Est Level IV   18:01:08 CDT  Kavitha Kristin Godoy  [facility]

 

 CPT-99070  Ofc Vst, Est Level III   13:36:36 CDT  Kavitha Kristin Godoy  [facility]

 

 CPT-34325  Ofc Vst, Est Level III   16:47:12 CDT  Kavitha Godoy  [facility]

 

 CPT-03669  Ofc Vst, Est Level IV   17:34:04 CDT  Kavitha Kirstin Godoy  [facility]

 

 CPT-15872  Ofc Vst, Est Level III   15:45:39 CST  Kavitha Godoy  [facility]

 

 CPT-70686  Ofc Vst, Est Level III   16:07:05 CST  Kavitha Godoy  [facility]

 

 CPT-29581  Ofc Vst, Est Level III   16:19:57 CST  Kavitha Godoy  [facility]

 

 CPT-85586  Ofc Vst, Est Level III   16:46:48 CST  Kavitha Godoy  [facility]

 

 CPT-43913  Ofc Vst, Est Level II   17:30:36 CST  Kavitha Godoy  [facility]

 

 CPT-39184  Ofc Vst, Est Level III   17:20:59 CST  Kavitha Godoy  [facility]

 

 CPT-55387  Ofc Vst, Est Level III   17:49:24 CST  Kavitha Godoy  [facility]

 

 CPT-22585  Ofc Vst, New Level III   17:10:24 CST  Kavitha Godoy  [facility] [place]

 

 CPT-82151  Ofc Vst, New Level IV   17:21:37 CST  Kavitha Godoy  [facility] [place]







Procedures







 Code  Procedure Name  Date  Entry Date  Standard Description

 

 CPT-  Medicare Annual Wellness Visit   15:52:28 CDT  
   

 

 CPT-  E-Prescribing Medication Sent   19:42:36 CDT   
  

 

 CPT-  E-Prescribing Not sent due to no medication given   14:28:
10 CST     

 

 CPT-  E-Prescribing Not sent due to no medication given   15:20:
04 CST     

 

 CPT-  E-Prescribing Not sent due to no medication given  2013/10/15 14:46:
42 CDT  2013/10/15   

 

 CPT-  E-Prescribing Not sent due to no medication given   13:31:
14 CDT     

 

 CPT-  E-Prescribing Medication Sent   16:36:19 CDT  2013/07/10 
  

 

 CPT-  Medicare Annual Wellness Visit Initial   16:36:19 CDT  
2013/07/10   

 

 CPT-  E-Prescribing Not sent due to no medication given  2013/04/10 16:26:
21 CDT     

 

 CPT-  E-Prescribing Medication Sent   16:02:31 CST   
  

 

 CPT-  E-Prescribing Not sent due to no medication given   13:01:
46 CST     

 

 CPT-  E-Prescribing Not sent due to no medication given   10:43:
37 CDT     

 

 CPT-20919  Injection, Pneumovax   14:03:27 CDT     

 

 CPT-04374  Ear Wax Removal  2008/10/17 11:05:06 CDT  2008/10/17   

 

 CPT-79922  Ear Wax Removal   10:22:58 CDT     

 

 CPT-64893  Cryopathy Skin   09:39:02 CDT     

 

 CPT-67690  Ear Wax Removal   17:30:36 CST     

 

 CPT-89889  Ear Wax Removal   17:20:59 CST  
[facility]

 Created on: 2017



[place]  [place]

External Reference #: [id number]

: 1945

Sex: Female



Demographics







 Address  [address] [place]  [address]

 

 Preferred Language  Unknown

 

 Marital Status  Unknown

 

 Gnosticism Affiliation  Unknown

 

 Race  Unknown

 

 Ethnic Group  Unknown





Author







 Author  LEANDER FREIRE

 

 Organization  [facility]

 

 Address  [address] [place]  [address]



 

 Phone  [phone]







Care Team Providers







 Care Team Member Name  Role  Phone

 

 LEANDER FREIRE  Unavailable  [phone]







PROBLEMS







 Type  Condition  ICD9-CM Code  BBF09-KN Code  Onset Dates  Condition Status  
SNOMED Code

 

 Problem  Type 2 diabetes mellitus with diabetic polyneuropathy     E11.42     
Active  14402230

 

 Problem  Lymphedema     I89.0     Active  117255874

 

 Problem  Immune thrombocytopenic purpura     D69.3     Active  180668406

 

 Problem  GERD (gastroesophageal reflux disease)     K21.9     Active  137145340

 

 Problem  Avascular necrosis of bone of right hip     M87.051     Active  
[id number]

 

 Problem  Hypertension     I10     Active  63404076

 

 Problem  Asthma     J45.909     Active  430399974

 

 Problem  History of MI (myocardial infarction)     I25.2     Active  915181403

 

 Problem  History of kidney stones     Z87.442     Active  135324478

 

 Problem  Hyperlipidemia, unspecified hyperlipidemia     E78.5     Active  
91206135

 

 Problem  Coronary artery disease     I25.10     Active  49578006







ALLERGIES

No Known Allergies



SOCIAL HISTORY

No smoking Hx information available



PLAN OF CARE





VITAL SIGNS





MEDICATIONS

No Known Medications



RESULTS

No Results



PROCEDURES

No Known procedures



IMMUNIZATIONS

No Known Immunizations
07-Jul-2022
